# Patient Record
Sex: FEMALE | Race: WHITE | Employment: UNEMPLOYED | URBAN - METROPOLITAN AREA
[De-identification: names, ages, dates, MRNs, and addresses within clinical notes are randomized per-mention and may not be internally consistent; named-entity substitution may affect disease eponyms.]

---

## 2019-03-11 LAB
ANTIBODY SCREEN, EXTERNAL: NEGATIVE
HBSAG, EXTERNAL: NEGATIVE
HIV, EXTERNAL: NONREACTIVE
RUBELLA, EXTERNAL: NORMAL
T. PALLIDUM, EXTERNAL: NEGATIVE
TYPE, ABO & RH, EXTERNAL: NORMAL

## 2019-07-03 ENCOUNTER — OFFICE VISIT (OUTPATIENT)
Dept: FAMILY MEDICINE CLINIC | Age: 29
End: 2019-07-03

## 2019-07-03 VITALS
TEMPERATURE: 98.4 F | HEIGHT: 61 IN | DIASTOLIC BLOOD PRESSURE: 70 MMHG | SYSTOLIC BLOOD PRESSURE: 109 MMHG | BODY MASS INDEX: 42.86 KG/M2 | OXYGEN SATURATION: 99 % | RESPIRATION RATE: 16 BRPM | HEART RATE: 86 BPM | WEIGHT: 227 LBS

## 2019-07-03 DIAGNOSIS — Z34.93 PRENATAL CARE IN THIRD TRIMESTER: Primary | ICD-10-CM

## 2019-07-03 DIAGNOSIS — O09.33 LATE PRENATAL CARE COMPLICATING PREGNANCY IN THIRD TRIMESTER: ICD-10-CM

## 2019-07-03 DIAGNOSIS — E66.01 SEVERE OBESITY (HCC): ICD-10-CM

## 2019-07-03 LAB
BILIRUB UR QL STRIP: NEGATIVE
GLUCOSE UR-MCNC: NEGATIVE MG/DL
GRBS, EXTERNAL: NEGATIVE
KETONES P FAST UR STRIP-MCNC: NEGATIVE MG/DL
PH UR STRIP: 7.5 [PH] (ref 4.6–8)
PROT UR QL STRIP: NEGATIVE
SP GR UR STRIP: 1.01 (ref 1–1.03)
UA UROBILINOGEN AMB POC: NORMAL (ref 0.2–1)
URINALYSIS CLARITY POC: CLEAR
URINALYSIS COLOR POC: YELLOW
URINE BLOOD POC: NORMAL
URINE LEUKOCYTES POC: NEGATIVE
URINE NITRITES POC: NEGATIVE

## 2019-07-03 NOTE — PROGRESS NOTES
Chief Complaint   Patient presents with    Routine Prenatal Visit     [address]-  Receiving care    lmp--10/07/2018    Abnormal bleeding or discharge--no bleeding --white discharge --a lot of itching    Last pap smear--at 4 months pregnant--normal results    --    HAD 2 MISCARRIAGES     2 VAGINAL AND 2 C SECTIONS--LAST TWO CHILDREN    ALL BABIES FULL TERM    DUE DATE--,9879    VCU--3X ULTRA SOUND--LAST ONE A MONTH AGO

## 2019-07-03 NOTE — PROGRESS NOTES
34 yo  at 45 w 3 d presents for first visit to our office    LENIN    Had been receiving prenatal care at the [facility]     CD x 2 (her last two CDs)    States that she is not taking her prenatal vitamins    States that she has a headache all the time    Sami speaking, requests female provider; from [facility]    Denies being on any other medications    Last Hgb = 10.8 on   TDAP given 19    ASCUS with negative HR HPV 3/19    B +/neg  Rubella immune  TPA neg  UC 10,000-50,000  HgbB s Ag neg  HIV NR  GC/chl neg  Early glucola 3/14/19    One hour  glucola = 109      Repeat CD scheduled 2019 -- 9:30 am -- Dr. Jyoti Kimble (notified)    Pt advised to arrive at 6 am and to fast after midngiht

## 2019-07-03 NOTE — PATIENT INSTRUCTIONS
[address] 39 ãä ÇáÍãá: ÅÑÔÇÏÇÊ ÇáÑÚÇíÉ  [ Week 44 of Your Pregnancy: Care Instructions ]  ÅÑÔÇÏÇÊ ÇáÑÚÇíÉ ÇáÎÇÕÉ Èß      XHNR åÐå ÇáÃÓÇÈíÚ ÇáäåÇÆíÉ¡ ÞÏ ÊÔÚÑíä PMSZCHM áÑÄíÉ ØÝáß ÇáÌÏíÏÉ. ÚÇÏÉð íÈÏæ ÇáãæÇáíÏ ÇáÌÏÏ ãÎÊáÝíä ÚãÇ ÊÔÇåÏíäå Ýí ÇáÕæÑ Ãæ ÇáÃÝáÇã. ÈÚÏ ÇáæáÇÏÉ ãÈÇÔÑÉð¡ íãßä Ãä íßæä Ôßá ÑÄæÓåã ÛÑíÈðÇ. íãßä Ãä Êßæä ÃÚíäåã ãäÊÝÎÉ. æíãßä Ãä Êßæä YMQJAZZ SHNHLVELL ãÊæÑãÉ. ßãÇ íãßä Ãä Êßæä ÈÔÑÊåã ÔÏíÏÉ ÇáÌÝÇÝ¡ Ãæ ÚáÇãÇÊ ÍãÑÇÁ Úáì ÇáÌÝæä¡ Ãæ ÇáÃäÝ¡ Ãæ ÇáÑÞÈÉ. æãÚ Ðáß áÇ íÒÇá ÃÛáÈ ÇáÂÈÇÁ íÚÊÞÏæä Ãä RDCNMTF Ðæí Ôßá Ìãíá. ÊõÚÏ ÑÚÇíÉ ÇáãÊÇÈÚÉ ÌÒÁðÇ ÃÓÇÓíðÇ Ýí ÚáÇÌß æÓáÇãÊß. ÝÚáíß ÇáÍÑÕ Úáì ÊÑÊíÈ ÌãíÚ ãæÇÚíÏ ÒíÇÑÉ ÇáØÈíÈ ZEPMQVFKY ÈåÇ¡ FHWMFWIQ ÈØÈíÈß ÚäÏ RFJHYNNG ãä Ãí ãÔßáÇÊ. æãä ÇáÌíÏ ÃíÖðÇ Ãä ÊÚÑÝ äÊÇÆÌ GXDCWBRY æßÐáß EAHSDPXC ÈÞÇÆãÉ ÇáÃÏæíÉ ÇáÊí WKMNDSFL. ßíÝ íãßäß ÇáÇÚÊäÇÁ ÈäÝÓß Ýí ZKJGCH¿  LRMBALSGC ááÅÝØÇÑ   · ÅÐÇ ßäÊö ÊÞæãíä ÈÇáÑÖÇÚÉ ÇáØÈíÚíÉ¡ ÝÇÓÊãÑí Ýí ÊäÇæá ÃØÚãÉ ÕÍíÉ.  · ÊÌäÈí TVOICHSXR HOBTJBLD¡ æÇáÓÌÇÆÑ¡ æÇáãÎÏÑÇÊ. íÊÖãä Ðáß ÇáÃÏæíÉ ÇáãÞÑÑÉ ÈæÕÝÉ ØÈíÉ Ãæ ãÊÇÍÉ ÈÏæä æÕÝÉ ØÈíÉ.  · íãßäß ÇáãÓÇÚÏÉ Ýí ÇáæÞÇíÉ ãä ÇáÊåÇÈ ÇáÍáãÇÊ ÅÐÇ ßäÊö ÊÑÖÚíä ØÝáß Ýí ÇáæÖÚ ÇáÕÍíÍ. ÓÊÓÇÚÏß ÇáããÑÖÉ Ýí ÊÚáã ÇáÞíÇã ÈÐáß.  · íÍÊÇÌ ÇáãæáæÏ ÇáÌÏíÏ Åáì ÇáÑÖÇÚÉ ßá ÓÇÚÉ æäÕÝ ÇáÓÇÚÉ Åáì 3 ÓÇÚÇÊ ÊÞÑíÈðÇ. ÇÎÊÇÑí æÓíáÉ ÊäÙíã ÇáÍãá ÇáãäÇÓÈÉ ÈÚÏ æáÇÏÉ ØÝáß   · áÇ íÒÇá ãä Çáããßä Íãá ÇáÓíÏÇÊ ÇáÇÊí ÊÑÖÚä ÑÖÇÚÉ ØÈíÚíÉ. ÇÓÊÎÏãí æÓÇÆá ÊäÙíã GSUYS ÅÐÇ ßäÊö áÇ ÊÑÛÈíä Ýí ÇáÍãá.  · íõÚÊÈÑ FTDAKE ÇáÑÍãí (IUDs) ÝÚøÇá ááÓíÏÇÊ ÇááÇÊí ÊÑÛÈä Ýí ÇáÇäÊÙÇÑ áãÏÉ áÇ ÊÞá Úä ÓäÊíä ÞÈá ÇáÍãá ãÑÉ ÃÎÑì. ßãÇ Ãä KDMLCWCZ Âãä ÃËäÇÁ ÇáÑÖÇÚÉ ÇáØÈíÚíÉ.  · íãßä ÇÓÊÎÏÇã \"ÏíÈæ ÈÑæÝíÑÇ\" ÃËäÇÁ ÇáÑÖÇÚÉ ÇáØÈíÚíÉ. ÅäåÇ ÍÞäÉ ÊõÃÎÐ ßá 3 ÃÔåÑ.  · ßãÇ Ãä ÃÞÑÇÕ ÊäÙíã ÇáÍãá ÝÚÇáÉ. áßäß ÊÍÊÇÌíä Åáì äæÚ ãÎÊáÝ ãä ÇáÃÞÑÇÕ ÃËäÇÁ ÇáÑÖÇÚÉ ÇáØÈíÚíÉ. æÚäÏãÇ ÊÈÏÃíä Ýí ÊäÇæá åÐå ÇáÃÞÑÇÕ¡ íÊÚíä Úáíßö ÇáÊÃßÏ ãä ÇÓÊÎÏÇã äæÚðÇ ÂÎÑ ãä æÓÇÆá ÊäÙíã ÇáÍãá ÍÊì ÊÈÏÃíä ÇáãÌãæÚÉ ÇáËÇäíÉ.    · íÚÊÈÑ HKRAZB ÇáãÇäÚ DBDMI¡ æÛØÇÁ ÚäÞ ÇáÑÍã¡ æÚãáíÇÊ ÒÑÚ ÞäÇÉ ÝÇáæÈ¡ CVDGNPVMT ÐÇÊ ãÈíÏ ÇáäØÇÝ ãä CJGNIVA DKXDZUW VTB SIIWUSI. ÅÐÇ ßÇä áÏíßö ÇáÍÌÇÈ ÇáãÇäÚ UFGTK Ãæ ÛØÇÁ ÚäÞ ÇáÑÍã¡ ÝÓÊÍÊÇÌíä Åáì ÊÌÏíÏå.  · ÑÈØ ÞäÇÉ ÝÇáæÈ (ÑÈØ ÇáÞäæÇÊ áÏíßö) æÞØÚ ÇáÞäÇÉ ÇáãäæíÉ ßáÇåãÇ Íáæá ÏÇÆãÉ. ÅäåÇ ÎíÇÑÇÊ ÌíÏÉ ÅÐÇ ßäÊö æÇËÞÉ ãä Ãäß ÇßÊÝíÊ ãä ÇáÅäÌÇÈ. Ãíä íãßä ãÚÑÝÉ ÇáãÒíÏ¿  ÇäÊÞÇá Åáì http://www.[employer]/. ÏÎæá A811 íãßäß ãÚÑÝÉ ÇáãÒíÏ ãä ÎáÇá ãÑÈÚ ÇáÈÍË \"ÇáÃÓÈæÚ 44 ãä ÇáÍãá: ÅÑÔÇÏÇÊ ÇáÑÚÇíÉ - [ Week 44 of Your Pregnancy: Care Instructions ]. \"  © 4857-5583 [employer]wise, Incorporated. ÊãÊ ÊåíÆÉ ÅÑÔÇÏÇÊ ÇáÚäÇíÉ ÈãæÌÈ ÊÑÎíÕ ãä ãÎÊÕ ÇáÑÚÇíÉ ÇáÕÍíÉ áÏíß. ÅÐÇ ßÇäÊ áÏíß ÃíÉ YLTNPUTHI Úä ÍÇáÉ ØÈíÉ Ãæ Ãí ãä åÐå FQSCIAOGF¡ ÝÊæÌå ÏæãðÇ UXEXFAR Åáì ãÎÊÕ ÇáÑÚÇíÉ ÇáÕÍíÉ. ÊäÝí ãäÙãÉ [employer] ÖãÇä Ãæ Jenna Pinedo NHGTPEC åÐå GXBVSTHNY.   ÅÕÏÇÑ ÇáãÍÊæì: 11.9 ãÍÏøË HVEGTKXA ãä: 25 HO QMOTD [id number]

## 2019-07-03 NOTE — PROGRESS NOTES
Subjective:      Chiquita Cortes is a 33 y.o. female who is being seen today for her first obstetrical visit. She is a 32 yo  at .39w2d. She has had prenatal care at [address] [place] [address]/ [facility]. Has has 2 prior C- section. Last C- section was done in [facility] on 2017. They state it is not possible to get records from [facility] because they did the C- section in a clinic and they don't keep records. Pregnancy complicated with hypertension, h/o migraines headaches. Plan of delivery was for  at 39 weeks    No LOF, VB, dysuria,  + mari damian contractions. + vaginal discharge but not itching, no swelling of legs today ( but happened last month)   + Fetal movements. She is not taking prenatal vitamins ( makes her vomit). + headaches, upper abdominal area pain, sometimes SOB at night. No other complaints today. The ROS otherwise negative        All babies delivered full term. Allergies- reviewed:   No Known Allergies      Medications- reviewed:   No current outpatient medications on file. No current facility-administered medications for this visit. Past Medical History- reviewed:  History reviewed. No pertinent past medical history. Past Surgical History- reviewed:   History reviewed. No pertinent surgical history.       Social History- reviewed:  Social History     Socioeconomic History    Marital status: UNKNOWN     Spouse name: Not on file    Number of children: Not on file    Years of education: Not on file    Highest education level: Not on file   Occupational History    Not on file   Social Needs    Financial resource strain: Not on file    Food insecurity:     Worry: Not on file     Inability: Not on file    Transportation needs:     Medical: Not on file     Non-medical: Not on file   Tobacco Use    Smoking status: Never Smoker    Smokeless tobacco: Never Used   Substance and Sexual Activity    Alcohol use: Not on file    Drug use: Never    Sexual activity: Never   Lifestyle    Physical activity:     Days per week: Not on file     Minutes per session: Not on file    Stress: Not on file   Relationships    Social connections:     Talks on phone: Not on file     Gets together: Not on file     Attends Evangelical service: Not on file     Active member of club or organization: Not on file     Attends meetings of clubs or organizations: Not on file     Relationship status: Not on file    Intimate partner violence:     Fear of current or ex partner: Not on file     Emotionally abused: Not on file     Physically abused: Not on file     Forced sexual activity: Not on file   Other Topics Concern    Not on file   Social History Narrative    Not on file         Immunizations- reviewed: There is no immunization history on file for this patient. Tdap done this pregnancy per patient.       ROS  : Denies vaginal bleeding and leaking of fluid  GI: Endorses occasional nausea and vomiting      Objective:     Visit Vitals  /70   Pulse 86   Temp 98.4 °F (36.9 °C) (Oral)   Resp 16   Ht 5' 0.63\" (1.54 m)   Wt 227 lb (103 kg)   LMP 10/07/2018   SpO2 99%   BMI 43.42 kg/m²       Physical Exam:  GENERAL APPEARANCE: alert, well appearing, in no apparent distress  HEAD: normocephalic, atraumatic   LUNGS: clear to auscultation, no wheezes, rales or rhonchi, symmetric air entry  HEART: regular rate and rhythm, no murmurs  ABDOMEN: fundus soft, nontender 38 cm and FHT present 140  BACK: no CVA tenderness  Pelvic exam: refused       Labs:    Recent Results (from the past 12 hour(s))   AMB POC URINALYSIS DIP STICK AUTO W/O MICRO    Collection Time: 07/03/19  8:29 AM   Result Value Ref Range    Color (UA POC) Yellow     Clarity (UA POC) Clear     Glucose (UA POC) Negative Negative    Bilirubin (UA POC) Negative Negative    Ketones (UA POC) Negative Negative    Specific gravity (UA POC) 1.015 1.001 - 1.035    Blood (UA POC) 2+ Negative    pH (UA POC) 7.5 4.6 - 8.0    Protein (UA POC) Negative Negative    Urobilinogen (UA POC) 0.2 mg/dL 0.2 - 1    Nitrites (UA POC) Negative Negative    Leukocyte esterase (UA POC) Negative Negative         Assessment:     32 yo [id number] at 39.2 by LMP and confirmed on 2nd trimester US    PNL : B rhesus Positive, antibody screen neg. TPA, hep B sAg, HIV, GC  neg,   delined GBS  Normal 1hour glucola: 73  Electrophoresis Hb: AA  Rubella immune, Varicella IGG: low on 19  Hb: 10.9 on   Pap on 3/14/19 : ASCUS on 3/14/19    4/22 US anterior placental, EFW: 59% Oni 22.1 cm      ICD-10-CM ICD-9-CM    1. Prenatal care in third trimester Z34.93 V22.1 AMB POC URINALYSIS DIP STICK AUTO W/O MICRO   2. Late prenatal care complicating pregnancy in third trimester O09.33 V23.7    3. BMI 40.0-44.9, adult [facility]) Z68.41 V85.41          Plan:   · Patient discussed with Dr. Ludy Hurley. She will arrange for her to get a C- section. Patient would prefer care with a female provider. Dr. Ludy Hurley will take over care.  will be scheduled on   · GBS not collected. Discussed the patient's BMI with her.     Vietnamese  # [id number]   Orders Placed This Encounter    AMB POC URINALYSIS DIP STICK AUTO W/O MICRO

## 2019-07-07 LAB — B-HEM STREP SPEC QL CULT: NEGATIVE

## 2019-07-08 ENCOUNTER — ANESTHESIA EVENT (OUTPATIENT)
Dept: LABOR AND DELIVERY | Age: 29
DRG: 540 | End: 2019-07-08
Payer: MEDICAID

## 2019-07-08 ENCOUNTER — ANESTHESIA (OUTPATIENT)
Dept: LABOR AND DELIVERY | Age: 29
DRG: 540 | End: 2019-07-08
Payer: MEDICAID

## 2019-07-08 ENCOUNTER — HOSPITAL ENCOUNTER (INPATIENT)
Age: 29
LOS: 3 days | Discharge: HOME | DRG: 540 | End: 2019-07-11
Attending: OBSTETRICS & GYNECOLOGY | Admitting: OBSTETRICS & GYNECOLOGY
Payer: MEDICAID

## 2019-07-08 DIAGNOSIS — Z98.891 S/P REPEAT LOW TRANSVERSE C-SECTION: Primary | ICD-10-CM

## 2019-07-08 LAB
ALBUMIN SERPL-MCNC: 2.6 G/DL (ref 3.5–5)
ALBUMIN/GLOB SERPL: 0.6 {RATIO} (ref 1.1–2.2)
ALP SERPL-CCNC: 136 U/L (ref 45–117)
ALT SERPL-CCNC: 10 U/L (ref 12–78)
ANION GAP SERPL CALC-SCNC: 8 MMOL/L (ref 5–15)
AST SERPL-CCNC: 20 U/L (ref 15–37)
BASOPHILS # BLD: 0 K/UL (ref 0–0.1)
BASOPHILS NFR BLD: 0 % (ref 0–1)
BILIRUB SERPL-MCNC: 0.3 MG/DL (ref 0.2–1)
BUN SERPL-MCNC: 5 MG/DL (ref 6–20)
BUN/CREAT SERPL: 13 (ref 12–20)
CALCIUM SERPL-MCNC: 8.9 MG/DL (ref 8.5–10.1)
CHLORIDE SERPL-SCNC: 107 MMOL/L (ref 97–108)
CO2 SERPL-SCNC: 23 MMOL/L (ref 21–32)
CREAT SERPL-MCNC: 0.4 MG/DL (ref 0.55–1.02)
DIFFERENTIAL METHOD BLD: ABNORMAL
EOSINOPHIL # BLD: 0.1 K/UL (ref 0–0.4)
EOSINOPHIL NFR BLD: 1 % (ref 0–7)
ERYTHROCYTE [DISTWIDTH] IN BLOOD BY AUTOMATED COUNT: 15.7 % (ref 11.5–14.5)
GLOBULIN SER CALC-MCNC: 4.1 G/DL (ref 2–4)
GLUCOSE SERPL-MCNC: 76 MG/DL (ref 65–100)
HCT VFR BLD AUTO: 36.3 % (ref 35–47)
HGB BLD-MCNC: 11.4 G/DL (ref 11.5–16)
IMM GRANULOCYTES # BLD AUTO: 0.1 K/UL (ref 0–0.04)
IMM GRANULOCYTES NFR BLD AUTO: 1 % (ref 0–0.5)
LYMPHOCYTES # BLD: 1.7 K/UL (ref 0.8–3.5)
LYMPHOCYTES NFR BLD: 28 % (ref 12–49)
MCH RBC QN AUTO: 24.3 PG (ref 26–34)
MCHC RBC AUTO-ENTMCNC: 31.4 G/DL (ref 30–36.5)
MCV RBC AUTO: 77.2 FL (ref 80–99)
MONOCYTES # BLD: 0.5 K/UL (ref 0–1)
MONOCYTES NFR BLD: 9 % (ref 5–13)
NEUTS SEG # BLD: 3.7 K/UL (ref 1.8–8)
NEUTS SEG NFR BLD: 61 % (ref 32–75)
NRBC # BLD: 0 K/UL (ref 0–0.01)
NRBC BLD-RTO: 0 PER 100 WBC
PLATELET # BLD AUTO: 199 K/UL (ref 150–400)
PMV BLD AUTO: 11.2 FL (ref 8.9–12.9)
POTASSIUM SERPL-SCNC: 4.4 MMOL/L (ref 3.5–5.1)
PROT SERPL-MCNC: 6.7 G/DL (ref 6.4–8.2)
RBC # BLD AUTO: 4.7 M/UL (ref 3.8–5.2)
SODIUM SERPL-SCNC: 138 MMOL/L (ref 136–145)
WBC # BLD AUTO: 6 K/UL (ref 3.6–11)

## 2019-07-08 PROCEDURE — 77030018809 HC RETRCTR ALXSO DISP AMR -B

## 2019-07-08 PROCEDURE — 3E0R3BZ INTRODUCTION OF ANESTHETIC AGENT INTO SPINAL CANAL, PERCUTANEOUS APPROACH: ICD-10-PCS | Performed by: ANESTHESIOLOGY

## 2019-07-08 PROCEDURE — 65270000029 HC RM PRIVATE

## 2019-07-08 PROCEDURE — 77010026065 HC OXYGEN MINIMUM MEDICAL AIR: Performed by: OBSTETRICS & GYNECOLOGY

## 2019-07-08 PROCEDURE — 74011000250 HC RX REV CODE- 250

## 2019-07-08 PROCEDURE — 36415 COLL VENOUS BLD VENIPUNCTURE: CPT

## 2019-07-08 PROCEDURE — 77030007866 HC KT SPN ANES BBMI -B: Performed by: ANESTHESIOLOGY

## 2019-07-08 PROCEDURE — 76010000392 HC C SECN EA ADDL 0.5 HR: Performed by: OBSTETRICS & GYNECOLOGY

## 2019-07-08 PROCEDURE — 85025 COMPLETE CBC W/AUTO DIFF WBC: CPT

## 2019-07-08 PROCEDURE — 74011250636 HC RX REV CODE- 250/636

## 2019-07-08 PROCEDURE — 77030008459 HC STPLR SKN COOP -B

## 2019-07-08 PROCEDURE — 86900 BLOOD TYPING SEROLOGIC ABO: CPT

## 2019-07-08 PROCEDURE — 74011250636 HC RX REV CODE- 250/636: Performed by: OBSTETRICS & GYNECOLOGY

## 2019-07-08 PROCEDURE — 80053 COMPREHEN METABOLIC PANEL: CPT

## 2019-07-08 PROCEDURE — 75410000003 HC RECOV DEL/VAG/CSECN EA 0.5 HR: Performed by: OBSTETRICS & GYNECOLOGY

## 2019-07-08 PROCEDURE — 74011250636 HC RX REV CODE- 250/636: Performed by: ANESTHESIOLOGY

## 2019-07-08 PROCEDURE — 74011250636 HC RX REV CODE- 250/636: Performed by: STUDENT IN AN ORGANIZED HEALTH CARE EDUCATION/TRAINING PROGRAM

## 2019-07-08 PROCEDURE — 77030018846 HC SOL IRR STRL H20 ICUM -A

## 2019-07-08 PROCEDURE — 74011250637 HC RX REV CODE- 250/637: Performed by: STUDENT IN AN ORGANIZED HEALTH CARE EDUCATION/TRAINING PROGRAM

## 2019-07-08 PROCEDURE — 77030018836 HC SOL IRR NACL ICUM -A

## 2019-07-08 PROCEDURE — 76010000391 HC C SECN FIRST 1 HR: Performed by: OBSTETRICS & GYNECOLOGY

## 2019-07-08 PROCEDURE — 76060000078 HC EPIDURAL ANESTHESIA: Performed by: OBSTETRICS & GYNECOLOGY

## 2019-07-08 PROCEDURE — 77030034850

## 2019-07-08 PROCEDURE — 77030032490 HC SLV COMPR SCD KNE COVD -B

## 2019-07-08 PROCEDURE — 77030033542 HC RETRCTR RETNTS PANICLS GQSM -B

## 2019-07-08 RX ORDER — HYDROMORPHONE HYDROCHLORIDE 1 MG/ML
0.5 INJECTION, SOLUTION INTRAMUSCULAR; INTRAVENOUS; SUBCUTANEOUS
Status: DISPENSED | OUTPATIENT
Start: 2019-07-08 | End: 2019-07-09

## 2019-07-08 RX ORDER — SODIUM CHLORIDE 0.9 % (FLUSH) 0.9 %
5-40 SYRINGE (ML) INJECTION EVERY 8 HOURS
Status: DISCONTINUED | OUTPATIENT
Start: 2019-07-08 | End: 2019-07-11

## 2019-07-08 RX ORDER — IBUPROFEN 800 MG/1
800 TABLET ORAL EVERY 8 HOURS
Status: DISCONTINUED | OUTPATIENT
Start: 2019-07-08 | End: 2019-07-11 | Stop reason: HOSPADM

## 2019-07-08 RX ORDER — MORPHINE SULFATE 0.5 MG/ML
INJECTION, SOLUTION EPIDURAL; INTRATHECAL; INTRAVENOUS AS NEEDED
Status: DISCONTINUED | OUTPATIENT
Start: 2019-07-08 | End: 2019-07-08 | Stop reason: HOSPADM

## 2019-07-08 RX ORDER — EPHEDRINE SULFATE 50 MG/ML
INJECTION, SOLUTION INTRAVENOUS AS NEEDED
Status: DISCONTINUED | OUTPATIENT
Start: 2019-07-08 | End: 2019-07-08 | Stop reason: HOSPADM

## 2019-07-08 RX ORDER — ACETAMINOPHEN 325 MG/1
650 TABLET ORAL
Status: DISCONTINUED | OUTPATIENT
Start: 2019-07-08 | End: 2019-07-11 | Stop reason: HOSPADM

## 2019-07-08 RX ORDER — SODIUM CHLORIDE, SODIUM LACTATE, POTASSIUM CHLORIDE, CALCIUM CHLORIDE 600; 310; 30; 20 MG/100ML; MG/100ML; MG/100ML; MG/100ML
1000 INJECTION, SOLUTION INTRAVENOUS CONTINUOUS
Status: DISCONTINUED | OUTPATIENT
Start: 2019-07-08 | End: 2019-07-08 | Stop reason: HOSPADM

## 2019-07-08 RX ORDER — CEFAZOLIN SODIUM/WATER 2 G/20 ML
2 SYRINGE (ML) INTRAVENOUS ONCE
Status: COMPLETED | OUTPATIENT
Start: 2019-07-08 | End: 2019-07-08

## 2019-07-08 RX ORDER — SODIUM CHLORIDE 0.9 % (FLUSH) 0.9 %
5-40 SYRINGE (ML) INJECTION EVERY 8 HOURS
Status: DISCONTINUED | OUTPATIENT
Start: 2019-07-08 | End: 2019-07-08 | Stop reason: HOSPADM

## 2019-07-08 RX ORDER — HYDROMORPHONE HYDROCHLORIDE 2 MG/ML
1 INJECTION, SOLUTION INTRAMUSCULAR; INTRAVENOUS; SUBCUTANEOUS
Status: DISPENSED | OUTPATIENT
Start: 2019-07-08 | End: 2019-07-09

## 2019-07-08 RX ORDER — DIPHENHYDRAMINE HYDROCHLORIDE 50 MG/ML
12.5 INJECTION, SOLUTION INTRAMUSCULAR; INTRAVENOUS
Status: DISCONTINUED | OUTPATIENT
Start: 2019-07-08 | End: 2019-07-09

## 2019-07-08 RX ORDER — KETOROLAC TROMETHAMINE 30 MG/ML
30 INJECTION, SOLUTION INTRAMUSCULAR; INTRAVENOUS
Status: DISPENSED | OUTPATIENT
Start: 2019-07-08 | End: 2019-07-09

## 2019-07-08 RX ORDER — OXYTOCIN 10 [USP'U]/ML
INJECTION, SOLUTION INTRAMUSCULAR; INTRAVENOUS AS NEEDED
Status: DISCONTINUED | OUTPATIENT
Start: 2019-07-08 | End: 2019-07-08 | Stop reason: HOSPADM

## 2019-07-08 RX ORDER — SODIUM CHLORIDE 0.9 % (FLUSH) 0.9 %
5-40 SYRINGE (ML) INJECTION AS NEEDED
Status: DISCONTINUED | OUTPATIENT
Start: 2019-07-08 | End: 2019-07-08 | Stop reason: HOSPADM

## 2019-07-08 RX ORDER — SODIUM CHLORIDE 0.9 % (FLUSH) 0.9 %
5-40 SYRINGE (ML) INJECTION AS NEEDED
Status: DISCONTINUED | OUTPATIENT
Start: 2019-07-08 | End: 2019-07-11 | Stop reason: HOSPADM

## 2019-07-08 RX ORDER — FENTANYL CITRATE 50 UG/ML
INJECTION, SOLUTION INTRAMUSCULAR; INTRAVENOUS AS NEEDED
Status: DISCONTINUED | OUTPATIENT
Start: 2019-07-08 | End: 2019-07-08 | Stop reason: HOSPADM

## 2019-07-08 RX ORDER — ONDANSETRON 2 MG/ML
4 INJECTION INTRAMUSCULAR; INTRAVENOUS
Status: DISCONTINUED | OUTPATIENT
Start: 2019-07-08 | End: 2019-07-11 | Stop reason: HOSPADM

## 2019-07-08 RX ORDER — OXYTOCIN/RINGER'S LACTATE 20/1000 ML
125-500 PLASTIC BAG, INJECTION (ML) INTRAVENOUS ONCE
Status: ACTIVE | OUTPATIENT
Start: 2019-07-08 | End: 2019-07-08

## 2019-07-08 RX ORDER — SIMETHICONE 80 MG
80 TABLET,CHEWABLE ORAL AS NEEDED
Status: DISCONTINUED | OUTPATIENT
Start: 2019-07-08 | End: 2019-07-11 | Stop reason: HOSPADM

## 2019-07-08 RX ORDER — ZOLPIDEM TARTRATE 5 MG/1
5 TABLET ORAL
Status: DISCONTINUED | OUTPATIENT
Start: 2019-07-08 | End: 2019-07-11 | Stop reason: HOSPADM

## 2019-07-08 RX ORDER — ONDANSETRON 2 MG/ML
INJECTION INTRAMUSCULAR; INTRAVENOUS AS NEEDED
Status: DISCONTINUED | OUTPATIENT
Start: 2019-07-08 | End: 2019-07-08 | Stop reason: HOSPADM

## 2019-07-08 RX ORDER — NALOXONE HYDROCHLORIDE 0.4 MG/ML
0.1 INJECTION, SOLUTION INTRAMUSCULAR; INTRAVENOUS; SUBCUTANEOUS
Status: DISCONTINUED | OUTPATIENT
Start: 2019-07-08 | End: 2019-07-11 | Stop reason: HOSPADM

## 2019-07-08 RX ORDER — SODIUM CHLORIDE, SODIUM LACTATE, POTASSIUM CHLORIDE, CALCIUM CHLORIDE 600; 310; 30; 20 MG/100ML; MG/100ML; MG/100ML; MG/100ML
125 INJECTION, SOLUTION INTRAVENOUS CONTINUOUS
Status: DISCONTINUED | OUTPATIENT
Start: 2019-07-08 | End: 2019-07-11 | Stop reason: HOSPADM

## 2019-07-08 RX ORDER — OXYCODONE HYDROCHLORIDE 5 MG/1
10 TABLET ORAL
Status: ACTIVE | OUTPATIENT
Start: 2019-07-08 | End: 2019-07-09

## 2019-07-08 RX ORDER — OXYCODONE HYDROCHLORIDE 5 MG/1
5 TABLET ORAL
Status: ACTIVE | OUTPATIENT
Start: 2019-07-08 | End: 2019-07-09

## 2019-07-08 RX ORDER — FAMOTIDINE 10 MG/ML
INJECTION INTRAVENOUS AS NEEDED
Status: DISCONTINUED | OUTPATIENT
Start: 2019-07-08 | End: 2019-07-08 | Stop reason: HOSPADM

## 2019-07-08 RX ORDER — NALOXONE HYDROCHLORIDE 0.4 MG/ML
0.4 INJECTION, SOLUTION INTRAMUSCULAR; INTRAVENOUS; SUBCUTANEOUS AS NEEDED
Status: DISCONTINUED | OUTPATIENT
Start: 2019-07-08 | End: 2019-07-11 | Stop reason: HOSPADM

## 2019-07-08 RX ORDER — BUPIVACAINE HYDROCHLORIDE 7.5 MG/ML
INJECTION, SOLUTION EPIDURAL; RETROBULBAR AS NEEDED
Status: DISCONTINUED | OUTPATIENT
Start: 2019-07-08 | End: 2019-07-08 | Stop reason: HOSPADM

## 2019-07-08 RX ORDER — HYDROCODONE BITARTRATE AND ACETAMINOPHEN 5; 325 MG/1; MG/1
1 TABLET ORAL
Status: DISCONTINUED | OUTPATIENT
Start: 2019-07-08 | End: 2019-07-11 | Stop reason: HOSPADM

## 2019-07-08 RX ADMIN — SODIUM CHLORIDE, SODIUM LACTATE, POTASSIUM CHLORIDE, AND CALCIUM CHLORIDE: 600; 310; 30; 20 INJECTION, SOLUTION INTRAVENOUS at 10:26

## 2019-07-08 RX ADMIN — MORPHINE SULFATE 100 MCG: 0.5 INJECTION, SOLUTION EPIDURAL; INTRATHECAL; INTRAVENOUS at 09:42

## 2019-07-08 RX ADMIN — FENTANYL CITRATE 90 MCG: 50 INJECTION, SOLUTION INTRAMUSCULAR; INTRAVENOUS at 10:34

## 2019-07-08 RX ADMIN — HYDROCODONE BITARTRATE AND ACETAMINOPHEN 1 TABLET: 5; 325 TABLET ORAL at 21:40

## 2019-07-08 RX ADMIN — FAMOTIDINE 20 MG: 10 INJECTION INTRAVENOUS at 09:38

## 2019-07-08 RX ADMIN — HYDROMORPHONE HYDROCHLORIDE 1 MG: 2 INJECTION INTRAMUSCULAR; INTRAVENOUS; SUBCUTANEOUS at 11:59

## 2019-07-08 RX ADMIN — BUPIVACAINE HYDROCHLORIDE 1.6 ML: 7.5 INJECTION, SOLUTION EPIDURAL; RETROBULBAR at 09:42

## 2019-07-08 RX ADMIN — EPHEDRINE SULFATE 10 MG: 50 INJECTION, SOLUTION INTRAVENOUS at 09:42

## 2019-07-08 RX ADMIN — Medication 2 G: at 09:37

## 2019-07-08 RX ADMIN — FENTANYL CITRATE 10 MCG: 50 INJECTION, SOLUTION INTRAMUSCULAR; INTRAVENOUS at 09:42

## 2019-07-08 RX ADMIN — OXYTOCIN 40 UNITS: 10 INJECTION, SOLUTION INTRAMUSCULAR; INTRAVENOUS at 10:25

## 2019-07-08 RX ADMIN — OXYTOCIN 20 UNITS: 10 INJECTION, SOLUTION INTRAMUSCULAR; INTRAVENOUS at 10:59

## 2019-07-08 RX ADMIN — KETOROLAC TROMETHAMINE 30 MG: 30 INJECTION, SOLUTION INTRAMUSCULAR at 11:38

## 2019-07-08 RX ADMIN — KETOROLAC TROMETHAMINE 30 MG: 30 INJECTION, SOLUTION INTRAMUSCULAR at 16:58

## 2019-07-08 RX ADMIN — SODIUM CHLORIDE, SODIUM LACTATE, POTASSIUM CHLORIDE, AND CALCIUM CHLORIDE 1000 ML: 600; 310; 30; 20 INJECTION, SOLUTION INTRAVENOUS at 07:56

## 2019-07-08 RX ADMIN — SODIUM CHLORIDE, SODIUM LACTATE, POTASSIUM CHLORIDE, AND CALCIUM CHLORIDE: 600; 310; 30; 20 INJECTION, SOLUTION INTRAVENOUS at 09:47

## 2019-07-08 RX ADMIN — MORPHINE SULFATE 4900 MCG: 0.5 INJECTION, SOLUTION EPIDURAL; INTRATHECAL; INTRAVENOUS at 10:34

## 2019-07-08 RX ADMIN — SODIUM CHLORIDE, SODIUM LACTATE, POTASSIUM CHLORIDE, AND CALCIUM CHLORIDE: 600; 310; 30; 20 INJECTION, SOLUTION INTRAVENOUS at 10:55

## 2019-07-08 RX ADMIN — SODIUM CHLORIDE, SODIUM LACTATE, POTASSIUM CHLORIDE, AND CALCIUM CHLORIDE 125 ML/HR: 600; 310; 30; 20 INJECTION, SOLUTION INTRAVENOUS at 15:32

## 2019-07-08 RX ADMIN — DIPHENHYDRAMINE HYDROCHLORIDE 12.5 MG: 50 INJECTION, SOLUTION INTRAMUSCULAR; INTRAVENOUS at 15:31

## 2019-07-08 RX ADMIN — ONDANSETRON 4 MG: 2 INJECTION INTRAMUSCULAR; INTRAVENOUS at 09:34

## 2019-07-08 NOTE — LACTATION NOTE
This note was copied from a baby's chart.  has left for the day. No blue  phone in room, mom being moved to 3 rd floor. Dad understands some english. Mom giving formula now, may try to breast feed once her milk is in.   Given information about breast feeding in Bulgarian. Will visit mom once she is moved and has  phone.

## 2019-07-08 NOTE — PROGRESS NOTES
0800 Patient in room 203 for repeat csection.  at bedside, Deya Valenzuela at bedside fro H&P and assessment. 3364 Dr. Ranjit Tomas at bedside to discuss plan of care. 0232 Patient in [facility] with this RN and . 200 Dr. Prince Dumont called to OR1  per Dr. Ranjit Tomas request.    4376 Dr. Ranjit Tomas leaving OR at this time. MD aware of 655mL QBL. 1116 Patient back in room 203. Dr. Jesus House leaving bedside at this time. 1202 Patient refusing fundal check at this time. Patient educated on importance of fundal checks. 1307 Patient sleeping on stretcher. Family at bedside. [address] Report: Mother    Bedside and Verbal report given to Johny Paul RN (full name & credentials) on this patient, who is now being transferred to MIU (unit) for routine progression of care. Report consisted of patient's Situation, Background, Assessment and Recommendations (SBAR). [place] ID bands were compared with the identification form, and verified with the patient and receiving nurse. Information from the SBAR, Kardex, Intake/Output, MAR and Recent Results and the Ani Report was reviewed with the receiving nurse; opportunity for questions and clarification provided.

## 2019-07-08 NOTE — ANESTHESIA POSTPROCEDURE EVALUATION
Procedure(s):   SECTION. spinal    Anesthesia Post Evaluation      Multimodal analgesia: multimodal analgesia used between 6 hours prior to anesthesia start to PACU discharge  Patient location during evaluation: PACU  Patient participation: complete - patient participated  Level of consciousness: awake and alert  Airway patency: patent  Anesthetic complications: no  Cardiovascular status: acceptable  Respiratory status: acceptable  Hydration status: acceptable        Vitals Value Taken Time   /47 2019 12:46 PM   Temp 37.1 °C (98.7 °F) 2019 12:02 PM   Pulse 86 2019 12:46 PM   Resp 16 2019 12:02 PM   SpO2 93 % 2019 12:49 PM   Vitals shown include unvalidated device data.

## 2019-07-08 NOTE — PROGRESS NOTES
SBAR IN Report: Mother    Verbal report received from Aleah Shook RN (full name & credentials) on this patient, who is now being transferred from  and D (unit) for routine progression of care. The patient is not wearing a green \"Anesthesia-Duramorph\" band. Report consisted of patient's Situation, Background, Assessment and Recommendations (SBAR). [place] ID bands were compared with the identification form, and verified with the patient and transferring nurse. Information from the SBAR, Kardex, Intake/Output and MAR and the Ani Report was reviewed with the transferring nurse; opportunity for questions and clarification provided.

## 2019-07-08 NOTE — L&D DELIVERY NOTE
Delivery Summary    Patient: Chiquita Cortes MRN: [id number]  SSN: [id number]    YOB: 1990  Age: 33 y.o. Sex: female        Information for the patient's :  Elise Maravilla [[id number]]       Labor Events:    Labor: No    Steroids:     Cervical Ripening Date/Time:       Cervical Ripening Type: None   Antibiotics During Labor: No   Rupture Identifier: Sac 1    Rupture Date/Time: 2019 10:24 AM   Rupture Type:     Amniotic Fluid Volume:  Moderate    Amniotic Fluid Description: Clear    Amniotic Fluid Odor: None    Induction: None       Induction Date/Time:        Indications for Induction:      Augmentation: None   Augmentation Date/Time:      Indications for Augmentation:     Labor complications: None       Additional complications:        Delivery Events:  Indications For Episiotomy:     Episiotomy:     Perineal Laceration(s):     Repaired:     Periurethral Laceration Location:      Repaired:     Labial Laceration Location:     Repaired:     Sulcal Laceration Location:     Repaired:     Vaginal Laceration Location:     Repaired:     Cervical Laceration Location:     Repaired:     Repair Suture:     Number of Repair Packets:     Estimated Blood Loss (ml):  ml     Delivery Date: 2019    Delivery Time: 10:24 AM   Delivery Type: , Low Transverse     Details    Trial of Labor: No   Primary/Repeat: Repeat   Priority: Routine   Indications:  Prior Uterine Surgery       Sex:  Male     Gestational Age: 37w0d  Delivery Clinician:  Nova Pearson  Living Status: Living   Delivery Location: L&D OR1          APGARS  One minute Five minutes Ten minutes   Skin color: 0   1        Heart rate: 2   2        Grimace: 2   2        Muscle tone: 2   2        Breathin   2        Totals: 8   9          Presentation: Vertex    Position:   Occiput    Resuscitation Method:  Tactile Stimulation;Suctioning-bulb     Meconium Stained: None      Cord Information: 3 Vessels  Complications: None  Cord around:    Delayed cord clamping? No  Cord clamped date/time:2019 10:25 AM  Disposition of Cord Blood: Discard    Blood Gases Sent?: No    Placenta:  Date/Time: 2019 10:24 AM  Removal: Manual Removal      Appearance: Normal;Intact      Measurements:  Birth Weight: 3.465 kg      Birth Length: 51.4 cm      Head Circumference: 35 cm      Chest Circumference: 31.5 cm     Abdominal Girth: 31 cm    Other Providers:   JARED HIGHTOWER;KARLA REY;ISAURO DICKENS;VALERIO AYALA;NIKO PAREDES;BRANDY KNIGHT;SINCERE WILSON;DEDE MIRANDA, Obstetrician;Primary Nurse;Primary  Nurse; Anesthesiologist;Nursery Nurse;Surgeon Assistant;Scrub Tech;Obstetrician         PNL from [facility]: Bpos, Ab neg, GBS neg, Rubella immune, Varicella IGG: low on 19, RPR neg, HBsAg neg, HIV neg, G/C neg    Group B Strep: No results found for: GRBSEXT, GRBSEXT  Information for the patient's :  Rivka Simmonds, Male Aleksandr Gum [[id number]]   No results found for: ABORH, PCTABR, PCTDIG, BILI, ABORHEXT, ABORH    No results for input(s): PCO2CB, PO2CB, HCO3I, SO2I, IBD, PTEMPI, SPECTI, PHICB, ISITE, IDEV, IALLEN in the last 72 hours.            Celia Patton, DO

## 2019-07-08 NOTE — ROUTINE PROCESS
1700 - using Tocagen  this RN explained pain management and the need to ambulate to the bathroom. Patient declined ambulation . This RN educated patient on risks of staying in the bed. Patient verbalized she would try. Patient motioned while sitting on the edge of the bed she was dizzy, and remained dizzy after a few minutes and did not ambulate to bathroom. Charu care completed, patient resting in bed with family at bedside.

## 2019-07-08 NOTE — H&P
History & Physical    Name: Francia Lugo MRN: [id number]  SSN: [id number]    YOB: 1990  Age: 33 y.o. Sex: female        Subjective:     Estimated Date of Delivery: 19  OB History    Para Term  AB Living   7 4 4 0 2 4   SAB TAB Ectopic Molar Multiple Live Births   2                # Outcome Date GA Lbr Christ/2nd Weight Sex Delivery Anes PTL Lv   7 Current            6 SAB            5 SAB            4 Term            3 Term            2 Term            1 Term                Ms. Curt Carvajal is a 32 yo [id number] at 40w0d that was admitted for scheduled repeat LTCS with no complaints. She has h/o 2  and 2 LTCS performed in [facility]. Previous pregnancy was 2017 LTCS in [facility] complicated by hypertension, left untreated due to patient noncompliance. All prenatal care and labs performed at [place] [address]. Prenatal course was complicated by obesity. Please see prenatal records for details. Patient denies vaginal bleeding. Patient reports good fetal movement and small amount of clear mucus like discharge. Patient denies contractions. Past Medical History:   Diagnosis Date    Complication of anesthesia     general anesthesia for both previous csections    Essential hypertension     last two pregnancies     Past Surgical History:   Procedure Laterality Date    HX  SECTION      x2    HX DILATION AND CURETTAGE       Social History     Occupational History    Not on file   Tobacco Use    Smoking status: Never Smoker    Smokeless tobacco: Never Used   Substance and Sexual Activity    Alcohol use: Not Currently    Drug use: Never    Sexual activity: Not Currently     History reviewed. No pertinent family history. No Known Allergies  Prior to Admission medications    Not on File        Review of Systems: A comprehensive review of systems was negative except for that written in the HPI.     Objective:     Vitals:  Vitals:    19 0759 19 0801 19 0823   BP: 128/68     Pulse: 81     Resp: 16     Temp: 99 °F (37.2 °C)     SpO2:  100%    Weight:   227 lb (103 kg)   Height:   5' 3\" (1.6 m)        Physical Exam:  No distress  CTAB no w/r/r/c  +S1, S2, normal 2+ systolic murmur, best heard left sternal border  Membranes:  Intact  Fetal Heart Rate: Reactive, 140 bpm, no decels  Abdomen non-tender  No swelling in lower extremities    Prenatal Labs:   B+ Ab neg, RPR neg, HbsAg neg, HIV neg, Treponema neg, Rubella immune, G/C neg, GBS neg, VZV low on 4/11/2019    Assessment/Plan:     Ms. Fredy Barrow is a 34 yo [id number] at 40w0d that was admitted for scheduled repeat LTCS with no complaints. Plan:     1. Repeat LTCS to be performed by Dr. Rachel Zhang  2. Group B Strep was neg.     Patient examined and seen with Dr. Alison Guadalupe MD  Family Medicine Resident

## 2019-07-08 NOTE — ANESTHESIA PROCEDURE NOTES
Spinal Block    Start time: 7/8/2019 9:40 AM  End time: 7/8/2019 9:41 AM  Performed by: Kapil Ambrocio MD  Authorized by: Kapil Ambrocio MD     Pre-procedure:   Indications: at surgeon's request and primary anesthetic  Preanesthetic Checklist: patient identified, risks and benefits discussed, anesthesia consent and timeout performed      Spinal Block:   Patient Position:  Seated  Prep Region:  Lumbar  Prep: chlorhexidine      Location:  L3-4  Technique:  Single shot        Needle:   Needle Type:  Pencan  Needle Gauge:  25 G  Attempts:  1      Events: CSF confirmed, no blood with aspiration and no paresthesia        Assessment:  Insertion:  Uncomplicated  Patient tolerance:  Patient tolerated the procedure well with no immediate complications

## 2019-07-08 NOTE — OP NOTES
Operative Note    Name: Ann-Marie Mcmahon   Medical Record Number: [id number]   YOB: 1990  Today's Date: 2019      Pre-operative Diagnosis: Repeat x 3    Post-operative Diagnosis: TLBI    Operation: low transverse  section Procedure(s):   SECTION    Surgeon(s):-  MD Gavin Marie MD    Anesthesia: Spinal    Prophylactic Antibiotics: Ancef  DVT Prophylaxis: Sequential Compression Devices         Fetal Description: pelayo     Birth Information:   Information for the patient's :  Marbin Ashby, Male Beryle Evens [[id number]]   Delivery of a 7 lb 10.2 oz (3.465 kg) male infant on 2019 at 10:24 AM. Apgars were 8  and 9 . Umbilical Cord: 3 Vessels     Umbilical Cord Events: None     Placenta: Manual Removal removal with Normal;Intact appearance. Amniotic Fluid Volume: Moderate     Amniotic Fluid Description:  Clear        Umbilical Cord: 3 vessels present    Placenta:  manual removal    Specimens: none   EBL: 750cc  Implants: none        Complications:  none    Circ-1: Laura Coats RN  Circ-2: Héctor Gautam RN  Scrub Tech-1: Tracey Rick   Surg Asst-1: Norma Perkins    Procedure Detail:      After proper patient identification and consent, the patient was taken to the operating room, where spinal anesthesia was administered and found to be adequate. Srinivasan catheter had been placed using sterile technique. The patient was prepped and draped in the normal sterile fashion. The abdomen was entered using the Pfannenstiel technique. The peritoneum was entered bluntely well superior to the bladder without any apparent injury. An Andrews retractor was placed. Palpation revealed no bowel below the retractor. There were very large vessels noted all over the right side of the MILLIE and under the bladder. Suspicion for accreta - no ultrasound report available. Dr. Syed Flanagan entered the case to help with evaluation.  He was able to take down the adherent bladder flap. I then made a  low transverse uterine incision  made with the scalpel and extended with blunt finger dissection. Amniotomy was performed and the fluid was medium amount clear. The babys head was then delivered atraumatically. The nose and mouth were suctioned. The cord was clamped and cut and the baby was handed off to Nursing staff in attendance. Placenta was manually extracted. Dr. Prince Dumont exited the case after I delivered the placenta. The uterus was wiped clean with a moist lap pad and cleared of all clots and debris. The uterine incision was closed in 2 layers, first with a running locked suture of 0-Vicryl, then with an imbricated layer. Several additional monocryl sutures were placed for added hemostasis. Adequate hemostasis was noted. Both tubes and ovaries appeared normal. The pericolic gutters were then lavaged clean with normal saline. Good hemostasis was again reassured The Andrews retractor was removed. The fascia was closed with 1-PDS in a running fashion. Good hemostasis was assured. The incision was lavaged clean and small bleeders were coagulated with the bovie. The subcutaneous tissue was reapproximated with interrupted chromic suture. The skin was closed with absorbable staples. The patient tolerated the procedure well. Sponge, lap, and needle counts were correct times three and the patient and baby were taken to recovery/postpartum room in stable condition.     Patrice Nicholas MD  July 8, 2019  5:10 PM

## 2019-07-08 NOTE — ANESTHESIA PREPROCEDURE EVALUATION
Relevant Problems   No relevant active problems       Anesthetic History   No history of anesthetic complications            Review of Systems / Medical History  Patient summary reviewed, nursing notes reviewed and pertinent labs reviewed    Pulmonary  Within defined limits                 Neuro/Psych   Within defined limits           Cardiovascular  Within defined limits  Hypertension                Comments: Not on Beta Blocker   GI/Hepatic/Renal  Within defined limits              Endo/Other  Within defined limits           Other Findings              Physical Exam    Airway  Mallampati: II  TM Distance: 4 - 6 cm  Neck ROM: normal range of motion   Mouth opening: Normal     Cardiovascular    Rhythm: regular  Rate: normal         Dental  No notable dental hx       Pulmonary  Breath sounds clear to auscultation               Abdominal  GI exam deferred       Other Findings            Anesthetic Plan    ASA: 2  Anesthesia type: spinal      Post-op pain plan if not by surgeon: intrathecal opiates      Anesthetic plan and risks discussed with: Patient

## 2019-07-08 NOTE — PROGRESS NOTES
Bedside shift change report given to Don Melendez RN (oncoming nurse) by Luis Manuel Richards RN (offgoing nurse). Report included the following information SBAR, Kardex, Intake/Output and MAR.

## 2019-07-09 LAB
ABO + RH BLD: NORMAL
BASOPHILS # BLD: 0 K/UL (ref 0–0.1)
BASOPHILS NFR BLD: 0 % (ref 0–1)
BLOOD GROUP ANTIBODIES SERPL: NORMAL
DIFFERENTIAL METHOD BLD: ABNORMAL
EOSINOPHIL # BLD: 0.1 K/UL (ref 0–0.4)
EOSINOPHIL NFR BLD: 1 % (ref 0–7)
ERYTHROCYTE [DISTWIDTH] IN BLOOD BY AUTOMATED COUNT: 15.7 % (ref 11.5–14.5)
HCT VFR BLD AUTO: 31.2 % (ref 35–47)
HGB BLD-MCNC: 9.7 G/DL (ref 11.5–16)
IMM GRANULOCYTES # BLD AUTO: 0 K/UL (ref 0–0.04)
IMM GRANULOCYTES NFR BLD AUTO: 0 % (ref 0–0.5)
LYMPHOCYTES # BLD: 1.4 K/UL (ref 0.8–3.5)
LYMPHOCYTES NFR BLD: 20 % (ref 12–49)
MCH RBC QN AUTO: 24.3 PG (ref 26–34)
MCHC RBC AUTO-ENTMCNC: 31.1 G/DL (ref 30–36.5)
MCV RBC AUTO: 78.2 FL (ref 80–99)
MONOCYTES # BLD: 0.6 K/UL (ref 0–1)
MONOCYTES NFR BLD: 9 % (ref 5–13)
NEUTS SEG # BLD: 4.8 K/UL (ref 1.8–8)
NEUTS SEG NFR BLD: 70 % (ref 32–75)
NRBC # BLD: 0 K/UL (ref 0–0.01)
NRBC BLD-RTO: 0 PER 100 WBC
PLATELET # BLD AUTO: 186 K/UL (ref 150–400)
PMV BLD AUTO: 11 FL (ref 8.9–12.9)
RBC # BLD AUTO: 3.99 M/UL (ref 3.8–5.2)
SPECIMEN EXP DATE BLD: NORMAL
WBC # BLD AUTO: 7 K/UL (ref 3.6–11)

## 2019-07-09 PROCEDURE — 74011250637 HC RX REV CODE- 250/637: Performed by: STUDENT IN AN ORGANIZED HEALTH CARE EDUCATION/TRAINING PROGRAM

## 2019-07-09 PROCEDURE — 65270000029 HC RM PRIVATE

## 2019-07-09 PROCEDURE — 77030027138 HC INCENT SPIROMETER -A

## 2019-07-09 PROCEDURE — 85025 COMPLETE CBC W/AUTO DIFF WBC: CPT

## 2019-07-09 RX ORDER — DOCUSATE SODIUM 100 MG/1
100 CAPSULE, LIQUID FILLED ORAL 2 TIMES DAILY
Status: DISCONTINUED | OUTPATIENT
Start: 2019-07-09 | End: 2019-07-11 | Stop reason: HOSPADM

## 2019-07-09 RX ORDER — HYDROCODONE BITARTRATE AND ACETAMINOPHEN 10; 325 MG/1; MG/1
1 TABLET ORAL
Status: DISCONTINUED | OUTPATIENT
Start: 2019-07-09 | End: 2019-07-11 | Stop reason: HOSPADM

## 2019-07-09 RX ORDER — DIPHENHYDRAMINE HCL 25 MG
25 CAPSULE ORAL
Status: DISCONTINUED | OUTPATIENT
Start: 2019-07-09 | End: 2019-07-11 | Stop reason: HOSPADM

## 2019-07-09 RX ADMIN — HYDROCODONE BITARTRATE AND ACETAMINOPHEN 1 TABLET: 5; 325 TABLET ORAL at 01:24

## 2019-07-09 RX ADMIN — DIPHENHYDRAMINE HYDROCHLORIDE 25 MG: 25 CAPSULE ORAL at 07:55

## 2019-07-09 RX ADMIN — IBUPROFEN 800 MG: 800 TABLET ORAL at 10:03

## 2019-07-09 RX ADMIN — ACETAMINOPHEN 650 MG: 325 TABLET ORAL at 19:58

## 2019-07-09 RX ADMIN — HYDROCODONE BITARTRATE AND ACETAMINOPHEN 1 TABLET: 5; 325 TABLET ORAL at 12:27

## 2019-07-09 RX ADMIN — SIMETHICONE CHEW TAB 80 MG 80 MG: 80 TABLET ORAL at 19:58

## 2019-07-09 RX ADMIN — DOCUSATE SODIUM 100 MG: 100 CAPSULE, LIQUID FILLED ORAL at 22:57

## 2019-07-09 RX ADMIN — HYDROCODONE BITARTRATE AND ACETAMINOPHEN 1 TABLET: 5; 325 TABLET ORAL at 18:28

## 2019-07-09 RX ADMIN — HYDROCODONE BITARTRATE AND ACETAMINOPHEN 1 TABLET: 5; 325 TABLET ORAL at 22:57

## 2019-07-09 RX ADMIN — IBUPROFEN 800 MG: 800 TABLET ORAL at 01:24

## 2019-07-09 RX ADMIN — IBUPROFEN 800 MG: 800 TABLET ORAL at 18:28

## 2019-07-09 RX ADMIN — HYDROCODONE BITARTRATE AND ACETAMINOPHEN 1 TABLET: 5; 325 TABLET ORAL at 08:43

## 2019-07-09 NOTE — PROGRESS NOTES
Bedside shift change report given to [address] [place] [address] (oncoming nurse) by Aidee Gregory (offgoing nurse). Report included the following information SBAR and MAR.

## 2019-07-09 NOTE — PROGRESS NOTES
Cross Cultural Services      Provided assistance with Video Remote Interpreting Cart to JAVI Kramer during circumcision care teaching.                         Lisa Car, [facility] Certified    can be requested at: [email or web address]

## 2019-07-09 NOTE — LACTATION NOTE
This note was copied from a baby's chart. Went in for visit with mother, nurse using video language line to speak to mother about circ care as baby just got circ done. Discussed with mother  behaviors after circ. Assisted mother in latching baby to the breast, baby latched well, mother states pain with nursing, not nipple pain but cramping, RN aware of pain level and states she will get mother pain meds as soon as they are due. Discussed with mother her plan for feeding. Reviewed the benefits of exclusive breast milk feeding during the hospital stay. Informed her of the risks of using formula to supplement in the first few days of life as well as the benefits of successful breast milk feeding; referred her to the Breastfeeding booklet about this information. She acknowledges understanding of information reviewed and states that it is her plan to both breast and formula feed her infant. Will support her choice and offer additional information as needed. Pt chooses to do both breast and bottle. Discussed effects of early supplementation on breastfeeding success; may decrease breastmilk production and supply, increase risk for pathological engorgement, baby may develop preference for faster flow from bottles vs breast, and baby's stomach can be stretched if larger volumes of formula are given. Reviewed breastfeeding basics:  How milk is made and normal  breastfeeding behaviors discussed. Supply and demand,  stomach size, early feeding cues, skin to skin bonding with comfortable positioning and baby led latch-on reviewed. How to identify signs of successful breastfeeding sessions reviewed; education on assymetrical latch, signs of effective latching vs shallow, in-effective latching, normal  feeding frequency and duration and expected infant output discussed.   Normal course of breastfeeding discussed including the AAP's recommendation that children receive exclusive breast milk feedings for the first six months of life with breast milk feedings to continue through the first year of life and/or beyond as complimentary table foods are added. Breastfeeding Booklet and Warm line information provided with discussion. Discussed typical  weight loss and the importance of pediatrician appointment within 24-48 hours of discharge, at 2 weeks of life and normalcy of requesting pediatric weight checks as needed in between visits. Pt will successfully establish breastfeeding by feeding in response to early feeding cues   or wake every 3h, will obtain deep latch, and will keep log of feedings/output. Taught to BF at hunger cues and or q 2-3 hrs and to offer 10-20 drops of hand expressed colostrum at any non-feeds.       Breast Assessment  Left Breast: Large  Left Nipple: Everted, Intact  Right Breast: Large  Right Nipple: Everted, Intact  Breast- Feeding Assessment  Attends Breast-Feeding Classes: No  Breast-Feeding Experience: Yes( other 4 children up to 2 years)  Breast Trauma/Surgery: No  Type/Quality: Good  Lactation Consultant Visits  Breast-Feedings: Good   Mother/Infant Observation  Mother Observation: Alignment, Breast comfortable, Close hold  Infant Observation: Latches nipple and aereolae, Lips flanged, lower, Lips flanged, upper, Opens mouth  LATCH Documentation  Latch: Grasps breast, tongue down, lips flanged, rhythmic sucking  Audible Swallowing: A few with stimulation  Type of Nipple: Everted (after stimulation)  Comfort (Breast/Nipple): Soft/non-tender  Hold (Positioning): Full assist, teach one side, mother does other, staff holds  [facility] Score: 8

## 2019-07-09 NOTE — ROUTINE PROCESS
Bedside and Verbal shift change report given to Baljeet Perdue RN (oncoming nurse) by Andrea Hutchins RN (offgoing nurse). Report included the following information SBAR.

## 2019-07-09 NOTE — PROGRESS NOTES
[address] [facility] [address] [place] [address] [place] [address]   Office [phone], Fax [phone]                               Post-Operative Day Number 1 Progress Note    Pt is Amharic speaking only and has a  from [facility] present at beside to give the history. Patient doing well post-op day 1 from  delivery without significant complaints. Pain temporarily relieved with Hydrocodone and motrin. Lochia the same as menses and passing some clots. Tolerating regular diet without nausea or vomiting. Ambulating to and from the bathroom and to  baby for feeding. Srinivasan removed by nurse last evening. Urinating without difficulty. no flatus. no BM. Pt c/o of itching overnight and this morning. She notes some heaviness in her chest but denies SOB, cough, chest pain, dizziness or calf pain. Vitals:    Patient Vitals for the past 8 hrs:   BP Temp Pulse Resp   19 0257 105/54 98.4 °F (36.9 °C) 83 18     Temp (24hrs), Av.4 °F (36.9 °C), Min:97.8 °F (36.6 °C), Max:99 °F (37.2 °C)      Vital signs stable, afebrile. Intake and Output:         Exam:   Patient without distress               CTAB, no w/r/r/c    RRR, + S1 and S2, no m/r/g   Chest heaviness and pressure is reproducible with palpation along the sternal border. Abdomen soft, fundus firm and below the umbilicus, non tender                Incision covered with pressure dressing that appears cleans and dry, appropriately tender over the incision.                 Lower extremities negative for swelling, no cords or tenderness, SCDs removed (per nursing these were bothering patient)     Lab/Data Review:  Recent Results (from the past 12 hour(s))   CBC WITH AUTOMATED DIFF    Collection Time: 19  3:01 AM   Result Value Ref Range    WBC 7.0 3.6 - 11.0 K/uL    RBC 3.99 3.80 - 5.20 M/uL    HGB 9.7 (L) 11.5 - 16.0 g/dL    HCT 31.2 (L) 35.0 - 47.0 %    MCV 78.2 (L) 80.0 - 99.0 FL    MCH 24.3 (L) 26.0 - 34.0 PG    MCHC 31.1 30.0 - 36.5 g/dL    RDW 15.7 (H) 11.5 - 14.5 %    PLATELET 344 939 - 129 K/uL    MPV 11.0 8.9 - 12.9 FL    NRBC 0.0 0  WBC    ABSOLUTE NRBC 0.00 0.00 - 0.01 K/uL    NEUTROPHILS 70 32 - 75 %    LYMPHOCYTES 20 12 - 49 %    MONOCYTES 9 5 - 13 %    EOSINOPHILS 1 0 - 7 %    BASOPHILS 0 0 - 1 %    IMMATURE GRANULOCYTES 0 0.0 - 0.5 %    ABS. NEUTROPHILS 4.8 1.8 - 8.0 K/UL    ABS. LYMPHOCYTES 1.4 0.8 - 3.5 K/UL    ABS. MONOCYTES 0.6 0.0 - 1.0 K/UL    ABS. EOSINOPHILS 0.1 0.0 - 0.4 K/UL    ABS. BASOPHILS 0.0 0.0 - 0.1 K/UL    ABS. IMM. GRANS. 0.0 0.00 - 0.04 K/UL    DF AUTOMATED           Assessment and Plan:    Kina Link is a 33 y.o. [id number] s/p uncomplicated repeat LTCS at 40 weeks 0 days for h/o LTCS x2 in [facility]. Pregnancy was complicated by late to prenatal care, questionable HTN (pt states she was told to take medication but declined) and chronic migraine HA. Pt started seeing the [facility] for prenatal care in during the second trimester. Patient appears to be having uncomplicated post- course. 1. Continue routine post-op care and maternal education. 2. Srinivasan d/c'd last night. Pt urinating without problems. 3. Incision bandage may be removed 24 hours post-op. 4. Pt c/o itching- Ordered PO benadryl as her IV infiltrated last night. 5. Chest pressure and heaviness-suspect costochondritis as I can reproduce the pressure with palpation all along the sternal border. Pt denies SOB, sharp chest pain, cough, dizziness or calf pain. I have informed nursing about this complaint and asked that they inform me if her sx's change. I will check on patient later this morning or early afternoon. 6. Encouraged Pt to continue to get up and move around as much as she can tolerate today. 7. Discuss benefits of breastfeeding and encouraged continued trials every 2-3 hours  8. Baby boy- mom requests circumcision. Consent signed with interpretor present at bedside.           Patient seen with Dr. Rola Ogden.     Zackery Roth, DO  Family Medicine Resident

## 2019-07-10 PROCEDURE — 74011250637 HC RX REV CODE- 250/637: Performed by: STUDENT IN AN ORGANIZED HEALTH CARE EDUCATION/TRAINING PROGRAM

## 2019-07-10 PROCEDURE — 65270000029 HC RM PRIVATE

## 2019-07-10 RX ADMIN — HYDROCODONE BITARTRATE AND ACETAMINOPHEN 1 TABLET: 5; 325 TABLET ORAL at 03:10

## 2019-07-10 RX ADMIN — HYDROCODONE BITARTRATE AND ACETAMINOPHEN 1 TABLET: 10; 325 TABLET ORAL at 23:09

## 2019-07-10 RX ADMIN — DOCUSATE SODIUM 100 MG: 100 CAPSULE, LIQUID FILLED ORAL at 18:44

## 2019-07-10 RX ADMIN — IBUPROFEN 800 MG: 800 TABLET ORAL at 03:10

## 2019-07-10 RX ADMIN — HYDROCODONE BITARTRATE AND ACETAMINOPHEN 1 TABLET: 10; 325 TABLET ORAL at 17:30

## 2019-07-10 RX ADMIN — DOCUSATE SODIUM 100 MG: 100 CAPSULE, LIQUID FILLED ORAL at 07:44

## 2019-07-10 RX ADMIN — HYDROCODONE BITARTRATE AND ACETAMINOPHEN 1 TABLET: 5; 325 TABLET ORAL at 07:44

## 2019-07-10 RX ADMIN — HYDROCODONE BITARTRATE AND ACETAMINOPHEN 1 TABLET: 10; 325 TABLET ORAL at 11:28

## 2019-07-10 RX ADMIN — IBUPROFEN 800 MG: 800 TABLET ORAL at 18:43

## 2019-07-10 RX ADMIN — IBUPROFEN 800 MG: 800 TABLET ORAL at 11:27

## 2019-07-10 RX ADMIN — SIMETHICONE CHEW TAB 80 MG 80 MG: 80 TABLET ORAL at 07:44

## 2019-07-10 NOTE — PROGRESS NOTES
2000- Pt tearful and c/o incisional pain. Site assessed no red or tender areas noted. Small amount of serosanguinous drainage noted to steri strip. Pt given Tylenol 650mg and Simethicone. 2130- pt requested VS check Temp noted to be 100.0  MD contacted Nurse for BP check and VS T 100, P 100. R 18, /65     2200- MD on unit to assess patient. Pt noted with increase in room temp and wearing two gowns. Pt helped with changing into one gown and adjusting room temperature. Orders from MD To continue to monitor temp and encourage use of incentive spirometer.  used to go over instructions for incentive spirometer   Temp after using incentive spirometer 99.4     2300- Temp 98.9 Pt given Norco for pain control. Pt also given abdominal binder to assist with comfort. 0030- Pt states she does feel better.

## 2019-07-10 NOTE — PROGRESS NOTES
[address] Medicine Residency  Resident Note in Brief  ----------------------------------------    S: 32 y/o  [id number] s/p uncomplicated repeated LTCS at 40w0d evaluated for abdominal pain. She states it began earlier this morning. It is located at her C section site. She rates it a 10/10. The pain is constant. Moving around makes the pain worse. She admits to some sweating and chest tightness. She denies any dysuria, cough, or shortness of breath    O:  Visit Vitals  /65 (BP 1 Location: Right arm, BP Patient Position: At rest)   Pulse 100   Temp 100 °F (37.8 °C)   Resp 16   Ht 5' 3\" (1.6 m)   Wt 227 lb (103 kg)   SpO2 95%   Breastfeeding? Unknown   BMI 40.21 kg/m²     Physical Exam   HENT:   Head: Normocephalic. Cardiovascular: Normal rate, regular rhythm and normal heart sounds. Pulmonary/Chest: Effort normal and breath sounds normal. No respiratory distress. Abdominal: Soft. There is tenderness. There is guarding. Caesarian site is clean and dry. No redness or leakage of fluid. Musculoskeletal: She exhibits no edema. Skin: Skin is warm. She is diaphoretic. No pallor. A/P  32 y/o  [id number] s/p uncomplicated repeated LTCS at 40w0d evaluated for abdominal pain   -repeat vitals in room: 99.4, HR 84  -abdominal pain most likely 2/2 to incision  -spoke with Dr. Santo Tellez, advises us to continue to monitor vitals and order CBC if needed   - Increased norco to 10mg for pain management  - Contiue to monitor vitals      Please see full daily progress note for complete plan.   Angel Dorantes, DO  9:56 PM

## 2019-07-10 NOTE — PROGRESS NOTES
[address] [facility] [place] [address]   Office [phone], Fax [phone]                                 Post-Operative Day Number 2       Pt is Italian speaking only and has a  from [address] [place] [address] present at beside to give the history. Patient doing well post-op day 2 from  delivery without significant complaints. Pain is temporarily controlled with Hydrocodone and Motrin. Lochia less than menses. Tolerating regular diet. Ambulating. Voiding without difficulty. no flatus. no BM. Pt states she felt feverish last night with a temp of 100 and [facility] physician was paged. When nurses and physician arrived to Pt room the room temp was very high and Pt was wearing two gowns and laying under blankets. Nurse turned the air down and changed Pt into one gown and her temp normalized shortly after. Pt denies current fever, chills, HA, chest pain, SOB, N/V, urinary problems. Vitals:    Patient Vitals for the past 8 hrs:   BP Temp Pulse Resp   19 2341 100/55 98.6 °F (37 °C) 85 14   19 2316  99.1 °F (37.3 °C)       Temp (24hrs), Av °F (37.2 °C), Min:98.4 °F (36.9 °C), Max:100 °F (37.8 °C)      Vital signs stable, afebrile. Exam:   Patient without distress               CTAB, no w/r/r/c    RRR, + S1 and S2, no m/r/g    Abdomen soft, fundus firm and below the umbilicus, non tender                 Incision c/d/i, appropriately tender. Pressure dressing removed last night and steri strips in place. Pt with Abdominal binder in place. Lower extremities negative for swelling, no cords or tenderness    Lab/Data Review:  No results found for this or any previous visit (from the past 12 hour(s)). Assessment and Plan:    Jolene Go is a 33 y.o. [id number] POD2 sp uncomplicated repeat LTCS at 40 weeks 0 days for h/o LTCS x2 in [facility].  Pregnancy was complicated by late to prenatal care, questionable HTN (pt states she was told to take medication but declined) and chronic migraine HA. [facility] during the second trimester. Patient appears to be having uncomplicated post- course. 1. Continue routine post-op care and maternal education. 2. Pt removed incision bandage last night. Steri Strips in place and Pt informed that she can remove these in about 1 week. 3. Encouraged Pt to continue to get up and move around as much as she can tolerate today as this will speed up her recovery and help her pain. 4. Discuss benefits of breastfeeding and encouraged continued trials every 2-3 hours. Mother still prefers to do both breast and bottle. 5. Baby boy- circumcision done on 19. 6. Plan discharge tomorrow if no problems occur; Pt with 2 wk f/u with [facility] at 1:30pm.     Patient heidi with Dr. Dalia Yang.     Zackery Roth DO  Family Medicine Resident

## 2019-07-10 NOTE — PROGRESS NOTES
Called resident Dr. Damaso barber/[facility]. Advised patient called me to room at 1835, was tearful and reported 10/10 pain at incision. No improvement since pain medicine. Requested to have her temperature taken. Temp 99.5. Assessed wound, dry with old drainage, some slight redness around wound, essentially unchanged from previous assessments. Gently cleansed wound with washcloth. No new drainage. Patient emotional. Reassured her, administered Motrin. MD advised no new orders, would come to unit to assess patient this evening. 1915- bedside shift change report with Johnie Aguirre RN. Used [employer] service. Explained to patient MD was notified of pain, temp and wound assessment. Provided patient with ice pack and instructed her to apply to wound for short 20 minute period for pain relief. Provided patient with emotional support and encouraged deep breathing for relaxation and pain relief. PCT to complete full set of vital signs.

## 2019-07-10 NOTE — LACTATION NOTE
This note was copied from a baby's chart. 1150 - Per primary RN. Pt is now formula feeding. Declined assistance with pump and declines nursing secondary to uterine cramps.

## 2019-07-11 VITALS
RESPIRATION RATE: 15 BRPM | SYSTOLIC BLOOD PRESSURE: 114 MMHG | TEMPERATURE: 99.1 F | HEIGHT: 63 IN | HEART RATE: 100 BPM | WEIGHT: 227 LBS | DIASTOLIC BLOOD PRESSURE: 68 MMHG | OXYGEN SATURATION: 95 % | BODY MASS INDEX: 40.22 KG/M2

## 2019-07-11 PROCEDURE — 74011250637 HC RX REV CODE- 250/637: Performed by: STUDENT IN AN ORGANIZED HEALTH CARE EDUCATION/TRAINING PROGRAM

## 2019-07-11 RX ORDER — HYDROCODONE BITARTRATE AND ACETAMINOPHEN 5; 325 MG/1; MG/1
1 TABLET ORAL
Qty: 10 TAB | Refills: 0 | Status: SHIPPED | OUTPATIENT
Start: 2019-07-11 | End: 2019-07-14

## 2019-07-11 RX ORDER — ACETAMINOPHEN 325 MG/1
650 TABLET ORAL
Qty: 30 TAB | Refills: 5 | Status: SHIPPED | OUTPATIENT
Start: 2019-07-11 | End: 2019-08-10

## 2019-07-11 RX ORDER — DOCUSATE SODIUM 100 MG/1
100 CAPSULE, LIQUID FILLED ORAL 2 TIMES DAILY
Qty: 60 CAP | Refills: 2 | Status: SHIPPED | OUTPATIENT
Start: 2019-07-11 | End: 2019-08-20

## 2019-07-11 RX ORDER — IBUPROFEN 800 MG/1
800 TABLET ORAL EVERY 8 HOURS
Qty: 90 TAB | Refills: 0 | Status: SHIPPED | OUTPATIENT
Start: 2019-07-11 | End: 2019-07-30 | Stop reason: SDUPTHER

## 2019-07-11 RX ADMIN — DOCUSATE SODIUM 100 MG: 100 CAPSULE, LIQUID FILLED ORAL at 09:26

## 2019-07-11 RX ADMIN — SIMETHICONE CHEW TAB 80 MG 80 MG: 80 TABLET ORAL at 04:24

## 2019-07-11 RX ADMIN — HYDROCODONE BITARTRATE AND ACETAMINOPHEN 1 TABLET: 10; 325 TABLET ORAL at 04:24

## 2019-07-11 RX ADMIN — IBUPROFEN 800 MG: 800 TABLET ORAL at 04:24

## 2019-07-11 NOTE — PROGRESS NOTES
19 11:00 AM  LISA returned to unit with carseat. Take home bag provided. 19 8:30 AM  CM met with RAFAELA and her /FOB Orly with the assistance of an Nepali intrepreter. Demographics were reviewed and confirmed. RAFAELA and LISA live together in an apartment in [place] and have four older children, ages 15, 6, 11, and 2. Family supports include LISA's cousin, Dee  and Coy's family. [facility] [address] [place] [address] will provide medical follow up for the baby; an appointment is scheduled for  at 2:15PM.  RAFAELA has Medicaid services and noted that she already knows how to have the baby added to her coverage. RAFAELA does not have [employer]  services; CM provided contact information for RAFAELA, with the assistance of the , reach out to [facility] to schedule an enrollment appointment. RAFAELA noted that she does have food stamps and CM made her aware that she can utilize her food stamps to purchase formula until she is enrolled in [employer] . LISA noted that they do not have a carseat. CM noted that LISA could purchase a carseat at [facility] and return to the hospital with the carseat so the baby could be discharged home in it. CM showed parents pictures of an appropriate infant carseat. LISA verbalized understanding and stated that he would return to the hospital with a carseat. He then asked if it would be okay for him to borrow a carseat from a friend. CM responded that it would be fine, he needed to verify that the carseat was not  and had not been involved in a car crash. RAFAELA noted that she planned to have the baby sleep in the bed with her and FOB. CM advised against this, providing education on SIDS and accidental death as a result of co-sleeping. CM discussed option for a Baby Box and parents agreeable. CM and intrepreter assisted parents with completing Baby Box Safe Sleep course online. CM provided Baby Box, forms signed. Parents denied any additional needs.       RAFAELA has 2 week postpartum follow up scheduled for 7/30 at 3PM with [facility].   Care Management Interventions  PCP Verified by CM: Yes([facility])  Mode of Transport at Discharge: Self  Transition of Care Consult (CM Consult): Discharge Planning  Current Support Network: Lives with Spouse, Family Lives Nearby  Confirm Follow Up Transport: Family  Plan discussed with Pt/Family/Caregiver: Yes  Freedom of Choice Offered: Yes  Discharge Location  Discharge Placement: Home with outpatient services  MATY Sands

## 2019-07-11 NOTE — LACTATION NOTE
This note was copied from a baby's chart. Went in for lactation visit with mother, mother states she has been breastfeeding some and baby latches well. Mother is both breast and formula feeding and mostly formula fed through the night. Discussed education for breastfeeding going home, mother does not have pump at home, gave mother manual pump and explained use. Lanolin and breast pads for home use given as well. Mother states no further questions on breastfeeding at this time. Chart shows numerous feedings (mostly formula), void, stool WNL. Discussed importance of monitoring outputs and feedings on first week of life. Discussed ways to tell if baby is  getting enough breast milk, ie  voids and stools, change in color of stool, and return to birth wt within 2 weeks. Follow up with pediatrician visit for weight check in 1-2 days (per AAP guidelines.)  Encouraged to call Warm Line  [phone]  for any questions/problems that arise. Mother also given breastfeeding support group dates and times for any future needs.

## 2019-07-11 NOTE — PROGRESS NOTES
Pt discharged to home via wheelchair with infant and her belongings. 4 RX's given to pt including Norco and Tylenol. Pt verbalized understanding not to take these two medications at the same time due to tylenol being in [address].  Pt states she has had loose stools so she does not want to take Colace as prescribed. Instructed to take this  medication if her stools started to become firm to ease bowel movements. All discharge instructions given with bedside intrepreter. Time allowed for questions and answers.

## 2019-07-11 NOTE — PROGRESS NOTES
8:06 PM  Spoke with OB Resident who states that someone will be up to see the patient in the next few hours. 11:36 PM  Spoke with OB resident who states that she will come to evaluate the patient now. 11:45 PM  OB resident at pt bedside. No orders for  at this time. Continue with pain management, add Colace twice a day. Pt states that she does not have a car seat. Will inform day shift team.    7:10 AM  Bedside and Verbal shift change report given to Evi Stephens RN (oncoming nurse) by Beverly Bethea RN (offgoing nurse). Report given with SBAR, Kardex, Intake/Output and MAR.

## 2019-07-11 NOTE — DISCHARGE SUMMARY
[address] [facility] [address]   Office [phone], Fax [phone]      Obstetrical Discharge Summary     Name: Mere Mayers MRN: [id number]  SSN: [id number]    YOB: 1990  Age: 33 y.o. Sex: female      Admit Date: 2019    Discharge Date: 2019     Admitting Physician: Roxi Salmon MD     Attending Physician:  Etta Frankel, MD     Admission Diagnoses: S/P repeat low transverse  [Z98.891]    Discharge Diagnoses:   Information for the patient's :  Terry Sims, Male Karita [place] [[id number]]   Delivery of a 7 lb 10.2 oz (3.465 kg) male infant via , Low Transverse on 2019 at 10:24 AM  by Roxi Salmon. Apgars were 8  and 9 . Additional Diagnoses:   Hospital Problems  Date Reviewed: 7/3/2019          Codes Class Noted POA    S/P repeat low transverse  ICD-10-CM: Z98.891  ICD-9-CM: V45.89  2019 Unknown             Lab Results   Component Value Date/Time    Rubella, External Immune 2019    GrBStrep, External negative 2019       Immunization(s): There is no immunization history for the selected administration types on file for this patient.      Post-Partum Depression Screening:  [place]  Depression Scale  I have been able to laugh and see the funny side of things: As much as I always could  I have looked forward with enjoyment to things: As much as I ever did  I have blamed myself unnecessarily when things went wrong: Not very often  I have been anxious or worried for no good reason: No, not at all  I have felt scared or panicky for no very good reason: No, not at all  Things have been getting on top of me: No, I have been coping as well as ever  I have been so unhappy that I have had difficulty sleeping: No, not at all  I have felt sad or miserable: No, not at all  I have been so unhappy that I have been crying: No, never  The thought of harming myself has occurred to me: Never  Total Score: 1    Hospital Course: Normal hospital course following the delivery. Condition at Discharge:  stable    Disposition:  Home with follow up at [facility] in 3 weeks for incision check. Pt unable to come in at the 2 week post partum date due to transportation issues.  said he can bring patient on 19. Pt's [facility]  will be attending the visit. Prior to discharge, Social work will consult Pt regarding car seat and WIC benefits. Patient Instructions:   Current Discharge Medication List      START taking these medications    Details   acetaminophen (TYLENOL) 325 mg tablet Take 2 Tabs by mouth every four (4) hours as needed for Pain or Fever for up to 30 days. Qty: 30 Tab, Refills: 5      docusate sodium (COLACE) 100 mg capsule Take 1 Cap by mouth two (2) times a day for 90 days. Qty: 60 Cap, Refills: 2      HYDROcodone-acetaminophen (NORCO) 5-325 mg per tablet Take 1 Tab by mouth every four (4) hours as needed for Pain for up to 3 days. Max Daily Amount: 6 Tabs. Qty: 10 Tab, Refills: 0    Associated Diagnoses: S/P repeat low transverse       ibuprofen (MOTRIN) 800 mg tablet Take 1 Tab by mouth every eight (8) hours for 30 days. Qty: 90 Tab, Refills: 0             Reference my discharge instructions.     Follow-up Information     Follow up With Specialties Details Why Contact Info    None    None (395) Patient stated that they have no PCP            Signed By:  Kaur Mcarthur DO    Family Medicine Resident

## 2019-07-11 NOTE — PROGRESS NOTES
Pt refused tdap at this time, per interprator pt states she believes she received vaccine during this pregnancy.

## 2019-07-11 NOTE — DISCHARGE INSTRUCTIONS
Patient Education         Section: What to Expect at Home  Your Recovery    A  section, or , is surgery to deliver your baby through a cut, called an incision, that the doctor makes in your lower belly and uterus. You may have some pain in your lower belly and need pain medicine for 1 to 2 weeks. You can expect some vaginal bleeding for several weeks. You will probably need about 6 weeks to fully recover. It is important to take it easy while the incision is healing. Avoid heavy lifting, strenuous activities, or exercises that strain the belly muscles while you are recovering. Ask a family member or friend for help with housework, cooking, and shopping. This care sheet gives you a general idea about how long it will take for you to recover. But each person recovers at a different pace. Follow the steps below to get better as quickly as possible. How can you care for yourself at home? Activity    · Rest when you feel tired. Getting enough sleep will help you recover.     · Try to walk each day. Start by walking a little more than you did the day before. Bit by bit, increase the amount you walk. Walking boosts blood flow and helps prevent pneumonia, constipation, and blood clots.     · Avoid strenuous activities, such as bicycle riding, jogging, weightlifting, and aerobic exercise, for 6 weeks or until your doctor says it is okay.     · Until your doctor says it is okay, do not lift anything heavier than your baby.     · Do not do sit-ups or other exercises that strain the belly muscles for 6 weeks or until your doctor says it is okay.     · Hold a pillow over your incision when you cough or take deep breaths. This will support your belly and decrease your pain.     · You may shower as usual. Pat the incision dry when you are done.     · You will have some vaginal bleeding. Wear sanitary pads.  Do not douche or use tampons until your doctor says it is okay.     · Ask your doctor when you can drive again.     · You will probably need to take at least 6 weeks off work. It depends on the type of work you do and how you feel.     · Ask your doctor when it is okay for you to have sex. Diet    · You can eat your normal diet. If your stomach is upset, try bland, low-fat foods like plain rice, broiled chicken, toast, and yogurt.     · Drink plenty of fluids (unless your doctor tells you not to).     · You may notice that your bowel movements are not regular right after your surgery. This is common. Try to avoid constipation and straining with bowel movements. You may want to take a fiber supplement every day. If you have not had a bowel movement after a couple of days, ask your doctor about taking a mild laxative.     · If you are breastfeeding, limit alcohol. Alcohol can cause a lack of energy and other health problems for the baby when a breastfeeding woman drinks heavily. It can also get in the way of a mom's ability to feed her baby or to care for the child in other ways. There isn't a lot of research about exactly how much alcohol can harm a baby. Having no alcohol is the safest choice for your baby. If you choose to have a drink now and then, have only one drink, and limit the number of occasions that you have a drink. Wait to breastfeed at least 2 hours after you have a drink to reduce the amount of alcohol the baby may get in the milk. Medicines- Norco- take 1 tablet by mouth every 4 hours as needed for pain for up to 3 days. DO NOT DRIVE WHILE YOU ARE TAKING THIS MEDICATION. Motrin 800mg- take 1 tablet by mouth every 8 hours as needed for 30 days, take with food. Tylenol 325mg- take 2 tablets by mouth every 4 hours as needed for pain or fever for up to 30 days. DO NOT TAKE TYLENOL WHILE YOU ARE TAKING THE [facility]- [address] has tylenol in it. Colace 100mg- take 1 cap by mouth 2 times a day for 90 days.      · Your doctor will tell you if and when you can restart your medicines.  He or she will also give you instructions about taking any new medicines.     · If you take blood thinners, such as warfarin (Coumadin), clopidogrel (Plavix), or aspirin, be sure to talk to your doctor. He or she will tell you if and when to start taking those medicines again. Make sure that you understand exactly what your doctor wants you to do.     · Take pain medicines exactly as directed. ? If the doctor gave you a prescription medicine for pain, take it as prescribed. ? If you are not taking a prescription pain medicine, ask your doctor if you can take an over-the-counter medicine.     · If you think your pain medicine is making you sick to your stomach:  ? Take your medicine after meals (unless your doctor has told you not to). ? Ask your doctor for a different pain medicine.     · If your doctor prescribed antibiotics, take them as directed. Do not stop taking them just because you feel better. You need to take the full course of antibiotics. Incision care    · If you have strips of tape on the incision, leave the tape on for a week or until it falls off.     · Wash the area daily with warm, soapy water, and pat it dry. Don't use hydrogen peroxide or alcohol, which can slow healing. You may cover the area with a gauze bandage if it weeps or rubs against clothing. Change the bandage every day.     · Keep the area clean and dry. Other instructions    · If you breastfeed your baby, you may be more comfortable while you are healing if you place the baby so that he or she is not resting on your belly. Try tucking your baby under your arm, with his or her body along the side you will be feeding on. Support your baby's upper body with your arm. With that hand you can control your baby's head to bring his or her mouth to your breast. This is sometimes called the football hold. Follow-up care is a key part of your treatment and safety.  Be sure to make and go to all appointments, and call your doctor if you are having problems. It's also a good idea to know your test results and keep a list of the medicines you take. When should you call for help? Call 911 anytime you think you may need emergency care. For example, call if:    · You passed out (lost consciousness).     · You have chest pain, are short of breath, or cough up blood.    Call your doctor now or seek immediate medical care if:    · You have pain that does not get better after you take pain medicine.     · You have severe vaginal bleeding.     · You are dizzy or lightheaded, or you feel like you may faint.     · You have new or worse pain in your belly or pelvis.     · You have loose stitches, or your incision comes open.     · You have symptoms of infection, such as:  ? Increased pain, swelling, warmth, or redness. ? Red streaks leading from the incision. ? Pus draining from the incision. ? A fever.     · You have symptoms of a blood clot in your leg (called a deep vein thrombosis), such as:  ? Pain in your calf, back of the knee, thigh, or groin. ? Redness and swelling in your leg or groin.    Watch closely for changes in your health, and be sure to contact your doctor if:    · You do not get better as expected. Where can you learn more? Go to http://jessie-michela.info/. Enter M806 in the search box to learn more about \" Section: What to Expect at Home. \"  Current as of: 2018  Content Version: 11.9  © 7044-5956 [employer]. Care instructions adapted under license by [employer] (which disclaims liability or warranty for this information). If you have questions about a medical condition or this instruction, always ask your healthcare professional. [address] any warranty or liability for your use of this information. [employer] Activation    Thank you for requesting access to [employer].  Please follow the instructions below to securely access and download your online medical record. [employer] allows you to send messages to your doctor, view your test results, renew your prescriptions, schedule appointments, and more. How Do I Sign Up? 1. In your internet browser, go to [email or web address]  2. Click on the First Time User? Click Here link in the Sign In box. You will be redirect to the New Member Sign Up page. 3. Enter your [employer] Access Code exactly as it appears below. You will not need to use this code after youve completed the sign-up process. If you do not sign up before the expiration date, you must request a new code. [employer] Access Code: [email or web address]  Expires: 2019 10:52 AM (This is the date your [employer] access code will )    4. Enter the last four digits of your Social Security Number (xxxx) and Date of Birth (mm/dd/yyyy) as indicated and click Submit. You will be taken to the next sign-up page. 5. Create a [employer] ID. This will be your [employer] login ID and cannot be changed, so think of one that is secure and easy to remember. 6. Create a [employer] password. You can change your password at any time. 7. Enter your Password Reset Question and Answer. This can be used at a later time if you forget your password. 8. Enter your e-mail address. You will receive e-mail notification when new information is available in [address] 9. Click Sign Up. You can now view and download portions of your medical record. 10. Click the Download Summary menu link to download a portable copy of your medical information. Additional Information    If you have questions, please visit the Frequently Asked Questions section of the [employer] website at [email or web address] Remember, [employer] is NOT to be used for urgent needs. For medical emergencies, dial 911.

## 2019-07-29 NOTE — PROGRESS NOTES
[address] [facility]   Office [phone], Fax [phone]    Subjective:     No chief complaint on file. History provided by patient and patients  with help from [address] [place] [address]       2 week  incision check and Post Partum Note    33 y.o. female [id number], presenting for 3 week incision checkt (Pt was unable to make it to the 2 week pp check dur to transportation issues)  s/p repeat  at 40w0d. Pregnancy was complicated by late to prenatal care, questionable HTN (pt states she was told to take medication but declined), h/o  x2 in [facility], and chronic migraine HA. [facility] the second trimester. Patient doing well post-partum without significant complaint. She notes that after her past pregnancies her periods were irregular and she never knew when her period would comes. Denies painful periods. She has tried nexplanon in the past but had side effects of back pain and weight gain. Pt would like to discuss other methods of menses regulation and family planning. Lochia: normal (Pt reports one day of bleeding 2 weeks ago but none since)   Pain: pain over her  scar is improving. Baby: doing well, has seen PCP  Sexual activity: none  Plan for contraception: Mirena IUD. Symptoms of depression: none  Breast/bottle: bottle (states she is not making any milk)  Support from FOB/family: yes     SocHx. - Denies smoking, alcohol use, illcit drug use  - Occupation: housewife     Review of Systems   Constitutional: Negative for chills and fever. Eyes: Negative for double vision. Respiratory: Negative for cough and shortness of breath. Cardiovascular: Negative for chest pain. Gastrointestinal: Positive for abdominal pain (over  scar). Negative for blood in stool, constipation, nausea and vomiting.    Genitourinary: Negative for dysuria and hematuria. Musculoskeletal: Negative for myalgias. Skin: Negative for rash. Neurological: Negative for dizziness. Endo/Heme/Allergies: Does not bruise/bleed easily. Psychiatric/Behavioral: Negative for depression. The patient is not nervous/anxious. Objective:     Visit Vitals  /74 (BP 1 Location: Left arm, BP Patient Position: Sitting)   Pulse 85   Temp 98.5 °F (36.9 °C) (Oral)   Resp 16   Ht 5' 3\" (1.6 m)   Wt 222 lb (100.7 kg)   SpO2 98%   BMI 39.33 kg/m²           Exam:  Patient without distress. Heart: regular rate and rhythm, no murmurs rubs or gallops    Lungs: clear to auscultation bilaterally. Abdomen soft, fundus difficult to palpate due to body habitus. Skin: low transverse  incision is clean dry and intact. No sign of infection. One disposable staple at the left apex of her incision. No dehiscence. Lower extremities:  No edema. No palpable cords or tenderness. Pertinent Labs/Studies:       Assessment and orders:     Assessment and Plan:    Kassidy Rodriguez is a 33 y.o. [id number] repeat low transverse  at 40 weeks 0 days. Patient having uncomplicated post-partum course. 1. Postpartum care following  delivery    1. Continue routine care   2. Continue keeping wound clean with unscented soap and warm water, and cover with dry cotton cloth. 3. Call clinic or make appointment for symptoms of sadness  4. Refill ibuprofen (MOTRIN) 800 mg tablet; Take 1 Tab by mouth every eight (8) hours for 30 days. Dispense: 90 Tab; Refill: 0   5. Several methods of family planning were discussed with patient including, Nexplanon, Depo injection, OCP and IUD. Risks and benefits of all were discussed with patient. Pt requesting a Mirena IUD for irregular vaginal bleeding post partum and family planning- consent signed and order placed. 6. Pt is sp Tdap 19- given at the health department.        Follow up scheduled for  2018 for 6 week post partum vist.  Will place IUD at this appointment. Pt was discussed with Dr Brenda Perez (attending physician). I have reviewed patient medical and social history and medications. I have reviewed pertinent labs results and other data. I have discussed the diagnosis with the patient and the intended plan as seen in the above orders. The patient has received an after-visit summary and questions were answered concerning future plans. I have discussed medication side effects and warnings with the patient as well.     Ananya Cota, DO  Resident TELECARE [facility]

## 2019-07-30 ENCOUNTER — ROUTINE PRENATAL (OUTPATIENT)
Dept: FAMILY MEDICINE CLINIC | Age: 29
End: 2019-07-30

## 2019-07-30 VITALS
OXYGEN SATURATION: 98 % | RESPIRATION RATE: 16 BRPM | WEIGHT: 222 LBS | TEMPERATURE: 98.5 F | HEART RATE: 85 BPM | HEIGHT: 63 IN | SYSTOLIC BLOOD PRESSURE: 118 MMHG | DIASTOLIC BLOOD PRESSURE: 74 MMHG | BODY MASS INDEX: 39.34 KG/M2

## 2019-07-30 DIAGNOSIS — Z98.891 S/P C-SECTION: Primary | ICD-10-CM

## 2019-07-30 RX ORDER — IBUPROFEN 800 MG/1
800 TABLET ORAL EVERY 8 HOURS
Qty: 90 TAB | Refills: 0 | Status: SHIPPED | OUTPATIENT
Start: 2019-07-30 | End: 2019-08-29

## 2019-07-30 NOTE — PROGRESS NOTES
34 yo  for 3 week postpartum visit    Here for incision check     Repeat CD x 3     Desires Mirena for family planning    One disposable staple at left lateral edge -- mild erythema, no wound disruption - continue keeping wound clean and covered with cotton cloth    I saw and evaluated the patient, performing the key elements of the service. I discussed the findings, assessment and plan with the resident and agree with the resident's findings and plan as documented in the resident's note.

## 2019-07-30 NOTE — PROGRESS NOTES
Chief Complaint   Patient presents with   [address]     1. Have you been to the ER, urgent care clinic since your last visit? Hospitalized since your last visit? Yes [facility] for  19.    2. Have you seen or consulted any other health care providers outside of the [address] [place] [address] since your last visit? Include any pap smears or colon screening.   No

## 2019-08-20 ENCOUNTER — ROUTINE PRENATAL (OUTPATIENT)
Dept: FAMILY MEDICINE CLINIC | Age: 29
End: 2019-08-20

## 2019-08-20 VITALS
SYSTOLIC BLOOD PRESSURE: 121 MMHG | BODY MASS INDEX: 39.16 KG/M2 | DIASTOLIC BLOOD PRESSURE: 69 MMHG | TEMPERATURE: 98.6 F | RESPIRATION RATE: 17 BRPM | HEIGHT: 63 IN | OXYGEN SATURATION: 99 % | HEART RATE: 98 BPM | WEIGHT: 221 LBS

## 2019-08-20 DIAGNOSIS — G44.209 ACUTE NON INTRACTABLE TENSION-TYPE HEADACHE: ICD-10-CM

## 2019-08-20 NOTE — PROGRESS NOTES
[address] [facility] [address] [place] [address] [facility]   Office [phone], Fax [phone]    Subjective:     No chief complaint on file. History provided by patient with help of [facility]  Lilliam Burnham       6 Week Post Partum Note    33 y.o. female [id number], presenting for 6 week postpartum visit s/p repeat LTCS at 40w0d. Pregnancy was complicated by late to prenatal care, questionable HTN (pt states she was told to take medication but declined), h/o  x2 in [facility], and chronic migraine HA. Pt [facility] for [facility] care in during the second trimester. Pt reports frontal, non-radiating, HA x3 days that is constant and sharp pain and states \"I feel like there is a fire inside my head. \" Reports some associated eye watering and floaters and mild dizziness. She denies any floaters or flashes of light. States sometimes she feels like she wants to lay down and close her eyes to make her feel better. Rates the HA at a 5/10. Has only tried Motrin 800mg for this problem and denies significant relief. Lochia: normal. 2 weeks ago Pt had what she thinks was a period, with bleeding every other day for 2 weeks and then subsided. She has not had any bleeding for 2 weeks. Pain: controlled with ibuprofen 800mg prn     Baby: doing well, has seen PCP  Sexual activity: none  Plan for contraception: Pt requested Mirena IUD- will place this next week in clinic   Symptoms of depression: none EDPS score 0  Breast/bottle:bottle   Support from FOB/family: yes     SocHx. - Denies smoking, alcohol use, illcit drug use  - Sexually active: no  - Occupation: housewife    Review of Systems   Constitutional: Negative for chills and fever. HENT: Negative for congestion and sinus pain. Eyes: Negative for blurred vision and double vision. Respiratory: Negative for cough and shortness of breath. Cardiovascular: Negative for chest pain and palpitations. Gastrointestinal: Negative for abdominal pain, nausea and vomiting. Genitourinary: Negative for dysuria and hematuria. Musculoskeletal: Negative for myalgias. Skin: Negative for rash. Neurological: Positive for headaches. Negative for dizziness. Objective: There were no vitals taken for this visit. Exam:  Patient without distress. Heart: RRR no m/r/g present    Lungs: clear to auscultation bilaterally. Abdomen soft, fundus firm at level of umbilicus, nontender. One disposable staple at the left apex of her incision- easily removed with suture removal kit. Skin: low transverse  incision is clean dry and intact. No sign of infection. Lower extremities:  No edema. No palpable cords or tenderness. Pertinent Labs/Studies:       Assessment and orders:     Assessment and Plan:    Jolene Go is a 33 y.o. [id number] s/p repeat LTCS (X3) at 40 weeks 0 days. Patient having uncomplicated post-partum course. 1. Postpartum care following  delivery  -[place]  Depression Scale score: 0  -Continue routine care  -Call clinic or make appointment for symptoms of sadness  -Advised no sexual intercourse until Mirena is placed. 2. Acute non intractable tension-type headache: this is likely a tension HA. Pt denies h/o migraines. Reports intermittent HA's prior to pregnancy but denies them being \"this bad. \" rates it as a 5/10.   -recommended Pt try over the counter Excedrin Migraine or Tylenol Migraine.    -If no relief will consider Fioricet. Follow Up:  2019 for Mirena IUD placement (Dr. Rk Pringle will precept)    Pt was discussed with Dr. Rk Pringle (attending physician). I have reviewed patient medical and social history and medications. I have reviewed pertinent labs results and other data. I have discussed the diagnosis with the patient and the intended plan as seen in the above orders.  The patient has received an after-visit summary and questions were answered concerning future plans. I have discussed medication side effects and warnings with the patient as well.     Bhavik Garcia, DO  Resident [facility]

## 2019-08-20 NOTE — PROGRESS NOTES
34 yo [id number] here for postpartum care at 6 weeks    Had repeat CD     Denies any bleeding for the last two weeks    Denies any signs of depression    + bottle feeding    C/O headache -- 5/10 -- frontal - constant, sharp pain; has tried motrin without relief    Incision -- clean/dry/intact    Desires Mirena for cycle control    I saw and evaluated the patient, performing the key elements of the service. I discussed the findings, assessment and plan with the resident and agree with the resident's findings and plan as documented in the resident's note.

## 2019-08-20 NOTE — PROGRESS NOTES
Chief Complaint   Patient presents with   Mariam Coop     1. Have you been to the ER, urgent care clinic since your last visit? Hospitalized since your last visit? No    2. Have you seen or consulted any other health care providers outside of the [address] [place] [address] since your last visit? Include any pap smears or colon screening.  No       Having some bad headaches--last 3 days    Taking motrin for the pain

## 2019-09-09 PROBLEM — E66.01 SEVERE OBESITY (HCC): Status: ACTIVE | Noted: 2019-09-09

## 2019-09-10 ENCOUNTER — OFFICE VISIT (OUTPATIENT)
Dept: FAMILY MEDICINE CLINIC | Age: 29
End: 2019-09-10

## 2019-09-10 DIAGNOSIS — Z97.5 IUD CONTRACEPTION: Primary | ICD-10-CM

## 2019-09-10 LAB
HCG URINE, QL. (POC): NEGATIVE
VALID INTERNAL CONTROL?: YES

## 2019-09-10 NOTE — PROGRESS NOTES
Hawa Faria is a 33 y.o. female here for IUD placement. Reason for IUD placement: dysmenorrhea      sp c-sections x3    She has never had an IUD in the past.     Last pap smear: 3/1/2019 Per health department records. IUD Procedure Note:    [facility]  OFFICE PROCEDURE PROGRESS NOTE      Chart reviewed for the following:  Guillaume VILA DO, have reviewed the History, Physical and updated the Allergic reactions for [address] performed immediately prior to start of procedure:  Guillaume VILA DO, have performed the following reviews on Hawa Faria prior to the start of the procedure:            * Patient was identified by name and date of birth   * Agreement on procedure being performed was verified  * Risks and Benefits explained to the patient which include but are not limited to bleeding, infection, uterine perforation, and damage to surrounding structures  * Procedure site verified and marked as necessary  * Patient was positioned for comfort  * Consent was signed and verified     Time: 3:32 PM    Date of procedure: 9/10/2019    Procedure performed by:  Guillaume Gama DO    Provider assisted by: Dr. Lisa Monteiro (attending)      Patient assisted by: [employer] interpretation #[id number]    How tolerated by patient: did not tolerate the procedure well and thus it had to be discontinued. Procedure in detail:    Pt understands the method and alternatives and agrees to IUD placement. Urine pregnancy test was negative. Pt placed in dorsal lithotomy position. A sterile speculum was placed in the patients vagina. The cervix was noted to be retroverted. It  was cleansed with betadine solution. The upper lip of the cervix was grasped with a single toothed tenaculum and gentle traction was applied to align the cervical canal with the uterine cavity. This was unsuccessful at aligning the cervix into a clear view.   The tenaculum was removed and the speculum was removed. A larger sterile speculum was placed inside the vagina. The cervix was again noted to be retroverted but was able to be visualized better with the larger speculum. The cervix was cleansed with betadine solution x3. The upper lip of the cervix was grasped with a single toothed tenaculum and gentle traction was applied to align the cervical canal with the uterine cavity. I attempted to sound the uterus but was met with great resistance and cervical stenosis and was unable to insert the sound. Dr. Lisa Monteiro attempted to sound the uterus and she too was met with resistance and significant cervical stenosis. Pt was very uncomfortable at this point and was actively crying on the exam table. Noting her discomfort, her h/o multiple c-sections and recent 8 week post partum  #3, Dr. Lisa Monteiro and I agreed to discontinue the procedure to avoid greater discomfort and possible injury to the patient. Dr. Lisa Monteiro (attending) present for entire procedure.     Guillaume Gama DO  Family Medicine Resident

## 2019-09-16 NOTE — PROGRESS NOTES
Cervical stenosis -- unable to pass uterine sound  Pt uncomfortable during the procedure    I was present for and supervised the entire procedure. See resident note for details.

## 2021-02-24 ENCOUNTER — APPOINTMENT (OUTPATIENT)
Dept: GENERAL RADIOLOGY | Age: 31
End: 2021-02-24
Attending: EMERGENCY MEDICINE
Payer: OTHER GOVERNMENT

## 2021-02-24 ENCOUNTER — HOSPITAL ENCOUNTER (EMERGENCY)
Age: 31
Discharge: HOME | End: 2021-02-24
Attending: EMERGENCY MEDICINE
Payer: OTHER GOVERNMENT

## 2021-02-24 VITALS
SYSTOLIC BLOOD PRESSURE: 131 MMHG | DIASTOLIC BLOOD PRESSURE: 77 MMHG | OXYGEN SATURATION: 97 % | TEMPERATURE: 99.3 F | RESPIRATION RATE: 18 BRPM | HEART RATE: 106 BPM

## 2021-02-24 DIAGNOSIS — J12.82 PNEUMONIA DUE TO COVID-19 VIRUS: Primary | ICD-10-CM

## 2021-02-24 DIAGNOSIS — U07.1 PNEUMONIA DUE TO COVID-19 VIRUS: Primary | ICD-10-CM

## 2021-02-24 LAB
ALBUMIN SERPL-MCNC: 3.2 G/DL (ref 3.5–5)
ALBUMIN/GLOB SERPL: 0.6 {RATIO} (ref 1.1–2.2)
ALP SERPL-CCNC: 72 U/L (ref 45–117)
ALT SERPL-CCNC: 22 U/L (ref 12–78)
ANION GAP SERPL CALC-SCNC: 6 MMOL/L (ref 5–15)
AST SERPL-CCNC: 12 U/L (ref 15–37)
BASOPHILS # BLD: 0 K/UL (ref 0–0.1)
BASOPHILS NFR BLD: 0 % (ref 0–1)
BILIRUB SERPL-MCNC: 0.2 MG/DL (ref 0.2–1)
BUN SERPL-MCNC: 15 MG/DL (ref 6–20)
BUN/CREAT SERPL: 20 (ref 12–20)
CALCIUM SERPL-MCNC: 9.5 MG/DL (ref 8.5–10.1)
CHLORIDE SERPL-SCNC: 106 MMOL/L (ref 97–108)
CO2 SERPL-SCNC: 27 MMOL/L (ref 21–32)
CREAT SERPL-MCNC: 0.74 MG/DL (ref 0.55–1.02)
CRP SERPL-MCNC: 1.5 MG/DL (ref 0–0.6)
D DIMER PPP FEU-MCNC: 0.39 MG/L FEU (ref 0–0.65)
DIFFERENTIAL METHOD BLD: ABNORMAL
EOSINOPHIL # BLD: 0 K/UL (ref 0–0.4)
EOSINOPHIL NFR BLD: 0 % (ref 0–7)
ERYTHROCYTE [DISTWIDTH] IN BLOOD BY AUTOMATED COUNT: 16.8 % (ref 11.5–14.5)
GLOBULIN SER CALC-MCNC: 5 G/DL (ref 2–4)
GLUCOSE SERPL-MCNC: 130 MG/DL (ref 65–100)
HCT VFR BLD AUTO: 37.9 % (ref 35–47)
HGB BLD-MCNC: 11.1 G/DL (ref 11.5–16)
IMM GRANULOCYTES # BLD AUTO: 0 K/UL (ref 0–0.04)
IMM GRANULOCYTES NFR BLD AUTO: 0 % (ref 0–0.5)
LACTATE SERPL-SCNC: 1.6 MMOL/L (ref 0.4–2)
LYMPHOCYTES # BLD: 0.9 K/UL (ref 0.8–3.5)
LYMPHOCYTES NFR BLD: 18 % (ref 12–49)
MAGNESIUM SERPL-MCNC: 1.8 MG/DL (ref 1.6–2.4)
MCH RBC QN AUTO: 21.1 PG (ref 26–34)
MCHC RBC AUTO-ENTMCNC: 29.3 G/DL (ref 30–36.5)
MCV RBC AUTO: 72.2 FL (ref 80–99)
MONOCYTES # BLD: 0.4 K/UL (ref 0–1)
MONOCYTES NFR BLD: 8 % (ref 5–13)
NEUTS SEG # BLD: 3.8 K/UL (ref 1.8–8)
NEUTS SEG NFR BLD: 74 % (ref 32–75)
NRBC # BLD: 0 K/UL (ref 0–0.01)
NRBC BLD-RTO: 0 PER 100 WBC
PLATELET # BLD AUTO: 311 K/UL (ref 150–400)
PMV BLD AUTO: 10.6 FL (ref 8.9–12.9)
POTASSIUM SERPL-SCNC: 3.9 MMOL/L (ref 3.5–5.1)
PROCALCITONIN SERPL-MCNC: <0.05 NG/ML
PROT SERPL-MCNC: 8.2 G/DL (ref 6.4–8.2)
RBC # BLD AUTO: 5.25 M/UL (ref 3.8–5.2)
SODIUM SERPL-SCNC: 139 MMOL/L (ref 136–145)
WBC # BLD AUTO: 5.2 K/UL (ref 3.6–11)

## 2021-02-24 PROCEDURE — 83605 ASSAY OF LACTIC ACID: CPT

## 2021-02-24 PROCEDURE — 93005 ELECTROCARDIOGRAM TRACING: CPT

## 2021-02-24 PROCEDURE — 85025 COMPLETE CBC W/AUTO DIFF WBC: CPT

## 2021-02-24 PROCEDURE — 74011250636 HC RX REV CODE- 250/636: Performed by: EMERGENCY MEDICINE

## 2021-02-24 PROCEDURE — 99285 EMERGENCY DEPT VISIT HI MDM: CPT

## 2021-02-24 PROCEDURE — 80053 COMPREHEN METABOLIC PANEL: CPT

## 2021-02-24 PROCEDURE — 86140 C-REACTIVE PROTEIN: CPT

## 2021-02-24 PROCEDURE — 83735 ASSAY OF MAGNESIUM: CPT

## 2021-02-24 PROCEDURE — 71045 X-RAY EXAM CHEST 1 VIEW: CPT

## 2021-02-24 PROCEDURE — 74011250637 HC RX REV CODE- 250/637: Performed by: EMERGENCY MEDICINE

## 2021-02-24 PROCEDURE — 36415 COLL VENOUS BLD VENIPUNCTURE: CPT

## 2021-02-24 PROCEDURE — 85379 FIBRIN DEGRADATION QUANT: CPT

## 2021-02-24 PROCEDURE — 84145 PROCALCITONIN (PCT): CPT

## 2021-02-24 RX ORDER — GUAIFENESIN/DEXTROMETHORPHAN 100-10MG/5
5 SYRUP ORAL
Status: COMPLETED | OUTPATIENT
Start: 2021-02-24 | End: 2021-02-24

## 2021-02-24 RX ORDER — SODIUM CHLORIDE 0.9 % (FLUSH) 0.9 %
5-40 SYRINGE (ML) INJECTION AS NEEDED
Status: DISCONTINUED | OUTPATIENT
Start: 2021-02-24 | End: 2021-02-24 | Stop reason: HOSPADM

## 2021-02-24 RX ORDER — SODIUM CHLORIDE 0.9 % (FLUSH) 0.9 %
5-40 SYRINGE (ML) INJECTION EVERY 8 HOURS
Status: DISCONTINUED | OUTPATIENT
Start: 2021-02-24 | End: 2021-02-24 | Stop reason: HOSPADM

## 2021-02-24 RX ORDER — HYDROCODONE POLISTIREX AND CHLORPHENIRAMINE POLISTIREX 10; 8 MG/5ML; MG/5ML
5 SUSPENSION, EXTENDED RELEASE ORAL
Status: COMPLETED | OUTPATIENT
Start: 2021-02-24 | End: 2021-02-24

## 2021-02-24 RX ORDER — ACETAMINOPHEN 500 MG
1000 TABLET ORAL
Status: COMPLETED | OUTPATIENT
Start: 2021-02-24 | End: 2021-02-24

## 2021-02-24 RX ADMIN — Medication 1000 MG: at 04:50

## 2021-02-24 RX ADMIN — SODIUM CHLORIDE 1000 ML: 9 INJECTION, SOLUTION INTRAVENOUS at 04:52

## 2021-02-24 RX ADMIN — HYDROCODONE POLISTIREX AND CHLORPHENIRAMINE POLISTIREX 5 ML: 10; 8 SUSPENSION, EXTENDED RELEASE ORAL at 06:11

## 2021-02-24 RX ADMIN — GUAIFENESIN AND DEXTROMETHORPHAN 5 ML: 100; 10 SYRUP ORAL at 04:50

## 2021-02-24 NOTE — PROGRESS NOTES
2/24/2021  7:58 AM  Case management note    Set up round trip ride, nurse took information as patient is covid positive   machine in room, RN giving instructions as to get ride outside.   [address]

## 2021-02-24 NOTE — ED TRIAGE NOTES
Triage: Per EMS pt was tested positive for covid and was told to call 911 if she had shortness of breath.

## 2021-02-24 NOTE — ED PROVIDER NOTES
Healthy 69-year-old [place] female. She presents via EMS with complaints of a near constant cough. It began 3 days ago. It has been dry. She also reports crampy abdominal pain and diarrhea x3 her appetite has been decreased. She feels short of breath. Her temperature has been hovering around 100. [facility] and [facility]. She was prescribed Zithromax and albuterol. She has tried albuterol with limited relief. History was obtained through an  later. Past Medical History:   Diagnosis Date    Complication of anesthesia     general anesthesia for both previous csections    Essential hypertension     last two pregnancies       Past Surgical History:   Procedure Laterality Date    HX  SECTION      x2    HX DILATION AND CURETTAGE           No family history on file.     Social History     Socioeconomic History    Marital status:      Spouse name: Not on file    Number of children: Not on file    Years of education: Not on file    Highest education level: Not on file   Occupational History    Not on file   Social Needs    Financial resource strain: Not on file    Food insecurity     Worry: Not on file     Inability: Not on file    Transportation needs     Medical: Not on file     Non-medical: Not on file   Tobacco Use    Smoking status: Never Smoker    Smokeless tobacco: Never Used   Substance and Sexual Activity    Alcohol use: Not Currently    Drug use: Never    Sexual activity: Not Currently   Lifestyle    Physical activity     Days per week: Not on file     Minutes per session: Not on file    Stress: Not on file   Relationships    Social connections     Talks on phone: Not on file     Gets together: Not on file     Attends Pentecostalism service: Not on file     Active member of club or organization: Not on file     Attends meetings of clubs or organizations: Not on file     Relationship status: Not on file  Intimate partner violence     Fear of current or ex partner: Not on file     Emotionally abused: Not on file     Physically abused: Not on file     Forced sexual activity: Not on file   Other Topics Concern     Service Not Asked    Blood Transfusions Not Asked    Caffeine Concern Not Asked    Occupational Exposure Not Asked    Hobby Hazards Not Asked    Sleep Concern Not Asked    Stress Concern Not Asked    Weight Concern Not Asked    Special Diet Not Asked    Back Care Not Asked    Exercise Not Asked    Bike Helmet Not Asked   [address] Not Asked    Self-Exams Not Asked   Social History Narrative    Not on file         ALLERGIES: Patient has no known allergies. Review of Systems   All other systems reviewed and are negative. Vitals:    02/24/21 0417   BP: (!) 115/90   Pulse: (!) 127   Resp: 16   Temp: 99.3 °F (37.4 °C)   SpO2: 97%            Physical Exam  Vitals signs and nursing note reviewed. Constitutional:       Appearance: She is well-developed. Comments: Near constant coughing. Overweight. HENT:      Head: Normocephalic and atraumatic. Eyes:      Conjunctiva/sclera: Conjunctivae normal.   Neck:      Musculoskeletal: Neck supple. Trachea: No tracheal deviation. Cardiovascular:      Rate and Rhythm: Regular rhythm. Tachycardia present. Heart sounds: Normal heart sounds. No murmur. No friction rub. No gallop. Pulmonary:      Effort: Pulmonary effort is normal.      Breath sounds: Normal breath sounds. Comments: Near constant coughing. Abdominal:      Palpations: Abdomen is soft. Tenderness: There is no abdominal tenderness. Musculoskeletal:         General: No deformity. Skin:     General: Skin is warm and dry. Neurological:      Mental Status: She is alert. Comments: oriented          MDM       Procedures    EKG: Sinus tachycardia; rate of 130; artifact; nonspecific ST, T wave abnormalities.   Effie Montanez MD  6:15 AM    Progress Note:  Results, treatment, and follow up plan have been discussed with patient. Questions were answered. Carrie Salinas MD  6:47 AM    Assessment/plan: Recently diagnosed with Covid presents with an almost continuous cough with dyspnea. Initially tachycardic into the 130s. Heart rate has trended down to the low 100s. Reassuring appearance/exam with stable vital signs. O2 sats have been above 95% throughout her ED stay. CBC, comp met, D-dimer, lactate okay. Chest x-ray shows early pneumonia. She has Zithromax and albuterol that [facility]  Return precautions discussed. Self quarantine recommended. PCP/pulmonary follow-up.   Carrie Salnias MD  6:49 AM

## 2021-02-25 ENCOUNTER — PATIENT OUTREACH (OUTPATIENT)
Dept: CASE MANAGEMENT | Age: 31
End: 2021-02-25

## 2021-02-25 NOTE — PROGRESS NOTES
Patient contacted regarding [id number] diagnosis\". Discussed COVID-19 related testing which was available at this time. Test results were positive. Patient informed of results, if available? yes     Care Transition Nurse/ Ambulatory Care Manager contacted the patient by telephone to perform post discharge assessment. Call within 2 business days of discharge: Yes Verified name and  with patient as identifiers. Provided introduction to self, and explanation of the CTN/ACM role, and reason for call due to risk factors for infection and/or exposure to COVID-19. Symptoms reviewed with patient who verbalized the following symptoms: no worsening symptoms      Due to no new or worsening symptoms encounter was not routed to provider for escalation. Discussed follow-up appointments. If no appointment was previously scheduled, appointment scheduling offered:  [facility]   [facility] No future appointments. Non-[facility] follow up appointment(s): urged to call PCP     Advance Care Planning:   Does patient have an Advance Directive:  patient declined education. Patient has following risk factors of: no known risk factors. CTN/ACM reviewed discharge instructions, medical action plan and red flags such as increased shortness of breath, increasing fever and signs of decompensation with patient who verbalized understanding. Discussed exposure protocols and quarantine with CDC Guidelines What to do if you are sick with coronavirus disease .  Patient was given an opportunity for questions and concerns. The patient agrees to contact the [facility] exposure line [phone], [facility] [address]  ([phone] and PCP office for questions related to their healthcare. CTN/ACM provided contact information for future needs.     Reviewed and educated patient on any new and changed medications related to discharge diagnosis     Patient/family/caregiver given information for [facility] and agrees to enroll no  14 day call based on severity of symptoms and risk factors.

## 2021-02-26 ENCOUNTER — APPOINTMENT (OUTPATIENT)
Dept: CT IMAGING | Age: 31
End: 2021-02-26
Attending: EMERGENCY MEDICINE

## 2021-02-26 ENCOUNTER — APPOINTMENT (OUTPATIENT)
Dept: GENERAL RADIOLOGY | Age: 31
End: 2021-02-26
Attending: NURSE PRACTITIONER

## 2021-02-26 ENCOUNTER — HOSPITAL ENCOUNTER (EMERGENCY)
Age: 31
Discharge: HOME | End: 2021-02-26
Attending: EMERGENCY MEDICINE

## 2021-02-26 VITALS
DIASTOLIC BLOOD PRESSURE: 83 MMHG | BODY MASS INDEX: 49.32 KG/M2 | HEART RATE: 93 BPM | TEMPERATURE: 97 F | WEIGHT: 278.44 LBS | OXYGEN SATURATION: 96 % | SYSTOLIC BLOOD PRESSURE: 148 MMHG | RESPIRATION RATE: 23 BRPM

## 2021-02-26 DIAGNOSIS — U07.1 PNEUMONIA DUE TO COVID-19 VIRUS: Primary | ICD-10-CM

## 2021-02-26 DIAGNOSIS — J12.82 PNEUMONIA DUE TO COVID-19 VIRUS: Primary | ICD-10-CM

## 2021-02-26 DIAGNOSIS — R06.02 SOB (SHORTNESS OF BREATH): ICD-10-CM

## 2021-02-26 LAB
ALBUMIN SERPL-MCNC: 3.6 G/DL (ref 3.5–5)
ALBUMIN/GLOB SERPL: 0.7 {RATIO} (ref 1.1–2.2)
ALP SERPL-CCNC: 73 U/L (ref 45–117)
ALT SERPL-CCNC: 27 U/L (ref 12–78)
ANION GAP SERPL CALC-SCNC: 6 MMOL/L (ref 5–15)
APPEARANCE UR: ABNORMAL
AST SERPL-CCNC: 30 U/L (ref 15–37)
ATRIAL RATE: 130 BPM
BACTERIA URNS QL MICRO: NEGATIVE /HPF
BASOPHILS # BLD: 0 K/UL (ref 0–0.1)
BASOPHILS NFR BLD: 0 % (ref 0–1)
BILIRUB SERPL-MCNC: 0.3 MG/DL (ref 0.2–1)
BILIRUB UR QL: NEGATIVE
BUN SERPL-MCNC: 13 MG/DL (ref 6–20)
BUN/CREAT SERPL: 16 (ref 12–20)
CALCIUM SERPL-MCNC: 8.7 MG/DL (ref 8.5–10.1)
CALCULATED P AXIS, ECG09: 51 DEGREES
CALCULATED R AXIS, ECG10: 41 DEGREES
CALCULATED T AXIS, ECG11: -60 DEGREES
CHLORIDE SERPL-SCNC: 103 MMOL/L (ref 97–108)
CO2 SERPL-SCNC: 31 MMOL/L (ref 21–32)
COLOR UR: ABNORMAL
CREAT SERPL-MCNC: 0.82 MG/DL (ref 0.55–1.02)
D DIMER PPP FEU-MCNC: 0.68 MG/L FEU (ref 0–0.65)
DIAGNOSIS, 93000: NORMAL
DIFFERENTIAL METHOD BLD: ABNORMAL
EOSINOPHIL # BLD: 0 K/UL (ref 0–0.4)
EOSINOPHIL NFR BLD: 0 % (ref 0–7)
EPITH CASTS URNS QL MICRO: ABNORMAL /LPF
ERYTHROCYTE [DISTWIDTH] IN BLOOD BY AUTOMATED COUNT: 17.2 % (ref 11.5–14.5)
GLOBULIN SER CALC-MCNC: 5.4 G/DL (ref 2–4)
GLUCOSE SERPL-MCNC: 86 MG/DL (ref 65–100)
GLUCOSE UR STRIP.AUTO-MCNC: NEGATIVE MG/DL
HCG UR QL: NEGATIVE
HCT VFR BLD AUTO: 41.4 % (ref 35–47)
HGB BLD-MCNC: 11.9 G/DL (ref 11.5–16)
HGB UR QL STRIP: ABNORMAL
HYALINE CASTS URNS QL MICRO: ABNORMAL /LPF (ref 0–5)
IMM GRANULOCYTES # BLD AUTO: 0 K/UL (ref 0–0.04)
IMM GRANULOCYTES NFR BLD AUTO: 1 % (ref 0–0.5)
KETONES UR QL STRIP.AUTO: 15 MG/DL
LACTATE SERPL-SCNC: 1.2 MMOL/L (ref 0.4–2)
LEUKOCYTE ESTERASE UR QL STRIP.AUTO: NEGATIVE
LYMPHOCYTES # BLD: 1 K/UL (ref 0.8–3.5)
LYMPHOCYTES NFR BLD: 31 % (ref 12–49)
MCH RBC QN AUTO: 21.1 PG (ref 26–34)
MCHC RBC AUTO-ENTMCNC: 28.7 G/DL (ref 30–36.5)
MCV RBC AUTO: 73.3 FL (ref 80–99)
MONOCYTES # BLD: 0.3 K/UL (ref 0–1)
MONOCYTES NFR BLD: 8 % (ref 5–13)
NEUTS SEG # BLD: 2 K/UL (ref 1.8–8)
NEUTS SEG NFR BLD: 60 % (ref 32–75)
NITRITE UR QL STRIP.AUTO: NEGATIVE
NRBC # BLD: 0 K/UL (ref 0–0.01)
NRBC BLD-RTO: 0 PER 100 WBC
P-R INTERVAL, ECG05: 126 MS
PH UR STRIP: 5.5 [PH] (ref 5–8)
PLATELET # BLD AUTO: 236 K/UL (ref 150–400)
PMV BLD AUTO: 10.3 FL (ref 8.9–12.9)
POTASSIUM SERPL-SCNC: 3.5 MMOL/L (ref 3.5–5.1)
PROT SERPL-MCNC: 9 G/DL (ref 6.4–8.2)
PROT UR STRIP-MCNC: 300 MG/DL
Q-T INTERVAL, ECG07: 288 MS
QRS DURATION, ECG06: 82 MS
QTC CALCULATION (BEZET), ECG08: 423 MS
RBC # BLD AUTO: 5.65 M/UL (ref 3.8–5.2)
RBC #/AREA URNS HPF: ABNORMAL /HPF (ref 0–5)
RBC MORPH BLD: ABNORMAL
RBC MORPH BLD: ABNORMAL
SODIUM SERPL-SCNC: 140 MMOL/L (ref 136–145)
SP GR UR REFRACTOMETRY: 1.02 (ref 1–1.03)
TROPONIN I SERPL-MCNC: <0.05 NG/ML
UR CULT HOLD, URHOLD: NORMAL
UROBILINOGEN UR QL STRIP.AUTO: 0.2 EU/DL (ref 0.2–1)
VENTRICULAR RATE, ECG03: 130 BPM
WBC # BLD AUTO: 3.3 K/UL (ref 3.6–11)
WBC URNS QL MICRO: ABNORMAL /HPF (ref 0–4)

## 2021-02-26 PROCEDURE — 85025 COMPLETE CBC W/AUTO DIFF WBC: CPT

## 2021-02-26 PROCEDURE — 87040 BLOOD CULTURE FOR BACTERIA: CPT

## 2021-02-26 PROCEDURE — 74011250636 HC RX REV CODE- 250/636: Performed by: NURSE PRACTITIONER

## 2021-02-26 PROCEDURE — 71275 CT ANGIOGRAPHY CHEST: CPT

## 2021-02-26 PROCEDURE — 71045 X-RAY EXAM CHEST 1 VIEW: CPT

## 2021-02-26 PROCEDURE — 85379 FIBRIN DEGRADATION QUANT: CPT

## 2021-02-26 PROCEDURE — 36415 COLL VENOUS BLD VENIPUNCTURE: CPT

## 2021-02-26 PROCEDURE — 81001 URINALYSIS AUTO W/SCOPE: CPT

## 2021-02-26 PROCEDURE — 74011250637 HC RX REV CODE- 250/637: Performed by: EMERGENCY MEDICINE

## 2021-02-26 PROCEDURE — 96374 THER/PROPH/DIAG INJ IV PUSH: CPT

## 2021-02-26 PROCEDURE — 83605 ASSAY OF LACTIC ACID: CPT

## 2021-02-26 PROCEDURE — 84484 ASSAY OF TROPONIN QUANT: CPT

## 2021-02-26 PROCEDURE — 80053 COMPREHEN METABOLIC PANEL: CPT

## 2021-02-26 PROCEDURE — 96375 TX/PRO/DX INJ NEW DRUG ADDON: CPT

## 2021-02-26 PROCEDURE — 81025 URINE PREGNANCY TEST: CPT

## 2021-02-26 PROCEDURE — 74011000636 HC RX REV CODE- 636: Performed by: RADIOLOGY

## 2021-02-26 PROCEDURE — 74011250636 HC RX REV CODE- 250/636: Performed by: EMERGENCY MEDICINE

## 2021-02-26 PROCEDURE — 99285 EMERGENCY DEPT VISIT HI MDM: CPT

## 2021-02-26 RX ORDER — KETOROLAC TROMETHAMINE 30 MG/ML
30 INJECTION, SOLUTION INTRAMUSCULAR; INTRAVENOUS
Status: COMPLETED | OUTPATIENT
Start: 2021-02-26 | End: 2021-02-26

## 2021-02-26 RX ORDER — DEXAMETHASONE 4 MG/1
4 TABLET ORAL DAILY
Qty: 5 TAB | Refills: 0 | Status: SHIPPED | OUTPATIENT
Start: 2021-02-26 | End: 2021-03-08

## 2021-02-26 RX ORDER — ONDANSETRON 2 MG/ML
8 INJECTION INTRAMUSCULAR; INTRAVENOUS
Status: COMPLETED | OUTPATIENT
Start: 2021-02-26 | End: 2021-02-26

## 2021-02-26 RX ORDER — BENZONATATE 100 MG/1
200 CAPSULE ORAL
Status: DISCONTINUED | OUTPATIENT
Start: 2021-02-26 | End: 2021-02-27 | Stop reason: HOSPADM

## 2021-02-26 RX ADMIN — BENZONATATE 200 MG: 100 CAPSULE ORAL at 17:01

## 2021-02-26 RX ADMIN — IOPAMIDOL 80 ML: 755 INJECTION, SOLUTION INTRAVENOUS at 19:05

## 2021-02-26 RX ADMIN — SODIUM CHLORIDE 1000 ML: 9 INJECTION, SOLUTION INTRAVENOUS at 18:14

## 2021-02-26 RX ADMIN — SODIUM CHLORIDE 1000 ML: 9 INJECTION, SOLUTION INTRAVENOUS at 16:15

## 2021-02-26 RX ADMIN — ONDANSETRON 8 MG: 2 INJECTION INTRAMUSCULAR; INTRAVENOUS at 16:15

## 2021-02-26 RX ADMIN — KETOROLAC TROMETHAMINE 30 MG: 30 INJECTION, SOLUTION INTRAMUSCULAR at 17:53

## 2021-02-26 NOTE — ED TRIAGE NOTES
Fever 102.4 x 2 days ago, coughing and vomiting nonstop. Today fever was 101.4. Treated with tylenol today this morning 0830 and ibuprofen 1230    Utilizing [id number]  for Danish translation.      Was tested for COVID and had positive result on 2/24

## 2021-02-26 NOTE — ED PROVIDER NOTES
Date of Service:  2021    Patient:  Leonard Bronson    Chief Complaint:  Cough, Fever, Vomiting, Dizziness, and Shortness of Breath       HPI:  Leonard Bronson is a 31 y.o.  female who presents for evaluation of cough fever and shortness of breath. Patient was diagnosed with Covid the other day. She is had symptoms since the . She comes in today complaining of worsening shortness of breath, cough, body aches, headache with the cough. She also has some nausea and decreased appetite. Patient was seen the other night discharged home on antibiotic arrives here tachycardic diaphoretic and overall ill-appearing. She otherwise denies any other acute complaints. She denies pregnancy. No diarrhea constipation           Past Medical History:   Diagnosis Date    Complication of anesthesia     general anesthesia for both previous csections    Essential hypertension     last two pregnancies       Past Surgical History:   Procedure Laterality Date    HX  SECTION      x2    HX DILATION AND CURETTAGE           No family history on file.     Social History     Socioeconomic History    Marital status:      Spouse name: Not on file    Number of children: Not on file    Years of education: Not on file    Highest education level: Not on file   Occupational History    Not on file   Social Needs    Financial resource strain: Not on file    Food insecurity     Worry: Not on file     Inability: Not on file    Transportation needs     Medical: Not on file     Non-medical: Not on file   Tobacco Use    Smoking status: Never Smoker    Smokeless tobacco: Never Used   Substance and Sexual Activity    Alcohol use: Not Currently    Drug use: Never    Sexual activity: Not Currently   Lifestyle    Physical activity     Days per week: Not on file     Minutes per session: Not on file    Stress: Not on file   Relationships    Social connections     Talks on phone: Not on file     Gets together: Not on file Attends Hinduism service: Not on file     Active member of club or organization: Not on file     Attends meetings of clubs or organizations: Not on file     Relationship status: Not on file    Intimate partner violence     Fear of current or ex partner: Not on file     Emotionally abused: Not on file     Physically abused: Not on file     Forced sexual activity: Not on file   Other Topics Concern     Service Not Asked    Blood Transfusions Not Asked    Caffeine Concern Not Asked    Occupational Exposure Not Asked   Percy Donning Hazards Not Asked    Sleep Concern Not Asked    Stress Concern Not Asked    Weight Concern Not Asked    Special Diet Not Asked    Back Care Not Asked    Exercise Not Asked    Bike Helmet Not Asked   [address] Not Asked    Self-Exams Not Asked   Social History Narrative    Not on file         ALLERGIES: Patient has no known allergies. Review of Systems   Constitutional: Positive for appetite change and fever. HENT: Negative for hearing loss. Eyes: Negative for visual disturbance. Respiratory: Positive for cough and shortness of breath. Cardiovascular: Positive for chest pain. Gastrointestinal: Positive for nausea and vomiting. Negative for abdominal pain. Genitourinary: Negative for flank pain. Musculoskeletal: Positive for myalgias. Negative for back pain. Skin: Negative for rash. Neurological: Negative for dizziness and light-headedness. Psychiatric/Behavioral: Negative for confusion. Vitals:    02/26/21 1504   BP: (!) 144/85   Pulse: (!) 118   Resp: 22   Temp: 97 °F (36.1 °C)   SpO2: 99%   Weight: 126.3 kg (278 lb 7.1 oz)            Physical Exam  Vitals signs and nursing note reviewed. Constitutional:       General: She is in acute distress. Appearance: She is well-developed. She is ill-appearing and diaphoretic. She is not toxic-appearing. HENT:      Head: Normocephalic and atraumatic. Eyes:      General: No scleral icterus. Conjunctiva/sclera: Conjunctivae normal.      Pupils: Pupils are equal, round, and reactive to light. Neck:      Musculoskeletal: Neck supple. Trachea: No tracheal deviation. Cardiovascular:      Rate and Rhythm: Regular rhythm. Tachycardia present. Heart sounds: No murmur. Pulmonary:      Effort: Pulmonary effort is normal. Tachypnea present. No respiratory distress. Breath sounds: Normal breath sounds. Comments: Excessive coughing  Chest:      Chest wall: No mass or tenderness. Abdominal:      General: Bowel sounds are normal. There is no distension. Palpations: Abdomen is soft. Tenderness: There is no abdominal tenderness. Musculoskeletal: Normal range of motion. General: No tenderness or deformity. Right lower leg: No edema. Left lower leg: No edema. Skin:     General: Skin is warm. Capillary Refill: Capillary refill takes less than 2 seconds. Findings: No rash. Neurological:      Mental Status: She is alert and oriented to person, place, and time. Psychiatric:         Behavior: Behavior normal.         Thought Content:  Thought content normal.         Judgment: Judgment normal.          MDM  Number of Diagnoses or Management Options    ED Course as of Feb 26 2120 Fri Feb 26, 2021   1837 EKG 1833    Normal axis and intervals  No acute ischemic changes    [GG]      ED Course User Index  [GG] Ronald Pean, DO     VITAL SIGNS:  Patient Vitals for the past 4 hrs:   Pulse Resp BP SpO2   02/26/21 2100 93  (!) 148/83 96 %   02/26/21 1908   138/84    02/26/21 1757 (!) 111 23 (!) 169/97 94 %         LABS:  Recent Results (from the past 6 hour(s))   METABOLIC PANEL, COMPREHENSIVE    Collection Time: 02/26/21  3:38 PM   Result Value Ref Range    Sodium 140 136 - 145 mmol/L    Potassium 3.5 3.5 - 5.1 mmol/L    Chloride 103 97 - 108 mmol/L    CO2 31 21 - 32 mmol/L    Anion gap 6 5 - 15 mmol/L    Glucose 86 65 - 100 mg/dL    BUN 13 6 - 20 MG/DL    Creatinine 0.82 0.55 - 1.02 MG/DL    BUN/Creatinine ratio 16 12 - 20      GFR est AA >60 >60 ml/min/1.73m2    GFR est non-AA >60 >60 ml/min/1.73m2    Calcium 8.7 8.5 - 10.1 MG/DL    Bilirubin, total 0.3 0.2 - 1.0 MG/DL    ALT (SGPT) 27 12 - 78 U/L    AST (SGOT) 30 15 - 37 U/L    Alk. phosphatase 73 45 - 117 U/L    Protein, total 9.0 (H) 6.4 - 8.2 g/dL    Albumin 3.6 3.5 - 5.0 g/dL    Globulin 5.4 (H) 2.0 - 4.0 g/dL    A-G Ratio 0.7 (L) 1.1 - 2.2     CBC WITH AUTOMATED DIFF    Collection Time: 02/26/21  3:38 PM   Result Value Ref Range    WBC 3.3 (L) 3.6 - 11.0 K/uL    RBC 5.65 (H) 3.80 - 5.20 M/uL    HGB 11.9 11.5 - 16.0 g/dL    HCT 41.4 35.0 - 47.0 %    MCV 73.3 (L) 80.0 - 99.0 FL    MCH 21.1 (L) 26.0 - 34.0 PG    MCHC 28.7 (L) 30.0 - 36.5 g/dL    RDW 17.2 (H) 11.5 - 14.5 %    PLATELET 581 398 - 066 K/uL    MPV 10.3 8.9 - 12.9 FL    NRBC 0.0 0  WBC    ABSOLUTE NRBC 0.00 0.00 - 0.01 K/uL    NEUTROPHILS 60 32 - 75 %    LYMPHOCYTES 31 12 - 49 %    MONOCYTES 8 5 - 13 %    EOSINOPHILS 0 0 - 7 %    BASOPHILS 0 0 - 1 %    IMMATURE GRANULOCYTES 1 (H) 0.0 - 0.5 %    ABS. NEUTROPHILS 2.0 1.8 - 8.0 K/UL    ABS. LYMPHOCYTES 1.0 0.8 - 3.5 K/UL    ABS. MONOCYTES 0.3 0.0 - 1.0 K/UL    ABS. EOSINOPHILS 0.0 0.0 - 0.4 K/UL    ABS. BASOPHILS 0.0 0.0 - 0.1 K/UL    ABS. IMM.  GRANS. 0.0 0.00 - 0.04 K/UL    DF AUTOMATED      RBC COMMENTS ANISOCYTOSIS  1+        RBC COMMENTS HYPOCHROMIA  1+       LACTIC ACID    Collection Time: 02/26/21  3:38 PM   Result Value Ref Range    Lactic acid 1.2 0.4 - 2.0 MMOL/L   TROPONIN I    Collection Time: 02/26/21  3:38 PM   Result Value Ref Range    Troponin-I, Qt. <0.05 <0.05 ng/mL   URINALYSIS W/ RFLX MICROSCOPIC    Collection Time: 02/26/21  5:03 PM   Result Value Ref Range    Color YELLOW/STRAW      Appearance TURBID (A) CLEAR      Specific gravity 1.025 1.003 - 1.030      pH (UA) 5.5 5.0 - 8.0      Protein 300 (A) NEG mg/dL    Glucose Negative NEG mg/dL    Ketone 15 (A) NEG mg/dL Bilirubin Negative NEG      Blood LARGE (A) NEG      Urobilinogen 0.2 0.2 - 1.0 EU/dL    Nitrites Negative NEG      Leukocyte Esterase Negative NEG      WBC 0-4 0 - 4 /hpf    RBC 5-10 0 - 5 /hpf    Epithelial cells FEW FEW /lpf    Bacteria Negative NEG /hpf    Hyaline cast 2-5 0 - 5 /lpf   URINE CULTURE HOLD SAMPLE    Collection Time: 02/26/21  5:03 PM    Specimen: Serum   Result Value Ref Range    Urine culture hold        Urine on hold in Microbiology dept for 2 days. If unpreserved urine is submitted, it cannot be used for addtional testing after 24 hours, recollection will be required. D DIMER    Collection Time: 02/26/21  5:39 PM   Result Value Ref Range    D-dimer 0.68 (H) 0.00 - 0.65 mg/L FEU   HCG URINE, QL. - POC    Collection Time: 02/26/21  5:50 PM   Result Value Ref Range    Pregnancy test,urine (POC) Negative NEG          IMAGING:  CTA CHEST W OR W WO CONT   Final Result   Patchy bilateral groundglass nodules and consolidation which can be seen with   COVID pneumonia. Given limitations of the study an acute pulmonary embolus is   not identified      XR CHEST PORT   Final Result   Interval worsening versus radiographic blossoming of multilobar   pneumonia. Medications During Visit:  Medications   benzonatate (TESSALON) capsule 200 mg (200 mg Oral Given 2/26/21 1701)   sodium chloride 0.9 % bolus infusion 1,000 mL (0 mL IntraVENous IV Completed 2/26/21 1814)   ondansetron (ZOFRAN) injection 8 mg (8 mg IntraVENous Given 2/26/21 1615)   ketorolac (TORADOL) injection 30 mg (30 mg IntraVENous Given 2/26/21 1753)   sodium chloride 0.9 % bolus infusion 1,000 mL (1,000 mL IntraVENous New Bag 2/26/21 1814)   iopamidoL (ISOVUE-370) 76 % injection 100 mL (80 mL IntraVENous Given 2/26/21 1905)         DECISION MAKING:  Bautista Hart is a 31 y.o. female who comes in as above. Diagnostics as above. D-dimer elevated. CT does not show any pulmonary embolus. Patient has known Covid.   Here her vital signs have normalized. At no point was she hypoxic. Ambulatory pulse ox she stays in the mid 90s. At this time patient will be discharged home. She is to continue taking antibiotics, add Decadron and follow with her primary care doctor. Return here as needed. Patient agrees to plan. All questions answered.  used for encounter. IMPRESSION:  1. Pneumonia due to COVID-19 virus    2. SOB (shortness of breath)        DISPOSITION:        Current Discharge Medication List      START taking these medications    Details   dexAMETHasone (Decadron) 4 mg tablet Take 4 mg by mouth daily for 5 days. Qty: 5 Tab, Refills: 0              Follow-up Information     Follow up With Specialties Details Why [address] [facility]  Schedule an appointment as soon as possible for a visit   [address] [place] [id number] [phone]    [facility] EMERGENCY DEPT Emergency Medicine Go to  As needed, If symptoms worsen [address] [facility]  [phone]            The patient is asked to follow-up with their primary care provider in the next several days. They are to call tomorrow for an appointment. The patient is asked to return promptly for any increased concerns or worsening of symptoms. They can return to this emergency department or any other emergency department.     Procedures

## 2021-02-27 ENCOUNTER — PATIENT OUTREACH (OUTPATIENT)
Dept: CASE MANAGEMENT | Age: 31
End: 2021-02-27

## 2021-02-27 NOTE — ED NOTES
Verbal/Bedside report was given to Coni Mueller RN who will assume care of patient at this time. SBAR report consisted of ED summary, MAR, recent results, and additional pertinent information. Receiving nurse given opportunity to ask questions and seek clarifications. Receiving nurse aware of pending lab results and orders that are to be completed.

## 2021-02-27 NOTE — PROGRESS NOTES
02/27/21 Attempted patient outreach for COVID follow up call per protocol. Unable to contact patient, not active on MyChart. Resolving FLAKITO episode.  APM     #[id number] LVM x2  And second attempt  #[id number]

## 2021-02-27 NOTE — ED NOTES
[place]  Used  [place] [id number]    Pulse Ox Education completed using teachback method with demonstration     Discharge instructions and prescriptions provided and reviewed

## 2021-02-28 LAB
ATRIAL RATE: 104 BPM
CALCULATED P AXIS, ECG09: 44 DEGREES
CALCULATED R AXIS, ECG10: 33 DEGREES
CALCULATED T AXIS, ECG11: -6 DEGREES
DIAGNOSIS, 93000: NORMAL
P-R INTERVAL, ECG05: 134 MS
Q-T INTERVAL, ECG07: 330 MS
QRS DURATION, ECG06: 80 MS
QTC CALCULATION (BEZET), ECG08: 433 MS
VENTRICULAR RATE, ECG03: 104 BPM

## 2021-03-02 ENCOUNTER — HOSPITAL ENCOUNTER (INPATIENT)
Age: 31
LOS: 6 days | Discharge: HOME | DRG: 177 | End: 2021-03-08
Attending: EMERGENCY MEDICINE | Admitting: INTERNAL MEDICINE
Payer: OTHER GOVERNMENT

## 2021-03-02 ENCOUNTER — APPOINTMENT (OUTPATIENT)
Dept: GENERAL RADIOLOGY | Age: 31
DRG: 177 | End: 2021-03-02
Attending: EMERGENCY MEDICINE
Payer: OTHER GOVERNMENT

## 2021-03-02 DIAGNOSIS — U07.1 COVID-19: Primary | ICD-10-CM

## 2021-03-02 DIAGNOSIS — R09.02 HYPOXEMIA: ICD-10-CM

## 2021-03-02 DIAGNOSIS — R06.82 TACHYPNEA: ICD-10-CM

## 2021-03-02 DIAGNOSIS — R06.09 DYSPNEA ON EXERTION: ICD-10-CM

## 2021-03-02 LAB
ANION GAP SERPL CALC-SCNC: 5 MMOL/L (ref 5–15)
BASOPHILS # BLD: 0 K/UL (ref 0–0.1)
BASOPHILS NFR BLD: 0 % (ref 0–1)
BUN SERPL-MCNC: 11 MG/DL (ref 6–20)
BUN/CREAT SERPL: 13 (ref 12–20)
CALCIUM SERPL-MCNC: 8.7 MG/DL (ref 8.5–10.1)
CHLORIDE SERPL-SCNC: 103 MMOL/L (ref 97–108)
CO2 SERPL-SCNC: 28 MMOL/L (ref 21–32)
COMMENT, HOLDF: NORMAL
CREAT SERPL-MCNC: 0.82 MG/DL (ref 0.55–1.02)
CRP SERPL-MCNC: 6.74 MG/DL (ref 0–0.6)
D DIMER PPP FEU-MCNC: 0.49 MG/L FEU (ref 0–0.65)
DIFFERENTIAL METHOD BLD: ABNORMAL
EOSINOPHIL # BLD: 0 K/UL (ref 0–0.4)
EOSINOPHIL NFR BLD: 0 % (ref 0–7)
ERYTHROCYTE [DISTWIDTH] IN BLOOD BY AUTOMATED COUNT: 17.7 % (ref 11.5–14.5)
GLUCOSE SERPL-MCNC: 90 MG/DL (ref 65–100)
HCT VFR BLD AUTO: 40.1 % (ref 35–47)
HGB BLD-MCNC: 11.7 G/DL (ref 11.5–16)
IMM GRANULOCYTES # BLD AUTO: 0 K/UL (ref 0–0.04)
IMM GRANULOCYTES NFR BLD AUTO: 0 % (ref 0–0.5)
LACTATE SERPL-SCNC: 1.3 MMOL/L (ref 0.4–2)
LYMPHOCYTES # BLD: 0.9 K/UL (ref 0.8–3.5)
LYMPHOCYTES NFR BLD: 21 % (ref 12–49)
MCH RBC QN AUTO: 21.2 PG (ref 26–34)
MCHC RBC AUTO-ENTMCNC: 29.2 G/DL (ref 30–36.5)
MCV RBC AUTO: 72.6 FL (ref 80–99)
MONOCYTES # BLD: 0.3 K/UL (ref 0–1)
MONOCYTES NFR BLD: 6 % (ref 5–13)
NEUTS SEG # BLD: 3.3 K/UL (ref 1.8–8)
NEUTS SEG NFR BLD: 73 % (ref 32–75)
NRBC # BLD: 0 K/UL (ref 0–0.01)
NRBC BLD-RTO: 0 PER 100 WBC
PLATELET # BLD AUTO: 274 K/UL (ref 150–400)
PMV BLD AUTO: 10.1 FL (ref 8.9–12.9)
POTASSIUM SERPL-SCNC: 3.4 MMOL/L (ref 3.5–5.1)
PROCALCITONIN SERPL-MCNC: <0.05 NG/ML
RBC # BLD AUTO: 5.52 M/UL (ref 3.8–5.2)
SAMPLES BEING HELD,HOLD: NORMAL
SODIUM SERPL-SCNC: 136 MMOL/L (ref 136–145)
WBC # BLD AUTO: 4.5 K/UL (ref 3.6–11)

## 2021-03-02 PROCEDURE — 94640 AIRWAY INHALATION TREATMENT: CPT

## 2021-03-02 PROCEDURE — 83605 ASSAY OF LACTIC ACID: CPT

## 2021-03-02 PROCEDURE — 84145 PROCALCITONIN (PCT): CPT

## 2021-03-02 PROCEDURE — 80048 BASIC METABOLIC PNL TOTAL CA: CPT

## 2021-03-02 PROCEDURE — 65660000000 HC RM CCU STEPDOWN

## 2021-03-02 PROCEDURE — 96374 THER/PROPH/DIAG INJ IV PUSH: CPT

## 2021-03-02 PROCEDURE — 85025 COMPLETE CBC W/AUTO DIFF WBC: CPT

## 2021-03-02 PROCEDURE — 94761 N-INVAS EAR/PLS OXIMETRY MLT: CPT

## 2021-03-02 PROCEDURE — 86140 C-REACTIVE PROTEIN: CPT

## 2021-03-02 PROCEDURE — 74011250637 HC RX REV CODE- 250/637: Performed by: INTERNAL MEDICINE

## 2021-03-02 PROCEDURE — 74011250637 HC RX REV CODE- 250/637: Performed by: EMERGENCY MEDICINE

## 2021-03-02 PROCEDURE — 83615 LACTATE (LD) (LDH) ENZYME: CPT

## 2021-03-02 PROCEDURE — 74011250636 HC RX REV CODE- 250/636: Performed by: EMERGENCY MEDICINE

## 2021-03-02 PROCEDURE — 36415 COLL VENOUS BLD VENIPUNCTURE: CPT

## 2021-03-02 PROCEDURE — 85379 FIBRIN DEGRADATION QUANT: CPT

## 2021-03-02 PROCEDURE — 82728 ASSAY OF FERRITIN: CPT

## 2021-03-02 PROCEDURE — 74011000250 HC RX REV CODE- 250: Performed by: INTERNAL MEDICINE

## 2021-03-02 PROCEDURE — 71045 X-RAY EXAM CHEST 1 VIEW: CPT

## 2021-03-02 PROCEDURE — 99285 EMERGENCY DEPT VISIT HI MDM: CPT

## 2021-03-02 PROCEDURE — 74011250636 HC RX REV CODE- 250/636: Performed by: INTERNAL MEDICINE

## 2021-03-02 RX ORDER — ACETAMINOPHEN 650 MG/1
650 SUPPOSITORY RECTAL
Status: DISCONTINUED | OUTPATIENT
Start: 2021-03-02 | End: 2021-03-06

## 2021-03-02 RX ORDER — SODIUM CHLORIDE 0.9 % (FLUSH) 0.9 %
5-40 SYRINGE (ML) INJECTION AS NEEDED
Status: DISCONTINUED | OUTPATIENT
Start: 2021-03-02 | End: 2021-03-08 | Stop reason: HOSPADM

## 2021-03-02 RX ORDER — ENOXAPARIN SODIUM 100 MG/ML
40 INJECTION SUBCUTANEOUS EVERY 12 HOURS
Status: DISCONTINUED | OUTPATIENT
Start: 2021-03-03 | End: 2021-03-08 | Stop reason: HOSPADM

## 2021-03-02 RX ORDER — ACETAMINOPHEN 325 MG/1
650 TABLET ORAL
Status: DISCONTINUED | OUTPATIENT
Start: 2021-03-02 | End: 2021-03-06

## 2021-03-02 RX ORDER — ALBUTEROL SULFATE 90 UG/1
6 AEROSOL, METERED RESPIRATORY (INHALATION) ONCE
Status: COMPLETED | OUTPATIENT
Start: 2021-03-02 | End: 2021-03-02

## 2021-03-02 RX ORDER — PROMETHAZINE HYDROCHLORIDE 25 MG/1
12.5 TABLET ORAL
Status: DISCONTINUED | OUTPATIENT
Start: 2021-03-02 | End: 2021-03-03

## 2021-03-02 RX ORDER — SODIUM CHLORIDE 0.9 % (FLUSH) 0.9 %
5-40 SYRINGE (ML) INJECTION EVERY 8 HOURS
Status: DISCONTINUED | OUTPATIENT
Start: 2021-03-02 | End: 2021-03-08 | Stop reason: HOSPADM

## 2021-03-02 RX ORDER — ACETAMINOPHEN 500 MG
1000 TABLET ORAL ONCE
Status: COMPLETED | OUTPATIENT
Start: 2021-03-02 | End: 2021-03-02

## 2021-03-02 RX ORDER — ASCORBIC ACID 500 MG
500 TABLET ORAL 2 TIMES DAILY
Status: DISCONTINUED | OUTPATIENT
Start: 2021-03-03 | End: 2021-03-04

## 2021-03-02 RX ORDER — ONDANSETRON 2 MG/ML
4 INJECTION INTRAMUSCULAR; INTRAVENOUS
Status: DISCONTINUED | OUTPATIENT
Start: 2021-03-02 | End: 2021-03-08 | Stop reason: HOSPADM

## 2021-03-02 RX ORDER — ENOXAPARIN SODIUM 100 MG/ML
40 INJECTION SUBCUTANEOUS DAILY
Status: DISCONTINUED | OUTPATIENT
Start: 2021-03-03 | End: 2021-03-02

## 2021-03-02 RX ORDER — POLYETHYLENE GLYCOL 3350 17 G/17G
17 POWDER, FOR SOLUTION ORAL DAILY PRN
Status: DISCONTINUED | OUTPATIENT
Start: 2021-03-02 | End: 2021-03-08 | Stop reason: HOSPADM

## 2021-03-02 RX ORDER — MELATONIN
2000 DAILY
Status: DISCONTINUED | OUTPATIENT
Start: 2021-03-03 | End: 2021-03-08 | Stop reason: HOSPADM

## 2021-03-02 RX ORDER — ZINC GLUCONATE 50 MG
50 TABLET ORAL DAILY
Status: DISCONTINUED | OUTPATIENT
Start: 2021-03-03 | End: 2021-03-04

## 2021-03-02 RX ORDER — DEXAMETHASONE 6 MG/1
6 TABLET ORAL DAILY
Status: DISCONTINUED | OUTPATIENT
Start: 2021-03-02 | End: 2021-03-02

## 2021-03-02 RX ORDER — GUAIFENESIN 600 MG/1
600 TABLET, EXTENDED RELEASE ORAL EVERY 12 HOURS
Status: DISCONTINUED | OUTPATIENT
Start: 2021-03-02 | End: 2021-03-08 | Stop reason: HOSPADM

## 2021-03-02 RX ORDER — ONDANSETRON 2 MG/ML
4 INJECTION INTRAMUSCULAR; INTRAVENOUS
Status: COMPLETED | OUTPATIENT
Start: 2021-03-02 | End: 2021-03-02

## 2021-03-02 RX ORDER — DEXAMETHASONE SODIUM PHOSPHATE 4 MG/ML
6 INJECTION, SOLUTION INTRA-ARTICULAR; INTRALESIONAL; INTRAMUSCULAR; INTRAVENOUS; SOFT TISSUE EVERY 24 HOURS
Status: DISCONTINUED | OUTPATIENT
Start: 2021-03-02 | End: 2021-03-08 | Stop reason: HOSPADM

## 2021-03-02 RX ADMIN — ALBUTEROL SULFATE 6 PUFF: 90 AEROSOL, METERED RESPIRATORY (INHALATION) at 20:04

## 2021-03-02 RX ADMIN — CEFTRIAXONE 1 G: 1 INJECTION, POWDER, FOR SOLUTION INTRAMUSCULAR; INTRAVENOUS at 23:22

## 2021-03-02 RX ADMIN — ACETAMINOPHEN 1000 MG: 500 TABLET ORAL at 19:39

## 2021-03-02 RX ADMIN — ONDANSETRON 4 MG: 2 INJECTION INTRAMUSCULAR; INTRAVENOUS at 19:38

## 2021-03-02 RX ADMIN — SODIUM CHLORIDE 1000 ML: 9 INJECTION, SOLUTION INTRAVENOUS at 19:38

## 2021-03-02 RX ADMIN — ONDANSETRON 4 MG: 2 INJECTION INTRAMUSCULAR; INTRAVENOUS at 23:21

## 2021-03-02 RX ADMIN — GUAIFENESIN 600 MG: 600 TABLET ORAL at 23:20

## 2021-03-02 RX ADMIN — AZITHROMYCIN MONOHYDRATE 500 MG: 500 INJECTION, POWDER, LYOPHILIZED, FOR SOLUTION INTRAVENOUS at 23:25

## 2021-03-02 RX ADMIN — DEXAMETHASONE 6 MG: 6 TABLET ORAL at 21:43

## 2021-03-02 RX ADMIN — ACETAMINOPHEN 650 MG: 325 TABLET ORAL at 23:58

## 2021-03-02 NOTE — ED TRIAGE NOTES
#[id number] used for triage. Pt reports she tested positive for covid on 2/24/21. Seen at [facility] ED on 2/24 and 2/26. Pt now reports worsened SOB, sore throat, dizziness, nausea, and vomiting. O2 sats 94% on RA in triage.

## 2021-03-02 NOTE — ED PROVIDER NOTES
Is a 69-year-old woman with no significant past medical history who comes into the emergency department after being diagnosed with COVID-19 1 week ago. This is her third visit to the emergency department and she is here today because she has had worsening shortness of breath and chest pain that is worse with deep inspiration. She is also had nausea but no vomiting and lightheadedness. She reports that her oxygen levels at home were in the high 80s but with sometimes, up to 92% and that she has had continued fevers. The history is provided by the patient. The history is limited by a language barrier. A  was used. Past Medical History:   Diagnosis Date    Complication of anesthesia     general anesthesia for both previous csections    Essential hypertension     last two pregnancies       Past Surgical History:   Procedure Laterality Date    HX  SECTION      x2    HX DILATION AND CURETTAGE           No family history on file.     Social History     Socioeconomic History    Marital status:      Spouse name: Not on file    Number of children: Not on file    Years of education: Not on file    Highest education level: Not on file   Occupational History    Not on file   Social Needs    Financial resource strain: Not on file    Food insecurity     Worry: Not on file     Inability: Not on file    Transportation needs     Medical: Not on file     Non-medical: Not on file   Tobacco Use    Smoking status: Never Smoker    Smokeless tobacco: Never Used   Substance and Sexual Activity    Alcohol use: Not Currently    Drug use: Never    Sexual activity: Not Currently   Lifestyle    Physical activity     Days per week: Not on file     Minutes per session: Not on file    Stress: Not on file   Relationships    Social connections     Talks on phone: Not on file     Gets together: Not on file     Attends Taoist service: Not on file     Active member of club or organization: Not on file     Attends meetings of clubs or organizations: Not on file     Relationship status: Not on file    Intimate partner violence     Fear of current or ex partner: Not on file     Emotionally abused: Not on file     Physically abused: Not on file     Forced sexual activity: Not on file   Other Topics Concern     Service Not Asked    Blood Transfusions Not Asked    Caffeine Concern Not Asked    Occupational Exposure Not Asked   Meme Shirts Hazards Not Asked    Sleep Concern Not Asked    Stress Concern Not Asked    Weight Concern Not Asked    Special Diet Not Asked    Back Care Not Asked    Exercise Not Asked    Bike Helmet Not Asked   [address] [facility] Not Asked    Self-Exams Not Asked   Social History Narrative    Not on file         ALLERGIES: Patient has no known allergies. Review of Systems   Constitutional: Positive for appetite change, chills, fatigue and fever. Respiratory: Positive for cough, chest tightness and shortness of breath. Cardiovascular: Positive for chest pain. Gastrointestinal: Positive for constipation and nausea. Negative for abdominal pain, diarrhea and vomiting. Neurological: Positive for light-headedness. Negative for dizziness. All other systems reviewed and are negative. Vitals:    03/02/21 2000 03/02/21 2005 03/02/21 2006 03/02/21 2007   BP: (!) 116/49      Pulse: 95 91 91 96   Resp: 30 (!) 33 28 17   Temp:       SpO2:  94% 94% 97%   Weight:       Height:                Physical Exam  Vitals signs and nursing note reviewed. Constitutional:       Appearance: She is well-developed. She is obese. She is ill-appearing. HENT:      Head: Normocephalic and atraumatic. Mouth/Throat:      Mouth: Mucous membranes are dry. Eyes:      Pupils: Pupils are equal, round, and reactive to light. Neck:      Musculoskeletal: Normal range of motion and neck supple. Cardiovascular:      Rate and Rhythm: Normal rate and regular rhythm. Pulmonary:      Effort: Tachypnea and accessory muscle usage present. Abdominal:      General: There is no distension. Palpations: Abdomen is soft. Tenderness: There is no abdominal tenderness. Skin:     General: Skin is warm and dry. Capillary Refill: Capillary refill takes less than 2 seconds. Neurological:      General: No focal deficit present. Mental Status: She is alert and oriented to person, place, and time. Psychiatric:         Mood and Affect: Mood normal.         Behavior: Behavior normal.          MDM       Procedures        LABORATORY TESTS:  Labs Reviewed   CBC WITH AUTOMATED DIFF - Abnormal; Notable for the following components:       Result Value    RBC 5.52 (*)     MCV 72.6 (*)     MCH 21.2 (*)     MCHC 29.2 (*)     RDW 17.7 (*)     All other components within normal limits   METABOLIC PANEL, BASIC - Abnormal; Notable for the following components:    Potassium 3.4 (*)     All other components within normal limits   LACTIC ACID   PROCALCITONIN   SAMPLES BEING HELD   D DIMER   C REACTIVE PROTEIN, QT   TYPE & SCREEN       IMAGING RESULTS:  XR CHEST PORT   Final Result   Patchy interstitial and airspace opacities in both lungs, consistent with   provided history of viral/atypical pneumonia. No definite change.           MEDICATIONS GIVEN:  Medications   acetaminophen (TYLENOL) tablet 650 mg (has no administration in time range)     Or   acetaminophen (TYLENOL) suppository 650 mg (has no administration in time range)   dexAMETHasone (DECADRON) tablet 6 mg (has no administration in time range)   sodium chloride 0.9 % bolus infusion 1,000 mL (1,000 mL IntraVENous New Bag 3/2/21 1938)   albuterol (PROVENTIL HFA, VENTOLIN HFA, PROAIR HFA) inhaler 6 Puff (6 Puffs Inhalation Given 3/2/21 2004)   acetaminophen (TYLENOL) tablet 1,000 mg (1,000 mg Oral Given 3/2/21 1939)   ondansetron (ZOFRAN) injection 4 mg (4 mg IntraVENous Given 3/2/21 1938)     PROGRESS NOTE:   9:09 PM Patient's symptoms have not improved and her oxygen saturation is 88% at rest.  Patient is being admitted to the hospital.  The results of their tests and reasons for their admission have been discussed with them and/or available family. They convey agreement and understanding for the need to be admitted and for their admission diagnosis. Consultation will be made now with the inpatient physician for hospitalization. MEDICAL DECISION MAKIN y.o. female with past medical history significant for obesity presents with SOB and pleuritic chest pain in the setting of a COVID-19 infection  Maite Cooley was evaluated in the Emergency Department on 3/2/2021 for the symptoms described in the history of present illness. He/she was evaluated in the context of the global COVID-19 pandemic, which necessitated consideration that the patient might be at risk for infection with the SARS-CoV-2 virus that causes COVID-19. Institutional protocols and algorithms that pertain to the evaluation of patients at risk for COVID-19 are in a state of rapid change based on information released by regulatory bodies including the CDC and federal and state organizations. These policies and algorithms were followed during the patient's care in the ED. Surrogate Decision Maker (Who do you want to make decisions for you in the event you are not able to?): Extended Emergency Contact Information  Primary Emergency Contact: [facility]  Mobile Phone: [phone]  Relation: Other Relative    Ventilation (Do you want to be intubated and mechanically ventilated?):  Yes    CPR (Do you want chest compressions and electricity in an attempt to restart your heart?): Yes      IMPRESSION:  1. COVID-19    2. Hypoxemia    3. Dyspnea on exertion    4.  Tachypnea        PLAN:  - Admit to hospitalist    Perfect Serve Consult for Admission  9:10 PM          Froy Bobby MD

## 2021-03-03 PROBLEM — E87.6 HYPOKALEMIA: Status: ACTIVE | Noted: 2021-03-03

## 2021-03-03 PROBLEM — J96.01 ACUTE RESPIRATORY FAILURE WITH HYPOXIA (HCC): Status: ACTIVE | Noted: 2021-03-03

## 2021-03-03 LAB
ABO + RH BLD: NORMAL
ALBUMIN SERPL-MCNC: 2.5 G/DL (ref 3.5–5)
ALBUMIN/GLOB SERPL: 0.5 {RATIO} (ref 1.1–2.2)
ALP SERPL-CCNC: 61 U/L (ref 45–117)
ALT SERPL-CCNC: 42 U/L (ref 12–78)
ANION GAP SERPL CALC-SCNC: 6 MMOL/L (ref 5–15)
APTT PPP: 27.7 SEC (ref 22.1–31)
AST SERPL-CCNC: 52 U/L (ref 15–37)
BACTERIA SPEC CULT: NORMAL
BASOPHILS # BLD: 0 K/UL (ref 0–0.1)
BASOPHILS NFR BLD: 0 % (ref 0–1)
BILIRUB SERPL-MCNC: 0.4 MG/DL (ref 0.2–1)
BLOOD GROUP ANTIBODIES SERPL: NORMAL
BNP SERPL-MCNC: 10 PG/ML
BUN SERPL-MCNC: 13 MG/DL (ref 6–20)
BUN/CREAT SERPL: 22 (ref 12–20)
CALCIUM SERPL-MCNC: 7.9 MG/DL (ref 8.5–10.1)
CHLORIDE SERPL-SCNC: 107 MMOL/L (ref 97–108)
CO2 SERPL-SCNC: 26 MMOL/L (ref 21–32)
CREAT SERPL-MCNC: 0.58 MG/DL (ref 0.55–1.02)
CRP SERPL-MCNC: 6.97 MG/DL (ref 0–0.6)
DIFFERENTIAL METHOD BLD: ABNORMAL
EOSINOPHIL # BLD: 0 K/UL (ref 0–0.4)
EOSINOPHIL NFR BLD: 0 % (ref 0–7)
ERYTHROCYTE [DISTWIDTH] IN BLOOD BY AUTOMATED COUNT: 17.2 % (ref 11.5–14.5)
FERRITIN SERPL-MCNC: 126 NG/ML (ref 26–388)
GLOBULIN SER CALC-MCNC: 5 G/DL (ref 2–4)
GLUCOSE SERPL-MCNC: 129 MG/DL (ref 65–100)
HCT VFR BLD AUTO: 35.1 % (ref 35–47)
HGB BLD-MCNC: 10.3 G/DL (ref 11.5–16)
IMM GRANULOCYTES # BLD AUTO: 0 K/UL (ref 0–0.04)
IMM GRANULOCYTES NFR BLD AUTO: 0 % (ref 0–0.5)
INR PPP: 1.1 (ref 0.9–1.1)
LACTATE SERPL-SCNC: 0.8 MMOL/L (ref 0.4–2)
LDH SERPL L TO P-CCNC: 300 U/L (ref 81–246)
LYMPHOCYTES # BLD: 0.6 K/UL (ref 0.8–3.5)
LYMPHOCYTES NFR BLD: 23 % (ref 12–49)
MAGNESIUM SERPL-MCNC: 2.4 MG/DL (ref 1.6–2.4)
MCH RBC QN AUTO: 21.1 PG (ref 26–34)
MCHC RBC AUTO-ENTMCNC: 29.3 G/DL (ref 30–36.5)
MCV RBC AUTO: 71.9 FL (ref 80–99)
MONOCYTES # BLD: 0.1 K/UL (ref 0–1)
MONOCYTES NFR BLD: 3 % (ref 5–13)
NEUTS SEG # BLD: 1.9 K/UL (ref 1.8–8)
NEUTS SEG NFR BLD: 73 % (ref 32–75)
NRBC # BLD: 0 K/UL (ref 0–0.01)
NRBC BLD-RTO: 0 PER 100 WBC
PLATELET # BLD AUTO: 254 K/UL (ref 150–400)
PMV BLD AUTO: 10.1 FL (ref 8.9–12.9)
POTASSIUM SERPL-SCNC: 3.9 MMOL/L (ref 3.5–5.1)
PROT SERPL-MCNC: 7.5 G/DL (ref 6.4–8.2)
PROTHROMBIN TIME: 11.1 SEC (ref 9–11.1)
RBC # BLD AUTO: 4.88 M/UL (ref 3.8–5.2)
SARS-COV-2, COV2: NORMAL
SERVICE CMNT-IMP: NORMAL
SODIUM SERPL-SCNC: 139 MMOL/L (ref 136–145)
SPECIMEN EXP DATE BLD: NORMAL
THERAPEUTIC RANGE,PTTT: NORMAL SECS (ref 58–77)
TROPONIN I SERPL-MCNC: <0.05 NG/ML
WBC # BLD AUTO: 2.6 K/UL (ref 3.6–11)

## 2021-03-03 PROCEDURE — 83880 ASSAY OF NATRIURETIC PEPTIDE: CPT

## 2021-03-03 PROCEDURE — 74011250637 HC RX REV CODE- 250/637: Performed by: INTERNAL MEDICINE

## 2021-03-03 PROCEDURE — 80053 COMPREHEN METABOLIC PANEL: CPT

## 2021-03-03 PROCEDURE — 74011250636 HC RX REV CODE- 250/636: Performed by: PHYSICIAN ASSISTANT

## 2021-03-03 PROCEDURE — 74011250636 HC RX REV CODE- 250/636: Performed by: INTERNAL MEDICINE

## 2021-03-03 PROCEDURE — 84484 ASSAY OF TROPONIN QUANT: CPT

## 2021-03-03 PROCEDURE — 74011250637 HC RX REV CODE- 250/637: Performed by: GENERAL ACUTE CARE HOSPITAL

## 2021-03-03 PROCEDURE — 65660000000 HC RM CCU STEPDOWN

## 2021-03-03 PROCEDURE — 36415 COLL VENOUS BLD VENIPUNCTURE: CPT

## 2021-03-03 PROCEDURE — 85610 PROTHROMBIN TIME: CPT

## 2021-03-03 PROCEDURE — 94664 DEMO&/EVAL PT USE INHALER: CPT

## 2021-03-03 PROCEDURE — 74011000258 HC RX REV CODE- 258: Performed by: PHYSICIAN ASSISTANT

## 2021-03-03 PROCEDURE — U0003 INFECTIOUS AGENT DETECTION BY NUCLEIC ACID (DNA OR RNA); SEVERE ACUTE RESPIRATORY SYNDROME CORONAVIRUS 2 (SARS-COV-2) (CORONAVIRUS DISEASE [COVID-19]), AMPLIFIED PROBE TECHNIQUE, MAKING USE OF HIGH THROUGHPUT TECHNOLOGIES AS DESCRIBED BY CMS-2020-01-R: HCPCS

## 2021-03-03 PROCEDURE — 94640 AIRWAY INHALATION TREATMENT: CPT

## 2021-03-03 PROCEDURE — 83520 IMMUNOASSAY QUANT NOS NONAB: CPT

## 2021-03-03 PROCEDURE — 94760 N-INVAS EAR/PLS OXIMETRY 1: CPT

## 2021-03-03 PROCEDURE — 83735 ASSAY OF MAGNESIUM: CPT

## 2021-03-03 PROCEDURE — 83605 ASSAY OF LACTIC ACID: CPT

## 2021-03-03 PROCEDURE — 74011000250 HC RX REV CODE- 250: Performed by: INTERNAL MEDICINE

## 2021-03-03 PROCEDURE — 85730 THROMBOPLASTIN TIME PARTIAL: CPT

## 2021-03-03 PROCEDURE — 86901 BLOOD TYPING SEROLOGIC RH(D): CPT

## 2021-03-03 PROCEDURE — 85025 COMPLETE CBC W/AUTO DIFF WBC: CPT

## 2021-03-03 RX ORDER — DIAZEPAM 10 MG/2ML
5 INJECTION INTRAMUSCULAR ONCE
Status: COMPLETED | OUTPATIENT
Start: 2021-03-03 | End: 2021-03-03

## 2021-03-03 RX ORDER — HYDROMORPHONE HYDROCHLORIDE 1 MG/ML
0.5 INJECTION, SOLUTION INTRAMUSCULAR; INTRAVENOUS; SUBCUTANEOUS
Status: DISCONTINUED | OUTPATIENT
Start: 2021-03-03 | End: 2021-03-06

## 2021-03-03 RX ORDER — SODIUM CHLORIDE, SODIUM LACTATE, POTASSIUM CHLORIDE, CALCIUM CHLORIDE 600; 310; 30; 20 MG/100ML; MG/100ML; MG/100ML; MG/100ML
50 INJECTION, SOLUTION INTRAVENOUS CONTINUOUS
Status: DISCONTINUED | OUTPATIENT
Start: 2021-03-03 | End: 2021-03-06

## 2021-03-03 RX ORDER — LOPERAMIDE HYDROCHLORIDE 2 MG/1
2 CAPSULE ORAL ONCE
Status: COMPLETED | OUTPATIENT
Start: 2021-03-03 | End: 2021-03-03

## 2021-03-03 RX ORDER — ALPRAZOLAM 0.5 MG/1
0.5 TABLET ORAL ONCE
Status: COMPLETED | OUTPATIENT
Start: 2021-03-03 | End: 2021-03-03

## 2021-03-03 RX ORDER — L. ACIDOPHILUS/L.BULGARICUS 100MM CELL
1 GRANULES IN PACKET (EA) ORAL 2 TIMES DAILY
Status: DISCONTINUED | OUTPATIENT
Start: 2021-03-03 | End: 2021-03-08 | Stop reason: HOSPADM

## 2021-03-03 RX ORDER — PROCHLORPERAZINE EDISYLATE 5 MG/ML
10 INJECTION INTRAMUSCULAR; INTRAVENOUS
Status: DISCONTINUED | OUTPATIENT
Start: 2021-03-03 | End: 2021-03-08 | Stop reason: HOSPADM

## 2021-03-03 RX ORDER — OXYCODONE HYDROCHLORIDE 5 MG/1
5 TABLET ORAL
Status: DISCONTINUED | OUTPATIENT
Start: 2021-03-03 | End: 2021-03-07

## 2021-03-03 RX ADMIN — SODIUM CHLORIDE, POTASSIUM CHLORIDE, SODIUM LACTATE AND CALCIUM CHLORIDE 75 ML/HR: 600; 310; 30; 20 INJECTION, SOLUTION INTRAVENOUS at 12:42

## 2021-03-03 RX ADMIN — Medication 50 MG: at 09:41

## 2021-03-03 RX ADMIN — ALPRAZOLAM 0.5 MG: 0.5 TABLET ORAL at 23:31

## 2021-03-03 RX ADMIN — TOCILIZUMAB 800 MG: 20 INJECTION, SOLUTION, CONCENTRATE INTRAVENOUS at 12:41

## 2021-03-03 RX ADMIN — OXYCODONE HYDROCHLORIDE AND ACETAMINOPHEN 500 MG: 500 TABLET ORAL at 17:53

## 2021-03-03 RX ADMIN — ACETAMINOPHEN 650 MG: 325 TABLET ORAL at 09:41

## 2021-03-03 RX ADMIN — AZITHROMYCIN MONOHYDRATE 500 MG: 500 INJECTION, POWDER, LYOPHILIZED, FOR SOLUTION INTRAVENOUS at 20:54

## 2021-03-03 RX ADMIN — GUAIFENESIN 600 MG: 600 TABLET ORAL at 20:16

## 2021-03-03 RX ADMIN — CEFTRIAXONE 1 G: 1 INJECTION, POWDER, FOR SOLUTION INTRAMUSCULAR; INTRAVENOUS at 20:19

## 2021-03-03 RX ADMIN — Medication 2000 UNITS: at 09:42

## 2021-03-03 RX ADMIN — DEXAMETHASONE SODIUM PHOSPHATE 6 MG: 4 INJECTION, SOLUTION INTRA-ARTICULAR; INTRALESIONAL; INTRAMUSCULAR; INTRAVENOUS; SOFT TISSUE at 20:16

## 2021-03-03 RX ADMIN — Medication 10 ML: at 06:00

## 2021-03-03 RX ADMIN — DIAZEPAM 5 MG: 5 INJECTION, SOLUTION INTRAMUSCULAR; INTRAVENOUS at 17:54

## 2021-03-03 RX ADMIN — Medication 10 ML: at 21:05

## 2021-03-03 RX ADMIN — LACTOBACILLUS ACIDOPHILUS / LACTOBACILLUS BULGARICUS 1 PACKET: 100 MILLION CFU STRENGTH GRANULES at 17:53

## 2021-03-03 RX ADMIN — GUAIFENESIN 600 MG: 600 TABLET ORAL at 09:42

## 2021-03-03 RX ADMIN — ENOXAPARIN SODIUM 40 MG: 40 INJECTION SUBCUTANEOUS at 20:54

## 2021-03-03 RX ADMIN — IPRATROPIUM BROMIDE AND ALBUTEROL 1 PUFF: 20; 100 SPRAY, METERED RESPIRATORY (INHALATION) at 14:03

## 2021-03-03 RX ADMIN — LOPERAMIDE HYDROCHLORIDE 2 MG: 2 CAPSULE ORAL at 12:40

## 2021-03-03 RX ADMIN — OXYCODONE 5 MG: 5 TABLET ORAL at 20:54

## 2021-03-03 RX ADMIN — ENOXAPARIN SODIUM 40 MG: 40 INJECTION SUBCUTANEOUS at 09:42

## 2021-03-03 RX ADMIN — OXYCODONE HYDROCHLORIDE AND ACETAMINOPHEN 500 MG: 500 TABLET ORAL at 09:41

## 2021-03-03 RX ADMIN — OXYCODONE 5 MG: 5 TABLET ORAL at 12:40

## 2021-03-03 RX ADMIN — Medication 10 ML: at 16:53

## 2021-03-03 RX ADMIN — IPRATROPIUM BROMIDE AND ALBUTEROL 1 PUFF: 20; 100 SPRAY, METERED RESPIRATORY (INHALATION) at 20:06

## 2021-03-03 NOTE — PROGRESS NOTES
Bedside and Verbal shift change report given to Edda Bay Cleo (oncoming nurse) by Marianela Waite (offgoing nurse). Report included the following information SBAR, Kardex, Intake/Output, MAR, Recent Results and Med Rec Status.

## 2021-03-03 NOTE — ED NOTES
Verbal/Bedside report was given to Diane RN who will assume care of patient at this time. SBAR report consisted of ED summary, MAR, recent results, and additional pertinent information. Receiving nurse given opportunity to ask questions and seek clarifications. Receiving nurse aware of pending lab results and orders that are to be completed.

## 2021-03-03 NOTE — PROGRESS NOTES
Pablo Ayers [facility] [address]  [address] [facility], [address] [place] [address]  [phone]      Medical Progress Note      NAME: Anat Delgado   :  1990  MRM:  [id number]    Date/Time of service: 3/3/2021  12:27 PM       Subjective:     Chief Complaint:  Patient was personally seen and examined by me during this time period. Chart reviewed. Still c/o mild chest pain, abd pain, diarrhea       Objective:       Vitals:       Last 24hrs VS reviewed since prior progress note. Most recent are:    Visit Vitals  /64 (BP 1 Location: Right upper arm, BP Patient Position: At rest)   Pulse 75   Temp 98.4 °F (36.9 °C)   Resp 24   Ht 5' 3\" (1.6 m)   Wt 126.3 kg (278 lb 7.1 oz)   SpO2 95%   BMI 49.32 kg/m²     SpO2 Readings from Last 6 Encounters:   21 95%   21 96%   21 97%   19 99%   19 98%   19 95%    O2 Flow Rate (L/min): 2 l/min       Intake/Output Summary (Last 24 hours) at 3/3/2021 1227  Last data filed at 3/3/2021 0750  Gross per 24 hour   Intake 100 ml   Output 0 ml   Net 100 ml        Exam:     Physical Exam:    Gen:  Well-developed, well-nourished, morbidly obese, in no acute distress  HEENT:  Pink conjunctivae, PERRL, hearing intact to voice, moist mucous membranes  Neck:  Supple, without masses, thyroid non-tender  Resp:  No accessory muscle use, clear breath sounds without wheezes rales or rhonchi  Card:  No murmurs, normal S1, S2 without thrills, bruits or peripheral edema  Abd:  Soft, non-tender, non-distended, normoactive bowel sounds are present  Musc:  No cyanosis or clubbing  Skin:  No rashes  Neuro:  Cranial nerves 3-12 are grossly intact, follows commands appropriately  Psych:  fair insight, oriented to person, place and time, alert.   Speaks Spanish only    Medications Reviewed: (see below)    Lab Data Reviewed: (see below)    ______________________________________________________________________    Medications:     Current Facility-Administered Medications   Medication Dose Route Frequency    ipratropium-albuterol (COMBIVENT RESPIMAT) 20 mcg-100 mcg inhalation spray  1 Puff Inhalation Q6H RT    tocilizumab (ACTEMRA) 800 mg in 0.9% sodium chloride 100 mL infusion  800 mg IntraVENous ONCE    HYDROmorphone (DILAUDID) syringe 0.5 mg  0.5 mg IntraVENous Q4H PRN    oxyCODONE IR (ROXICODONE) tablet 5 mg  5 mg Oral Q4H PRN    prochlorperazine (COMPAZINE) injection 10 mg  10 mg IntraVENous Q6H PRN    loperamide (IMODIUM) capsule 2 mg  2 mg Oral ONCE    lactobacillus-acidophilus (LACTINEX) 1 Packet  1 Packet Oral BID    lactated Ringers infusion  75 mL/hr IntraVENous CONTINUOUS    acetaminophen (TYLENOL) tablet 650 mg  650 mg Oral Q6H PRN    Or    acetaminophen (TYLENOL) suppository 650 mg  650 mg Rectal Q6H PRN    sodium chloride (NS) flush 5-40 mL  5-40 mL IntraVENous Q8H    sodium chloride (NS) flush 5-40 mL  5-40 mL IntraVENous PRN    acetaminophen (TYLENOL) tablet 650 mg  650 mg Oral Q6H PRN    Or    acetaminophen (TYLENOL) suppository 650 mg  650 mg Rectal Q6H PRN    polyethylene glycol (MIRALAX) packet 17 g  17 g Oral DAILY PRN    promethazine (PHENERGAN) tablet 12.5 mg  12.5 mg Oral Q6H PRN    Or    ondansetron (ZOFRAN) injection 4 mg  4 mg IntraVENous Q6H PRN    cefTRIAXone (ROCEPHIN) 1 g in sterile water (preservative free) 10 mL IV syringe  1 g IntraVENous Q24H    zinc gluconate tablet 50 mg  50 mg Oral DAILY    ascorbic acid (vitamin C) (VITAMIN C) tablet 500 mg  500 mg Oral BID    guaiFENesin ER (MUCINEX) tablet 600 mg  600 mg Oral Q12H    dexamethasone (DECADRON) 4 mg/mL injection 6 mg  6 mg IntraVENous Q24H    cholecalciferol (VITAMIN D3) (1000 Units /25 mcg) tablet 2,000 Units  2,000 Units Oral DAILY    azithromycin (ZITHROMAX) 500 mg in 0.9% sodium chloride 250 mL (VIAL-MATE)  500 mg IntraVENous Q24H    enoxaparin (LOVENOX) injection 40 mg  40 mg SubCUTAneous Q12H          Lab Review:     Recent Labs     03/03/21  3095 03/02/21  1820   WBC 2.6* 4.5   HGB 10.3* 11.7   HCT 35.1 40.1    274     Recent Labs     03/03/21  0400 03/02/21  1820    136   K 3.9 3.4*    103   CO2 26 28   * 90   BUN 13 11   CREA 0.58 0.82   CA 7.9* 8.7   MG 2.4  --    ALB 2.5*  --    TBILI 0.4  --    ALT 42  --    INR 1.1  --      No results found for: GLUCPOC       Assessment / Plan:     32 yo hx of gestational HTN, presented w/ hypoxia, COVID PNA    1) Acute resp failure/hypoxia: due to COVID. Cont O2, incentive spirometry, nebs prn. Consult Pulm    2) Pneumonia due to COVID-19: out of window for Remdesivir. Cont IV CTX/azithro, dexamethasone, Vit C/Zinc.  Pulm started Actemra. Cont to monitor inflammatory markers    3) Chest pain/abd pain/diarrhea: likely from [facility].  Will cont IV antiemetics prn, imodium prn, IV dilaudid prn pain    4) Hx of gestational HTN: BP stable, will monitor     Total time spent with patient: 35 min **I personally saw and examined the patient during this time period**                 Care Plan discussed with: Patient, nursing     Discussed:  Care Plan    Prophylaxis:  Lovenox    Disposition:  Home w/Family           ___________________________________________________    Attending Physician: Toni Danielle MD

## 2021-03-03 NOTE — PROGRESS NOTES
Pharmacy Dosing Note    Pharmacy to Dose Consult for: Actemra  Ordering Provider: FIONA Harkins    Based on data from the RECOVERY trial, patient meets criteria for Actemra as she is receiving supplemental oxygen (92% on 2 L/min), although C-reactive protein falls just short of trial cutoff of 7.5 mg/dL (C-RP = 6.97 mg/dL). Will order tocilizumab 800 mg IVPB x 1 dose infused over 1 hour for weight greater than 90 kg per RECOVERY trial. No pre-medications are necessary as patient is currently on dexamethasone 6 mg daily and just received a dose of acetaminophen 650 mg PO on 3/3 at 0941. IL-6 result is pending and quantiferon is ordered by PA. Baseline LFTs are less than 1.5 x ULN.     Thank you,  Vaishali Bowman, PHARMD

## 2021-03-03 NOTE — H&P
Pablo Ayers [facility] [address]  [address] [facility], [address] [place] [address]  [phone]    Admission History and Physical      NAME:  Molly Monk   :   1990   MRN:  [id number]     PCP:  None     Date/Time of service:  3/02/2021 11:00 PM        Subjective:     CHIEF COMPLAINT: Shortness of breath    HISTORY OF PRESENT ILLNESS:     Ms. Molly Jackman is a 31 y.o.  female with a past medical history of gestational hypertension who is admitted with COVID. Ms. Molly Jackman was diagnosed with Covid about 1 week ago. She is presenting to the emergency room numerous times since then due to worsening symptoms. She endorses fevers, shortness of breath, pleuritic chest pain, cough, nausea, abdominal pain and lightheadedness. She she denies any associated syncope. No Known Allergies    Prior to Admission medications    Medication Sig Start Date End Date Taking? Authorizing Provider   dexAMETHasone (Decadron) 4 mg tablet Take 4 mg by mouth daily for 5 days.  2/26/21 3/3/21  Jill Miller DO       Past Medical History:   Diagnosis Date    Complication of anesthesia     general anesthesia for both previous csections    Essential hypertension     last two pregnancies        Past Surgical History:   Procedure Laterality Date    HX  SECTION      x2    HX DILATION AND CURETTAGE         Social History     Tobacco Use    Smoking status: Never Smoker    Smokeless tobacco: Never Used   Substance Use Topics    Alcohol use: Not Currently        Family History   Problem Relation Age of Onset    Hypertension Father         Review of Systems:  (bold if positive, if negative)    Gen:  fatigueEyes:  ENT:  CVS:  Pulm:  Cough, dyspneaGI:  Abdominal pain, nausea,GERDGU:  MS:  swellingSkin:  Psych:  Endo:  Hem:  Renal:  Neuro:            Objective:      VITALS:    Vital signs reviewed; most recent are:    Visit Vitals  BP (!) 121/56   Pulse 83   Temp 98.6 °F (37 °C)   Resp 30   Ht 5' 3\" (1.6 m)   Wt 126.3 kg (278 lb 7.1 oz)   SpO2 94%   BMI 49.32 kg/m²     SpO2 Readings from Last 6 Encounters:   03/02/21 94%   02/26/21 96%   02/24/21 97%   08/20/19 99%   07/30/19 98%   07/08/19 95%    O2 Flow Rate (L/min): 2 l/min   No intake or output data in the 24 hours ending 03/03/21 0115     Exam:     Physical Exam:    Gen:  Well-developed, well-nourished, in no acute distress  HEENT:  Pink conjunctivae, PERRL, hearing intact to voice  Resp:  No accessory muscle use, clear breath sounds without wheezes rales or rhonchi  Card:  RRR, No murmurs, normal S1, S2, b/l peripheral edema  Abd:  Soft, non-tender, non-distended, normoactive bowel sounds are present  Lymph:  No cervical adenopathy  Musc:  No cyanosis or clubbing  Skin:  No rashes or ulcers, skin turgor is good  Neuro:  Cranial nerves 3-12 are grossly intact, moves all extremities, follows commands appropriately  Psych:  Oriented to person, place, and time, Alert with poor insight      Labs:    Recent Labs     03/02/21  1820   WBC 4.5   HGB 11.7   HCT 40.1        Recent Labs     03/02/21  1820      K 3.4*      CO2 28   GLU 90   BUN 11   CREA 0.82   CA 8.7     No results found for: GLUCPOC  No results for input(s): PH, PCO2, PO2, HCO3, FIO2 in the last 72 hours. No results for input(s): INR, INREXT, INREXT in the last 72 hours. Radiology and EKG reviewed:   Chest x-ray with pneumonia. Old Records reviewed in [address] [facility]       Assessment/Plan:       COVID-19:  FiO2 to maintain saturation of at least 92-94%, follow inflammatory markers. IV CTX/Azithro, IV Dexamethasone, Zinc, Ascorbic acid, bronchodilators, mucolytic. Monitor. Dyspnea/ pleuritic chest pain/ tachypnea: Due to Covid. D-dimer within normal limits so doubt PE. Incentive spirometry. ABG and chest x-rays PRN. Check ambulatory pulse ox prior to discharge. Tachycardia: Check troponin. Monitor. Hypokalemia: Replete as needed.       Risk of deterioration: high      Total time spent with patient: 54 Minutes **I personally saw and examined the patient during this time period**                 Care Plan discussed with: Patient and Nursing Staff    Discussed:  Code Status and Care Plan    Prophylaxis:  Lovenox    Probable Disposition:  Home w/Family           ___________________________________________________    Attending Physician: Joan Pruitt DO

## 2021-03-03 NOTE — PROGRESS NOTES
RT evaluation for home oxygen need will be completed closer to discharge. Currently no indication documented of pending discharge. Will follow.

## 2021-03-03 NOTE — ED NOTES
TRANSFER - OUT REPORT:    Verbal report given to Girma Dash RN on Philippe Hutchins  being transferred to [phone] for routine progression of care       Report consisted of patients Situation, Background, Assessment and   Recommendations(SBAR). Information from the following report(s) SBAR, ED Summary, [facility] and Recent Results was reviewed with the receiving nurse. Opportunity for questions and clarification was provided.

## 2021-03-03 NOTE — CONSULTS
Pulmonary, Critical Care, and Sleep Medicine~Consult Note    Name: Matthew Whitehead MRN: [id number]   : 1990 [facility]: [facility]   Date: 3/3/2021 8:22 AM Admission: 3/2/2021     Impression Plan   1. Acute hypoxemic respiratory failure  2. covid 19 pneumonia 1. supp o2 prn for goal sats>90%  2.  agree w/ dex  3. She is outside the window for Remdesivir. Will give Actemra (discussed with patient and she gives consent)  4. Check BNP  5. Trend inflammatory markers  6. combivent  7. lovenox ppx  8. abx as per primary team    Thank you for the consult. Will continue to follow. Daily Progression:    HPI: history obtained via . 30 y/o [place] F with no PMH who presented with worsening SOB, pleuritic chest pain, cough, fevers. Symptoms began on 21 per ED physician notes (patient unable to recall for me when symptoms started). She was diagnosed with covid 19 on 21 at [facility] and prescribed azithromycin. She has presented to our ED on 2 other occasions last week for similar symptoms. On arrival to the ED o2 sats 94% on RA  tmax 100.4    Labs: wbc 4.5, ddimer 0.68, pct <0.05, LA 1.3, , crp 6.74    CXR 3/2/21 (personally reviewed): Patchy interstitial and airspace opacities in both lungs    CTA chest 21: Patchy bilateral groundglass nodules and consolidation which can be seen with  COVID pneumonia. Given limitations of the study an acute pulmonary embolus is  not identified    I have reviewed the labs and previous days notes. ROS: She is feeling poorly. She c/o fevers, dizziness, SOB, pleuritic chest pain (reproducible), cough productive of yellow and blood tinged sputum, N/V, myalgias, poor appetite. She denies diarrhea. On my eval O2 sats 84% on RA and improve to 92% on 2L.     Past Medical History:   Diagnosis Date    Complication of anesthesia     general anesthesia for both previous csections    Essential hypertension last two pregnancies      Past Surgical History:   Procedure Laterality Date    HX  SECTION      x2    HX DILATION AND CURETTAGE        Prior to Admission medications    Medication Sig Start Date End Date Taking? Authorizing Provider   dexAMETHasone (Decadron) 4 mg tablet Take 4 mg by mouth daily for 5 days. 2/26/21 3/3/21  Brenna Hankins, DO     No Known Allergies   Social History     Tobacco Use    Smoking status: Never Smoker    Smokeless tobacco: Never Used   Substance Use Topics    Alcohol use: Not Currently      Family History   Problem Relation Age of Onset    Hypertension Father      OBJECTIVE:     Vital Signs:       Visit Vitals  BP (!) 111/58 (BP 1 Location: Right upper arm, BP Patient Position: At rest)   Pulse 75   Temp 98.2 °F (36.8 °C)   Resp 29   Ht 5' 3\" (1.6 m)   Wt 126.3 kg (278 lb 7.1 oz)   LMP 2021 (Approximate)   SpO2 92%   BMI 49.32 kg/m²      Temp (24hrs), Av.5 °F (36.9 °C), Min:97.1 °F (36.2 °C), Max:100.4 °F (38 °C)     Intake/Output:     Last shift: No intake/output data recorded.     Last 3 shifts:  1901 -  0700  In: 100 [P.O.:100]  Out: 0           Intake/Output Summary (Last 24 hours) at 3/3/2021 0263  Last data filed at 3/3/2021 0750  Gross per 24 hour   Intake 100 ml   Output 0 ml   Net 100 ml       Physical Exam:                                        Exam Findings Other   General: No resp distress noted, appears stated age    [de-identified]:  Moist mucosal membranes    Chest: No pectus deformity, normal chest rise b/l    HEART:  RRR, no murmurs/rubs/gallops    Lungs:  CTA b/l    ABD: Soft/NT, obese    EXT: No cyanosis/clubbing/edema    Skin: No rashes or ulcers, no mottling    Neuro: A/O x 3        Medications:  Current Facility-Administered Medications   Medication Dose Route Frequency    ipratropium-albuterol (COMBIVENT RESPIMAT) 20 mcg-100 mcg inhalation spray  1 Puff Inhalation Q6H RT    acetaminophen (TYLENOL) tablet 650 mg  650 mg Oral Q6H PRN Or    acetaminophen (TYLENOL) suppository 650 mg  650 mg Rectal Q6H PRN    sodium chloride (NS) flush 5-40 mL  5-40 mL IntraVENous Q8H    sodium chloride (NS) flush 5-40 mL  5-40 mL IntraVENous PRN    acetaminophen (TYLENOL) tablet 650 mg  650 mg Oral Q6H PRN    Or    acetaminophen (TYLENOL) suppository 650 mg  650 mg Rectal Q6H PRN    polyethylene glycol (MIRALAX) packet 17 g  17 g Oral DAILY PRN    promethazine (PHENERGAN) tablet 12.5 mg  12.5 mg Oral Q6H PRN    Or    ondansetron (ZOFRAN) injection 4 mg  4 mg IntraVENous Q6H PRN    cefTRIAXone (ROCEPHIN) 1 g in sterile water (preservative free) 10 mL IV syringe  1 g IntraVENous Q24H    zinc gluconate tablet 50 mg  50 mg Oral DAILY    ascorbic acid (vitamin C) (VITAMIN C) tablet 500 mg  500 mg Oral BID    guaiFENesin ER (MUCINEX) tablet 600 mg  600 mg Oral Q12H    dexamethasone (DECADRON) 4 mg/mL injection 6 mg  6 mg IntraVENous Q24H    cholecalciferol (VITAMIN D3) (1000 Units /25 mcg) tablet 2,000 Units  2,000 Units Oral DAILY    azithromycin (ZITHROMAX) 500 mg in 0.9% sodium chloride 250 mL (VIAL-MATE)  500 mg IntraVENous Q24H    enoxaparin (LOVENOX) injection 40 mg  40 mg SubCUTAneous Q12H       Labs:  ABG No results for input(s): PHI, PCO2I, PO2I, HCO3I, SO2I, FIO2I in the last 72 hours. CBC Recent Labs     03/03/21  0400 03/02/21  1820   WBC 2.6* 4.5   HGB 10.3* 11.7   HCT 35.1 40.1    274   MCV 71.9* 72.6*   MCH 21.1* 25.8*        Metabolic  Panel Recent Labs     03/03/21  0400 03/02/21  1820    136   K 3.9 3.4*    103   CO2 26 28   * 90   BUN 13 11   CREA 0.58 0.82   CA 7.9* 8.7   MG 2.4  --    ALB 2.5*  --    ALT 42  --    INR 1.1  --         Pertinent Labs                Kavita Shermanma  3/3/2021

## 2021-03-04 ENCOUNTER — APPOINTMENT (OUTPATIENT)
Dept: CT IMAGING | Age: 31
DRG: 177 | End: 2021-03-04
Attending: INTERNAL MEDICINE
Payer: OTHER GOVERNMENT

## 2021-03-04 LAB
25(OH)D3 SERPL-MCNC: 9.5 NG/ML (ref 30–100)
ALBUMIN SERPL-MCNC: 2.5 G/DL (ref 3.5–5)
ALBUMIN/GLOB SERPL: 0.5 {RATIO} (ref 1.1–2.2)
ALP SERPL-CCNC: 61 U/L (ref 45–117)
ALT SERPL-CCNC: 44 U/L (ref 12–78)
ANION GAP SERPL CALC-SCNC: 3 MMOL/L (ref 5–15)
AST SERPL-CCNC: 38 U/L (ref 15–37)
BASOPHILS # BLD: 0 K/UL (ref 0–0.1)
BASOPHILS NFR BLD: 0 % (ref 0–1)
BILIRUB SERPL-MCNC: 0.2 MG/DL (ref 0.2–1)
BUN SERPL-MCNC: 14 MG/DL (ref 6–20)
BUN/CREAT SERPL: 23 (ref 12–20)
CALCIUM SERPL-MCNC: 8.9 MG/DL (ref 8.5–10.1)
CHLORIDE SERPL-SCNC: 107 MMOL/L (ref 97–108)
CO2 SERPL-SCNC: 28 MMOL/L (ref 21–32)
CREAT SERPL-MCNC: 0.6 MG/DL (ref 0.55–1.02)
CRP SERPL-MCNC: 4.29 MG/DL (ref 0–0.6)
D DIMER PPP FEU-MCNC: 0.33 MG/L FEU (ref 0–0.65)
DIFFERENTIAL METHOD BLD: ABNORMAL
EOSINOPHIL # BLD: 0 K/UL (ref 0–0.4)
EOSINOPHIL NFR BLD: 0 % (ref 0–7)
ERYTHROCYTE [DISTWIDTH] IN BLOOD BY AUTOMATED COUNT: 17.1 % (ref 11.5–14.5)
FERRITIN SERPL-MCNC: 153 NG/ML (ref 8–252)
FIBRINOGEN PPP-MCNC: 604 MG/DL (ref 200–475)
GLOBULIN SER CALC-MCNC: 4.8 G/DL (ref 2–4)
GLUCOSE SERPL-MCNC: 107 MG/DL (ref 65–100)
HCT VFR BLD AUTO: 35.5 % (ref 35–47)
HGB BLD-MCNC: 10.3 G/DL (ref 11.5–16)
IMM GRANULOCYTES # BLD AUTO: 0 K/UL
IMM GRANULOCYTES NFR BLD AUTO: 0 %
LDH SERPL L TO P-CCNC: 198 U/L (ref 81–246)
LYMPHOCYTES # BLD: 1 K/UL (ref 0.8–3.5)
LYMPHOCYTES NFR BLD: 52 % (ref 12–49)
MAGNESIUM SERPL-MCNC: 2.5 MG/DL (ref 1.6–2.4)
MCH RBC QN AUTO: 21.2 PG (ref 26–34)
MCHC RBC AUTO-ENTMCNC: 29 G/DL (ref 30–36.5)
MCV RBC AUTO: 73 FL (ref 80–99)
MONOCYTES # BLD: 0.2 K/UL (ref 0–1)
MONOCYTES NFR BLD: 12 % (ref 5–13)
NEUTS BAND NFR BLD MANUAL: 2 % (ref 0–6)
NEUTS SEG # BLD: 0.7 K/UL (ref 1.8–8)
NEUTS SEG NFR BLD: 34 % (ref 32–75)
NRBC # BLD: 0 K/UL (ref 0–0.01)
NRBC BLD-RTO: 0 PER 100 WBC
PHOSPHATE SERPL-MCNC: 3.6 MG/DL (ref 2.6–4.7)
PLATELET # BLD AUTO: 305 K/UL (ref 150–400)
PMV BLD AUTO: 10.1 FL (ref 8.9–12.9)
POTASSIUM SERPL-SCNC: 4.1 MMOL/L (ref 3.5–5.1)
PROT SERPL-MCNC: 7.3 G/DL (ref 6.4–8.2)
RBC # BLD AUTO: 4.86 M/UL (ref 3.8–5.2)
RBC MORPH BLD: ABNORMAL
SARS-COV-2, XPLCVT: DETECTED
SODIUM SERPL-SCNC: 138 MMOL/L (ref 136–145)
SOURCE, COVRS: ABNORMAL
WBC # BLD AUTO: 1.9 K/UL (ref 3.6–11)

## 2021-03-04 PROCEDURE — 82728 ASSAY OF FERRITIN: CPT

## 2021-03-04 PROCEDURE — 77010033678 HC OXYGEN DAILY

## 2021-03-04 PROCEDURE — 71275 CT ANGIOGRAPHY CHEST: CPT

## 2021-03-04 PROCEDURE — 84100 ASSAY OF PHOSPHORUS: CPT

## 2021-03-04 PROCEDURE — 85025 COMPLETE CBC W/AUTO DIFF WBC: CPT

## 2021-03-04 PROCEDURE — 80053 COMPREHEN METABOLIC PANEL: CPT

## 2021-03-04 PROCEDURE — 85384 FIBRINOGEN ACTIVITY: CPT

## 2021-03-04 PROCEDURE — 74011000636 HC RX REV CODE- 636: Performed by: RADIOLOGY

## 2021-03-04 PROCEDURE — 74011250636 HC RX REV CODE- 250/636: Performed by: INTERNAL MEDICINE

## 2021-03-04 PROCEDURE — 74011250637 HC RX REV CODE- 250/637: Performed by: INTERNAL MEDICINE

## 2021-03-04 PROCEDURE — 94640 AIRWAY INHALATION TREATMENT: CPT

## 2021-03-04 PROCEDURE — 82306 VITAMIN D 25 HYDROXY: CPT

## 2021-03-04 PROCEDURE — 36415 COLL VENOUS BLD VENIPUNCTURE: CPT

## 2021-03-04 PROCEDURE — 86140 C-REACTIVE PROTEIN: CPT

## 2021-03-04 PROCEDURE — 86480 TB TEST CELL IMMUN MEASURE: CPT

## 2021-03-04 PROCEDURE — 65660000000 HC RM CCU STEPDOWN

## 2021-03-04 PROCEDURE — 74011000250 HC RX REV CODE- 250: Performed by: INTERNAL MEDICINE

## 2021-03-04 PROCEDURE — 94760 N-INVAS EAR/PLS OXIMETRY 1: CPT

## 2021-03-04 PROCEDURE — 85379 FIBRIN DEGRADATION QUANT: CPT

## 2021-03-04 PROCEDURE — 83615 LACTATE (LD) (LDH) ENZYME: CPT

## 2021-03-04 PROCEDURE — 83735 ASSAY OF MAGNESIUM: CPT

## 2021-03-04 RX ORDER — MECLIZINE HYDROCHLORIDE 25 MG/1
25 TABLET ORAL
Status: DISCONTINUED | OUTPATIENT
Start: 2021-03-04 | End: 2021-03-08 | Stop reason: HOSPADM

## 2021-03-04 RX ADMIN — OXYCODONE HYDROCHLORIDE AND ACETAMINOPHEN 500 MG: 500 TABLET ORAL at 08:57

## 2021-03-04 RX ADMIN — LACTOBACILLUS ACIDOPHILUS / LACTOBACILLUS BULGARICUS 1 PACKET: 100 MILLION CFU STRENGTH GRANULES at 17:44

## 2021-03-04 RX ADMIN — LACTOBACILLUS ACIDOPHILUS / LACTOBACILLUS BULGARICUS 1 PACKET: 100 MILLION CFU STRENGTH GRANULES at 08:57

## 2021-03-04 RX ADMIN — Medication 2000 UNITS: at 08:57

## 2021-03-04 RX ADMIN — IPRATROPIUM BROMIDE AND ALBUTEROL 1 PUFF: 20; 100 SPRAY, METERED RESPIRATORY (INHALATION) at 04:41

## 2021-03-04 RX ADMIN — IPRATROPIUM BROMIDE AND ALBUTEROL 1 PUFF: 20; 100 SPRAY, METERED RESPIRATORY (INHALATION) at 20:54

## 2021-03-04 RX ADMIN — DEXAMETHASONE SODIUM PHOSPHATE 6 MG: 4 INJECTION, SOLUTION INTRA-ARTICULAR; INTRALESIONAL; INTRAMUSCULAR; INTRAVENOUS; SOFT TISSUE at 20:52

## 2021-03-04 RX ADMIN — GUAIFENESIN 600 MG: 600 TABLET ORAL at 20:47

## 2021-03-04 RX ADMIN — GUAIFENESIN 600 MG: 600 TABLET ORAL at 08:57

## 2021-03-04 RX ADMIN — Medication 10 ML: at 13:58

## 2021-03-04 RX ADMIN — IPRATROPIUM BROMIDE AND ALBUTEROL 1 PUFF: 20; 100 SPRAY, METERED RESPIRATORY (INHALATION) at 14:36

## 2021-03-04 RX ADMIN — CEFTRIAXONE 1 G: 1 INJECTION, POWDER, FOR SOLUTION INTRAMUSCULAR; INTRAVENOUS at 20:50

## 2021-03-04 RX ADMIN — AZITHROMYCIN MONOHYDRATE 500 MG: 500 INJECTION, POWDER, LYOPHILIZED, FOR SOLUTION INTRAVENOUS at 20:54

## 2021-03-04 RX ADMIN — ENOXAPARIN SODIUM 40 MG: 40 INJECTION SUBCUTANEOUS at 08:57

## 2021-03-04 RX ADMIN — Medication 50 MG: at 08:57

## 2021-03-04 RX ADMIN — OXYCODONE 5 MG: 5 TABLET ORAL at 17:44

## 2021-03-04 RX ADMIN — Medication 10 ML: at 05:56

## 2021-03-04 RX ADMIN — IPRATROPIUM BROMIDE AND ALBUTEROL 1 PUFF: 20; 100 SPRAY, METERED RESPIRATORY (INHALATION) at 08:00

## 2021-03-04 RX ADMIN — IOPAMIDOL 79 ML: 755 INJECTION, SOLUTION INTRAVENOUS at 16:17

## 2021-03-04 RX ADMIN — ENOXAPARIN SODIUM 40 MG: 40 INJECTION SUBCUTANEOUS at 20:47

## 2021-03-04 RX ADMIN — Medication 10 ML: at 21:00

## 2021-03-04 NOTE — PROGRESS NOTES
Pulmonary, Critical Care, and Sleep Medicine~Consult Note    Name: Zackary Augustin MRN: [id number]   : 1990 Hospital: [address] [place], [place] [address] Rd   Date: 3/4/2021 \ Admission: 3/2/2021     Impression Plan   1. Acute hypoxemic respiratory failure  2. covid 19 pneumonia 1. supp o2 prn for goal sats>90%; currently on 2L  2. continue w/ dex  3. She is outside the window for Remdesivir. 4. S/p Actemra (discussed with patient and she gives consent) on 3/3  5. Trend inflammatory markers  6. combivent  7. lovenox ppx  8. Continue empiric abx to complete 5 day course  9. Pain control per primary team       Daily Progression:    HPI: history obtained via . 32 y/o [place] F with no PMH who presented with worsening SOB, pleuritic chest pain, cough, fevers. Symptoms began on 21 per ED physician notes (patient unable to recall for me when symptoms started). She was diagnosed with covid 19 on 21 at [facility] and prescribed azithromycin. She has presented to our ED on 2 other occasions last week for similar symptoms. On arrival to the ED o2 sats 94% on RA  tmax 100.4    Labs: wbc 4.5, ddimer 0.68, pct <0.05, LA 1.3, , crp 6.74    CXR 3/2/21 (personally reviewed): Patchy interstitial and airspace opacities in both lungs    CTA chest 21: Patchy bilateral groundglass nodules and consolidation which can be seen with  COVID pneumonia. Given limitations of the study an acute pulmonary embolus is  not identified    I have reviewed the labs and previous days notes. ROS: She is feeling poorly. She c/o fevers, dizziness, SOB, pleuritic chest pain (reproducible), cough productive of yellow and blood tinged sputum, N/V, myalgias, poor appetite. She denies diarrhea. On my eval O2 sats 84% on RA and improve to 92% on 2L.     Interval history  Afebrile  BP stable  Sats 93-95% on 2L  WBC 1.9 - worse  ANC 0.7  CRP 4.29 - better  D-dimer 0.33  Ferritin 153  TB gold quant in process  proBNP 10    ROS: She endorses CP, abd pain, continued SOB, and coughing. Past Medical History:   Diagnosis Date    Complication of anesthesia     general anesthesia for both previous csections    Essential hypertension     last two pregnancies      Past Surgical History:   Procedure Laterality Date    HX  SECTION      x2    HX DILATION AND CURETTAGE        Prior to Admission medications    Medication Sig Start Date End Date Taking? Authorizing Provider   dexAMETHasone (Decadron) 4 mg tablet Take 4 mg by mouth daily for 5 days. 2/26/21 3/3/21  Lissy Ambrosio, DO     No Known Allergies   Social History     Tobacco Use    Smoking status: Never Smoker    Smokeless tobacco: Never Used   Substance Use Topics    Alcohol use: Not Currently      Family History   Problem Relation Age of Onset    Hypertension Father      OBJECTIVE:     Vital Signs:       Visit Vitals  /77 (BP 1 Location: Left lower arm, BP Patient Position: At rest)   Pulse 70   Temp 97.6 °F (36.4 °C)   Resp 18   Ht 5' 3\" (1.6 m)   Wt 126.3 kg (278 lb 7.1 oz)   LMP 2021 (Approximate)   SpO2 93%   BMI 49.32 kg/m²      Temp (24hrs), Av.3 °F (36.8 °C), Min:97.6 °F (36.4 °C), Max:98.7 °F (37.1 °C)     Intake/Output:     Last shift:  07 -  1900  In: 1247.5 [P.O.:125;  I.V.:1122.5]  Out: 0     Last 3 shifts:  190 -  0700  In: 540 [P.O.:540]  Out: 0           Intake/Output Summary (Last 24 hours) at 3/4/2021 1025  Last data filed at 3/4/2021 0935  Gross per 24 hour   Intake 1687.5 ml   Output 0 ml   Net 1687.5 ml       Physical Exam:                                        Exam Findings Other   General: No acute resp distress noted, appears uncomfortable, appears stated age    [de-identified]:  Moist mucosal membranes    Chest: No pectus deformity, normal chest rise b/l, TTP    HEART:  RRR, no murmurs/rubs/gallops    Lungs:  CTA b/l    ABD: Soft, obese, TTP    EXT: No cyanosis/clubbing/edema    Skin: No rashes or ulcers, no mottling    Neuro: A/O x 3        Medications:  Current Facility-Administered Medications   Medication Dose Route Frequency    ipratropium-albuterol (COMBIVENT RESPIMAT) 20 mcg-100 mcg inhalation spray  1 Puff Inhalation Q6H RT    HYDROmorphone (DILAUDID) syringe 0.5 mg  0.5 mg IntraVENous Q4H PRN    oxyCODONE IR (ROXICODONE) tablet 5 mg  5 mg Oral Q4H PRN    prochlorperazine (COMPAZINE) injection 10 mg  10 mg IntraVENous Q6H PRN    lactobacillus-acidophilus (LACTINEX) 1 Packet  1 Packet Oral BID    lactated Ringers infusion  75 mL/hr IntraVENous CONTINUOUS    acetaminophen (TYLENOL) tablet 650 mg  650 mg Oral Q6H PRN    Or    acetaminophen (TYLENOL) suppository 650 mg  650 mg Rectal Q6H PRN    sodium chloride (NS) flush 5-40 mL  5-40 mL IntraVENous Q8H    sodium chloride (NS) flush 5-40 mL  5-40 mL IntraVENous PRN    acetaminophen (TYLENOL) tablet 650 mg  650 mg Oral Q6H PRN    Or    acetaminophen (TYLENOL) suppository 650 mg  650 mg Rectal Q6H PRN    polyethylene glycol (MIRALAX) packet 17 g  17 g Oral DAILY PRN    ondansetron (ZOFRAN) injection 4 mg  4 mg IntraVENous Q6H PRN    cefTRIAXone (ROCEPHIN) 1 g in sterile water (preservative free) 10 mL IV syringe  1 g IntraVENous Q24H    zinc gluconate tablet 50 mg  50 mg Oral DAILY    ascorbic acid (vitamin C) (VITAMIN C) tablet 500 mg  500 mg Oral BID    guaiFENesin ER (MUCINEX) tablet 600 mg  600 mg Oral Q12H    dexamethasone (DECADRON) 4 mg/mL injection 6 mg  6 mg IntraVENous Q24H    cholecalciferol (VITAMIN D3) (1000 Units /25 mcg) tablet 2,000 Units  2,000 Units Oral DAILY    azithromycin (ZITHROMAX) 500 mg in 0.9% sodium chloride 250 mL (VIAL-MATE)  500 mg IntraVENous Q24H    enoxaparin (LOVENOX) injection 40 mg  40 mg SubCUTAneous Q12H       Labs:  ABG No results for input(s): PHI, PCO2I, PO2I, HCO3I, SO2I, FIO2I in the last 72 hours.      CBC Recent Labs     03/04/21  0413 03/03/21  0400 03/02/21  1820   WBC 1.9* 2.6* 4. 5   HGB 10.3* 10.3* 11.7   HCT 35.5 35.1 40.1    254 274   MCV 73.0* 71.9* 72.6*   MCH 21.2* 21.1* 91.9*        Metabolic  Panel Recent Labs     03/04/21  0413 03/03/21  0400 03/02/21  1820    139 136   K 4.1 3.9 3.4*    107 103   CO2 28 26 28   * 129* 90   BUN 14 13 11   CREA 0.60 0.58 0.82   CA 8.9 7.9* 8.7   MG 2.5* 2.4  --    PHOS 3.6  --   --    ALB 2.5* 2.5*  --    ALT 44 42  --    INR  --  1.1  --                     [facility], [address]  3/4/2021

## 2021-03-04 NOTE — PROGRESS NOTES
[facility]ablo A[facility] [address]  [address]  [phone]      Medical Progress Note      NAME: Vasiliy Corea   :  1990  MRM:  [id number]    Date/Time of service: 3/4/2021  12:17 PM       Subjective:     Chief Complaint:  Patient was personally seen and examined by me during this time period. Chart reviewed. C/o cough, chest pain, dizziness. Symptoms about the same as yesterday        Objective:       Vitals:       Last 24hrs VS reviewed since prior progress note. Most recent are:    Visit Vitals  BP (!) 141/84 (BP 1 Location: Right upper arm, BP Patient Position: At rest)   Pulse 68   Temp 97.8 °F (36.6 °C)   Resp 18   Ht 5' 3\" (1.6 m)   Wt 126.3 kg (278 lb 7.1 oz)   SpO2 96%   BMI 49.32 kg/m²     SpO2 Readings from Last 6 Encounters:   21 96%   21 96%   21 97%   19 99%   19 98%   19 95%    O2 Flow Rate (L/min): 2 l/min       Intake/Output Summary (Last 24 hours) at 3/4/2021 1217  Last data filed at 3/4/2021 0935  Gross per 24 hour   Intake 1687.5 ml   Output 0 ml   Net 1687.5 ml        Exam:     Physical Exam:    Gen:  Well-developed, well-nourished, morbidly obese, mild distress  HEENT:  Pink conjunctivae, PERRL, hearing intact to voice, moist mucous membranes  Neck:  Supple, without masses, thyroid non-tender  Resp:  No accessory muscle use, clear breath sounds without wheezes rales or rhonchi  Card:  No murmurs, normal S1, S2 without thrills, bruits or peripheral edema  Abd:  Soft, non-tender, non-distended, normoactive bowel sounds are present  Musc:  No cyanosis or clubbing  Skin:  No rashes  Neuro:  Cranial nerves 3-12 are grossly intact, follows commands appropriately  Psych:  fair insight, oriented to person, place and time, alert.   Speaks Latvian only    Medications Reviewed: (see below)    Lab Data Reviewed: (see below)    ______________________________________________________________________    Medications:     Current Facility-Administered Medications   Medication Dose Route Frequency    meclizine (ANTIVERT) tablet 25 mg  25 mg Oral Q6H PRN    ipratropium-albuterol (COMBIVENT RESPIMAT) 20 mcg-100 mcg inhalation spray  1 Puff Inhalation Q6H RT    HYDROmorphone (DILAUDID) syringe 0.5 mg  0.5 mg IntraVENous Q4H PRN    oxyCODONE IR (ROXICODONE) tablet 5 mg  5 mg Oral Q4H PRN    prochlorperazine (COMPAZINE) injection 10 mg  10 mg IntraVENous Q6H PRN    lactobacillus-acidophilus (LACTINEX) 1 Packet  1 Packet Oral BID    lactated Ringers infusion  50 mL/hr IntraVENous CONTINUOUS    acetaminophen (TYLENOL) tablet 650 mg  650 mg Oral Q6H PRN    Or    acetaminophen (TYLENOL) suppository 650 mg  650 mg Rectal Q6H PRN    sodium chloride (NS) flush 5-40 mL  5-40 mL IntraVENous Q8H    sodium chloride (NS) flush 5-40 mL  5-40 mL IntraVENous PRN    acetaminophen (TYLENOL) tablet 650 mg  650 mg Oral Q6H PRN    Or    acetaminophen (TYLENOL) suppository 650 mg  650 mg Rectal Q6H PRN    polyethylene glycol (MIRALAX) packet 17 g  17 g Oral DAILY PRN    ondansetron (ZOFRAN) injection 4 mg  4 mg IntraVENous Q6H PRN    cefTRIAXone (ROCEPHIN) 1 g in sterile water (preservative free) 10 mL IV syringe  1 g IntraVENous Q24H    guaiFENesin ER (MUCINEX) tablet 600 mg  600 mg Oral Q12H    dexamethasone (DECADRON) 4 mg/mL injection 6 mg  6 mg IntraVENous Q24H    cholecalciferol (VITAMIN D3) (1000 Units /25 mcg) tablet 2,000 Units  2,000 Units Oral DAILY    azithromycin (ZITHROMAX) 500 mg in 0.9% sodium chloride 250 mL (VIAL-MATE)  500 mg IntraVENous Q24H    enoxaparin (LOVENOX) injection 40 mg  40 mg SubCUTAneous Q12H          Lab Review:     Recent Labs     03/04/21  0413 03/03/21  0400 03/02/21  1820   WBC 1.9* 2.6* 4.5   HGB 10.3* 10.3* 11.7   HCT 35.5 35.1 40.1    254 274     Recent Labs     03/04/21  0413 03/03/21  0400 03/02/21  1820    139 136   K 4.1 3.9 3.4*    107 103   CO2 28 26 28   * 129* 90   BUN 14 13 11   CREA 0.60 0.58 0.82   CA 8.9 7.9* 8.7   MG 2.5* 2.4  --    PHOS 3.6  --   --    ALB 2.5* 2.5*  --    TBILI 0.2 0.4  --    ALT 44 42  --    INR  --  1.1  --      No results found for: GLUCPOC       Assessment / Plan:     32 yo hx of gestational HTN, presented w/ hypoxia, COVID PNA    1) Acute resp failure/hypoxia: due to COVID. Still requiring 2-3L O2. Desat to 88% on RA. Cont O2, incentive spirometry, nebs prn    2) Pneumonia due to COVID-19: out of window for Remdesivir. S/p Actemra. Cont IV CTX/azithro, dexamethasone, Vit C/Zinc.  Cont to monitor inflammatory markers. Pulm following    3) Chest pain/abd pain/dizziness/diarrhea: likely from [facility]. Will obtain chest CT to r/o PE.   Will cont IV antiemetics prn, imodium prn, IV dilaudid prn pain, meclizine prn    4) Hx of gestational HTN: BP stable, will monitor     Total time spent with patient: 35 min **I personally saw and examined the patient during this time period**                 Care Plan discussed with: Patient, nursing,  service     Discussed:  Care Plan    Prophylaxis:  Lovenox    Disposition:  Home w/Family           ___________________________________________________    Attending Physician: Eric Rodriguez MD

## 2021-03-04 NOTE — PROGRESS NOTES
Bedside and Verbal shift change report given to [address] [place] [address] (oncoming nurse) by Shelia Wheat (offgoing nurse). Report included the following information SBAR, Kardex, Intake/Output, MAR, Recent Results and Med Rec Status.

## 2021-03-04 NOTE — PROGRESS NOTES
Bedside shift change report given to Katrin Morales RN (oncoming nurse) by Bev Kelly RN (offgoing nurse). Report included the following information SBAR, Kardex and Recent Results.

## 2021-03-05 LAB
ANION GAP SERPL CALC-SCNC: 6 MMOL/L (ref 5–15)
BUN SERPL-MCNC: 20 MG/DL (ref 6–20)
BUN/CREAT SERPL: 28 (ref 12–20)
CALCIUM SERPL-MCNC: 8.9 MG/DL (ref 8.5–10.1)
CHLORIDE SERPL-SCNC: 106 MMOL/L (ref 97–108)
CO2 SERPL-SCNC: 27 MMOL/L (ref 21–32)
CREAT SERPL-MCNC: 0.72 MG/DL (ref 0.55–1.02)
CRP SERPL-MCNC: 1.56 MG/DL (ref 0–0.6)
D DIMER PPP FEU-MCNC: 0.33 MG/L FEU (ref 0–0.65)
ERYTHROCYTE [DISTWIDTH] IN BLOOD BY AUTOMATED COUNT: 17.1 % (ref 11.5–14.5)
GLUCOSE SERPL-MCNC: 109 MG/DL (ref 65–100)
HCT VFR BLD AUTO: 35.4 % (ref 35–47)
HGB BLD-MCNC: 10 G/DL (ref 11.5–16)
IL6 SERPL-MCNC: <2.5 PG/ML (ref 0–13)
MAGNESIUM SERPL-MCNC: 2.3 MG/DL (ref 1.6–2.4)
MCH RBC QN AUTO: 20.6 PG (ref 26–34)
MCHC RBC AUTO-ENTMCNC: 28.2 G/DL (ref 30–36.5)
MCV RBC AUTO: 73 FL (ref 80–99)
NRBC # BLD: 0 K/UL (ref 0–0.01)
NRBC BLD-RTO: 0 PER 100 WBC
PHOSPHATE SERPL-MCNC: 4 MG/DL (ref 2.6–4.7)
PLATELET # BLD AUTO: 362 K/UL (ref 150–400)
PMV BLD AUTO: 10.2 FL (ref 8.9–12.9)
POTASSIUM SERPL-SCNC: 4 MMOL/L (ref 3.5–5.1)
RBC # BLD AUTO: 4.85 M/UL (ref 3.8–5.2)
SODIUM SERPL-SCNC: 139 MMOL/L (ref 136–145)
WBC # BLD AUTO: 2.7 K/UL (ref 3.6–11)

## 2021-03-05 PROCEDURE — 74011000250 HC RX REV CODE- 250: Performed by: INTERNAL MEDICINE

## 2021-03-05 PROCEDURE — 80048 BASIC METABOLIC PNL TOTAL CA: CPT

## 2021-03-05 PROCEDURE — 86140 C-REACTIVE PROTEIN: CPT

## 2021-03-05 PROCEDURE — 94640 AIRWAY INHALATION TREATMENT: CPT

## 2021-03-05 PROCEDURE — 74011250637 HC RX REV CODE- 250/637: Performed by: INTERNAL MEDICINE

## 2021-03-05 PROCEDURE — 85027 COMPLETE CBC AUTOMATED: CPT

## 2021-03-05 PROCEDURE — 74011250636 HC RX REV CODE- 250/636: Performed by: INTERNAL MEDICINE

## 2021-03-05 PROCEDURE — 84100 ASSAY OF PHOSPHORUS: CPT

## 2021-03-05 PROCEDURE — 65660000000 HC RM CCU STEPDOWN

## 2021-03-05 PROCEDURE — 36415 COLL VENOUS BLD VENIPUNCTURE: CPT

## 2021-03-05 PROCEDURE — 83735 ASSAY OF MAGNESIUM: CPT

## 2021-03-05 PROCEDURE — 85379 FIBRIN DEGRADATION QUANT: CPT

## 2021-03-05 RX ADMIN — HYDROMORPHONE HYDROCHLORIDE 0.5 MG: 1 INJECTION, SOLUTION INTRAMUSCULAR; INTRAVENOUS; SUBCUTANEOUS at 12:14

## 2021-03-05 RX ADMIN — LACTOBACILLUS ACIDOPHILUS / LACTOBACILLUS BULGARICUS 1 PACKET: 100 MILLION CFU STRENGTH GRANULES at 09:16

## 2021-03-05 RX ADMIN — LACTOBACILLUS ACIDOPHILUS / LACTOBACILLUS BULGARICUS 1 PACKET: 100 MILLION CFU STRENGTH GRANULES at 18:12

## 2021-03-05 RX ADMIN — DEXAMETHASONE SODIUM PHOSPHATE 6 MG: 4 INJECTION, SOLUTION INTRA-ARTICULAR; INTRALESIONAL; INTRAMUSCULAR; INTRAVENOUS; SOFT TISSUE at 22:19

## 2021-03-05 RX ADMIN — ENOXAPARIN SODIUM 40 MG: 40 INJECTION SUBCUTANEOUS at 09:16

## 2021-03-05 RX ADMIN — IPRATROPIUM BROMIDE AND ALBUTEROL 1 PUFF: 20; 100 SPRAY, METERED RESPIRATORY (INHALATION) at 09:18

## 2021-03-05 RX ADMIN — IPRATROPIUM BROMIDE AND ALBUTEROL 1 PUFF: 20; 100 SPRAY, METERED RESPIRATORY (INHALATION) at 02:30

## 2021-03-05 RX ADMIN — SODIUM CHLORIDE, POTASSIUM CHLORIDE, SODIUM LACTATE AND CALCIUM CHLORIDE 50 ML/HR: 600; 310; 30; 20 INJECTION, SOLUTION INTRAVENOUS at 18:12

## 2021-03-05 RX ADMIN — Medication 2000 UNITS: at 09:16

## 2021-03-05 RX ADMIN — Medication 10 ML: at 05:50

## 2021-03-05 RX ADMIN — AZITHROMYCIN MONOHYDRATE 500 MG: 500 INJECTION, POWDER, LYOPHILIZED, FOR SOLUTION INTRAVENOUS at 22:15

## 2021-03-05 RX ADMIN — ENOXAPARIN SODIUM 40 MG: 40 INJECTION SUBCUTANEOUS at 22:19

## 2021-03-05 RX ADMIN — GUAIFENESIN 600 MG: 600 TABLET ORAL at 09:16

## 2021-03-05 RX ADMIN — IPRATROPIUM BROMIDE AND ALBUTEROL 1 PUFF: 20; 100 SPRAY, METERED RESPIRATORY (INHALATION) at 20:16

## 2021-03-05 RX ADMIN — GUAIFENESIN 600 MG: 600 TABLET ORAL at 22:20

## 2021-03-05 RX ADMIN — Medication 10 ML: at 18:12

## 2021-03-05 RX ADMIN — CEFTRIAXONE 2 G: 2 INJECTION, POWDER, FOR SOLUTION INTRAMUSCULAR; INTRAVENOUS at 22:18

## 2021-03-05 RX ADMIN — HYDROMORPHONE HYDROCHLORIDE 0.5 MG: 1 INJECTION, SOLUTION INTRAMUSCULAR; INTRAVENOUS; SUBCUTANEOUS at 22:21

## 2021-03-05 RX ADMIN — Medication 10 ML: at 22:20

## 2021-03-05 RX ADMIN — IPRATROPIUM BROMIDE AND ALBUTEROL 1 PUFF: 20; 100 SPRAY, METERED RESPIRATORY (INHALATION) at 13:32

## 2021-03-05 NOTE — PROGRESS NOTES
Verbal shift change report given to Deshawn Herrmann (oncoming nurse) by Rae Urban RN (offgoing nurse). Report included the following information SBAR, Kardex, ED Summary and MAR.

## 2021-03-05 NOTE — PROGRESS NOTES
Pulmonary, Critical Care, and Sleep Medicine~Consult Note    Name: Vasiliy Corea MRN: [id number]   : 1990 Hospital: [address] [place], [place] [address]on    Date: 3/5/2021 \ Admission: 3/2/2021     Impression Plan   1. Acute hypoxemic respiratory failure  2. covid 19 pneumonia 1. supp o2 prn for goal sats>90%; currently on 2L  2. continue w/ dex  3. She is outside the window for Remdesivir. 4. S/p Actemra (discussed with patient and she gives consent) on 3/3  5. Trend inflammatory markers  6. combivent  7. lovenox ppx  8. Continue empiric abx to complete 5 day course  9. Pain control per primary team       Daily Progression:    HPI: history obtained via . 30 y/o [place] F with no PMH who presented with worsening SOB, pleuritic chest pain, cough, fevers. Symptoms began on 21 per ED physician notes (patient unable to recall for me when symptoms started). She was diagnosed with covid 19 on 21 at [facility] and prescribed azithromycin. She has presented to our ED on 2 other occasions last week for similar symptoms. On arrival to the ED o2 sats 94% on RA  tmax 100.4    Labs: wbc 4.5, ddimer 0.68, pct <0.05, LA 1.3, , crp 6.74    CXR 3/2/21 (personally reviewed): Patchy interstitial and airspace opacities in both lungs    CTA chest 21: Patchy bilateral groundglass nodules and consolidation which can be seen with  COVID pneumonia. Given limitations of the study an acute pulmonary embolus is  not identified    I have reviewed the labs and previous days notes. ROS: She is feeling poorly. She c/o fevers, dizziness, SOB, pleuritic chest pain (reproducible), cough productive of yellow and blood tinged sputum, N/V, myalgias, poor appetite. She denies diarrhea. On my eval O2 sats 84% on RA and improve to 92% on 2L.     Interval history  Afebrile  BP stable  Sats 95% on 3L  WBC 2.7 - better  CRP 1.56 - better  D-dimer 0.33  Ferritin 153  TB gold quant in process  proBNP 10    3/4 chest CTA personally visualized and report reviewed. Interval worsening versus radiographic blossoming of multilobar pneumonia. No evidence for pulmonary embolism. ROS: She endorses continued CP, abd pain, LE pain, and SOB. Past Medical History:   Diagnosis Date    Complication of anesthesia     general anesthesia for both previous csections    Essential hypertension     last two pregnancies      Past Surgical History:   Procedure Laterality Date    HX  SECTION      x2    HX DILATION AND CURETTAGE        Prior to Admission medications    Not on File     No Known Allergies   Social History     Tobacco Use    Smoking status: Never Smoker    Smokeless tobacco: Never Used   Substance Use Topics    Alcohol use: Not Currently      Family History   Problem Relation Age of Onset    Hypertension Father      OBJECTIVE:     Vital Signs:       Visit Vitals  /70 (BP 1 Location: Right lower arm, BP Patient Position: At rest)   Pulse 60   Temp 98.7 °F (37.1 °C)   Resp 19   Ht 5' 3\" (1.6 m)   Wt 126.3 kg (278 lb 7.1 oz)   LMP 2021 (Approximate)   SpO2 95%   BMI 49.32 kg/m²      Temp (24hrs), Av.1 °F (36.7 °C), Min:97.6 °F (36.4 °C), Max:98.7 °F (37.1 °C)     Intake/Output:     Last shift: No intake/output data recorded.     Last 3 shifts:  1901 -  0700  In: 2291.3 [P.O.:1065; I.V.:1226.3]  Out: 0           Intake/Output Summary (Last 24 hours) at 3/5/2021 1105  Last data filed at 3/5/2021 0313  Gross per 24 hour   Intake 500 ml   Output    Net 500 ml       Physical Exam:                                        Exam Findings Other   General: No acute resp distress noted, appears uncomfortable, appears stated age    [de-identified]:  Moist mucosal membranes    Chest: No pectus deformity, normal chest rise b/l, TTP    HEART:  RRR, no murmurs/rubs/gallops    Lungs:  CTA b/l - anterior exam only due to patient discomfort; breath sounds diminished    ABD: Soft, obese, TTP EXT: No cyanosis/clubbing/edema - generalized TTP    Skin: No rashes or ulcers, no mottling    Neuro: A/O x 3        Medications:  Current Facility-Administered Medications   Medication Dose Route Frequency    meclizine (ANTIVERT) tablet 25 mg  25 mg Oral Q6H PRN    cefTRIAXone (ROCEPHIN) 2 g in sterile water (preservative free) 20 mL IV syringe  2 g IntraVENous Q24H    ipratropium-albuterol (COMBIVENT RESPIMAT) 20 mcg-100 mcg inhalation spray  1 Puff Inhalation Q6H RT    HYDROmorphone (DILAUDID) syringe 0.5 mg  0.5 mg IntraVENous Q4H PRN    oxyCODONE IR (ROXICODONE) tablet 5 mg  5 mg Oral Q4H PRN    prochlorperazine (COMPAZINE) injection 10 mg  10 mg IntraVENous Q6H PRN    lactobacillus-acidophilus (LACTINEX) 1 Packet  1 Packet Oral BID    lactated Ringers infusion  50 mL/hr IntraVENous CONTINUOUS    acetaminophen (TYLENOL) tablet 650 mg  650 mg Oral Q6H PRN    Or    acetaminophen (TYLENOL) suppository 650 mg  650 mg Rectal Q6H PRN    sodium chloride (NS) flush 5-40 mL  5-40 mL IntraVENous Q8H    sodium chloride (NS) flush 5-40 mL  5-40 mL IntraVENous PRN    acetaminophen (TYLENOL) tablet 650 mg  650 mg Oral Q6H PRN    Or    acetaminophen (TYLENOL) suppository 650 mg  650 mg Rectal Q6H PRN    polyethylene glycol (MIRALAX) packet 17 g  17 g Oral DAILY PRN    ondansetron (ZOFRAN) injection 4 mg  4 mg IntraVENous Q6H PRN    guaiFENesin ER (MUCINEX) tablet 600 mg  600 mg Oral Q12H    dexamethasone (DECADRON) 4 mg/mL injection 6 mg  6 mg IntraVENous Q24H    cholecalciferol (VITAMIN D3) (1000 Units /25 mcg) tablet 2,000 Units  2,000 Units Oral DAILY    azithromycin (ZITHROMAX) 500 mg in 0.9% sodium chloride 250 mL (VIAL-MATE)  500 mg IntraVENous Q24H    enoxaparin (LOVENOX) injection 40 mg  40 mg SubCUTAneous Q12H       Labs:  ABG No results for input(s): PHI, PCO2I, PO2I, HCO3I, SO2I, FIO2I in the last 72 hours.      CBC Recent Labs     03/05/21  0438 03/04/21  0413 03/03/21  0400   WBC 2.7* 1.9* 2.6*   HGB 10.0* 10.3* 10.3*   HCT 35.4 35.5 35.1    305 254   MCV 73.0* 73.0* 71.9*   MCH 20.6* 21.2* 01.9*        Metabolic  Panel Recent Labs     03/05/21  0438 03/04/21  0413 03/03/21  0400    138 139   K 4.0 4.1 3.9    107 107   CO2 27 28 26   * 107* 129*   BUN 20 14 13   CREA 0.72 0.60 0.58   CA 8.9 8.9 7.9*   MG 2.3 2.5* 2.4   PHOS 4.0 3.6  --    ALB  --  2.5* 2.5*   ALT  --  44 42   INR  --   --  1.1                    [address]  3/5/2021

## 2021-03-05 NOTE — PROGRESS NOTES
RACQUEL MORRISON [facility]  [address] [place] [address] [phone]      Medical Progress Note      NAME: Lexis Avelar   :  1990  MRM:  [id number]    Date/Time of service: 3/5/2021  12:24 PM       Subjective:     Chief Complaint:  Patient was personally seen and examined by me during this time period.  Chart reviewed.  Still with chest tightness, fatigue       Objective:       Vitals:       Last 24hrs VS reviewed since prior progress note. Most recent are:    Visit Vitals  /70 (BP 1 Location: Right lower arm, BP Patient Position: At rest)   Pulse 60   Temp 98.7 °F (37.1 °C)   Resp 19   Ht 5' 3\" (1.6 m)   Wt 126.3 kg (278 lb 7.1 oz)   SpO2 95%   BMI 49.32 kg/m²     SpO2 Readings from Last 6 Encounters:   21 95%   21 96%   21 97%   19 99%   19 98%   19 95%    O2 Flow Rate (L/min): 3 l/min       Intake/Output Summary (Last 24 hours) at 3/5/2021 1224  Last data filed at 3/5/2021 0313  Gross per 24 hour   Intake 500 ml   Output —   Net 500 ml        Exam:     Physical Exam:    Gen:  Well-developed, well-nourished, morbidly obese, mild distress  HEENT:  Pink conjunctivae, PERRL, hearing intact to voice, moist mucous membranes  Neck:  Supple, without masses, thyroid non-tender  Resp:  No accessory muscle use, coarse BS  Card:  No murmurs, normal S1, S2 without thrills, bruits or peripheral edema  Abd:  Soft, non-tender, non-distended, normoactive bowel sounds are present  Musc:  No cyanosis or clubbing  Skin:  No rashes  Neuro:  Cranial nerves 3-12 are grossly intact, follows commands appropriately  Psych:  fair insight, oriented to person, place and time, alert.  Speaks Icelandic only    Medications Reviewed: (see below)    Lab Data Reviewed: (see below)    ______________________________________________________________________    Medications:     Current Facility-Administered Medications   Medication Dose Route Frequency   • meclizine (ANTIVERT)  tablet 25 mg  25 mg Oral Q6H PRN    cefTRIAXone (ROCEPHIN) 2 g in sterile water (preservative free) 20 mL IV syringe  2 g IntraVENous Q24H    ipratropium-albuterol (COMBIVENT RESPIMAT) 20 mcg-100 mcg inhalation spray  1 Puff Inhalation Q6H RT    HYDROmorphone (DILAUDID) syringe 0.5 mg  0.5 mg IntraVENous Q4H PRN    oxyCODONE IR (ROXICODONE) tablet 5 mg  5 mg Oral Q4H PRN    prochlorperazine (COMPAZINE) injection 10 mg  10 mg IntraVENous Q6H PRN    lactobacillus-acidophilus (LACTINEX) 1 Packet  1 Packet Oral BID    lactated Ringers infusion  50 mL/hr IntraVENous CONTINUOUS    acetaminophen (TYLENOL) tablet 650 mg  650 mg Oral Q6H PRN    Or    acetaminophen (TYLENOL) suppository 650 mg  650 mg Rectal Q6H PRN    sodium chloride (NS) flush 5-40 mL  5-40 mL IntraVENous Q8H    sodium chloride (NS) flush 5-40 mL  5-40 mL IntraVENous PRN    acetaminophen (TYLENOL) tablet 650 mg  650 mg Oral Q6H PRN    Or    acetaminophen (TYLENOL) suppository 650 mg  650 mg Rectal Q6H PRN    polyethylene glycol (MIRALAX) packet 17 g  17 g Oral DAILY PRN    ondansetron (ZOFRAN) injection 4 mg  4 mg IntraVENous Q6H PRN    guaiFENesin ER (MUCINEX) tablet 600 mg  600 mg Oral Q12H    dexamethasone (DECADRON) 4 mg/mL injection 6 mg  6 mg IntraVENous Q24H    cholecalciferol (VITAMIN D3) (1000 Units /25 mcg) tablet 2,000 Units  2,000 Units Oral DAILY    azithromycin (ZITHROMAX) 500 mg in 0.9% sodium chloride 250 mL (VIAL-MATE)  500 mg IntraVENous Q24H    enoxaparin (LOVENOX) injection 40 mg  40 mg SubCUTAneous Q12H          Lab Review:     Recent Labs     03/05/21  0438 03/04/21  0413 03/03/21  0400   WBC 2.7* 1.9* 2.6*   HGB 10.0* 10.3* 10.3*   HCT 35.4 35.5 35.1    305 254     Recent Labs     03/05/21  0438 03/04/21  0413 03/03/21  0400    138 139   K 4.0 4.1 3.9    107 107   CO2 27 28 26   * 107* 129*   BUN 20 14 13   CREA 0.72 0.60 0.58   CA 8.9 8.9 7.9*   MG 2.3 2.5* 2.4   PHOS 4.0 3.6  --    ALB --  2.5* 2.5*   TBILI  --  0.2 0.4   ALT  --  44 42   INR  --   --  1.1     No results found for: GLUCPOC       Assessment / Plan:     30 yo hx of gestational HTN, presented w/ hypoxia, COVID PNA    1) Acute resp failure/hypoxia: due to COVID. Still requiring 2-3L O2. Desat to 88% on RA. Chest CT on 03/04 neg for PE. Cont O2, incentive spirometry, nebs prn    2) Pneumonia due to COVID-19: looks worse on chest CT from 03/04. Out of window for Remdesivir. S/p Actemra. Cont IV CTX/azithro, dexamethasone, Vit C/Zinc.  Cont to monitor inflammatory markers. Pulm following    3) Chest pain/abd pain/dizziness/diarrhea: likely from [facility]".   Will cont IV antiemetics prn, imodium prn, IV dilaudid prn pain, meclizine prn    4) Hx of gestational HTN: BP stable, will monitor     Total time spent with patient: 35 min **I personally saw and examined the patient during this time period**                 Care Plan discussed with: Patient, nursing,  service     Discussed:  Care Plan    Prophylaxis:  Lovenox    Disposition:  Home w/Family           ___________________________________________________    Attending Physician: Eric Rodriguez MD

## 2021-03-05 NOTE — PROGRESS NOTES
3/5/2021   CARE MANAGEMENT NOTE:    Reason for Admission:   Acute respiratory failure with hypoxia, and COVID                   RUR Score:   15%                  Plan for utilizing home health:   No       PCP: First and Last name:  None listed   Name of Practice:    Are you a current patient: Yes/No:  Hx is limited 2/2 language barrier and Covid positive so no entry into pt's room   Approximate date of last visit:    Can you participate in a virtual visit with your PCP:                     Current Advanced Directive/Advance Care Plan:  Full Code; Adv Care Plan not on file    Healthcare Decision Maker:   Click here to complete HealthCare Decision Makers including selection of the Healthcare Decision Maker Relationship (ie \"Primary\")                         Transition of Care Plan:   1.  Pulmonary following for medical management  2.  Pt reportedly, speaks Malay.  She is without insurance and no PCP listed  3.  Pt will return home with family  4.  Outpt f/u  5.  Family will transport pt home.    CM will follow pt until discharge.  Kellen

## 2021-03-05 NOTE — PROGRESS NOTES
Comprehensive Nutrition Assessment    Type and Reason for Visit: Initial, RD nutrition re-screen/LOS    Nutrition Recommendations/Plan:   1. Continue cardiac diet. Family to bring in food/meals that pt likes. Nutrition Assessment:      3/5: 30 y/o female admitted with COVID-19. Attempted to call pt's room phone with  service, but no answer. Unable to get full nutrition hx at this time. Per RN, pt does not like hospital food, but eating well on food that family is bringing in. Many snacks from home in room. Recorded intake of 50% breakfast yesterday and 75% breakfast 3/3. No recent wt hx in EMR, but weight does appear to be up from 2019. Will continue to monitor weight trends. Labs- reviewed. Meds- azithromycin, vit. D3, decadron, Lactinex. Intakes:  Patient Vitals for the past 168 hrs:   % Diet Eaten   03/05/21 0313 0 %   03/04/21 0935 50 %   03/04/21 0701 0 %   03/04/21 0444 0 %   03/03/21 2047 0 %   03/03/21 1931 0 %   03/03/21 1051 75 %   03/03/21 0010 0 %     Weight hx: Wt Readings from Last 10 Encounters:   03/02/21 126.3 kg (278 lb 7.1 oz)   02/26/21 126.3 kg (278 lb 7.1 oz)   08/20/19 100.2 kg (221 lb)   07/30/19 100.7 kg (222 lb)   07/08/19 103 kg (227 lb)   07/03/19 103 kg (227 lb)     Malnutrition Assessment:  Malnutrition Status: insufficient data    Estimated Daily Nutrient Needs:  Energy (kcal): 4672(9610 x 1.3 AF (-500)); Weight Used for Energy Requirements: Current  Protein (g): 63(1-1.2 g/kg IBW);  Weight Used for Protein Requirements: Ideal  Fluid (ml/day): 2031; Method Used for Fluid Requirements: 1 ml/kcal      Nutrition Related Findings:  last BM 3/4      Wounds:    None       Current Nutrition Therapies:  DIET CARDIAC Regular    Anthropometric Measures:  · Height:  5' 3\" (160 cm)  · Current Body Wt:  126.3 kg (278 lb 7.1 oz)   · Admission Body Wt:       · Usual Body Wt:        · Ideal Body Wt:  115 lbs:  242.1 %   · Adjusted Body Weight:   ; Weight Adjustment for: No adjustment · Adjusted BMI:       · BMI Category:  Obese class 3 (BMI 40.0 or greater)       Nutrition Diagnosis:   · No nutrition diagnosis at this time related to   as evidenced by        Nutrition Interventions:   Food and/or Nutrient Delivery: Continue current diet  Nutrition Education and Counseling: No recommendations at this time  Coordination of Nutrition Care: Continue to monitor while inpatient    Goals:  PO intake >50% meals next 3-5 days       Nutrition Monitoring and Evaluation:   Behavioral-Environmental Outcomes: None identified  Food/Nutrient Intake Outcomes: Food and nutrient intake  Physical Signs/Symptoms Outcomes: Weight, Biochemical data    Discharge Planning:    Continue current diet     Electronically signed by Brandon Gonzales RDN on 3/5/2021 at 2:35 PM    Contact: [phone]

## 2021-03-05 NOTE — PROGRESS NOTES
Bedside and Verbal shift change report given to [address] [place] [address] (oncoming nurse) by Amelia Lund (offgoing nurse). Report included the following information SBAR, Kardex, Intake/Output, MAR and Recent Results.

## 2021-03-06 LAB
ANION GAP SERPL CALC-SCNC: 7 MMOL/L (ref 5–15)
BNP SERPL-MCNC: 140 PG/ML
BUN SERPL-MCNC: 15 MG/DL (ref 6–20)
BUN/CREAT SERPL: 26 (ref 12–20)
CALCIUM SERPL-MCNC: 8.3 MG/DL (ref 8.5–10.1)
CHLORIDE SERPL-SCNC: 106 MMOL/L (ref 97–108)
CO2 SERPL-SCNC: 25 MMOL/L (ref 21–32)
CREAT SERPL-MCNC: 0.58 MG/DL (ref 0.55–1.02)
CRP SERPL-MCNC: 0.72 MG/DL (ref 0–0.6)
D DIMER PPP FEU-MCNC: 0.28 MG/L FEU (ref 0–0.65)
ERYTHROCYTE [DISTWIDTH] IN BLOOD BY AUTOMATED COUNT: 17 % (ref 11.5–14.5)
GLUCOSE SERPL-MCNC: 120 MG/DL (ref 65–100)
HCT VFR BLD AUTO: 35.7 % (ref 35–47)
HGB BLD-MCNC: 10.4 G/DL (ref 11.5–16)
MAGNESIUM SERPL-MCNC: 2.2 MG/DL (ref 1.6–2.4)
MCH RBC QN AUTO: 21.1 PG (ref 26–34)
MCHC RBC AUTO-ENTMCNC: 29.1 G/DL (ref 30–36.5)
MCV RBC AUTO: 72.3 FL (ref 80–99)
NRBC # BLD: 0 K/UL (ref 0–0.01)
NRBC BLD-RTO: 0 PER 100 WBC
PHOSPHATE SERPL-MCNC: 3 MG/DL (ref 2.6–4.7)
PLATELET # BLD AUTO: 349 K/UL (ref 150–400)
PMV BLD AUTO: 9.8 FL (ref 8.9–12.9)
POTASSIUM SERPL-SCNC: 4 MMOL/L (ref 3.5–5.1)
RBC # BLD AUTO: 4.94 M/UL (ref 3.8–5.2)
SODIUM SERPL-SCNC: 138 MMOL/L (ref 136–145)
WBC # BLD AUTO: 2.9 K/UL (ref 3.6–11)

## 2021-03-06 PROCEDURE — 74011250636 HC RX REV CODE- 250/636: Performed by: INTERNAL MEDICINE

## 2021-03-06 PROCEDURE — 36415 COLL VENOUS BLD VENIPUNCTURE: CPT

## 2021-03-06 PROCEDURE — 77030027138 HC INCENT SPIROMETER -A

## 2021-03-06 PROCEDURE — 85027 COMPLETE CBC AUTOMATED: CPT

## 2021-03-06 PROCEDURE — 85379 FIBRIN DEGRADATION QUANT: CPT

## 2021-03-06 PROCEDURE — 84100 ASSAY OF PHOSPHORUS: CPT

## 2021-03-06 PROCEDURE — 94640 AIRWAY INHALATION TREATMENT: CPT

## 2021-03-06 PROCEDURE — 94760 N-INVAS EAR/PLS OXIMETRY 1: CPT

## 2021-03-06 PROCEDURE — 83735 ASSAY OF MAGNESIUM: CPT

## 2021-03-06 PROCEDURE — 74011250637 HC RX REV CODE- 250/637: Performed by: INTERNAL MEDICINE

## 2021-03-06 PROCEDURE — 80048 BASIC METABOLIC PNL TOTAL CA: CPT

## 2021-03-06 PROCEDURE — 86140 C-REACTIVE PROTEIN: CPT

## 2021-03-06 PROCEDURE — 77010033678 HC OXYGEN DAILY

## 2021-03-06 PROCEDURE — 83880 ASSAY OF NATRIURETIC PEPTIDE: CPT

## 2021-03-06 PROCEDURE — 65660000000 HC RM CCU STEPDOWN

## 2021-03-06 PROCEDURE — 74011000250 HC RX REV CODE- 250: Performed by: INTERNAL MEDICINE

## 2021-03-06 RX ORDER — FAMOTIDINE 20 MG/1
20 TABLET, FILM COATED ORAL DAILY
Status: DISCONTINUED | OUTPATIENT
Start: 2021-03-06 | End: 2021-03-08 | Stop reason: HOSPADM

## 2021-03-06 RX ORDER — OXYCODONE HYDROCHLORIDE 5 MG/1
10 TABLET ORAL
Status: DISCONTINUED | OUTPATIENT
Start: 2021-03-06 | End: 2021-03-07

## 2021-03-06 RX ORDER — KETOROLAC TROMETHAMINE 30 MG/ML
30 INJECTION, SOLUTION INTRAMUSCULAR; INTRAVENOUS EVERY 6 HOURS
Status: COMPLETED | OUTPATIENT
Start: 2021-03-06 | End: 2021-03-07

## 2021-03-06 RX ORDER — ACETAMINOPHEN 325 MG/1
650 TABLET ORAL EVERY 6 HOURS
Status: DISCONTINUED | OUTPATIENT
Start: 2021-03-06 | End: 2021-03-07

## 2021-03-06 RX ADMIN — AZITHROMYCIN MONOHYDRATE 500 MG: 500 INJECTION, POWDER, LYOPHILIZED, FOR SOLUTION INTRAVENOUS at 21:33

## 2021-03-06 RX ADMIN — ENOXAPARIN SODIUM 40 MG: 40 INJECTION SUBCUTANEOUS at 21:36

## 2021-03-06 RX ADMIN — KETOROLAC TROMETHAMINE 30 MG: 30 INJECTION, SOLUTION INTRAMUSCULAR at 17:43

## 2021-03-06 RX ADMIN — Medication 10 ML: at 14:55

## 2021-03-06 RX ADMIN — OXYCODONE 10 MG: 5 TABLET ORAL at 21:38

## 2021-03-06 RX ADMIN — OXYCODONE 10 MG: 5 TABLET ORAL at 14:54

## 2021-03-06 RX ADMIN — GUAIFENESIN 600 MG: 600 TABLET ORAL at 21:39

## 2021-03-06 RX ADMIN — GUAIFENESIN 600 MG: 600 TABLET ORAL at 08:39

## 2021-03-06 RX ADMIN — ACETAMINOPHEN 650 MG: 325 TABLET ORAL at 17:43

## 2021-03-06 RX ADMIN — Medication 10 ML: at 21:38

## 2021-03-06 RX ADMIN — Medication 2000 UNITS: at 08:39

## 2021-03-06 RX ADMIN — CEFTRIAXONE 2 G: 2 INJECTION, POWDER, FOR SOLUTION INTRAMUSCULAR; INTRAVENOUS at 21:36

## 2021-03-06 RX ADMIN — OXYCODONE 5 MG: 5 TABLET ORAL at 08:53

## 2021-03-06 RX ADMIN — IPRATROPIUM BROMIDE AND ALBUTEROL 1 PUFF: 20; 100 SPRAY, METERED RESPIRATORY (INHALATION) at 07:50

## 2021-03-06 RX ADMIN — LACTOBACILLUS ACIDOPHILUS / LACTOBACILLUS BULGARICUS 1 PACKET: 100 MILLION CFU STRENGTH GRANULES at 17:43

## 2021-03-06 RX ADMIN — LACTOBACILLUS ACIDOPHILUS / LACTOBACILLUS BULGARICUS 1 PACKET: 100 MILLION CFU STRENGTH GRANULES at 08:39

## 2021-03-06 RX ADMIN — IPRATROPIUM BROMIDE AND ALBUTEROL 1 PUFF: 20; 100 SPRAY, METERED RESPIRATORY (INHALATION) at 14:55

## 2021-03-06 RX ADMIN — ENOXAPARIN SODIUM 40 MG: 40 INJECTION SUBCUTANEOUS at 08:39

## 2021-03-06 RX ADMIN — IPRATROPIUM BROMIDE AND ALBUTEROL 1 PUFF: 20; 100 SPRAY, METERED RESPIRATORY (INHALATION) at 21:40

## 2021-03-06 RX ADMIN — ACETAMINOPHEN 650 MG: 325 TABLET ORAL at 12:02

## 2021-03-06 RX ADMIN — KETOROLAC TROMETHAMINE 30 MG: 30 INJECTION, SOLUTION INTRAMUSCULAR at 12:02

## 2021-03-06 RX ADMIN — DEXAMETHASONE SODIUM PHOSPHATE 6 MG: 4 INJECTION, SOLUTION INTRA-ARTICULAR; INTRALESIONAL; INTRAMUSCULAR; INTRAVENOUS; SOFT TISSUE at 21:39

## 2021-03-06 RX ADMIN — FAMOTIDINE 20 MG: 20 TABLET ORAL at 21:38

## 2021-03-06 NOTE — PROGRESS NOTES
Bedside, Verbal and Written shift change report given to [address] (oncoming nurse) by Rowan Recio RN (offgoing nurse). Report included the following information SBAR, Kardex, ED Summary, Procedure Summary, Intake/Output, MAR, Recent Results and Med Rec Status.

## 2021-03-06 NOTE — PROGRESS NOTES
Was on the phone with Jose Ramon Fagan #[id number] the interpretor explaining medications patient stated her upper abdomen was hurting gave dilaudid afterward patient stated she can't see per interpretor she is seeing black advised patient will let the doctor know she then stated she can see again which lasted for about 3 minutes. Advised will be holding dilaudid if in pain again. Patient then ask what she will get if she is in pain again advise she has oxy advised patient she can unplug iv pole and take into the bathroom no need to call.

## 2021-03-06 NOTE — PROGRESS NOTES
Bedside shift change report given to Rowan Recio RN (oncoming nurse) by Juany Garcia RN (offgoing nurse). Report included the following information SBAR, Kardex, Intake/Output and MAR.

## 2021-03-07 LAB
ANION GAP SERPL CALC-SCNC: 6 MMOL/L (ref 5–15)
BASOPHILS # BLD: 0 K/UL (ref 0–0.1)
BASOPHILS NFR BLD: 0 % (ref 0–1)
BUN SERPL-MCNC: 26 MG/DL (ref 6–20)
BUN/CREAT SERPL: 36 (ref 12–20)
CALCIUM SERPL-MCNC: 8.9 MG/DL (ref 8.5–10.1)
CHLORIDE SERPL-SCNC: 106 MMOL/L (ref 97–108)
CO2 SERPL-SCNC: 23 MMOL/L (ref 21–32)
CREAT SERPL-MCNC: 0.73 MG/DL (ref 0.55–1.02)
DIFFERENTIAL METHOD BLD: ABNORMAL
EOSINOPHIL # BLD: 0 K/UL (ref 0–0.4)
EOSINOPHIL NFR BLD: 0 % (ref 0–7)
ERYTHROCYTE [DISTWIDTH] IN BLOOD BY AUTOMATED COUNT: 16.9 % (ref 11.5–14.5)
GLUCOSE SERPL-MCNC: 121 MG/DL (ref 65–100)
HCT VFR BLD AUTO: 35.5 % (ref 35–47)
HGB BLD-MCNC: 10.3 G/DL (ref 11.5–16)
IMM GRANULOCYTES # BLD AUTO: 0.1 K/UL (ref 0–0.04)
IMM GRANULOCYTES NFR BLD AUTO: 3 % (ref 0–0.5)
LYMPHOCYTES # BLD: 1.4 K/UL (ref 0.8–3.5)
LYMPHOCYTES NFR BLD: 32 % (ref 12–49)
M TB IFN-G BLD-IMP: NEGATIVE
MCH RBC QN AUTO: 21.1 PG (ref 26–34)
MCHC RBC AUTO-ENTMCNC: 29 G/DL (ref 30–36.5)
MCV RBC AUTO: 72.6 FL (ref 80–99)
MONOCYTES # BLD: 0.3 K/UL (ref 0–1)
MONOCYTES NFR BLD: 6 % (ref 5–13)
NEUTS SEG # BLD: 2.7 K/UL (ref 1.8–8)
NEUTS SEG NFR BLD: 59 % (ref 32–75)
NRBC # BLD: 0 K/UL (ref 0–0.01)
NRBC BLD-RTO: 0 PER 100 WBC
PLATELET # BLD AUTO: 364 K/UL (ref 150–400)
PMV BLD AUTO: 9.4 FL (ref 8.9–12.9)
POTASSIUM SERPL-SCNC: 4.1 MMOL/L (ref 3.5–5.1)
QUANTIFERON CRITERIA, QFI1T: NORMAL
QUANTIFERON MITOGEN VALUE: 4.48 IU/ML
QUANTIFERON NIL VALUE: 0.09 IU/ML
QUANTIFERON TB1 AG: 0.09 IU/ML
QUANTIFERON TB2 AG: 0.09 IU/ML
RBC # BLD AUTO: 4.89 M/UL (ref 3.8–5.2)
RBC MORPH BLD: ABNORMAL
SODIUM SERPL-SCNC: 135 MMOL/L (ref 136–145)
WBC # BLD AUTO: 4.5 K/UL (ref 3.6–11)

## 2021-03-07 PROCEDURE — 74011250636 HC RX REV CODE- 250/636: Performed by: INTERNAL MEDICINE

## 2021-03-07 PROCEDURE — 36415 COLL VENOUS BLD VENIPUNCTURE: CPT

## 2021-03-07 PROCEDURE — 74011250637 HC RX REV CODE- 250/637: Performed by: INTERNAL MEDICINE

## 2021-03-07 PROCEDURE — 65660000000 HC RM CCU STEPDOWN

## 2021-03-07 PROCEDURE — 85025 COMPLETE CBC W/AUTO DIFF WBC: CPT

## 2021-03-07 PROCEDURE — 94640 AIRWAY INHALATION TREATMENT: CPT

## 2021-03-07 PROCEDURE — 80048 BASIC METABOLIC PNL TOTAL CA: CPT

## 2021-03-07 PROCEDURE — 74011000250 HC RX REV CODE- 250: Performed by: INTERNAL MEDICINE

## 2021-03-07 RX ORDER — PSEUDOEPHED/ACETAMINOPHEN/CPM 30-500-2MG
2 TABLET ORAL ONCE
Status: COMPLETED | OUTPATIENT
Start: 2021-03-07 | End: 2021-03-07

## 2021-03-07 RX ORDER — DIPHENOXYLATE HCL/ATROPINE 2.5-.025/5
5 LIQUID (ML) ORAL ONCE
Status: DISCONTINUED | OUTPATIENT
Start: 2021-03-07 | End: 2021-03-07

## 2021-03-07 RX ORDER — LOPERAMIDE HCL 2 MG
2 TABLET ORAL
Qty: 10 TAB | Refills: 0 | Status: SHIPPED | OUTPATIENT
Start: 2021-03-07 | End: 2021-03-17

## 2021-03-07 RX ORDER — ONDANSETRON 8 MG/1
8 TABLET, ORALLY DISINTEGRATING ORAL
Qty: 10 TAB | Refills: 0 | Status: SHIPPED | OUTPATIENT
Start: 2021-03-07 | End: 2022-06-20

## 2021-03-07 RX ORDER — SODIUM CHLORIDE 9 MG/ML
75 INJECTION, SOLUTION INTRAVENOUS CONTINUOUS
Status: DISCONTINUED | OUTPATIENT
Start: 2021-03-07 | End: 2021-03-07

## 2021-03-07 RX ORDER — KETOROLAC TROMETHAMINE 30 MG/ML
30 INJECTION, SOLUTION INTRAMUSCULAR; INTRAVENOUS EVERY 6 HOURS
Status: DISCONTINUED | OUTPATIENT
Start: 2021-03-07 | End: 2021-03-08 | Stop reason: HOSPADM

## 2021-03-07 RX ORDER — AZITHROMYCIN 500 MG/1
500 TABLET, FILM COATED ORAL DAILY
Qty: 3 TAB | Refills: 0 | Status: SHIPPED | OUTPATIENT
Start: 2021-03-07 | End: 2021-03-10

## 2021-03-07 RX ORDER — HYDROCODONE BITARTRATE AND ACETAMINOPHEN 7.5; 325 MG/1; MG/1
1 TABLET ORAL ONCE
Status: COMPLETED | OUTPATIENT
Start: 2021-03-07 | End: 2021-03-07

## 2021-03-07 RX ORDER — ALBUTEROL SULFATE 90 UG/1
2 AEROSOL, METERED RESPIRATORY (INHALATION)
Qty: 1 INHALER | Refills: 0 | Status: SHIPPED | OUTPATIENT
Start: 2021-03-07 | End: 2022-06-20

## 2021-03-07 RX ORDER — SODIUM CHLORIDE 9 MG/ML
75 INJECTION, SOLUTION INTRAVENOUS CONTINUOUS
Status: DISCONTINUED | OUTPATIENT
Start: 2021-03-07 | End: 2021-03-08 | Stop reason: HOSPADM

## 2021-03-07 RX ORDER — DEXAMETHASONE 6 MG/1
6 TABLET ORAL
Qty: 5 TAB | Refills: 0 | Status: SHIPPED | OUTPATIENT
Start: 2021-03-07 | End: 2021-03-12

## 2021-03-07 RX ORDER — HYDROCODONE BITARTRATE AND ACETAMINOPHEN 7.5; 325 MG/1; MG/1
1 TABLET ORAL 2 TIMES DAILY
Qty: 20 TAB | Refills: 0 | Status: SHIPPED | OUTPATIENT
Start: 2021-03-07 | End: 2021-03-15

## 2021-03-07 RX ORDER — CEFDINIR 300 MG/1
300 CAPSULE ORAL 2 TIMES DAILY
Qty: 6 CAP | Refills: 0 | Status: SHIPPED | OUTPATIENT
Start: 2021-03-07 | End: 2021-03-10

## 2021-03-07 RX ADMIN — LACTOBACILLUS ACIDOPHILUS / LACTOBACILLUS BULGARICUS 1 PACKET: 100 MILLION CFU STRENGTH GRANULES at 18:10

## 2021-03-07 RX ADMIN — IPRATROPIUM BROMIDE AND ALBUTEROL 1 PUFF: 20; 100 SPRAY, METERED RESPIRATORY (INHALATION) at 01:00

## 2021-03-07 RX ADMIN — IPRATROPIUM BROMIDE AND ALBUTEROL 1 PUFF: 20; 100 SPRAY, METERED RESPIRATORY (INHALATION) at 14:32

## 2021-03-07 RX ADMIN — MECLIZINE HYDROCHLORIDE 25 MG: 25 TABLET ORAL at 21:34

## 2021-03-07 RX ADMIN — Medication 2000 UNITS: at 09:16

## 2021-03-07 RX ADMIN — GUAIFENESIN 600 MG: 600 TABLET ORAL at 21:34

## 2021-03-07 RX ADMIN — KETOROLAC TROMETHAMINE 30 MG: 30 INJECTION, SOLUTION INTRAMUSCULAR at 00:58

## 2021-03-07 RX ADMIN — CEFTRIAXONE 2 G: 2 INJECTION, POWDER, FOR SOLUTION INTRAMUSCULAR; INTRAVENOUS at 21:34

## 2021-03-07 RX ADMIN — DEXAMETHASONE SODIUM PHOSPHATE 6 MG: 4 INJECTION, SOLUTION INTRA-ARTICULAR; INTRALESIONAL; INTRAMUSCULAR; INTRAVENOUS; SOFT TISSUE at 21:32

## 2021-03-07 RX ADMIN — SODIUM CHLORIDE 75 ML/HR: 9 INJECTION, SOLUTION INTRAVENOUS at 17:00

## 2021-03-07 RX ADMIN — GUAIFENESIN 600 MG: 600 TABLET ORAL at 09:16

## 2021-03-07 RX ADMIN — LACTOBACILLUS ACIDOPHILUS / LACTOBACILLUS BULGARICUS 1 PACKET: 100 MILLION CFU STRENGTH GRANULES at 09:16

## 2021-03-07 RX ADMIN — ACETAMINOPHEN 650 MG: 325 TABLET ORAL at 05:44

## 2021-03-07 RX ADMIN — IPRATROPIUM BROMIDE AND ALBUTEROL 1 PUFF: 20; 100 SPRAY, METERED RESPIRATORY (INHALATION) at 08:29

## 2021-03-07 RX ADMIN — KETOROLAC TROMETHAMINE 30 MG: 30 INJECTION, SOLUTION INTRAMUSCULAR at 12:26

## 2021-03-07 RX ADMIN — KETOROLAC TROMETHAMINE 30 MG: 30 INJECTION, SOLUTION INTRAMUSCULAR at 05:44

## 2021-03-07 RX ADMIN — ACETAMINOPHEN 650 MG: 325 TABLET ORAL at 00:58

## 2021-03-07 RX ADMIN — ENOXAPARIN SODIUM 40 MG: 40 INJECTION SUBCUTANEOUS at 21:34

## 2021-03-07 RX ADMIN — KETOROLAC TROMETHAMINE 30 MG: 30 INJECTION, SOLUTION INTRAMUSCULAR at 18:10

## 2021-03-07 RX ADMIN — SODIUM CHLORIDE 75 ML/HR: 9 INJECTION, SOLUTION INTRAVENOUS at 12:28

## 2021-03-07 RX ADMIN — Medication 10 ML: at 21:35

## 2021-03-07 RX ADMIN — MECLIZINE HYDROCHLORIDE 25 MG: 25 TABLET ORAL at 14:02

## 2021-03-07 RX ADMIN — Medication 10 ML: at 01:00

## 2021-03-07 RX ADMIN — LOPERAMIDE HYDROCHLORIDE 2 MG: 2 SOLUTION ORAL at 12:26

## 2021-03-07 RX ADMIN — HYDROCODONE BITARTRATE AND ACETAMINOPHEN 1 TABLET: 7.5; 325 TABLET ORAL at 12:26

## 2021-03-07 RX ADMIN — ENOXAPARIN SODIUM 40 MG: 40 INJECTION SUBCUTANEOUS at 09:16

## 2021-03-07 RX ADMIN — OXYCODONE 10 MG: 5 TABLET ORAL at 09:16

## 2021-03-07 RX ADMIN — IPRATROPIUM BROMIDE AND ALBUTEROL 1 PUFF: 20; 100 SPRAY, METERED RESPIRATORY (INHALATION) at 20:57

## 2021-03-07 RX ADMIN — AZITHROMYCIN MONOHYDRATE 500 MG: 500 INJECTION, POWDER, LYOPHILIZED, FOR SOLUTION INTRAVENOUS at 21:34

## 2021-03-07 NOTE — PROGRESS NOTES
Bedside, Verbal and Written shift change report given to [address] (oncoming nurse) by Kory Sadler RN (offgoing nurse). Report included the following information SBAR, Kardex, ED Summary, Procedure Summary, Intake/Output, MAR, Recent Results and Med Rec Status.

## 2021-03-07 NOTE — PROGRESS NOTES
Pablo Ayers [address]  [address] [facility], [address] [place] [address]  [phone]      Medical Progress Note      NAME: Matt Dean   :  1990  MRM:  [id number]    Date/Time of service: 3/7/2021  12:24 PM       Subjective:     Chief Complaint:  Patient was personally seen and examined by me during this time period. Chart reviewed    Pt continues to have pain in her chest wall. Also reports dizziness with movement of her head. Reluctant to d/c as she has children at home she will have to care for and she doesn't think she will get rest        Objective:       Vitals:       Last 24hrs VS reviewed since prior progress note. Most recent are:    Visit Vitals  /75 (BP Patient Position: Supine)   Pulse 67   Temp 97.5 °F (36.4 °C)   Resp 17   Ht 5' 3\" (1.6 m)   Wt 126.3 kg (278 lb 7.1 oz)   SpO2 95%   BMI 49.32 kg/m²     SpO2 Readings from Last 6 Encounters:   21 95%   21 96%   21 97%   19 99%   19 98%   19 95%    O2 Flow Rate (L/min): 2 l/min       Intake/Output Summary (Last 24 hours) at 3/7/2021 1648  Last data filed at 3/6/2021 2136  Gross per 24 hour   Intake 770 ml   Output    Net 770 ml        Exam:     Physical Exam:    Gen:  Well-developed, well-nourished, morbidly obese, mild distress  HEENT:  Pink conjunctivae, PERRL, hearing intact to voice, moist mucous membranes  Neck:  Supple, without masses, thyroid non-tender  Resp:  No accessory muscle use, coarse BS  Card:  No murmurs, normal S1, S2 without thrills, bruits or peripheral edema  Abd:  Soft, non-tender, non-distended, normoactive bowel sounds are present  Musc:  No cyanosis or clubbing  Skin:  No rashes  Neuro:  Cranial nerves 3-12 are grossly intact, follows commands appropriately  Psych: Chinese speaking.  Flat affect  MSK tenderness over the intercostal muscles    Medications Reviewed: (see below)    Lab Data Reviewed: (see below)    ______________________________________________________________________    Medications:     Current Facility-Administered Medications   Medication Dose Route Frequency    0.9% sodium chloride infusion  75 mL/hr IntraVENous CONTINUOUS    famotidine (PEPCID) tablet 20 mg  20 mg Oral DAILY    meclizine (ANTIVERT) tablet 25 mg  25 mg Oral Q6H PRN    cefTRIAXone (ROCEPHIN) 2 g in sterile water (preservative free) 20 mL IV syringe  2 g IntraVENous Q24H    ipratropium-albuterol (COMBIVENT RESPIMAT) 20 mcg-100 mcg inhalation spray  1 Puff Inhalation Q6H RT    prochlorperazine (COMPAZINE) injection 10 mg  10 mg IntraVENous Q6H PRN    lactobacillus-acidophilus (LACTINEX) 1 Packet  1 Packet Oral BID    sodium chloride (NS) flush 5-40 mL  5-40 mL IntraVENous Q8H    sodium chloride (NS) flush 5-40 mL  5-40 mL IntraVENous PRN    polyethylene glycol (MIRALAX) packet 17 g  17 g Oral DAILY PRN    ondansetron (ZOFRAN) injection 4 mg  4 mg IntraVENous Q6H PRN    guaiFENesin ER (MUCINEX) tablet 600 mg  600 mg Oral Q12H    dexamethasone (DECADRON) 4 mg/mL injection 6 mg  6 mg IntraVENous Q24H    cholecalciferol (VITAMIN D3) (1000 Units /25 mcg) tablet 2,000 Units  2,000 Units Oral DAILY    azithromycin (ZITHROMAX) 500 mg in 0.9% sodium chloride 250 mL (VIAL-MATE)  500 mg IntraVENous Q24H    enoxaparin (LOVENOX) injection 40 mg  40 mg SubCUTAneous Q12H          Lab Review:     Recent Labs     03/07/21  0046 03/06/21  0640 03/05/21  0438   WBC 4.5 2.9* 2.7*   HGB 10.3* 10.4* 10.0*   HCT 35.5 35.7 35.4    349 362     Recent Labs     03/07/21  0046 03/06/21  0640 03/05/21  0438   * 138 139   K 4.1 4.0 4.0    106 106   CO2 23 25 27   * 120* 109*   BUN 26* 15 20   CREA 0.73 0.58 0.72   CA 8.9 8.3* 8.9   MG  --  2.2 2.3   PHOS  --  3.0 4.0     No results found for: GLUCPOC       Assessment / Plan:   30 yo hx of gestational HTN, presented w/ hypoxia, COVID PNA    1) Acute resp failure/hypoxia: due to COVID. Chest CT negative for PE   -OOB  -incentive spirometry   -see management of COVID PNA below  -continue toradol for MSK pain     2) Pneumonia due to COVID-19: looks worse on chest CT from 03/04. Out of window for Remdesivir. S/p Actemra  -on IV CTX/azithro, dexamethasone, Vit C/Zinc    3) Chest pain/abd pain/dizziness/diarrhea: likely from [address]   -incentive spirometry  -encouraged to get OOB pt's obesity, lack of movement is a set up for decompensation. She was counseled on this     4) Hx of gestational HTN: BP stable, will monitor     5) Dispo: passed 6 minute walk.  Will d/c in the AM if AM labs WNL     Total time spent with patient: 35 min     **I personally saw and examined the patient during this time period**                 Care Plan discussed with: Patient, nursing,  service     Discussed:  Care Plan    Prophylaxis:  Lovenox    Disposition:  Home w/Family           ___________________________________________________    Attending Physician: Luis Hart MD

## 2021-03-07 NOTE — PROGRESS NOTES
3/7/2021  Case Management Note    3:31 PM  Noted consult for PCP appointment, I have sent an email to [facility] (specialist) explaining that patient is self-pay and may benefit from one of the free clinics if she can get an appointment in a week. I also suggested Family Practice as [facility] [employer] CM informed me that they will take self-pay/citlalli. Noted that patient does not need home O2.      Sayda Meek BS

## 2021-03-07 NOTE — PROGRESS NOTES
Problem: Airway Clearance - Ineffective  Goal: *Patent airway  Outcome: Progressing Towards Goal  Goal: *Absence of airway secretions  Outcome: Progressing Towards Goal  Goal: *Able to cough effectively  Outcome: Progressing Towards Goal  Goal: *PALLIATIVE CARE:  Alleviation of secretions, cough and/or nasal congestion  Outcome: Progressing Towards Goal     Problem: Patient Education: Go to Patient Education Activity  Goal: Patient/Family Education  Outcome: Progressing Towards Goal     Problem: Pressure Injury - Risk of  Goal: *Prevention of pressure injury  Description: Document Saw Scale and appropriate interventions in the flowsheet. Outcome: Progressing Towards Goal  Note: Pressure Injury Interventions:             Activity Interventions: Increase time out of bed    Mobility Interventions: HOB 30 degrees or less    Nutrition Interventions: Document food/fluid/supplement intake    Friction and Shear Interventions: HOB 30 degrees or less

## 2021-03-07 NOTE — PROGRESS NOTES
Home O2 eval - patient stated she felt dizzy, unable to complete home evaluation Patient's SPO2 100%/HR 54 at rest. Spoke with patient's nurse to advise and rn will advise me once patient is feeling better to try home eval again

## 2021-03-07 NOTE — PROGRESS NOTES
Pt educated on the importance of getting out of bed and sitting up in the chair. Pt ambulated in room and sat up in the chair for lunch and most of the afternoon. Pt educated on the importance of using the incentive spirometer and demonstrated how to properly use it. With the assistance of an  the pt was educated to use the IS 10 times every waking hour. Pt verbalized understanding. Pt was also taken off of O2. Pt was educated that her O2 saturations are good and the O2 is not necessary. Pt educated to call nurse if she feels SOB. Pt verbalized understanding.

## 2021-03-07 NOTE — PROGRESS NOTES
03/07/21 1445 03/07/21 1447 03/07/21 1449   RT Walking Oximetry   Stage Resting (Room Air) During Walk (Room Air) During Walk (Room Air)   SpO2 98 % 95 % 96 %   HR 56 bpm 75 bpm 77 bpm   Rate of Dyspnea 0 0 0   Symptoms  --  Dizziness Dizziness   O2 Device None (Room air) None (Room air) None (Room air)   FIO2 (%) 21 % 21 % 21 %   Walk/Assistive Device  --  Ambulation Ambulation      03/07/21 1452   RT Walking Oximetry   Stage After Walk   SpO2 97 %   HR 69 bpm   Rate of Dyspnea 0   Symptoms Dizziness   O2 Device None (Room air)   FIO2 (%) 21 %   Walk/Assistive Device  --

## 2021-03-07 NOTE — PROGRESS NOTES
Pablo Ayers [facility] [address]  [address] [place] [address]  [phone]      Medical Progress Note      NAME: Esperanza Sofia   :  1990  MRM:  [id number]    Date/Time of service: 3/6/2021  12:24 PM       Subjective:     Chief Complaint:  Patient was personally seen and examined by me during this time period. Chart reviewed    Pt with MSK pain of the chest wall as well as pleuritic pain with inspiration        Objective:       Vitals:       Last 24hrs VS reviewed since prior progress note. Most recent are:    Visit Vitals  BP (!) 140/81 (BP 1 Location: Right upper arm, BP Patient Position: At rest)   Pulse 64   Temp 97.8 °F (36.6 °C)   Resp 18   Ht 5' 3\" (1.6 m)   Wt 126.3 kg (278 lb 7.1 oz)   SpO2 97%   BMI 49.32 kg/m²     SpO2 Readings from Last 6 Encounters:   21 97%   21 96%   21 97%   19 99%   19 98%   19 95%    O2 Flow Rate (L/min): 2 l/min     No intake or output data in the 24 hours ending 21     Exam:     Physical Exam:    Gen:  Well-developed, well-nourished, morbidly obese, mild distress  HEENT:  Pink conjunctivae, PERRL, hearing intact to voice, moist mucous membranes  Neck:  Supple, without masses, thyroid non-tender  Resp:  No accessory muscle use, coarse BS  Card:  No murmurs, normal S1, S2 without thrills, bruits or peripheral edema  Abd:  Soft, non-tender, non-distended, normoactive bowel sounds are present  Musc:  No cyanosis or clubbing  Skin:  No rashes  Neuro:  Cranial nerves 3-12 are grossly intact, follows commands appropriately  Psych: Indonesian speaking.  Flat affect  MSK tenderness over the intercostal muscles    Medications Reviewed: (see below)    Lab Data Reviewed: (see below)    ______________________________________________________________________    Medications:     Current Facility-Administered Medications   Medication Dose Route Frequency    acetaminophen (TYLENOL) tablet 650 mg  650 mg Oral Q6H    ketorolac (TORADOL) injection 30 mg  30 mg IntraVENous Q6H    oxyCODONE IR (ROXICODONE) tablet 10 mg  10 mg Oral Q4H PRN    meclizine (ANTIVERT) tablet 25 mg  25 mg Oral Q6H PRN    cefTRIAXone (ROCEPHIN) 2 g in sterile water (preservative free) 20 mL IV syringe  2 g IntraVENous Q24H    ipratropium-albuterol (COMBIVENT RESPIMAT) 20 mcg-100 mcg inhalation spray  1 Puff Inhalation Q6H RT    oxyCODONE IR (ROXICODONE) tablet 5 mg  5 mg Oral Q4H PRN    prochlorperazine (COMPAZINE) injection 10 mg  10 mg IntraVENous Q6H PRN    lactobacillus-acidophilus (LACTINEX) 1 Packet  1 Packet Oral BID    sodium chloride (NS) flush 5-40 mL  5-40 mL IntraVENous Q8H    sodium chloride (NS) flush 5-40 mL  5-40 mL IntraVENous PRN    polyethylene glycol (MIRALAX) packet 17 g  17 g Oral DAILY PRN    ondansetron (ZOFRAN) injection 4 mg  4 mg IntraVENous Q6H PRN    guaiFENesin ER (MUCINEX) tablet 600 mg  600 mg Oral Q12H    dexamethasone (DECADRON) 4 mg/mL injection 6 mg  6 mg IntraVENous Q24H    cholecalciferol (VITAMIN D3) (1000 Units /25 mcg) tablet 2,000 Units  2,000 Units Oral DAILY    azithromycin (ZITHROMAX) 500 mg in 0.9% sodium chloride 250 mL (VIAL-MATE)  500 mg IntraVENous Q24H    enoxaparin (LOVENOX) injection 40 mg  40 mg SubCUTAneous Q12H          Lab Review:     Recent Labs     03/06/21  0640 03/05/21 0438 03/04/21 0413   WBC 2.9* 2.7* 1.9*   HGB 10.4* 10.0* 10.3*   HCT 35.7 35.4 35.5    362 305     Recent Labs     03/06/21  0640 03/05/21 0438 03/04/21 0413    139 138   K 4.0 4.0 4.1    106 107   CO2 25 27 28   * 109* 107*   BUN 15 20 14   CREA 0.58 0.72 0.60   CA 8.3* 8.9 8.9   MG 2.2 2.3 2.5*   PHOS 3.0 4.0 3.6   ALB  --   --  2.5*   TBILI  --   --  0.2   ALT  --   --  44     No results found for: GLUCPOC       Assessment / Plan:   30 yo hx of gestational HTN, presented w/ hypoxia, COVID PNA    1) Acute resp failure/hypoxia: due to COVID.   Chest CT negative for PE -OOB  -incentive spirometry   -see management of COVID PNA below  -add toradol as splinting     2) Pneumonia due to COVID-19: looks worse on chest CT from 03/04. Out of window for Remdesivir. S/p Actemra  -on IV CTX/azithro, dexamethasone, Vit C/Zinc    3) Chest pain/abd pain/dizziness/diarrhea: likely from COVID  -IV toradol   -schedule tylenol   -incentive spirometry  -encouraged to get OOB pt's obesity, lack of movement is a set up for decompensation.  She was counseled on this     4) Hx of gestational HTN: BP stable, will monitor     Total time spent with patient: 35 min     **I personally saw and examined the patient during this time period**                 Care Plan discussed with: Patient, nursing,  service     Discussed:  Care Plan    Prophylaxis:  Lovenox    Disposition:  Home w/Family           ___________________________________________________    Attending Physician: Leslee Gilbert MD

## 2021-03-08 VITALS
DIASTOLIC BLOOD PRESSURE: 83 MMHG | WEIGHT: 278.44 LBS | HEART RATE: 82 BPM | RESPIRATION RATE: 20 BRPM | SYSTOLIC BLOOD PRESSURE: 144 MMHG | TEMPERATURE: 97.7 F | OXYGEN SATURATION: 98 % | BODY MASS INDEX: 49.34 KG/M2 | HEIGHT: 63 IN

## 2021-03-08 LAB
ANION GAP SERPL CALC-SCNC: 9 MMOL/L (ref 5–15)
BASOPHILS # BLD: 0 K/UL (ref 0–0.1)
BASOPHILS NFR BLD: 0 % (ref 0–1)
BUN SERPL-MCNC: 25 MG/DL (ref 6–20)
BUN/CREAT SERPL: 37 (ref 12–20)
CALCIUM SERPL-MCNC: 8.4 MG/DL (ref 8.5–10.1)
CHLORIDE SERPL-SCNC: 109 MMOL/L (ref 97–108)
CO2 SERPL-SCNC: 16 MMOL/L (ref 21–32)
CREAT SERPL-MCNC: 0.68 MG/DL (ref 0.55–1.02)
DIFFERENTIAL METHOD BLD: ABNORMAL
EOSINOPHIL # BLD: 0.1 K/UL (ref 0–0.4)
EOSINOPHIL NFR BLD: 2 % (ref 0–7)
ERYTHROCYTE [DISTWIDTH] IN BLOOD BY AUTOMATED COUNT: 17.3 % (ref 11.5–14.5)
GLUCOSE SERPL-MCNC: 96 MG/DL (ref 65–100)
HCT VFR BLD AUTO: 34.7 % (ref 35–47)
HGB BLD-MCNC: 10 G/DL (ref 11.5–16)
IMM GRANULOCYTES # BLD AUTO: 0 K/UL
IMM GRANULOCYTES NFR BLD AUTO: 0 %
LYMPHOCYTES # BLD: 1.7 K/UL (ref 0.8–3.5)
LYMPHOCYTES NFR BLD: 31 % (ref 12–49)
MCH RBC QN AUTO: 21.1 PG (ref 26–34)
MCHC RBC AUTO-ENTMCNC: 28.8 G/DL (ref 30–36.5)
MCV RBC AUTO: 73.2 FL (ref 80–99)
METAMYELOCYTES NFR BLD MANUAL: 1 %
MONOCYTES # BLD: 0.5 K/UL (ref 0–1)
MONOCYTES NFR BLD: 9 % (ref 5–13)
NEUTS SEG # BLD: 3.1 K/UL (ref 1.8–8)
NEUTS SEG NFR BLD: 57 % (ref 32–75)
NRBC # BLD: 0 K/UL (ref 0–0.01)
NRBC BLD-RTO: 0 PER 100 WBC
PLATELET # BLD AUTO: 350 K/UL (ref 150–400)
PMV BLD AUTO: 9.9 FL (ref 8.9–12.9)
POTASSIUM SERPL-SCNC: 4.3 MMOL/L (ref 3.5–5.1)
RBC # BLD AUTO: 4.74 M/UL (ref 3.8–5.2)
RBC MORPH BLD: ABNORMAL
RBC MORPH BLD: ABNORMAL
SODIUM SERPL-SCNC: 134 MMOL/L (ref 136–145)
WBC # BLD AUTO: 5.4 K/UL (ref 3.6–11)

## 2021-03-08 PROCEDURE — 74011250636 HC RX REV CODE- 250/636: Performed by: INTERNAL MEDICINE

## 2021-03-08 PROCEDURE — 74011250637 HC RX REV CODE- 250/637: Performed by: INTERNAL MEDICINE

## 2021-03-08 PROCEDURE — 36415 COLL VENOUS BLD VENIPUNCTURE: CPT

## 2021-03-08 PROCEDURE — 85025 COMPLETE CBC W/AUTO DIFF WBC: CPT

## 2021-03-08 PROCEDURE — 80048 BASIC METABOLIC PNL TOTAL CA: CPT

## 2021-03-08 RX ORDER — MECLIZINE HYDROCHLORIDE 25 MG/1
25 TABLET ORAL
Qty: 10 TAB | Refills: 0 | Status: SHIPPED | OUTPATIENT
Start: 2021-03-08 | End: 2021-03-18

## 2021-03-08 RX ADMIN — LACTOBACILLUS ACIDOPHILUS / LACTOBACILLUS BULGARICUS 1 PACKET: 100 MILLION CFU STRENGTH GRANULES at 09:46

## 2021-03-08 RX ADMIN — KETOROLAC TROMETHAMINE 30 MG: 30 INJECTION, SOLUTION INTRAMUSCULAR at 00:33

## 2021-03-08 RX ADMIN — Medication 10 ML: at 00:33

## 2021-03-08 RX ADMIN — KETOROLAC TROMETHAMINE 30 MG: 30 INJECTION, SOLUTION INTRAMUSCULAR at 05:35

## 2021-03-08 RX ADMIN — Medication 2000 UNITS: at 09:46

## 2021-03-08 RX ADMIN — ENOXAPARIN SODIUM 40 MG: 40 INJECTION SUBCUTANEOUS at 09:47

## 2021-03-08 RX ADMIN — IPRATROPIUM BROMIDE AND ALBUTEROL 1 PUFF: 20; 100 SPRAY, METERED RESPIRATORY (INHALATION) at 05:35

## 2021-03-08 RX ADMIN — FAMOTIDINE 20 MG: 20 TABLET ORAL at 09:46

## 2021-03-08 RX ADMIN — SODIUM CHLORIDE 75 ML/HR: 9 INJECTION, SOLUTION INTRAVENOUS at 00:37

## 2021-03-08 RX ADMIN — GUAIFENESIN 600 MG: 600 TABLET ORAL at 09:46

## 2021-03-08 NOTE — PROGRESS NOTES
Bedside, Verbal and Written shift change report given to Nurse RN (oncoming nurse) by Henny Belle (offgoing nurse). Report included the following information SBAR, Kardex, ED Summary, Procedure Summary, Intake/Output, MAR, Recent Results and Med Rec Status.

## 2021-03-08 NOTE — PROGRESS NOTES
Hospital follow-up new PCP transitional care appointment has been scheduled with Neo Wiley  for Friday, 3/19/21 at 8:30 a.m  Pending patient discharge.   Thaddeus Gotti, Care Management Specialist.

## 2021-03-08 NOTE — PROGRESS NOTES
Pulmonary, Critical Care, and Sleep Medicine~Consult Note    Name: Carlos Howard MRN: [id number]   : 1990 Hospital: [facility]   Date: 3/8/2021 \ Admission: 3/2/2021     Impression Plan   1. Acute hypoxemic respiratory failure; resolved  2. covid 19 pneumonia 1. supp o2 prn for goal sats>90%; now on RA  2.  continue w/ dex  3. She is outside the window for Remdesivir. 4. S/p Actemra (discussed with patient and she gives consent) on 3/3  5. Trend inflammatory markers  6. combivent  7. lovenox ppx  8. Continue empiric abx to complete 5 day course  9. Pain control per primary team    Okay from pulmonary perspective for discharge. We will arrange for outpatient follow-up in 1 week via telemedicine. Please call with questions. Daily Progression:    HPI: history obtained via . 32 y/o [place] F with no PMH who presented with worsening SOB, pleuritic chest pain, cough, fevers. Symptoms began on 21 per ED physician notes (patient unable to recall for me when symptoms started). She was diagnosed with covid 19 on 21 at [facility] and prescribed azithromycin. She has presented to our ED on 2 other occasions last week for similar symptoms. On arrival to the ED o2 sats 94% on RA  tmax 100.4    Labs: wbc 4.5, ddimer 0.68, pct <0.05, LA 1.3, , crp 6.74    CXR 3/2/21 (personally reviewed): Patchy interstitial and airspace opacities in both lungs    CTA chest 21: Patchy bilateral groundglass nodules and consolidation which can be seen with  COVID pneumonia. Given limitations of the study an acute pulmonary embolus is  not identified    I have reviewed the labs and previous days notes. ROS: She is feeling poorly. She c/o fevers, dizziness, SOB, pleuritic chest pain (reproducible), cough productive of yellow and blood tinged sputum, N/V, myalgias, poor appetite. She denies diarrhea.     On my eval O2 sats 84% on RA and improve to 92% on 2L.    Interval history  Afebrile  BP stable  Sats 100% on RA  WBC 5.4  Hgb 10  CRP . 72  D-dimer 0.28  Ferritin 153  TB gold quant negative  proBNP 10    3/4 chest CTA personally visualized and report reviewed. Interval worsening versus radiographic blossoming of multilobar pneumonia. No evidence for pulmonary embolism. ROS: Reports CP with deep breaths. Also nauseated and dizzy. Past Medical History:   Diagnosis Date    Complication of anesthesia     general anesthesia for both previous csections    Essential hypertension     last two pregnancies      Past Surgical History:   Procedure Laterality Date    HX  SECTION      x2    HX DILATION AND CURETTAGE        Prior to Admission medications    Medication Sig Start Date End Date Taking? Authorizing Provider   meclizine (ANTIVERT) 25 mg tablet Take 1 Tab by mouth every six (6) hours as needed for Dizziness for up to 10 days. 3/8/21 3/18/21 Yes Guru Adame MD   dexAMETHasone (Decadron) 6 mg tablet Take 1 Tab by mouth Daily (before breakfast) for 5 days. 3/7/21 3/12/21 Yes Guru Adame MD   albuterol (PROVENTIL HFA, VENTOLIN HFA, PROAIR HFA) 90 mcg/actuation inhaler Take 2 Puffs by inhalation every six (6) hours as needed for Wheezing. 3/7/21  Yes Guru Adame MD   azithromycin (ZITHROMAX) 500 mg tab Take 1 Tab by mouth daily for 3 days. 3/7/21 3/10/21 Yes Guru Adame MD   cefdinir (OMNICEF) 300 mg capsule Take 1 Cap by mouth two (2) times a day for 3 days. 3/7/21 3/10/21 Yes Guru Adame MD   loperamide (IMMODIUM) 2 mg tablet Take 1 Tab by mouth four (4) times daily as needed for Diarrhea for up to 10 days. 3/7/21 3/17/21 Yes Guru Adame MD   ondansetron (Zofran ODT) 8 mg disintegrating tablet Take 1 Tab by mouth every eight (8) hours as needed for Nausea or Vomiting.  3/7/21  Yes Guru Adame MD   HYDROcodone-acetaminophen (Norco) 7.5-325 mg per tablet Take 1 Tab by mouth two (2) times a day for 8 days. Max Daily Amount: 2 Tabs. 3/7/21 3/15/21 Yes Guru Adame MD     No Known Allergies   Social History     Tobacco Use    Smoking status: Never Smoker    Smokeless tobacco: Never Used   Substance Use Topics    Alcohol use: Not Currently      Family History   Problem Relation Age of Onset    Hypertension Father      OBJECTIVE:     Vital Signs:       Visit Vitals  BP (!) 145/92 (BP 1 Location: Right lower arm, BP Patient Position: At rest)   Pulse 61   Temp 97.4 °F (36.3 °C)   Resp 20   Ht 5' 3\" (1.6 m)   Wt 126.3 kg (278 lb 7.1 oz)   LMP 2021 (Approximate)   SpO2 100%   BMI 49.32 kg/m²      Temp (24hrs), Av.8 °F (36.6 °C), Min:97.4 °F (36.3 °C), Max:97.9 °F (36.6 °C)     Intake/Output:     Last shift: No intake/output data recorded. Last 3 shifts:  1901 -  0700  In: 2411.3 [P.O.:800;  I.V.:1611.3]  Out: -           Intake/Output Summary (Last 24 hours) at 3/8/2021 0920  Last data filed at 3/8/2021 0037  Gross per 24 hour   Intake 1641.25 ml   Output    Net 1641.25 ml       Physical Exam:                                        Exam Findings Other   General: No acute resp distress noted,  appears stated age    [de-identified]:  Moist mucosal membranes    Chest: No pectus deformity, normal chest rise b/l, TTP    HEART:  RRR, no murmurs/rubs/gallops,    Lungs:  CTA b/l - respirations non labored    ABD: Soft, obese, TTP    EXT: No cyanosis/clubbing/edema - generalized TTP    Skin: No rashes or ulcers, no mottling    Neuro: A/O x 3        Medications:  Current Facility-Administered Medications   Medication Dose Route Frequency    0.9% sodium chloride infusion  75 mL/hr IntraVENous CONTINUOUS    ketorolac (TORADOL) injection 30 mg  30 mg IntraVENous Q6H    famotidine (PEPCID) tablet 20 mg  20 mg Oral DAILY    meclizine (ANTIVERT) tablet 25 mg  25 mg Oral Q6H PRN    cefTRIAXone (ROCEPHIN) 2 g in sterile water (preservative free) 20 mL IV syringe  2 g IntraVENous Q24H    ipratropium-albuterol (COMBIVENT RESPIMAT) 20 mcg-100 mcg inhalation spray  1 Puff Inhalation Q6H RT    prochlorperazine (COMPAZINE) injection 10 mg  10 mg IntraVENous Q6H PRN    lactobacillus-acidophilus (LACTINEX) 1 Packet  1 Packet Oral BID    sodium chloride (NS) flush 5-40 mL  5-40 mL IntraVENous Q8H    sodium chloride (NS) flush 5-40 mL  5-40 mL IntraVENous PRN    polyethylene glycol (MIRALAX) packet 17 g  17 g Oral DAILY PRN    ondansetron (ZOFRAN) injection 4 mg  4 mg IntraVENous Q6H PRN    guaiFENesin ER (MUCINEX) tablet 600 mg  600 mg Oral Q12H    dexamethasone (DECADRON) 4 mg/mL injection 6 mg  6 mg IntraVENous Q24H    cholecalciferol (VITAMIN D3) (1000 Units /25 mcg) tablet 2,000 Units  2,000 Units Oral DAILY    azithromycin (ZITHROMAX) 500 mg in 0.9% sodium chloride 250 mL (VIAL-MATE)  500 mg IntraVENous Q24H    enoxaparin (LOVENOX) injection 40 mg  40 mg SubCUTAneous Q12H       Labs:  ABG No results for input(s): PHI, PCO2I, PO2I, HCO3I, SO2I, FIO2I in the last 72 hours.      CBC Recent Labs     03/08/21  0034 03/07/21  0046 03/06/21  0640   WBC 5.4 4.5 2.9*   HGB 10.0* 10.3* 10.4*   HCT 34.7* 35.5 35.7    364 349   MCV 73.2* 72.6* 72.3*   MCH 21.1* 21.1* 56.4*        Metabolic  Panel Recent Labs     03/08/21  0034 03/07/21  0046 03/06/21  0640   * 135* 138   K 4.3 4.1 4.0   * 106 106   CO2 16* 23 25   GLU 96 121* 120*   BUN 25* 26* 15   CREA 0.68 0.73 0.58   CA 8.4* 8.9 8.3*   MG  --   --  2.2   PHOS  --   --  3.0                    Dinora Riddles JEANCARLOS Cruz  3/8/2021

## 2021-03-08 NOTE — PROGRESS NOTES
Problem: Airway Clearance - Ineffective  Goal: *Patent airway  Outcome: Resolved/Not Met  Goal: *Absence of airway secretions  Outcome: Resolved/Not Met  Goal: *Able to cough effectively  Outcome: Resolved/Not Met  Goal: *PALLIATIVE CARE:  Alleviation of secretions, cough and/or nasal congestion  Outcome: Resolved/Not Met     Problem: Patient Education: Go to Patient Education Activity  Goal: Patient/Family Education  Outcome: Resolved/Not Met     Problem: Pressure Injury - Risk of  Goal: *Prevention of pressure injury  Description: Document Saw Scale and appropriate interventions in the flowsheet.   Outcome: Resolved/Not Met     Problem: Patient Education: Go to Patient Education Activity  Goal: Patient/Family Education  Outcome: Resolved/Not Met

## 2021-03-08 NOTE — PROGRESS NOTES
Problem: Patient Education: Go to Patient Education Activity  Goal: Patient/Family Education  Outcome: Progressing Towards Goal     Problem: Pressure Injury - Risk of  Goal: *Prevention of pressure injury  Description: Document Saw Scale and appropriate interventions in the flowsheet. Outcome: Progressing Towards Goal  Note: Pressure Injury Interventions:             Activity Interventions: Increase time out of bed    Mobility Interventions: HOB 30 degrees or less    Nutrition Interventions: Document food/fluid/supplement intake    Friction and Shear Interventions: HOB 30 degrees or less

## 2021-03-08 NOTE — DISCHARGE INSTRUCTIONS
HOSPITALIST DISCHARGE INSTRUCTIONS  NAME: Anat Delgado   :  1990   MRN:  [id number]     Date/Time:  3/7/2021 7:56 AM    ADMIT DATE: 3/2/2021     DISCHARGE DATE: 3/7/2021     ADMITTING DIAGNOSIS:  COVID pneumonia    DISCHARGE DIAGNOSIS:  As above     MEDICATIONS:     · It is important that you take the medication exactly as they are prescribed. · Keep your medication in the bottles provided by the pharmacist and keep a list of the medication names, dosages, and times to be taken in your wallet. · Do not take other medications without consulting your doctor. Pain Management: per above medications    What to do at Home    Recommended diet:  Regular Diet    Recommended activity: Activity as tolerated    If you experience any of the following symptoms then please call your primary care physician or return to the emergency room if you cannot get hold of your doctor:  Fever, chills, nausea, vomiting, diarrhea, change in mentation, falling, bleeding, shortness of breath, chest pain     Follow Up:  Please follow-up with your PCP in one week   Information obtained by :  I understand that if any problems occur once I am at home I am to contact my physician. I understand and acknowledge receipt of the instructions indicated above. Physician's or R.N.'s Signature                                                                  Date/Time                                                                                                                                              Patient or Representative Signature                                                          Date/Time        Ti       RE: Anat Delgado      To Whom It May Concern,    This is to certify that Maureen Bullard  will need to stay at home to help care for his children while his wife recovers from her medical condition     Please feel free to contact my office if you have any questions or concerns. Thank you for your assistance in this matter.       Sincerely,  Jeet Chowdhury MD  [phone]

## 2021-03-09 ENCOUNTER — TELEPHONE (OUTPATIENT)
Dept: CASE MANAGEMENT | Age: 31
End: 2021-03-09

## 2021-03-09 NOTE — TELEPHONE ENCOUNTER
3/9/21 11:16 AM--contacted via [facility]  # [phone]. Patient contacted regarding [id number] diagnosis\". Discussed COVID-19 related testing which was available at this time. Test results were positive. Patient informed of results, if available? yes     Care Transition Nurse contacted the patient by telephone to perform post discharge assessment. Call within 2 business days of discharge: Yes Verified name and  with patient as identifiers. Provided introduction to self, and explanation of the CTN/ACM role, and reason for call due to risk factors for infection and/or exposure to COVID-19. Symptoms reviewed with patient who verbalized the following symptoms: fever, fatigue, pain or aching joints, cough, shortness of breath, confusion of unusual change in mental status, loss or taste or smell, chills or shaking, sweating, nausea, diarrhea, chest pain, fast heart rate, fast breathing, dizziness/lightheadedness, less urine output, cold, clammy and pale skin, flank pain, no new symptoms and reports worsening abd pain and dizziness. Pt reports that she is trying to sip water but only able to do so every half hour because it tastes bitter. Friends brought juices to her but she tried this once and it tasted so bad she was unable to drink more. She states she is only passing \"drops\" of urine but feels that she needs to void. She was prescribed Zofran at D/C but states she does not have her discharge instructions any longer because the pharmacy kept the list after filling the prescriptions. She is unable to tell me for sure if she has the Zofran bottle near her but knows there is something she is taking every 6 hours and something else every 12 hours. Her , who she states was COVID positive 15 days ago, has gone to the store to buy food. I offered to call back later this afternoon to speak with him and to see how she is doing and help them develop a plan.   She is agreeable to this.      Due to no new or worsening symptoms encounter was not routed to provider because she doesn't have one on file. She is sched to have a new pt appt at [address] [place] [address] on 3/19. If no appointment was previously scheduled, appointment scheduling offered:  N/A   [facility] follow up appointment(s): No future appointments. Non-[facility] follow up appointment(s): none     Advance Care Planning:   Does patient have an Advance Directive:  not on file. Patient has following risk factors of: acute respiratory failure. CTN reviewed discharge instructions, medical action plan and red flags such as increased shortness of breath, increasing fever and signs of decompensation with patient who verbalized understanding. Discussed exposure protocols and quarantine with CDC Guidelines What to do if you are sick with coronavirus disease 2019.  Patient was given an opportunity for questions and concerns. The patient agrees to contact the [employer] exposure line [phone], [facility] department [facility]  ([phone] and PCP office for questions related to their healthcare. CTN provided contact information for future needs. Reviewed and educated patient on any new and changed medications related to discharge diagnosis     Was patient discharged with a pulse oximeter? yes Discussed and confirmed pulse oximeter discharge instructions and when to notify provider or seek emergency care. Pt states her pulse ox today is 97%. Patient/family/caregiver given information for [employer] and agrees to enroll no, Azeri-speaking  Patient's preferred e-mail: N/A   Patient's preferred phone number: N/A  Based on Loop alert triggers, patient will be contacted by nurse care manager for worsening symptoms. 3:30 PM--contacted pt's  via [address]  [place]  # [phone].   Introduced myself and stated that I am calling back to see how pt is doing and to see if he has time to discuss her medications with me. He states that she is crying and reports she has terrible chest and abdominal pain. I advised that it sounds like she needs to be re-evaluated in the ED for these symptoms and also my concern from earlier that she is not voiding very much and not drinking an adequate amount of fluids. He states he will take her if she agrees to go, but otherwise cannot. I told him I understand and we reviewed the pain and nausea medicines together in case she won't go to the hospital.  I encouraged him to give her another pain pill if she won't go right now to the ED--he states she had her last dose at 6 AM today. He states he has recently given her the nausea med so I reminded him she cannot have this but every 8 hours. Again, I urged him to take her to the emergency department for evaluation of the pain and also possible dehydration. He states he will do his best to convince her to go. I thanked him for his time and we disconnected. Advised if I am able, I will contact them in a couple of days. Plan for follow-up call in 2-5 days based on severity of symptoms and risk factors, if she isn't reassigned to someone else.

## 2021-03-10 ENCOUNTER — PATIENT OUTREACH (OUTPATIENT)
Dept: CASE MANAGEMENT | Age: 31
End: 2021-03-10

## 2021-03-11 ENCOUNTER — TELEPHONE (OUTPATIENT)
Dept: CASE MANAGEMENT | Age: 31
End: 2021-03-11

## 2021-03-11 NOTE — TELEPHONE ENCOUNTER
3/11/21 2:37 PM--contacted pt via [facility]  # [phone]. She states she has fever and still has abd pain. I tried to find out if she is using the medication given but it appears her  helps administer this so I asked if I may speak to him. She agrees. Pt's , Mina Mcneill, stated that pt is about the same with the abd pain but now has fever, for which he has been giving her Tyenol. I asked about her current pulse ox reading and he reports it is %. He states that he tried to get her to return to the ED yesterday but she declined. He states she is eating a little but not drinking very much and not urinating a normal amount. We reviewed her new pt appt at [facility] and Mr. Annalisa Mayer confirms name of clinic, address, phone #, date and time of appt, and items needed for visit. I advised I will call again on Tuesday afternoon to see how pt is doing. I thanked him for his time and we disconnected.

## 2021-03-14 NOTE — DISCHARGE SUMMARY
Physician Discharge Summary     Patient ID:  Wily Ford  [id number]  24 y.o.  1990    Admit date: 3/2/2021    Discharge date and time: 3/8/2021     Admission Diagnoses: COVID-19 [U07.1]    Discharge Diagnoses/Hospital Course  Ms. Patricia Boo is a 32 yo morbidly obese female admitted with dyspnea and hypoxia 2/2 COVID PNA. She was given IV antibiotics, steroids, zinc, vitamin C and Actemra. She was out of the window for benefit from remdesivir. CTA did NOT show a PE. Pt was started on IV NSAIDS due to pleuritic pain that was reproducible in the intercostal muscles on exam. She also required PRN pain meds. She passed her 6 minute walk prior to d/c. She was discharged to home. PCP: None     Consults: Pulmonary/Intensive care    Discharge Exam:  Visit Vitals  BP (!) 144/83 (BP 1 Location: Right lower arm, BP Patient Position: Sitting)   Pulse 82   Temp 97.7 °F (36.5 °C)   Resp 20   Ht 5' 3\" (1.6 m)   Wt 126.3 kg (278 lb 7.1 oz)   SpO2 98%   BMI 49.32 kg/m²     Gen:  Well-developed, well-nourished, morbidly obese  HEENT:  Pink conjunctivae, PERRL, hearing intact to voice, moist mucous membranes  Neck:  Supple, without masses, thyroid non-tender  Resp:  No accessory muscle use, coarse BS  Card:  No murmurs, normal S1, S2 without thrills, bruits or peripheral edema  Abd:  Soft, non-tender, non-distended, normoactive bowel sounds are present  Musc:  No cyanosis or clubbing  Skin:  No rashes  Neuro:  Cranial nerves 3-12 are grossly intact, follows commands appropriately  Psych: Albanian speaking. Flat affect  MSK tenderness over the intercostal muscles  Very histrionic     Disposition: home    Patient Instructions:   Discharge Medication List as of 3/8/2021 12:28 PM      START taking these medications    Details   meclizine (ANTIVERT) 25 mg tablet Take 1 Tab by mouth every six (6) hours as needed for Dizziness for up to 10 days. , Print, Disp-10 Tab, R-0      albuterol (PROVENTIL HFA, VENTOLIN HFA, PROAIR HFA) 90 mcg/actuation inhaler Take 2 Puffs by inhalation every six (6) hours as needed for Wheezing., Print, Disp-1 Inhaler, R-0      azithromycin (ZITHROMAX) 500 mg tab Take 1 Tab by mouth daily for 3 days. , Print, Disp-3 Tab, R-0      cefdinir (OMNICEF) 300 mg capsule Take 1 Cap by mouth two (2) times a day for 3 days. , Print, Disp-6 Cap, R-0      loperamide (IMMODIUM) 2 mg tablet Take 1 Tab by mouth four (4) times daily as needed for Diarrhea for up to 10 days. , Print, Disp-10 Tab, R-0      ondansetron (Zofran ODT) 8 mg disintegrating tablet Take 1 Tab by mouth every eight (8) hours as needed for Nausea or Vomiting., Normal, Disp-10 Tab, R-0      HYDROcodone-acetaminophen (Norco) 7.5-325 mg per tablet Take 1 Tab by mouth two (2) times a day for 8 days. Max Daily Amount: 2 Tabs., Print, Disp-20 Tab, R-0         CONTINUE these medications which have CHANGED    Details   dexAMETHasone (Decadron) 6 mg tablet Take 1 Tab by mouth Daily (before breakfast) for 5 days. , Print, Disp-5 Tab, R-0           Activity: Activity as tolerated  Diet: Regular Diet  Wound Care: None needed    Follow-up Information     Follow up With Specialties Details Why [facility]  On 3/19/2021 Hospital follow up new PCP appointment Friday, 3/19/21 at 8:30 a.m. Please provide completed application, photo ID, two months proof of income, listing of all medications and discharge papers.  [facility]  [phone]          Approximate time spent in patient care on day of discharge: 35 minutes     Signed:  Eulalia Perry MD  3/14/2021  7:04 PM

## 2021-03-17 ENCOUNTER — TELEPHONE (OUTPATIENT)
Dept: CASE MANAGEMENT | Age: 31
End: 2021-03-17

## 2021-03-17 NOTE — TELEPHONE ENCOUNTER
3/17/21 1:36 PM--contacted pt and her  via [address]  [place]  # [phone]. Pt states she is feeling somewhat better but still has HA and dizziness. She states she is eating better now but still drinking only sips because she doesn't like it and reports her urine output remains lower than normal.    Asked her if I may speak with her  for a minute and she agrees. I confirmed with pt's  that she still has the appt with [facility] for this Friday and asked about the pulse ox reading. He tells me that it is  is 99% today but but also eports it drops to 89% sometimes at night and that this can last up to 2 hours. I advised him to watch this tonight and if it continues to be below 90% for longer than 5-10 minutes, he should take her back to the ED as is recommended with this pulse ox program.  He is agreeable to this plan. I thanked him for his update and advised that I will call one more time next Tuesday before we disconnected.

## 2021-03-20 ENCOUNTER — APPOINTMENT (OUTPATIENT)
Dept: ULTRASOUND IMAGING | Age: 31
End: 2021-03-20
Attending: STUDENT IN AN ORGANIZED HEALTH CARE EDUCATION/TRAINING PROGRAM

## 2021-03-20 ENCOUNTER — HOSPITAL ENCOUNTER (EMERGENCY)
Age: 31
Discharge: HOME | End: 2021-03-20
Attending: STUDENT IN AN ORGANIZED HEALTH CARE EDUCATION/TRAINING PROGRAM | Admitting: STUDENT IN AN ORGANIZED HEALTH CARE EDUCATION/TRAINING PROGRAM

## 2021-03-20 ENCOUNTER — APPOINTMENT (OUTPATIENT)
Dept: GENERAL RADIOLOGY | Age: 31
End: 2021-03-20
Attending: STUDENT IN AN ORGANIZED HEALTH CARE EDUCATION/TRAINING PROGRAM

## 2021-03-20 VITALS
BODY MASS INDEX: 51.29 KG/M2 | DIASTOLIC BLOOD PRESSURE: 87 MMHG | SYSTOLIC BLOOD PRESSURE: 145 MMHG | OXYGEN SATURATION: 100 % | HEIGHT: 63 IN | WEIGHT: 289.46 LBS | HEART RATE: 98 BPM | TEMPERATURE: 97.9 F | RESPIRATION RATE: 24 BRPM

## 2021-03-20 DIAGNOSIS — R10.816 EPIGASTRIC ABDOMINAL TENDERNESS, REBOUND TENDERNESS PRESENCE NOT SPECIFIED: Primary | ICD-10-CM

## 2021-03-20 LAB
ALBUMIN SERPL-MCNC: 3.8 G/DL (ref 3.5–5)
ALBUMIN/GLOB SERPL: 1 {RATIO} (ref 1.1–2.2)
ALP SERPL-CCNC: 52 U/L (ref 45–117)
ALT SERPL-CCNC: 33 U/L (ref 12–78)
ANION GAP SERPL CALC-SCNC: 6 MMOL/L (ref 5–15)
APPEARANCE UR: CLEAR
AST SERPL-CCNC: 16 U/L (ref 15–37)
BACTERIA URNS QL MICRO: NEGATIVE /HPF
BASOPHILS # BLD: 0.1 K/UL (ref 0–0.1)
BASOPHILS NFR BLD: 1 % (ref 0–1)
BILIRUB SERPL-MCNC: 0.3 MG/DL (ref 0.2–1)
BILIRUB UR QL: NEGATIVE
BUN SERPL-MCNC: 23 MG/DL (ref 6–20)
BUN/CREAT SERPL: 27 (ref 12–20)
CALCIUM SERPL-MCNC: 8.9 MG/DL (ref 8.5–10.1)
CHLORIDE SERPL-SCNC: 105 MMOL/L (ref 97–108)
CO2 SERPL-SCNC: 26 MMOL/L (ref 21–32)
COLOR UR: ABNORMAL
COMMENT, HOLDF: NORMAL
CREAT SERPL-MCNC: 0.84 MG/DL (ref 0.55–1.02)
DIFFERENTIAL METHOD BLD: ABNORMAL
EOSINOPHIL # BLD: 0.2 K/UL (ref 0–0.4)
EOSINOPHIL NFR BLD: 3 % (ref 0–7)
EPITH CASTS URNS QL MICRO: ABNORMAL /LPF
ERYTHROCYTE [DISTWIDTH] IN BLOOD BY AUTOMATED COUNT: 20.4 % (ref 11.5–14.5)
GLOBULIN SER CALC-MCNC: 3.8 G/DL (ref 2–4)
GLUCOSE SERPL-MCNC: 78 MG/DL (ref 65–100)
GLUCOSE UR STRIP.AUTO-MCNC: NEGATIVE MG/DL
HCT VFR BLD AUTO: 40.5 % (ref 35–47)
HGB BLD-MCNC: 11.8 G/DL (ref 11.5–16)
HGB UR QL STRIP: ABNORMAL
IMM GRANULOCYTES # BLD AUTO: 0 K/UL (ref 0–0.04)
IMM GRANULOCYTES NFR BLD AUTO: 0 % (ref 0–0.5)
KETONES UR QL STRIP.AUTO: NEGATIVE MG/DL
LEUKOCYTE ESTERASE UR QL STRIP.AUTO: NEGATIVE
LIPASE SERPL-CCNC: 272 U/L (ref 73–393)
LYMPHOCYTES # BLD: 2.6 K/UL (ref 0.8–3.5)
LYMPHOCYTES NFR BLD: 50 % (ref 12–49)
MCH RBC QN AUTO: 21.8 PG (ref 26–34)
MCHC RBC AUTO-ENTMCNC: 29.1 G/DL (ref 30–36.5)
MCV RBC AUTO: 74.9 FL (ref 80–99)
MONOCYTES # BLD: 0.6 K/UL (ref 0–1)
MONOCYTES NFR BLD: 10 % (ref 5–13)
NEUTS SEG # BLD: 2 K/UL (ref 1.8–8)
NEUTS SEG NFR BLD: 36 % (ref 32–75)
NITRITE UR QL STRIP.AUTO: NEGATIVE
NRBC # BLD: 0 K/UL (ref 0–0.01)
NRBC BLD-RTO: 0 PER 100 WBC
PH UR STRIP: 5.5 [PH] (ref 5–8)
PLATELET # BLD AUTO: 233 K/UL (ref 150–400)
PMV BLD AUTO: 9.5 FL (ref 8.9–12.9)
POTASSIUM SERPL-SCNC: 3.6 MMOL/L (ref 3.5–5.1)
PROT SERPL-MCNC: 7.6 G/DL (ref 6.4–8.2)
PROT UR STRIP-MCNC: 30 MG/DL
RBC # BLD AUTO: 5.41 M/UL (ref 3.8–5.2)
RBC #/AREA URNS HPF: ABNORMAL /HPF (ref 0–5)
RBC MORPH BLD: ABNORMAL
SAMPLES BEING HELD,HOLD: NORMAL
SODIUM SERPL-SCNC: 137 MMOL/L (ref 136–145)
SP GR UR REFRACTOMETRY: 1 (ref 1–1.03)
TROPONIN I SERPL-MCNC: <0.05 NG/ML
UA: UC IF INDICATED,UAUC: ABNORMAL
UROBILINOGEN UR QL STRIP.AUTO: 0.2 EU/DL (ref 0.2–1)
WBC # BLD AUTO: 5.5 K/UL (ref 3.6–11)
WBC URNS QL MICRO: ABNORMAL /HPF (ref 0–4)

## 2021-03-20 PROCEDURE — 81001 URINALYSIS AUTO W/SCOPE: CPT

## 2021-03-20 PROCEDURE — 36415 COLL VENOUS BLD VENIPUNCTURE: CPT

## 2021-03-20 PROCEDURE — 74011250636 HC RX REV CODE- 250/636: Performed by: STUDENT IN AN ORGANIZED HEALTH CARE EDUCATION/TRAINING PROGRAM

## 2021-03-20 PROCEDURE — 71045 X-RAY EXAM CHEST 1 VIEW: CPT

## 2021-03-20 PROCEDURE — 85025 COMPLETE CBC W/AUTO DIFF WBC: CPT

## 2021-03-20 PROCEDURE — 84484 ASSAY OF TROPONIN QUANT: CPT

## 2021-03-20 PROCEDURE — 96374 THER/PROPH/DIAG INJ IV PUSH: CPT

## 2021-03-20 PROCEDURE — 83690 ASSAY OF LIPASE: CPT

## 2021-03-20 PROCEDURE — 76705 ECHO EXAM OF ABDOMEN: CPT

## 2021-03-20 PROCEDURE — 99283 EMERGENCY DEPT VISIT LOW MDM: CPT

## 2021-03-20 PROCEDURE — 93005 ELECTROCARDIOGRAM TRACING: CPT

## 2021-03-20 PROCEDURE — 80053 COMPREHEN METABOLIC PANEL: CPT

## 2021-03-20 PROCEDURE — 96375 TX/PRO/DX INJ NEW DRUG ADDON: CPT

## 2021-03-20 RX ORDER — FAMOTIDINE 20 MG/1
20 TABLET, FILM COATED ORAL 2 TIMES DAILY
Qty: 20 TAB | Refills: 0 | Status: SHIPPED | OUTPATIENT
Start: 2021-03-20 | End: 2021-03-30

## 2021-03-20 RX ORDER — ALUMINA, MAGNESIA, AND SIMETHICONE 2400; 2400; 240 MG/30ML; MG/30ML; MG/30ML
10 SUSPENSION ORAL
Qty: 335 ML | Refills: 0 | Status: SHIPPED | OUTPATIENT
Start: 2021-03-20 | End: 2022-06-20

## 2021-03-20 RX ORDER — MORPHINE SULFATE 4 MG/ML
4 INJECTION INTRAVENOUS ONCE
Status: COMPLETED | OUTPATIENT
Start: 2021-03-20 | End: 2021-03-20

## 2021-03-20 RX ORDER — PROMETHAZINE HYDROCHLORIDE 25 MG/1
25 TABLET ORAL
Qty: 12 TAB | Refills: 0 | Status: SHIPPED | OUTPATIENT
Start: 2021-03-20 | End: 2022-06-20

## 2021-03-20 RX ORDER — PROCHLORPERAZINE EDISYLATE 5 MG/ML
5 INJECTION INTRAMUSCULAR; INTRAVENOUS
Status: COMPLETED | OUTPATIENT
Start: 2021-03-20 | End: 2021-03-20

## 2021-03-20 RX ADMIN — PROCHLORPERAZINE EDISYLATE 5 MG: 5 INJECTION INTRAMUSCULAR; INTRAVENOUS at 21:52

## 2021-03-20 RX ADMIN — SODIUM CHLORIDE 1000 ML: 9 INJECTION, SOLUTION INTRAVENOUS at 21:49

## 2021-03-20 RX ADMIN — MORPHINE SULFATE 4 MG: 4 INJECTION, SOLUTION INTRAMUSCULAR; INTRAVENOUS at 22:43

## 2021-03-21 NOTE — ED PROVIDER NOTES
The history is provided by the patient. Abdominal Pain   This is a new problem. The current episode started yesterday. The problem occurs daily. The problem has not changed since onset. The pain is associated with vomiting (L shoulder pain). The pain is located in the epigastric region. The quality of the pain is burning. The pain is moderate. Associated symptoms include nausea, vomiting, headaches and back pain (L shoulder). Pertinent negatives include no fever, no diarrhea, no dysuria and no trauma. The pain is worsened by eating. The pain is relieved by nothing. Her past medical history does not include gallstones or pancreatitis. Past medical history comments: recent COVID. The patient's surgical history non-contributory.        Past Medical History:   Diagnosis Date    Complication of anesthesia     general anesthesia for both previous csections    Essential hypertension     last two pregnancies       Past Surgical History:   Procedure Laterality Date    HX  SECTION      x2    HX DILATION AND CURETTAGE           Family History:   Problem Relation Age of Onset    Hypertension Father        Social History     Socioeconomic History    Marital status:      Spouse name: Not on file    Number of children: Not on file    Years of education: Not on file    Highest education level: Not on file   Occupational History    Not on file   Social Needs    Financial resource strain: Not on file    Food insecurity     Worry: Not on file     Inability: Not on file   [employer] needs     Medical: Not on file     Non-medical: Not on file   Tobacco Use    Smoking status: Never Smoker    Smokeless tobacco: Never Used   Substance and Sexual Activity    Alcohol use: Not Currently    Drug use: Never    Sexual activity: Not Currently   Lifestyle    Physical activity     Days per week: Not on file     Minutes per session: Not on file    Stress: Not on file   Relationships    Social connections Talks on phone: Not on file     Gets together: Not on file     Attends Zoroastrian service: Not on file     Active member of club or organization: Not on file     Attends meetings of clubs or organizations: Not on file     Relationship status: Not on file    Intimate partner violence     Fear of current or ex partner: Not on file     Emotionally abused: Not on file     Physically abused: Not on file     Forced sexual activity: Not on file   Other Topics Concern     Service Not Asked    Blood Transfusions Not Asked    Caffeine Concern Not Asked    Occupational Exposure Not Asked   [employer] Hazards Not Asked    Sleep Concern Not Asked    Stress Concern Not Asked    Weight Concern Not Asked    Special Diet Not Asked    Back Care Not Asked    Exercise Not Asked    Bike Helmet Not Asked   [address] Not Asked    Self-Exams Not Asked   Social History Narrative    Not on file         ALLERGIES: Patient has no known allergies. Review of Systems   Constitutional: Negative for fever. Gastrointestinal: Positive for abdominal pain, nausea and vomiting. Negative for diarrhea. Genitourinary: Negative for dysuria. Musculoskeletal: Positive for back pain (L shoulder). Neurological: Positive for headaches. All other systems reviewed and are negative. Vitals:    03/20/21 2025   BP: (!) 145/87   Pulse: 98   Resp: 24   Temp: 97.9 °F (36.6 °C)   SpO2: 100%   Weight: 131.3 kg (289 lb 7.4 oz)   Height: 5' 3\" (1.6 m)            Physical Exam  Vitals signs and nursing note reviewed. Constitutional:       General: She is not in acute distress. Appearance: She is well-developed. She is obese. HENT:      Head: Normocephalic and atraumatic. Eyes:      Conjunctiva/sclera: Conjunctivae normal.   Neck:      Musculoskeletal: Normal range of motion and neck supple. Cardiovascular:      Rate and Rhythm: Normal rate and regular rhythm.    Pulmonary:      Effort: Pulmonary effort is normal. No respiratory distress. Abdominal:      Palpations: Abdomen is soft. Tenderness: There is abdominal tenderness (epigastric). There is no guarding or rebound. Musculoskeletal:      Right lower leg: No edema. Left lower leg: No edema. Skin:     General: Skin is warm and dry. Neurological:      Mental Status: She is alert and oriented to person, place, and time. Motor: No abnormal muscle tone. MDM       Procedures      10:54 PM  The patient is resting comfortably and feels better, is alert and in no distress. The repeat examination is unremarkable and benign; in particular, there is no discomfort at McBurney's point. The history, exam, diagnostic testing, and current condition do not suggest acute appendicitis, bowel obstruction, tubovarian abscess, ectopic pregnancy, ovarian torsion, acute cholecystitis, bowel perforation, major gastrointestinal bleeding, severe diverticulitis, sepsis, or other significant pathology to warrant further testing, continued ED treatment, admission, or surgical evaluation at this point. The vital signs have been stable. The patient does not have uncontrollable pain, intractable vomiting, or other significant symptoms. The patient's condition is stable and appropriate for discharge. The patient will pursue further outpatient evaluation with the primary care physician or other designated or consulting physician as indicated in the discharge instructions.

## 2021-03-21 NOTE — ED TRIAGE NOTES
Pt ambulatory to triage with mask, c/o vomiting starting today. Also reports L shoulder pain starting approx 1 week ago. Reports vomiting x2 today, reports feeling nausea. Reports epigastric pain starting yesterday. Reports taking tylenol for pain without relief.

## 2021-03-21 NOTE — ED NOTES
Patient given discharge instructions with interpretor, patient verbalized understanding and ambulatory from ED to home.

## 2021-03-22 LAB
ATRIAL RATE: 94 BPM
CALCULATED P AXIS, ECG09: 48 DEGREES
CALCULATED R AXIS, ECG10: 64 DEGREES
CALCULATED T AXIS, ECG11: 6 DEGREES
DIAGNOSIS, 93000: NORMAL
P-R INTERVAL, ECG05: 164 MS
Q-T INTERVAL, ECG07: 384 MS
QRS DURATION, ECG06: 90 MS
QTC CALCULATION (BEZET), ECG08: 480 MS
VENTRICULAR RATE, ECG03: 94 BPM

## 2021-08-06 ENCOUNTER — HOSPITAL ENCOUNTER (EMERGENCY)
Age: 31
Discharge: HOME | End: 2021-08-06
Attending: EMERGENCY MEDICINE

## 2021-08-06 ENCOUNTER — APPOINTMENT (OUTPATIENT)
Dept: GENERAL RADIOLOGY | Age: 31
End: 2021-08-06
Attending: EMERGENCY MEDICINE

## 2021-08-06 VITALS
OXYGEN SATURATION: 95 % | SYSTOLIC BLOOD PRESSURE: 149 MMHG | RESPIRATION RATE: 24 BRPM | HEIGHT: 63 IN | BODY MASS INDEX: 51.29 KG/M2 | DIASTOLIC BLOOD PRESSURE: 92 MMHG | WEIGHT: 289.46 LBS | TEMPERATURE: 96.8 F | HEART RATE: 125 BPM

## 2021-08-06 DIAGNOSIS — Z32.01 PREGNANCY TEST PERFORMED, PREGNANCY CONFIRMED: ICD-10-CM

## 2021-08-06 DIAGNOSIS — B34.9 VIRAL ILLNESS: Primary | ICD-10-CM

## 2021-08-06 DIAGNOSIS — E86.0 DEHYDRATION: ICD-10-CM

## 2021-08-06 LAB
ANION GAP SERPL CALC-SCNC: 7 MMOL/L (ref 5–15)
BASOPHILS # BLD: 0 K/UL (ref 0–0.1)
BASOPHILS NFR BLD: 0 % (ref 0–1)
BUN SERPL-MCNC: 10 MG/DL (ref 6–20)
BUN/CREAT SERPL: 17 (ref 12–20)
CALCIUM SERPL-MCNC: 8.9 MG/DL (ref 8.5–10.1)
CHLORIDE SERPL-SCNC: 106 MMOL/L (ref 97–108)
CO2 SERPL-SCNC: 25 MMOL/L (ref 21–32)
COVID-19 RAPID TEST, COVR: NOT DETECTED
CREAT SERPL-MCNC: 0.59 MG/DL (ref 0.55–1.02)
D DIMER PPP FEU-MCNC: 0.42 MG/L FEU (ref 0–0.65)
DIFFERENTIAL METHOD BLD: ABNORMAL
EOSINOPHIL # BLD: 0.1 K/UL (ref 0–0.4)
EOSINOPHIL NFR BLD: 2 % (ref 0–7)
ERYTHROCYTE [DISTWIDTH] IN BLOOD BY AUTOMATED COUNT: 17.6 % (ref 11.5–14.5)
GLUCOSE SERPL-MCNC: 139 MG/DL (ref 65–100)
HCG UR QL: POSITIVE
HCT VFR BLD AUTO: 39.7 % (ref 35–47)
HGB BLD-MCNC: 11.4 G/DL (ref 11.5–16)
IMM GRANULOCYTES # BLD AUTO: 0.1 K/UL (ref 0–0.04)
IMM GRANULOCYTES NFR BLD AUTO: 1 % (ref 0–0.5)
LYMPHOCYTES # BLD: 2.1 K/UL (ref 0.8–3.5)
LYMPHOCYTES NFR BLD: 32 % (ref 12–49)
MAGNESIUM SERPL-MCNC: 1.8 MG/DL (ref 1.6–2.4)
MCH RBC QN AUTO: 20.7 PG (ref 26–34)
MCHC RBC AUTO-ENTMCNC: 28.7 G/DL (ref 30–36.5)
MCV RBC AUTO: 72.2 FL (ref 80–99)
MONOCYTES # BLD: 0.5 K/UL (ref 0–1)
MONOCYTES NFR BLD: 7 % (ref 5–13)
NEUTS SEG # BLD: 3.7 K/UL (ref 1.8–8)
NEUTS SEG NFR BLD: 58 % (ref 32–75)
NRBC # BLD: 0 K/UL (ref 0–0.01)
NRBC BLD-RTO: 0 PER 100 WBC
PLATELET # BLD AUTO: 324 K/UL (ref 150–400)
PMV BLD AUTO: 10.5 FL (ref 8.9–12.9)
POTASSIUM SERPL-SCNC: 3.5 MMOL/L (ref 3.5–5.1)
RBC # BLD AUTO: 5.5 M/UL (ref 3.8–5.2)
RBC MORPH BLD: ABNORMAL
SODIUM SERPL-SCNC: 138 MMOL/L (ref 136–145)
SOURCE, COVRS: NORMAL
WBC # BLD AUTO: 6.5 K/UL (ref 3.6–11)

## 2021-08-06 PROCEDURE — 36415 COLL VENOUS BLD VENIPUNCTURE: CPT

## 2021-08-06 PROCEDURE — 85025 COMPLETE CBC W/AUTO DIFF WBC: CPT

## 2021-08-06 PROCEDURE — 74011250637 HC RX REV CODE- 250/637: Performed by: EMERGENCY MEDICINE

## 2021-08-06 PROCEDURE — 83735 ASSAY OF MAGNESIUM: CPT

## 2021-08-06 PROCEDURE — 71045 X-RAY EXAM CHEST 1 VIEW: CPT

## 2021-08-06 PROCEDURE — 81025 URINE PREGNANCY TEST: CPT

## 2021-08-06 PROCEDURE — 87635 SARS-COV-2 COVID-19 AMP PRB: CPT

## 2021-08-06 PROCEDURE — 93005 ELECTROCARDIOGRAM TRACING: CPT

## 2021-08-06 PROCEDURE — 74011250636 HC RX REV CODE- 250/636: Performed by: EMERGENCY MEDICINE

## 2021-08-06 PROCEDURE — 99285 EMERGENCY DEPT VISIT HI MDM: CPT

## 2021-08-06 PROCEDURE — 80048 BASIC METABOLIC PNL TOTAL CA: CPT

## 2021-08-06 PROCEDURE — 85379 FIBRIN DEGRADATION QUANT: CPT

## 2021-08-06 PROCEDURE — 96360 HYDRATION IV INFUSION INIT: CPT

## 2021-08-06 RX ORDER — ACETAMINOPHEN 500 MG
1000 TABLET ORAL ONCE
Status: COMPLETED | OUTPATIENT
Start: 2021-08-06 | End: 2021-08-06

## 2021-08-06 RX ORDER — METOCLOPRAMIDE 10 MG/1
10 TABLET ORAL
Status: COMPLETED | OUTPATIENT
Start: 2021-08-06 | End: 2021-08-06

## 2021-08-06 RX ORDER — METOCLOPRAMIDE HYDROCHLORIDE 5 MG/ML
INJECTION INTRAMUSCULAR; INTRAVENOUS
Status: DISCONTINUED
Start: 2021-08-06 | End: 2021-08-06 | Stop reason: WASHOUT

## 2021-08-06 RX ADMIN — METOCLOPRAMIDE 10 MG: 10 TABLET ORAL at 22:18

## 2021-08-06 RX ADMIN — SODIUM CHLORIDE 1000 ML: 9 INJECTION, SOLUTION INTRAVENOUS at 18:06

## 2021-08-06 RX ADMIN — SODIUM CHLORIDE 1000 ML: 9 INJECTION, SOLUTION INTRAVENOUS at 20:01

## 2021-08-06 RX ADMIN — ACETAMINOPHEN 1000 MG: 500 TABLET ORAL at 18:10

## 2021-08-06 NOTE — ED TRIAGE NOTES
Pt reports chest pain onset 5 days ago accompanied by diaphoresis, SOB, headache, nausea, vomiting, and diarrhea. Tachycardic in triage. Denies any cardiac hx.

## 2021-08-06 NOTE — ED PROVIDER NOTES
The history is provided by the patient. The history is limited by a language barrier. A  was used. Chest Pain (Angina)   This is a new problem. Episode onset: 5 days ago. The problem has not changed since onset. The pain is associated with normal activity. Associated symptoms include abdominal pain, cough, diaphoresis, a fever, nausea and shortness of breath. Pertinent negatives include no dizziness and no vomiting. Risk factors include obesity.         Past Medical History:   Diagnosis Date    Complication of anesthesia     general anesthesia for both previous csections    Essential hypertension     last two pregnancies       Past Surgical History:   Procedure Laterality Date    HX  SECTION      x2    HX DILATION AND CURETTAGE           Family History:   Problem Relation Age of Onset    Hypertension Father        Social History     Socioeconomic History    Marital status:      Spouse name: Not on file    Number of children: Not on file    Years of education: Not on file    Highest education level: Not on file   Occupational History    Not on file   Tobacco Use    Smoking status: Never Smoker    Smokeless tobacco: Never Used   Substance and Sexual Activity    Alcohol use: Not Currently    Drug use: Never    Sexual activity: Not Currently   Other Topics Concern     Service Not Asked    Blood Transfusions Not Asked    Caffeine Concern Not Asked    Occupational Exposure Not Asked    Hobby Hazards Not Asked    Sleep Concern Not Asked    Stress Concern Not Asked    Weight Concern Not Asked    Special Diet Not Asked    Back Care Not Asked    Exercise Not Asked    Bike Helmet Not Asked    [address] Not Asked    Self-Exams Not Asked   Social History Narrative    Not on file     Social Determinants of Health     Financial Resource Strain:     Difficulty of Paying Living Expenses:    Food Insecurity:     Worried About Running Out of Food in the Last Year:     Ran Out of Food in the Last Year:    Transportation Needs:     Lack of Transportation (Medical):  Lack of Transportation (Non-Medical):    Physical Activity:     Days of Exercise per Week:     Minutes of Exercise per Session:    Stress:     Feeling of Stress :    Social Connections:     Frequency of Communication with Friends and Family:     Frequency of Social Gatherings with Friends and Family:     Attends [employer] Services:     Active Member of Clubs or Organizations:     Attends Club or Organization Meetings:     Marital Status:    Intimate Partner Violence:     Fear of Current or Ex-Partner:     Emotionally Abused:     Physically Abused:     Sexually Abused: ALLERGIES: Patient has no known allergies. Review of Systems   Constitutional: Positive for chills, diaphoresis and fever. Respiratory: Positive for cough and shortness of breath. Cardiovascular: Positive for chest pain. Gastrointestinal: Positive for abdominal pain and nausea. Negative for constipation, diarrhea and vomiting. Neurological: Positive for light-headedness. Negative for dizziness. All other systems reviewed and are negative. Vitals:    08/06/21 1524   BP: (!) 159/82   Pulse: (!) 125   Resp: 24   Temp: 96.8 °F (36 °C)   SpO2: 100%   Weight: 131.3 kg (289 lb 7.4 oz)   Height: 5' 3\" (1.6 m)            Physical Exam  Vitals and nursing note reviewed. Constitutional:       General: She is not in acute distress. Appearance: She is well-developed. She is obese. She is ill-appearing. HENT:      Head: Normocephalic and atraumatic. Eyes:      Pupils: Pupils are equal, round, and reactive to light. Cardiovascular:      Rate and Rhythm: Regular rhythm. Tachycardia present. Pulmonary:      Effort: Pulmonary effort is normal.      Breath sounds: Normal breath sounds. Abdominal:      General: There is no distension. Palpations: Abdomen is soft. Tenderness:  There is no abdominal tenderness. Musculoskeletal:      Cervical back: Normal range of motion and neck supple. Skin:     General: Skin is warm and dry. Capillary Refill: Capillary refill takes less than 2 seconds. Neurological:      General: No focal deficit present. Mental Status: She is alert and oriented to person, place, and time. Psychiatric:         Mood and Affect: Mood normal.         Behavior: Behavior normal.          [facility]  ED Course as of Aug 06 1604   Fri Aug 06, 2021   1521 ED EKG interpretation:  Rhythm: sinus tachycardia; and regular . Rate (approx.): 129; Axis: normal; ST/T wave: non-specific changes; No STEMI, read limited by motion artifact. [AL]      ED Course User Index  [AL] Elaina Carmona MD       Procedures      The patient is resting comfortably and feels better, is alert and in no distress. On re-examination the patient does not appear toxic and has no meningeal signs, and there is no intractable vomiting, no respiratory distress and no apparent pain. Based on the history, exam, diagnostic testing (if any) and reassessment, the patient has no signs of meningitis, significant pneumonia, pyelonephritis, sepsis or other acute serious bacterial infections, or other significant pathology to warrant further testing, continued ED treatment, admission or specialist evaluation. Covid PCR is pending. D. Dimer is negative. Patient has no vaginal bleeding or abdominal pain. The patient's vital signs have been stable. The patient's condition is stable and is appropriate for discharge. The patient or caregiver will pursue further outpatient evaluation with the primary care physician or other designated or consulting physician as indicated in the discharge instructions.     LABORATORY TESTS:  Recent Results (from the past 12 hour(s))   CBC WITH AUTOMATED DIFF    Collection Time: 08/06/21  4:22 PM   Result Value Ref Range    WBC 6.5 3.6 - 11.0 K/uL    RBC 5.50 (H) 3.80 - 5.20 M/uL    HGB 11.4 (L) 11.5 - 16.0 g/dL    HCT 39.7 35.0 - 47.0 %    MCV 72.2 (L) 80.0 - 99.0 FL    MCH 20.7 (L) 26.0 - 34.0 PG    MCHC 28.7 (L) 30.0 - 36.5 g/dL    RDW 17.6 (H) 11.5 - 14.5 %    PLATELET 352 579 - 703 K/uL    MPV 10.5 8.9 - 12.9 FL    NRBC 0.0 0  WBC    ABSOLUTE NRBC 0.00 0.00 - 0.01 K/uL    NEUTROPHILS 58 32 - 75 %    LYMPHOCYTES 32 12 - 49 %    MONOCYTES 7 5 - 13 %    EOSINOPHILS 2 0 - 7 %    BASOPHILS 0 0 - 1 %    IMMATURE GRANULOCYTES 1 (H) 0.0 - 0.5 %    ABS. NEUTROPHILS 3.7 1.8 - 8.0 K/UL    ABS. LYMPHOCYTES 2.1 0.8 - 3.5 K/UL    ABS. MONOCYTES 0.5 0.0 - 1.0 K/UL    ABS. EOSINOPHILS 0.1 0.0 - 0.4 K/UL    ABS. BASOPHILS 0.0 0.0 - 0.1 K/UL    ABS. IMM.  GRANS. 0.1 (H) 0.00 - 0.04 K/UL    DF AUTOMATED      RBC COMMENTS HYPOCHROMIA  2+        RBC COMMENTS MICROCYTOSIS  1+        RBC COMMENTS ANISOCYTOSIS  1+       METABOLIC PANEL, BASIC    Collection Time: 08/06/21  4:22 PM   Result Value Ref Range    Sodium 138 136 - 145 mmol/L    Potassium 3.5 3.5 - 5.1 mmol/L    Chloride 106 97 - 108 mmol/L    CO2 25 21 - 32 mmol/L    Anion gap 7 5 - 15 mmol/L    Glucose 139 (H) 65 - 100 mg/dL    BUN 10 6 - 20 MG/DL    Creatinine 0.59 0.55 - 1.02 MG/DL    BUN/Creatinine ratio 17 12 - 20      GFR est AA >60 >60 ml/min/1.73m2    GFR est non-AA >60 >60 ml/min/1.73m2    Calcium 8.9 8.5 - 10.1 MG/DL   MAGNESIUM    Collection Time: 08/06/21  4:22 PM   Result Value Ref Range    Magnesium 1.8 1.6 - 2.4 mg/dL   HCG URINE, QL. - POC    Collection Time: 08/06/21  4:46 PM   Result Value Ref Range    Pregnancy test,urine (POC) Positive (A) NEG     COVID-19 RAPID TEST    Collection Time: 08/06/21  6:12 PM   Result Value Ref Range    Specimen source Nasopharyngeal      COVID-19 rapid test Not detected NOTD     D DIMER    Collection Time: 08/06/21  8:00 PM   Result Value Ref Range    D-dimer 0.42 0.00 - 0.65 mg/L FEU       MEDICATIONS GIVEN:  Medications   sodium chloride 0.9 % bolus infusion 1,000 mL (0 mL IntraVENous IV Completed 8/6/21 1915)   acetaminophen (TYLENOL) tablet 1,000 mg (1,000 mg Oral Given 8/6/21 1810)   sodium chloride 0.9 % bolus infusion 1,000 mL (1,000 mL IntraVENous New Bag 8/6/21 2001)       IMPRESSION:  1. Viral illness    2. Dehydration    3. Pregnancy test performed, pregnancy confirmed        PLAN:  1. Hydration  2.  Covid PCR    Return to ED if worse

## 2021-08-06 NOTE — ED NOTES
Verbal/Bedside report was given to Lee Ashraf RN who will assume care of patient at this time. SBAR report consisted of ED summary, MAR, recent results, and additional pertinent information. Receiving nurse given opportunity to ask questions and seek clarifications. Receiving nurse aware of pending lab results and orders that are to be completed.

## 2021-08-07 NOTE — ED NOTES
Danish  used to assist in discharge teaching. Patient no acute distress. Respirations symmetrically unlabored and speaking clearly. After discharged instruction. Patient ambulated with steady gait to ED waiting room where her husbands awaits to safely transport home.

## 2021-08-09 LAB
ATRIAL RATE: 129 BPM
CALCULATED P AXIS, ECG09: 56 DEGREES
CALCULATED R AXIS, ECG10: 48 DEGREES
CALCULATED T AXIS, ECG11: -47 DEGREES
DIAGNOSIS, 93000: NORMAL
P-R INTERVAL, ECG05: 142 MS
Q-T INTERVAL, ECG07: 306 MS
QRS DURATION, ECG06: 86 MS
QTC CALCULATION (BEZET), ECG08: 448 MS
VENTRICULAR RATE, ECG03: 129 BPM

## 2021-08-11 ENCOUNTER — APPOINTMENT (OUTPATIENT)
Dept: ULTRASOUND IMAGING | Age: 31
End: 2021-08-11
Attending: EMERGENCY MEDICINE

## 2021-08-11 ENCOUNTER — HOSPITAL ENCOUNTER (EMERGENCY)
Age: 31
Discharge: HOME | End: 2021-08-11
Attending: EMERGENCY MEDICINE

## 2021-08-11 VITALS
BODY MASS INDEX: 51.28 KG/M2 | TEMPERATURE: 98.2 F | RESPIRATION RATE: 18 BRPM | OXYGEN SATURATION: 98 % | HEART RATE: 78 BPM | DIASTOLIC BLOOD PRESSURE: 78 MMHG | WEIGHT: 289.46 LBS | SYSTOLIC BLOOD PRESSURE: 112 MMHG

## 2021-08-11 DIAGNOSIS — O20.0 THREATENED MISCARRIAGE: Primary | ICD-10-CM

## 2021-08-11 LAB
ABO + RH BLD: NORMAL
BASOPHILS # BLD: 0.1 K/UL (ref 0–0.1)
BASOPHILS NFR BLD: 1 % (ref 0–1)
BLASTS NFR BLD MANUAL: 0 %
COMMENT, HOLDF: NORMAL
DIFFERENTIAL METHOD BLD: ABNORMAL
EOSINOPHIL # BLD: 0.3 K/UL (ref 0–0.4)
EOSINOPHIL NFR BLD: 4 % (ref 0–7)
ERYTHROCYTE [DISTWIDTH] IN BLOOD BY AUTOMATED COUNT: 18.2 % (ref 11.5–14.5)
HCG SERPL-ACNC: 7737 MIU/ML (ref 0–6)
HCT VFR BLD AUTO: 35.1 % (ref 35–47)
HGB BLD-MCNC: 10.3 G/DL (ref 11.5–16)
IMM GRANULOCYTES # BLD AUTO: 0 K/UL
IMM GRANULOCYTES NFR BLD AUTO: 0 %
LYMPHOCYTES # BLD: 2.2 K/UL (ref 0.8–3.5)
LYMPHOCYTES NFR BLD: 32 % (ref 12–49)
MCH RBC QN AUTO: 20.9 PG (ref 26–34)
MCHC RBC AUTO-ENTMCNC: 29.3 G/DL (ref 30–36.5)
MCV RBC AUTO: 71.3 FL (ref 80–99)
METAMYELOCYTES NFR BLD MANUAL: 0 %
MONOCYTES # BLD: 0.1 K/UL (ref 0–1)
MONOCYTES NFR BLD: 2 % (ref 5–13)
MYELOCYTES NFR BLD MANUAL: 0 %
NEUTS BAND NFR BLD MANUAL: 0 % (ref 0–6)
NEUTS SEG # BLD: 4.3 K/UL (ref 1.8–8)
NEUTS SEG NFR BLD: 61 % (ref 32–75)
NRBC # BLD: 0 K/UL (ref 0–0.01)
NRBC BLD-RTO: 0 PER 100 WBC
OTHER CELLS NFR BLD MANUAL: 0 %
PLATELET # BLD AUTO: 252 K/UL (ref 150–400)
PMV BLD AUTO: 10.6 FL (ref 8.9–12.9)
PROMYELOCYTES NFR BLD MANUAL: 0 %
RBC # BLD AUTO: 4.92 M/UL (ref 3.8–5.2)
RBC MORPH BLD: ABNORMAL
SAMPLES BEING HELD,HOLD: NORMAL
WBC # BLD AUTO: 7 K/UL (ref 3.6–11)

## 2021-08-11 PROCEDURE — 96360 HYDRATION IV INFUSION INIT: CPT

## 2021-08-11 PROCEDURE — 36415 COLL VENOUS BLD VENIPUNCTURE: CPT

## 2021-08-11 PROCEDURE — 76817 TRANSVAGINAL US OBSTETRIC: CPT

## 2021-08-11 PROCEDURE — 85007 BL SMEAR W/DIFF WBC COUNT: CPT

## 2021-08-11 PROCEDURE — 99283 EMERGENCY DEPT VISIT LOW MDM: CPT

## 2021-08-11 PROCEDURE — 84702 CHORIONIC GONADOTROPIN TEST: CPT

## 2021-08-11 PROCEDURE — 76801 OB US < 14 WKS SINGLE FETUS: CPT

## 2021-08-11 PROCEDURE — 74011250636 HC RX REV CODE- 250/636: Performed by: EMERGENCY MEDICINE

## 2021-08-11 PROCEDURE — 86901 BLOOD TYPING SEROLOGIC RH(D): CPT

## 2021-08-11 RX ADMIN — SODIUM CHLORIDE 1000 ML: 9 INJECTION, SOLUTION INTRAVENOUS at 04:58

## 2021-08-11 NOTE — ED TRIAGE NOTES
Pt.  was seen here last week and found out that she was pregnant, states started vaginal bleeding around 0200, states is like a heavy period. Pt. Also complaining of pain in her abd. Pt.   A2.

## 2021-08-11 NOTE — ED NOTES
The documentation for this period is being entered following the guidelines as defined in the [facility] policy by Edyta Rose RN.

## 2021-08-11 NOTE — ED PROVIDER NOTES
69-year-old female,  Ab2, approximately 6 to 8 weeks pregnant who presents to the ED with a complaint of pelvic cramping and vaginal bleeding that began approximately 5 to 6 hours ago. The patient state that she woke up with blood in her undergarment and when she went to the bathroom and wipe, she had blood in the tissue and toilet paper. The patient was also spotting with blood in her pad. She has not had any prenatal care yet. She denies any fever chills, headache, sore throat, cough or congestion, neck or back pain, chest pain or shortness of breath, diarrhea, constipation, dysuria, dizziness, extremity weakness or numbness, sick contact, skin rash, prior history of same. The patient speaks mostly Kazakh and the history was obtained from her daughter who consists translate from [place] to Kazakh and vice versa.            Past Medical History:   Diagnosis Date    Complication of anesthesia     general anesthesia for both previous csections    Essential hypertension     last two pregnancies       Past Surgical History:   Procedure Laterality Date    HX  SECTION      x2    HX DILATION AND CURETTAGE           Family History:   Problem Relation Age of Onset    Hypertension Father        Social History     Socioeconomic History    Marital status:      Spouse name: Not on file    Number of children: Not on file    Years of education: Not on file    Highest education level: Not on file   Occupational History    Not on file   Tobacco Use    Smoking status: Never Smoker    Smokeless tobacco: Never Used   Substance and Sexual Activity    Alcohol use: Not Currently    Drug use: Never    Sexual activity: Not Currently   Other Topics Concern     Service Not Asked    Blood Transfusions Not Asked    Caffeine Concern Not Asked    Occupational Exposure Not Asked    Hobby Hazards Not Asked    Sleep Concern Not Asked    Stress Concern Not Asked    Weight Concern Not Asked    Special Diet Not Asked    Back Care Not Asked    Exercise Not Asked    Bike Helmet Not Asked   [address] Not Asked    Self-Exams Not Asked   Social History Narrative    Not on file     Social Determinants of Health     Financial Resource Strain:     Difficulty of Paying Living Expenses:    Food Insecurity:     Worried About Running Out of Food in the Last Year:     Ran Out of Food in the Last Year:    Transportation Needs:     Lack of Transportation (Medical):  Lack of Transportation (Non-Medical):    Physical Activity:     Days of Exercise per Week:     Minutes of Exercise per Session:    Stress:     Feeling of Stress :    Social Connections:     Frequency of Communication with Friends and Family:     Frequency of Social Gatherings with Friends and Family:     Attends Holiness Services:     Active Member of Clubs or Organizations:     Attends Club or Organization Meetings:     Marital Status:    Intimate Partner Violence:     Fear of Current or Ex-Partner:     Emotionally Abused:     Physically Abused:     Sexually Abused: ALLERGIES: Other medication    Review of Systems   All other systems reviewed and are negative. Vitals:    08/11/21 0332   BP: (!) 160/97   Pulse: (!) 106   Resp: 20   Temp: 98.1 °F (36.7 °C)   SpO2: 100%   Weight: 131.3 kg (289 lb 7.4 oz)            Physical Exam  Vitals and nursing note reviewed. Exam conducted with a chaperone present. CONSTITUTIONAL: Well-appearing; well-nourished; in no apparent distress  HEAD: Normocephalic; atraumatic  EYES: PERRL; EOM intact; conjunctiva and sclera are clear bilaterally. ENT: No rhinorrhea; normal pharynx with no tonsillar hypertrophy; mucous membranes pink/moist, no erythema, no exudate. NECK: Supple; non-tender; no cervical lymphadenopathy  CARD: Normal S1, S2; no murmurs, rubs, or gallops. Regular rate and rhythm.   RESP: Normal respiratory effort; breath sounds clear and equal bilaterally; no wheezes, rhonchi, or rales. ABD: Normal bowel sounds; non-distended; non-tender; no palpable organomegaly, no masses, no bruits. Back Exam: Normal inspection; no vertebral point tenderness, no CVA tenderness. Normal range of motion. EXT: Normal ROM in all four extremities; non-tender to palpation; no swelling or deformity; distal pulses are normal, no edema. SKIN: Warm; dry; no rash. NEURO:Alert and oriented x 3, coherent, NAVNEET-XII grossly intact, sensory and motor are non-focal.   EXAM:  External genitalia normal.  Pelvic exam: cervix normal, ovaries and uterus normal size and non-tender to palpation, no cervical motion tenderness or adnexal masses. No discharge. MDM  Number of Diagnoses or Management Options  Diagnosis management comments: Assessment: 79-year-old female with first trimester bleeding and pelvic pain rule out ectopic pregnancy, miscarriage, threatened miscarriage and subchorionic hemorrhage. Plan: lab/IV fluid/ultrasound pelvis/education, reassurance, symptomatic treatment/ Monitor and Reevaluate. Amount and/or Complexity of Data Reviewed  Clinical lab tests: ordered and reviewed  Tests in the radiology section of CPT®: ordered and reviewed  Tests in the medicine section of CPT®: reviewed and ordered  Discussion of test results with the performing providers: yes  Decide to obtain previous medical records or to obtain history from someone other than the patient: yes  Obtain history from someone other than the patient: yes  Review and summarize past medical records: yes  Discuss the patient with other providers: yes  Independent visualization of images, tracings, or specimens: yes    Risk of Complications, Morbidity, and/or Mortality  Presenting problems: moderate  Diagnostic procedures: moderate  Management options: moderate           Procedures    Progress Note:   Pt has been reexamined by Doreen Gupta MD. Pt is feeling much better. Symptoms have improved.  All available results have been reviewed with pt and any available family. Ultrasound did not reveal any cardiac activity at this time. The patient is B+. 6Pt understands sx, dx, and tx in ED. Care plan has been outlined and questions have been answered. Pt is ready to go home. Will send home on threatened miscarriage instruction. Outpatient referral with PCP/obstetrician for reevaluation and further treatment as needed. Written by Jennie Mccormick MD,5:05 AM    .   .

## 2021-08-11 NOTE — ED NOTES
The documentation for this period is being entered following the guidelines as defined in the [facility] policy by Ivette Steele yu.

## 2021-08-11 NOTE — Clinical Note
[address]  [facility] EMERGENCY DEPT  [id number]  [phone]    Work/School Note    Date: 8/11/2021    To Whom It May concern:    Connie Sullivan was seen and treated today in the emergency room by the following provider(s):  Attending Provider: Claudell Cruz, MD.      Connie Sullivan is excused from work/school on 8/11/2021 through 8/14/2021. She is medically clear to return to work/school on 8/15/2021.         Sincerely,          Israel Huertas MD

## 2021-08-11 NOTE — ED NOTES
Dr. Sinai Niño reviewed discharge instructions with the patient. The patient verbalized understanding. The patient was given opportunity for questions. Patient discharged in stable condition to the waiting room via ambulatory with daughter.

## 2021-08-26 ENCOUNTER — HOSPITAL ENCOUNTER (EMERGENCY)
Age: 31
Discharge: HOME | End: 2021-08-27
Attending: EMERGENCY MEDICINE

## 2021-08-26 ENCOUNTER — APPOINTMENT (OUTPATIENT)
Dept: ULTRASOUND IMAGING | Age: 31
End: 2021-08-26
Attending: NURSE PRACTITIONER

## 2021-08-26 VITALS
OXYGEN SATURATION: 100 % | SYSTOLIC BLOOD PRESSURE: 157 MMHG | RESPIRATION RATE: 16 BRPM | HEART RATE: 98 BPM | TEMPERATURE: 97.9 F | DIASTOLIC BLOOD PRESSURE: 87 MMHG

## 2021-08-26 DIAGNOSIS — R11.0 NAUSEA WITHOUT VOMITING: Primary | ICD-10-CM

## 2021-08-26 LAB
ANION GAP SERPL CALC-SCNC: 9 MMOL/L (ref 5–15)
APPEARANCE UR: CLEAR
BACTERIA URNS QL MICRO: NEGATIVE /HPF
BASOPHILS # BLD: 0 K/UL (ref 0–0.1)
BASOPHILS NFR BLD: 0 % (ref 0–1)
BILIRUB UR QL: NEGATIVE
BUN SERPL-MCNC: 7 MG/DL (ref 6–20)
BUN/CREAT SERPL: 14 (ref 12–20)
CALCIUM SERPL-MCNC: 9.2 MG/DL (ref 8.5–10.1)
CHLORIDE SERPL-SCNC: 105 MMOL/L (ref 97–108)
CO2 SERPL-SCNC: 24 MMOL/L (ref 21–32)
COLOR UR: ABNORMAL
CREAT SERPL-MCNC: 0.5 MG/DL (ref 0.55–1.02)
DIFFERENTIAL METHOD BLD: ABNORMAL
EOSINOPHIL # BLD: 0.1 K/UL (ref 0–0.4)
EOSINOPHIL NFR BLD: 2 % (ref 0–7)
EPITH CASTS URNS QL MICRO: ABNORMAL /LPF
ERYTHROCYTE [DISTWIDTH] IN BLOOD BY AUTOMATED COUNT: 18 % (ref 11.5–14.5)
GLUCOSE SERPL-MCNC: 114 MG/DL (ref 65–100)
GLUCOSE UR STRIP.AUTO-MCNC: NEGATIVE MG/DL
HCT VFR BLD AUTO: 37.2 % (ref 35–47)
HGB BLD-MCNC: 11.1 G/DL (ref 11.5–16)
HGB UR QL STRIP: ABNORMAL
HYALINE CASTS URNS QL MICRO: ABNORMAL /LPF (ref 0–5)
IMM GRANULOCYTES # BLD AUTO: 0 K/UL (ref 0–0.04)
IMM GRANULOCYTES NFR BLD AUTO: 0 % (ref 0–0.5)
KETONES UR QL STRIP.AUTO: NEGATIVE MG/DL
LEUKOCYTE ESTERASE UR QL STRIP.AUTO: NEGATIVE
LYMPHOCYTES # BLD: 2.3 K/UL (ref 0.8–3.5)
LYMPHOCYTES NFR BLD: 39 % (ref 12–49)
MCH RBC QN AUTO: 21.2 PG (ref 26–34)
MCHC RBC AUTO-ENTMCNC: 29.8 G/DL (ref 30–36.5)
MCV RBC AUTO: 71.1 FL (ref 80–99)
MONOCYTES # BLD: 0.6 K/UL (ref 0–1)
MONOCYTES NFR BLD: 9 % (ref 5–13)
NEUTS SEG # BLD: 3 K/UL (ref 1.8–8)
NEUTS SEG NFR BLD: 50 % (ref 32–75)
NITRITE UR QL STRIP.AUTO: NEGATIVE
NRBC # BLD: 0 K/UL (ref 0–0.01)
NRBC BLD-RTO: 0 PER 100 WBC
PH UR STRIP: 5.5 [PH] (ref 5–8)
PLATELET # BLD AUTO: 272 K/UL (ref 150–400)
PMV BLD AUTO: 11.2 FL (ref 8.9–12.9)
POTASSIUM SERPL-SCNC: 3.6 MMOL/L (ref 3.5–5.1)
PROT UR STRIP-MCNC: 100 MG/DL
RBC # BLD AUTO: 5.23 M/UL (ref 3.8–5.2)
RBC #/AREA URNS HPF: ABNORMAL /HPF (ref 0–5)
SODIUM SERPL-SCNC: 138 MMOL/L (ref 136–145)
SP GR UR REFRACTOMETRY: 1.02 (ref 1–1.03)
UROBILINOGEN UR QL STRIP.AUTO: 0.2 EU/DL (ref 0.2–1)
WBC # BLD AUTO: 6 K/UL (ref 3.6–11)
WBC URNS QL MICRO: ABNORMAL /HPF (ref 0–4)

## 2021-08-26 PROCEDURE — 99282 EMERGENCY DEPT VISIT SF MDM: CPT

## 2021-08-26 PROCEDURE — 85025 COMPLETE CBC W/AUTO DIFF WBC: CPT

## 2021-08-26 PROCEDURE — 36415 COLL VENOUS BLD VENIPUNCTURE: CPT

## 2021-08-26 PROCEDURE — 80048 BASIC METABOLIC PNL TOTAL CA: CPT

## 2021-08-26 PROCEDURE — 81001 URINALYSIS AUTO W/SCOPE: CPT

## 2021-08-26 PROCEDURE — 76817 TRANSVAGINAL US OBSTETRIC: CPT

## 2021-08-26 NOTE — Clinical Note
[address]  [facility] EMERGENCY DEPT  [phone]  [phone]    Work/School Note    Date: 8/26/2021    To Whom It May concern:    Ho Wang was seen and treated today in the emergency room by the following provider(s):  Attending Provider: Brooklyn Caballero., MD  Nurse Practitioner: Sea Griffith NP. 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 She is medically clear to return to work/school on 8/29/2021.        Sincerely,          Mike Barraza NP

## 2021-08-27 NOTE — ED TRIAGE NOTES
Pt ambulatory to triage for headache, lower abd pain, back pain, and dizziness on standing x10 days. Reports frequent urination. Pt reports nausea w/o vomiting. Pt is approx 8 weeks pregnant. Was seen here 8/11 for vaginal bleeding but denies presently.

## 2021-08-27 NOTE — ED PROVIDER NOTES
This is a 39-year-old female who presents ambulatory to the emergency room with complaints of a headache, lower abdominal pain, back pain and dizziness on standing for approximately 10 days. Patient adds that she has frequent urination. Positive nausea, no vomiting. Denies any chest pain, shortness of breath, dizziness, fevers or chills. Was seen at this facility on  with vaginal bleeding which has resolved. Patient states she is 8 weeks pregnant. Has not taken any medication prior to arrival for her symptoms. There are no further complaints at this time. On chart review patient was noted to have twin gestation.     None  Past Medical History:  No date: Complication of anesthesia      Comment:  general anesthesia for both previous csections  No date: Essential hypertension      Comment:  last two pregnancies  Past Surgical History:  No date: HX  SECTION      Comment:  x2  No date: HX DILATION AND CURETTAGE             Past Medical History:   Diagnosis Date    Complication of anesthesia     general anesthesia for both previous csections    Essential hypertension     last two pregnancies       Past Surgical History:   Procedure Laterality Date    HX  SECTION      x2    HX DILATION AND CURETTAGE           Family History:   Problem Relation Age of Onset    Hypertension Father        Social History     Socioeconomic History    Marital status:      Spouse name: Not on file    Number of children: Not on file    Years of education: Not on file    Highest education level: Not on file   Occupational History    Not on file   Tobacco Use    Smoking status: Never Smoker    Smokeless tobacco: Never Used   Substance and Sexual Activity    Alcohol use: Not Currently    Drug use: Never    Sexual activity: Not Currently   Other Topics Concern     Service Not Asked    Blood Transfusions Not Asked    Caffeine Concern Not Asked    Occupational Exposure Not Asked    Hobby Hazards Not Asked    Sleep Concern Not Asked    Stress Concern Not Asked    Weight Concern Not Asked    Special Diet Not Asked    Back Care Not Asked    Exercise Not Asked    Bike Helmet Not Asked   [address] Not Asked    Self-Exams Not Asked   Social History Narrative    Not on file     Social Determinants of Health     Financial Resource Strain:     Difficulty of Paying Living Expenses:    Food Insecurity:     Worried About Running Out of Food in the Last Year:     [address] in the Last Year:    Transportation Needs:     Lack of Transportation (Medical):  Lack of Transportation (Non-Medical):    Physical Activity:     Days of Exercise per Week:     Minutes of Exercise per Session:    Stress:     Feeling of Stress :    Social Connections:     Frequency of Communication with Friends and Family:     Frequency of Social Gatherings with Friends and Family:     Attends Sabianism Services:     Active Member of Clubs or Organizations:     Attends Club or Organization Meetings:     Marital Status:    Intimate Partner Violence:     Fear of Current or Ex-Partner:     Emotionally Abused:     Physically Abused:     Sexually Abused: ALLERGIES: Other medication    Review of Systems   Constitutional: Negative for appetite change, chills, diaphoresis, fatigue and fever. HENT: Negative for congestion, ear discharge, ear pain, sinus pressure, sinus pain, sore throat and trouble swallowing. Eyes: Negative for photophobia, pain, redness and visual disturbance. Respiratory: Negative for chest tightness, shortness of breath and wheezing. Cardiovascular: Negative for chest pain and palpitations. Gastrointestinal: Positive for abdominal pain and nausea. Negative for abdominal distention and vomiting. Endocrine: Negative. Genitourinary: Positive for frequency. Negative for difficulty urinating, flank pain and urgency. Musculoskeletal: Positive for back pain.  Negative for neck pain and neck stiffness. Skin: Negative for color change, pallor, rash and wound. Allergic/Immunologic: Negative. Neurological: Positive for dizziness and headaches. Negative for speech difficulty and weakness. Hematological: Does not bruise/bleed easily. Psychiatric/Behavioral: Negative for behavioral problems. The patient is not nervous/anxious. Vitals:    08/26/21 2003   BP: (!) 157/87   Pulse: 98   Resp: 16   Temp: 97.9 °F (36.6 °C)   SpO2: 100%            Physical Exam  Vitals and nursing note reviewed. Constitutional:       General: She is not in acute distress. Appearance: Normal appearance. She is well-developed. She is not ill-appearing. HENT:      Head: Normocephalic and atraumatic. Right Ear: External ear normal.      Left Ear: External ear normal.      Nose: Nose normal.      Mouth/Throat:      Mouth: Mucous membranes are moist.   Eyes:      General:         Right eye: No discharge. Left eye: No discharge. Conjunctiva/sclera: Conjunctivae normal.      Pupils: Pupils are equal, round, and reactive to light. Neck:      Vascular: No JVD. Trachea: No tracheal deviation. Cardiovascular:      Rate and Rhythm: Normal rate and regular rhythm. Pulses: Normal pulses. Heart sounds: Normal heart sounds. No murmur heard. No gallop. Pulmonary:      Effort: Pulmonary effort is normal. No respiratory distress. Breath sounds: Normal breath sounds. No wheezing or rales. Chest:      Chest wall: No tenderness. Abdominal:      General: Bowel sounds are normal. There is no distension. Palpations: Abdomen is soft. Tenderness: There is no abdominal tenderness. There is no guarding or rebound. Genitourinary:     Comments: Negative    Musculoskeletal:         General: No tenderness. Normal range of motion. Cervical back: Normal range of motion and neck supple. Skin:     General: Skin is warm and dry.       Capillary Refill: Capillary refill takes less than 2 seconds. Coloration: Skin is not pale. Findings: No erythema or rash. Neurological:      General: No focal deficit present. Mental Status: She is alert and oriented to person, place, and time. Motor: No weakness. Coordination: Coordination normal.   Psychiatric:         Mood and Affect: Mood normal.         Behavior: Behavior normal.         Thought Content: Thought content normal.         Judgment: Judgment normal.          MDM  Number of Diagnoses or Management Options  Nausea without vomiting: new and requires workup  Diagnosis management comments: Differential diagnosis includes ectopic pregnancy, threatened , dehydration and others. After physical assessment, review of imaging and laboratory data, patient was ordered IV fluids. Patient decided to leave without completing intervention. States that she will increase p.o. intake. Left for home and will return with worsening symptoms. We will follow-up with PCP and OB/GYN as an outpatient. Amount and/or Complexity of Data Reviewed  Clinical lab tests: ordered and reviewed  Tests in the radiology section of CPT®: ordered and reviewed         Labs Reviewed   URINALYSIS W/MICROSCOPIC - Abnormal; Notable for the following components:       Result Value    Protein 100 (*)     Blood LARGE (*)     All other components within normal limits   CBC WITH AUTOMATED DIFF - Abnormal; Notable for the following components:    RBC 5.23 (*)     HGB 11.1 (*)     MCV 71.1 (*)     MCH 21.2 (*)     MCHC 29.8 (*)     RDW 18.0 (*)     All other components within normal limits   METABOLIC PANEL, BASIC - Abnormal; Notable for the following components:    Glucose 114 (*)     Creatinine 0.50 (*)     All other components within normal limits      UTS TRANSVAGINAL OB    Addendum Date: 2021    Addendum: Dual intrauterine gestations are identified.  Fetus a 7 week 4 day gestation with a heart rate of 156 bpm. Crown-rump length of 1.39 cm consistent with a 7 week 5 day gestation. Fetus B1 167 bpm crown-rump length of 1.46 cm consistent with a 7 week 6 day gestation. IMPRESSION: Dual intrauterine gestations. Result Date: 8/26/2021  Single IUP. 12:04 AM  Pt has been reexamined. Pt has no new complaints, changes or physical findings. Care plan outlined and precautions discussed. All available results were reviewed with pt. All medications were reviewed with pt. All of pt's questions and concerns were addressed. Pt agrees to F/U as instructed and agrees to return to ED upon further deterioration. Pt is ready to go home after she refused IVF. Will increase po intake. Nadege Ramey, NP    Please note that this dictation was completed with [employer], the computer voice recognition software. Quite often unanticipated grammatical, syntax, homophones, and other interpretive errors are inadvertently transcribed by the computer software. Please disregard these errors. Please excuse any errors that have escaped final proofreading. Thank you.     Procedures

## 2021-09-10 ENCOUNTER — ROUTINE PRENATAL (OUTPATIENT)
Dept: OBGYN CLINIC | Age: 31
End: 2021-09-10

## 2021-09-10 VITALS
SYSTOLIC BLOOD PRESSURE: 139 MMHG | WEIGHT: 285 LBS | DIASTOLIC BLOOD PRESSURE: 77 MMHG | BODY MASS INDEX: 50.5 KG/M2 | HEIGHT: 63 IN

## 2021-09-10 DIAGNOSIS — O30.049 DICHORIONIC DIAMNIOTIC TWIN PREGNANCY, ANTEPARTUM: ICD-10-CM

## 2021-09-10 DIAGNOSIS — O16.9 HYPERTENSION AFFECTING PREGNANCY, ANTEPARTUM: ICD-10-CM

## 2021-09-10 DIAGNOSIS — O09.90 SUPERVISION OF HIGH RISK PREGNANCY, ANTEPARTUM: Primary | ICD-10-CM

## 2021-09-10 DIAGNOSIS — Z3A.10 10 WEEKS GESTATION OF PREGNANCY: ICD-10-CM

## 2021-09-10 LAB
ABO + RH BLD: NORMAL
ANTIBODY SCREEN, EXTERNAL: NEGATIVE
BILIRUB UR QL STRIP: NEGATIVE
BLOOD BANK CMNT PATIENT-IMP: NORMAL
BLOOD GROUP ANTIBODIES SERPL: NORMAL
CHLAMYDIA, EXTERNAL: NEGATIVE
GLUCOSE UR-MCNC: NORMAL MG/DL
HBSAG, EXTERNAL: NEGATIVE
HCT, EXTERNAL: 39.2
HEPATITIS C AB,   EXT: NON REACTIVE
HGB, EXTERNAL: 11.6
HIV, EXTERNAL: NON REACTIVE
KETONES P FAST UR STRIP-MCNC: NORMAL MG/DL
N. GONORRHEA, EXTERNAL: NEGATIVE
PH UR STRIP: 6 [PH] (ref 4.6–8)
PLATELET CNT,   EXTERNAL: 291
PROT UR QL STRIP: NORMAL
RUBELLA, EXTERNAL: NORMAL
SP GR UR STRIP: 1.03 (ref 1–1.03)
SPECIMEN EXP DATE BLD: NORMAL
T. PALLIDUM, EXTERNAL: NON REACTIVE
UA UROBILINOGEN AMB POC: NORMAL (ref 0.2–1)
URINALYSIS CLARITY POC: CLEAR
URINALYSIS COLOR POC: NORMAL
URINE BLOOD POC: NORMAL
URINE LEUKOCYTES POC: NORMAL
URINE NITRITES POC: NEGATIVE

## 2021-09-10 PROCEDURE — 0500F INITIAL PRENATAL CARE VISIT: CPT | Performed by: OBSTETRICS & GYNECOLOGY

## 2021-09-10 RX ORDER — FERROUS SULFATE 325(65) MG
325 TABLET, DELAYED RELEASE (ENTERIC COATED) ORAL DAILY
Qty: 90 TABLET | Refills: 4 | Status: SHIPPED | OUTPATIENT
Start: 2021-09-10 | End: 2022-06-20

## 2021-09-10 NOTE — PROGRESS NOTES
Current pregnancy history:    Yudelka Morales is a 31 y.o. female who presents for the evaluation of pregnancy. Patient's last menstrual period was 2021 (exact date). LMP history:  The date of her LMP is  certain. Her menstrual cycles are regular and occur approximately every 28 days and range from 3 to 5 days. The last menses did  last the usual number of days. A urine pregnancy test was positive at the ER about 2 weeks ago. She WAS on the pill at conception, she was taking Femogesal.    Based on her LMP her EGA is 10 weeks and 2 days giving an EDC of 2022. Ultrasound data:  She had an ultrasound done by the ultrasound tech [facility] which revealed a viable pelayo pregnancy with a gestational age of 9 weeks and 0 days giving an [facility] of 2022. Ultrasound details:    Fetus: A  TA ULTRASOUND PERFORMED  DECREASED SCAN CLARITY DUE TO BMI >50. A KAREN TWIN VIABLE 10W2D IUP BY LMP IS SEEN BOTH WITH INCREASED CARDIAC RHYTHM. BABY A MEASURES 10W0D. BABY B MEASURES 10W0D. GESTATIONAL AGE BASED ON [facility] ULTRASOUND. A NORMAL YOLK SAC IS SEEN FOR A.  NO YOLK SAC IS SEEN FOR B.  RIGHT ADNEXA APPEARS WITHIN NORMAL LIMITS. LEFT ADNEXA APPEARS WITHIN NORMAL LIMITS. NO FREE FLUID SEEN IN THE CDS. Pregnancy symptoms:    Since her LMP she has experienced  urinary frequency, breast tenderness, fatigue and nausea. She has not been vomiting over the last few weeks. Associated signs and symptoms which she denies: discharge    She states she has gained weight:  Approximately 5 pounds over the last few weeks. Relevant past pregnancy history:  She has the following pregnancy history: Hx  x3, gestational hypertension  Seen in ER for vaginal bleeding  C/o dysuria        Relevant past medical history:(relevant to this pregnancy): noncontributory. Pap/Occupational history:  Last pap smear:   Results: normal, per patient     Her occupation is: Homemaker.      Substance history:  Negative for alcohol, tobacco and street drugs. Positive for nothing. Exposure history: There are no indoor cat/s in the home. She denies close contact with children on a regular basis. She has not had chicken pox or the vaccine in the past.   Patient denies issues with domestic violence. Genetic Screening/Teratology Counseling: (Includes patient, baby's father, or anyone in either family with:)  3.  Patient's age >/= 28 at [facility]?-- no  .   2. Thalassemia ([facility], [facility], [address], or  background): MCV<80?--no.     3.  Neural tube defect (meningomyelocele, spina bifida, anencephaly)?--no.   4.  Congenital heart defect?--no.  5.  Down syndrome?--no.   6.  Bunny-Sachs (Spiritism, [facility])?--no.   7.  Canavan's Disease?--no.   8.  Familial Dysautonomia?--no.   9.  Sickle cell disease or trait ()? --no   The patient has not been tested for sickle trait  10. Hemophilia or other blood disorders?--no. 11.  Muscular dystrophy?--no. 12.  Cystic fibrosis?--no. 13.  Nat's Chorea?--no. 14.  Mental retardation/autism (if yes was person tested for Fragile X)?--no. 15.  Other inherited genetic or chromosomal disorder?--no. 12.  Maternal metabolic disorder (DM, PKU, etc)?--no. 17.  Patient or FOB with a child with a birth defect not listed above?--no.  17a. Patient or FOB with a birth defect themselves?--no. 18.  Recurrent pregnancy loss, or stillbirth?--no. 19.  Any medications since LMP other than prenatal vitamins (include vitamins,  supplements, OTC meds, drugs, alcohol)?--no. 20.  Any other genetic/environmental exposure to discuss?--no. Infection History:  1. Lives with someone with TB or TB exposed?--no.   2.  Patient or partner has history of genital herpes?--no.  3.  Rash or viral illness since LMP?--no.    4.  History of STD (GC, CT, HPV, syphilis, HIV)? --no   5.  Other: OTHER?      OB History    Para Term  AB Living   8 5 5 0 2 5   SAB TAB Ectopic Molar Multiple Live Births   2 0 0 0 0 5      # Outcome Date GA Lbr Christ/2nd Weight Sex Delivery Anes PTL Lv   8 Current            7 Term 19 40w0d  7 lb 10.2 oz (3.465 kg) M CS-LTranv Spinal N MORE      Name: Caitlin Peterson: Nikita  Apgar5: 9   6 Term 17    F CS-Unspec   MORE   5 Term 14    F CS-Unspec   MORE   4 Term 11    M Vag-Spont   MORE   3 Term 07    F Vag-Spont   MORE   2 SAB            1 SAB                  Past Medical History:   Diagnosis Date    Complication of anesthesia     general anesthesia for both previous csections    Essential hypertension     last two pregnancies     Past Surgical History:   Procedure Laterality Date    HX  SECTION      x2    HX DILATION AND CURETTAGE       Social History     Occupational History    Not on file   Tobacco Use    Smoking status: Never Smoker    Smokeless tobacco: Never Used   Substance and Sexual Activity    Alcohol use: Not Currently    Drug use: Never    Sexual activity: Not Currently     Family History   Problem Relation Age of Onset    Hypertension Father        Allergies   Allergen Reactions    Other Medication Unknown (comments)     Pt. States does not remember the name of the shot but had a reaction. Prior to Admission medications    Medication Sig Start Date End Date Taking? Authorizing Provider   promethazine (PHENERGAN) 25 mg tablet Take 1 Tab by mouth every six (6) hours as needed for Nausea. Patient not taking: Reported on 9/10/2021 3/20/21   Shari Ramey MD   aluminum & magnesium hydroxide-simethicone (Mylanta Maximum Strength) 400-400-40 mg/5 mL suspension Take 10 mL by mouth every six (6) hours as needed for Indigestion. Indications: indigestion  Patient not taking: Reported on 9/10/2021 3/20/21   Shari Ramey MD   albuterol (PROVENTIL HFA, VENTOLIN HFA, PROAIR HFA) 90 mcg/actuation inhaler Take 2 Puffs by inhalation every six (6) hours as needed for Wheezing.   Patient not taking: Reported on 9/10/2021 3/7/21   Zane Huerta MD   ondansetron (Zofran ODT) 8 mg disintegrating tablet Take 1 Tab by mouth every eight (8) hours as needed for Nausea or Vomiting.   Patient not taking: Reported on 9/10/2021 3/7/21   Zane Huerta MD        Review of Systems: History obtained from the patient  Constitutional: negative for weight loss, fever, night sweats  HEENT: negative for hearing loss, earache, congestion, snoring, sorethroat  CV: negative for chest pain, palpitations, edema  Resp: negative for cough, shortness of breath, wheezing  Breast: negative for breast lumps, nipple discharge, galactorrhea  GI: negative for change in bowel habits, abdominal pain, black or bloody stools  : negative for frequency, dysuria, hematuria, vaginal discharge  MSK: negative for back pain, joint pain, muscle pain  Skin: negative for itching, rash, hives  Neuro: negative for dizziness, headache, confusion, weakness  Psych: negative for anxiety, depression, change in mood  Heme/lymph: negative for bleeding, bruising, pallor    Objective:  Visit Vitals  /77   Ht 5' 3\" (1.6 m)   Wt 285 lb (129.3 kg)   LMP 06/30/2021 (Exact Date)   Breastfeeding No   BMI 50.49 kg/m²       Physical Exam:   PHYSICAL EXAMINATION    Constitutional  · Appearance: well-nourished, well developed, alert, in no acute distress    HENT  · Head  · Face: appears normal  · Eyes: appear normal  · Ears: normal  · Mouth: normal  · Lips: no lesions    Neck  · Inspection/Palpation: normal appearance, no masses or tenderness  · Lymph Nodes: no lymphadenopathy present  · Thyroid: gland size normal, nontender, no nodules or masses present on palpation    Chest  · Respiratory Effort: breathing unlabored  · Auscultation: normal breath sounds    Cardiovascular  · Heart:  · Auscultation: regular rate and rhythm without murmur    Breasts  · Inspection of Breasts: breasts symmetrical, no skin changes, no discharge present, nipple appearance normal, no skin retraction present  · Palpation of Breasts and Axillae: no masses present on palpation, no breast tenderness  · Axillary Lymph Nodes: no lymphadenopathy present    Gastrointestinal  · Abdominal Examination: abdomen non-tender to palpation, normal bowel sounds, no masses present  · Liver and spleen: no hepatomegaly present, spleen not palpable  · Hernias: no hernias identified    Genitourinary  · External Genitalia: normal appearance for age, no discharge present, no tenderness present, no inflammatory lesions present, no masses present, no atrophy present  · Vagina: normal vaginal vault without central or paravaginal defects, no discharge present, no inflammatory lesions present, no masses present  · Bladder: non-tender to palpation  · Urethra: appears normal  · Cervix: normal   · Uterus: enlarged, normal shape, soft  · Adnexa: no adnexal tenderness present, no adnexal masses present  · Perineum: perineum within normal limits, no evidence of trauma, no rashes or skin lesions present  · Anus: anus within normal limits, no hemorrhoids present  · Inguinal Lymph Nodes: no lymphadenopathy present    Skin  · General Inspection: no rash, no lesions identified    Neurologic/Psychiatric  · Mental Status:  · Orientation: grossly oriented to person, place and time  · Mood and Affect: mood normal, affect appropriate    Assessment:   Intrauterine pregnancy with the following problems identified:   [facility] 4/8/2022 by US (conceived on OCP  Twins di/di  MFM 12 weeks  [place] multiparity  Hx of CS x3 - CS 38 weeks  Hx of HTN - no meds, baby ASA at 12 weeks  NIPTS? [employer]?  covid vaccine  Brothers with blood clotting issue - thin - need dx      Plan:     Offered CF testing, CVS, Nuchal Translucency, MSAFP, amnio, and discussed NIPT  Course of pregnancy discussed including visit schedule, routine U/S, glucola testing, etc.  Avoid alcoholic beverages and illicit/recreational drugs use  Take prenatal vitamins or folic acid daily.   [facility] and practice style discussed with coverage system. Discussed nutrition, toxoplasmosis precautions, sexual activity, exercise, need for influenza vaccine, environmental and work hazards, travel advice, screen for domestic violence, need for seat belts. Discussed seafood, unpasteurized dairy products, deli meat, artificial sweeteners, and caffeine. Information on prenatal classes/breastfeeding given. Information on circumcision given  Patient encouraged not to smoke. Discussed current prescription drug use. Given medication list.  Discussed the use of over the counter medications and chemicals. Route of delivery discussed, including risks, benefits, and alternatives of  versus repeat LTCS. Pt understands risk of hemorrhage during pregnancy and post delivery and would accept blood products if necessary in life-threatening emergencies  Varicella IgG        Handouts given to pt.

## 2021-09-11 LAB
CREAT UR-MCNC: 154 MG/DL
ERYTHROCYTE [DISTWIDTH] IN BLOOD BY AUTOMATED COUNT: 19 % (ref 11.5–14.5)
HBV SURFACE AG SER QL: <0.1 INDEX
HBV SURFACE AG SER QL: NEGATIVE
HCT VFR BLD AUTO: 39.2 % (ref 35–47)
HCV AB SERPL QL IA: NONREACTIVE
HGB BLD-MCNC: 11.6 G/DL (ref 11.5–16)
HIV 1+2 AB+HIV1 P24 AG SERPL QL IA: NONREACTIVE
HIV12 RESULT COMMENT, HHIVC: NORMAL
MCH RBC QN AUTO: 21.4 PG (ref 26–34)
MCHC RBC AUTO-ENTMCNC: 29.6 G/DL (ref 30–36.5)
MCV RBC AUTO: 72.3 FL (ref 80–99)
NRBC # BLD: 0 K/UL (ref 0–0.01)
NRBC BLD-RTO: 0 PER 100 WBC
PLATELET # BLD AUTO: 291 K/UL (ref 150–400)
PMV BLD AUTO: 10.6 FL (ref 8.9–12.9)
PROT UR-MCNC: 288 MG/DL (ref 0–11.9)
PROT/CREAT UR-RTO: 1.9
RBC # BLD AUTO: 5.42 M/UL (ref 3.8–5.2)
RUBV IGG SER-IMP: REACTIVE
RUBV IGG SERPL IA-ACNC: 93.8 IU/ML
WBC # BLD AUTO: 6 K/UL (ref 3.6–11)

## 2021-09-12 LAB
BACTERIA SPEC CULT: NORMAL
SERVICE CMNT-IMP: NORMAL

## 2021-09-13 LAB
T PALLIDUM AB SER QL IA: NON REACTIVE
VZV IGG SER IA-ACNC: <135 INDEX

## 2021-09-15 LAB
C TRACH RRNA CVX QL NAA+PROBE: NEGATIVE
CYTOLOGIST CVX/VAG CYTO: NORMAL
CYTOLOGY CVX/VAG DOC CYTO: NORMAL
CYTOLOGY CVX/VAG DOC THIN PREP: NORMAL
DX ICD CODE: NORMAL
HPV I/H RISK 4 DNA CVX QL PROBE+SIG AMP: NEGATIVE
Lab: NORMAL
Lab: NORMAL
N GONORRHOEA RRNA CVX QL NAA+PROBE: NEGATIVE
OTHER STN SPEC: NORMAL
STAT OF ADQ CVX/VAG CYTO-IMP: NORMAL
T VAGINALIS RRNA SPEC QL NAA+PROBE: NEGATIVE

## 2021-09-21 DIAGNOSIS — O09.90 SUPERVISION OF HIGH RISK PREGNANCY, ANTEPARTUM: ICD-10-CM

## 2021-09-29 ENCOUNTER — ROUTINE PRENATAL (OUTPATIENT)
Dept: OBGYN CLINIC | Age: 31
End: 2021-09-29

## 2021-09-29 VITALS
HEIGHT: 63 IN | DIASTOLIC BLOOD PRESSURE: 72 MMHG | SYSTOLIC BLOOD PRESSURE: 131 MMHG | WEIGHT: 283 LBS | BODY MASS INDEX: 50.14 KG/M2

## 2021-09-29 DIAGNOSIS — O09.90 SUPERVISION OF HIGH RISK PREGNANCY, ANTEPARTUM: Primary | ICD-10-CM

## 2021-09-29 DIAGNOSIS — O30.049 DICHORIONIC DIAMNIOTIC TWIN PREGNANCY, ANTEPARTUM: ICD-10-CM

## 2021-09-29 DIAGNOSIS — Z3A.12 12 WEEKS GESTATION OF PREGNANCY: ICD-10-CM

## 2021-09-29 LAB
GLUCOSE 1H P 100 G GLC PO SERPL-MCNC: 121 MG/DL (ref 65–140)
GTT, 1 HR, GLUCOLA, EXTERNAL: 121

## 2021-09-29 PROCEDURE — 0502F SUBSEQUENT PRENATAL CARE: CPT | Performed by: OBSTETRICS & GYNECOLOGY

## 2021-09-29 RX ORDER — ONDANSETRON 8 MG/1
8 TABLET, ORALLY DISINTEGRATING ORAL
Qty: 30 TABLET | Refills: 4 | Status: SHIPPED | OUTPATIENT
Start: 2021-09-29 | End: 2022-06-20

## 2021-09-29 NOTE — LETTER
9/29/2021 10:44 AM    Ms. Kayleen Santos [phone]        To Whom it may concern:      Gino Tse is under my care for pregnancy. Patient is currently pregnant with twins. EDC is 4/8/2022. She is currently 12 weeks, 5 days pregnant. Patient will be seen monthly until 28 weeks gestation, bi weekly until 36 weeks gestation, weekly until delivery.  If you have any questions please contact our office        Sincerely,      Katalina Bass MD

## 2021-09-29 NOTE — PROGRESS NOTES
Problem List  Date Reviewed: 9/10/2021        Codes Class Noted    Supervision of high risk pregnancy, antepartum ICD-10-CM: O09.90  ICD-9-CM: V23.9  2021    Overview Addendum 2021  1:38 PM by Martin Hammond LPN     [facility] 4/3/8705 by [address] (conceived on OCP)  Twins di/di  MFM 12 weeks-  (Pt notified at appt-needs -Slovak)  [address] multiparity  Hx of CS x3 - CS 38 weeks  Hx of HTN - no meds, baby ASA at 12 weeks  NIPTS? Medicaid pending  [facility]?  Medicaid pending  covid vaccine  Brothers with blood clotting issue - thin - need dx  Varicella nonimmune  *Needs rx for yeast @ next appt*             Ac[facility] respiratory failure with hypoxia ([facility] ICD-10-CM: J96.01  ICD-9-CM: 518.81  3/3/2021        Hypokalemia ICD-10-CM: E87.6  ICD-9-CM: 276.8  3/3/2021        COVID-19 ICD-10-CM: U07.1  ICD-9-CM: 079.89  3/2/2021        Severe obesity ([facility] ICD-10-CM: E66.01  ICD-9-CM: 278.01  2019        S/P repeat low transverse  ICD-10-CM: R24.904  ICD-9-CM: V45.89  2019

## 2021-09-29 NOTE — PROGRESS NOTES
Both babies appear to be moving on handheld US but visualization very difficult - has MFM today  Has pain in mid abdomen above uterus, eye hurts, dizzy  Abdomen NT - normal BM and tolerative PO but nausea  Advised needs to take Zofran so can be able to drink fluids  See MFM today  Declines NIPTS today pending medicaid - given proof of pregnancy

## 2021-10-08 ENCOUNTER — HOSPITAL ENCOUNTER (OUTPATIENT)
Dept: PERINATAL CARE | Age: 31
Discharge: HOME | End: 2021-10-08
Attending: OBSTETRICS & GYNECOLOGY

## 2021-10-08 ENCOUNTER — HOSPITAL ENCOUNTER (EMERGENCY)
Age: 31
Discharge: HOME | End: 2021-10-08
Attending: STUDENT IN AN ORGANIZED HEALTH CARE EDUCATION/TRAINING PROGRAM

## 2021-10-08 ENCOUNTER — LAB ONLY (OUTPATIENT)
Dept: OBGYN CLINIC | Age: 31
End: 2021-10-08

## 2021-10-08 VITALS
RESPIRATION RATE: 22 BRPM | OXYGEN SATURATION: 100 % | DIASTOLIC BLOOD PRESSURE: 96 MMHG | SYSTOLIC BLOOD PRESSURE: 160 MMHG | TEMPERATURE: 97.8 F | HEART RATE: 103 BPM

## 2021-10-08 DIAGNOSIS — R11.10 HYPEREMESIS: Primary | ICD-10-CM

## 2021-10-08 LAB
ALBUMIN SERPL-MCNC: 2.8 G/DL (ref 3.5–5)
ALBUMIN/GLOB SERPL: 0.7 {RATIO} (ref 1.1–2.2)
ALP SERPL-CCNC: 45 U/L (ref 45–117)
ALT SERPL-CCNC: 12 U/L (ref 12–78)
ANION GAP SERPL CALC-SCNC: 7 MMOL/L (ref 5–15)
APPEARANCE UR: ABNORMAL
AST SERPL-CCNC: 13 U/L (ref 15–37)
BACTERIA URNS QL MICRO: NEGATIVE /HPF
BASOPHILS # BLD: 0 K/UL (ref 0–0.1)
BASOPHILS NFR BLD: 0 % (ref 0–1)
BILIRUB SERPL-MCNC: 0.2 MG/DL (ref 0.2–1)
BILIRUB UR QL: NEGATIVE
BUN SERPL-MCNC: 6 MG/DL (ref 6–20)
BUN/CREAT SERPL: 12 (ref 12–20)
CALCIUM SERPL-MCNC: 8.7 MG/DL (ref 8.5–10.1)
CHLORIDE SERPL-SCNC: 104 MMOL/L (ref 97–108)
CO2 SERPL-SCNC: 27 MMOL/L (ref 21–32)
COLOR UR: ABNORMAL
CREAT SERPL-MCNC: 0.5 MG/DL (ref 0.55–1.02)
DIFFERENTIAL METHOD BLD: ABNORMAL
EOSINOPHIL # BLD: 0 K/UL (ref 0–0.4)
EOSINOPHIL NFR BLD: 1 % (ref 0–7)
EPITH CASTS URNS QL MICRO: ABNORMAL /LPF
ERYTHROCYTE [DISTWIDTH] IN BLOOD BY AUTOMATED COUNT: 17.8 % (ref 11.5–14.5)
GLOBULIN SER CALC-MCNC: 4 G/DL (ref 2–4)
GLUCOSE SERPL-MCNC: 79 MG/DL (ref 65–100)
GLUCOSE UR STRIP.AUTO-MCNC: NEGATIVE MG/DL
HCT VFR BLD AUTO: 36 % (ref 35–47)
HGB BLD-MCNC: 11 G/DL (ref 11.5–16)
HGB UR QL STRIP: ABNORMAL
IMM GRANULOCYTES # BLD AUTO: 0 K/UL (ref 0–0.04)
IMM GRANULOCYTES NFR BLD AUTO: 0 % (ref 0–0.5)
KETONES UR QL STRIP.AUTO: 80 MG/DL
LEUKOCYTE ESTERASE UR QL STRIP.AUTO: ABNORMAL
LYMPHOCYTES # BLD: 1.7 K/UL (ref 0.8–3.5)
LYMPHOCYTES NFR BLD: 30 % (ref 12–49)
MCH RBC QN AUTO: 21.9 PG (ref 26–34)
MCHC RBC AUTO-ENTMCNC: 30.6 G/DL (ref 30–36.5)
MCV RBC AUTO: 71.6 FL (ref 80–99)
MONOCYTES # BLD: 0.4 K/UL (ref 0–1)
MONOCYTES NFR BLD: 6 % (ref 5–13)
NEUTS SEG # BLD: 3.6 K/UL (ref 1.8–8)
NEUTS SEG NFR BLD: 63 % (ref 32–75)
NITRITE UR QL STRIP.AUTO: NEGATIVE
NRBC # BLD: 0 K/UL (ref 0–0.01)
NRBC BLD-RTO: 0 PER 100 WBC
PH UR STRIP: 6 [PH] (ref 5–8)
PLATELET # BLD AUTO: 232 K/UL (ref 150–400)
PMV BLD AUTO: 10.4 FL (ref 8.9–12.9)
POTASSIUM SERPL-SCNC: 3.8 MMOL/L (ref 3.5–5.1)
PROT SERPL-MCNC: 6.8 G/DL (ref 6.4–8.2)
PROT UR STRIP-MCNC: 300 MG/DL
RBC # BLD AUTO: 5.03 M/UL (ref 3.8–5.2)
RBC #/AREA URNS HPF: ABNORMAL /HPF (ref 0–5)
SODIUM SERPL-SCNC: 138 MMOL/L (ref 136–145)
SP GR UR REFRACTOMETRY: 1.02 (ref 1–1.03)
UROBILINOGEN UR QL STRIP.AUTO: 0.2 EU/DL (ref 0.2–1)
WBC # BLD AUTO: 5.8 K/UL (ref 3.6–11)
WBC URNS QL MICRO: ABNORMAL /HPF (ref 0–4)

## 2021-10-08 PROCEDURE — 96374 THER/PROPH/DIAG INJ IV PUSH: CPT

## 2021-10-08 PROCEDURE — 80053 COMPREHEN METABOLIC PANEL: CPT

## 2021-10-08 PROCEDURE — 96361 HYDRATE IV INFUSION ADD-ON: CPT

## 2021-10-08 PROCEDURE — 85025 COMPLETE CBC W/AUTO DIFF WBC: CPT

## 2021-10-08 PROCEDURE — 76815 OB US LIMITED FETUS(S): CPT | Performed by: OBSTETRICS & GYNECOLOGY

## 2021-10-08 PROCEDURE — 36415 COLL VENOUS BLD VENIPUNCTURE: CPT

## 2021-10-08 PROCEDURE — 74011250636 HC RX REV CODE- 250/636: Performed by: NURSE PRACTITIONER

## 2021-10-08 PROCEDURE — 81001 URINALYSIS AUTO W/SCOPE: CPT

## 2021-10-08 PROCEDURE — 99282 EMERGENCY DEPT VISIT SF MDM: CPT

## 2021-10-08 RX ORDER — ONDANSETRON 2 MG/ML
4 INJECTION INTRAMUSCULAR; INTRAVENOUS
Status: COMPLETED | OUTPATIENT
Start: 2021-10-08 | End: 2021-10-08

## 2021-10-08 RX ADMIN — ONDANSETRON 4 MG: 2 INJECTION INTRAMUSCULAR; INTRAVENOUS at 17:37

## 2021-10-08 RX ADMIN — SODIUM CHLORIDE 1000 ML: 9 INJECTION, SOLUTION INTRAVENOUS at 17:37

## 2021-10-08 NOTE — ED TRIAGE NOTES
Pt arrives to ED pregnant with twins 14 weeks. Pt sent by OB for fluids due to nausea and vomiting, headache. Pt reports this is her 8th pregnancy.  Pt reports this pregnancy has been normal.

## 2021-10-08 NOTE — Clinical Note
[facility]  [facility] EMERGENCY DEPT  [phone]  [phone]    Work/School Note    Date: 10/8/2021    To Whom It May concern:    Molly Monk was seen and treated today in the emergency room by the following provider(s):  Attending Provider: Gurpreet Armendariz MD  Nurse Practitioner: Ely Flower NP. Molly Monk is excused from work/school on 10/08/21 and 10/09/21. She is medically clear to return to work/school on 10/10/2021.        Sincerely,          Sumeet Short NP

## 2021-10-08 NOTE — ED NOTES
I have reviewed discharge instructions with the patient. The patient verbalized understanding. The patient was able to ambulate to the exit with a steady gait an did not appear to be in any distress.

## 2021-10-08 NOTE — ED PROVIDER NOTES
This is a 62-year-old female who presents ambulatory to the emergency room with complaints of a headache, dizziness, nausea and vomiting. She was sent by her OB GYN for IVF due to the symptoms. Denies any chest pain, shortness of breath,  fevers or chills. Positive dizziness. Patient states she is with a twin pregnancy, currently 14 weeks pregnant. No vaginal bleeding, no vaginal discharge. Has not taken any medication prior to arrival for her symptoms. Actively dry heaving in triage. Adds she feels weak. There are no further complaints at this time.     None  Past Medical History:  No date: Complication of anesthesia      Comment:  general anesthesia for both previous csections  No date: Essential hypertension      Comment:  last two pregnancies  Past Surgical History:  No date: HX  SECTION      Comment:  x2  No date: HX DILATION AND CURETTAGE                Past Medical History:   Diagnosis Date    Complication of anesthesia     general anesthesia for both previous csections    Essential hypertension     last two pregnancies       Past Surgical History:   Procedure Laterality Date    HX  SECTION      x2    HX DILATION AND CURETTAGE           Family History:   Problem Relation Age of Onset    Hypertension Father        Social History     Socioeconomic History    Marital status:      Spouse name: Not on file    Number of children: Not on file    Years of education: Not on file    Highest education level: Not on file   Occupational History    Not on file   Tobacco Use    Smoking status: Never Smoker    Smokeless tobacco: Never Used   Substance and Sexual Activity    Alcohol use: Not Currently    Drug use: Never    Sexual activity: Not Currently   Other Topics Concern     Service Not Asked    Blood Transfusions Not Asked    Caffeine Concern Not Asked    Occupational Exposure Not Asked    Hobby Hazards Not Asked    Sleep Concern Not Asked    Stress Concern Not Asked  Weight Concern Not Asked    Special Diet Not Asked    Back Care Not Asked    Exercise Not Asked    Bike Helmet Not Asked   [place] Not Asked    Self-Exams Not Asked   Social History Narrative    Not on file     Social Determinants of Health     Financial Resource Strain:     Difficulty of Paying Living Expenses:    Food Insecurity:     Worried About Running Out of Food in the Last Year:     Ran Out of Food in the Last Year:    Transportation Needs:     Lack of Transportation (Medical):  Lack of Transportation (Non-Medical):    Physical Activity:     Days of Exercise per Week:     Minutes of Exercise per Session:    Stress:     Feeling of Stress :    Social Connections:     Frequency of Communication with Friends and Family:     Frequency of Social Gatherings with Friends and Family:     Attends [employer] Services:     Active Member of Clubs or Organizations:     Attends Club or Organization Meetings:     Marital Status:    Intimate Partner Violence:     Fear of Current or Ex-Partner:     Emotionally Abused:     Physically Abused:     Sexually Abused: ALLERGIES: Other medication    Review of Systems   Constitutional: Positive for appetite change. Negative for activity change, fatigue and fever. HENT: Negative for congestion and trouble swallowing. Eyes: Negative for redness. Respiratory: Negative for chest tightness and shortness of breath. Cardiovascular: Negative for chest pain and palpitations. Gastrointestinal: Positive for nausea and vomiting. Genitourinary: Negative for difficulty urinating, frequency and urgency. Musculoskeletal: Negative for arthralgias. Skin: Negative for color change, pallor, rash and wound. Neurological: Positive for weakness (generalized) and headaches. Psychiatric/Behavioral: The patient is not nervous/anxious. There were no vitals filed for this visit. Physical Exam  Vitals and nursing note reviewed. Constitutional:       General: She is not in acute distress. Appearance: Normal appearance. She is well-developed. She is not ill-appearing. HENT:      Head: Normocephalic and atraumatic. Right Ear: External ear normal.      Left Ear: External ear normal.      Nose: Nose normal. No congestion. Mouth/Throat:      Mouth: Mucous membranes are moist.   Eyes:      General:         Right eye: No discharge. Left eye: No discharge. Conjunctiva/sclera: Conjunctivae normal.      Pupils: Pupils are equal, round, and reactive to light. Neck:      Vascular: No JVD. Trachea: No tracheal deviation. Cardiovascular:      Rate and Rhythm: Regular rhythm. Tachycardia present. Pulses: Normal pulses. Heart sounds: Normal heart sounds. Pulmonary:      Effort: Pulmonary effort is normal. No respiratory distress. Abdominal:      General: Bowel sounds are normal. There is no distension. Palpations: Abdomen is soft. Tenderness: There is no abdominal tenderness. There is no guarding or rebound. Genitourinary:     Comments: Negative. Gravid abdomen. Positive nausea, positive vomiting in triage. Musculoskeletal:         General: No tenderness. Normal range of motion. Cervical back: Normal range of motion and neck supple. Skin:     General: Skin is warm and dry. Capillary Refill: Capillary refill takes less than 2 seconds. Coloration: Skin is not pale. Findings: No erythema or rash. Neurological:      General: No focal deficit present. Mental Status: She is alert and oriented to person, place, and time. Coordination: Coordination normal.   Psychiatric:         Behavior: Behavior normal.         Thought Content:  Thought content normal.         Judgment: Judgment normal.          MDM  Number of Diagnoses or Management Options  Hyperemesis: new and requires workup  Diagnosis management comments: Differential diagnosis includes hyperemesis , dehydration, electrolyte imbalance and others. After physical assessment, review of laboratory data and IV fluids, patient's symptoms improved. Discharged home and will follow up with PCP. Follow-up with OB/GYN. Return to the emergency room with worsening symptoms. Patient in agreement with plan of care. Amount and/or Complexity of Data Reviewed  Clinical lab tests: ordered and reviewed         Labs Reviewed   CBC WITH AUTOMATED DIFF - Abnormal; Notable for the following components:       Result Value    HGB 11.0 (*)     MCV 71.6 (*)     MCH 21.9 (*)     RDW 17.8 (*)     All other components within normal limits   METABOLIC PANEL, COMPREHENSIVE - Abnormal; Notable for the following components:    Creatinine 0.50 (*)     AST (SGOT) 13 (*)     Albumin 2.8 (*)     A-G Ratio 0.7 (*)     All other components within normal limits   URINALYSIS W/MICROSCOPIC     No results found. 6:32 PM  Pt has been reexamined. Pt has no new complaints, changes or physical findings. Care plan outlined and precautions discussed. All available results were reviewed with pt. All medications were reviewed with pt. All of pt's questions and concerns were addressed. Pt agrees to F/U as instructed and agrees to return to ED upon further deterioration. Pt is ready to go home. Hema Zarate NP    Please note that this dictation was completed with [email or web address], the computer voice recognition software. Quite often unanticipated grammatical, syntax, homophones, and other interpretive errors are inadvertently transcribed by the computer software. Please disregard these errors. Please excuse any errors that have escaped final proofreading. Thank you.     Procedures

## 2021-10-20 ENCOUNTER — HOSPITAL ENCOUNTER (EMERGENCY)
Age: 31
Discharge: HOME | End: 2021-10-21
Attending: EMERGENCY MEDICINE

## 2021-10-20 DIAGNOSIS — Z3A.16 16 WEEKS GESTATION OF PREGNANCY: ICD-10-CM

## 2021-10-20 DIAGNOSIS — R10.9 ABDOMINAL CRAMPING: Primary | ICD-10-CM

## 2021-10-20 LAB
COMMENT, HOLDF: NORMAL
SAMPLES BEING HELD,HOLD: NORMAL
UR CULT HOLD, URHOLD: NORMAL

## 2021-10-20 PROCEDURE — 81001 URINALYSIS AUTO W/SCOPE: CPT

## 2021-10-20 PROCEDURE — 74011250636 HC RX REV CODE- 250/636: Performed by: EMERGENCY MEDICINE

## 2021-10-20 PROCEDURE — 96360 HYDRATION IV INFUSION INIT: CPT

## 2021-10-20 PROCEDURE — 96361 HYDRATE IV INFUSION ADD-ON: CPT

## 2021-10-20 PROCEDURE — 99285 EMERGENCY DEPT VISIT HI MDM: CPT

## 2021-10-20 PROCEDURE — 85025 COMPLETE CBC W/AUTO DIFF WBC: CPT

## 2021-10-20 PROCEDURE — 36415 COLL VENOUS BLD VENIPUNCTURE: CPT

## 2021-10-20 PROCEDURE — 80053 COMPREHEN METABOLIC PANEL: CPT

## 2021-10-20 RX ADMIN — SODIUM CHLORIDE 1000 ML: 9 INJECTION, SOLUTION INTRAVENOUS at 23:43

## 2021-10-21 ENCOUNTER — APPOINTMENT (OUTPATIENT)
Dept: ULTRASOUND IMAGING | Age: 31
End: 2021-10-21
Attending: EMERGENCY MEDICINE

## 2021-10-21 VITALS
HEIGHT: 63 IN | WEIGHT: 283 LBS | SYSTOLIC BLOOD PRESSURE: 145 MMHG | TEMPERATURE: 98 F | BODY MASS INDEX: 50.14 KG/M2 | HEART RATE: 97 BPM | DIASTOLIC BLOOD PRESSURE: 75 MMHG | OXYGEN SATURATION: 98 % | RESPIRATION RATE: 18 BRPM

## 2021-10-21 LAB
ALBUMIN SERPL-MCNC: 2.6 G/DL (ref 3.5–5)
ALBUMIN/GLOB SERPL: 0.6 {RATIO} (ref 1.1–2.2)
ALP SERPL-CCNC: 42 U/L (ref 45–117)
ALT SERPL-CCNC: 11 U/L (ref 12–78)
ANION GAP SERPL CALC-SCNC: 9 MMOL/L (ref 5–15)
APPEARANCE UR: ABNORMAL
AST SERPL-CCNC: 15 U/L (ref 15–37)
BACTERIA URNS QL MICRO: NEGATIVE /HPF
BASOPHILS # BLD: 0 K/UL (ref 0–0.1)
BASOPHILS NFR BLD: 1 % (ref 0–1)
BILIRUB SERPL-MCNC: 0.3 MG/DL (ref 0.2–1)
BILIRUB UR QL: NEGATIVE
BUN SERPL-MCNC: 7 MG/DL (ref 6–20)
BUN/CREAT SERPL: 12 (ref 12–20)
CALCIUM SERPL-MCNC: 8.7 MG/DL (ref 8.5–10.1)
CHLORIDE SERPL-SCNC: 107 MMOL/L (ref 97–108)
CO2 SERPL-SCNC: 23 MMOL/L (ref 21–32)
COLOR UR: ABNORMAL
CREAT SERPL-MCNC: 0.58 MG/DL (ref 0.55–1.02)
DIFFERENTIAL METHOD BLD: ABNORMAL
EOSINOPHIL # BLD: 0.1 K/UL (ref 0–0.4)
EOSINOPHIL NFR BLD: 2 % (ref 0–7)
EPITH CASTS URNS QL MICRO: ABNORMAL /LPF
ERYTHROCYTE [DISTWIDTH] IN BLOOD BY AUTOMATED COUNT: 18.3 % (ref 11.5–14.5)
GLOBULIN SER CALC-MCNC: 4.4 G/DL (ref 2–4)
GLUCOSE SERPL-MCNC: 89 MG/DL (ref 65–100)
GLUCOSE UR STRIP.AUTO-MCNC: NEGATIVE MG/DL
HCT VFR BLD AUTO: 34.5 % (ref 35–47)
HGB BLD-MCNC: 10.5 G/DL (ref 11.5–16)
HGB UR QL STRIP: ABNORMAL
HYALINE CASTS URNS QL MICRO: ABNORMAL /LPF (ref 0–5)
IMM GRANULOCYTES # BLD AUTO: 0 K/UL (ref 0–0.04)
IMM GRANULOCYTES NFR BLD AUTO: 1 % (ref 0–0.5)
KETONES UR QL STRIP.AUTO: NEGATIVE MG/DL
LEUKOCYTE ESTERASE UR QL STRIP.AUTO: ABNORMAL
LYMPHOCYTES # BLD: 2.1 K/UL (ref 0.8–3.5)
LYMPHOCYTES NFR BLD: 33 % (ref 12–49)
MCH RBC QN AUTO: 21.9 PG (ref 26–34)
MCHC RBC AUTO-ENTMCNC: 30.4 G/DL (ref 30–36.5)
MCV RBC AUTO: 71.9 FL (ref 80–99)
MONOCYTES # BLD: 0.6 K/UL (ref 0–1)
MONOCYTES NFR BLD: 9 % (ref 5–13)
NEUTS SEG # BLD: 3.6 K/UL (ref 1.8–8)
NEUTS SEG NFR BLD: 56 % (ref 32–75)
NITRITE UR QL STRIP.AUTO: NEGATIVE
NRBC # BLD: 0 K/UL (ref 0–0.01)
NRBC BLD-RTO: 0 PER 100 WBC
PH UR STRIP: 6.5 [PH] (ref 5–8)
PLATELET # BLD AUTO: 223 K/UL (ref 150–400)
POTASSIUM SERPL-SCNC: 3.4 MMOL/L (ref 3.5–5.1)
PROT SERPL-MCNC: 7 G/DL (ref 6.4–8.2)
PROT UR STRIP-MCNC: NEGATIVE MG/DL
RBC # BLD AUTO: 4.8 M/UL (ref 3.8–5.2)
RBC #/AREA URNS HPF: ABNORMAL /HPF (ref 0–5)
SODIUM SERPL-SCNC: 139 MMOL/L (ref 136–145)
SP GR UR REFRACTOMETRY: <1.005 (ref 1–1.03)
UROBILINOGEN UR QL STRIP.AUTO: 0.2 EU/DL (ref 0.2–1)
WBC # BLD AUTO: 6.4 K/UL (ref 3.6–11)
WBC URNS QL MICRO: ABNORMAL /HPF (ref 0–4)

## 2021-10-21 PROCEDURE — 76770 US EXAM ABDO BACK WALL COMP: CPT

## 2021-10-21 PROCEDURE — 76815 OB US LIMITED FETUS(S): CPT

## 2021-10-21 PROCEDURE — 74011250637 HC RX REV CODE- 250/637: Performed by: EMERGENCY MEDICINE

## 2021-10-21 PROCEDURE — 76810 OB US >/= 14 WKS ADDL FETUS: CPT

## 2021-10-21 RX ORDER — ACETAMINOPHEN 500 MG
1000 TABLET ORAL
Status: COMPLETED | OUTPATIENT
Start: 2021-10-21 | End: 2021-10-21

## 2021-10-21 RX ADMIN — ACETAMINOPHEN 1000 MG: 500 TABLET ORAL at 01:00

## 2021-10-21 NOTE — ED NOTES
I have reviewed discharge instructions with the patient. The patient verbalized understanding. No further questions at this time. Patient is being discharged from the ED via wheelchair and in stable condition.

## 2021-10-21 NOTE — ED PROVIDER NOTES
39-year-old female presenting ER with complaint of abdominal pain/cramping currently approximately 15 weeks pregnant according to the patient. Patient is followed by Dr. Elsie Blanton. Patient reports that she started having abdominal pain today. Describes it as intermittent lower abdominal cramping. Denies any vaginal bleeding or discharge. Reports that she has had previous vaginal bleeding during this pregnancy. No fevers or chills. No nausea vomiting or diarrhea. No pain with urination. After exam and ultrasounds were performed patient later reports that this cramping that she is having is something that she has been having ongoing has been seen by her OB/GYN for and has had ultrasounds previously for and pelvic exams. Patient was asking the ultrasound tech with the gender of the baby's work and then asked me multiple times if I knew what the gender of her twins were. Patient's pain seems to be improved after receiving Tylenol and IV fluids. No concern for urinary tract infection. Normal lab work.            Past Medical History:   Diagnosis Date    Complication of anesthesia     general anesthesia for both previous csections    Essential hypertension     last two pregnancies       Past Surgical History:   Procedure Laterality Date    HX  SECTION      x2    HX DILATION AND CURETTAGE           Family History:   Problem Relation Age of Onset    Hypertension Father        Social History     Socioeconomic History    Marital status:      Spouse name: Not on file    Number of children: Not on file    Years of education: Not on file    Highest education level: Not on file   Occupational History    Not on file   Tobacco Use    Smoking status: Never Smoker    Smokeless tobacco: Never Used   Substance and Sexual Activity    Alcohol use: Not Currently    Drug use: Never    Sexual activity: Not Currently   Other Topics Concern     Service Not Asked    Blood Transfusions Not Asked  Caffeine Concern Not Asked    Occupational Exposure Not Asked    Hobby Hazards Not Asked    Sleep Concern Not Asked    Stress Concern Not Asked    Weight Concern Not Asked    Special Diet Not Asked    Back Care Not Asked    Exercise Not Asked    Bike Helmet Not Asked   [address] Not Asked    Self-Exams Not Asked   Social History Narrative    Not on file     Social Determinants of Health     Financial Resource Strain:     Difficulty of Paying Living Expenses:    Food Insecurity:     Worried About Running Out of Food in the Last Year:     [address] in the Last Year:    Transportation Needs:     Lack of Transportation (Medical):  Lack of Transportation (Non-Medical):    Physical Activity:     Days of Exercise per Week:     Minutes of Exercise per Session:    Stress:     Feeling of Stress :    Social Connections:     Frequency of Communication with Friends and Family:     Frequency of Social Gatherings with Friends and Family:     Attends [employer] Services:     Active Member of Clubs or Organizations:     Attends Club or Organization Meetings:     Marital Status:    Intimate Partner Violence:     Fear of Current or Ex-Partner:     Emotionally Abused:     Physically Abused:     Sexually Abused: ALLERGIES: Other medication    Review of Systems   Constitutional: Negative for chills and fever. HENT: Negative for congestion and sore throat. Eyes: Negative for pain. Respiratory: Negative for shortness of breath. Cardiovascular: Negative for chest pain. Gastrointestinal: Positive for abdominal pain (cramping). Negative for diarrhea, nausea and vomiting. Genitourinary: Positive for pelvic pain (cramping). Negative for dysuria, flank pain, vaginal bleeding, vaginal discharge and vaginal pain. Musculoskeletal: Negative for back pain and neck pain. Skin: Negative for rash. Neurological: Negative for dizziness and headaches.    All other systems reviewed and are negative. Vitals:    10/20/21 2345 10/21/21 0000 10/21/21 0055 10/21/21 0100   BP: 137/71 127/68 (!) 149/61 (!) 136/58   Pulse:       Resp:       Temp:       SpO2:  100% 99% 100%   Weight:       Height:                Physical Exam  Constitutional:       Appearance: She is well-developed. HENT:      Head: Normocephalic. Eyes:      Conjunctiva/sclera: Conjunctivae normal.   Cardiovascular:      Rate and Rhythm: Normal rate and regular rhythm. Pulmonary:      Effort: Pulmonary effort is normal. No respiratory distress. Breath sounds: Normal breath sounds. Abdominal:      General: Bowel sounds are normal.      Palpations: Abdomen is soft. Tenderness: There is abdominal tenderness in the periumbilical area and suprapubic area. There is no right CVA tenderness, left CVA tenderness, guarding or rebound. Negative signs include Payne's sign and McBurney's sign. Comments: Gravid uterus   Musculoskeletal:         General: Normal range of motion. Cervical back: Normal range of motion and neck supple. Skin:     General: Skin is warm. Capillary Refill: Capillary refill takes less than 2 seconds. Findings: No rash. Neurological:      Mental Status: She is alert and oriented to person, place, and time. Comments: No gross motor or sensory deficits          MDM  Number of Diagnoses or Management Options  16 weeks gestation of pregnancy  Abdominal cramping  Diagnosis management comments: See HPI.        Amount and/or Complexity of Data Reviewed  Clinical lab tests: reviewed  Tests in the radiology section of CPT®: reviewed           Procedures      Recent Results (from the past 24 hour(s))   URINALYSIS W/MICROSCOPIC    Collection Time: 10/20/21 11:42 PM   Result Value Ref Range    Color YELLOW/STRAW      Appearance CLOUDY (A) CLEAR      Specific gravity <1.005 1.003 - 1.030    pH (UA) 6.5 5.0 - 8.0      Protein Negative NEG mg/dL    Glucose Negative NEG mg/dL    Ketone Negative NEG mg/dL    Bilirubin Negative NEG      Blood SMALL (A) NEG      Urobilinogen 0.2 0.2 - 1.0 EU/dL    Nitrites Negative NEG      Leukocyte Esterase LARGE (A) NEG      WBC 5-10 0 - 4 /hpf    RBC 0-5 0 - 5 /hpf    Epithelial cells MODERATE (A) FEW /lpf    Bacteria Negative NEG /hpf    Hyaline cast 0-2 0 - 5 /lpf   URINE CULTURE HOLD SAMPLE    Collection Time: 10/20/21 11:42 PM    Specimen: Serum; Urine   Result Value Ref Range    Urine culture hold        Urine on hold in Microbiology dept for 2 days. If unpreserved urine is submitted, it cannot be used for addtional testing after 24 hours, recollection will be required. CBC WITH AUTOMATED DIFF    Collection Time: 10/20/21 11:42 PM   Result Value Ref Range    WBC 6.4 3.6 - 11.0 K/uL    RBC 4.80 3.80 - 5.20 M/uL    HGB 10.5 (L) 11.5 - 16.0 g/dL    HCT 34.5 (L) 35.0 - 47.0 %    MCV 71.9 (L) 80.0 - 99.0 FL    MCH 21.9 (L) 26.0 - 34.0 PG    MCHC 30.4 30.0 - 36.5 g/dL    RDW 18.3 (H) 11.5 - 14.5 %    PLATELET 377 110 - 599 K/uL    NRBC 0.0 0  WBC    ABSOLUTE NRBC 0.00 0.00 - 0.01 K/uL    NEUTROPHILS 56 32 - 75 %    LYMPHOCYTES 33 12 - 49 %    MONOCYTES 9 5 - 13 %    EOSINOPHILS 2 0 - 7 %    BASOPHILS 1 0 - 1 %    IMMATURE GRANULOCYTES 1 (H) 0.0 - 0.5 %    ABS. NEUTROPHILS 3.6 1.8 - 8.0 K/UL    ABS. LYMPHOCYTES 2.1 0.8 - 3.5 K/UL    ABS. MONOCYTES 0.6 0.0 - 1.0 K/UL    ABS. EOSINOPHILS 0.1 0.0 - 0.4 K/UL    ABS. BASOPHILS 0.0 0.0 - 0.1 K/UL    ABS. IMM.  GRANS. 0.0 0.00 - 0.04 K/UL    DF AUTOMATED     METABOLIC PANEL, COMPREHENSIVE    Collection Time: 10/20/21 11:42 PM   Result Value Ref Range    Sodium 139 136 - 145 mmol/L    Potassium 3.4 (L) 3.5 - 5.1 mmol/L    Chloride 107 97 - 108 mmol/L    CO2 23 21 - 32 mmol/L    Anion gap 9 5 - 15 mmol/L    Glucose 89 65 - 100 mg/dL    BUN 7 6 - 20 MG/DL    Creatinine 0.58 0.55 - 1.02 MG/DL    BUN/Creatinine ratio 12 12 - 20      GFR est AA >60 >60 ml/min/1.73m2    GFR est non-AA >60 >60 ml/min/1.73m2    Calcium 8.7 8.5 - 10.1 MG/DL    Bilirubin, total 0.3 0.2 - 1.0 MG/DL    ALT (SGPT) 11 (L) 12 - 78 U/L    AST (SGOT) 15 15 - 37 U/L    Alk. phosphatase 42 (L) 45 - 117 U/L    Protein, total 7.0 6.4 - 8.2 g/dL    Albumin 2.6 (L) 3.5 - 5.0 g/dL    Globulin 4.4 (H) 2.0 - 4.0 g/dL    A-G Ratio 0.6 (L) 1.1 - 2.2     SAMPLES BEING HELD    Collection Time: 10/20/21 11:42 PM   Result Value Ref Range    SAMPLES BEING HELD 1RED,1SST     COMMENT        Add-on orders for these samples will be processed based on acceptable specimen integrity and analyte stability, which may vary by analyte. US RETROPERITONEUM COMP    Result Date: 10/21/2021  EXAM: US RENAL-RETROPERITONEAL INDICATION: Abdominal pain. Pregnant. COMPARISON: None FINDINGS: The patient is studied with coronal and axial real-time ultrasound. The right kidney measures 12.3 cm, and the left kidney measures 12.9 cm. Renal contour and echogenicity are normal.  There is no mass or shadowing calculus. There is mild right pelviectasis. There is no left hydronephrosis. The ureters are not dilated. The aorta is normal in caliber. The origins of the iliac arteries are normal in caliber. The IVC is patent. The urinary bladder is unremarkable. Bilateral ureteral jets are demonstrated. Mild right kidney pelviectasis may be physiologic in the setting of pregnancy. Normal appearance of the renal parenchyma. US PREG UTS LTD    Result Date: 10/21/2021  EXAM: US OB EVAL SINGLE GESTATION LESS THAN 14 WEEKS INDICATION: Pelvic pain. Pregnant. COMPARISON: Ultrasound 9/10/2021 TECHNIQUE: Transabdominal ultrasound of the pelvis. hCG: Not available FINDINGS:  The gravid uterus contains a twin gestation. Fetus A: Breech position with fetal measurements corresponding to an estimated gestational age of 12 weeks 1 day. Fetal heart rate of 163 bpm. Amniotic fluid within normal limits. Placenta anterior.  Fetus B: Vertex position with fetal measurements corresponding to an estimated gestational age of 16 weeks 1 day. Fetal heart rate of 157 bpm. Amniotic fluid within normal limits. Placenta posterior. The cervix is unremarkable measuring 4.7 cm in length. The ovaries are not seen due to bowel gas and a gravid uterus. Viable intrauterine twin gestation with an estimated gestational age of 12 weeks 1 day.

## 2021-10-21 NOTE — ED TRIAGE NOTES
Pt arrives with complaint of abdominal cramping w/ nausea starting 1 hr ago. Pt denies vaginal bleeding at this time, acknowledges white discharge. Pain with urination. 8th pregnancy per pt.

## 2021-10-27 ENCOUNTER — ROUTINE PRENATAL (OUTPATIENT)
Dept: OBGYN CLINIC | Age: 31
End: 2021-10-27

## 2021-10-27 VITALS — DIASTOLIC BLOOD PRESSURE: 70 MMHG | SYSTOLIC BLOOD PRESSURE: 134 MMHG | WEIGHT: 286 LBS | BODY MASS INDEX: 50.66 KG/M2

## 2021-10-27 DIAGNOSIS — O30.049 DICHORIONIC DIAMNIOTIC TWIN PREGNANCY, ANTEPARTUM: ICD-10-CM

## 2021-10-27 DIAGNOSIS — O09.90 SUPERVISION OF HIGH RISK PREGNANCY, ANTEPARTUM: Primary | ICD-10-CM

## 2021-10-27 DIAGNOSIS — Z3A.16 16 WEEKS GESTATION OF PREGNANCY: ICD-10-CM

## 2021-10-27 PROCEDURE — 90471 IMMUNIZATION ADMIN: CPT | Performed by: OBSTETRICS & GYNECOLOGY

## 2021-10-27 PROCEDURE — 0502F SUBSEQUENT PRENATAL CARE: CPT | Performed by: OBSTETRICS & GYNECOLOGY

## 2021-10-27 PROCEDURE — 90686 IIV4 VACC NO PRSV 0.5 ML IM: CPT | Performed by: OBSTETRICS & GYNECOLOGY

## 2021-10-27 RX ORDER — ASPIRIN 81 MG/1
81 TABLET ORAL DAILY
Qty: 90 TABLET | Refills: 3 | Status: SHIPPED | OUTPATIENT
Start: 2021-10-27 | End: 2022-06-20

## 2021-10-27 NOTE — PROGRESS NOTES
AFP today   weight up 3#  No FM yet  Wont take vit or iron, refuses nausea meds - states she can't eat or drink but weight up  Has medicaid now - will get AFP and [facility]  Advised needs to start baby ASA  Flu vaccine today

## 2021-10-27 NOTE — PROGRESS NOTES
Problem List  Date Reviewed: 2021        Codes Class Noted    Supervision of high risk pregnancy, antepartum ICD-10-CM: O09.90  ICD-9-CM: V23.9  2021    Overview Addendum 10/1/2021  7:23 AM by Elyse Batista     [facility] 2022 by [address] (conceived on OCP)  Twins di/di  MFM 12 weeks-  (Pt notified at appt-needs -[place])  [address] multiparity  Hx of CS x3 - CS 38 weeks  Hx of HTN - no meds, baby ASA at 12 weeks  NIPTS? Medicaid pending  [employer]?  Medicaid pending  covid vaccine  Brothers with blood clotting issue - thin - need dx  Varicella nonimmune  *Needs rx for yeast @ next appt*  Early Glucola- 124             Acute respiratory failure with hypoxia ([facility] [address] ICD-10-CM: J96.01  ICD-9-CM: 518.81  3/3/2021        Hypokalemia ICD-10-CM: E87.6  ICD-9-CM: 276.8  3/3/2021        COVID-19 ICD-10-CM: U07.1  ICD-9-CM: 079.89  3/2/2021        Severe obesity ([facility] [address] ICD-10-CM: E66.01  ICD-9-CM: 278.01  2019        S/P repeat low transverse  ICD-10-CM: J97.671  ICD-9-CM: V45.89  2019

## 2021-10-29 LAB
AFP ADJ MOM SERPL: 1.15
AFP INTERP SERPL-IMP: NORMAL
AFP INTERP SERPL-IMP: NORMAL
AFP SERPL-MCNC: 26.8 NG/ML
AGE AT DELIVERY: 32.5 YR
COMMENT, 018013: NORMAL
GA METHOD: NORMAL
GA: 16 WEEKS
IDDM PATIENT QL: NO
MULTIPLE PREGNANCY: NORMAL
NEURAL TUBE DEFECT RISK FETUS: NORMAL %
RESULTS, 017004: NORMAL

## 2021-11-01 ENCOUNTER — APPOINTMENT (OUTPATIENT)
Dept: VASCULAR SURGERY | Age: 31
End: 2021-11-01
Attending: EMERGENCY MEDICINE

## 2021-11-01 ENCOUNTER — HOSPITAL ENCOUNTER (EMERGENCY)
Age: 31
Discharge: HOME | End: 2021-11-01
Attending: EMERGENCY MEDICINE

## 2021-11-01 VITALS
OXYGEN SATURATION: 100 % | SYSTOLIC BLOOD PRESSURE: 140 MMHG | RESPIRATION RATE: 20 BRPM | WEIGHT: 286 LBS | HEART RATE: 100 BPM | DIASTOLIC BLOOD PRESSURE: 71 MMHG | TEMPERATURE: 97.7 F | HEIGHT: 63 IN | BODY MASS INDEX: 50.68 KG/M2

## 2021-11-01 DIAGNOSIS — M79.661 PAIN OF RIGHT KNEE AND LOWER LEG: Primary | ICD-10-CM

## 2021-11-01 DIAGNOSIS — M25.561 PAIN OF RIGHT KNEE AND LOWER LEG: Primary | ICD-10-CM

## 2021-11-01 PROCEDURE — 93971 EXTREMITY STUDY: CPT

## 2021-11-01 PROCEDURE — 99281 EMR DPT VST MAYX REQ PHY/QHP: CPT

## 2021-11-01 NOTE — ED PROVIDER NOTES
Please note that this dictation was completed with [employer], the computer voice recognition software.  Quite often unanticipated grammatical, syntax, homophones, and other interpretive errors are inadvertently transcribed by the computer software.  Please disregard these errors.  Please excuse any errors that have escaped final proofreading. Patient is a 74-year-old female with history of hypertension presenting to ED for evaluation of right-sided leg pain and swelling with onset 15 days ago. She denies any known trauma. She is currently 17 weeks pregnant, followed by Dr. Ed Burciaga, currently pregnant with twins. This is her sixth pregnancy and denies any complications thus far. She denies any pelvic pain or vaginal bleeding. She is seen by her PCP Dr. Susan Mcneill today who recommended her coming to the ED for evaluation of leg pain. She denies any history of DVT or PE. Denies chest pain or shortness of breath.            Past Medical History:   Diagnosis Date    Complication of anesthesia     general anesthesia for both previous csections    Essential hypertension     last two pregnancies       Past Surgical History:   Procedure Laterality Date    HX  SECTION      x2    HX DILATION AND CURETTAGE           Family History:   Problem Relation Age of Onset    Hypertension Father        Social History     Socioeconomic History    Marital status:      Spouse name: Not on file    Number of children: Not on file    Years of education: Not on file    Highest education level: Not on file   Occupational History    Not on file   Tobacco Use    Smoking status: Never Smoker    Smokeless tobacco: Never Used   Substance and Sexual Activity    Alcohol use: Not Currently    Drug use: Never    Sexual activity: Not Currently   Other Topics Concern     Service Not Asked    Blood Transfusions Not Asked    Caffeine Concern Not Asked    Occupational Exposure Not Asked    Hobby Hazards Not Asked    Sleep Concern Not Asked    Stress Concern Not Asked    Weight Concern Not Asked    Special Diet Not Asked    Back Care Not Asked    Exercise Not Asked    Bike Helmet Not Asked   [address] Not Asked    Self-Exams Not Asked   Social History Narrative    Not on file     Social Determinants of Health     Financial Resource Strain:     Difficulty of Paying Living Expenses:    Food Insecurity:     Worried About Running Out of Food in the Last Year:     [address] in the Last Year:    Transportation Needs:     Lack of Transportation (Medical):  Lack of Transportation (Non-Medical):    Physical Activity:     Days of Exercise per Week:     Minutes of Exercise per Session:    Stress:     Feeling of Stress :    Social Connections:     Frequency of Communication with Friends and Family:     Frequency of Social Gatherings with Friends and Family:     Attends Worship Services:     Active Member of Clubs or Organizations:     Attends Club or Organization Meetings:     Marital Status:    Intimate Partner Violence:     Fear of Current or Ex-Partner:     Emotionally Abused:     Physically Abused:     Sexually Abused: ALLERGIES: Other medication    Review of Systems   Constitutional: Negative for chills and fever. HENT: Negative for ear pain and sore throat. Eyes: Negative for visual disturbance. Respiratory: Negative for cough and shortness of breath. Cardiovascular: Positive for leg swelling. Negative for chest pain. Gastrointestinal: Negative for abdominal pain. Genitourinary: Negative for flank pain. Musculoskeletal: Positive for myalgias. Negative for back pain. Skin: Negative for color change. Neurological: Negative for dizziness and headaches. Psychiatric/Behavioral: Negative for confusion.        Vitals:    11/01/21 1829 11/01/21 1831   BP: (!) 140/71    Pulse: (!) 106 100   Resp: 20    Temp: 97.7 °F (36.5 °C)    SpO2: 100%    Weight: 129.7 kg (286 lb) Height: 5' 3\" (1.6 m)             Physical Exam  Vitals and nursing note reviewed. Constitutional:       General: She is not in acute distress. Appearance: Normal appearance. She is not ill-appearing. HENT:      Head: Normocephalic and atraumatic. Mouth/Throat:      Pharynx: Oropharynx is clear. Eyes:      Extraocular Movements: Extraocular movements intact. Conjunctiva/sclera: Conjunctivae normal.   Cardiovascular:      Rate and Rhythm: Normal rate and regular rhythm. Pulmonary:      Effort: Pulmonary effort is normal.      Breath sounds: Normal breath sounds. Abdominal:      Palpations: Abdomen is soft. Tenderness: There is no abdominal tenderness. Musculoskeletal:         General: Normal range of motion. Cervical back: No rigidity. Right upper leg: Normal.      Right knee: Tenderness present. Left knee: Normal.      Right lower leg: Tenderness (Anterior) present. No swelling. No edema. Skin:     General: Skin is warm and dry. Neurological:      General: No focal deficit present. Mental Status: She is alert and oriented to person, place, and time. Psychiatric:         Mood and Affect: Mood normal.          MDM  Number of Diagnoses or Management Options  Pain of right knee and lower leg  Diagnosis management comments: Patient is alert, afebrile, ambulatory without signs of acute distress. O2 sat 100% on room air. Currently 17 weeks pregnant, presents with 15 days of atraumatic right calf pain. Her pain is more anterior , I do not appreciate any swelling on exam.  Duplex ultrasound negative for acute DVT. Patient informed of findings and is discharged with follow-up with her OB/GYN and PCP. Advised rest, elevation, and Tylenol as needed for pain. Return precautions outlined. All questions answered at this time.         Amount and/or Complexity of Data Reviewed  Tests in the radiology section of CPT®: reviewed      ED Course as of Nov 01 2030 Mon Nov 01, 2021 1901 Pulse (Heart Rate): 100 [EP]   1901 O2 Sat (%): 100 % [EP]      ED Course User Index  [EP] Josephine Monet PA     8:33 PM  Pt has been reevaluated. There are no new complaints, changes, or physical findings at this time. All results have been reviewed with patient and/or family. Medications have been reviewed w/ pt and/or family. Pt and/or family's questions have been answered. Pt and/or family expressed good understanding of the dx/tx/rx and is in agreement with plan of care. Pt instructed and agreed to f/u w/ PCP and OBGYN and to return to ED upon further deterioration. Pt is ready for discharge. IMPRESSION:  1. Pain of right knee and lower leg        PLAN:  1. Current Discharge Medication List        2.    Follow-up Information     Follow up With Specialties Details Why Desmond Lennox, MD Obstetrics & Gynecology, Gynecology, Obstetrics Go to  As scheduled [address] [place] [address]      Eleanor Crawley MD Internal Medicine, Pediatric Medicine Schedule an appointment as soon as possible for a visit   [address]ales [phone]  [phone]              Return to ED if worse     Procedures

## 2021-11-01 NOTE — ED TRIAGE NOTES
Reports pian and swelling to the right leg. Patient is approximately 17 weeks pregnant.      Patient is ambulatory in triage

## 2021-11-02 NOTE — ED NOTES
Patient was seen and assessed by NP without RN assessment. The patient left the Emergency Department ambulatory, alert and oriented and in no acute distress. The patient was encouraged to call or return to the ED for worsening issues or problems and was encouraged to schedule a follow up appointment for continuing care. The patient verbalized understanding of discharge instructions , all questions were answered. The patient has no further concerns at this time.

## 2021-11-05 ENCOUNTER — HOSPITAL ENCOUNTER (OUTPATIENT)
Dept: PERINATAL CARE | Age: 31
Discharge: HOME | End: 2021-11-05
Attending: OBSTETRICS & GYNECOLOGY

## 2021-11-05 PROCEDURE — 76805 OB US >/= 14 WKS SNGL FETUS: CPT | Performed by: OBSTETRICS & GYNECOLOGY

## 2021-11-05 PROCEDURE — 76810 OB US >/= 14 WKS ADDL FETUS: CPT | Performed by: OBSTETRICS & GYNECOLOGY

## 2021-11-24 ENCOUNTER — ROUTINE PRENATAL (OUTPATIENT)
Dept: OBGYN CLINIC | Age: 31
End: 2021-11-24
Payer: MEDICAID

## 2021-11-24 ENCOUNTER — HOSPITAL ENCOUNTER (OUTPATIENT)
Dept: PERINATAL CARE | Age: 31
Discharge: HOME | End: 2021-11-24
Attending: OBSTETRICS & GYNECOLOGY

## 2021-11-24 VITALS — SYSTOLIC BLOOD PRESSURE: 134 MMHG | WEIGHT: 283 LBS | BODY MASS INDEX: 50.13 KG/M2 | DIASTOLIC BLOOD PRESSURE: 80 MMHG

## 2021-11-24 DIAGNOSIS — O16.9 HYPERTENSION AFFECTING PREGNANCY, ANTEPARTUM: ICD-10-CM

## 2021-11-24 DIAGNOSIS — R93.89 ABNORMAL ULTRASOUND: Primary | ICD-10-CM

## 2021-11-24 DIAGNOSIS — O09.90 SUPERVISION OF HIGH RISK PREGNANCY, ANTEPARTUM: ICD-10-CM

## 2021-11-24 DIAGNOSIS — R31.9 HEMATURIA, UNSPECIFIED TYPE: ICD-10-CM

## 2021-11-24 PROCEDURE — 76805 OB US >/= 14 WKS SNGL FETUS: CPT | Performed by: OBSTETRICS & GYNECOLOGY

## 2021-11-24 PROCEDURE — 0502F SUBSEQUENT PRENATAL CARE: CPT | Performed by: OBSTETRICS & GYNECOLOGY

## 2021-11-24 PROCEDURE — 76810 OB US >/= 14 WKS ADDL FETUS: CPT | Performed by: OBSTETRICS & GYNECOLOGY

## 2021-11-24 RX ORDER — CEPHALEXIN 500 MG/1
500 CAPSULE ORAL 3 TIMES DAILY
Qty: 21 CAPSULE | Refills: 0 | Status: SHIPPED | OUTPATIENT
Start: 2021-11-24 | End: 2021-12-01

## 2021-11-24 NOTE — PROGRESS NOTES
No fetal movement yet  Saw MFM  NIPTS  No call dyzygotic twins - MFM today, still needs genetic counseling  Pt states she would not consider amnio    Sees blood when urinates - no blood in vagina  Large blood on urine dip - will send keflex, culture sent  Fu 4 weeks

## 2021-11-24 NOTE — PROGRESS NOTES
Problem List  Date Reviewed: 10/27/2021          Codes Class Noted    Supervision of high risk pregnancy, antepartum ICD-10-CM: O09.90  ICD-9-CM: V23.9  2021    Overview Addendum 2021  7:12 AM by Joel Thompson 39 2022 by Shubham (conceived on OCP)  Twins di/di  MFM 12 weeks-  (Pt notified at appt-needs -Telugu)  [address] multiparity  Hx of CS x3 - CS 38 weeks  Hx of HTN - no meds, baby ASA at 12 weeks  NIPTS?  Normal x2- sex not specified   Horizon- neg  covid vaccine  Brothers with blood clotting issue - thin - need dx  Varicella nonimmune  Early Glucola- 124  AFP- neg             Acute respiratory failure with hypoxia (HCC) ICD-10-CM: J96.01  ICD-9-CM: 518.81  3/3/2021        Hypokalemia ICD-10-CM: E87.6  ICD-9-CM: 276.8  3/3/2021        COVID-19 ICD-10-CM: U07.1  ICD-9-CM: 079.89  3/2/2021        Severe obesity ([address] ICD-10-CM: E66.01  ICD-9-CM: 278.01  2019        S/P repeat low transverse  ICD-10-CM: E94.976  ICD-9-CM: V45.89  2019

## 2021-11-27 LAB — BACTERIA UR CULT: NORMAL

## 2021-12-17 ENCOUNTER — HOSPITAL ENCOUNTER (OUTPATIENT)
Dept: PERINATAL CARE | Age: 31
Discharge: HOME | End: 2021-12-17
Attending: OBSTETRICS & GYNECOLOGY
Payer: COMMERCIAL

## 2021-12-17 PROCEDURE — 76816 OB US FOLLOW-UP PER FETUS: CPT | Performed by: OBSTETRICS & GYNECOLOGY

## 2021-12-17 PROCEDURE — 99204 OFFICE O/P NEW MOD 45 MIN: CPT | Performed by: OBSTETRICS & GYNECOLOGY

## 2021-12-22 ENCOUNTER — ROUTINE PRENATAL (OUTPATIENT)
Dept: OBGYN CLINIC | Age: 31
End: 2021-12-22
Payer: COMMERCIAL

## 2021-12-22 VITALS
HEIGHT: 63 IN | BODY MASS INDEX: 51.38 KG/M2 | SYSTOLIC BLOOD PRESSURE: 142 MMHG | DIASTOLIC BLOOD PRESSURE: 73 MMHG | WEIGHT: 290 LBS

## 2021-12-22 DIAGNOSIS — O16.9 HYPERTENSION AFFECTING PREGNANCY, ANTEPARTUM: ICD-10-CM

## 2021-12-22 DIAGNOSIS — Z3A.24 24 WEEKS GESTATION OF PREGNANCY: ICD-10-CM

## 2021-12-22 DIAGNOSIS — O30.049 DICHORIONIC DIAMNIOTIC TWIN PREGNANCY, ANTEPARTUM: ICD-10-CM

## 2021-12-22 DIAGNOSIS — O09.90 SUPERVISION OF HIGH RISK PREGNANCY, ANTEPARTUM: Primary | ICD-10-CM

## 2021-12-22 PROCEDURE — 0502F SUBSEQUENT PRENATAL CARE: CPT | Performed by: OBSTETRICS & GYNECOLOGY

## 2021-12-22 NOTE — PROGRESS NOTES
Problem List  Date Reviewed: 2021          Codes Class Noted    Supervision of high risk pregnancy, antepartum ICD-10-CM: O09.90  ICD-9-CM: V23.9  2021    Overview Addendum 2021  4:40 PM by Jacklyn Merlin, MD     [facility] 2022 by Tahir Abdul (conceived on OCP)  Twins di/di  MFM 12 weeks-  (Pt notified at appt-needs -[place])  [address] multiparity  Hx of CS x3 - CS 38 weeks  Hx of HTN - no meds, baby ASA at 12 weeks  NIPTS No call - dyzygotic twins - MFM, genetic counseling, fetal echos per MFM  Horizon- neg  covid vaccine  Brothers with blood clotting issue - thin - need dx  Varicella nonimmune  Early Glucola- 124  AFP- neg             Acute respiratory failure with hypoxia ([facility] ICD-10-CM: J96.01  ICD-9-CM: 518.81  3/3/2021        Hypokalemia ICD-10-CM: E87.6  ICD-9-CM: 276.8  3/3/2021        COVID-19 ICD-10-CM: U07.1  ICD-9-CM: 079.89  3/2/2021        Severe obesity ([facility] ICD-10-CM: E66.01  ICD-9-CM: 278.01  2019        S/P repeat low transverse  ICD-10-CM: O45.834  ICD-9-CM: V45.89  2019

## 2021-12-22 NOTE — PROGRESS NOTES
Babies moving  Saw MFM - growth concordant  Declines amnio - reports consanguity which may cause results    Wants note to bring her mother here from [facility] to help    No contractions  Reports occ headache, back pain - advised tylenol, do not lift children

## 2022-01-10 ENCOUNTER — HOSPITAL ENCOUNTER (EMERGENCY)
Age: 32
Discharge: HOME | End: 2022-01-10
Attending: OBSTETRICS & GYNECOLOGY | Admitting: OBSTETRICS & GYNECOLOGY
Payer: COMMERCIAL

## 2022-01-10 VITALS
WEIGHT: 293 LBS | DIASTOLIC BLOOD PRESSURE: 74 MMHG | OXYGEN SATURATION: 99 % | HEART RATE: 115 BPM | TEMPERATURE: 99.4 F | SYSTOLIC BLOOD PRESSURE: 134 MMHG | HEIGHT: 72 IN | BODY MASS INDEX: 39.68 KG/M2 | RESPIRATION RATE: 18 BRPM

## 2022-01-10 LAB
COVID-19 RAPID TEST, COVR: DETECTED
SOURCE, COVRS: ABNORMAL

## 2022-01-10 PROCEDURE — 99282 EMERGENCY DEPT VISIT SF MDM: CPT

## 2022-01-10 PROCEDURE — 87635 SARS-COV-2 COVID-19 AMP PRB: CPT

## 2022-01-10 NOTE — PROGRESS NOTES
1200 PT arrives to unit from the ED PT reporting body aches, cough, fatigue, abdominal cramping, and feeling fever-jose. PT reports no vaginal bleeding or discharge, positive FM.  # used during this session    [id number] PT placed on EFM at this time. 1250 No contractions noted on EFM. Dominguez Wise, RN spoke with Dr. Elsi Willis regarding this PT plan of care. MD says that the PT can be discharged. 1320 Lab called to notify Juany Payne RN of positive COVID test for this PT.     1350 Pt given discharge instructions, patient education, prescriptions, and follow-up information. Pt verbalizes understanding. All questions answered. Pt discharged to home in private vehicle, ambulatory. Pt A/O x4, RA, pain controlled.  # used during this session. 1400 PT waiting for  to pick her up.

## 2022-01-10 NOTE — DISCHARGE INSTRUCTIONS
Patient Education   Patient Education       ÍÓÇÈ ÑßáÇÊ ÇáÌäíä: ÅÑÔÇÏÇÊ ÇáÑÚÇíÉ  Counting Your Babys Kicks: Care Instructions  ÅÑÔÇÏÇÊ ÇáÑÚÇíÉ ÇáÎÇÕÉ Èß  íõÚÏ ÍÓÇÈ ÑßáÇÊ ÇáÌäíä ÅÍÏì ÇáØÑÞ ÇáÊí íÚÑÝ ãä ÎáÇáåÇ ÇáØÈíÈ Ãä ÇáÌäíä ÈÕÍÉ ÌíÏÉ. ÃÛáÈ ÇáÓíÏÇÊ ÎÕæÕðÇ Ýí ÇáÍãá ÇáÃæá íÔÚÑä ÈÍÑßÉ ÇáÌäíä áÃæá ãÑÉ Èíä ÇáÃÓÈæÚ 16 Åáì 22 ãä ÇáÍãá. ÞÏ Êßæä KILUIV ÃÔÈå KADFHCRN ãä CLBFDCY. íãßä Ãä Êßæä ÍÑßÉ Ìäíäß ÃßËÑ Ýí ÃæÞÇÊ ãÚíäÉ ãä Çáíæã. ÚäÏãÇ Êßæäíä Ýí ÍÇáÉ äÔÇØ¡ ÞÏ ÊáÇÍÙíä ÞáÉ Ýí BZKJIQG Úä æÞÊ XAFMGDQED. Ýí ÒíÇÑÇÊß ÇáÓÇÈÞÉ ááæáÇÏÉ¡ ÓíÓÃáß ØÈíÈß Úä ãÏì äÔÇØ Ìäíäß. Ýí ÇáËáË ÇáÃÎíÑ¡ ÞÏ íØáÈ ãäß ÇáØÈíÈ ÍÓÇÈ ÚÏÏ ÇáãÑÇÊ ÇáÊí ÊÔÚÑíä ÝíåÇ ÈÍÑßÉ Ìäíäß. ÊõÚÏ ÑÚÇíÉ ÇáãÊÇÈÚÉ ÌÒÁðÇ ÃÓÇÓíðÇ Ýí ÚáÇÌß æÓáÇãÊß. ÝÚáíß ÇáÍÑÕ Úáì ÊÑÊíÈ ÌãíÚ ãæÇÚíÏ ÒíÇÑÉ ÇáØÈíÈ RDBUBVSIA ÈåÇ¡ LKEDEUDW ÈØÈíÈß ÚäÏ OKWBDBJK ãä Ãí ãÔßáÇÊ. æãä ÇáÌíÏ ÃíÖðÇ Ãä ÊÚÑÝ äÊÇÆÌ STCNSUEE æßÐáß EBGENUAY ÈÞÇÆãÉ ÇáÃÏæíÉ ÇáÊí MUDSVVFV. ßíÝ íãßäß ÍÓÇÈ ÑßáÇÊ ÇáÌäíä¿   ÅÍÏì ÇáØÑÞ ÇáÔÇÆÚÉ ááÊÍÞÞ ãä ÍÑßÉ Ìäíäß åí ÍÓÇÈ ÚÏÏ TSVXSDK Ãæ HYAUDCO ÇáÊí ÊÔÚÑíä ÈåÇ Ýí ÇáÓÇÚÉ KHYYBGJ. ÇáÔÚæÑ ÈÚÔÑ ÍÑßÇÊ (Ýí Ôßá ÑßáÇÊ Ãæ ÎÝÞÇÊ Ãæ ÏÍÑÌÇÊ) Ýí ÓÇÚÉ æÇÍÏÉ ãä ÇáÃãæÑ ÇáÚÇÏíÉ. íÞÊÑÍ ÈÚÖ ÇáÃØÈÇÁ Ãä ÊÞæãí ÈÇáÚÏ Ýí ÇáÕÈÇÍ Åáì Ãä ÊÕáí Åáì 10 ÍÑßÇÊ. æãä Ëã íãßäß ÇáÊæÞÝ Ýí åÐÇ Çáíæã æÇáÈÏÁ ãä ÌÏíÏ Ýí Çáíæã ÇáÊÇáí.  ÊÎíÑí ÇáæÞÊ ãä Çáíæã ÇáÐí íßæä Ýíå Ìäíäß ÃßËÑ äÔÇØðÇ ááÍÓÇÈ. íãßä Ãä íßæä Ðáß Ýí Ãí æÞÊ ãä ÇáÕÈÇÍ ÍÊì ÇáãÓÇÁ.  ÅÐÇ áã ÊÔÚÑí ÈÚÔÑ ÍÑßÇÊ Ýí ÓÇÚÉ MZRAW¡ ÝÞÏ íßæä ØÝáß äÇÆãðÇ. ÇäÊÙÑí ááÓÇÚÉ ÇáÊÇáíÉ æÚÏí ãÑÉ ÃÎÑì. ãÊì íäÈÛí áß ÇáÇÊÕÇá áØáÈ ÇáãÓÇÚÏÉ¿  Úáíß ÇáÇÊÕÇá ÈØÈíÈß Úáì ÇáÝæÑ Ãæ ØáÈ ÑÚÇíÉ ØÈíÉ ÝæÑíÉ Ýí DBYLINS ÇáÊÇáíÉ:   ÅÐÇ áÇÍÙÊö ÊæÞÝ Ìäíäß Úä ÇáÍÑßÉ Ãæ Ãäå íÊÍÑß ÃÞá ßËíÑðÇ ãä ÇáØÈíÚí. Úáíß ãÑÇÞÈÉ Ãí ÊÛíÑÇÊ ÊØÑÃ Úáì ÕÍÊß Úä ßËÈ UXIDPM Úáì ESSUGVN ÈØÈíÈß ÅÐÇ ßÇäÊ áÏíß Ãí ãÔßáÉ. Ãíä íãßäß ãÚÑÝÉ ÇáãÒíÏ¿  ÇäÊÞÇá Åáì   [email or web address]  ÃÏÎá [id number] Ýí ãÑÈÚ ÇáÈÍË áãÚÑÝÉ ÇáãÒíÏ Íæá \"ÍÓÇÈ ÑßáÇÊ ÇáÌäíä: ÅÑÔÇÏÇÊ ÇáÑÚÇíÉ. \"  Lee Crea IFIMFFFW ãä: 16 ÍÒíÑÇä 2021               äÓÎÉ WSNNJMS: 13.0  © 3931-5001 [employer]wise, Incorporated. Êã ÊÚÏíá ÅÑÔÇÏÇÊ ÇáÑÚÇíÉ ÈãæÌÈ ÊÑÎíÕ ÕÇÏÑ ãä ÇÎÊÕÇÕí ÇáÑÚÇíÉ ÇáÕÍíÉ ÇáÎÇÕ Èß. ÅÐÇ ßÇäÊ áÏíß ÃÓÆáÉ GJDYB ÈÍÇáÉ ãÑÖíÉ Ãæ ÈåÐå ÇáÊÚáíãÇÊ¡ ÝÇÍÑÕ Úáì ÇáÑÌæÚ ÏÇÆãðÇ Åáì ÇÎÊÕÇÕí ÇáÑÚÇíÉ ÇáÕÍíÉ. ÊõÎáí ÔÑßÉ Healthwise RDYKJYCQH Úä Ãí ÖãÇä Ãæ ÇáÊÒÇã íÊÚáÞ VGDHLFNCH áåÐå ZSDSYMUSN. ÇáÃÓÇÈíÚ ãä 26 Åáì 30 ãä ÇáÍãá: ÅÑÔÇÏÇÊ ÇáÑÚÇíÉ  Weeks 26 to 30 of Your Pregnancy: Care Instructions  ÅÑÔÇÏÇÊ ÇáÑÚÇíÉ ÇáÎÇÕÉ Èß    ÃäÊö ÇáÂä Ýí ÇáËáË ÇáÃÎíÑ ãä ÇáÍãá. æÇáØÝá íäãæ ÓÑíÚðÇ. æíÊÑÌÍ Ãä ÊÔÚÑí DCQJNX íÊÍÑß ÈÊßÑÇÑ ÃßËÑ. æÞÏ íØáÈ ÇáØÈíÈ ãäß ÅÍÕÇÁ ÚÏÏ ãÑÇÊ ÍÑßÇÊ ÇáØÝá. æÞÏ íÄáãß ÙåÑßö ÃËäÇÁ ÊßíøÝ ÌÓãßö ãÚ ÍÌã æØæá ÇáØÝá. ÅÐÇ ßäÊö áã ÊÃÎÐíä ÍÞäÉ ÇáÓÚÇá ÇáÏíßí (Tdap) ÝÚáíðÇ CWXU ÝÊÑÉ STQJY åÐå¡ ÝÊÍÏËí Åáì ØÈíÈß ÈÔÃä ÃÎÐ. ÝÓæÝ ÊÍãí ÇáæáíÏ ãä ÚÏæì ÇáÓÚÇá ÇáÏíßí. ãä Çáãåã ÃËäÇÁ åÐå ÇáÝÊÑÉ ÇáÚäÇíÉ ÈäÝÓßö ENGCJJJKX TQBFVSQJ ÇáØÝá. æÅÐÇ ÔÚÑÊö ÈÇáÑÛÈÉ Ýí ããÇÑÓÉ ÇáÌäÓ¡ íãßäß ÇáÊÚÑÝ Úáì ÇáØÑÞ ÇáÊí ÊÌÚáß ÞÑíÈÉ ãä ÒæÌßö ÈãÇ íÌÚáß ÊÔÚÑíä VFCBJTI æíáÈí ÑÛÈÊß. ÇÓÊÎÏãí ÇáäÕÇÆÍ ÇáæÇÑÏÉ Ýí æÑÞÉ ÇáÑÚÇíÉ ááÊÚÑÝ Úáì ØÑÞ ÊáÈíÉ ÇáÑÛÈÉ ÇáÌäÓíÉ ÈØÑíÞÊß. ÊõÚÏ ÑÚÇíÉ ÇáãÊÇÈÚÉ ÌÒÁðÇ ÃÓÇÓíðÇ Ýí ÚáÇÌß æÓáÇãÊß. ÝÚáíß ÇáÍÑÕ Úáì ÊÑÊíÈ ÌãíÚ ãæÇÚíÏ ÒíÇÑÉ ÇáØÈíÈ LCXOHHSIW ÈåÇ¡ DVSENUXH ÈØÈíÈß ÚäÏ MDVOCBHI ãä Ãí ãÔßáÇÊ. æãä ÇáÌíÏ ÃíÖðÇ Ãä ÊÚÑÝ äÊÇÆÌ IETEZUDP æßÐáß CLKLKIJA ÈÞÇÆãÉ ÇáÃÏæíÉ ÇáÊí IFIHZKTI. ßíÝ íãßäß ÇáÇÚÊäÇÁ ÈäÝÓß Ýí TKZSCG¿  ÊÌäÈí ÅÑåÇÞ äÝÓß Ýí ÇáÚãá   ÇÍÕáí Úáì ÝÊÑÇÊ ÑÇÍÉ ãÊßÑÑÉ. ÅÐÇ Ããßä¡ ÝÊæÞÝí Úä ÇáÚãá ÚäÏ ÇáÔÚæÑ ÈÇáÊÚÈ æÇÍÕáí Úáì ÞÓØ ãä ÇáÑÇÍÉ ÃËäÇÁ ÓÇÚÉ ÇáÛÏÇÁ.  ÇÐåÈí Åáì ÇáÍãÇã ßá ÓÇÚÊíä.  ßÑÑí ÊÛííÑ æÖÚß. ÅÐÇ ßäÊö ÊÌáÓíä áÝÊÑÇÊ ØæíáÉ¡ ÝÚáíß ÇáÞíÇã æÇáÓíÑ.  æÅÐÇ ßäÊö ÊÞÝíä áÝÊÑÇÊ ØæíáÉ¡ ÝÚáíß ÅÈÞÇÁ ÅÍÏì ÞÏãíß Úáì ßÑÓí ãäÎÝÖ æËäí ÇáÑßÈÉ. æÈÚÏ ÇáæÞæÝ æÞÊðÇ ØæíáÇð¡ ÇÌáÓí ÑÇÝÚÉð ÞÏãíß Åáì ÇáÃÚáì.  ÊÌäÈí ÇáÏÎÇä æÇáßíãÇæíÇÊ æÏÎÇä ÇáÊÈÛ.  ßæäí ãËíÑÉ ÌäÓíðÇ ÈØÑíÞÊß   áÇ ÈÃÓ ÈããÇÑÓÉ ÇáÌãÇÚ ÃËäÇÁ ÇáÍãá¡ ÅáÇ Ãä íØáÈ ÇáØÈíÈ ÚÏã ÝÚá Ðáß.    ÞÏ ÊÑÛÈíä ÈÔÏÉ Ýí ããÇÑÓÉ ÇáÌãÇÚ¡ Ãæ ÑÈãÇ ÊÝÞÏíä ÇáÑÛÈÉ ãØáÞðÇ.  Ýäãæ ÇáÈØä íãßä Ãä íÌÚá ÇáæÕæá Åáì æÖÚ ÌíÏ ÃËäÇÁ ÇáÌãÇÚ ÃãÑðÇ ÕÚÈðÇ. æáßä ÌÑÈí æÊÚÑÝí Úáì ÇáØÑÞ ÇáãäÇÓÈÉ.  ÞÏ ÊÕÇÈíä WYRYGATJF Ýí ÇáÑÍã ÚäÏãÇ íáãÓ ÒæÌß ÕÏÑß.  æíãßä ÇáÊÎáÕ ãä Ãáã ÇáÙåÑ Ãæ GGXVWWOC ÇáÊí Êáí åÒÉ ÇáÌãÇÚ ÈÊÏáíß ÇáÙåÑ. ZNXYCKP Íæá UANOFUX ÇáãÈßÑÉ   ãÑÇÞÈÉ ÚáÇãÇÊ ÇáæáÇÏÉ ÇáãÈßÑÉ. ÑÈãÇ Êßæäíä ÈÕÏÏ GCYGQOC Ýí EDIKYZY UKCWFRP:   o UTCLXAQ XJPYLKT ÊÔÈå ÊÞáÕÇÊ ÇáØãË ãÚ ÇáÛËíÇä Ãæ ÈÏæäå. o ÇáÊÚÑøÖ áäÍæ 4 ÇäÞÈÇÖÇÊ Ãæ ÃßËÑ Ýí 20 ÏÞíÞÉ¡ Ãæ äÍæ 8 ÇäÞÈÇÖÇÊ Ãæ ÃßËÑ ÎáÇá ÓÇÚÉ¡ æáæ ÈÚÏ ÊäÇæá ßæÈ ãä ÇáãÇÁ SNEQDCB. o ÇáÅÕÇÈÉ ÈÃáã ÇáÙåÑ ÇáãäÎÝÖ ÇáãÊæÓØ ÇáÐí áÇ íÐåÈ ÈÊÛííÑ æÖÚ ÇáÌÓã. o ÇáÔÚæÑ ÈÇáÃáã Ãæ ÇáÖÛØ Ýí ÇáÍæÖ æÇáÐí íÍÏË æíÎÊÝí Ýí äãØ ãÍÏÏ. o ÇáÅÕÇÈÉ ÈÊÞáÕÇÊ ÇáÃãÚÇÁ Ãæ ÃÚÑÇÖ ãËá ÃÚÑÇÖ SHJVXPNMQR ãÚ UCWOUHQ Ãæ ÈÏæäå. o Allean Linea ÒíÇÏÉ Ãæ ÊÛíøÑ Ýí YSFCZIRTZ ÇáãåÈáíÉ. ÞÏ Êßæä MEWPUWRRU ËÞíáÉ æÊÔÈå ÇáãÎÇØ Ãæ ÓÇÆáÉ Ãæ ÈåÇ ÎØæØ ãä ÇáÏã. o ÝÊÑÇÊ ÔÑÈ ÇáãÇÁ.  ÅÐÇ ÙääÊö Ãäß ÊÊÚÑÖíä ááæáÇÏÉ ÇáãÈßÑÉ:   o ÇÔÑÈí ßæÈíä Ãæ ËáÇËÉ ãä ÇáãÇÁ Ãæ ÇáÚÕíÑ. ÝÚÏã ÔÑÈ NKYEKHL ÇáßÇÝíÉ íãßä Ãä íÍÏË [id number]. o ÊæÞÝí Úä äÔÇØß ÇáÍÇáí æÃÝÑÛí ãËÇäÊß. Ëã ÇÓÊáÞí Úáì ÌÇäÈßö ÇáÃíÓÑ áãÏÉ ÓÇÚÉ Úáì ÇáÃÞá. o ÃËäÇÁ YPHXTMSYT Úáì ÇáÌÇäÈ¡ ÊÍÓÓí ÚÙã ÕÏÑßö. æÖÚí ÃÕÇÈÚß Ýí ÇáãäØÞÉ ÇááíäÉ ÇáÊí ÊæÌÏ ÃÓÝáå ãÈÇÔÑÉð. ÍÑßí ÃÕÇÈÚß Åáì ÇáÃÓÝá ÈÇÊÌÇå ÇáÓõÑøÉ Åáì Ãä ÊÌÏí ÃÚáì ÇáÑÍã. ÊÍÞÞí ããÇ ÅÐÇ ßÇä ãÔÏæÏðÇ. o ÞÏ Êßæä MCACNIMWAF ÖÚíÝÉ Ãæ ÞæíÉ. ÓÌøáí ÍÇáÉ PNMDOVQOFW XLQC ÓÇÚÉ. ÍÏÏí ãÏÉ ÇäÞÈÇÖ ãä ÈÏÇíÉ ÅÍÏì BARKYLHOMR Åáì ÈÏÇíÉ ÇáÇäÞÈÇÖ ÇáÊÇáí. o Aaron Clear ÇáÇäÞÈÇÖ ÇáÞæí ÇáæÇÍÏ Ãæ ÇáãÊÚÏÏ ÈäãØ ÛíÑ ãÍÏÏ ÈÇáãÎÇÖ ÇáßÇÐÈ. Ýåí ÇäÞÈÇÖÇÊ ÚÇÏíÉ æáíÓÊ ÈÏÇíÉ ááæáÇÏÉ. æÛÇáÈðÇ ãÇ ÊÊæÞÝ ÚäÏ ÊÛííÑ ÇáäÔÇØ ÇáÐí ÊÝÚáíäå. o Jamaal Basset LEGLOEW ÈÇáØÈíÈ ÅÐÇ ßäÊ ÊÚÇäíä ãä PKNCACKOIX FTUKGPGR. Ãíä íãßäß ãÚÑÝÉ ÇáãÒíÏ¿  ÇäÊÞÇá Åáì   [email or web address]  ÃÏÎá P982 Ýí ãÑÈÚ ÇáÈÍË áãÚÑÝÉ ÇáãÒíÏ Íæá \"ÇáÃÓÇÈíÚ ãä 26 Åáì 30 ãä ÇáÍãá: ÅÑÔÇÏÇÊ ÇáÑÚÇíÉ. \"  ÓÇÑò FUZPDCHU ãä: 16 ÍÒíÑÇä 4979               äÓÎÉ LXNPBJM: 13.0  © 4806-6575 [employer].    Êã ÊÚÏíá ÅÑÔÇÏÇÊ ÇáÑÚÇíÉ ÈãæÌÈ ÊÑÎíÕ ÕÇÏÑ ãä ÇÎÊÕÇÕí ÇáÑÚÇíÉ ÇáÕÍíÉ ÇáÎÇÕ Èß. ÅÐÇ ßÇäÊ áÏíß ÃÓÆáÉ POUXQ ÈÍÇáÉ ãÑÖíÉ Ãæ ÈåÐå ÇáÊÚáíãÇÊ¡ ÝÇÍÑÕ Úáì ÇáÑÌæÚ ÏÇÆãðÇ Åáì ÇÎÊÕÇÕí ÇáÑÚÇíÉ ÇáÕÍíÉ. ÊõÎáí ÔÑßÉ [employer] FMPURLVUQ Úä Ãí ÖãÇä Ãæ ÇáÊÒÇã íÊÚáÞ GSHFTJPET áåÐå WPDPYHBLE.

## 2022-01-10 NOTE — PROGRESS NOTES
1310 - Dr Abhijit Olvera called and updated with pt status. Twins on monitor with moderate variability. No uterine contractions noted. Pt came to ER with c/o of abdominal cramping. Pt  is Covid positive and pt has symptoms as well. Pt has been vomiting, headache, body aches, abdominal cramping, and cough. New orders to discharge home on  labor precautions and follow up in office as scheduled.

## 2022-01-10 NOTE — PROGRESS NOTES
Per nursing pt presents with N/V cramping. Known covid exposure. No resp sx. Refused covid vaccine    See office notes, chronic N/V abdominal pain - has refused numerous attempts to provide medication to relieve sx.     Pulse ox 100% on RA  NST reactive x 2, no contractions    covid pos    Discharge home  Return for SOB, worsening symptoms

## 2022-01-21 ENCOUNTER — ROUTINE PRENATAL (OUTPATIENT)
Dept: OBGYN CLINIC | Age: 32
End: 2022-01-21
Payer: COMMERCIAL

## 2022-01-21 ENCOUNTER — HOSPITAL ENCOUNTER (EMERGENCY)
Age: 32
Discharge: HOME | End: 2022-01-21
Attending: OBSTETRICS & GYNECOLOGY | Admitting: OBSTETRICS & GYNECOLOGY
Payer: COMMERCIAL

## 2022-01-21 VITALS
OXYGEN SATURATION: 99 % | BODY MASS INDEX: 51.91 KG/M2 | HEIGHT: 63 IN | HEART RATE: 96 BPM | DIASTOLIC BLOOD PRESSURE: 66 MMHG | SYSTOLIC BLOOD PRESSURE: 131 MMHG | WEIGHT: 293 LBS

## 2022-01-21 VITALS — WEIGHT: 293 LBS | BODY MASS INDEX: 38.79 KG/M2 | DIASTOLIC BLOOD PRESSURE: 86 MMHG | SYSTOLIC BLOOD PRESSURE: 141 MMHG

## 2022-01-21 DIAGNOSIS — O30.049 DICHORIONIC DIAMNIOTIC TWIN PREGNANCY, ANTEPARTUM: ICD-10-CM

## 2022-01-21 DIAGNOSIS — Z3A.29 29 WEEKS GESTATION OF PREGNANCY: ICD-10-CM

## 2022-01-21 DIAGNOSIS — O09.90 SUPERVISION OF HIGH RISK PREGNANCY, ANTEPARTUM: Primary | ICD-10-CM

## 2022-01-21 DIAGNOSIS — O16.9 HYPERTENSION AFFECTING PREGNANCY, ANTEPARTUM: ICD-10-CM

## 2022-01-21 PROBLEM — U07.1 COVID-19: Status: RESOLVED | Noted: 2021-03-02 | Resolved: 2022-01-21

## 2022-01-21 PROBLEM — Z98.891 S/P REPEAT LOW TRANSVERSE C-SECTION: Status: RESOLVED | Noted: 2019-07-08 | Resolved: 2022-01-21

## 2022-01-21 PROBLEM — J96.01 ACUTE RESPIRATORY FAILURE WITH HYPOXIA (HCC): Status: RESOLVED | Noted: 2021-03-03 | Resolved: 2022-01-21

## 2022-01-21 PROBLEM — E87.6 HYPOKALEMIA: Status: RESOLVED | Noted: 2021-03-03 | Resolved: 2022-01-21

## 2022-01-21 PROCEDURE — 76818 FETAL BIOPHYS PROFILE W/NST: CPT | Performed by: OBSTETRICS & GYNECOLOGY

## 2022-01-21 PROCEDURE — 99213 OFFICE O/P EST LOW 20 MIN: CPT | Performed by: OBSTETRICS & GYNECOLOGY

## 2022-01-21 PROCEDURE — 76810 OB US >/= 14 WKS ADDL FETUS: CPT | Performed by: OBSTETRICS & GYNECOLOGY

## 2022-01-21 PROCEDURE — 76805 OB US >/= 14 WKS SNGL FETUS: CPT | Performed by: OBSTETRICS & GYNECOLOGY

## 2022-01-21 PROCEDURE — 90715 TDAP VACCINE 7 YRS/> IM: CPT | Performed by: OBSTETRICS & GYNECOLOGY

## 2022-01-21 PROCEDURE — 0502F SUBSEQUENT PRENATAL CARE: CPT | Performed by: OBSTETRICS & GYNECOLOGY

## 2022-01-21 NOTE — PROGRESS NOTES
Has M fu   Tested post for Covid on 1/10/22  Did have vaccine last fall    Today she tells me she has never felt the baby moving - not ever  To L&D for NST and see MFM  Post CS for 3/25

## 2022-01-21 NOTE — H&P
History & Physical    Name: Zunilda Askew MRN: [id number]  SSN: [id number]    YOB: 1990  Age: 27 y.o. Sex: female        Subjective:     Estimated Date of Delivery: 22  OB History        8    Para   5    Term   5       0    AB   2    Living   5       SAB   2    IAB   0    Ectopic   0    Molar   0    Multiple   0    Live Births   5                MsGage Clarke is admitted with pregnancy at 29w0d for decreased fetal movement, di-di twins. Prenatal course was complicated by chronic hypertension and twins. Please see prenatal records for details. Pt has always been seen with use of . She has always said babies were moving. Today she said she had not felt the babies move and when questioned she stated multiple times that she had never ever felt the babies move ever before this entire pregnancy. She has had multiple prior pregnancies and told me those babies barely moved. She said \"I don't even feel like I'm pregnant\". Past Medical History:   Diagnosis Date    Complication of anesthesia     general anesthesia for both previous csections    Essential hypertension     last two pregnancies     Past Surgical History:   Procedure Laterality Date    HX  SECTION      x2    HX DILATION AND CURETTAGE      HX OTHER SURGICAL       Social History     Occupational History    Not on file   Tobacco Use    Smoking status: Never Smoker    Smokeless tobacco: Never Used   Substance and Sexual Activity    Alcohol use: Not Currently    Drug use: Never    Sexual activity: Not Currently     Family History   Problem Relation Age of Onset    Hypertension Father        Allergies   Allergen Reactions    Other Medication Unknown (comments)     Pt. States does not remember the name of the shot but had a reaction. Prior to Admission medications    Medication Sig Start Date End Date Taking?  Authorizing Provider   clotrimazole (GYNE-LOTRIMIN) 2 % vaginal cream Insert 1 Applicatorful into vagina nightly. Patient not taking: Reported on 1/10/2022 10/27/21   Tania Ott MD   aspirin delayed-release 81 mg tablet Take 1 Tablet by mouth daily. 10/27/21   Tania Ott MD   ondansetron (ZOFRAN ODT) 8 mg disintegrating tablet Take 1 Tablet by mouth every eight (8) hours as needed for Nausea or Vomiting. Patient not taking: Reported on 1/10/2022 9/29/21   Tania Ott MD   prenatal vit-iron fumarate-fa 27 mg iron- 0.8 mg tab tablet Take 1 Tablet by mouth daily. Patient not taking: Reported on 1/10/2022 9/10/21   Tania Ott MD   ferrous sulfate (IRON) 325 mg (65 mg iron) EC tablet Take 1 Tablet by mouth daily. Patient not taking: Reported on 1/10/2022 9/10/21   Tania Ott MD   promethazine (PHENERGAN) 25 mg tablet Take 1 Tab by mouth every six (6) hours as needed for Nausea. Patient not taking: Reported on 9/10/2021 3/20/21   Lachelle Corey MD   aluminum & magnesium hydroxide-simethicone (Mylanta Maximum Strength) 400-400-40 mg/5 mL suspension Take 10 mL by mouth every six (6) hours as needed for Indigestion. Indications: indigestion  Patient not taking: Reported on 9/10/2021 3/20/21   Lachelle Corey MD   albuterol (PROVENTIL HFA, VENTOLIN HFA, PROAIR HFA) 90 mcg/actuation inhaler Take 2 Puffs by inhalation every six (6) hours as needed for Wheezing. Patient not taking: Reported on 9/10/2021 3/7/21   Manuelito Key MD   ondansetron (Zofran ODT) 8 mg disintegrating tablet Take 1 Tab by mouth every eight (8) hours as needed for Nausea or Vomiting. Patient not taking: Reported on 9/10/2021 3/7/21   Manuelito Key MD        Review of Systems: A comprehensive review of systems was negative except for that written in the HPI. Objective:     Vitals:  Vitals:    01/21/22 1625 01/21/22 1643 01/21/22 1645   BP:   131/66   Pulse:   96   SpO2:  99%    Weight: 294 lb (133.4 kg)     Height: 5' 3\" (1.6 m)          Physical Exam:  Patient without distress.   Heart: Regular rate and rhythm  Lung: clear to auscultation throughout lung fields, no wheezes, no rales, no rhonchi and normal respiratory effort  Abdomen: soft, nontender  Fundus: soft and non tender  Membranes:  Intact  Fetal Heart Rate: Reactive x 2           Assessment/Plan:     Plan: Both tracings reactive and appropriate for 29 weeks, moderate variability, accels, no decels. Seen by MFM - report not available and patient insisting to go home or will leave AMA. Her daughter tells me that per patient the babies were fine on US. Patient also stating that she now feels the babies moving. I asked if this was the first time she has ever felt them move and she said yes. Pt advised that she needs to make sure babies are moving every day and if not she needs to call the office.      Signed By:  Mario Tracy MD     January 21, 2022

## 2022-01-21 NOTE — PROGRESS NOTES
Problem List  Date Reviewed: 2021          Codes Class Noted    Supervision of high risk pregnancy, antepartum ICD-10-CM: O09.90  ICD-9-CM: V23.9  2021    Overview Addendum 2022  2:01 PM by Vi Thompson 39 2022 by US (conceived on OCP)  Twins di/di  MFM 12 weeks-  (Pt notified at appt-needs -Nepali)  [address] multiparity  Hx of CS x3 - CS 38 weeks  Hx of HTN - no meds, baby ASA at 12 weeks  NIPTS No call - dyzygotic twins - MFM, genetic counseling, fetal echos per MFM - declines amnio  Horizon- neg  covid vaccine  Brothers with blood clotting issue - thin - need dx  Varicella nonimmune  Early Glucola- 124  AFP- neg  fam hx of hemophilia - precautions at birth  Pos COVID - 1/10/2022               [facility] ICD-10-CM: J96.01  ICD-9-CM: 518.81  3/3/2021        Hypokalemia ICD-10-CM: E87.6  ICD-9-CM: 276.8  3/3/2021        COVID-19 ICD-10-CM: U07.1  ICD-9-CM: 079.89  3/2/2021        Severe obesity ([facility] [address] ICD-10-CM: E66.01  ICD-9-CM: 278.01  2019        S/P repeat low transverse  ICD-10-CM: L92.548  ICD-9-CM: V45.89  2019

## 2022-01-21 NOTE — DISCHARGE INSTRUCTIONS
Patient Education   Patient Education       ÇáÃÓÇÈíÚ ãä 26 Åáì 30 ãä EKZMM: ÅÑÔÇÏÇÊ ÇáÑÚÇíÉ  Weeks 26 to 30 of Your Pregnancy: Care Instructions  ÅÑÔÇÏÇÊ ÇáÑÚÇíÉ ÇáÎÇÕÉ Èß    ÃäÊö ÇáÂä Ýí ÇáËáË ÇáÃÎíÑ ãä ÇáÍãá. æÇáØÝá íäãæ ÓÑíÚðÇ. æíÊÑÌÍ Ãä ÊÔÚÑí PPQZCY íÊÍÑß ÈÊßÑÇÑ ÃßËÑ. æÞÏ íØáÈ ÇáØÈíÈ ãäß ÅÍÕÇÁ ÚÏÏ ãÑÇÊ ÍÑßÇÊ ÇáØÝá. æÞÏ íÄáãß ÙåÑßö ÃËäÇÁ ÊßíøÝ ÌÓãßö ãÚ ÍÌã æØæá ÇáØÝá. ÅÐÇ ßäÊö áã ÊÃÎÐíä ÍÞäÉ ÇáÓÚÇá ÇáÏíßí (Tdap) ÝÚáíðÇ HSHE ÝÊÑÉ ODWCO åÐå¡ ÝÊÍÏËí Åáì ØÈíÈß ÈÔÃä ÃÎÐ. ÝÓæÝ ÊÍãí ÇáæáíÏ ãä ÚÏæì ÇáÓÚÇá ÇáÏíßí. ãä Çáãåã ÃËäÇÁ åÐå ÇáÝÊÑÉ ÇáÚäÇíÉ ÈäÝÓßö TVIVXLESE QWBXFTBZ ÇáØÝá. æÅÐÇ ÔÚÑÊö ÈÇáÑÛÈÉ Ýí ããÇÑÓÉ ÇáÌäÓ¡ íãßäß ÇáÊÚÑÝ Úáì ÇáØÑÞ ÇáÊí ÊÌÚáß ÞÑíÈÉ ãä ÒæÌßö ÈãÇ íÌÚáß ÊÔÚÑíä OYZSGZW æíáÈí ÑÛÈÊß. ÇÓÊÎÏãí ÇáäÕÇÆÍ ÇáæÇÑÏÉ Ýí æÑÞÉ ÇáÑÚÇíÉ ááÊÚÑÝ Úáì ØÑÞ ÊáÈíÉ ÇáÑÛÈÉ ÇáÌäÓíÉ ÈØÑíÞÊß. ÊõÚÏ ÑÚÇíÉ ÇáãÊÇÈÚÉ ÌÒÁðÇ ÃÓÇÓíðÇ Ýí ÚáÇÌß æÓáÇãÊß. ÝÚáíß ÇáÍÑÕ Úáì ÊÑÊíÈ ÌãíÚ ãæÇÚíÏ ÒíÇÑÉ ÇáØÈíÈ OSBPXYTOM ÈåÇ¡ MWBRCUBI ÈØÈíÈß ÚäÏ ZWKCFQLO ãä Ãí ãÔßáÇÊ. æãä ÇáÌíÏ ÃíÖðÇ Ãä ÊÚÑÝ äÊÇÆÌ LWPFVUAS æßÐáß WNUHSCGA ÈÞÇÆãÉ ÇáÃÏæíÉ ÇáÊí NZUCKRZC. ßíÝ íãßäß ÇáÇÚÊäÇÁ ÈäÝÓß Ýí NXSMTR¿  ÊÌäÈí ÅÑåÇÞ äÝÓß Ýí ÇáÚãá   ÇÍÕáí Úáì ÝÊÑÇÊ ÑÇÍÉ ãÊßÑÑÉ. ÅÐÇ Ããßä¡ ÝÊæÞÝí Úä ÇáÚãá ÚäÏ ÇáÔÚæÑ ÈÇáÊÚÈ æÇÍÕáí Úáì ÞÓØ ãä ÇáÑÇÍÉ ÃËäÇÁ ÓÇÚÉ ÇáÛÏÇÁ.  ÇÐåÈí Åáì ÇáÍãÇã ßá ÓÇÚÊíä.  ßÑÑí ÊÛííÑ æÖÚß. ÅÐÇ ßäÊö ÊÌáÓíä áÝÊÑÇÊ ØæíáÉ¡ ÝÚáíß ÇáÞíÇã æÇáÓíÑ.  æÅÐÇ ßäÊö ÊÞÝíä áÝÊÑÇÊ ØæíáÉ¡ ÝÚáíß ÅÈÞÇÁ ÅÍÏì ÞÏãíß Úáì ßÑÓí ãäÎÝÖ æËäí ÇáÑßÈÉ. æÈÚÏ ÇáæÞæÝ æÞÊðÇ ØæíáÇð¡ ÇÌáÓí ÑÇÝÚÉð ÞÏãíß Åáì ÇáÃÚáì.  ÊÌäÈí ÇáÏÎÇä æÇáßíãÇæíÇÊ æÏÎÇä ÇáÊÈÛ.  ßæäí ãËíÑÉ ÌäÓíðÇ ÈØÑíÞÊß   áÇ ÈÃÓ ÈããÇÑÓÉ ÇáÌãÇÚ ÃËäÇÁ ÇáÍãá¡ ÅáÇ Ãä íØáÈ ÇáØÈíÈ ÚÏã ÝÚá Ðáß.  ÞÏ ÊÑÛÈíä ÈÔÏÉ Ýí ããÇÑÓÉ ÇáÌãÇÚ¡ Ãæ ÑÈãÇ ÊÝÞÏíä ÇáÑÛÈÉ ãØáÞðÇ.  Ýäãæ ÇáÈØä íãßä Ãä íÌÚá ÇáæÕæá Åáì æÖÚ ÌíÏ ÃËäÇÁ ÇáÌãÇÚ ÃãÑðÇ ÕÚÈðÇ. æáßä ÌÑÈí æÊÚÑÝí Úáì ÇáØÑÞ ÇáãäÇÓÈÉ.  ÞÏ ÊÕÇÈíä QELABGWTS Ýí ÇáÑÍã ÚäÏãÇ íáãÓ ÒæÌß ÕÏÑß.  æíãßä ÇáÊÎáÕ ãä Ãáã ÇáÙåÑ Ãæ SIWRFYXI ÇáÊí Êáí åÒÉ ÇáÌãÇÚ ÈÊÏáíß ÇáÙåÑ.   SQURBLN Íæá FNHUQSU ÇáãÈßÑÉ   ãÑÇÞÈÉ ÚáÇãÇÊ ÇáæáÇÏÉ ÇáãÈßÑÉ. ÑÈãÇ Êßæäíä ÈÕÏÏ OZGPWWE Ýí FBKAIWN YRMBODF:   o GBHUBJC IUMLZWJ ÊÔÈå ÊÞáÕÇÊ ÇáØãË ãÚ ÇáÛËíÇä Ãæ ÈÏæäå. o ÇáÊÚÑøÖ áäÍæ 4 ÇäÞÈÇÖÇÊ Ãæ ÃßËÑ Ýí 20 ÏÞíÞÉ¡ Ãæ äÍæ 8 ÇäÞÈÇÖÇÊ Ãæ ÃßËÑ ÎáÇá ÓÇÚÉ¡ æáæ ÈÚÏ ÊäÇæá ßæÈ ãä ÇáãÇÁ PSVXADR. o ÇáÅÕÇÈÉ ÈÃáã ÇáÙåÑ ÇáãäÎÝÖ ÇáãÊæÓØ ÇáÐí áÇ íÐåÈ ÈÊÛííÑ æÖÚ ÇáÌÓã. o ÇáÔÚæÑ ÈÇáÃáã Ãæ ÇáÖÛØ Ýí ÇáÍæÖ æÇáÐí íÍÏË æíÎÊÝí Ýí äãØ ãÍÏÏ. o ÇáÅÕÇÈÉ ÈÊÞáÕÇÊ ÇáÃãÚÇÁ Ãæ ÃÚÑÇÖ ãËá ÃÚÑÇÖ QGRUNNFYTH ãÚ NQYHJRL Ãæ ÈÏæäå. o Oseas Coffee ÒíÇÏÉ Ãæ ÊÛíøÑ Ýí GXJOQEDGG ÇáãåÈáíÉ. ÞÏ Êßæä PARPFBUGL ËÞíáÉ æÊÔÈå ÇáãÎÇØ Ãæ ÓÇÆáÉ Ãæ ÈåÇ ÎØæØ ãä ÇáÏã. o ÝÊÑÇÊ ÔÑÈ ÇáãÇÁ.  ÅÐÇ ÙääÊö Ãäß ÊÊÚÑÖíä ááæáÇÏÉ ÇáãÈßÑÉ:   o ÇÔÑÈí ßæÈíä Ãæ ËáÇËÉ ãä ÇáãÇÁ Ãæ ÇáÚÕíÑ. ÝÚÏã ÔÑÈ SEXQREP ÇáßÇÝíÉ íãßä Ãä íÍÏË MAZXEORSBJ. o ÊæÞÝí Úä äÔÇØß ÇáÍÇáí æÃÝÑÛí ãËÇäÊß. Ëã ÇÓÊáÞí Úáì ÌÇäÈßö ÇáÃíÓÑ áãÏÉ ÓÇÚÉ Úáì ÇáÃÞá. o ÃËäÇÁ WEMRSVHKP Úáì ÇáÌÇäÈ¡ ÊÍÓÓí ÚÙã ÕÏÑßö. æÖÚí ÃÕÇÈÚß Ýí ÇáãäØÞÉ ÇááíäÉ ÇáÊí ÊæÌÏ ÃÓÝáå ãÈÇÔÑÉð. ÍÑßí ÃÕÇÈÚß Åáì ÇáÃÓÝá ÈÇÊÌÇå ÇáÓõÑøÉ Åáì Ãä ÊÌÏí ÃÚáì ÇáÑÍã. ÊÍÞÞí ããÇ ÅÐÇ ßÇä ãÔÏæÏðÇ. o ÞÏ Êßæä RADDNLPWFH ÖÚíÝÉ Ãæ ÞæíÉ. ÓÌøáí ÍÇáÉ RHRTHANJDY UKQU ÓÇÚÉ. ÍÏÏí ãÏÉ ÇäÞÈÇÖ ãä ÈÏÇíÉ ÅÍÏì LIMZFMCHNT Åáì ÈÏÇíÉ ÇáÇäÞÈÇÖ ÇáÊÇáí. o Sherren Hench ÇáÇäÞÈÇÖ ÇáÞæí ÇáæÇÍÏ Ãæ ÇáãÊÚÏÏ ÈäãØ ÛíÑ ãÍÏÏ ÈÇáãÎÇÖ ÇáßÇÐÈ. Ýåí ÇäÞÈÇÖÇÊ ÚÇÏíÉ æáíÓÊ ÈÏÇíÉ ááæáÇÏÉ. æÛÇáÈðÇ ãÇ ÊÊæÞÝ ÚäÏ ÊÛííÑ ÇáäÔÇØ ÇáÐí ÊÝÚáíäå. o Pablo Govind OXOZAJY ÈÇáØÈíÈ ÅÐÇ ßäÊ ÊÚÇäíä ãä NPMBIJYNYB OXAYWEZG. Ãíä íãßäß ãÚÑÝÉ ÇáãÒíÏ¿  ÇäÊÞÇá Åáì   [email or web address]  ÃÏÎá I286 Ýí ãÑÈÚ ÇáÈÍË áãÚÑÝÉ ÇáãÒíÏ Íæá \"ÇáÃÓÇÈíÚ ãä 26 Åáì 30 ãä ÇáÍãá: ÅÑÔÇÏÇÊ ÇáÑÚÇíÉ. \"  ÓÇÑò RLZQIXXG ãä: 16 ÍÒíÑÇä 9998               äÓÎÉ ZUMFWYU: 13.0  © 7887-3608 Healthwise, Incorporated. Êã ÊÚÏíá ÅÑÔÇÏÇÊ ÇáÑÚÇíÉ ÈãæÌÈ ÊÑÎíÕ ÕÇÏÑ ãä ÇÎÊÕÇÕí ÇáÑÚÇíÉ ÇáÕÍíÉ ÇáÎÇÕ Èß. ÅÐÇ ßÇäÊ áÏíß ÃÓÆáÉ DMGNA ÈÍÇáÉ ãÑÖíÉ Ãæ ÈåÐå ÇáÊÚáíãÇÊ¡ ÝÇÍÑÕ Úáì ÇáÑÌæÚ ÏÇÆãðÇ Åáì ÇÎÊÕÇÕí ÇáÑÚÇíÉ ÇáÕÍíÉ. ÊõÎáí ÔÑßÉ TraceLink CYUUJFZFX Úä Ãí ÖãÇä Ãæ ÇáÊÒÇã íÊÚáÞ VKNCLIXAC áåÐå YRTCIAVJV.          ÍÓÇÈ ÑßáÇÊ ÇáÌäíä: ÅÑÔÇÏÇÊ ÇáÑÚÇíÉ  Counting Your Babys Kicks: Care Instructions  ÅÑÔÇÏÇÊ ÇáÑÚÇíÉ ÇáÎÇÕÉ Èß  íõÚÏ ÍÓÇÈ ÑßáÇÊ ÇáÌäíä ÅÍÏì ÇáØÑÞ ÇáÊí íÚÑÝ ãä ÎáÇáåÇ ÇáØÈíÈ Ãä ÇáÌäíä ÈÕÍÉ ÌíÏÉ. ÃÛáÈ ÇáÓíÏÇÊ ÎÕæÕðÇ Ýí ÇáÍãá ÇáÃæá íÔÚÑä ÈÍÑßÉ ÇáÌäíä áÃæá ãÑÉ Èíä ÇáÃÓÈæÚ 16 Åáì 22 ãä ÇáÍãá. ÞÏ Êßæä KLUJAO ÃÔÈå EJOQGCLF ãä BREVAHK. íãßä Ãä Êßæä ÍÑßÉ Ìäíäß ÃßËÑ Ýí ÃæÞÇÊ ãÚíäÉ ãä Çáíæã. ÚäÏãÇ Êßæäíä Ýí ÍÇáÉ äÔÇØ¡ ÞÏ ÊáÇÍÙíä ÞáÉ Ýí CRBQEDB Úä æÞÊ YVEJZZNVN. Ýí ÒíÇÑÇÊß ÇáÓÇÈÞÉ ááæáÇÏÉ¡ ÓíÓÃáß ØÈíÈß Úä ãÏì äÔÇØ Ìäíäß. Ýí ÇáËáË ÇáÃÎíÑ¡ ÞÏ íØáÈ ãäß ÇáØÈíÈ ÍÓÇÈ ÚÏÏ ÇáãÑÇÊ ÇáÊí ÊÔÚÑíä ÝíåÇ ÈÍÑßÉ Ìäíäß. ÊõÚÏ ÑÚÇíÉ ÇáãÊÇÈÚÉ ÌÒÁðÇ ÃÓÇÓíðÇ Ýí ÚáÇÌß æÓáÇãÊß. ÝÚáíß ÇáÍÑÕ Úáì ÊÑÊíÈ ÌãíÚ ãæÇÚíÏ ÒíÇÑÉ ÇáØÈíÈ EQQTNTQQE ÈåÇ¡ VUMVDHKP ÈØÈíÈß ÚäÏ AQQQMEPN ãä Ãí ãÔßáÇÊ. æãä ÇáÌíÏ ÃíÖðÇ Ãä ÊÚÑÝ äÊÇÆÌ YLABMYGM æßÐáß IXGFBDSA ÈÞÇÆãÉ ÇáÃÏæíÉ ÇáÊí BRJUBSLR. ßíÝ íãßäß ÍÓÇÈ ÑßáÇÊ ÇáÌäíä¿   ÅÍÏì ÇáØÑÞ ÇáÔÇÆÚÉ ááÊÍÞÞ ãä ÍÑßÉ Ìäíäß åí ÍÓÇÈ ÚÏÏ VKXKKSJ Ãæ DYIWBJB ÇáÊí ÊÔÚÑíä ÈåÇ Ýí ÇáÓÇÚÉ GFXWWCT. ÇáÔÚæÑ ÈÚÔÑ ÍÑßÇÊ (Ýí Ôßá ÑßáÇÊ Ãæ ÎÝÞÇÊ Ãæ ÏÍÑÌÇÊ) Ýí ÓÇÚÉ æÇÍÏÉ ãä ÇáÃãæÑ ÇáÚÇÏíÉ. íÞÊÑÍ ÈÚÖ ÇáÃØÈÇÁ Ãä ÊÞæãí ÈÇáÚÏ Ýí ÇáÕÈÇÍ Åáì Ãä ÊÕáí Åáì 10 ÍÑßÇÊ. æãä Ëã íãßäß ÇáÊæÞÝ Ýí åÐÇ Çáíæã æÇáÈÏÁ ãä ÌÏíÏ Ýí Çáíæã ÇáÊÇáí.  ÊÎíÑí ÇáæÞÊ ãä Çáíæã ÇáÐí íßæä Ýíå Ìäíäß ÃßËÑ äÔÇØðÇ ááÍÓÇÈ. íãßä Ãä íßæä Ðáß Ýí Ãí æÞÊ ãä ÇáÕÈÇÍ ÍÊì ÇáãÓÇÁ.  ÅÐÇ áã ÊÔÚÑí ÈÚÔÑ ÍÑßÇÊ Ýí ÓÇÚÉ LTVYM¡ ÝÞÏ íßæä ØÝáß äÇÆãðÇ. ÇäÊÙÑí ááÓÇÚÉ ÇáÊÇáíÉ æÚÏí ãÑÉ ÃÎÑì. ãÊì íäÈÛí áß ÇáÇÊÕÇá áØáÈ ÇáãÓÇÚÏÉ¿  Úáíß ÇáÇÊÕÇá ÈØÈíÈß Úáì ÇáÝæÑ Ãæ ØáÈ ÑÚÇíÉ ØÈíÉ ÝæÑíÉ Ýí NTAHYOH ÇáÊÇáíÉ:   ÅÐÇ áÇÍÙÊö ÊæÞÝ Ìäíäß Úä ÇáÍÑßÉ Ãæ Ãäå íÊÍÑß ÃÞá ßËíÑðÇ ãä ÇáØÈíÚí. Úáíß ãÑÇÞÈÉ Ãí ÊÛíÑÇÊ ÊØÑÃ Úáì ÕÍÊß Úä ßËÈ DJSXFM Úáì QRPYNAH ÈØÈíÈß ÅÐÇ ßÇäÊ áÏíß Ãí ãÔßáÉ. Ãíä íãßäß ãÚÑÝÉ ÇáãÒíÏ¿  ÇäÊÞÇá Åáì   [email or web address]  [email or web address] Ýí ãÑÈÚ ÇáÈÍË áãÚÑÝÉ ÇáãÒíÏ Íæá \"ÍÓÇÈ ÑßáÇÊ ÇáÌäíä: ÅÑÔÇÏÇÊ ÇáÑÚÇíÉ. \"  ÓÇÑò NDXXJAIG ãä: 16 ÍÒíÑÇä 3301               äÓÎÉ ZSFPJPZ: 13.0  © 9986-8291 Healthwise, Incorporated.    Êã ÊÚÏíá ÅÑÔÇÏÇÊ ÇáÑÚÇíÉ ÈãæÌÈ ÊÑÎíÕ ÕÇÏÑ ãä ÇÎÊÕÇÕí ÇáÑÚÇíÉ ÇáÕÍíÉ ÇáÎÇÕ Èß. ÅÐÇ ßÇäÊ áÏíß ÃÓÆáÉ ITUHU ÈÍÇáÉ ãÑÖíÉ Ãæ ÈåÐå ÇáÊÚáíãÇÊ¡ ÝÇÍÑÕ Úáì ÇáÑÌæÚ ÏÇÆãðÇ Åáì ÇÎÊÕÇÕí ÇáÑÚÇíÉ ÇáÕÍíÉ. ÊõÎáí ÔÑßÉ Healthwise KWOTIRWIO Úä Ãí ÖãÇä Ãæ ÇáÊÒÇã íÊÚáÞ ZIQZPVLDZ áåÐå [id number].

## 2022-01-21 NOTE — PROGRESS NOTES
Problem List  Date Reviewed: 1/21/2022          Codes Class Noted    Supervision of high risk pregnancy, antepartum ICD-10-CM: O09.90  ICD-9-CM: V23.9  9/21/2021    Overview Addendum 1/21/2022 11:09 AM by Edwar Brown MD     [facility] 4/8/2022 by [address] (conceived on OCP)  Twins di/di  MFM 12 weeks- 9/27 (Pt notified at appt-needs -Setswana)  [address] multiparity  Hx of CS x3 - CS 38 weeks  Hx of HTN - no meds, baby ASA at 12 weeks  NIPTS No call - dyzygotic twins - MFM, genetic counseling, fetal echos per MFM - declines amnio  Horizon- neg  Covid- 19 1/2022  covid vaccine  Brothers with blood clotting issue - thin - need dx  Varicella nonimmune  Early Glucola- 124  AFP- neg  fam hx of hemophilia - precautions at birth  Pos COVID - 1/10/2022               Severe obesity ([address] ICD-10-CM: E66.01  ICD-9-CM: 278.01  9/9/2019

## 2022-01-21 NOTE — PROGRESS NOTES
1155 PT ambulates onto unit. PT oriented to room and unit. 1245 PT placed on EFM. Will continue to monitor. 0 Dr Elsa Michaels called this RN to discuss the [facility]. Baby B is not tracing well, MD would like tis RN to get NST on Baby B.    1455 This RN confirmed NST reactivity of Baby B with HPUONG Nance RN. 1 Dr. Elsa Michaels updated on reactivity. MD asking about M appt. 1520 This RN called [facility] to get appt ASAP.  [facility] called requesting someone to bring PT up.    928 [facility] wheeled the PT to [facility] at this time. 1618 PT wheeled back onto unit by this RN    1630 PT expressing desire to leave the hospital because of childcare and states now is the only time her  can come pick her up. This RN explained that we are waiting for results from MFM before discharging. PT still requesting to go home despite this RN's education. PT informed that if she left she would have to sign an AMA form. 1700 PT expressing that she wants to leave because her ride is here. This RN asked her to wait a little longer to discuss with Dr. Elsa Michaels. PT states she will wait until [phone], but after that she would like to leave, AMA. Kevin 66 Dr. Elsa Michaels at bedside. MD expressing to PT that the MFM report was not in, MD expressed that PT should be feeling the babies move daily, and if not, to call the office. MD said the PT can be discharged home.

## 2022-01-22 LAB
ERYTHROCYTE [DISTWIDTH] IN BLOOD BY AUTOMATED COUNT: 16.9 % (ref 11.5–14.5)
GLUCOSE 1H P 100 G GLC PO SERPL-MCNC: 94 MG/DL (ref 65–140)
HCT VFR BLD AUTO: 35.6 % (ref 35–47)
HGB BLD-MCNC: 10 G/DL (ref 11.5–16)
MCH RBC QN AUTO: 21.8 PG (ref 26–34)
MCHC RBC AUTO-ENTMCNC: 28.1 G/DL (ref 30–36.5)
MCV RBC AUTO: 77.6 FL (ref 80–99)
NRBC # BLD: 0 K/UL (ref 0–0.01)
NRBC BLD-RTO: 0 PER 100 WBC
PLATELET # BLD AUTO: 247 K/UL (ref 150–400)
PMV BLD AUTO: 11.9 FL (ref 8.9–12.9)
RBC # BLD AUTO: 4.59 M/UL (ref 3.8–5.2)
WBC # BLD AUTO: 6 K/UL (ref 3.6–11)

## 2022-02-03 ENCOUNTER — ROUTINE PRENATAL (OUTPATIENT)
Dept: OBGYN CLINIC | Age: 32
End: 2022-02-03
Payer: COMMERCIAL

## 2022-02-03 ENCOUNTER — HOSPITAL ENCOUNTER (OUTPATIENT)
Dept: PERINATAL CARE | Age: 32
Discharge: HOME | End: 2022-02-03
Attending: OBSTETRICS & GYNECOLOGY
Payer: COMMERCIAL

## 2022-02-03 VITALS — SYSTOLIC BLOOD PRESSURE: 139 MMHG | DIASTOLIC BLOOD PRESSURE: 79 MMHG | WEIGHT: 293 LBS | BODY MASS INDEX: 53.6 KG/M2

## 2022-02-03 DIAGNOSIS — O16.9 HYPERTENSION AFFECTING PREGNANCY, ANTEPARTUM: ICD-10-CM

## 2022-02-03 DIAGNOSIS — O30.049 DICHORIONIC DIAMNIOTIC TWIN PREGNANCY, ANTEPARTUM: ICD-10-CM

## 2022-02-03 DIAGNOSIS — O09.90 SUPERVISION OF HIGH RISK PREGNANCY, ANTEPARTUM: Primary | ICD-10-CM

## 2022-02-03 DIAGNOSIS — Z3A.30 30 WEEKS GESTATION OF PREGNANCY: ICD-10-CM

## 2022-02-03 PROCEDURE — 76819 FETAL BIOPHYS PROFIL W/O NST: CPT | Performed by: OBSTETRICS & GYNECOLOGY

## 2022-02-03 PROCEDURE — 0502F SUBSEQUENT PRENATAL CARE: CPT | Performed by: OBSTETRICS & GYNECOLOGY

## 2022-02-03 NOTE — PROGRESS NOTES
Problem List  Date Reviewed: 1/21/2022          Codes Class Noted    Supervision of high risk pregnancy, antepartum ICD-10-CM: O09.90  ICD-9-CM: V23.9  9/21/2021    Overview Addendum 1/28/2022  9:51 AM by Ranell Galeazzi     [facility] 4/8/2022 by [address] (conceived on OCP)  Twins di/di  MFM 12 weeks- 9/27 (Pt notified at appt-needs -Khmer)  [address] multiparity  Hx of CS x3 - CS 38 weeks  Hx of HTN - no meds, baby ASA at 12 weeks  NIPTS No call - dyzygotic twins - MFM, genetic counseling, fetal echos per MFM - declines amnio  Horizon- neg  Covid- 19 1/2022  covid vaccine  Brothers with blood clotting issue - thin - need dx  Varicella nonimmune  Early Glucola- 124  AFP- neg  fam hx of hemophilia - precautions at birth  Pos COVID - 1/10/2022  1hr GTT- normal  REPEAT C/S 3/25/22               Severe obesity ([facility] [address] ICD-10-CM: E66.01  ICD-9-CM: 278.01  9/9/2019

## 2022-02-03 NOTE — PROGRESS NOTES
Patient states she has not had any fetal movements since 2 days after she left the hospital last visit. This is despite being told that she needs to call the office if she does not feel the baby is moving. She was advised that she needs to take her prenatal vitamins and iron which she has always refused to take and that she is an increased risk for blood transfusion particularly due to the fact that she is a grand multiparity and is carrying twins and will need a .   We will send the patient up to [facility] today for biophysical profile

## 2022-02-18 ENCOUNTER — HOSPITAL ENCOUNTER (OUTPATIENT)
Age: 32
Setting detail: OBSERVATION
Discharge: HOME | End: 2022-02-21
Attending: OBSTETRICS & GYNECOLOGY | Admitting: OBSTETRICS & GYNECOLOGY
Payer: COMMERCIAL

## 2022-02-18 ENCOUNTER — HOSPITAL ENCOUNTER (OUTPATIENT)
Dept: PERINATAL CARE | Age: 32
Discharge: HOME | End: 2022-02-18
Attending: OBSTETRICS & GYNECOLOGY
Payer: COMMERCIAL

## 2022-02-18 ENCOUNTER — ROUTINE PRENATAL (OUTPATIENT)
Dept: OBGYN CLINIC | Age: 32
End: 2022-02-18
Payer: COMMERCIAL

## 2022-02-18 VITALS — SYSTOLIC BLOOD PRESSURE: 150 MMHG | BODY MASS INDEX: 54.74 KG/M2 | DIASTOLIC BLOOD PRESSURE: 90 MMHG | WEIGHT: 293 LBS

## 2022-02-18 DIAGNOSIS — O36.5990 PREGNANCY AFFECTED BY FETAL GROWTH RESTRICTION: ICD-10-CM

## 2022-02-18 DIAGNOSIS — O30.049 DICHORIONIC DIAMNIOTIC TWIN PREGNANCY, ANTEPARTUM: ICD-10-CM

## 2022-02-18 DIAGNOSIS — O16.9 HYPERTENSION AFFECTING PREGNANCY, ANTEPARTUM: ICD-10-CM

## 2022-02-18 DIAGNOSIS — O09.90 SUPERVISION OF HIGH RISK PREGNANCY, ANTEPARTUM: Primary | ICD-10-CM

## 2022-02-18 DIAGNOSIS — R31.9 HEMATURIA, UNSPECIFIED TYPE: ICD-10-CM

## 2022-02-18 DIAGNOSIS — O99.213 OBESITY AFFECTING PREGNANCY IN THIRD TRIMESTER: ICD-10-CM

## 2022-02-18 DIAGNOSIS — O30.043 DICHORIONIC DIAMNIOTIC TWIN PREGNANCY IN THIRD TRIMESTER: ICD-10-CM

## 2022-02-18 DIAGNOSIS — O09.90 SUPERVISION OF HIGH RISK PREGNANCY, ANTEPARTUM: ICD-10-CM

## 2022-02-18 DIAGNOSIS — Z3A.33 33 WEEKS GESTATION OF PREGNANCY: ICD-10-CM

## 2022-02-18 DIAGNOSIS — O10.919 CHRONIC HYPERTENSION AFFECTING PREGNANCY: ICD-10-CM

## 2022-02-18 PROBLEM — Z34.90 PREGNANCY: Status: ACTIVE | Noted: 2022-02-18

## 2022-02-18 LAB
ALBUMIN SERPL-MCNC: 2.2 G/DL (ref 3.5–5)
ALBUMIN/GLOB SERPL: 0.5 {RATIO} (ref 1.1–2.2)
ALP SERPL-CCNC: 119 U/L (ref 45–117)
ALT SERPL-CCNC: 16 U/L (ref 12–78)
ANION GAP SERPL CALC-SCNC: 7 MMOL/L (ref 5–15)
AST SERPL-CCNC: 69 U/L (ref 15–37)
BASOPHILS # BLD: 0 K/UL (ref 0–0.1)
BASOPHILS NFR BLD: 0 % (ref 0–1)
BILIRUB SERPL-MCNC: 0.2 MG/DL (ref 0.2–1)
BUN SERPL-MCNC: 4 MG/DL (ref 6–20)
BUN/CREAT SERPL: 8 (ref 12–20)
CALCIUM SERPL-MCNC: 8.9 MG/DL (ref 8.5–10.1)
CHLORIDE SERPL-SCNC: 106 MMOL/L (ref 97–108)
CO2 SERPL-SCNC: 25 MMOL/L (ref 21–32)
CREAT SERPL-MCNC: 0.52 MG/DL (ref 0.55–1.02)
DIFFERENTIAL METHOD BLD: ABNORMAL
EOSINOPHIL # BLD: 0 K/UL (ref 0–0.4)
EOSINOPHIL NFR BLD: 0 % (ref 0–7)
ERYTHROCYTE [DISTWIDTH] IN BLOOD BY AUTOMATED COUNT: 17 % (ref 11.5–14.5)
GLOBULIN SER CALC-MCNC: 4.5 G/DL (ref 2–4)
GLUCOSE SERPL-MCNC: 63 MG/DL (ref 65–100)
HCT VFR BLD AUTO: 34.6 % (ref 35–47)
HGB BLD-MCNC: 10.3 G/DL (ref 11.5–16)
IMM GRANULOCYTES # BLD AUTO: 0 K/UL (ref 0–0.04)
IMM GRANULOCYTES NFR BLD AUTO: 0 % (ref 0–0.5)
LYMPHOCYTES # BLD: 1.5 K/UL (ref 0.8–3.5)
LYMPHOCYTES NFR BLD: 22 % (ref 12–49)
MCH RBC QN AUTO: 21.3 PG (ref 26–34)
MCHC RBC AUTO-ENTMCNC: 29.8 G/DL (ref 30–36.5)
MCV RBC AUTO: 71.5 FL (ref 80–99)
MONOCYTES # BLD: 0.7 K/UL (ref 0–1)
MONOCYTES NFR BLD: 10 % (ref 5–13)
NEUTS SEG # BLD: 4.7 K/UL (ref 1.8–8)
NEUTS SEG NFR BLD: 68 % (ref 32–75)
NRBC # BLD: 0 K/UL (ref 0–0.01)
NRBC BLD-RTO: 0 PER 100 WBC
PLATELET # BLD AUTO: 267 K/UL (ref 150–400)
PMV BLD AUTO: 11.4 FL (ref 8.9–12.9)
POTASSIUM SERPL-SCNC: 5.8 MMOL/L (ref 3.5–5.1)
PROT SERPL-MCNC: 6.7 G/DL (ref 6.4–8.2)
RBC # BLD AUTO: 4.84 M/UL (ref 3.8–5.2)
SODIUM SERPL-SCNC: 138 MMOL/L (ref 136–145)
WBC # BLD AUTO: 7 K/UL (ref 3.6–11)

## 2022-02-18 PROCEDURE — 76816 OB US FOLLOW-UP PER FETUS: CPT | Performed by: OBSTETRICS & GYNECOLOGY

## 2022-02-18 PROCEDURE — 74011250637 HC RX REV CODE- 250/637: Performed by: OBSTETRICS & GYNECOLOGY

## 2022-02-18 PROCEDURE — 96372 THER/PROPH/DIAG INJ SC/IM: CPT

## 2022-02-18 PROCEDURE — 76819 FETAL BIOPHYS PROFIL W/O NST: CPT | Performed by: OBSTETRICS & GYNECOLOGY

## 2022-02-18 PROCEDURE — 99218 PR INITIAL OBSERVATION CARE/DAY 30 MINUTES: CPT | Performed by: OBSTETRICS & GYNECOLOGY

## 2022-02-18 PROCEDURE — 96374 THER/PROPH/DIAG INJ IV PUSH: CPT

## 2022-02-18 PROCEDURE — 74011250636 HC RX REV CODE- 250/636

## 2022-02-18 PROCEDURE — 74011250636 HC RX REV CODE- 250/636: Performed by: OBSTETRICS & GYNECOLOGY

## 2022-02-18 PROCEDURE — 76820 UMBILICAL ARTERY ECHO: CPT | Performed by: OBSTETRICS & GYNECOLOGY

## 2022-02-18 PROCEDURE — 96375 TX/PRO/DX INJ NEW DRUG ADDON: CPT

## 2022-02-18 PROCEDURE — 36415 COLL VENOUS BLD VENIPUNCTURE: CPT

## 2022-02-18 PROCEDURE — 0502F SUBSEQUENT PRENATAL CARE: CPT | Performed by: OBSTETRICS & GYNECOLOGY

## 2022-02-18 PROCEDURE — 74011000250 HC RX REV CODE- 250: Performed by: OBSTETRICS & GYNECOLOGY

## 2022-02-18 PROCEDURE — 80053 COMPREHEN METABOLIC PANEL: CPT

## 2022-02-18 PROCEDURE — 85025 COMPLETE CBC W/AUTO DIFF WBC: CPT

## 2022-02-18 PROCEDURE — G0378 HOSPITAL OBSERVATION PER HR: HCPCS

## 2022-02-18 PROCEDURE — 96376 TX/PRO/DX INJ SAME DRUG ADON: CPT

## 2022-02-18 RX ORDER — CEPHALEXIN 250 MG/1
500 CAPSULE ORAL EVERY 6 HOURS
Status: DISCONTINUED | OUTPATIENT
Start: 2022-02-18 | End: 2022-02-20

## 2022-02-18 RX ORDER — MAGNESIUM SULFATE HEPTAHYDRATE 40 MG/ML
INJECTION, SOLUTION INTRAVENOUS
Status: COMPLETED
Start: 2022-02-18 | End: 2022-02-19

## 2022-02-18 RX ORDER — WATER FOR INJECTION,STERILE
VIAL (ML) INJECTION
Status: DISPENSED
Start: 2022-02-18 | End: 2022-02-19

## 2022-02-18 RX ORDER — ONDANSETRON 2 MG/ML
4 INJECTION INTRAMUSCULAR; INTRAVENOUS
Status: DISCONTINUED | OUTPATIENT
Start: 2022-02-18 | End: 2022-02-21 | Stop reason: HOSPADM

## 2022-02-18 RX ORDER — OXYTOCIN/RINGER'S LACTATE 30/500 ML
87.3 PLASTIC BAG, INJECTION (ML) INTRAVENOUS AS NEEDED
Status: CANCELLED | OUTPATIENT
Start: 2022-02-18

## 2022-02-18 RX ORDER — CEFAZOLIN SODIUM 1 G/3ML
INJECTION, POWDER, FOR SOLUTION INTRAMUSCULAR; INTRAVENOUS
Status: DISPENSED
Start: 2022-02-18 | End: 2022-02-19

## 2022-02-18 RX ORDER — OXYTOCIN/RINGER'S LACTATE 30/500 ML
10 PLASTIC BAG, INJECTION (ML) INTRAVENOUS AS NEEDED
Status: CANCELLED | OUTPATIENT
Start: 2022-02-18

## 2022-02-18 RX ORDER — SODIUM CHLORIDE 0.9 % (FLUSH) 0.9 %
5-40 SYRINGE (ML) INJECTION EVERY 8 HOURS
Status: CANCELLED | OUTPATIENT
Start: 2022-02-18

## 2022-02-18 RX ORDER — NALBUPHINE HYDROCHLORIDE 10 MG/ML
10 INJECTION, SOLUTION INTRAMUSCULAR; INTRAVENOUS; SUBCUTANEOUS
Status: DISCONTINUED | OUTPATIENT
Start: 2022-02-18 | End: 2022-02-21 | Stop reason: HOSPADM

## 2022-02-18 RX ORDER — BETAMETHASONE SODIUM PHOSPHATE AND BETAMETHASONE ACETATE 3; 3 MG/ML; MG/ML
12 INJECTION, SUSPENSION INTRA-ARTICULAR; INTRALESIONAL; INTRAMUSCULAR; SOFT TISSUE EVERY 24 HOURS
Status: COMPLETED | OUTPATIENT
Start: 2022-02-18 | End: 2022-02-19

## 2022-02-18 RX ORDER — SODIUM CHLORIDE, SODIUM LACTATE, POTASSIUM CHLORIDE, CALCIUM CHLORIDE 600; 310; 30; 20 MG/100ML; MG/100ML; MG/100ML; MG/100ML
100 INJECTION, SOLUTION INTRAVENOUS CONTINUOUS
Status: DISCONTINUED | OUTPATIENT
Start: 2022-02-18 | End: 2022-02-21 | Stop reason: HOSPADM

## 2022-02-18 RX ORDER — ACETAMINOPHEN 325 MG/1
650 TABLET ORAL
Status: DISCONTINUED | OUTPATIENT
Start: 2022-02-18 | End: 2022-02-19

## 2022-02-18 RX ORDER — PROCHLORPERAZINE EDISYLATE 5 MG/ML
10 INJECTION INTRAMUSCULAR; INTRAVENOUS ONCE
Status: COMPLETED | OUTPATIENT
Start: 2022-02-18 | End: 2022-02-18

## 2022-02-18 RX ORDER — MAGNESIUM SULFATE HEPTAHYDRATE 40 MG/ML
1 INJECTION, SOLUTION INTRAVENOUS CONTINUOUS
Status: DISCONTINUED | OUTPATIENT
Start: 2022-02-18 | End: 2022-02-21

## 2022-02-18 RX ORDER — MAGNESIUM SULFATE HEPTAHYDRATE 40 MG/ML
4 INJECTION, SOLUTION INTRAVENOUS ONCE
Status: COMPLETED | OUTPATIENT
Start: 2022-02-18 | End: 2022-02-19

## 2022-02-18 RX ORDER — CEPHALEXIN 500 MG/1
500 CAPSULE ORAL 3 TIMES DAILY
Qty: 21 CAPSULE | Refills: 0 | Status: SHIPPED | OUTPATIENT
Start: 2022-02-18 | End: 2022-02-25

## 2022-02-18 RX ORDER — SODIUM CHLORIDE 0.9 % (FLUSH) 0.9 %
5-40 SYRINGE (ML) INJECTION AS NEEDED
Status: CANCELLED | OUTPATIENT
Start: 2022-02-18

## 2022-02-18 RX ADMIN — PROCHLORPERAZINE EDISYLATE 10 MG: 5 INJECTION INTRAMUSCULAR; INTRAVENOUS at 22:13

## 2022-02-18 RX ADMIN — CEFAZOLIN 2 G: 1 INJECTION, POWDER, FOR SOLUTION INTRAMUSCULAR; INTRAVENOUS at 17:31

## 2022-02-18 RX ADMIN — MAGNESIUM SULFATE IN WATER 1 G/HR: 40 INJECTION, SOLUTION INTRAVENOUS at 22:18

## 2022-02-18 RX ADMIN — MAGNESIUM SULFATE HEPTAHYDRATE 4 G: 40 INJECTION, SOLUTION INTRAVENOUS at 21:54

## 2022-02-18 RX ADMIN — SODIUM CHLORIDE, POTASSIUM CHLORIDE, SODIUM LACTATE AND CALCIUM CHLORIDE 75 ML/HR: 600; 310; 30; 20 INJECTION, SOLUTION INTRAVENOUS at 21:02

## 2022-02-18 RX ADMIN — ACETAMINOPHEN 650 MG: 325 TABLET ORAL at 20:36

## 2022-02-18 RX ADMIN — BETAMETHASONE SODIUM PHOSPHATE AND BETAMETHASONE ACETATE 12 MG: 3; 3 INJECTION, SUSPENSION INTRA-ARTICULAR; INTRALESIONAL; INTRAMUSCULAR at 17:34

## 2022-02-18 RX ADMIN — NALBUPHINE HYDROCHLORIDE 10 MG: 10 INJECTION, SOLUTION INTRAMUSCULAR; INTRAVENOUS; SUBCUTANEOUS at 21:48

## 2022-02-18 RX ADMIN — CEPHALEXIN 500 MG: 250 CAPSULE ORAL at 19:23

## 2022-02-18 NOTE — PROGRESS NOTES
Problem List  Date Reviewed: 2/3/2022          Codes Class Noted    Supervision of high risk pregnancy, antepartum ICD-10-CM: O09.90  ICD-9-CM: V23.9  9/21/2021    Overview Addendum 1/28/2022  9:51 AM by Mary Sharpe     [facility] 4/8/2022 by US (conceived on OCP)  Twins di/di  MFM 12 weeks- 9/27 (Pt notified at appt-needs -Frisian)  [address] multiparity  Hx of CS x3 - CS 38 weeks  Hx of HTN - no meds, baby ASA at 12 weeks  NIPTS No call - dyzygotic twins - MFM, genetic counseling, fetal echos per MFM - declines amnio  Horizon- neg  Covid- 19 1/2022  covid vaccine  Brothers with blood clotting issue - thin - need dx  Varicella nonimmune  Early Glucola- 124  AFP- neg  fam hx of hemophilia - precautions at birth  Pos COVID - 1/10/2022  1hr GTT- normal  REPEAT C/S 3/25/22               Severe obesity ([facility] [address] ICD-10-CM: E66.01  ICD-9-CM: 278.01  9/9/2019

## 2022-02-18 NOTE — H&P
History & Physical    Name: Rama Noguera MRN: [id number]  SSN: [id number]    YOB: 1990  Age: 27 y.o. Sex: female        Subjective:     Estimated Date of Delivery: 22  OB History        8    Para   5    Term   5       0    AB   2    Living   5       SAB   2    IAB   0    Ectopic   0    Molar   0    Multiple   0    Live Births   5                MsGage Palomino Courser is admitted with pregnancy at 33w0d for 2/8 BPP on twin B, poor interval growth, elevated dopplers. Prenatal course was complicated by chronic hypertension and twins. Please see prenatal records for details.       Problem List  Date Reviewed: 2022          Codes Class Noted    Supervision of high risk pregnancy, antepartum ICD-10-CM: O09.90  ICD-9-CM: V23.9  2021    Overview Addendum 2022  9:51 AM by Alise Thompson 39 2022 by US (conceived on OCP)  Twins di/di  MFM 12 weeks-  (Pt notified at appt-needs -Khmer)  [address] multiparity  Hx of CS x3 - CS 38 weeks  Hx of HTN - no meds, baby ASA at 12 weeks  NIPTS No call - dyzygotic twins - MFM, genetic counseling, fetal echos per MFM - declines amnio  Horizon- neg  Covid- 19 2022  covid vaccine  Brothers with blood clotting issue - thin - need dx  Varicella nonimmune  Early Glucola- 124  AFP- neg  fam hx of hemophilia - precautions at birth  Pos COVID - 1/10/2022  1hr GTT- normal  REPEAT C/S 3/25/22               Severe obesity ([facility] [address] ICD-10-CM: E66.01  ICD-9-CM: 278.01  2019              Past Medical History:   Diagnosis Date    Complication of anesthesia     general anesthesia for both previous csections    Essential hypertension     last two pregnancies     Past Surgical History:   Procedure Laterality Date    HX  SECTION      x2    HX DILATION AND CURETTAGE      HX OTHER SURGICAL       Social History     Occupational History    Not on file   Tobacco Use    Smoking status: Never Smoker    Smokeless tobacco: Never Used   Substance and Sexual Activity    Alcohol use: Not Currently    Drug use: Never    Sexual activity: Not Currently     Family History   Problem Relation Age of Onset    Hypertension Father        Allergies   Allergen Reactions    Other Medication Unknown (comments)     Pt. States does not remember the name of the shot but had a reaction. Prior to Admission medications    Medication Sig Start Date End Date Taking? Authorizing Provider   cephALEXin (KEFLEX) 500 mg capsule Take 1 Capsule by mouth three (3) times daily for 7 days. 2/18/22 2/25/22  Yasmin Benoit MD   clotrimazole (GYNE-LOTRIMIN) 2 % vaginal cream Insert 1 Applicatorful into vagina nightly. Patient not taking: Reported on 1/10/2022 10/27/21   Yasmin Benoit MD   aspirin delayed-release 81 mg tablet Take 1 Tablet by mouth daily. 10/27/21   Yasmin Benoit MD   ondansetron (ZOFRAN ODT) 8 mg disintegrating tablet Take 1 Tablet by mouth every eight (8) hours as needed for Nausea or Vomiting. Patient not taking: Reported on 1/10/2022 9/29/21   Yasmin Benoit MD   prenatal vit-iron fumarate-fa 27 mg iron- 0.8 mg tab tablet Take 1 Tablet by mouth daily. Patient not taking: Reported on 1/10/2022 9/10/21   Yasmin Benoit MD   ferrous sulfate (IRON) 325 mg (65 mg iron) EC tablet Take 1 Tablet by mouth daily. Patient not taking: Reported on 1/10/2022 9/10/21   Yasmin Benoit MD   promethazine (PHENERGAN) 25 mg tablet Take 1 Tab by mouth every six (6) hours as needed for Nausea. Patient not taking: Reported on 9/10/2021 3/20/21   Neyda London MD   aluminum & magnesium hydroxide-simethicone (Mylanta Maximum Strength) 400-400-40 mg/5 mL suspension Take 10 mL by mouth every six (6) hours as needed for Indigestion. Indications: indigestion  Patient not taking: Reported on 9/10/2021 3/20/21   Neyda London MD   albuterol (PROVENTIL HFA, VENTOLIN HFA, PROAIR HFA) 90 mcg/actuation inhaler Take 2 Puffs by inhalation every six (6) hours as needed for Wheezing.   Patient not taking: Reported on 9/10/2021 3/7/21   Sera Herring MD   ondansetron (Zofran ODT) 8 mg disintegrating tablet Take 1 Tab by mouth every eight (8) hours as needed for Nausea or Vomiting. Patient not taking: Reported on 9/10/2021 3/7/21   Sera Herring MD        Review of Systems: A comprehensive review of systems was negative except for that written in the HPI. Objective:     Vitals: There were no vitals filed for this visit. Physical Exam:  Patient without distress. Heart: Regular rate and rhythm  Lung: clear to auscultation throughout lung fields, no wheezes, no rales, no rhonchi and normal respiratory effort  Abdomen: soft, nontender  Fundus: soft and non tender  Perineum: blood absent, amniotic fluid absent  Cervical Exam: Closed/Thick/High  Membranes:  Intact      Prenatal Labs:   B pos         Assessment/Plan:     Plan: Admit for observation. Continuous EFM. Give steroids now. Dr. Syed De Souza to repeat BPP tomorrow.    CS if abnormal tracing B or unable to adequately trace twins    Signed By:  Emili Brock MD     February 18, 2022

## 2022-02-18 NOTE — PROGRESS NOTES
Has SP pain worse with urination  Large blood in urine  Will send keflex   Cervix closed  Recheck BP in one week - sees MFM today

## 2022-02-18 NOTE — PROGRESS NOTES
Pt arrived to L&D from office for monitoring. [address] Dr. Myles Wolf at bedside to see pt. Pt aware will see MFM again tomorrow and will stay inpatient until at least Monday. 1909 Bedside shift change report given to JENIFER Voss (oncoming nurse) by PHUONG Villalba RN (offgoing nurse). Report included the following information SBAR, Kardex, Intake/Output, MAR, Recent Results and Med Rec Status.

## 2022-02-18 NOTE — PROGRESS NOTES
MATERNAL FETAL MEDICINE NOTE    Ultrasound findings today:    Follow Up Obstetric Ultrasound    This is a dichorionic/diamniotic pelayo gestation. Biometry is consistent with prior dating. Fetal anatomy appears normal as noted above. Twin A: Maternal left, 2163 gms, 46th percentile. There has been adequate interval growth of 150 gms/week. Umbilical artery dopplers are normal. BPP 8/8. Twin B: Superior transverse, head to maternal left, 1692 gms, 15th percentile. Growth for twin B is lagging at 100 gms/week. Umbilical artery dopplers are elevated, there is continuous forward flow. BPP 2/8 with no fetal tone, movement, fetal breathing. Intertwin discordance is 21%    Impression:    28 yo  at 33w0d with di/di twin pregnancy, chronic hypertension, morbid obesity, prior  delivery X 3, abnormal genetic screening, possible family history of hemophilia. There is significant compromise in the growth for Twin B along with elevated umbilical dopplers and non-reassuring fetal status. I recommend inpatient hospital admission for  corticosteroids. I discussed the plan with Dr. Indigo Stone.     Recommendation:  - Admit to inpatient  -  corticosteroids for fetal lung maturity  - Continuous fetal monitoring  - If there is any concern for Twin B, proceed with delivery  - I will perform a BPP in the morning    Ward Kim MD  Maternal Fetal Medicine

## 2022-02-19 PROBLEM — O30.043 DICHORIONIC DIAMNIOTIC TWIN PREGNANCY IN THIRD TRIMESTER: Status: ACTIVE | Noted: 2022-02-19

## 2022-02-19 PROBLEM — O99.213 OBESITY AFFECTING PREGNANCY IN THIRD TRIMESTER: Status: ACTIVE | Noted: 2022-02-19

## 2022-02-19 PROBLEM — O10.919 CHRONIC HYPERTENSION AFFECTING PREGNANCY: Status: ACTIVE | Noted: 2022-02-19

## 2022-02-19 PROBLEM — O36.5990 PREGNANCY AFFECTED BY FETAL GROWTH RESTRICTION: Status: ACTIVE | Noted: 2022-02-19

## 2022-02-19 LAB
ABO + RH BLD: NORMAL
ALBUMIN SERPL-MCNC: 2.2 G/DL (ref 3.5–5)
ALBUMIN/GLOB SERPL: 0.6 {RATIO} (ref 1.1–2.2)
ALP SERPL-CCNC: 114 U/L (ref 45–117)
ALT SERPL-CCNC: 12 U/L (ref 12–78)
ANION GAP SERPL CALC-SCNC: 11 MMOL/L (ref 5–15)
AST SERPL-CCNC: 11 U/L (ref 15–37)
BASOPHILS # BLD: 0 K/UL (ref 0–0.1)
BASOPHILS NFR BLD: 0 % (ref 0–1)
BILIRUB SERPL-MCNC: 0.3 MG/DL (ref 0.2–1)
BLOOD GROUP ANTIBODIES SERPL: NORMAL
BUN SERPL-MCNC: 8 MG/DL (ref 6–20)
BUN/CREAT SERPL: 13 (ref 12–20)
CALCIUM SERPL-MCNC: 8.3 MG/DL (ref 8.5–10.1)
CHLORIDE SERPL-SCNC: 105 MMOL/L (ref 97–108)
CO2 SERPL-SCNC: 22 MMOL/L (ref 21–32)
CREAT SERPL-MCNC: 0.6 MG/DL (ref 0.55–1.02)
DIFFERENTIAL METHOD BLD: ABNORMAL
EOSINOPHIL # BLD: 0 K/UL (ref 0–0.4)
EOSINOPHIL NFR BLD: 0 % (ref 0–7)
ERYTHROCYTE [DISTWIDTH] IN BLOOD BY AUTOMATED COUNT: 17 % (ref 11.5–14.5)
GLOBULIN SER CALC-MCNC: 3.8 G/DL (ref 2–4)
GLUCOSE SERPL-MCNC: 136 MG/DL (ref 65–100)
HCT VFR BLD AUTO: 32.4 % (ref 35–47)
HGB BLD-MCNC: 9.7 G/DL (ref 11.5–16)
IMM GRANULOCYTES # BLD AUTO: 0.1 K/UL (ref 0–0.04)
IMM GRANULOCYTES NFR BLD AUTO: 1 % (ref 0–0.5)
LYMPHOCYTES # BLD: 1.3 K/UL (ref 0.8–3.5)
LYMPHOCYTES NFR BLD: 17 % (ref 12–49)
MCH RBC QN AUTO: 21.4 PG (ref 26–34)
MCHC RBC AUTO-ENTMCNC: 29.9 G/DL (ref 30–36.5)
MCV RBC AUTO: 71.5 FL (ref 80–99)
MONOCYTES # BLD: 0.7 K/UL (ref 0–1)
MONOCYTES NFR BLD: 8 % (ref 5–13)
NEUTS SEG # BLD: 6 K/UL (ref 1.8–8)
NEUTS SEG NFR BLD: 74 % (ref 32–75)
NRBC # BLD: 0 K/UL (ref 0–0.01)
NRBC BLD-RTO: 0 PER 100 WBC
PLATELET # BLD AUTO: 303 K/UL (ref 150–400)
PMV BLD AUTO: 10.7 FL (ref 8.9–12.9)
POTASSIUM SERPL-SCNC: 4.1 MMOL/L (ref 3.5–5.1)
PROT SERPL-MCNC: 6 G/DL (ref 6.4–8.2)
RBC # BLD AUTO: 4.53 M/UL (ref 3.8–5.2)
SODIUM SERPL-SCNC: 138 MMOL/L (ref 136–145)
SPECIMEN EXP DATE BLD: NORMAL
URATE SERPL-MCNC: 6.5 MG/DL (ref 2.6–6)
WBC # BLD AUTO: 8 K/UL (ref 3.6–11)

## 2022-02-19 PROCEDURE — 99225 PR SBSQ OBSERVATION CARE/DAY 25 MINUTES: CPT | Performed by: OBSTETRICS & GYNECOLOGY

## 2022-02-19 PROCEDURE — 85025 COMPLETE CBC W/AUTO DIFF WBC: CPT

## 2022-02-19 PROCEDURE — 74011250636 HC RX REV CODE- 250/636: Performed by: OBSTETRICS & GYNECOLOGY

## 2022-02-19 PROCEDURE — 74011250637 HC RX REV CODE- 250/637: Performed by: OBSTETRICS & GYNECOLOGY

## 2022-02-19 PROCEDURE — 96372 THER/PROPH/DIAG INJ SC/IM: CPT

## 2022-02-19 PROCEDURE — G0378 HOSPITAL OBSERVATION PER HR: HCPCS

## 2022-02-19 PROCEDURE — 80053 COMPREHEN METABOLIC PANEL: CPT

## 2022-02-19 PROCEDURE — 86900 BLOOD TYPING SEROLOGIC ABO: CPT

## 2022-02-19 PROCEDURE — 84550 ASSAY OF BLOOD/URIC ACID: CPT

## 2022-02-19 PROCEDURE — 36415 COLL VENOUS BLD VENIPUNCTURE: CPT

## 2022-02-19 PROCEDURE — 99231 SBSQ HOSP IP/OBS SF/LOW 25: CPT | Performed by: OBSTETRICS & GYNECOLOGY

## 2022-02-19 RX ORDER — NIFEDIPINE 10 MG/1
20 CAPSULE ORAL EVERY 8 HOURS
Status: DISCONTINUED | OUTPATIENT
Start: 2022-02-19 | End: 2022-02-21

## 2022-02-19 RX ORDER — ACETAMINOPHEN 325 MG/1
975 TABLET ORAL
Status: DISCONTINUED | OUTPATIENT
Start: 2022-02-19 | End: 2022-02-21 | Stop reason: HOSPADM

## 2022-02-19 RX ADMIN — CEPHALEXIN 500 MG: 250 CAPSULE ORAL at 05:54

## 2022-02-19 RX ADMIN — SODIUM CHLORIDE, POTASSIUM CHLORIDE, SODIUM LACTATE AND CALCIUM CHLORIDE 100 ML/HR: 600; 310; 30; 20 INJECTION, SOLUTION INTRAVENOUS at 08:25

## 2022-02-19 RX ADMIN — CEPHALEXIN 500 MG: 250 CAPSULE ORAL at 12:29

## 2022-02-19 RX ADMIN — BETAMETHASONE SODIUM PHOSPHATE AND BETAMETHASONE ACETATE 12 MG: 3; 3 INJECTION, SUSPENSION INTRA-ARTICULAR; INTRALESIONAL; INTRAMUSCULAR at 17:34

## 2022-02-19 RX ADMIN — ACETAMINOPHEN 975 MG: 325 TABLET ORAL at 20:06

## 2022-02-19 RX ADMIN — CEPHALEXIN 500 MG: 250 CAPSULE ORAL at 00:02

## 2022-02-19 RX ADMIN — NIFEDIPINE 20 MG: 10 CAPSULE ORAL at 11:09

## 2022-02-19 RX ADMIN — CEPHALEXIN 500 MG: 250 CAPSULE ORAL at 18:24

## 2022-02-19 RX ADMIN — NIFEDIPINE 20 MG: 10 CAPSULE ORAL at 19:58

## 2022-02-19 RX ADMIN — ACETAMINOPHEN 975 MG: 325 TABLET ORAL at 12:29

## 2022-02-19 NOTE — PROGRESS NOTES
Problem: Antepartum: Day 1 through Discharge  Goal: Off Pathway (Use only if patient is Off Pathway)  Outcome: Progressing Towards Goal     Problem: Antepartum: Day 1 through Discharge  Goal: Activity/Safety  Outcome: Progressing Towards Goal

## 2022-02-19 NOTE — PROGRESS NOTES
[address] [employer] language line Lucien Rings [id number]) used to interpret plan of care. Pt states ctx pain and frequency are \"less than yesterday\". Pt verbalize she feels a painful ctx every 2hrs. Pt denies any other needs at this time. Pt aware if she has any questions or concerns to press call bell and [employer]  will be contacted. 2883 Dr Judge Bahena at bedside to evaluate pt.    0006 Magnesium Sulfate stopped. Oeders received per Dr Astrid Dobbs. [id number]  Dr James([facility]) at bedside to evaluate pt and perform bedside ultrasound. Baby A BPP 8/8 and baby BPP 6/8. FM present with both babies per pt. Orders for pt to advance to regular diet and NST TID. [employer]   used for translation. [address] reviewed with Dr Niranjan Gray. Orders received for pt to start Nifedipine 20mg q 8 hrs for contractions. 56 Pt educated on use of Nifedipine. Pt aware a headache is common side effect. through [facility]  [id number].    1109 Nifedipine 20mg given orally. Dr Niranjan Gray at bedside. Aware pt is feeling her ctx and rates them 10/10.     1205 Pt c/o headache and would like to take Tylenol. Pt verbalized ctx are less and pain is now 4/10. [employer]  [id number] used to translate. [address] Niranjan Gray updated on ctx less frequent and painful per pt. Aware pt has a headache. Orders received for pt to take tylenol for headache. Tracing reviewed per Dr Niranjan Gray. Pt to come off monitor at this time. 1306 Pt denies any headache at this time. 1454 This RN at bedside to draw cbc, cmp and uric acid. Pt verbalized HA has returned. Pt states it is a 10/10. Pt stated \"it feels like boiling water inside\". Pt given ice pack. [employer]  [id number] used to translate. 1506 Pt placed back on monitor for NST.    1525 Dr Niranjan Gray at bedside with ultrasound to help find baby B hr. Dr Niranjan Gray aware pt is c/o headache returning and temp of 99.7. No orders received at this time.     1710 Recent lab results reviewed with Dr Tracie Baca. No new orders at this time. 960 [employer]  [id number] used to translate. Pt verbalized headache is better following ice pack. Pt informed she will be receiving second dose of Betamethasone. Pt instructed to notify  RN for if headache worsens or if she begins to feel painful contractions. Pt verbalized understanding. FM present with Baby A and Baby B per pt.     1750 Pt sitting at bedside eating dinner. Pt would like to shower this evening. 1824 Pt given Keflex 500mg po. Pt talking on phone. No complaints offered. 1850 Pt to continue taking Nifedipine 20mg q 8hrs  per Dr Tracie Baca. Pt can take Fiorecet for HA if needed.     1905 Shift report given to Live Bean RN

## 2022-02-19 NOTE — PROGRESS NOTES
Problem: Patient Education: Go to Patient Education Activity  Goal: Patient/Family Education  2/19/2022 0326 by Eva Perez Outcome: Progressing Towards Goal  2/18/2022 1953 by Eva Perez Outcome: Progressing Towards Goal     Problem: Antepartum: Day 1 through Discharge  Goal: Activity/Safety  2/19/2022 0326 by Eva Perez. Outcome: Progressing Towards Goal  2/18/2022 1953 by Eva Perez Outcome: Progressing Towards Goal     Problem: Antepartum: Day 1 through Discharge  Goal: Medications  2/19/2022 0326 by Eva Perez. Outcome: Progressing Towards Goal  2/18/2022 1953 by Eva Perez   Outcome: Progressing Towards Goal

## 2022-02-19 NOTE — PROGRESS NOTES
S/c/o contractions last night and started on Mag. No with occasional contractions    O/ 140/64; 126/56; 138/60    Abd: obese, gravid, mild diffuse tenderness to palpation    A/ IUP 33 1/7 weeks with di/di twins; twin B 2/8 yesterday and in L&D for monitoring; both twins with reactive tracings overnight; some contractions overnight now subsided; Seen by MFM this am and twin B 6/8; 2 off for breathing; MFM recc TID NST and repeat BPP in 2 days.  Stop Mag and treat with Nifedipine prn for contractions    P/ per MFM TID NST and repeat BPP in 2 days; stop mag and use prn Nifedpine in contractions increase

## 2022-02-19 NOTE — PROGRESS NOTES
MATERNAL FETAL MEDICINE   PROGRESS NOTE    Roma Ren is a 27 y.o. female  at 33w1d admitted for di/di twin pregnancy, morbid obesity, chronic hypertension and non-reassuring status for twin B. Present on Admission:  Gerri Dean Dichorionic diamniotic twin pregnancy in third trimester   Chronic hypertension affecting pregnancy   Obesity affecting pregnancy in third trimester   Pregnancy affected by fetal growth restriction      Since admission she has felt occasional movement from Twin B. She says Twin A is very active. She is feeling occasional contractions overnight. No contractions this morning. Visit Vitals  BP (!) 140/64   Pulse (!) 110   Temp 99 °F (37.2 °C)   Resp 20   Wt (!) 177.8 kg (392 lb)   SpO2 93%   Breastfeeding No   BMI 69.44 kg/m²     Gen: Comfortable, NAD  Resp: Comfortable respirations  CV: Well perfused  Abd: Gravid, NT, ND  Ext: Moving all extremities well  Neuro: Alert, interactive, appropriate    Recent Results (from the past 24 hour(s))   CBC WITH AUTOMATED DIFF    Collection Time: 22  4:57 PM   Result Value Ref Range    WBC 7.0 3.6 - 11.0 K/uL    RBC 4.84 3.80 - 5.20 M/uL    HGB 10.3 (L) 11.5 - 16.0 g/dL    HCT 34.6 (L) 35.0 - 47.0 %    MCV 71.5 (L) 80.0 - 99.0 FL    MCH 21.3 (L) 26.0 - 34.0 PG    MCHC 29.8 (L) 30.0 - 36.5 g/dL    RDW 17.0 (H) 11.5 - 14.5 %    PLATELET 356 883 - 908 K/uL    MPV 11.4 8.9 - 12.9 FL    NRBC 0.0 0  WBC    ABSOLUTE NRBC 0.00 0.00 - 0.01 K/uL    NEUTROPHILS 68 32 - 75 %    LYMPHOCYTES 22 12 - 49 %    MONOCYTES 10 5 - 13 %    EOSINOPHILS 0 0 - 7 %    BASOPHILS 0 0 - 1 %    IMMATURE GRANULOCYTES 0 0.0 - 0.5 %    ABS. NEUTROPHILS 4.7 1.8 - 8.0 K/UL    ABS. LYMPHOCYTES 1.5 0.8 - 3.5 K/UL    ABS. MONOCYTES 0.7 0.0 - 1.0 K/UL    ABS. EOSINOPHILS 0.0 0.0 - 0.4 K/UL    ABS. BASOPHILS 0.0 0.0 - 0.1 K/UL    ABS. IMM.  GRANS. 0.0 0.00 - 0.04 K/UL    DF AUTOMATED     METABOLIC PANEL, COMPREHENSIVE    Collection Time: 22  4:57 PM   Result Value Ref Range    Sodium 138 136 - 145 mmol/L    Potassium 5.8 (H) 3.5 - 5.1 mmol/L    Chloride 106 97 - 108 mmol/L    CO2 25 21 - 32 mmol/L    Anion gap 7 5 - 15 mmol/L    Glucose 63 (L) 65 - 100 mg/dL    BUN 4 (L) 6 - 20 MG/DL    Creatinine 0.52 (L) 0.55 - 1.02 MG/DL    BUN/Creatinine ratio 8 (L) 12 - 20      GFR est AA >60 >60 ml/min/1.73m2    GFR est non-AA >60 >60 ml/min/1.73m2    Calcium 8.9 8.5 - 10.1 MG/DL    Bilirubin, total 0.2 0.2 - 1.0 MG/DL    ALT (SGPT) 16 12 - 78 U/L    AST (SGOT) 69 (H) 15 - 37 U/L    Alk. phosphatase 119 (H) 45 - 117 U/L    Protein, total 6.7 6.4 - 8.2 g/dL    Albumin 2.2 (L) 3.5 - 5.0 g/dL    Globulin 4.5 (H) 2.0 - 4.0 g/dL    A-G Ratio 0.5 (L) 1.1 - 2.2     TYPE & SCREEN    Collection Time: 22 12:10 AM   Result Value Ref Range    Crossmatch Expiration 2022,2359     ABO/Rh(D) B POSITIVE     Antibody screen NEG        ULTRASOUND  Bedside BPP:  Twin A: cephalic, maternal left, MVP 6.1 cm, BPP 8/8  Twin B: transverse head to maternal right, MVP 5.3, BPP 6/8 (-2 for breathing)    ASSESSMENT:    26 yo  at 33w1d with di/di twin pregnancy, chronic hypertension, morbid obesity with fetal growth restriction for twin B. Fetal tracings have been Category 1 for both twins. BPP for twin B is improved today with only -2 for fetal breathing. Josh Cabello had received her first dose of  corticosteroids. She was having contractions overnight and started on magnesium. I recommend discontinuing magnesium and using nifedipine for tocolysis.      RECOMMENDATIONS:    - Complete  corticosteroid course  - Can discontinue continuous fetal monitoring  - NSTs 3 times per day  - Nifedipine prn tocolysis  - If there is a change in fetal status I recommend proceeding to delivery  - Currently fetal status is reassuring  - Will repeat BPP on Monday    Montse Martin MD  Maternal Fetal Medicine

## 2022-02-19 NOTE — PROGRESS NOTES
This is a 26 y/o F [id number] at 33 weeks 1 day has twins with TWIN B 6/8 on todays BPP. Pt is having contractions Q7-8 minutes that are painful. FHR is CAT I. Will tocolyse with Procardia 20mg Q 4- 8 hrs for now. VS are stable. Will monitor closely.

## 2022-02-19 NOTE — ROUTINE PROCESS
1905  Verbal shift change report given to DAMEON Kay RN (oncoming nurse) by Nathen Quiles RN (offgoing nurse). Report included the following information SBAR, Kardex, MAR, Accordion, Recent Results and Med Rec Status. 1925  Patient assessment completed. [address] adjusted. 2021  Patient ambulates to bathroom. Patient states she has contraction like pains every ten minutes apart that are a 10/10. Dr Siddiqui Persons notified. Per MD, administer Tylenol. 2042  TOCO repositioned. 2118  Dr Ofelia Maloney at bedside assessing patient prgres and the strip. 2127  SVE 0.5 by Dr Ofelia Maloney. Per MD Magnesium will be started, and additional pain medications prescribed. 1448  Dr Ofelia Maloney updated on strip.  0700  Verbal shift change report given to ELOISA Gupta RN (oncoming nurse) by Jenny Hines RN (offgoing nurse). Report included the following information SBAR, Kardex, Intake/Output, MAR, Accordion, Recent Results and Med Rec Status.

## 2022-02-19 NOTE — PROGRESS NOTES
High Risk Obstetrics Progress Note    Name: Mckenzie Cruz MRN: [id number]  SSN: [id number]    YOB: 1990  Age: 27 y.o. Sex: female      Subjective:      LOS: 0 days    Estimated Date of Delivery: 22   Gestational Age Today: 33w0d     Patient admitted for twins. States she does have severe abdominal pain and severe contractions. Objective:     Vitals:  Blood pressure 125/70, pulse 88, temperature 98.8 °F (37.1 °C), resp. rate 18, weight (!) 177.8 kg (392 lb), last menstrual period 2021, SpO2 98 %, not currently breastfeeding. Temp (24hrs), Av.6 °F (37 °C), Min:98.4 °F (36.9 °C), Max:98.8 °F (64.3 °C)    Systolic (03RQE), WWI:628 , Min:122 , IFO:943      Diastolic (17RZW), NZV:06, Min:60, Max:99       Intake and Output:         Physical Exam:  Patient with distress.   Abdomen: soft, nontender, tenderness in the right lower quadrant - moderate and in the left lower quadrant - moderate, without guarding, without rebound  Fundus: soft and non tender  Perineum: blood absent, amniotic fluid absent  Cervical Exam: 1 cm dilated    50% effaced    -3 station    Lower Extremities: n/a       Membranes:  Intact    Uterine Activity:  Contractions noted per RN with EFm(hard to keep mom on due to obesity)    Fetal Heart Rate:  Baseline: 1320 per minute x2  Variability: moderate  Accelerations: yes  Decelerations: none  Uterine contractions: irregular, every 5-10 minutes        Labs:   Recent Results (from the past 36 hour(s))   CBC WITH AUTOMATED DIFF    Collection Time: 22  4:57 PM   Result Value Ref Range    WBC 7.0 3.6 - 11.0 K/uL    RBC 4.84 3.80 - 5.20 M/uL    HGB 10.3 (L) 11.5 - 16.0 g/dL    HCT 34.6 (L) 35.0 - 47.0 %    MCV 71.5 (L) 80.0 - 99.0 FL    MCH 21.3 (L) 26.0 - 34.0 PG    MCHC 29.8 (L) 30.0 - 36.5 g/dL    RDW 17.0 (H) 11.5 - 14.5 %    PLATELET 798 000 - 814 K/uL    MPV 11.4 8.9 - 12.9 FL    NRBC 0.0 0  WBC    ABSOLUTE NRBC 0.00 0.00 - 0.01 K/uL    NEUTROPHILS 68 32 - 75 % LYMPHOCYTES 22 12 - 49 %    MONOCYTES 10 5 - 13 %    EOSINOPHILS 0 0 - 7 %    BASOPHILS 0 0 - 1 %    IMMATURE GRANULOCYTES 0 0.0 - 0.5 %    ABS. NEUTROPHILS 4.7 1.8 - 8.0 K/UL    ABS. LYMPHOCYTES 1.5 0.8 - 3.5 K/UL    ABS. MONOCYTES 0.7 0.0 - 1.0 K/UL    ABS. EOSINOPHILS 0.0 0.0 - 0.4 K/UL    ABS. BASOPHILS 0.0 0.0 - 0.1 K/UL    ABS. IMM. GRANS. 0.0 0.00 - 0.04 K/UL    DF AUTOMATED     METABOLIC PANEL, COMPREHENSIVE    Collection Time: 22  4:57 PM   Result Value Ref Range    Sodium 138 136 - 145 mmol/L    Potassium 5.8 (H) 3.5 - 5.1 mmol/L    Chloride 106 97 - 108 mmol/L    CO2 25 21 - 32 mmol/L    Anion gap 7 5 - 15 mmol/L    Glucose 63 (L) 65 - 100 mg/dL    BUN 4 (L) 6 - 20 MG/DL    Creatinine 0.52 (L) 0.55 - 1.02 MG/DL    BUN/Creatinine ratio 8 (L) 12 - 20      GFR est AA >60 >60 ml/min/1.73m2    GFR est non-AA >60 >60 ml/min/1.73m2    Calcium 8.9 8.5 - 10.1 MG/DL    Bilirubin, total 0.2 0.2 - 1.0 MG/DL    ALT (SGPT) 16 12 - 78 U/L    AST (SGOT) 69 (H) 15 - 37 U/L    Alk. phosphatase 119 (H) 45 - 117 U/L    Protein, total 6.7 6.4 - 8.2 g/dL    Albumin 2.2 (L) 3.5 - 5.0 g/dL    Globulin 4.5 (H) 2.0 - 4.0 g/dL    A-G Ratio 0.5 (L) 1.1 - 2.2         Assessment and Plan: Active Problems:    Pregnancy (2022)       Previous  Delivery: For planned repeat  delivery. Twin Pregnancy:  abn BPP twin B per [facility]   Fetal Abnormality:  Fetus with anatomic abnormality noted previously on ultrasound. Admitted with UTI and Antibiotic is being given- is this bladder spasm? Morbidly Obese with Twins- is this Contributing? Prior  (multiple)- unlikely Scar issue- but will closely observe for uterine rupture(doubt at this time)- non acute abdomen at this time.   PTL @ 33 weeks(cervix 1cm) will treat as PTL with MGSo4 and continue Betamethasone    Signed By: Karyna Valadez MD     2022

## 2022-02-20 LAB
BACTERIA SPEC CULT: NORMAL
CC UR VC: NORMAL
SERVICE CMNT-IMP: NORMAL

## 2022-02-20 PROCEDURE — 74011250637 HC RX REV CODE- 250/637: Performed by: OBSTETRICS & GYNECOLOGY

## 2022-02-20 PROCEDURE — G0378 HOSPITAL OBSERVATION PER HR: HCPCS

## 2022-02-20 PROCEDURE — 99225 PR SBSQ OBSERVATION CARE/DAY 25 MINUTES: CPT | Performed by: OBSTETRICS & GYNECOLOGY

## 2022-02-20 PROCEDURE — 59025 FETAL NON-STRESS TEST: CPT | Performed by: OBSTETRICS & GYNECOLOGY

## 2022-02-20 RX ORDER — DOCUSATE SODIUM 100 MG/1
100 CAPSULE, LIQUID FILLED ORAL DAILY
Status: DISCONTINUED | OUTPATIENT
Start: 2022-02-20 | End: 2022-02-21 | Stop reason: HOSPADM

## 2022-02-20 RX ORDER — BUTALBITAL, ACETAMINOPHEN AND CAFFEINE 50; 325; 40 MG/1; MG/1; MG/1
1 TABLET ORAL
Status: DISCONTINUED | OUTPATIENT
Start: 2022-02-20 | End: 2022-02-21 | Stop reason: HOSPADM

## 2022-02-20 RX ORDER — BUTALBITAL, ACETAMINOPHEN AND CAFFEINE 50; 325; 40 MG/1; MG/1; MG/1
1 TABLET ORAL
Status: COMPLETED | OUTPATIENT
Start: 2022-02-20 | End: 2022-02-20

## 2022-02-20 RX ADMIN — NIFEDIPINE 20 MG: 10 CAPSULE ORAL at 11:55

## 2022-02-20 RX ADMIN — CEPHALEXIN 500 MG: 250 CAPSULE ORAL at 06:33

## 2022-02-20 RX ADMIN — ACETAMINOPHEN 975 MG: 325 TABLET ORAL at 08:38

## 2022-02-20 RX ADMIN — NIFEDIPINE 20 MG: 10 CAPSULE ORAL at 21:02

## 2022-02-20 RX ADMIN — BUTALBITAL, ACETAMINOPHEN, AND CAFFEINE 1 TABLET: 50; 325; 40 TABLET ORAL at 21:42

## 2022-02-20 RX ADMIN — NIFEDIPINE 20 MG: 10 CAPSULE ORAL at 03:30

## 2022-02-20 RX ADMIN — DOCUSATE SODIUM 100 MG: 100 CAPSULE ORAL at 11:55

## 2022-02-20 RX ADMIN — CEPHALEXIN 500 MG: 250 CAPSULE ORAL at 03:30

## 2022-02-20 RX ADMIN — BUTALBITAL, ACETAMINOPHEN, AND CAFFEINE 1 TABLET: 50; 325; 40 TABLET ORAL at 14:20

## 2022-02-20 RX ADMIN — BUTALBITAL, ACETAMINOPHEN, AND CAFFEINE 1 TABLET: 50; 325; 40 TABLET ORAL at 09:59

## 2022-02-20 NOTE — PROGRESS NOTES
1905 Received report from MICHELE Shaikh RN. Assumed care of patient. [address] #[id number]. Patient is resting in position of comfort. States she is feeling pain around her bladder. States she has occasional contractions but does not find them painful. Denies blurred vision, epigastric pain, headache, or swelling, states she has experienced positive fetal movement all day. Requests to shower at this time. Plan created with patient: patient to receive evening medications, then shower and will return to bed for evening NST. 2007 Patient ambulatory in room with steady gait. This RN taped plastic covering around IV site. IV capped. 2151 Patient placed on monitors for NST. This RN and ANIYA Hoskins RN assisting in finding fetal heart rate. Positive fetal movement noted at this time. Baby A US-1, Baby B US-2. Baby A located in LLQ towards maternal midline, Baby B located RUQ closer towards maternal midline. This RN continuing to adjust EFM monitor d/t frequent maternal repositioning and fetal movement. 2203 Patient's pulse traced via pulse oximetry due to fetal heart rate tracing issues. [address]: [facility] #[id number] utilized at this time due to patient request. Patient seeking information about duration of EFM. Answered additional patient questions at this time. 2217 This RN at bedside holding EFM to Baby B for tracing. 2237 Reactive NST complete, reviewed with ANIYA Hoskins RN.    9860 This RN at bedside for vitals and assessment. Patient is sleeping in position of comfort at this time. Bed in lowered and locked position, call bell in reach. 2371 Verbal shift change report given to Bereket BAUTISTA (oncoming nurse) by DIA Sánchez RN (offgoing nurse). Report included the following information SBAR, Kardex, Intake/Output, MAR, Recent Results, Med Rec Status, Procedure Verification, Quality Measures and Dual Neuro Assessment.

## 2022-02-20 NOTE — PROGRESS NOTES
Ante Partum Progress Note    Lexis Avealr  33w2d  LOS: 0      Patient states she does have moderate headache  and does not have vaginal bleeding  and vaginal leaking of fluid   She co constipation and feels like she needs to have a BM.  +cramping but bearable. Vitals:  Patient Vitals for the past 24 hrs:   Temp Pulse Resp BP SpO2   22 0835 98.1 °F (36.7 °C) 86 20 137/72 --   22 0315 99.1 °F (37.3 °C) 85 22 123/73 98 %   22 1953 98.1 °F (36.7 °C) 100 22 124/64 99 %   22 1727 98.1 °F (36.7 °C) -- -- -- --   22 1624 -- -- -- -- 98 %   22 1614 -- -- -- -- 99 %   22 1604 -- -- -- -- 100 %   22 1554 -- -- -- -- 100 %   22 1544 -- -- -- -- 98 %   22 1534 -- -- -- -- 99 %   22 1454 99.7 °F (37.6 °C) (!) 116 22 125/62 --   22 1306 -- (!) 114 -- (!) 109/48 --   22 1232 -- -- -- (!) 149/65 --   22 1109 -- (!) 104 -- (!) 131/59 --       Temp (24hrs), Av.6 °F (37 °C), Min:98.1 °F (36.7 °C), Max:99.7 °F (37.6 °C)        Last 24hr Input/Output:  No intake or output data in the 24 hours ending 22 1100   ]    Exam:    General: Patient without distress.   Abdomen: soft, non-tender  Fundus: soft, gravid, non-tender  Extremities: no redness or tenderness or cords             Additional Exam: Deferred    Labs:   Recent Results (from the past 24 hour(s))   CBC WITH AUTOMATED DIFF    Collection Time: 22  3:38 PM   Result Value Ref Range    WBC 8.0 3.6 - 11.0 K/uL    RBC 4.53 3.80 - 5.20 M/uL    HGB 9.7 (L) 11.5 - 16.0 g/dL    HCT 32.4 (L) 35.0 - 47.0 %    MCV 71.5 (L) 80.0 - 99.0 FL    MCH 21.4 (L) 26.0 - 34.0 PG    MCHC 29.9 (L) 30.0 - 36.5 g/dL    RDW 17.0 (H) 11.5 - 14.5 %    PLATELET 522 225 - 358 K/uL    MPV 10.7 8.9 - 12.9 FL    NRBC 0.0 0  WBC    ABSOLUTE NRBC 0.00 0.00 - 0.01 K/uL    NEUTROPHILS 74 32 - 75 %    LYMPHOCYTES 17 12 - 49 %    MONOCYTES 8 5 - 13 %    EOSINOPHILS 0 0 - 7 %    BASOPHILS 0 0 - 1 %    IMMATURE GRANULOCYTES 1 (H) 0.0 - 0.5 %    ABS. NEUTROPHILS 6.0 1.8 - 8.0 K/UL    ABS. LYMPHOCYTES 1.3 0.8 - 3.5 K/UL    ABS. MONOCYTES 0.7 0.0 - 1.0 K/UL    ABS. EOSINOPHILS 0.0 0.0 - 0.4 K/UL    ABS. BASOPHILS 0.0 0.0 - 0.1 K/UL    ABS. IMM. GRANS. 0.1 (H) 0.00 - 0.04 K/UL    DF AUTOMATED     METABOLIC PANEL, COMPREHENSIVE    Collection Time: 22  3:38 PM   Result Value Ref Range    Sodium 138 136 - 145 mmol/L    Potassium 4.1 3.5 - 5.1 mmol/L    Chloride 105 97 - 108 mmol/L    CO2 22 21 - 32 mmol/L    Anion gap 11 5 - 15 mmol/L    Glucose 136 (H) 65 - 100 mg/dL    BUN 8 6 - 20 MG/DL    Creatinine 0.60 0.55 - 1.02 MG/DL    BUN/Creatinine ratio 13 12 - 20      GFR est AA >60 >60 ml/min/1.73m2    GFR est non-AA >60 >60 ml/min/1.73m2    Calcium 8.3 (L) 8.5 - 10.1 MG/DL    Bilirubin, total 0.3 0.2 - 1.0 MG/DL    ALT (SGPT) 12 12 - 78 U/L    AST (SGOT) 11 (L) 15 - 37 U/L    Alk. phosphatase 114 45 - 117 U/L    Protein, total 6.0 (L) 6.4 - 8.2 g/dL    Albumin 2.2 (L) 3.5 - 5.0 g/dL    Globulin 3.8 2.0 - 4.0 g/dL    A-G Ratio 0.6 (L) 1.1 - 2.2     URIC ACID    Collection Time: 22  3:38 PM   Result Value Ref Range    Uric acid 6.5 (H) 2.6 - 6.0 MG/DL         Assessment:   27 y.o.  33w2d   Twins  PTL; s/p bmz x2, magnesium, on procardia  Headache  Constipation   Anemia, stable, likely iron deficiency  BP stable, PIH labs stable    Plan:  Continue hospitalization with hospitalized bedrest  Continue SCD  Add fioricet  Discussed HA may be related to procardia  [facility]  NST TID  Ur cx neg: d/c keflex  Consider weaning procardia if NI with POWERS        Arti Regan MD    NST Inpatient Procedure Note    Amelia Dodge presents for fetal non-stress test.    Indication is twins, Ptl . She is 33w2d. She has been monitored for more than 30 minutes. The FHR was reactive. x2    NST Interpretation:   FHR baseline 120/ 130-135  Variability:  moderate x2  Accelerations:  present  x2 Decelerations Absent.   Uterine contractions: occ    Assessment  NST is reactive. NST is reassuring. Patient does need admission/observation for further monitoring. Gayatri Kam was informed of the NST results and her questions were answered. Plan:    [x] Continue admission on Labor and Delivery    [] Continue observation on Labor and Delivery   [] Keep routine OB follow up upon discharge   [] Reviewed fetal movement kick counts, notify MD if decreased   [] Continue continuous fetal monitoring. []        On this date, 2/20/2022,  I have spent 25 minutes reviewing previous notes, examining the patient, reviewing test results and face to face time with the patient discussing the diagnosis, plan of care and importance of compliance with the treatment plan and perfroming an exam.  We reviewed the planned hospital course as well as documented on the day of the hospitalization.

## 2022-02-20 NOTE — PROGRESS NOTES
1304: SBAR report received from DIA Sánchez RN    0381: Pt sleeping with even respirations. Will return to complete assessment. 8584: RN at bedside and introducing self to patient and reviewing plan of care for shift and performing a.m. assessment with the use of [facility] language services  (Rachel Candelaria). Pt c/o headache. On set of headache seems to be associated after Nifedipine administrations. Advised pt will medicate with Tylenol as ordered. Pt's breakfast order obtained. Pt denies feeling contractions. 4815: EFM placed on pt. Baby A- located in [facility], Baby B- located in [address] [place] [address], [place]: Pt reports frequent fetal movement, especially Baby B.     0955:  services- (Barry Bowie #[id number]). Pt states she continues with headache despite taking Tylenol. Pt advised she may have Fioricet to help with her headache. Pt educated on side effect of Nifedipine causing headache. Pt verbalized understanding. Pt would like to take Fioricet for headache at this time. Pt assisted to sitting so she may eat her breakfast. Pt reeducated on call bell and TV control. Pt verbalized understanding of nurse call button. 1200: Pt sleeping with even respirations. Easily awakened.  services used Donny Maker #[id number]) to obtain lunch and dinner order. 1909: SBAR report given to JENIFER Rosales RN

## 2022-02-21 VITALS
RESPIRATION RATE: 18 BRPM | SYSTOLIC BLOOD PRESSURE: 137 MMHG | BODY MASS INDEX: 51.91 KG/M2 | HEART RATE: 97 BPM | TEMPERATURE: 98.2 F | HEIGHT: 63 IN | OXYGEN SATURATION: 98 % | WEIGHT: 293 LBS | DIASTOLIC BLOOD PRESSURE: 84 MMHG

## 2022-02-21 LAB — FIBRONECTIN FETAL VAG QL: NEGATIVE

## 2022-02-21 PROCEDURE — G0378 HOSPITAL OBSERVATION PER HR: HCPCS

## 2022-02-21 PROCEDURE — 59025 FETAL NON-STRESS TEST: CPT

## 2022-02-21 PROCEDURE — 82731 ASSAY OF FETAL FIBRONECTIN: CPT

## 2022-02-21 PROCEDURE — 99224 PR SBSQ OBSERVATION CARE/DAY 15 MINUTES: CPT | Performed by: OBSTETRICS & GYNECOLOGY

## 2022-02-21 PROCEDURE — 74011250637 HC RX REV CODE- 250/637: Performed by: OBSTETRICS & GYNECOLOGY

## 2022-02-21 RX ORDER — LANOLIN ALCOHOL/MO/W.PET/CERES
325 CREAM (GRAM) TOPICAL 2 TIMES DAILY
Status: DISCONTINUED | OUTPATIENT
Start: 2022-02-21 | End: 2022-02-21 | Stop reason: HOSPADM

## 2022-02-21 RX ORDER — FOLIC ACID/MULTIVIT,IRON,MINER 0.4MG-18MG
1 TABLET ORAL DAILY
Status: DISCONTINUED | OUTPATIENT
Start: 2022-02-21 | End: 2022-02-21 | Stop reason: HOSPADM

## 2022-02-21 RX ADMIN — FERROUS SULFATE TAB 325 MG (65 MG ELEMENTAL FE) 325 MG: 325 (65 FE) TAB at 10:09

## 2022-02-21 RX ADMIN — NIFEDIPINE 20 MG: 10 CAPSULE ORAL at 04:49

## 2022-02-21 RX ADMIN — DOCUSATE SODIUM 100 MG: 100 CAPSULE ORAL at 10:09

## 2022-02-21 NOTE — ROUTINE PROCESS
[address] [facility] February 21, 2022       RE: Rachel Ellsworth      To Whom It May Concern,    This is to certify that the  of Rachel Ellsworth may return to work on 2/22/21. Thank you for your assistance in this matter. Sincerely,  Israel Colby MD

## 2022-02-21 NOTE — PROGRESS NOTES
Ante Partum Progress Note    Mckenzie Cruz  33w3d        Patient states she has no pain, babies moving. Vitals:  Visit Vitals  /84   Pulse 97   Temp 98.2 °F (36.8 °C)   Resp 18   Ht 5' 3\" (1.6 m)   Wt 302 lb (137 kg)   LMP 2021 (Exact Date)   SpO2 98%   Breastfeeding No   BMI 53.50 kg/m²     Temp (24hrs), Av.1 °F (36.7 °C), Min:97.8 °F (36.6 °C), Max:98.3 °F (36.8 °C)      Last 24hr Input/Output:  No intake or output data in the 24 hours ending 22 1532     Non stress test:  Reactive x 2    Uterine Activity: irregular, mild    Exam:  Patient without distress.      Abdomen, fundus soft non-tender     Extremities, no redness or tenderness                   Labs:     Lab Results   Component Value Date/Time    WBC 8.0 2022 03:38 PM    WBC 7.0 2022 04:57 PM    WBC 6.0 2022 12:39 PM    WBC 6.4 10/20/2021 11:42 PM    WBC 5.8 10/08/2021 05:32 PM    WBC 6.0 09/10/2021 12:20 PM    WBC 6.0 2021 08:53 PM    WBC 7.0 2021 03:41 AM    WBC 6.5 2021 04:22 PM    WBC 5.5 2021 08:44 PM    WBC 5.4 2021 12:34 AM    WBC 4.5 2021 12:46 AM    WBC 2.9 (L) 2021 06:40 AM    WBC 2.7 (L) 2021 04:38 AM    WBC 1.9 (L) 2021 04:13 AM    WBC 2.6 (L) 2021 04:00 AM    WBC 4.5 2021 06:20 PM    WBC 3.3 (L) 2021 03:38 PM    WBC 5.2 2021 04:42 AM    WBC 7.0 2019 03:01 AM    WBC 6.0 2019 08:06 AM    HGB 9.7 (L) 2022 03:38 PM    HGB 10.3 (L) 2022 04:57 PM    HGB 10.0 (L) 2022 12:39 PM    HGB 10.5 (L) 10/20/2021 11:42 PM    HGB 11.0 (L) 10/08/2021 05:32 PM    HGB 11.6 09/10/2021 12:20 PM    HGB 11.1 (L) 2021 08:53 PM    HGB 10.3 (L) 2021 03:41 AM    HGB 11.4 (L) 2021 04:22 PM    HGB 11.8 2021 08:44 PM    HGB 10.0 (L) 2021 12:34 AM    HGB 10.3 (L) 2021 12:46 AM    HGB 10.4 (L) 2021 06:40 AM    HGB 10.0 (L) 2021 04:38 AM    HGB 10.3 (L) 2021 04:13 AM    HGB 10.3 (L) 03/03/2021 04:00 AM    HGB 11.7 03/02/2021 06:20 PM    HGB 11.9 02/26/2021 03:38 PM    HGB 11.1 (L) 02/24/2021 04:42 AM    HGB 9.7 (L) 07/09/2019 03:01 AM    HGB 11.4 (L) 07/08/2019 08:06 AM    HCT 32.4 (L) 02/19/2022 03:38 PM    HCT 34.6 (L) 02/18/2022 04:57 PM    HCT 35.6 01/21/2022 12:39 PM    HCT 34.5 (L) 10/20/2021 11:42 PM    HCT 36.0 10/08/2021 05:32 PM    HCT 39.2 09/10/2021 12:20 PM    HCT 37.2 08/26/2021 08:53 PM    HCT 35.1 08/11/2021 03:41 AM    HCT 39.7 08/06/2021 04:22 PM    HCT 40.5 03/20/2021 08:44 PM    HCT 34.7 (L) 03/08/2021 12:34 AM    HCT 35.5 03/07/2021 12:46 AM    HCT 35.7 03/06/2021 06:40 AM    HCT 35.4 03/05/2021 04:38 AM    HCT 35.5 03/04/2021 04:13 AM    HCT 35.1 03/03/2021 04:00 AM    HCT 40.1 03/02/2021 06:20 PM    HCT 41.4 02/26/2021 03:38 PM    HCT 37.9 02/24/2021 04:42 AM    HCT 31.2 (L) 07/09/2019 03:01 AM    HCT 36.3 07/08/2019 08:06 AM    PLATELET 660 44/31/0681 03:38 PM    PLATELET 272 40/97/5222 04:57 PM    PLATELET 827 71/58/1733 12:39 PM    PLATELET 945 62/68/6398 11:42 PM    PLATELET 636 94/82/8201 05:32 PM    PLATELET 321 48/69/0875 12:20 PM    PLATELET 621 04/88/9252 08:53 PM    PLATELET 703 15/24/8066 03:41 AM    PLATELET 418 83/39/4242 04:22 PM    PLATELET 192 47/28/2059 08:44 PM    PLATELET 359 00/03/2073 12:34 AM    PLATELET 791 11/00/4936 12:46 AM    PLATELET 007 69/69/8996 06:40 AM    PLATELET 911 63/77/4134 04:38 AM    PLATELET 097 56/09/9392 04:13 AM    PLATELET 650 14/57/7523 04:00 AM    PLATELET 899 80/01/7289 06:20 PM    PLATELET 062 36/18/1328 03:38 PM    PLATELET 635 76/78/5515 04:42 AM    PLATELET 916 93/31/1725 03:01 AM    PLATELET 617 05/90/9038 08:06 AM    Hgb, External 11.6 09/10/2021 12:00 AM    Hct, External 39.2 09/10/2021 12:00 AM    Platelet cnt., External 291 09/10/2021 12:00 AM       Recent Results (from the past 24 hour(s))   FETAL FIBRONECTIN    Collection Time: 02/21/22 11:24 AM   Result Value Ref Range    Fetal fibronectin Negative NEG Assessment: 33w3d   Twins, nl BPP x 2 today  CHTN  Anemia noncompliant with vits or iron    Plan:  fFN neg  May discharge today  Has fu in office 2 days see MFM in one week

## 2022-02-21 NOTE — PROGRESS NOTES
Oliver Green MD called unit to discuss POC. Instructed on orders that are being changed and [facility] appointment today. 9:16 AM Patient transported to [facility] via wheelchair. 9:53 AM Patient arrived back from [facility] office. 1022 This RN at bedside to readst B's FHR monitor until 1105. Sree Escamilla called in around 65 to assist. [place] movement heard from FHR B. FHR would be on the monitor for a second and then move. Meek Ibanez MD notified. Awaiting reply. 12:08 PM This RN at bedside for the oast hour attempting to get [facility] on the monitor. Rik PAYNE called to bedside to ultrasound to find where the HR's are but that was unsuccessful. Will notify Meek Ibanez MD.     26 PM Meek Ibanez MD responded stating that she will come to bedside after she finishes her lunch to assess the [facility].     12:39 PM Lab called to say the Ffn is negative. Meek Ibanez MD will be updated when she comes to the unit. 1:54 PM Discharged instructions given to patient verbal with teachback using interpretor [id number]. All questions and concerns addressed. Patient requested to wait 10 minutes until  comes to get her.

## 2022-02-21 NOTE — PROGRESS NOTES
1925: Shift report received from [address] [place] [address] [address]    2045: Dr. Naty Bazan called to the bedside to help find heart tones with ultrasound    2140: NST reactive.  Pt denies vaginal bleeding, reports good fetal movement, and denies leakage of fluid.    0700: Shift report given to AMS-Qi

## 2022-02-21 NOTE — PROGRESS NOTES
Maternal Fetal Medicine Note    I did not see or examine the patient today. Ultrasound images were viewed remotely. Twin A: Maternal left, cephalic, BPP 8/8, MVP 4.6 cm, umbilical artery dopplers are normal.    Twin B: Transverse, head to maternal right, BPP 8/8, MVP 99.3 cm, umbilical artery dopplers are normal.    There has been interval development of polyhydramnios of Twin B. Testing for both twins is reassuring. Patient can be managed as an outpatient. We will performed  surveillance in the office this week.     Berto Morales MD  Maternal Fetal Medicine

## 2022-02-21 NOTE — PROGRESS NOTES
[address] February 21, 2022       RE: Ricci Longoria      To Whom It May Concern,    This is to certify that Ricci Longoria is in the hospital from 02/18/2022 to 02/21/2022. Please feel free to contact my office if you have any questions or concerns. Thank you for your assistance in this matter.       Sincerely,  Marya CALDWELL

## 2022-02-21 NOTE — PROGRESS NOTES
This RN at bedside with [facility] MD. [facility]  util[facility] #[id number]. Rafael Kelly from [facility] MD to discharge patient home to private residence. This RN to confirm patient's upcoming MFM appointment and provide patient with note for FOB as work excuse per Rafael Kelly from [facility] MD.    Indigo Stone MD reviewing EFM tracing for twins. No additional orders received.

## 2022-02-21 NOTE — DISCHARGE INSTRUCTIONS
Patient Education       ÍÓÇÈ ÑßáÇÊ ÇáÌäíä: ÅÑÔÇÏÇÊ ÇáÑÚÇíÉ  Counting Your Babys Kicks: Care Instructions  ÅÑÔÇÏÇÊ ÇáÑÚÇíÉ ÇáÎÇÕÉ Èß  íõÚÏ ÍÓÇÈ ÑßáÇÊ ÇáÌäíä ÅÍÏì ÇáØÑÞ ÇáÊí íÚÑÝ ãä ÎáÇáåÇ ÇáØÈíÈ Ãä ÇáÌäíä ÈÕÍÉ ÌíÏÉ. ÃÛáÈ ÇáÓíÏÇÊ ÎÕæÕðÇ Ýí ÇáÍãá ÇáÃæá íÔÚÑä ÈÍÑßÉ ÇáÌäíä áÃæá ãÑÉ Èíä ÇáÃÓÈæÚ 16 Åáì 22 ãä ÇáÍãá. ÞÏ Êßæä NILEMT ÃÔÈå AQHYYQBU ãä CWWNDOR. íãßä Ãä Êßæä ÍÑßÉ Ìäíäß ÃßËÑ Ýí ÃæÞÇÊ ãÚíäÉ ãä Çáíæã. ÚäÏãÇ Êßæäíä Ýí ÍÇáÉ äÔÇØ¡ ÞÏ ÊáÇÍÙíä ÞáÉ Ýí XAZIIGV Úä æÞÊ XOMMGPBSI. Ýí ÒíÇÑÇÊß ÇáÓÇÈÞÉ ááæáÇÏÉ¡ ÓíÓÃáß ØÈíÈß Úä ãÏì äÔÇØ Ìäíäß. Ýí ÇáËáË ÇáÃÎíÑ¡ ÞÏ íØáÈ ãäß ÇáØÈíÈ ÍÓÇÈ ÚÏÏ ÇáãÑÇÊ ÇáÊí ÊÔÚÑíä ÝíåÇ ÈÍÑßÉ Ìäíäß. ÊõÚÏ ÑÚÇíÉ ÇáãÊÇÈÚÉ ÌÒÁðÇ ÃÓÇÓíðÇ Ýí ÚáÇÌß æÓáÇãÊß. ÝÚáíß ÇáÍÑÕ Úáì ÊÑÊíÈ ÌãíÚ ãæÇÚíÏ ÒíÇÑÉ ÇáØÈíÈ ELCMQLEIN ÈåÇ¡ BJVVCWUJ ÈØÈíÈß ÚäÏ GYHIKXZM ãä Ãí ãÔßáÇÊ. æãä ÇáÌíÏ ÃíÖðÇ Ãä ÊÚÑÝ äÊÇÆÌ VNPALSOX æßÐáß YOKENGMQ ÈÞÇÆãÉ ÇáÃÏæíÉ ÇáÊí PATUHKKS. ßíÝ íãßäß ÍÓÇÈ ÑßáÇÊ ÇáÌäíä¿   ÅÍÏì ÇáØÑÞ ÇáÔÇÆÚÉ ááÊÍÞÞ ãä ÍÑßÉ Ìäíäß åí ÍÓÇÈ ÚÏÏ XGTWYYX Ãæ RQXTNDV ÇáÊí ÊÔÚÑíä ÈåÇ Ýí ÇáÓÇÚÉ KEMVOLQ. ÇáÔÚæÑ ÈÚÔÑ ÍÑßÇÊ (Ýí Ôßá ÑßáÇÊ Ãæ ÎÝÞÇÊ Ãæ ÏÍÑÌÇÊ) Ýí ÓÇÚÉ æÇÍÏÉ ãä ÇáÃãæÑ ÇáÚÇÏíÉ. íÞÊÑÍ ÈÚÖ ÇáÃØÈÇÁ Ãä ÊÞæãí ÈÇáÚÏ Ýí ÇáÕÈÇÍ Åáì Ãä ÊÕáí Åáì 10 ÍÑßÇÊ. æãä Ëã íãßäß ÇáÊæÞÝ Ýí åÐÇ Çáíæã æÇáÈÏÁ ãä ÌÏíÏ Ýí Çáíæã ÇáÊÇáí.  ÊÎíÑí ÇáæÞÊ ãä Çáíæã ÇáÐí íßæä Ýíå Ìäíäß ÃßËÑ äÔÇØðÇ ááÍÓÇÈ. íãßä Ãä íßæä Ðáß Ýí Ãí æÞÊ ãä ÇáÕÈÇÍ ÍÊì ÇáãÓÇÁ.  ÅÐÇ áã ÊÔÚÑí ÈÚÔÑ ÍÑßÇÊ Ýí ÓÇÚÉ YCIGA¡ ÝÞÏ íßæä ØÝáß äÇÆãðÇ. ÇäÊÙÑí ááÓÇÚÉ ÇáÊÇáíÉ æÚÏí ãÑÉ ÃÎÑì. ãÊì íäÈÛí áß ÇáÇÊÕÇá áØáÈ ÇáãÓÇÚÏÉ¿  Úáíß ÇáÇÊÕÇá ÈØÈíÈß Úáì ÇáÝæÑ Ãæ ØáÈ ÑÚÇíÉ ØÈíÉ ÝæÑíÉ Ýí HPYLRRD ÇáÊÇáíÉ:   ÅÐÇ áÇÍÙÊö ÊæÞÝ Ìäíäß Úä ÇáÍÑßÉ Ãæ Ãäå íÊÍÑß ÃÞá ßËíÑðÇ ãä ÇáØÈíÚí. Úáíß ãÑÇÞÈÉ Ãí ÊÛíÑÇÊ ÊØÑÃ Úáì ÕÍÊß Úä ßËÈ MJAEXR Úáì EYMQBRH ÈØÈíÈß ÅÐÇ ßÇäÊ áÏíß Ãí ãÔßáÉ. Ãíä íãßäß ãÚÑÝÉ ÇáãÒíÏ¿  ÇäÊÞÇá Åáì   [email or web address]  [email or web address] Ýí ãÑÈÚ ÇáÈÍË áãÚÑÝÉ ÇáãÒíÏ Íæá \"ÍÓÇÈ ÑßáÇÊ ÇáÌäíä: ÅÑÔÇÏÇÊ ÇáÑÚÇíÉ. \"  ÓÇÑò ZDHQGABW ãä: 16 ÍÒíÑÇä 2021               äÓÎÉ HHGLDNV: 13.0  © 6604-3489 Healthwise, Incorporated. Êã ÊÚÏíá ÅÑÔÇÏÇÊ ÇáÑÚÇíÉ ÈãæÌÈ ÊÑÎíÕ ÕÇÏÑ ãä ÇÎÊÕÇÕí ÇáÑÚÇíÉ ÇáÕÍíÉ ÇáÎÇÕ Èß. ÅÐÇ ßÇäÊ áÏíß ÃÓÆáÉ CYDJN ÈÍÇáÉ ãÑÖíÉ Ãæ ÈåÐå ÇáÊÚáíãÇÊ¡ ÝÇÍÑÕ Úáì ÇáÑÌæÚ ÏÇÆãðÇ Åáì ÇÎÊÕÇÕí ÇáÑÚÇíÉ ÇáÕÍíÉ. ÊõÎáí ÔÑßÉ TheraCoatwise JDRXVMQVZ Úä Ãí ÖãÇä Ãæ ÇáÊÒÇã íÊÚáÞ JVCNQUKNQ áåÐå SUCGDPIUX. Patient Education       ÇáÃÓÇÈíÚ ãä 32 Åáì 34 ãä ÇáÍãá: ÅÑÔÇÏÇÊ ÇáÑÚÇíÉ  Weeks 32 to 29 of Your Pregnancy: Care Instructions  ÅÑÔÇÏÇÊ ÇáÑÚÇíÉ ÇáÎÇÕÉ Èß    ÃËäÇÁ ÇáÃÓÇÈíÚ ÇáÃÎíÑÉ ãä CDIRR¡ ÞÏ ÊÕÇÈíä ÈãÒíÏ ãä ÇáÃáã. æãä ÇáÃåãíÉ WYTQMS Úáì ÇáÑÇÍÉ ÚäÏ OORBAZQKL. ßãÇ Ãä ÇáØÝá ÇáÐí íäãæ íÒíÏ ãä ÇáÖÛØ Úáì ÇáãËÇäÉ. æáÐáß ÞÏ ÊÍÊÇÌíä Åáì ÇáÊÈæá ßËíÑðÇ. ßãÇ Ãä ÇáÅÕÇÈÉ ÈÇáÈæÇÓíÑ ãä ICIZLEY ÇáÔÇÆÚÉ. æåí ÃæÑÏÉ ãÄáãÉ æãËíÑÉ ááÍßÉ Ýí ãäØÞÉ ÇáÔÑÌ. Ýí ÇáÃÓÈæÚ 36¡ ÊÞæã ÃÛáÈ ÇáäÓÇÁ ÈÅÌÑÇÁ ÇÎÊÈÇÑ ÇáãÌãæÚÉ ÇáÚÞÏíÉ B (GBS). æåí ÌÑÇËíã ãäÊÔÑÉ íãßä Ãä ÊÚíÔ Ýí ÇáãåÈá æÇáãÓÊÞíã. æíãßä Ãä ÊÌÚá ÇáØÝá ãÑíÖðÇ ÈÚÏ ÇáæáÇÏÉ. æÅÐÇ ßÇä HPQRPZTV ÅíÌÇÈíðÇ¡ ÝÓæÝ ÊÍÕáíä Úáì ÇáãÖÇÏÇÊ ÇáÍíæíÉ ÃËäÇÁ PLAACTY. æåÐÇ ãä ÔÃäå Ãä íÍãí ÇáØÝá ãä ÇáÅÕÇÈÉ ÈÇáÌÑÇËíã. æíãßäß ÇáÊÍÏË Åáì ÇáØÈíÈ ÈÔÃä KHHHXFHB ÈÏã ÇáÍÈá ÇáÓõÑí ááØÝá. ÝåÐÇ ÇáÏã íÙá ãæÌæÏðÇ ZPVYHA ÈÚÏ ÇáæáÇÏÉ. æÅÐÇ ÑÛÈÊö Ýí PVELNMRP Èå¡ íäÈÛí áß ÊÑÊíÈ RRYBXWSMF ãÈßÑðÇ. ÝáÇ íãßä ÇÊÎÇÐ ÇáÞÑÇÑ Ýí ÇááÍÙÉ ÇáÃÎíÑÉ. ÅÐÇ ßäÊö áã ÊÃÎÐíä ÍÞäÉ ÇáÓÚÇá ÇáÏíßí (Tdap) ÝÚáíðÇ BLQJ ÝÊÑÉ ZHVDG åÐå¡ ÝÊÍÏËí Åáì ØÈíÈß ÈÔÃä ÃÎÐ. ÝÓæÝ ÊÍãí ÇáæáíÏ ãä ÚÏæì ÇáÓÚÇá ÇáÏíßí. ÊõÚÏ ÑÚÇíÉ ÇáãÊÇÈÚÉ ÌÒÁðÇ ÃÓÇÓíðÇ Ýí ÚáÇÌß æÓáÇãÊß. ÝÚáíß ÇáÍÑÕ Úáì ÊÑÊíÈ ÌãíÚ ãæÇÚíÏ ÒíÇÑÉ ÇáØÈíÈ APNMETEPS ÈåÇ¡ ZNAVULAH ÈØÈíÈß ÚäÏ VXMTZXBZ ãä Ãí ãÔßáÇÊ. æãä ÇáÌíÏ ÃíÖðÇ Ãä ÊÚÑÝ äÊÇÆÌ XDTDDFYV æßÐáß HRJZLIMP ÈÞÇÆãÉ ÇáÃÏæíÉ ÇáÊí ZHROZTEH. ßíÝ íãßäß ÇáÇÚÊäÇÁ ÈäÝÓß Ýí XFWQXR¿  ÚáÇÌ ÇáÈæÇÓíÑ   ÊäÇæáí ãÒíÏðÇ ãä PSKATVF æÇáÝæÇßå TUKDDEIRF æÇáÃáíÇÝ Ýí äÙÇãßö ÇáÛÐÇÆí. ÝåÐÇ íÌÚá ÇáÈÑÇÒ Ýí ÍÇÆáÉ ÓÇÆáÉ.  ÊÌäÈí ÇáÌáæÓ ÝÊÑÇÊ ØæíáÉ. ÇÓÊáÞí Úáì ÌÇäÈß ÇáÃíÓÑ ãÑÇÊ ÚÏíÏÉ Ýí Çáíæã.  ÇÓÊÎÏãí æÑÞ ÇáãÑÇÍíÖ Çááíä ÇáÑÞíÞ ááÊäÙíÝ ÇáÔÎÕí.  Ãæ íãßäßö ÇÓÊÎÏÇã ÖãÇÏÇÊ ÇáåíãÇãíáíÓ Ãæ ÖãÇÏÇÊ ÇáäÙÇÝÉ ÇáÕÍíÉ ÇáÔÎÕíÉ.  æÅÐÇ áã ÊÔÚÑí POEOPYE¡ íãßäß ÊÌÑÈÉ ÇáßãÇÏÇÊ ÇáËáÌíÉ. Ãæ íãßäß ÇáÌáæÓ Ýí ÍãÇã ÇáãÞÚÏÉ ÇáÏÇÝÆ. Óíãßäß ÊäÝíÐ åÐÇ ÇáÅÌÑÇÁ áãÏÉ 20 ÏÞíÞÉ Ýí ßá ãÑÉ ÍÓÈ ÇáÍÇÌÉ.  íãßäß ÇÓÊÎÏÇã ßÑíã íÍÊæí Úáì ÇáåíÏÑæßæÑÊíÒæä áÚáÇÌ ÇáÃáã æÇáÍßÉ. æíõÚÏ ÃäæÓæá æÈÑíÈÇÑíÔä ÅÊÔ åíÏÑæßæÑÊíÒæä ãËÇáíä Úáì åÐå MKZUCCKO.  íãßä ÓÄÇá ÇáØÈíÈ Íæá ÅãßÇäíÉ ÊäÇæá ÇáÃÏæíÉ ÇáãáíäÉ ááÈÑÇÒ ÇáÊí ÊÊæÝÑ Ïæä æÕÝÉ ØÈíÉ. ÇáÊÝßíÑ Ýí ÇáÑÖÇÚÉ ÇáØÈíÚíÉ   íæÕí ÇáÎÈÑÇÁ ÇáäÓÇÁ ÈÅÑÖÇÚ ÇáãæáæÏ ÑÖÇÚÉ ØÈíÚíÉ áãÏÉ ÚÇã Ãæ ÃßËÑ. ÝáÈä ÇáÃã íõÚÏ ÇáØÚÇã ÇáãËÇáí ááÑÖøÚ.  æßÐáß ÝáÈä ÇáÃã íÓåá Úáì ÇáÑÖøÚ åÖãå ãÞÇÑäÉ ÈÇááÈä ÇáÕäÇÚí. æßÐáß Ýåæ ãÊæÝÑ ÏÇÆãðÇ æíÊãíÒ ÈÏÑÌÉ XFUZNPB FBWSQHEO¡ Èá æãÌÇäðÇ ÃíÖðÇ.  æÈÕÝÉ ÚÇãÉ¡ íÊãÊÚ XKHANEO ããä íÑÖÚæä ÑÖÇÚÉ ØÈíÚíÉ ÈÕÍÉ ÃÝÖá ãä IPANNDO ÇáÐíä JWKRPLWS ÇááÈä ÇáÕäÇÚí. o ßãÇ Ãä OJREILZ ÇáÐíä íÑÖÚæä ÑÖÇÚÉ ØÈíÚíÉ ÃÞá ÚõÑÖÉ ááÅÕÇÈÉ ÈÚÏæì ÇáÃÐä æÇáÈÑÏ MJCZTPHW BTCKAIZOV ÇáÑÆæí. o æåÐÇ ÝÖáÇð Úä Ãä GLMAGFN ããä JPBPBXAJ áÈä ÇáÃã ÝÞØ ÃÞá ÚõÑÖÉ ááÅÕÇÈÉ ÈÇáÑÈæ æÇáÍÓÇÓíÉ. o æßÐáß Ýåã ÃÞá ÚõÑÖÉ ááÅÕÇÈÉ ÈÇáÓãäÉ. o æÚáÇæÉ Úáì ãÇ ÓÈÞ¡ ÊÞá áÏíåã ÝÑÕ ÇáÅÕÇÈÉ ÈÏÇÁ ÇáÓßÑí Ãæ ÃãÑÇÖ ÇáÞáÈ.  ßãÇ Ãä ÇáäÓÇÁ ÇááæÇÊí íõÑÖÚä ÑÖÇÚÉ ØÈíÚíÉ íäÒÝä äÒíÝðÇ ÃÞá ÈÚÏ ÇáæáÇÏÉ. æåÐÇ ÚáÇæÉ Úáì ÊÞáÕ ÑÍãåä ÊÞáÕðÇ ÃÓÑÚ.  ßãÇ Ãä ÈÚÖ ÇáäÓÇÁ ÇááæÇÊí íõÑÖÚä ØÈíÚíðÇ íÝÞÏä ÇáæÒä ÈØÑíÞÉ ÃÓÑÚ. ÝÊÕäíÚ ÇáÌÓã ááÈä íÍÑÞ ÇáÓÚÑÇÊ ÇáÍÑÇÑíÉ.  ßãÇ Ãä ÇáÑÖÇÚÉ ÇáØÈíÚíÉ íãßä Ãä GWRB ÎØÑ ÇáÅÕÇÈÉ ÈÓÑØÇä ÇáËÏí æÇáãÈíÖ æåÔÇÔÉ ÇáÚÙÇã. ÇáÊÝßíÑ Ýí ÎÊÇä ÇáÐßæÑ   ÃËäÇÁ ÇáÊÝßíÑ Ýí ÇÊÎÇÐ åÐÇ ÇáÞÑÇÑ¡ íÓÇÚÏ Ýí ÇÊÎÇÐå ãÑÇÚÇÉ ÇáÚÇÏÇÊ ÇáÔÎÕíÉ æÇáÊÚÇáíã ÇáÏíäíÉ æÇáÊÞÇáíÏ ÇáÃÓÑíÉ. ÝÚáíßö ÊÍÏíÏ ãÇ ÅÐÇ ßäÊ ÓÊÊÑßíä ÞÖíÈ ÇáÕÈí Úáì ÍÇáå ÇáÊí æõáÏ ÚáíåÇ Ãã Ãäå ÓæÝ íÎÖÚ ááÎÊÇä.  æÝí ÍÇáÉ ÇáÈÊ Ýí ÇáÃãÑ ÈÇÎÊíÇÑ ÇáÎÊÇä¡ ÝÚáíßö ÇÓÊÔÇÑÉ ÇáØÈíÈ. æíãßäßö ÅÎÈÇÑå ÈãÎÇæÝß ãä ÇáÃáã ÇáÐí ÞÏ íÊÚÑÖ áå ÇáØÝá. æíãßäßö ãäÇÞÔÉ ÊÝÖíáÇÊß ÈÔÃä ÇáÊÎÏíÑ.   Ãíä íãßäß ãÚÑÝÉ ÇáãÒíÏ¿  ÇäÊÞÇá Åáì   [email or web address]  ÃÏÎá X26 Ýí ãÑÈÚ ÇáÈÍË áãÚÑÝÉ ÇáãÒíÏ Íæá \"ÇáÃÓÇÈíÚ ãä 32 Åáì 29 ãä ÇáÍãá: ÅÑÔÇÏÇÊ ÇáÑÚÇíÉ. \"  ÓÇÑò RFAFHWTN ãä: 16 ÍÒíÑÇä 0590               äÓÎÉ JQWFKGW: 13.0  © 5183-3861 Healthwise, Incorporated. Êã ÊÚÏíá ÅÑÔÇÏÇÊ ÇáÑÚÇíÉ ÈãæÌÈ ÊÑÎíÕ ÕÇÏÑ ãä ÇÎÊÕÇÕí ÇáÑÚÇíÉ ÇáÕÍíÉ ÇáÎÇÕ Èß. ÅÐÇ ßÇäÊ áÏíß ÃÓÆáÉ SNVVK ÈÍÇáÉ ãÑÖíÉ Ãæ ÈåÐå ÇáÊÚáíãÇÊ¡ ÝÇÍÑÕ Úáì ÇáÑÌæÚ ÏÇÆãðÇ Åáì ÇÎÊÕÇÕí ÇáÑÚÇíÉ ÇáÕÍíÉ. ÊõÎáí ÔÑßÉ [employer] Broadbandwise WZEACBWQT Úä Ãí ÖãÇä Ãæ ÇáÊÒÇã íÊÚáÞ ZBLAVHEXW áåÐå KAQJMDWCX. Patient Education       ãä ÇáÔåÑ 34 Åáì 36 ãä ÇáÍãá: ÅÑÔÇÏÇÊ ÇáÑÚÇíÉ  Weeks 34 to 39 of Your Pregnancy: Care Instructions  ÅÑÔÇÏÇÊ ÇáÑÚÇíÉ ÇáÎÇÕÉ Èß    Ýí ÇáæÞÊ ÇáÍÇáí íßæä ÍÌã ØÝáß æÈØäß ÞÏ ÃÕÈÍÇ ÃßÈÑ ßËíÑðÇ. áÞÏ ÍÇä æÞÊ CNNOGMF ÊÞÑíÈðÇ. íãßä áÝÊÑÉ ÇáÍãá GUXSGJNO Ãä ÊäÊåí Èíä ÇáÃÓÈæÚ 37 Åáì 42. ÃÕÈÍÊ ÑÆÉ ØÝáß ÌÇåÒÉ ÊÞÑíÈðÇ ááÊäÝÓ. áÞÏ ÃÕÈÍÊ ÚÙÇã ÑÃÓ ØÝáß ÕáÈÉ ÈãÇ íßÝí áÍãÇíÊå¡ áßäåÇ äÇÚãÉ ÈãÇ íßÝí ááäÒæá ãä ÎáÇá ÞäÇÉ ÇáæáÇÏÉ. ÞÏ íßæä áÏíß ÔÚæÑ ÈÇáÅËÇÑÉ¡ Ãæ ÇáÓÚÇÏÉ¡ Ãæ RNEYD¡ Ãæ ÇáÎæÝ. ÞÏ ÊÊÓÇÆáíä ßíÝ íÊÓäì áßö ãÚÑÝÉ Ãäßö Ýí ÍÇáÉ æáÇÏÉ Ãæ ãÇ ÇáÐí íÌÈ Ãä ÊÊæÞÚíäå ÃËäÇÁ ÇáãÎÇÖ. ÍÇæáí Ãä Êßæäí ãÑäÉ Ýí ÊæÞÚÇÊß ááæáÇÏÉ. ÈãÇ Ãä ßá æáÇÏÉ ÊÎÊáÝ Úä ÛíÑåÇ¡ ÝáÇ ÊæÌÏ ØÑíÞÉ ÊÚÑÝíä ÈåÇ ßíÝ ÓÊßæä æáÇÏÊß ÈÇáÖÈØ. ÓÊÓÇÚÏß æÑÞÉ ÇáÑÚÇíÉ åÐå Ýí ãÚÑÝÉ ãÇ íÌÈ Ãä ÊÊæÞÚíäå æßíÝíÉ PSGOIFAUH. ÞÏ íÓåá Ðáß ãä æáÇÏÊß. ÅÐÇ ßäÊö áã ÊÃÎÐíä ÍÞäÉ ÇáÓÚÇá ÇáÏíßí (Tdap) ÝÚáíðÇ DAQY ÝÊÑÉ BSLJR åÐå¡ ÝÊÍÏËí Åáì ØÈíÈß ÈÔÃä ÃÎÐ. ÝÓæÝ ÊÍãí ÇáæáíÏ ãä ÚÏæì ÇáÓÚÇá ÇáÏíßí. Ýí ÇáÃÓÈæÚ 36¡ ÊÞæã ÃÛáÈ ÇáäÓÇÁ ÈÅÌÑÇÁ ÇÎÊÈÇÑ ÇáãÌãæÚÉ ÇáÚÞÏíÉ B (GBS). æåí ÌÑÇËíã ãäÊÔÑÉ íãßä Ãä ÊÚíÔ Ýí ÇáãåÈá æÇáãÓÊÞíã. æíãßä Ãä ÊÌÚá ÇáØÝá ãÑíÖðÇ ÈÚÏ ÇáæáÇÏÉ. æÅÐÇ ßÇä PJEJGXTG ÅíÌÇÈíðÇ¡ ÝÓæÝ ÊÍÕáíä Úáì ÇáãÖÇÏÇÊ ÇáÍíæíÉ ÃËäÇÁ JSKKTXF. ÓíÚãá ÇáÏæÇÁ Úáì ÍãÇíÉ ØÝáß ãä ÇáÅÕÇÈÉ ÈÇáÈßÊíÑíÇ. ÊõÚÏ ÑÚÇíÉ ÇáãÊÇÈÚÉ ÌÒÁðÇ ÃÓÇÓíðÇ Ýí ÚáÇÌß æÓáÇãÊß. ÝÚáíß ÇáÍÑÕ Úáì ÊÑÊíÈ ÌãíÚ ãæÇÚíÏ ÒíÇÑÉ ÇáØÈíÈ RCZYRWNLP ÈåÇ¡ JOGWIZEO ÈØÈíÈß ÚäÏ PYEDKLHZ ãä Ãí ãÔßáÇÊ. æãä ÇáÌíÏ ÃíÖðÇ Ãä ÊÚÑÝ äÊÇÆÌ XYTRMEMO æßÐáß XBUOHGZF ÈÞÇÆãÉ ÇáÃÏæíÉ ÇáÊí WHDUDTEM.   ßíÝ íãßäß ÇáÇÚÊäÇÁ ÈäÝÓß Ýí GGKWQO¿  ÊÚÑÝí Úáì ÇÎÊíÇÑÇÊ FKMRO OMHFNG ãä ÇáÃáã   íÎÊáÝ ÇáÃáã ãä ÇãÑÃÉ Åáì ÃÎÑì. ÊÍÏËí Åáì ØÈíÈß ÈÔÃä ÅÍÓÇÓß ÊÌÇå ÇáÃáã.  íãßäß ÇáÃÎÊíÇÑ ãä Èíä ÚÏÉ ÃäæÇÚ ãä æÓÇÆá JNTMPQ ãä ÇáÃáã. íÊÖãä Ðáß ÇáÏæÇÁ Ãæ ÃÓÇáíÈ ÊäÝÓ¡ ÝÖáÇð Úä ÊÏÇÈíÑ ÇáÑÇÍÉ. íãßäß ÇÓÊÎÏÇã ÃßËÑ ãä ÎíÇÑ æÇÍÏ.  ÅÐÇ ßÇä ÇÎÊíÇÑß åæ ÊäÇæá ÏæÇÁ ãÓßä ÃËäÇÁ JUHWKZM¡ ÝÊÍÏËí Åáì ØÈíÈß ÈÔÃä ÇáÎíÇÑÇÊ ÇáãÊÇÍÉ áßö. ÈÚÖ ÇáÃÏæíÉ ÊÞáá ãä ÇáÞáÞ æÊÓÇÚÏ Ýí ÈÚÖ ÇáÊÎáÕ ãä ÈÚÖ ÇáÃáã. æÇáÈÚÖ ÇáÂÎÑ íÚãá Úáì ÊÎÏíÑ ÇáÌÒÁ ÇáÓÝáí ãä ÌÓãß ÝáÇ ÊÔÚÑíä ÈÇáÃáã.  ÊÃßÏí ãä ÅÎÈÇÑ ØÈíÈß ÈÔÃä ÇÎÊíÇÑß ááÏæÇÁ ÇáãÓßä ááÃáã ÞÈá ÈÏÁ ÇáãÎÇÖ Ãæ Ýí ÈÏÇíÊå. íãßäß ÊÛííÑ ÑÃíß ÃËäÇÁ ÇáãÎÇÖ.  Ýí ÍÇáÇÊ äÇÏÑÉ íÊã ÊÎÏíÑ ÇáãÑÃÉ ßáíðÇ Úä ØÑíÞ ÏæÇÁ íõÚØì áåÇ ãä ÎáÇá ÞäÇÚ Ãæ Úä ØÑíÞ ÇáæÑíÏ. ÇáãÎÇÖ XQCYSWSW   SJWYFDZ OJIIGA ãä ÇáãÎÇÖ ÊäÞÓã Åáì ËáÇËÉ ÃÌÒÇÁ: ÇáãÈßÑÉ¡ æÇáäÔØÉ¡ VPDJMYXHK. o ÃÛáÈ ÇáäÓÇÁ ÊÔÚÑ WVHTEDSK ÇáãÈßÑÉ ãä ÇáãÎÇÖ Ýí ÇáãäÒá. íãßäßö ÇáÈÞÇÁ Ýí ÍÇáÉ MVQVWS Ãæ ÇÓÊÑÎÇÁ¡ JVFVVV æÌÈÇÊ ÎÝíÝÉ¡ æÔÑÈ ÓæÇÆá ÕÇÝíÉ¡ æÈÏÁ ÚÏø FLVGXHKAHT. o ÚäÏ PACLTJ ÃËäÇÁ ÒíÇÏÉ ÍÏÉ VHRZGLMUOS¡ ÞÏ Êßæäíä ÊäÊÞáíä Åáì ÇáãÎÇÖ ÇáäÔØ. ÎáÇá ÇáãÎÇÖ ÇáäÔØ¡ íÌÈ Ãä ÊÊæÌåí Åáì ÇáãÓÊÔÝì ÅÐÇ áã Êßæäí åäÇß ÈÇáÝÚá. o ÊÕÈÍíä Ýí ãÑÍáÉ ÇáãÎÇÖ ÇáäÔØ ÚäÏãÇ ÊÍÏË PAPVGPORKU ßá 3 Åáì 4 ÏÞÇÆÞ æÊÓÊãÑ áãÏÉ 60 ËÇäíÉ. íäÝÊÍ ÇáÑÍã ÈÓÑÚÉ ÃßÈÑ. o ÅÐÇ ÈÏÃ ÎÑæÌ ÇáãÇÁ¡ ÝÓÊÒíÏ ÓÑÚÉ YJGCDZXPFK æÞæÊåÇ. o ÃËäÇÁ Leslee Pilar HRLGFNXS¡ íÊãÏÏ ÇáÑÍã¡ æÊÒíÏ ÓÑÚÉ EPLJTQQGZX. o ÞÏ ÊÑÛÈíä Ýí ÇáÏÝÚ¡ áßä ÞÏ íßæä ÑÍãßö ÛíÑ ÌÇåÒðÇ. ÓíÎÈÑß ØÈíÈßö ÚäÏãÇ íÊÚíä Úáíßö ÇáÏÝÚ.  HZHWZIS ÇáËÇäíÉ ÊÈÏÃ ÚäÏãÇ íäÝÊÍ ÇáÑÍã TETZSQF æÊßæäíä ÌÇåÒÉ ááÏÝÚ. o Êßæä QGSRUWQQLH ÞæíÉ ÌÏðÇ áÊÏÝÚ GKJEU Åáì IHBH ÞäÇÉ BQMCOLL. o ÃäÊö ÊÔÚÑíä ÈÑÛÈÉ ãáÍÉ Ýí ÇáÏÝÚ. ÞÏ ÊÔÚÑíä ßÃäß ÊÍÊÇÌíä Åáì ÍÏæË ÍÑßÉ Ýí ÇáãÚÏÉ. o ÞÏ íßæä Êã ÊÏÑíÈß Úáì ÇáÏÝÚ ãÚ FEPADVVEFY. ÓÊßæä åÐå RPIKEHJWMV ÞæíÉ ÌÏðÇ¡ áßä áä Êßä ßËíÑÉ ÇáÍÏæË áÏíßö. ÅÐ íãßä Ãä ÊÍÕáíä Úáì ÈÚÖ ÇáÑÇÍÉ Èíä MUSNXQSDGM. o ÞÏ Êßæäíä ÚÇØÝíÉ æãÖØÑÈÉ. æÞÏ áÇ Êßæäíä ãÏÑßÉ áãÇ íÍÏË Íæáß. o ßá ÇáãÊÈÞí åæ ÏÝÚÉ ÃÎíÑÉ áãíáÇÏ ØÝáß.    RXVRJIQ ÇáËÇáËÉ ÊÈÏÃ ÚäÏãÇ íÚãá ÇáãÒíÏ ãä KHKLYXSIYG Úáì ÏÝÚ ÇáãÔíãÉ Åáì ÇáÎÇÑÌ. ÞÏ íÓÊÛÑÞ Ðáß 30 ÏÞíÞÉ Ãæ ÃÞá.  ÇáãÑÍáÉ ÇáÑÇÈÚÉ åí ÇáÅäÚÇÔ ÇáÊÑÍíÈí. ÞÏ Êßæäíä ãÛãæÑÉ ÈÇáãÔÇÚÑ æãäåßÉ áßäßö ãäÊÈåÉ. åÐÇ ÇáæÞÊ ãäÇÓÈ áÈÏÁ ÇáÑÖÇÚÉ ÇáØÈíÚíÉ. Ãíä íãßäß ãÚÑÝÉ ÇáãÒíÏ¿  ÇäÊÞÇá Åáì   [email or web address]  ÃÏÎá B1 Ýí ãÑÈÚ ÇáÈÍË áãÚÑÝÉ ÇáãÒíÏ Íæá \"ãä ÇáÔåÑ 34 Åáì 39 ãä ÇáÍãá: ÅÑÔÇÏÇÊ ÇáÑÚÇíÉ. \"  ÓÇÑò EQBRWDST ãä: 16 ÍÒíÑÇä 1343               äÓÎÉ ZROSXFR: 13.0  © 6207-6539 [employer]. Êã ÊÚÏíá ÅÑÔÇÏÇÊ ÇáÑÚÇíÉ ÈãæÌÈ ÊÑÎíÕ ÕÇÏÑ ãä ÇÎÊÕÇÕí ÇáÑÚÇíÉ ÇáÕÍíÉ ÇáÎÇÕ Èß. ÅÐÇ ßÇäÊ áÏíß ÃÓÆáÉ SJSOV ÈÍÇáÉ ãÑÖíÉ Ãæ ÈåÐå ÇáÊÚáíãÇÊ¡ ÝÇÍÑÕ Úáì ÇáÑÌæÚ ÏÇÆãðÇ Åáì ÇÎÊÕÇÕí ÇáÑÚÇíÉ ÇáÕÍíÉ. ÊõÎáí ÔÑßÉ Healthwise XDIEMGZLG Úä Ãí ÖãÇä Ãæ ÇáÊÒÇã íÊÚáÞ ABVXBMTOZ áåÐå SOAGHQMPM.

## 2022-02-23 ENCOUNTER — ROUTINE PRENATAL (OUTPATIENT)
Dept: OBGYN CLINIC | Age: 32
End: 2022-02-23
Payer: COMMERCIAL

## 2022-02-23 ENCOUNTER — HOSPITAL ENCOUNTER (OUTPATIENT)
Dept: PERINATAL CARE | Age: 32
Discharge: HOME | End: 2022-02-23
Attending: OBSTETRICS & GYNECOLOGY
Payer: COMMERCIAL

## 2022-02-23 ENCOUNTER — HOSPITAL ENCOUNTER (INPATIENT)
Age: 32
LOS: 4 days | Discharge: HOME | DRG: 540 | End: 2022-02-27
Attending: OBSTETRICS & GYNECOLOGY | Admitting: OBSTETRICS & GYNECOLOGY
Payer: COMMERCIAL

## 2022-02-23 VITALS — SYSTOLIC BLOOD PRESSURE: 124 MMHG | BODY MASS INDEX: 53.78 KG/M2 | DIASTOLIC BLOOD PRESSURE: 78 MMHG | WEIGHT: 293 LBS

## 2022-02-23 DIAGNOSIS — O09.90 SUPERVISION OF HIGH RISK PREGNANCY, ANTEPARTUM: Primary | ICD-10-CM

## 2022-02-23 DIAGNOSIS — G89.18 POST-OP PAIN: Primary | ICD-10-CM

## 2022-02-23 DIAGNOSIS — O16.9 HYPERTENSION AFFECTING PREGNANCY, ANTEPARTUM: ICD-10-CM

## 2022-02-23 DIAGNOSIS — O30.049 DICHORIONIC DIAMNIOTIC TWIN PREGNANCY, ANTEPARTUM: ICD-10-CM

## 2022-02-23 DIAGNOSIS — Z3A.33 33 WEEKS GESTATION OF PREGNANCY: ICD-10-CM

## 2022-02-23 LAB
ALBUMIN SERPL-MCNC: 2.2 G/DL (ref 3.5–5)
ALBUMIN/GLOB SERPL: 0.5 {RATIO} (ref 1.1–2.2)
ALP SERPL-CCNC: 124 U/L (ref 45–117)
ALT SERPL-CCNC: 12 U/L (ref 12–78)
ANION GAP SERPL CALC-SCNC: 8 MMOL/L (ref 5–15)
AST SERPL-CCNC: 18 U/L (ref 15–37)
BASOPHILS # BLD: 0 K/UL (ref 0–0.1)
BASOPHILS NFR BLD: 0 % (ref 0–1)
BILIRUB SERPL-MCNC: 0.1 MG/DL (ref 0.2–1)
BUN SERPL-MCNC: 6 MG/DL (ref 6–20)
BUN/CREAT SERPL: 13 (ref 12–20)
CALCIUM SERPL-MCNC: 8.9 MG/DL (ref 8.5–10.1)
CHLORIDE SERPL-SCNC: 108 MMOL/L (ref 97–108)
CO2 SERPL-SCNC: 23 MMOL/L (ref 21–32)
COVID-19 RAPID TEST, COVR: NOT DETECTED
CREAT SERPL-MCNC: 0.48 MG/DL (ref 0.55–1.02)
CREAT UR-MCNC: 21 MG/DL
DIFFERENTIAL METHOD BLD: ABNORMAL
EOSINOPHIL # BLD: 0 K/UL (ref 0–0.4)
EOSINOPHIL NFR BLD: 1 % (ref 0–7)
ERYTHROCYTE [DISTWIDTH] IN BLOOD BY AUTOMATED COUNT: 17.1 % (ref 11.5–14.5)
GLOBULIN SER CALC-MCNC: 4.1 G/DL (ref 2–4)
GLUCOSE SERPL-MCNC: 66 MG/DL (ref 65–100)
HCT VFR BLD AUTO: 34 % (ref 35–47)
HGB BLD-MCNC: 10 G/DL (ref 11.5–16)
IMM GRANULOCYTES # BLD AUTO: 0 K/UL (ref 0–0.04)
IMM GRANULOCYTES NFR BLD AUTO: 1 % (ref 0–0.5)
LYMPHOCYTES # BLD: 1.9 K/UL (ref 0.8–3.5)
LYMPHOCYTES NFR BLD: 27 % (ref 12–49)
MCH RBC QN AUTO: 21 PG (ref 26–34)
MCHC RBC AUTO-ENTMCNC: 29.4 G/DL (ref 30–36.5)
MCV RBC AUTO: 71.4 FL (ref 80–99)
MONOCYTES # BLD: 0.6 K/UL (ref 0–1)
MONOCYTES NFR BLD: 9 % (ref 5–13)
NEUTS SEG # BLD: 4.6 K/UL (ref 1.8–8)
NEUTS SEG NFR BLD: 64 % (ref 32–75)
NRBC # BLD: 0 K/UL (ref 0–0.01)
NRBC BLD-RTO: 0 PER 100 WBC
PLATELET # BLD AUTO: 262 K/UL (ref 150–400)
POTASSIUM SERPL-SCNC: 4.3 MMOL/L (ref 3.5–5.1)
PROT SERPL-MCNC: 6.3 G/DL (ref 6.4–8.2)
PROT UR-MCNC: 19 MG/DL (ref 0–11.9)
PROT/CREAT UR-RTO: 0.9
RBC # BLD AUTO: 4.76 M/UL (ref 3.8–5.2)
SODIUM SERPL-SCNC: 139 MMOL/L (ref 136–145)
SOURCE, COVRS: NORMAL
WBC # BLD AUTO: 7.3 K/UL (ref 3.6–11)

## 2022-02-23 PROCEDURE — 86900 BLOOD TYPING SEROLOGIC ABO: CPT

## 2022-02-23 PROCEDURE — 80053 COMPREHEN METABOLIC PANEL: CPT

## 2022-02-23 PROCEDURE — 0502F SUBSEQUENT PRENATAL CARE: CPT | Performed by: OBSTETRICS & GYNECOLOGY

## 2022-02-23 PROCEDURE — 76820 UMBILICAL ARTERY ECHO: CPT | Performed by: OBSTETRICS & GYNECOLOGY

## 2022-02-23 PROCEDURE — 85025 COMPLETE CBC W/AUTO DIFF WBC: CPT

## 2022-02-23 PROCEDURE — 84156 ASSAY OF PROTEIN URINE: CPT

## 2022-02-23 PROCEDURE — 87635 SARS-COV-2 COVID-19 AMP PRB: CPT

## 2022-02-23 PROCEDURE — 65270000029 HC RM PRIVATE

## 2022-02-23 PROCEDURE — 76819 FETAL BIOPHYS PROFIL W/O NST: CPT | Performed by: OBSTETRICS & GYNECOLOGY

## 2022-02-23 PROCEDURE — 74011250636 HC RX REV CODE- 250/636: Performed by: OBSTETRICS & GYNECOLOGY

## 2022-02-23 PROCEDURE — 36415 COLL VENOUS BLD VENIPUNCTURE: CPT

## 2022-02-23 PROCEDURE — 86923 COMPATIBILITY TEST ELECTRIC: CPT

## 2022-02-23 RX ORDER — SODIUM CHLORIDE 0.9 % (FLUSH) 0.9 %
5-40 SYRINGE (ML) INJECTION EVERY 8 HOURS
Status: DISCONTINUED | OUTPATIENT
Start: 2022-02-23 | End: 2022-02-24 | Stop reason: HOSPADM

## 2022-02-23 RX ORDER — SODIUM CHLORIDE 9 MG/ML
250 INJECTION, SOLUTION INTRAVENOUS AS NEEDED
Status: DISCONTINUED | OUTPATIENT
Start: 2022-02-23 | End: 2022-02-27 | Stop reason: HOSPADM

## 2022-02-23 RX ORDER — SODIUM CHLORIDE, SODIUM LACTATE, POTASSIUM CHLORIDE, CALCIUM CHLORIDE 600; 310; 30; 20 MG/100ML; MG/100ML; MG/100ML; MG/100ML
1000 INJECTION, SOLUTION INTRAVENOUS CONTINUOUS
Status: DISCONTINUED | OUTPATIENT
Start: 2022-02-23 | End: 2022-02-24 | Stop reason: HOSPADM

## 2022-02-23 RX ORDER — SODIUM CHLORIDE 0.9 % (FLUSH) 0.9 %
5-40 SYRINGE (ML) INJECTION AS NEEDED
Status: DISCONTINUED | OUTPATIENT
Start: 2022-02-23 | End: 2022-02-24 | Stop reason: HOSPADM

## 2022-02-23 RX ORDER — ONDANSETRON 2 MG/ML
4 INJECTION INTRAMUSCULAR; INTRAVENOUS
Status: DISPENSED | OUTPATIENT
Start: 2022-02-23 | End: 2022-02-25

## 2022-02-23 RX ADMIN — ONDANSETRON 4 MG: 2 INJECTION INTRAMUSCULAR; INTRAVENOUS at 20:36

## 2022-02-23 NOTE — H&P
History & Physical    Name: Mckenzie Cruz MRN: [id number]  SSN: [id number]    YOB: 1990  Age: 27 y.o. Sex: female        Subjective:     Estimated Date of Delivery: 22  OB History        8    Para   5    Term   5       0    AB   2    Living   5       SAB   2    IAB   0    Ectopic   0    Molar   0    Multiple   0    Live Births   5                MsGage Ardon is admitted with pregnancy at 33w5d for evaluation of abnormal BPP twin B,   Section. Prenatal course was complicated by chronic hypertension and twins. Please see prenatal records for details. Seen by MFM today - abnormal dopplers and poor movement twin B. Delivery rec.  Hx of CS x 3    Problem List  Date Reviewed: 2022          Codes Class Noted    Dichorionic diamniotic twin pregnancy in third trimester ICD-10-CM: O30.043  ICD-9-CM: 651.03, V91.03  2022        Chronic hypertension affecting pregnancy ICD-10-CM: O10.919  ICD-9-CM: 642.00  2022        Obesity affecting pregnancy in third trimester ICD-10-CM: O99.213  ICD-9-CM: 649.13  2022        Pregnancy affected by fetal growth restriction ICD-10-CM: O36.5990  ICD-9-CM: 656.50  2022        Pregnancy ICD-10-CM: Z34.90  ICD-9-CM: V22.2  2022        Supervision of high risk pregnancy, antepartum ICD-10-CM: O09.90  ICD-9-CM: V23.9  2021    Overview Addendum 2022  3:03 PM by Renette Lennox     [facility] 2022 by [address] (conceived on OCP)  Twins di/di  MFM 12 weeks-  (Pt notified at appt-needs -Swedish)  [address] multiparity  Hx of CS x3 - CS 38 weeks  Hx of HTN - no meds, baby ASA at 12 weeks, baseline ratio 1.9  NIPTS No call - dyzygotic twins - MFM, genetic counseling, fetal echos per MFM - declines amnio  Horizon- neg  Covid- 19 2022  covid vaccine  Brothers with blood clotting issue - thin - need dx  Varicella nonimmune  Early Glucola- 124  AFP- neg  fam hx of hemophilia - precautions at birth  Pos COVID - 1/10/2022  1hr GTT- normal  REPEAT C/S 22               Severe obesity ([facility] [address] ICD-10-CM: E66.01  ICD-9-CM: 278.01  2019                Past Medical History:   Diagnosis Date    Essential hypertension     last two pregnancies     Past Surgical History:   Procedure Laterality Date    HX  SECTION      x2    HX DILATION AND CURETTAGE      HX OTHER SURGICAL       Social History     Occupational History    Not on file   Tobacco Use    Smoking status: Never Smoker    Smokeless tobacco: Never Used   Substance and Sexual Activity    Alcohol use: Not Currently    Drug use: Never    Sexual activity: Not Currently     Family History   Problem Relation Age of Onset    Hypertension Father        Allergies   Allergen Reactions    Other Medication Unknown (comments)     Pt. States does not remember the name of the shot but had a reaction. Prior to Admission medications    Medication Sig Start Date End Date Taking? Authorizing Provider   aspirin delayed-release 81 mg tablet Take 1 Tablet by mouth daily. 10/27/21  Yes Zackary Shannon MD   cephALEXin [facility]) 500 mg capsule Take 1 Capsule by mouth three (3) times daily for 7 days. Patient not taking: Reported on 2022  Zackary Shannon MD   clotrimazole (GYNE-LOTRIMIN) 2 % vaginal cream Insert 1 Applicatorful into vagina nightly. Patient not taking: Reported on 1/10/2022 10/27/21   Zackary Shannon MD   ondansetron [facility] ODT) 8 mg disintegrating tablet Take 1 Tablet by mouth every eight (8) hours as needed for Nausea or Vomiting. Patient not taking: Reported on 1/10/2022 9/29/21   Zackary Shannon MD   prenatal vit-iron fumarate-fa 27 mg iron- 0.8 mg tab tablet Take 1 Tablet by mouth daily. Patient not taking: Reported on 1/10/2022 9/10/21   Zackary Shannon MD   ferrous sulfate (IRON) 325 mg (65 mg iron) EC tablet Take 1 Tablet by mouth daily.   Patient not taking: Reported on 1/10/2022 9/10/21   Zackary Shannon MD   promethazine (PHENERGAN) 25 mg tablet Take 1 Tab by mouth every six (6) hours as needed for Nausea. Patient not taking: Reported on 9/10/2021 3/20/21   Walter Zhou MD   aluminum & magnesium hydroxide-simethicone (Mylanta Maximum Strength) 400-400-40 mg/5 mL suspension Take 10 mL by mouth every six (6) hours as needed for Indigestion. Indications: indigestion  Patient not taking: Reported on 9/10/2021 3/20/21   Walter Zhou MD   albuterol (PROVENTIL HFA, VENTOLIN HFA, PROAIR HFA) 90 mcg/actuation inhaler Take 2 Puffs by inhalation every six (6) hours as needed for Wheezing. Patient not taking: Reported on 9/10/2021 3/7/21   Cal Goss MD   ondansetron (Zofran ODT) 8 mg disintegrating tablet Take 1 Tab by mouth every eight (8) hours as needed for Nausea or Vomiting. Patient not taking: Reported on 9/10/2021 3/7/21   Cal Goss MD        Review of Systems: A comprehensive review of systems was negative except for that written in the HPI. Objective:     Vitals:  Vitals:    02/23/22 1526 02/23/22 1547 02/23/22 1606 02/23/22 1627   BP: 131/66 (!) 148/58 136/70 124/64   Pulse: 88 88 94 84   Temp:            Physical Exam:  Patient without distress.   Heart: Regular rate and rhythm  Lung: clear to auscultation throughout lung fields, no wheezes, no rales, no rhonchi and normal respiratory effort  Abdomen: soft, nontender, morbidly obese  Fundus: soft and non tender  Perineum: blood absent, amniotic fluid absent  Cervical Exam: 1 cm dilated    50% effaced    -3 station    Presenting Part: cephalic  Membranes:  Intact  Fetal Heart Rate: Reactive x 2    Prenatal Labs:   Lab Results   Component Value Date/Time    Rubella, External Immune 09/10/2021 12:00 AM    GrBStrep, External negative 07/03/2019 12:00 AM    HBsAg, External Negative 09/10/2021 12:00 AM    HIV, External Non Reactive 09/10/2021 12:00 AM    Gonorrhea, External Negative 09/10/2021 12:00 AM    Chlamydia, External Negative 09/10/2021 12:00 AM      Results for orders placed or performed during the hospital encounter of 02/23/22   COVID-19 RAPID TEST   Result Value Ref Range    Specimen source Nasopharyngeal      COVID-19 rapid test Not detected NOTD     CBC WITH AUTOMATED DIFF   Result Value Ref Range    WBC 7.3 3.6 - 11.0 K/uL    RBC 4.76 3.80 - 5.20 M/uL    HGB 10.0 (L) 11.5 - 16.0 g/dL    HCT 34.0 (L) 35.0 - 47.0 %    MCV 71.4 (L) 80.0 - 99.0 FL    MCH 21.0 (L) 26.0 - 34.0 PG    MCHC 29.4 (L) 30.0 - 36.5 g/dL    RDW 17.1 (H) 11.5 - 14.5 %    PLATELET 369 303 - 272 K/uL    NRBC 0.0 0  WBC    ABSOLUTE NRBC 0.00 0.00 - 0.01 K/uL    NEUTROPHILS 64 32 - 75 %    LYMPHOCYTES 27 12 - 49 %    MONOCYTES 9 5 - 13 %    EOSINOPHILS 1 0 - 7 %    BASOPHILS 0 0 - 1 %    IMMATURE GRANULOCYTES 1 (H) 0.0 - 0.5 %    ABS. NEUTROPHILS 4.6 1.8 - 8.0 K/UL    ABS. LYMPHOCYTES 1.9 0.8 - 3.5 K/UL    ABS. MONOCYTES 0.6 0.0 - 1.0 K/UL    ABS. EOSINOPHILS 0.0 0.0 - 0.4 K/UL    ABS. BASOPHILS 0.0 0.0 - 0.1 K/UL    ABS. IMM. GRANS. 0.0 0.00 - 0.04 K/UL    DF AUTOMATED     METABOLIC PANEL, COMPREHENSIVE   Result Value Ref Range    Sodium 139 136 - 145 mmol/L    Potassium 4.3 3.5 - 5.1 mmol/L    Chloride 108 97 - 108 mmol/L    CO2 23 21 - 32 mmol/L    Anion gap 8 5 - 15 mmol/L    Glucose 66 65 - 100 mg/dL    BUN 6 6 - 20 MG/DL    Creatinine 0.48 (L) 0.55 - 1.02 MG/DL    BUN/Creatinine ratio 13 12 - 20      GFR est AA >60 >60 ml/min/1.73m2    GFR est non-AA >60 >60 ml/min/1.73m2    Calcium 8.9 8.5 - 10.1 MG/DL    Bilirubin, total 0.1 (L) 0.2 - 1.0 MG/DL    ALT (SGPT) 12 12 - 78 U/L    AST (SGOT) 18 15 - 37 U/L    Alk. phosphatase 124 (H) 45 - 117 U/L    Protein, total 6.3 (L) 6.4 - 8.2 g/dL    Albumin 2.2 (L) 3.5 - 5.0 g/dL    Globulin 4.1 (H) 2.0 - 4.0 g/dL    A-G Ratio 0.5 (L) 1.1 - 2.2     PROTEIN/CREATININE RATIO, URINE   Result Value Ref Range    Protein, urine random 19 (H) 0.0 - 11.9 mg/dL    Creatinine, urine 21.00 mg/dL    Protein/Creat.  urine Ratio 0.9     TYPE & SCREEN   Result Value Ref Range Crossmatch Expiration 02/26/2022,2340     ABO/Rh(D) B POSITIVE     Antibody screen NEG        Assessment/Plan:     Plan: Admit for repeat CS tomorrow. Had steroids last week. Group B Strep was not tested. CHTN no meds, BP stable. Pr:Cr less than baseline. FHT cat 1 for both twins. Plan delivery tomorrow. NPO after midnight.      Signed By:  Yoly Diaz MD     February 23, 2022

## 2022-02-23 NOTE — PROGRESS NOTES
Asked to establish IV access in Ms. May Southward. She has had multiple peripheral IV sticks without success. Under ultrasound guidance 4Fr 10 cm midline catheter placed in right antecubital region. Catheter aspirates and flushes easily.

## 2022-02-23 NOTE — PROGRESS NOTES
Problem List  Date Reviewed: 2/18/2022          Codes Class Noted    Dichorionic diamniotic twin pregnancy in third trimester ICD-10-CM: O30.043  ICD-9-CM: 651.03, V91.03  2/19/2022        Chronic hypertension affecting pregnancy ICD-10-CM: O10.919  ICD-9-CM: 642.00  2/19/2022        Obesity affecting pregnancy in third trimester ICD-10-CM: O99.213  ICD-9-CM: 649.13  2/19/2022        Pregnancy affected by fetal growth restriction ICD-10-CM: O36.5990  ICD-9-CM: 656.50  2/19/2022        Pregnancy ICD-10-CM: Z34.90  ICD-9-CM: V22.2  2/18/2022        Supervision of high risk pregnancy, antepartum ICD-10-CM: O09.90  ICD-9-CM: V23.9  9/21/2021    Overview Addendum 1/28/2022  9:51 AM by Prabha Thompson 39 4/8/2022 by [place] (conceived on OCP)  Twins di/di  MFM 12 weeks- 9/27 (Pt notified at appt-needs -Kazakh)  [address] multiparity  Hx of CS x3 - CS 38 weeks  Hx of HTN - no meds, baby ASA at 12 weeks  NIPTS No call - dyzygotic twins - MFM, genetic counseling, fetal echos per MFM - declines amnio  Horizon- neg  Covid- 19 1/2022  covid vaccine  Brothers with blood clotting issue - thin - need dx  Varicella nonimmune  Early Glucola- 124  AFP- neg  fam hx of hemophilia - precautions at birth  Pos COVID - 1/10/2022  1hr GTT- normal  REPEAT C/S 3/25/22               Severe obesity ([address] ICD-10-CM: E66.01  ICD-9-CM: 278.01  9/9/2019

## 2022-02-23 NOTE — PROGRESS NOTES
MATERNAL FETAL MEDICINE NOTE    Lucila Sadler is a 28 yo  at 33w5d  with di/di twin pregnancy, fetal growth restriction Twin B, chronic hypertension, morbid obesity, prior  delivery X 3, abnormal genetic screening, possible family history of hemophilia. Biophysical profile at [facility] office today    Twin A: Maternal left, cephalic, BPP 8/8, MVP 5.0 cm, umbilical artery dopplers are normal.    Twin B: Transverse, head to maternal right, BPP 4/8 (-2 tone, movement), MVP 8.4 cm, umbilical artery dopplers are elevated with continuous forward flow. Lucila Sadler was admitted last week for non-reassuring fetal status of Twin B. She received  corticosteroids. Today on fetal surveillance, Twin B was noted to have elevated UA dopplers and absent tone and fetal movement. Due to growth restriction of Twin B, elevated dopplers, and non-reassuring fetal status, I recommend delivery. Lucila Sadler should be admitted to L&D with plans for delivery tomorrow. If there is concern about fetal status of Twin B overnight, delivery should be performed at that time. I discussed the plan with Dr. Cecilia Rivas.     Dyane Libman, MD  Maternal Fetal Medicine

## 2022-02-23 NOTE — PROGRESS NOTES
[id number]  Entered room to find pt laying in bed. Pt c/o lower abdominal pain 8 out of 10. Pt denies vaginal bleeding, n/v, epigastric pain, but pt c/o headache and white vaginal d/c. +CTX. Pt last ate something at 9am today. Consents explained and signed. Pt stated she received all three COVID vaccines and her flu shot but is unsure if she received her TDAP. Pt stated she had a rash after her COVID shot. [id number] [address]  [facility] #[id number]    [id number] [address]  [facility] [employer]! #[id number]    5501 MD Tesfaye at bedside using ultrasound to locate [facility]. [address] Betsy Bond #[id number]    [address] CRNA at bedside to place IV via ultrasound. [address] Tar[address] Garza,[address] Dr. Carissa Manuel at bedside to assist.     4531 This RN made phone call to Maryjane Marcano MD of difficulty tracing baby B. MD stated she would be down shortly to re-ultrasound the pt. MD stated pt was fine to be off the monitor for now. [address] This RN spoke to Maryjane Marcano MD. MD ordered NST q4. MD would like 2 units of PRBCs on hold for tomorrow as well as for the pt to have TXA ready during scheduled c/s.    1649 This RN explained to pt that she is able to ambulate to bathroom and is able to eat/drink until midnight. Pt verbalized understanding. 1857 This RN spoke to Niharika Gonzales MD. MD ordered continuous monitoring for this pt.    1900 Bedside and Verbal shift change report given to Fior Corral RN (oncoming nurse) by Anya Brand RN  (offgoing nurse). Report included the following information SBAR, MAR and Med Rec Status.

## 2022-02-23 NOTE — PROGRESS NOTES
[phone] Call from Dr. Mirela Berg stating to have pt NPO after midnight for a c/s delivery tomorrow.

## 2022-02-24 ENCOUNTER — ANESTHESIA EVENT (OUTPATIENT)
Dept: LABOR AND DELIVERY | Age: 32
DRG: 540 | End: 2022-02-24
Payer: COMMERCIAL

## 2022-02-24 ENCOUNTER — ANESTHESIA (OUTPATIENT)
Dept: LABOR AND DELIVERY | Age: 32
DRG: 540 | End: 2022-02-24
Payer: COMMERCIAL

## 2022-02-24 LAB — HISTORY CHECKED?,CKHIST: NORMAL

## 2022-02-24 PROCEDURE — 74011250636 HC RX REV CODE- 250/636: Performed by: OBSTETRICS & GYNECOLOGY

## 2022-02-24 PROCEDURE — 74011250636 HC RX REV CODE- 250/636: Performed by: ANESTHESIOLOGY

## 2022-02-24 PROCEDURE — 88307 TISSUE EXAM BY PATHOLOGIST: CPT

## 2022-02-24 PROCEDURE — 76010000397 HC C SECN MULTIPL FIRST 1 HR: Performed by: OBSTETRICS & GYNECOLOGY

## 2022-02-24 PROCEDURE — 75410000003 HC RECOV DEL/VAG/CSECN EA 0.5 HR: Performed by: OBSTETRICS & GYNECOLOGY

## 2022-02-24 PROCEDURE — 74011250637 HC RX REV CODE- 250/637: Performed by: OBSTETRICS & GYNECOLOGY

## 2022-02-24 PROCEDURE — 2709999900 HC NON-CHARGEABLE SUPPLY

## 2022-02-24 PROCEDURE — 77030020847 HC STATLOK BARD -A

## 2022-02-24 PROCEDURE — 74011250637 HC RX REV CODE- 250/637: Performed by: ANESTHESIOLOGY

## 2022-02-24 PROCEDURE — 65270000029 HC RM PRIVATE

## 2022-02-24 PROCEDURE — 76060000078 HC EPIDURAL ANESTHESIA: Performed by: OBSTETRICS & GYNECOLOGY

## 2022-02-24 PROCEDURE — 59510 CESAREAN DELIVERY: CPT | Performed by: OBSTETRICS & GYNECOLOGY

## 2022-02-24 PROCEDURE — 74011000258 HC RX REV CODE- 258: Performed by: OBSTETRICS & GYNECOLOGY

## 2022-02-24 PROCEDURE — 65410000002 HC RM PRIVATE OB

## 2022-02-24 PROCEDURE — 77030007866 HC KT SPN ANES BBMI -B: Performed by: ANESTHESIOLOGY

## 2022-02-24 PROCEDURE — 74011000250 HC RX REV CODE- 250: Performed by: NURSE ANESTHETIST, CERTIFIED REGISTERED

## 2022-02-24 PROCEDURE — 76010000398 HC C SECN MULTIPL EA ADDL 0.5 HR: Performed by: OBSTETRICS & GYNECOLOGY

## 2022-02-24 PROCEDURE — 74011000258 HC RX REV CODE- 258: Performed by: NURSE ANESTHETIST, CERTIFIED REGISTERED

## 2022-02-24 PROCEDURE — 10907ZC DRAINAGE OF AMNIOTIC FLUID, THERAPEUTIC FROM PRODUCTS OF CONCEPTION, VIA NATURAL OR ARTIFICIAL OPENING: ICD-10-PCS | Performed by: OBSTETRICS & GYNECOLOGY

## 2022-02-24 PROCEDURE — 74011250636 HC RX REV CODE- 250/636: Performed by: NURSE ANESTHETIST, CERTIFIED REGISTERED

## 2022-02-24 RX ORDER — OXYTOCIN/RINGER'S LACTATE 30/500 ML
10 PLASTIC BAG, INJECTION (ML) INTRAVENOUS AS NEEDED
Status: DISCONTINUED | OUTPATIENT
Start: 2022-02-24 | End: 2022-02-27 | Stop reason: HOSPADM

## 2022-02-24 RX ORDER — DOCUSATE SODIUM 100 MG/1
100 CAPSULE, LIQUID FILLED ORAL 2 TIMES DAILY
Status: DISCONTINUED | OUTPATIENT
Start: 2022-02-24 | End: 2022-02-27 | Stop reason: HOSPADM

## 2022-02-24 RX ORDER — OXYTOCIN/RINGER'S LACTATE 30/500 ML
87.3 PLASTIC BAG, INJECTION (ML) INTRAVENOUS AS NEEDED
Status: DISCONTINUED | OUTPATIENT
Start: 2022-02-24 | End: 2022-02-27 | Stop reason: HOSPADM

## 2022-02-24 RX ORDER — GLYCOPYRROLATE 0.2 MG/ML
INJECTION INTRAMUSCULAR; INTRAVENOUS AS NEEDED
Status: DISCONTINUED | OUTPATIENT
Start: 2022-02-24 | End: 2022-02-24 | Stop reason: HOSPADM

## 2022-02-24 RX ORDER — SODIUM CHLORIDE 0.9 % (FLUSH) 0.9 %
5-40 SYRINGE (ML) INJECTION AS NEEDED
Status: DISCONTINUED | OUTPATIENT
Start: 2022-02-24 | End: 2022-02-27 | Stop reason: HOSPADM

## 2022-02-24 RX ORDER — DIPHENHYDRAMINE HYDROCHLORIDE 50 MG/ML
12.5 INJECTION, SOLUTION INTRAMUSCULAR; INTRAVENOUS
Status: DISCONTINUED | OUTPATIENT
Start: 2022-02-24 | End: 2022-02-27 | Stop reason: HOSPADM

## 2022-02-24 RX ORDER — SODIUM CHLORIDE 0.9 % (FLUSH) 0.9 %
5-40 SYRINGE (ML) INJECTION EVERY 8 HOURS
Status: DISCONTINUED | OUTPATIENT
Start: 2022-02-24 | End: 2022-02-27 | Stop reason: HOSPADM

## 2022-02-24 RX ORDER — IBUPROFEN 800 MG/1
800 TABLET ORAL EVERY 8 HOURS
Status: DISCONTINUED | OUTPATIENT
Start: 2022-02-25 | End: 2022-02-27 | Stop reason: HOSPADM

## 2022-02-24 RX ORDER — EPHEDRINE SULFATE/0.9% NACL/PF 50 MG/5 ML
SYRINGE (ML) INTRAVENOUS AS NEEDED
Status: DISCONTINUED | OUTPATIENT
Start: 2022-02-24 | End: 2022-02-24 | Stop reason: HOSPADM

## 2022-02-24 RX ORDER — HYDROCODONE BITARTRATE AND ACETAMINOPHEN 5; 325 MG/1; MG/1
1 TABLET ORAL
Status: DISCONTINUED | OUTPATIENT
Start: 2022-02-25 | End: 2022-02-27 | Stop reason: HOSPADM

## 2022-02-24 RX ORDER — SODIUM CHLORIDE, SODIUM LACTATE, POTASSIUM CHLORIDE, CALCIUM CHLORIDE 600; 310; 30; 20 MG/100ML; MG/100ML; MG/100ML; MG/100ML
125 INJECTION, SOLUTION INTRAVENOUS CONTINUOUS
Status: DISCONTINUED | OUTPATIENT
Start: 2022-02-24 | End: 2022-02-27 | Stop reason: HOSPADM

## 2022-02-24 RX ORDER — OXYTOCIN 10 [USP'U]/ML
INJECTION, SOLUTION INTRAMUSCULAR; INTRAVENOUS AS NEEDED
Status: DISCONTINUED | OUTPATIENT
Start: 2022-02-24 | End: 2022-02-24 | Stop reason: HOSPADM

## 2022-02-24 RX ORDER — FENTANYL CITRATE 50 UG/ML
INJECTION, SOLUTION INTRAMUSCULAR; INTRAVENOUS AS NEEDED
Status: DISCONTINUED | OUTPATIENT
Start: 2022-02-24 | End: 2022-02-24 | Stop reason: HOSPADM

## 2022-02-24 RX ORDER — ONDANSETRON 2 MG/ML
INJECTION INTRAMUSCULAR; INTRAVENOUS AS NEEDED
Status: DISCONTINUED | OUTPATIENT
Start: 2022-02-24 | End: 2022-02-24 | Stop reason: HOSPADM

## 2022-02-24 RX ORDER — NALBUPHINE HYDROCHLORIDE 10 MG/ML
5 INJECTION, SOLUTION INTRAMUSCULAR; INTRAVENOUS; SUBCUTANEOUS
Status: ACTIVE | OUTPATIENT
Start: 2022-02-24 | End: 2022-02-25

## 2022-02-24 RX ORDER — OXYCODONE HYDROCHLORIDE 5 MG/1
5 TABLET ORAL
Status: ACTIVE | OUTPATIENT
Start: 2022-02-24 | End: 2022-02-25

## 2022-02-24 RX ORDER — NALOXONE HYDROCHLORIDE 0.4 MG/ML
0.4 INJECTION, SOLUTION INTRAMUSCULAR; INTRAVENOUS; SUBCUTANEOUS AS NEEDED
Status: DISCONTINUED | OUTPATIENT
Start: 2022-02-24 | End: 2022-02-27 | Stop reason: HOSPADM

## 2022-02-24 RX ORDER — BUPIVACAINE HYDROCHLORIDE 7.5 MG/ML
INJECTION, SOLUTION EPIDURAL; RETROBULBAR AS NEEDED
Status: DISCONTINUED | OUTPATIENT
Start: 2022-02-24 | End: 2022-02-24 | Stop reason: HOSPADM

## 2022-02-24 RX ORDER — SODIUM CHLORIDE, SODIUM LACTATE, POTASSIUM CHLORIDE, CALCIUM CHLORIDE 600; 310; 30; 20 MG/100ML; MG/100ML; MG/100ML; MG/100ML
INJECTION, SOLUTION INTRAVENOUS
Status: DISCONTINUED | OUTPATIENT
Start: 2022-02-24 | End: 2022-02-24 | Stop reason: HOSPADM

## 2022-02-24 RX ORDER — OXYCODONE HYDROCHLORIDE 5 MG/1
10 TABLET ORAL
Status: DISPENSED | OUTPATIENT
Start: 2022-02-24 | End: 2022-02-25

## 2022-02-24 RX ORDER — FAMOTIDINE 10 MG/ML
INJECTION INTRAVENOUS AS NEEDED
Status: DISCONTINUED | OUTPATIENT
Start: 2022-02-24 | End: 2022-02-24 | Stop reason: HOSPADM

## 2022-02-24 RX ORDER — ZOLPIDEM TARTRATE 5 MG/1
5 TABLET ORAL
Status: DISCONTINUED | OUTPATIENT
Start: 2022-02-24 | End: 2022-02-27 | Stop reason: HOSPADM

## 2022-02-24 RX ORDER — ROCURONIUM BROMIDE 10 MG/ML
INJECTION, SOLUTION INTRAVENOUS AS NEEDED
Status: DISCONTINUED | OUTPATIENT
Start: 2022-02-24 | End: 2022-02-24

## 2022-02-24 RX ORDER — SIMETHICONE 80 MG
80 TABLET,CHEWABLE ORAL 4 TIMES DAILY
Status: DISCONTINUED | OUTPATIENT
Start: 2022-02-24 | End: 2022-02-27 | Stop reason: HOSPADM

## 2022-02-24 RX ORDER — KETOROLAC TROMETHAMINE 30 MG/ML
30 INJECTION, SOLUTION INTRAMUSCULAR; INTRAVENOUS
Status: DISPENSED | OUTPATIENT
Start: 2022-02-24 | End: 2022-02-25

## 2022-02-24 RX ORDER — MORPHINE SULFATE 0.5 MG/ML
INJECTION, SOLUTION EPIDURAL; INTRATHECAL; INTRAVENOUS AS NEEDED
Status: DISCONTINUED | OUTPATIENT
Start: 2022-02-24 | End: 2022-02-24 | Stop reason: HOSPADM

## 2022-02-24 RX ADMIN — SODIUM CHLORIDE, POTASSIUM CHLORIDE, SODIUM LACTATE AND CALCIUM CHLORIDE 1000 ML: 600; 310; 30; 20 INJECTION, SOLUTION INTRAVENOUS at 09:22

## 2022-02-24 RX ADMIN — DIPHENHYDRAMINE HYDROCHLORIDE 12.5 MG: 50 INJECTION, SOLUTION INTRAMUSCULAR; INTRAVENOUS at 13:00

## 2022-02-24 RX ADMIN — OXYCODONE HYDROCHLORIDE 10 MG: 5 TABLET ORAL at 22:46

## 2022-02-24 RX ADMIN — OXYTOCIN 40 UNITS: 10 INJECTION, SOLUTION INTRAMUSCULAR; INTRAVENOUS at 11:32

## 2022-02-24 RX ADMIN — Medication 10 MG: at 10:55

## 2022-02-24 RX ADMIN — DOCUSATE SODIUM 100 MG: 100 CAPSULE ORAL at 19:39

## 2022-02-24 RX ADMIN — SODIUM CHLORIDE, POTASSIUM CHLORIDE, SODIUM LACTATE AND CALCIUM CHLORIDE 125 ML/HR: 600; 310; 30; 20 INJECTION, SOLUTION INTRAVENOUS at 21:18

## 2022-02-24 RX ADMIN — OXYCODONE HYDROCHLORIDE 10 MG: 5 TABLET ORAL at 15:05

## 2022-02-24 RX ADMIN — DIPHENHYDRAMINE HYDROCHLORIDE 12.5 MG: 50 INJECTION, SOLUTION INTRAMUSCULAR; INTRAVENOUS at 17:33

## 2022-02-24 RX ADMIN — FENTANYL CITRATE 10 MCG: 50 INJECTION INTRAMUSCULAR; INTRAVENOUS at 10:50

## 2022-02-24 RX ADMIN — PHENYLEPHRINE HYDROCHLORIDE 20 MCG/MIN: 10 INJECTION INTRAVENOUS at 10:52

## 2022-02-24 RX ADMIN — SODIUM CHLORIDE, POTASSIUM CHLORIDE, SODIUM LACTATE AND CALCIUM CHLORIDE 1000 ML: 600; 310; 30; 20 INJECTION, SOLUTION INTRAVENOUS at 10:35

## 2022-02-24 RX ADMIN — CEFAZOLIN SODIUM 3 G: 10 INJECTION, POWDER, FOR SOLUTION INTRAVENOUS at 11:00

## 2022-02-24 RX ADMIN — SODIUM CHLORIDE, POTASSIUM CHLORIDE, SODIUM LACTATE AND CALCIUM CHLORIDE 1000 ML: 600; 310; 30; 20 INJECTION, SOLUTION INTRAVENOUS at 07:44

## 2022-02-24 RX ADMIN — SODIUM CHLORIDE, POTASSIUM CHLORIDE, SODIUM LACTATE AND CALCIUM CHLORIDE: 600; 310; 30; 20 INJECTION, SOLUTION INTRAVENOUS at 10:48

## 2022-02-24 RX ADMIN — Medication 10 MG: at 10:59

## 2022-02-24 RX ADMIN — BUPIVACAINE HYDROCHLORIDE 1.5 ML: 7.5 INJECTION, SOLUTION EPIDURAL; RETROBULBAR at 10:50

## 2022-02-24 RX ADMIN — Medication 10 MG: at 10:51

## 2022-02-24 RX ADMIN — Medication 10 MG: at 10:53

## 2022-02-24 RX ADMIN — Medication 10 MG: at 11:03

## 2022-02-24 RX ADMIN — KETOROLAC TROMETHAMINE 30 MG: 30 INJECTION, SOLUTION INTRAMUSCULAR at 19:38

## 2022-02-24 RX ADMIN — KETOROLAC TROMETHAMINE 30 MG: 30 INJECTION, SOLUTION INTRAMUSCULAR at 12:56

## 2022-02-24 RX ADMIN — ONDANSETRON 4 MG: 2 INJECTION INTRAMUSCULAR; INTRAVENOUS at 16:53

## 2022-02-24 RX ADMIN — SIMETHICONE 80 MG: 80 TABLET, CHEWABLE ORAL at 19:39

## 2022-02-24 RX ADMIN — SODIUM CHLORIDE, POTASSIUM CHLORIDE, SODIUM LACTATE AND CALCIUM CHLORIDE 125 ML/HR: 600; 310; 30; 20 INJECTION, SOLUTION INTRAVENOUS at 13:13

## 2022-02-24 RX ADMIN — TRANEXAMIC ACID 1 G: 100 INJECTION, SOLUTION INTRAVENOUS at 11:33

## 2022-02-24 RX ADMIN — GLYCOPYRROLATE 0.2 MG: 0.2 INJECTION INTRAMUSCULAR; INTRAVENOUS at 10:52

## 2022-02-24 RX ADMIN — ONDANSETRON HYDROCHLORIDE 4 MG: 2 SOLUTION INTRAMUSCULAR; INTRAVENOUS at 11:00

## 2022-02-24 RX ADMIN — MORPHINE SULFATE 200 MCG: 0.5 INJECTION, SOLUTION EPIDURAL; INTRATHECAL; INTRAVENOUS at 10:50

## 2022-02-24 RX ADMIN — FAMOTIDINE 20 MG: 10 INJECTION INTRAVENOUS at 11:03

## 2022-02-24 NOTE — PROGRESS NOTES
2/24/2022  3:24 PM    CM completed assessment with  #[id number]     CM met with RAFAELA to complete initial assessment and begin discharge planning. MOB verified and confirmed demographics. RAFAELA lives with CALOS Enamorado ( [phone]) along with their children ages: 15,14,7,4, and 2, at the address on file. RAFAELA does not work and stays home with the children. LISA is also employed and will be taking adequate time off. RAFAELA reports she has good family support, and feels like she has the support she needs when she returns home. RAFAELA plans to bottle feed baby. [facility], ([address] location) will provide follow up care for infant. RAFAELA has car seat, bassinet/crib, clothing, bottles and all necessary supplies for baby. RAFAELA has [facility], and will be adding baby to this policy. CM discussed process to add baby to insurance, MOB verbalized understanding. RAFAELA is also enrolled in [facility] services. CM discussed twins admission to NICU. Baby: Idalmis Pickering. Both infants in isolette and on RA at this time. CM will continue to follow for needs. Care Management Interventions  PCP Verified by CM: Yes Luisana Oropeza)  Mode of Transport at Discharge:  Other (see comment)  Transition of Care Consult (CM Consult): Discharge Planning  Support Systems: Spouse/Significant Other,Other Family Member(s)  Confirm Follow Up Transport: Family  Discharge Location  Patient Expects to be Discharged to[de-identified] Home with family assistance  [place]

## 2022-02-24 NOTE — PROGRESS NOTES
1853 Bedside and Verbal shift change report given to Nisreen Mandujano (oncoming nurse) by Hyacinth Zavala (offgoing nurse). Report included the following information SBAR, Kardex, Intake/Output, MAR and Recent Results. 1910 RN at bedside, Dr Zoey Maloney in room for bedside US to determine accurate placement for monitors. 8881 RN at bedside. Pt requesting to get off monitor and take shower at this time. RN spoke with Dr Zoey Maloney and MD agrees pt can shower. 0885 RN at bedside, pt off monitor at this time to shower. 0594 Bedside and Verbal shift change report given to Grayson (oncoming nurse) by Nisreen Mandujano (offgoing nurse). Report included the following information SBAR, Kardex, Procedure Summary, Intake/Output, MAR and Recent Results.

## 2022-02-24 NOTE — ANESTHESIA PREPROCEDURE EVALUATION
Relevant Problems   CARDIOVASCULAR   (+) Chronic hypertension affecting pregnancy      ENDOCRINE   (+) Obesity affecting pregnancy in third trimester   (+) Severe obesity (HCC)       Anesthetic History   No history of anesthetic complications            Review of Systems / Medical History  Patient summary reviewed and pertinent labs reviewed    Pulmonary  Within defined limits        Undiagnosed apnea         Neuro/Psych   Within defined limits        Pertinent negatives: No seizures, TIA and CVA   Cardiovascular    Hypertension            Pertinent negatives: No past MI, angina and CHF  Exercise tolerance: >4 METS     GI/Hepatic/Renal  Within defined limits           Pertinent negatives: No renal disease   Endo/Other        Morbid obesity and anemia  Pertinent negatives: No diabetes   Other Findings   Comments: 3 prior c-sections  Twin pregnancy         Physical Exam    Airway  Mallampati: III  TM Distance: 4 - 6 cm  Neck ROM: short neck   Mouth opening: Diminished (comment)    Comments: Small mouth Cardiovascular      Rate: normal         Dental         Pulmonary  Breath sounds clear to auscultation               Abdominal  GI exam deferred       Other Findings            Anesthetic Plan    ASA: 3  Anesthesia type: spinal            Anesthetic plan and risks discussed with: Patient      Consent obtained with assistance of Estonian . Starting Hgb 10.0. High risk for hemorrhage. Consented for blood transfusion as needed.

## 2022-02-24 NOTE — PROGRESS NOTES
0710: Bedside and Verbal shift change report given to [address] [facility] RN (oncoming nurse) by Paola Daniel RN (offgoing nurse). Report included the following information SBAR, Kardex, Intake/Output, MAR, Recent Results and Med Rec Status.  # [id number] used from 51954 Dequindre: IV infiltrated, IV removed. Anesthesia called to place IV after 1 unsuccessful attempt by this RN. Anesthesia asked for PICC team to be called to facilitate IV placement. 1800: Pt sleeping, family at bedside    1900: Bedside and Verbal shift change report given to [address] (oncoming nurse) by Cherylene Maclachlan RN (offgoing nurse). Report included the following information SBAR, Kardex, OR Summary, Intake/Output, MAR, Recent Results and Med Rec Status.

## 2022-02-24 NOTE — ANESTHESIA POSTPROCEDURE EVALUATION
Procedure(s):   SECTION MULTIPLE. spinal    Anesthesia Post Evaluation      Multimodal analgesia: multimodal analgesia used between 6 hours prior to anesthesia start to PACU discharge  Patient location during evaluation: floor  Patient participation: complete - patient participated  Level of consciousness: awake and alert  Pain management: adequate  Airway patency: patent  Anesthetic complications: no  Cardiovascular status: acceptable and stable  Respiratory status: acceptable and unassisted  Hydration status: acceptable  Comments: The patient was seen and evaluated in the post-operative period. The time of my evaluation may not match the time of this note. The patient denied uncontrolled pain or nausea, and there were no significant complications evident. Jerry Godinez MD      Post anesthesia nausea and vomiting:  none  Final Post Anesthesia Temperature Assessment:  Normothermia (36.0-37.5 degrees C)      INITIAL Post-op Vital signs:   Vitals Value Taken Time   /65 22 1324   Temp 36.4 °C (97.5 °F) 22 1240   Pulse 80 22 1324   Resp 16 22 1255   SpO2 93 % 22 1328   Vitals shown include unvalidated device data.

## 2022-02-24 NOTE — PROGRESS NOTES
0710: Bedside and Verbal shift change report given to [id number] [facility],1[address] Floor RN (oncoming nurse) by William Werner RN (offgoing nurse). Report included the following information SBAR, Kardex, Intake/Output, MAR, Recent Results and Med Rec Status. 0745:  # [id number] [facility] [phone] for shift assessment PICC team procedure. 7148 PICC at bedside to start an IV. Attempting to keep baby A & B heart tone on the monitor during the procedure. 4621: IV line placed by PICC team in the left  [place] Lars Hudson MD at bedside to explain C/S procedure . 1038 Patient transferred to OR for C/S.  # [id number] used for  until disconnected at [phone].    21 : FHTs noted in OR after spinal A-160s, B-150s. Chintan Ramírez #[id number] used from [phone] (PNDS:[id number])LXQO for pain assessment for on the abdomen which she stated to be 7/10 on pain scale, and a headache of 9/10. See MAR for details.

## 2022-02-24 NOTE — ANESTHESIA PROCEDURE NOTES
Spinal Block    Performed by: Radha Youssef CRNA  Authorized by: Olu Gonzales MD     Pre-procedure:   Indications: at surgeon's request and primary anesthetic  Preanesthetic Checklist: patient identified, risks and benefits discussed, anesthesia consent and timeout performed      Spinal Block:   Patient Position:  Seated  Prep Region:  Lumbar  Prep: chlorhexidine      Location:  L3-4  Technique:  Single shot        Needle:   Needle Type:  Pencan  Needle Gauge:  25 G  Attempts:  1      Events: CSF confirmed, no blood with aspiration and no paresthesia        Assessment:  Insertion:  Uncomplicated  Patient tolerance:  Patient tolerated the procedure well with no immediate complications  Spinal easily placed x1 attempt.  +csf  Neg heme, neg paresthesia

## 2022-02-24 NOTE — OP NOTES
Operative Note    Name: Claudean England   Medical Record Number: [id number]   YOB: 1990  Today's Date: 2022      Pre-operative Diagnosis: Dizygotic twin pregnancy [O30.049] IUGR, CHTN    Post-operative Diagnosis: Dizygotic twin pregnancy, IUGR twin B    Operation: low transverse  section Procedure(s):   SECTION MULTIPLE    Surgeon: Anirudh Lundberg MD  Assistant Surgeon: Delmar Hopkins MD    Anesthesia: Spinal    EBL: 1200cc    Prophylactic Antibiotics: Ancef  DVT Prophylaxis: Sequential Compression Devices         Fetal Description: multiple gestation 2     Birth Information:   Information for the patient's :  Anderson Zavala, Male Remi Olvera [[id number]]   Delivery of a 5 lb 2.2 oz (2.33 kg) male infant on 2022 at 11:31 AM. Apgars were 9  and 9 . Umbilical Cord: 3 Vessels     Umbilical Cord Events: None     Placenta: Manual Removal removal with   appearance. Amniotic Fluid Volume:  Large     Amniotic Fluid Description:  Clear    Information for the patient's :  May Men MALE JARET [address] [place] [address] [[id number]]   Delivery of a 4 lb 7.4 oz (2.025 kg) male infant on 2022 at 11:31 AM. Apgars were 8  and 9 . Umbilical Cord: 3 Vessels     Umbilical Cord Events: None     Placenta: Manual Removal removal with Normal appearance. Amniotic Fluid Volume:  Large     Amniotic Fluid Description:  Clear        Umbilical Cord: 3 vessels present x 2    Placenta:  manual removal    Specimens: placenta           Complications:  none    Implants: none    Circ-1: Mikayla Venegas RN; Jose Rafael Aj RN  Circ-2: Jose R Gordon RN  Scrub Tech-1: Whitney Saldivar  Surg Asst-1: Conception Gables        Procedure Detail:      After proper patient identification and consent, the patient was taken to the operating room, where spinal anesthesia was administered and found to be adequate. Srinivasan catheter had been placed using sterile technique.   The patient was prepped and draped in the normal sterile fashion. The abdomen was entered using the Pfannenstiel technique. The peritoneum was entered bluntely well superior to the bladder without any apparent injury. There were omental adhesions that were taken down with the bovid. An Andrews retractor was placed. Palpation revealed no bowel below the retractor. The bladder flap was created without difficulty. A low transverse uterine incision was made with the scalpel and extended with blunt finger dissection. Amniotomy was performed and the fluid was medium amount clear. The babys head was then delivered atraumatically. The nose and mouth were suctioned. The cord was clamped and cut and the baby was handed off to  staff in attendance. The feet of Twin B were grasped, rupture revealed clear fluid. B was delivered in the usual fashion for breech extraction. The cord was clamped but and baby handed to  attendant. The placenta was manually extracted. The uterus was wiped clean with a moist lap pad and cleared of all clots and debris. The uterine incision was closed in 2 layers, first with a running locked suture of 0-Monocryl, then with an imbricated layer. Adequate hemostasis was noted. Both tubes and ovaries appeared normal. The pericolic gutters were then lavaged clean with normal saline. Good hemostasis was again reassured The Andrews retractor was removed. The fascia was closed with stratifix symmetric in a running fashion. Good hemostasis was assured. The incision was lavaged clean and small bleeders were coagulated with the bovie. The subcutaneous tissue was closed with chromic. The skin was closed with absorbable staples. The patient tolerated the procedure well. Sponge, lap, and needle counts were correct times three and the patient and baby were taken to recovery/postpartum room in stable condition.     Candis Mart MD  2022  5:17 PM

## 2022-02-25 LAB
ABO + RH BLD: NORMAL
BASOPHILS # BLD: 0 K/UL (ref 0–0.1)
BASOPHILS NFR BLD: 0 % (ref 0–1)
BLD PROD TYP BPU: NORMAL
BLD PROD TYP BPU: NORMAL
BLOOD GROUP ANTIBODIES SERPL: NORMAL
BPU ID: NORMAL
BPU ID: NORMAL
CROSSMATCH RESULT,%XM: NORMAL
CROSSMATCH RESULT,%XM: NORMAL
DIFFERENTIAL METHOD BLD: ABNORMAL
EOSINOPHIL # BLD: 0.1 K/UL (ref 0–0.4)
EOSINOPHIL NFR BLD: 2 % (ref 0–7)
ERYTHROCYTE [DISTWIDTH] IN BLOOD BY AUTOMATED COUNT: 17.1 % (ref 11.5–14.5)
HCT VFR BLD AUTO: 30.9 % (ref 35–47)
HGB BLD-MCNC: 9.1 G/DL (ref 11.5–16)
IMM GRANULOCYTES # BLD AUTO: 0 K/UL (ref 0–0.04)
IMM GRANULOCYTES NFR BLD AUTO: 0 % (ref 0–0.5)
LYMPHOCYTES # BLD: 1.4 K/UL (ref 0.8–3.5)
LYMPHOCYTES NFR BLD: 21 % (ref 12–49)
MCH RBC QN AUTO: 21.1 PG (ref 26–34)
MCHC RBC AUTO-ENTMCNC: 29.4 G/DL (ref 30–36.5)
MCV RBC AUTO: 71.7 FL (ref 80–99)
MONOCYTES # BLD: 0.5 K/UL (ref 0–1)
MONOCYTES NFR BLD: 8 % (ref 5–13)
NEUTS SEG # BLD: 4.8 K/UL (ref 1.8–8)
NEUTS SEG NFR BLD: 69 % (ref 32–75)
NRBC # BLD: 0 K/UL (ref 0–0.01)
NRBC BLD-RTO: 0 PER 100 WBC
PLATELET # BLD AUTO: 231 K/UL (ref 150–400)
PMV BLD AUTO: 10.7 FL (ref 8.9–12.9)
RBC # BLD AUTO: 4.31 M/UL (ref 3.8–5.2)
SPECIMEN EXP DATE BLD: NORMAL
STATUS OF UNIT,%ST: NORMAL
STATUS OF UNIT,%ST: NORMAL
UNIT DIVISION, %UDIV: 0
UNIT DIVISION, %UDIV: 0
WBC # BLD AUTO: 6.8 K/UL (ref 3.6–11)

## 2022-02-25 PROCEDURE — 85025 COMPLETE CBC W/AUTO DIFF WBC: CPT

## 2022-02-25 PROCEDURE — 74011250636 HC RX REV CODE- 250/636: Performed by: ANESTHESIOLOGY

## 2022-02-25 PROCEDURE — 65410000002 HC RM PRIVATE OB

## 2022-02-25 PROCEDURE — 36415 COLL VENOUS BLD VENIPUNCTURE: CPT

## 2022-02-25 PROCEDURE — 74011250637 HC RX REV CODE- 250/637: Performed by: OBSTETRICS & GYNECOLOGY

## 2022-02-25 PROCEDURE — 74011250636 HC RX REV CODE- 250/636: Performed by: OBSTETRICS & GYNECOLOGY

## 2022-02-25 PROCEDURE — 74011000250 HC RX REV CODE- 250: Performed by: OBSTETRICS & GYNECOLOGY

## 2022-02-25 PROCEDURE — 2709999900 HC NON-CHARGEABLE SUPPLY

## 2022-02-25 RX ORDER — AMMONIA 15 % (W/V)
AMPUL (EA) INHALATION
Status: DISPENSED
Start: 2022-02-25 | End: 2022-02-25

## 2022-02-25 RX ORDER — OXYCODONE HYDROCHLORIDE 5 MG/1
5 TABLET ORAL
Status: DISCONTINUED | OUTPATIENT
Start: 2022-02-25 | End: 2022-02-27

## 2022-02-25 RX ORDER — OXYCODONE HYDROCHLORIDE 5 MG/1
10 TABLET ORAL
Status: DISCONTINUED | OUTPATIENT
Start: 2022-02-25 | End: 2022-02-27

## 2022-02-25 RX ADMIN — HYDROCODONE BITARTRATE AND ACETAMINOPHEN 1 TABLET: 5; 325 TABLET ORAL at 19:52

## 2022-02-25 RX ADMIN — SODIUM CHLORIDE, PRESERVATIVE FREE 10 ML: 5 INJECTION INTRAVENOUS at 23:28

## 2022-02-25 RX ADMIN — IBUPROFEN 800 MG: 800 TABLET ORAL at 16:17

## 2022-02-25 RX ADMIN — HYDROCODONE BITARTRATE AND ACETAMINOPHEN 1 TABLET: 5; 325 TABLET ORAL at 12:04

## 2022-02-25 RX ADMIN — KETOROLAC TROMETHAMINE 30 MG: 30 INJECTION, SOLUTION INTRAMUSCULAR at 03:49

## 2022-02-25 RX ADMIN — SODIUM CHLORIDE, POTASSIUM CHLORIDE, SODIUM LACTATE AND CALCIUM CHLORIDE 125 ML/HR: 600; 310; 30; 20 INJECTION, SOLUTION INTRAVENOUS at 05:38

## 2022-02-25 RX ADMIN — DOCUSATE SODIUM 100 MG: 100 CAPSULE ORAL at 09:12

## 2022-02-25 RX ADMIN — IBUPROFEN 800 MG: 800 TABLET ORAL at 23:25

## 2022-02-25 RX ADMIN — OXYCODONE 10 MG: 5 TABLET ORAL at 23:25

## 2022-02-25 RX ADMIN — HYDROCODONE BITARTRATE AND ACETAMINOPHEN 1 TABLET: 5; 325 TABLET ORAL at 16:17

## 2022-02-25 RX ADMIN — SIMETHICONE 80 MG: 80 TABLET, CHEWABLE ORAL at 09:11

## 2022-02-25 NOTE — PROGRESS NOTES
1600: Pt taken back to room after being in NICU. Pt tolerating sitting upright and ambulating small distances in the room. 1900: SBAR report given to ANTIONETTE Ribeiro RN

## 2022-02-25 NOTE — LACTATION NOTE
This note was copied from a baby's chart. Consult provided with Uzbek  Jose Gregory #[id number]. Mom states she has not  long with other children as she \"doesn't produce much and my babies don't like my milk. \"  Mom acknowledges she might like to try pumping to give EBM to the twins. MedAdimab Symphony pump set up with instruction. Mom assisted with pump session. A few drops of colostrum seen on nipple. Reviewed with mom that for a full supply, pumping will need to be frequent - at least 8 times in 24 hours. Reviewed hand expression with mother. Syringes to bedside and mom shown how to capture colostrum to transfer to the NICU. All questions regarding pumping answered. Discussed with mother her plan for feeding. Reviewed the benefits of exclusive breast milk feeding during the hospital stay. Informed her of the risks of using formula to supplement in the first few days of life as well as the benefits of successful breast milk feeding; referred her to the Breastfeeding booklet about this information. She acknowledges understanding of information reviewed and states that it is her plan to blendfeed her infants. Will support her choice and offer additional information as needed. Pt chooses to do both breast and bottle. Discussed effects of early supplementation on breastfeeding success; may decrease breastmilk production and supply, increase risk for pathological engorgement, baby may develop preference for faster flow from bottles vs breast, and baby's stomach can be stretched if larger volumes of formula are given. Hand Expression Education:  Mom taught how to manually hand express her colostrum. Emphasized the importance of providing infant with valuable colostrum as infant rests skin to skin at breast.  Aware to avoid extended periods of non-feeding. Aware to offer 10-20+ drops of colostrum every 2-3 hours until infant is latching and nursing effectively.   Taught the rationale behind this low tech but highly effective evidence based practice. Infant admitted to NICU. Pt will successfully establish breast milk supply by pumping with a hospital grade pump every 2-3 hours for approximately 20 minutes/8-10 x day. To maximize milk production mom taught to incorporate breast massage before and hand expression after each pumping session. All expressed breast milk (EBM) will be provided for infant(s) use. The value of skin to skin bonding emphasized. The breast will be offered as baby is ready; with the goal of eventual transition to breastfeeding. Importance of maintaining milk supply through pumping emphasized as infant(s) learns to nurse. Pt will successfully establish breastfeeding by feeding in response to early feeding cues   or wake every 3h, will obtain deep latch, and will keep log of feedings/output. Taught to BF at hunger cues and or q 2-3 hrs and to offer 10-20 drops of hand expressed colostrum at any non-feeds. Breast Assessment  Left Breast: Large,Extra large  Left Nipple: Everted,Intact  Right Breast: Large,Extra large  Right Nipple: Everted,Intact  Breast- Feeding Assessment  Breast-Feeding Experience: Yes (\"a few days/weeks.  My babies don't like my milk\")  Breast Trauma/Surgery: No  Type/Quality:  (twins in NICU)     Mother/Infant Observation  Mother Observation: Breast comfortable

## 2022-02-25 NOTE — PROGRESS NOTES
0700: SBAR report received from ANTIONETTE Taylor RN with preceptor ANIYA Gordon RN. 7963:  services used (Vikas Markham #[id number]). Pt complains of occasional dizziness. States pt pain is manageable at this time. Reviewed POC with pt to include removing barajas catheter and ambulating to bathroom. Breakfast order received. Will plan to remove catheter after pt has eaten breakfast. Plan to take mother to NICU later in the day. 3938:  services used [id number]). Barajas catheter removed. Pt assisted to sitting position at side of bed. Pt assisted to wheelchair. Pt taken to NICU to see babies. 1010: Pt assisted back to room in wheelchair. Pt tolerated being in wheelchair well. Pt assisted to bathroom to void. Pt back in bed eating breakfast.     1400: Pt assisted to bathroom. Pt assisted to wheelchair and taken to NICU.     1825: Pt up to bathroom with .  used Jorge Patten #[id number]). Pt asking for more pain medication. Advise on next dose.

## 2022-02-25 NOTE — PROGRESS NOTES
Post-Operative Day Number 1 Progress Note    Patient doing well post-op day 1 from  delivery without significant complaints. Pain controlled on current medication. Voiding without difficulty, normal lochia. Vitals:  Patient Vitals for the past 8 hrs:   BP Temp Pulse Resp SpO2   22 0750 (!) 114/58 99 °F (37.2 °C) 92 18 100 %     Temp (24hrs), Av.5 °F (36.9 °C), Min:98.3 °F (36.8 °C), Max:99 °F (37.2 °C)      Vital signs stable, afebrile. Exam:  Patient without distress. Abdomen soft, fundus firm at level of umbilicus, nontender. Incision dry and clean without erythema. Lower extremities are negative for swelling, cords or tenderness. Labs:   Recent Results (from the past 24 hour(s))   CBC WITH AUTOMATED DIFF    Collection Time: 22  4:00 AM   Result Value Ref Range    WBC 6.8 3.6 - 11.0 K/uL    RBC 4.31 3.80 - 5.20 M/uL    HGB 9.1 (L) 11.5 - 16.0 g/dL    HCT 30.9 (L) 35.0 - 47.0 %    MCV 71.7 (L) 80.0 - 99.0 FL    MCH 21.1 (L) 26.0 - 34.0 PG    MCHC 29.4 (L) 30.0 - 36.5 g/dL    RDW 17.1 (H) 11.5 - 14.5 %    PLATELET 693 390 - 875 K/uL    MPV 10.7 8.9 - 12.9 FL    NRBC 0.0 0  WBC    ABSOLUTE NRBC 0.00 0.00 - 0.01 K/uL    NEUTROPHILS 69 32 - 75 %    LYMPHOCYTES 21 12 - 49 %    MONOCYTES 8 5 - 13 %    EOSINOPHILS 2 0 - 7 %    BASOPHILS 0 0 - 1 %    IMMATURE GRANULOCYTES 0 0.0 - 0.5 %    ABS. NEUTROPHILS 4.8 1.8 - 8.0 K/UL    ABS. LYMPHOCYTES 1.4 0.8 - 3.5 K/UL    ABS. MONOCYTES 0.5 0.0 - 1.0 K/UL    ABS. EOSINOPHILS 0.1 0.0 - 0.4 K/UL    ABS. BASOPHILS 0.0 0.0 - 0.1 K/UL    ABS. IMM. GRANS. 0.0 0.00 - 0.04 K/UL    DF AUTOMATED         Assessment and Plan:  Patient appears to be having uncomplicated post- course. Continue routine post-op care and maternal education.

## 2022-02-26 PROCEDURE — 65410000002 HC RM PRIVATE OB

## 2022-02-26 PROCEDURE — 74011250637 HC RX REV CODE- 250/637: Performed by: OBSTETRICS & GYNECOLOGY

## 2022-02-26 RX ORDER — HYDROCORTISONE 1 %
CREAM (GRAM) TOPICAL
Status: DISCONTINUED | OUTPATIENT
Start: 2022-02-26 | End: 2022-02-27 | Stop reason: HOSPADM

## 2022-02-26 RX ORDER — ACETAMINOPHEN 325 MG/1
650 TABLET ORAL
Status: DISCONTINUED | OUTPATIENT
Start: 2022-02-26 | End: 2022-02-27 | Stop reason: HOSPADM

## 2022-02-26 RX ADMIN — OXYCODONE 10 MG: 5 TABLET ORAL at 21:50

## 2022-02-26 RX ADMIN — IBUPROFEN 800 MG: 800 TABLET ORAL at 09:02

## 2022-02-26 RX ADMIN — OXYCODONE 10 MG: 5 TABLET ORAL at 13:35

## 2022-02-26 RX ADMIN — OXYCODONE 10 MG: 5 TABLET ORAL at 09:23

## 2022-02-26 RX ADMIN — SIMETHICONE 80 MG: 80 TABLET, CHEWABLE ORAL at 17:54

## 2022-02-26 RX ADMIN — OXYCODONE 10 MG: 5 TABLET ORAL at 17:54

## 2022-02-26 RX ADMIN — IBUPROFEN 800 MG: 800 TABLET ORAL at 17:53

## 2022-02-26 RX ADMIN — ACETAMINOPHEN 650 MG: 325 TABLET ORAL at 13:27

## 2022-02-26 RX ADMIN — SIMETHICONE 80 MG: 80 TABLET, CHEWABLE ORAL at 13:27

## 2022-02-26 RX ADMIN — OXYCODONE 10 MG: 5 TABLET ORAL at 04:44

## 2022-02-26 NOTE — LACTATION NOTE
This note was copied from a baby's chart. Lactation Consult done with interpretor for Arabic #[id number]. Mom states pumps but is not getting much milk and has started nursing the baby's. Discussed importance of breast stimulation. Shown mom how to hand express. Milk squirting out in streams. Asked if she has [facility] and told her she may be able to get a double electric pump to borrow. Does not want to rent a pump. Shown how to use hand pump. Pt will successfully establish breastfeeding by feeding in response to early feeding cues   or wake every 3h, will obtain deep latch, and will keep log of feedings/output. Taught to BF at hunger cues and or q 2-3 hrs and to offer 10-20 drops of hand expressed colostrum at any non-feeds. Breast Assessment  Left Breast: Large,Extra large  Left Nipple: Everted,Intact  Right Breast: Large,Extra large  Right Nipple: Everted,Intact  Breast- Feeding Assessment  Breast-Feeding Experience: Yes (\"a few days/weeks.  My babies don't like my milk\")  Breast Trauma/Surgery: No  Type/Quality:  (twins in NICU)     Mother/Infant Observation  Mother Observation: Breast comfortable

## 2022-02-26 NOTE — PROGRESS NOTES
Post-Operative Day Number 2 Progress Note    Patient doing well post-op day 2 from  delivery without significant complaints. Pain controlled on current medication. Voiding without difficulty, normal lochia. Has headache - BP normal. Given tylenol. Also tape burn    Vitals:  Patient Vitals for the past 8 hrs:   BP Temp Pulse Resp SpO2   22 1049 -- 98.3 °F (36.8 °C) -- -- --   22 0853 -- 98.1 °F (36.7 °C) -- 16 --   22 0449 138/69 98.5 °F (36.9 °C) 98 16 95 %     Temp (24hrs), Av.3 °F (36.8 °C), Min:98.1 °F (36.7 °C), Max:98.5 °F (36.9 °C)      Vital signs stable, afebrile. Exam:  Patient without distress. Abdomen soft, fundus firm at level of umbilicus, nontender. Incision dry and clean without erythema. Lower extremities are negative for swelling, cords or tenderness. Labs: No results found for this or any previous visit (from the past 24 hour(s)). Assessment and Plan:  Patient appears to be having uncomplicated post- course. Continue routine post-op care and maternal education.

## 2022-02-26 NOTE — PROGRESS NOTES
0700 sbar report received from kaleb herbert rn    1050 pt c/o bilateral headache and dizziness that just started. Dr Melinda Jordan in and assessed pt, aware of pt c/o headache, vital signs and c/o dizziness. Pt c/o abdominal itching, pink lines noted on abdomen and left side of abdomen has raised red area that pt also c/o itching.   Orders received     [phone] sbar report given to corie rankin rn

## 2022-02-27 VITALS
TEMPERATURE: 98.6 F | SYSTOLIC BLOOD PRESSURE: 151 MMHG | HEART RATE: 85 BPM | DIASTOLIC BLOOD PRESSURE: 65 MMHG | OXYGEN SATURATION: 96 % | RESPIRATION RATE: 18 BRPM

## 2022-02-27 PROCEDURE — 74011250637 HC RX REV CODE- 250/637: Performed by: OBSTETRICS & GYNECOLOGY

## 2022-02-27 RX ORDER — IBUPROFEN 800 MG/1
800 TABLET ORAL
Qty: 60 TABLET | Refills: 1 | Status: SHIPPED | OUTPATIENT
Start: 2022-02-27 | End: 2022-06-20

## 2022-02-27 RX ORDER — IBUPROFEN 800 MG/1
800 TABLET ORAL
Qty: 60 TABLET | Refills: 1 | Status: SHIPPED | OUTPATIENT
Start: 2022-02-27 | End: 2022-02-27

## 2022-02-27 RX ORDER — HYDROCODONE BITARTRATE AND ACETAMINOPHEN 5; 325 MG/1; MG/1
1 TABLET ORAL
Qty: 30 TABLET | Refills: 0 | Status: SHIPPED | OUTPATIENT
Start: 2022-02-27 | End: 2022-02-27

## 2022-02-27 RX ORDER — HYDROCODONE BITARTRATE AND ACETAMINOPHEN 5; 325 MG/1; MG/1
1 TABLET ORAL
Qty: 30 TABLET | Refills: 0 | Status: SHIPPED | OUTPATIENT
Start: 2022-02-27 | End: 2022-03-04

## 2022-02-27 RX ADMIN — DOCUSATE SODIUM 100 MG: 100 CAPSULE ORAL at 09:24

## 2022-02-27 RX ADMIN — OXYCODONE 10 MG: 5 TABLET ORAL at 09:24

## 2022-02-27 RX ADMIN — HYDROCODONE BITARTRATE AND ACETAMINOPHEN 1 TABLET: 5; 325 TABLET ORAL at 13:15

## 2022-02-27 RX ADMIN — IBUPROFEN 800 MG: 800 TABLET ORAL at 06:00

## 2022-02-27 RX ADMIN — SIMETHICONE 80 MG: 80 TABLET, CHEWABLE ORAL at 13:15

## 2022-02-27 RX ADMIN — SIMETHICONE 80 MG: 80 TABLET, CHEWABLE ORAL at 09:24

## 2022-02-27 RX ADMIN — IBUPROFEN 800 MG: 800 TABLET ORAL at 13:15

## 2022-02-27 NOTE — DISCHARGE SUMMARY
Obstetrical Discharge Summary     Name: Ricci Longoria MRN: [id number]  SSN: [id number]    YOB: 1990  Age: 27 y.o. Sex: female      Admit Date: 2022    Discharge Date: 2022     Admitting Physician: Vikas Phan MD     Attending Physician:  Yaquelin Blandon MD     Admission Diagnoses: Pregnancy [Z34.90]    Discharge Diagnoses:   Information for the patient's :  Saintclair Cain, Male Israel Bill [[id number]]   Delivery of a 5 lb 2.2 oz (2.33 kg) male infant via , Low Transverse on 2022 at 11:31 AM  by Vikas Phan. Apgars were 9  and 9 . Information for the patient's :  Leyda Tomas MALE B [address] [facility] [[id number]]   Delivery of a 4 lb 7.4 oz (2.025 kg) male infant via , Low Transverse on 2022 at 11:31 AM  by Vikas Phan. Apgars were 8  and 9 . Additional Diagnoses:   Hospital Problems  Date Reviewed: 2022          Codes Class Noted POA    Pregnancy ICD-10-CM: Z34.90  ICD-9-CM: V22.2  2022 Unknown             Lab Results   Component Value Date/Time    Rubella, External Immune 09/10/2021 12:00 AM    GrBStrep, External negative 2019 12:00 AM       Hospital Course: Normal hospital course following the delivery. Disposition: Home  Condition: Good    Patient Instructions:   Current Discharge Medication List      START taking these medications    Details   ibuprofen (MOTRIN) 800 mg tablet Take 1 Tablet by mouth every eight (8) hours as needed for Pain. Qty: 60 Tablet, Refills: 1      HYDROcodone-acetaminophen (NORCO) 5-325 mg per tablet Take 1 Tablet by mouth every four (4) hours as needed for Pain for up to 3 days. Max Daily Amount: 6 Tablets. Qty: 30 Tablet, Refills: 0    Associated Diagnoses: Post-op pain         CONTINUE these medications which have NOT CHANGED    Details   aspirin delayed-release 81 mg tablet Take 1 Tablet by mouth daily.   Qty: 90 Tablet, Refills: 3      clotrimazole (GYNE-LOTRIMIN) 2 % vaginal cream Insert 1 Applicatorful into vagina nightly. Qty: 21 g, Refills: 0      !! ondansetron (ZOFRAN ODT) 8 mg disintegrating tablet Take 1 Tablet by mouth every eight (8) hours as needed for Nausea or Vomiting. Qty: 30 Tablet, Refills: 4      prenatal vit-iron fumarate-fa 27 mg iron- 0.8 mg tab tablet Take 1 Tablet by mouth daily. Qty: 90 Tablet, Refills: 4      ferrous sulfate (IRON) 325 mg (65 mg iron) EC tablet Take 1 Tablet by mouth daily. Qty: 90 Tablet, Refills: 4      promethazine (PHENERGAN) 25 mg tablet Take 1 Tab by mouth every six (6) hours as needed for Nausea. Qty: 12 Tab, Refills: 0      aluminum & magnesium hydroxide-simethicone (Mylanta Maximum Strength) 400-400-40 mg/5 mL suspension Take 10 mL by mouth every six (6) hours as needed for Indigestion. Indications: indigestion  Qty: 335 mL, Refills: 0      albuterol (PROVENTIL HFA, VENTOLIN HFA, PROAIR HFA) 90 mcg/actuation inhaler Take 2 Puffs by inhalation every six (6) hours as needed for Wheezing. Qty: 1 Inhaler, Refills: 0      !! ondansetron (Zofran ODT) 8 mg disintegrating tablet Take 1 Tab by mouth every eight (8) hours as needed for Nausea or Vomiting. Qty: 10 Tab, Refills: 0       !! - Potential duplicate medications found. Please discuss with provider. STOP taking these medications       cephALEXin (KEFLEX) 500 mg capsule Comments:   Reason for Stopping:               Reference my discharge instructions. Follow-up Appointments   Procedures    FOLLOW UP VISIT Appointment in: One Week 10:30 on 3/3/2022     10:30 on 3/3/2022     Standing Status:   Standing     Number of Occurrences:   1     Order Specific Question:   Appointment in     Answer:    One Week        Signed By:  Flavia Kramer MD     February 27, 2022

## 2022-02-27 NOTE — DISCHARGE INSTRUCTIONS
Patient Education       [facility]ÑíÉ: ãÇ íäÈÛí ÊæÞÚå Ýí ÇáãäÒá   Section: What to Expect at [address] ÇáæáÇÏÉ ÇáÞíÕÑíÉ Ãæ ÇáÌÑÇÍÉ ÇáÞíÕÑíÉ ÈÃäåÇ ÚãáíÉ ÌÑÇÍíÉ áæáÇÏÉ ØÝáßö -íØáÞ ÚáíåÇ ÇáÔÞ- ÅÐ íÞæã ÇáØÈíÈ ÈÅÌÑÇÁ ÝÊÍÉ Ýí ÃÓÝá ÇáÈØä BRLHBG. æÞÏ ÊÔÚÑíä ÈÈÚÖ ÇáÃáã Ýí ÃÓÝá ÇáÈØä æÊÍÊÇÌíä Åáì ÊäÇæá ÃÏæíÉ ÊÓßíä ÇáÃáã áãÏÉ ÊÊÑÇæÍ ãä ÃÓÈæÚ Åáì ÃÓÈæÚíä. æÊæÞÚí ÍÏæË ÈÚÖ ÇáäÒíÝ ÇáãåÈáí áÚÏÉ ÃÓÇÈíÚ. ßãÇ ÞÏ ÊÍÊÇÌíä Åáì ÍæÇáí 6 ÃÓÇÈíÚ ááÊÚÇÝí ÈÇáßÇãá. ãä Çáãåã Ãä ÊÊÚÇãáí ãÚ ÇáÃãÑ ÈÈÓÇØÉ ÃËäÇÁ ÝÊÑÉ ÇáÊÚÇÝí ãä ÇáÔÞ. ÊÌäÈí ÑÝÚ ÇáÃÔíÇÁ ÇáËÞíáÉ¡ Ãæ ããÇÑÓÉ ÇáÃäÔØÉ ÇáÔÇÞÉ¡ Ãæ ÇáÊãÇÑíä ÇáÊí ÊÌåÏ ÚÖáÇÊ ÇáÈØä ÃËäÇÁ ÝÊÑÉ ÊÚÇÝíßö. ÇØáÈí ÇáãÓÇÚÏÉ ãä ÃÍÏ ÃÝÑÇÏ ÚÇÆáÊßö Ãæ ÕÏíÞÊßö Ýí BCSKVVZ ÇáãäÒáíÉ¡ æÇáØÈÎ¡ æÇáÊÓæÞ. æÊÞÏã æÑÞÉ ÇáÚäÇíÉ åÐå ÝßÑÉ ÚÇãÉ Íæá ÇáãÏÉ ÇáÊí ÊÓÊÛÑÞíäåÇ ááÊÚÇÝí. æåÐÇ ãÚ ÇáÚáã Ãä ßá ÇãÑÃÉ ÊÊÚÇÝì ÈÓÑÚÉ ÊÎÊáÝ Úä ÇáÃÎÑì. ÇÊÈÚí ÇáÎØæÇÊ ÇáÊÇáíÉ ááÔÚæÑ ÈÇáÊÍÓøä Ýí ÃÓÑÚ æÞÊ ããßä. ßíÝ íãßäßö ÇáÇÚÊäÇÁ ÈäÝÓßö Ýí XKGHUE¿  ÇáäÔÇØ   ÇÓÊÑíÍí ÚäÏãÇ ÊÔÚÑíä ÈÇáÊÚÈ. ßãÇ Ãä VZUXOQ Úáì ÞÓØ ßÇÝò ãä Çáäæã ÞÏ íÓÇÚÏßö Úáì ÇáÊÚÇÝí.  ÍÇæáí ÇáãÔí ßá íæã. ÇÈÏÆí ÈÇáãÔí áãÓÇÝÉ ÃØæá ÞáíáðÇ ãä ÇáãÓÇÝÉ ÇáÊí ÞØÚÊöåÇ Ýí Çáíæã ÇáÓÇÈÞ. Þæãí ÈÒíÇÏÉ ÇáãÓÇÝÉ ÇáÊí ÊãÔíäåÇ ÊÏÑíÌíðÇ. ÅÐ íÚÒÒ ÇáãÔí ãä ÊÏÝÞ ÇáÏã æíÓÇÚÏ Úáì ãäÚ SBCAISJP ÇáÑÆæí¡ æÇáÅãÓÇß¡ VBWNBAPR ÇáÏãæíÉ.  ÊÌäÈí ÇáÃäÔØÉ ÇáÔÇÞÉ¡ ãËá ÑßæÈ XLAPQJXE¡ GIHOLQ¡ æÑÝÚ BPFXRLF¡ æããÇÑÓÉ ÇáÑíÇÖÇÊ ÇáåæÇÆíÉ áãÏÉ 6 ÃÓÇÈíÚ Ãæ ÍÊì íÞæá ØÈíÈßö Åä ÇáæÖÚ Âãä.  æÅáì Ãä íÕÑÍ ØÈíÈßö ÈÐáß¡ áÇ ÊÍãáí Ãí ÔíÁ ÃËÞá ãä ØÝáßö.  áÇ ÊãÇÑÓí ÊãÇÑíä ÇáÈØä Ãæ ÛíÑåÇ ãä ÇáÊãÇÑíä ÇáÊí ÊÌåÏ ÚÖáÇÊ ÇáÈØä áãÏÉ 6 ÃÓÇÈíÚ Ãæ ÍÊì íÞæá ØÈíÈßö Åä ÇáæÖÚ Âãä.  ÖÚí æÓÇÏÉ Úáì ÇáÔÞ ÇáÐí áÏíßö ÚäÏ ÇáÓÚÇá Ãæ ÃÎÐ äÝÓ ÚãíÞ. ÓíÄÏí Ðáß Åáì ÊÞæíÉ ÈØäßö æÊÞáíá Ãáãßö.  íãßäßö FWCNJLDKR ZYVRKWDE. æÇÊÑßí ÇáÔÞ ÍÊì íÌÝ ÚäÏ ÇáÇäÊåÇÁ.  æÓæÝ ÊÕÇÈíä ÈÈÚÖ ÇáäÒíÝ ÇáãåÈáí. Úáíßö ÇÑÊÏÇÁ ÇáÝæØ ÇáÕÍíÉ. áÇ ÊÃÎÐí ÏÔ ãåÈáíðÇ Ãæ ÊÓÊÎÏãí OURIVGLR ÇáÞØäíÉ ÅáÇ ÅÐÇ ÕÑÍ ØÈíÈßö ÈÃä Ðáß Âãä.    ÇÓÃáí ØÈíÈßö Úä ÇáæÞÊ ÇáÐí íãßäßö Ýíå ÇáÞíÇÏÉ ãÑÉ ÃÎÑì.   ÑÈãÇ ZBOQLHWW Åáì KTSHOO Úáì ÅÌÇÒÉ ãä ÇáÚãá áãÏÉ 6 ÃÓÇÈíÚ Úáì ÇáÃÞá. æåÐÇ íÚÊãÏ Úáì äæÚ ÇáÚãá ÇáÐí ÊãÇÑÓíäå Èå æØÈíÚÉ ÔÚæÑßö.  ÇÓÃáí ØÈíÈßö Úä ÇáæÞÊ ÇáãäÇÓÈ áããÇÑÓÉ ÇáÌäÓ. ÇáäÙÇã ÇáÛÐÇÆí   íãßäßö ÊäÇæá æÌÈÇÊ ÇáäÙÇã ÇáÛÐÇÆí ÇáãÚÊÇÏ. ÅÐÇ áã Êßä ãÚÏÊßö Úáì ãÇ íÑÇã¡ ÝÌÑÈí ÇáÃØÚãÉ ÇáÎÝíÝÉ ÞáíáÉ ÇáÏÓã ãËá ÇáÃÑÒ ÇáÓÇÏÉ¡ æÇáÏÌÇÌ ÇáãÔæí¡ æÇáÎÈÒ ÇáãÍãÕ¡ æÇáÒÈÇÏí.  ÇÔÑÈí ßãíÉ æÇÝÑÉ ãä OKQUBEH (ãÇ áã íÎÈÑßö ØÈíÈßö ÎáÇÝ Ðáß).  ÞÏ ÊáÇÍÙíä Ãä ÍÑßÇÊ ÇáÃãÚÇÁ ÛíÑ ãäÊÙãÉ ÈÚÏ ÇáÌÑÇÍÉ ãÈÇÔÑÉð. æåÐÇ ÃãÑ ÔÇÆÚ. ÍÇæáí ÊÌäÈ ÍÏæË ÇáÅãÓÇß æÇáÅÌåÇÏ ÚäÏ ÇáÊÈÑÒ. æÞÏ ÊÑÛÈíä Ýí ÊäÇæá GASGDXZL ÇáÛÐÇÆíÉ ÇáÊí ÊÍÊæí Úáì ÃáíÇÝ íæãíðÇ. ÅÐÇ áã Êßæäí ÞÏ ÞãÊö ÈÇáÊÈÑÒ ÈÚÏ íæãíä¡ ÝÇÓÊÔíÑí ØÈíÈßö ÈÔÃä ÊäÇæá ãáíä ÎÝíÝ.  ÅÐÇ ßäÊö ÊÑÖÚíä ÑÖÇÚÉ ØÈíÚíÉ¡ ÝÚáíßö Priscille Jaya ãä ÊäÇæá FGIYJZHXR ÇáßÍæáíÉ. ÅÐ íãßä Ãä íÓÈÈ NNWLLA äÞÕðÇ Ýí ÇáØÇÞÉ VAWGARS ÕÍíÉ ÃÎÑì ááØÝá ÚäÏãÇ ÊÔÑÈå ÇáãÑÃÉ ÇáãÑÖÚÉ ÈßËÑÉ. æíãßä Ãä íÚíÞ ÃíÖðÇ ÞÏÑÉ ÇáÃã Úáì ÅØÚÇã ØÝáåÇ Ãæ ÑÚÇíÉ ÇáØÝá ÈØÑÞ ÃÎÑì. áÇ íæÌÏ ÇáßËíÑ ãä TNRHQNB Íæá ãÞÏÇÑ WOEWXV ÇáÐí íãßä Ãä íÄÐí ÇáØÝá. ÚáãðÇ ÈÃä ÚÏã ÊäÇæá ÇáßÍæá åæ ÇáÎíÇÑ ÇáÃßËÑ ÃãÇäðÇ áØÝáßö. ÅÐÇ ÇÎÊÑÊö ÔÑÈ ÇáÎãÑ Èíä ÇáÍíä EAZIWJ¡ ÝÚáíßö ÊäÇæá ãÔÑæÈ æÇÍÏ ÝÞØ¡ æÊÞáíá ÚÏÏ PYHEEBMMR ÇáÊí KQWABHZJ ÝíåÇ åÐÇ ÇáãÔÑæÈ. ÇäÊÙÑí ÓÇÚÊíä Úáì ÇáÃÞá ÈÚÏ ÊäÇæá ÇáÎãÑ áÊÞáíá ßãíÉ ÇáßÍæá ÇáÊí ÞÏ WFZTTPXF ÇáØÝá Ýí ÇáÍáíÈ ÚäÏ ÇáÑÖÇÚÉ ÇáØÈíÚíÉ. ÇáÃÏæíÉ   ÓæÝ íÎÈÑßö ØÈíÈßö ÅÐÇ ßÇä ãä Çáããßä ÇÓÊÆäÇÝ ÊÚÇØí ÇáÏæÇÁ æãæÚÏ Ðáß. æÓæÝ íÚØíßö ÅÑÔÇÏÇÊ ÈÔÃä ÊäÇæá Ãíø ÏæÇÁ ÌÏíÏ.  æÅÐÇ ßäÊö ÊÊäÇæáíä ãÎÝÝÇÊ ÇáÏã ãËá ÇáæÇÑÝÇÑíä (Coumadin) Ãæ ßáæÈíÏæÌÑíá (Plavix)¡ Ãæ ÇáÃÓÈíÑíä¡ ÝÊÃßÏí ãä ÇÓÊÔÇÑÉ ÇáØÈíÈ. ÝÓæÝ íÎÈÑßö ØÈíÈßö ÅÐÇ ßÇä ãä Çáããßä ÈÏÁ ÊÚÇØí Êáß ÇáÃÏæíÉ ãÑÉ ÃÎÑì æãæÚÏ Ðáß. ÊÃßÏí ãä ÇÓÊíÚÇÈßö ÈÏÞÉ ãÇ íÑíÏ ØÈíÈßö Ãä ÊÝÚáíå.  ÊäÇæáí ÃÏæíÉ ÊÓßíä ÇáÃáã ÈÏÞÉ æÝÞðÇ áÅÑÔÇÏÇÊ ÇáØÈíÈ. o ÅÐÇ ÃÚØÇßö ÇáØÈíÈ ÏæÇÁð ãä ÇáÃÏæíÉ ÇáÊí ÊõÕÑÝ ÈæÕÝÉ ØÈíÉ áÚáÇÌ ÇáÃáã¡ ÝÊäÇæáíå æÝÞðÇ áÅÑÔÇÏÇÊå.   o ÅÐÇ áã ÊÊäÇæáí ÏæÇÁð ãÖÇÏðÇ ááÃáã ãä ÇáÃÏæíÉ ÇáÊí ÊõÕÑÝ ÈæÕÝÉ ØÈíÉ¡ ÝÇÓÃáí ØÈíÈßö ÅÐÇ ßÇä ÈÅãßÇäßö ÊäÇæá ÏæÇÁ ãä ÇáÃÏæíÉ ÇáÊí ÊõÕÑÝ Ïæä æÕÝÉ ØÈíÉ.  ÅÐÇ ßäÊö ÊÚÊÞÏíä Ãä ÇáÏæÇÁ ÇáãÖÇÏ ááÃáã ÇáÎÇÕ Èßö TYARGP ÊÔÚÑíä ÈÇáÛËíÇä Ýí ãÚÏÊßö:   o ÊäÇæáí ÇáÏæÇÁ ÈÚÏ ÇáæÌÈÇÊ (ÅáÇ ÅÐÇ ÃÎÈÑßö ØÈíÈßö ÈÎáÇÝ Ðáß). o ÇÓÃáí ØÈíÈßö Úä ÏæÇÁ ÂÎÑ ãÖÇÏ ááÃáã.  ÅÐÇ æÕÝ áßö ØÈíÈßö ãÖÇÏðÇ ÍíæíðÇ¡ JENKIVQI æÝÞðÇ áÅÑÔÇÏÇÊå. æáÇ ÊÊæÞÝí Úä ÊäÇæáå áãÌÑÏ Ãäßö ÊÔÚÑíä ÈÊÍÓä. ÅÐ íáÒãßö ÊäÇæá ÇáãÖÇÏ ÇáÍíæí ÈÇáßÇãá. ÇáÚäÇíÉ ÈÇáÔÞ   ÅÐÇ ßÇä áÏíßö ÔÑíØ áÇÕÞ Úáì ÇáÔÞ¡ ÝÇÊÑßí ÇáÔÑíØ áãÏÉ ÃÓÈæÚ Ãæ ÍÊì íÓÞØ.  ÇÛÓáí ÇáãäØÞÉ íæãíðÇ ÈãÇÁ ÏÇÝÆ æÕÇÈæä¡ æÇÊÑßíåÇ ÍÊì ÊÌÝ. áÇ ÊÓÊÎÏãí ãÇÏÉ ÊÍÊæí Úáì ÈíÑæßÓíÏ ÇáåíÏÑæÌíä Ãæ TURIUL ÝÞÏ íÚãáÇä Úáì ÊÃÎÑ ÇáÔÝÇÁ. íãßäßö ÊÛØíÉ ÇáãäØÞÉ ÈÚÕÇÈÉ ãä ÇáÔÇÔ ÅÐÇ ßÇäÊ ÊäÒÝ Ãæ ÊÍÊß KLFXCWSC. Úáíßö ÊÛííÑ ÇáÚÕÇÈÉ ßá íæã.  æÍÇÝÙí Úáì äÙÇÝÉ ÇáãäØÞÉ æÌÝÇÝåÇ. ÅÑÔÇÏÇÊ ÃÎÑì   ÅÐÇ ßäÊö ÊÑÖÚíä ØÝáßö ÑÖÇÚÉ ØÈíÚíÉ¡ ÝÞÏ ÊÔÚÑíä ÈÑÇÍÉ ÃßÈÑ ÃËäÇÁ ÇáÊÚÇÝí ÅÐÇ æÖÚÊö ÇáÑÖíÚ ÍÊì áÇ íÓÊÑíÍ Úáì ÈØäßö. ÌÑøÈí æÖÚ ØÝáßö ÃÓÝá ÐÑÇÚßö æÌÓãå ÈãÍÇÐÇÉ ÇáÌÇäÈ ÇáÐí ÓÊÑÖÚíäå Úáíå. ÇÏÚãí ÇáÌÒÁ ÇáÚáæí ãä ÌÓã ÇáØÝá ÈÐÑÇÚßö. íãßäßö ÈåÐå ÇáíÏ ÇáÊÍßã Ýí ÑÃÓ ÇáØÝá áÌáÈ Ýãå Åáì ÇáËÏí. æåÐÇ ãÇ íÓãì Ýí ÈÚÖ ÇáÃÍíÇä ãÓßÉ ßÑÉ ÇáÞÏã. ÊõÚÏ ÑÚÇíÉ ÇáãÊÇÈÚÉ ÌÒÁðÇ ÃÓÇÓíðÇ Ýí ÚáÇÌß æÓáÇãÊß. ÝÚáíß ÇáÍÑÕ Úáì ÊÑÊíÈ ÌãíÚ ãæÇÚíÏ ÒíÇÑÉ ÇáØÈíÈ WHOALJIRV ÈåÇ¡ ZPQEKSPP ÈØÈíÈß ÚäÏ DWFYHZJD ãä Ãí ãÔßáÇÊ. æãä ÇáÌíÏ ÃíÖðÇ Ãä ÊÚÑÝ äÊÇÆÌ RWRIFMMM æßÐáß WPQGQCPE ÈÞÇÆãÉ ÇáÃÏæíÉ ÇáÊí IJFAHSLY. ãÊì íäÈÛí Úáíßö AHWZYUO áØáÈ ÇáãÓÇÚÏÉ¿  íõãßäßö ÇáÇÊÕÇá 911 Ýí Ãí æÞÊ ÊÚÊÞÏíä Ýíå Ãäßö ÈÍÇÌÉ Åáì ÑÚÇíÉ ØÇÑÆÉ. Úáì ÓÈíá DBZFFF¡ ÇÊÕáí Ýí KASHDLW OCPNZPL:   ÅÐÇ ÑÇæÏÊßö ÃÝßÇÑ ÈÅíÐÇÁ äÝÓßö¡ Ãæ ØÝáßö¡ Ãæ Ãí ÔÎÕ ÂÎÑ.  ÅÐÇ ÃõÕÈÊö ÈÇáÅÛãÇÁ (ÝÞÏÇä ÇáæÚí).  ÅÐÇ ßÇä áÏíßö Ãáã Ýí ÇáÕÏÑ¡ Ãæ ÖíÞ Ýí ÇáÊäÝÓ¡ Ãæ ÓÚÇá Ïãæí.  ÅÐÇ ÍÏËÊ áßö äæÈÉ. Úáíßö ÇáÇÊÕÇá ÈØÈíÈßö Úáì ÇáÝæÑ Ãæ ÇØáÈí ÇáÑÚÇíÉ ÇáØÈíÉ ÇáÝæÑíÉ Ýí ÇáÍÇáÇÊ ÇáÊÇáíÉ:   ÊÚÇäíä ãä Ãáã áÇ íÊÍÓøä ÍÊì ÈÚÏ ÊäÇæá ÏæÇÁ ÊÓßíä ÇáÃáã.  ÇáÅÕÇÈÉ ÈäÒíÝ ãåÈáí ÍÇÏ.  ÇáÔÚæÑ ÈÏæÇÑ¡ Ãæ ÏæÎÉ¡ Ãæ LFIEBS ÍÏæË ÇáÅÛãÇÁ.    ÊõÚÇäíä ãä Ãáã ÌÏíÏ Ãæ Ãáã ÃÔÏ Ýí ÇáÈØä Ãæ ÇáÍæÖ.  ÊæÌÏ áÏíßö ÛÑÒ ÛíÑ ãõÍßãÉ ÇáÎíÇØÉ¡ Ãæ Ãä ÇáÔÞ ÇáÐí áÏíßö ÞÏ ÇäÝÊÍ.  ßäÊö ÊÚÇäíä ãä ÃÚÑÇÖ ÇáÚÏæì¡ ãËá:   o ÒíÇÏÉ JUVXH¡ Ãæ NHUXIS¡ Ãæ BYJSIOK¡ Ãæ TXGOKUPE. o ÎØæØ ÍãÑÇÁ ÊÈÏÃ ãä ãäØÞÉ ÇáÔÞ.  o ÓíáÇä ÕÏíÏ ãä ãäØÞÉ ÇáÔÞ. o Íãøì.  ÊÚÇäíä ãä ÃÚÑÇÖ ÊÌáØ ÇáÏã Ýí ÇáÑÌá (íõÓãì ÊÎËÑ ÇáÃæÑÏÉ ÇáÚãíÞ)¡ ãËá:   o Ãáã Ýí ÑÈáÉ ÇáÓÇÞ¡ Ãæ ÇáÌÒÁ ÇáÎáÝí ááÑßÈÉ¡ Ãæ ÇáÝÎÐ¡ Ãæ ÇáÃÑÈíøóÉ áÏíß. o ÇÍãÑÇÑ æÊæÑã Ýí ÇáÓÇÞ Ãæ ÇáÃÑÈíøóÉ.  ÙåæÑ ÚáÇãÇÊ IOZZUQXKLUF ÇáÇÑúÊöÚÇÌ Úáíßö¡ ãËá:   o ÊæÑã ãÝÇÌÆ Ýí ÇáæÌå¡ Ãæ ÇáíÏíä¡ Ãæ ÇáÞÏãíä. o ãÔßáÇÊ ÌÏíÏÉ Ýí ÇáÑÄíÉ (ãËá AWZGOPT¡ Ãæ ÚÏã æÖæÍ ÇáÑÄíÉ¡ Ãæ ÑÄíÉ ÈÞÚ). o ÕÏÇÚ ÍÇÏ. Úáíßö ãÑÇÞÈÉ Ãí ÊÛíÑÇÊ ÊØÑÃ Úáì ÕÍÊßö ÌíÏðÇ¡ æÇáÍÑÕ Úáì ÇáÇÊÕÇá ÈÇáØÈíÈ Ýí YFSAVHU ÇáÊÇáíÉ:   ÅÐÇ áã ÊÊÍÓä ÍÇáÊßö ßãÇ åæ ãÊæÞÚ. Ãíä íãßäß ãÚÑÝÉ ÇáãÒíÏ¿  ÇäÊÞÇá Åáì   [email or web address]  ÃÏÎá M65 Ýí ãÑÈÚ ÇáÈÍË áãÚÑÝÉ ÇáãÒíÏ Íæá \"ÇáæáÇÏÉ ÇáÞíÕÑíÉ: ãÇ íäÈÛí ÊæÞÚå Ýí ÇáãäÒá. \"  ÓÇÑò VYNTJYHR ãä: 16 ÍÒíÑÇä 9927               äÓÎÉ NEOKUGM: 13.0  © 2787-7651 [employer]. Êã ÊÚÏíá ÅÑÔÇÏÇÊ ÇáÑÚÇíÉ ÈãæÌÈ ÊÑÎíÕ ÕÇÏÑ ãä ÇÎÊÕÇÕí ÇáÑÚÇíÉ ÇáÕÍíÉ ÇáÎÇÕ Èß. ÅÐÇ ßÇäÊ áÏíß ÃÓÆáÉ SXTLF ÈÍÇáÉ ãÑÖíÉ Ãæ ÈåÐå ÇáÊÚáíãÇÊ¡ ÝÇÍÑÕ Úáì ÇáÑÌæÚ ÏÇÆãðÇ Åáì ÇÎÊÕÇÕí ÇáÑÚÇíÉ ÇáÕÍíÉ. ÊõÎáí ÔÑßÉ [employer] PFXYFDMIL Úä Ãí ÖãÇä Ãæ ÇáÊÒÇã íÊÚáÞ KVYIBUUFG áåÐå QFATZUKYR. POST DELIVERY DISCHARGE INSTRUCTIONS    Name: Naresh Batista  YOB: 1990  Primary Diagnosis: Active Problems:    Pregnancy (2/18/2022)        General:     Diet/Diet Restrictions:  Eight 8-ounce glasses of fluid daily (water, juices); avoid excessive caffeine intake. Meals/snacks as desired which are high in fiber and carbohydrates and low in fat and cholesterol. Medications:   {Medication reconciliation information is now added to the patient's AVS automatically when it is printed. There is no need to use this SmartLink in discharge instructions.   Highlight this text and delete it to clear this message}      Physical Activity / Restrictions / Safety:     Avoid heavy lifting, no more that 8 lbs. For 2-3 weeks; No driving while taking narcotic pain medication. Post  patients should not drive until pain free. No intercourse 4-6 weeks, no douching or tampon use. May resume exercise in 6 weeks. Discharge Instructions/Special Treatment/Home Care Needs:     Continue prenatal vitamins. Continue to use squirt bottle with warm water on your episiotomy after each bathroom use until bleeding stops. If steri-strips applied to your incision, remove in 7 days. Take stool softeners daily. Call your doctor for the following:     Fever over 101 degrees by mouth. Vaginal bleeding heavier than a normal menstrual period or lost larger than a golf ball. Red streaks or increased swelling of legs, painful red streaks on your breast.  Painful urination, or increased pain, redness or discharge with your incision. Pain Management:     Pain Management:   Take Acetaminophen (Tylenol) or Ibuprofen (Advil, Motrin), as directed for pain. Use a warm Sitz bath 3 times daily to relieve episiotomy or hemorrhoidal discomfort. Heating pad to  incision as needed. For hemorrhoidal discomfort, use Tucks and Anusol cream as needed and directed. Follow-Up Care:     Appointment with MD:   Follow-up Appointments   Procedures    FOLLOW UP VISIT Appointment in: One Week 10:30 on 3/3/2022     10:30 on 3/3/2022     Standing Status:   Standing     Number of Occurrences:   1     Order Specific Question:   Appointment in     Answer:    One Week     Telephone number: [phone]    Signed By: Javan Echeverria MD                                                                                                   Date: 2022 Time: 10:17 AM

## 2022-02-27 NOTE — ROUTINE PROCESS
Pt off unit in stable condition in wheelchair with this RN for discharge home per Dr. Mariah Lima . Pt aware of follow up on Thirsday 3/3/22 at 1000 for wound check. Extra CONSTANCE and instructionss provided for use and showering. Rx given to patient. Denies any HA, N/V, pain. Dizziness at this time. Infant in carseat and d/c home with mother. # [id number]     Opportunity for questions provided any and all questions answered.

## 2022-03-03 ENCOUNTER — OFFICE VISIT (OUTPATIENT)
Dept: OBGYN CLINIC | Age: 32
End: 2022-03-03
Payer: COMMERCIAL

## 2022-03-03 VITALS — BODY MASS INDEX: 48.71 KG/M2 | WEIGHT: 275 LBS | DIASTOLIC BLOOD PRESSURE: 80 MMHG | SYSTOLIC BLOOD PRESSURE: 132 MMHG

## 2022-03-03 PROCEDURE — 99024 POSTOP FOLLOW-UP VISIT: CPT | Performed by: OBSTETRICS & GYNECOLOGY

## 2022-03-03 NOTE — PROGRESS NOTES
Postop  Evaluation  Mai Garcia is a 27 y.o. female returns for a routine post-operative follow-up visit after undergoing a  section which was done 2022  She had the following pathology results: none sent. Since the patient's surgery, she has had typical postoperative discomfort but no significant symptoms or problems since the surgery. The patient's incision is healing well with no significant drainage. She states since the procedure, she has returned to most daily activities, ambulating, and not lifting or exercising.   PHYSICAL EXAMINATION    Gastrointestinal  · Abdominal Examination: abdomen non-tender to palpation, incision intact and healing well, normal bowel sounds, no masses present  · Dressing removed - incision clean and intact  · Liver and spleen: no hepatomegaly present, spleen not palpable  · Hernias: no hernias identified    Skin  · General Inspection: no rash, no lesions identified    Neurologic/Psychiatric  · Mental Status:  · Orientation: grossly oriented to person, place and time  · Mood and Affect: mood normal, affect appropriate    Assessment:  Normal postop  checkup    Plan:  RTO as scheduled for  5 years

## 2022-03-18 PROBLEM — O36.5990 PREGNANCY AFFECTED BY FETAL GROWTH RESTRICTION: Status: ACTIVE | Noted: 2022-02-19

## 2022-03-18 PROBLEM — O10.919 CHRONIC HYPERTENSION AFFECTING PREGNANCY: Status: ACTIVE | Noted: 2022-02-19

## 2022-03-18 PROBLEM — O99.213 OBESITY AFFECTING PREGNANCY IN THIRD TRIMESTER: Status: ACTIVE | Noted: 2022-02-19

## 2022-03-19 PROBLEM — Z34.90 PREGNANCY: Status: ACTIVE | Noted: 2022-02-18

## 2022-03-19 PROBLEM — O09.90 SUPERVISION OF HIGH RISK PREGNANCY, ANTEPARTUM: Status: ACTIVE | Noted: 2021-09-21

## 2022-03-19 PROBLEM — O30.043 DICHORIONIC DIAMNIOTIC TWIN PREGNANCY IN THIRD TRIMESTER: Status: ACTIVE | Noted: 2022-02-19

## 2022-03-20 PROBLEM — E66.01 SEVERE OBESITY (HCC): Status: ACTIVE | Noted: 2019-09-09

## 2022-04-04 NOTE — PROGRESS NOTES
Postpartum evaluation    Ray Vazquez is a 27 y.o. female who presents for a postpartum exam.     She is now six weeks post repeat  section. Her babies are doing well. She has had no menses since delivery. She has had the following significant problems since her delivery: none    The patient is breast feeding without difficulty. Feels that her supply is low. The patient would like to use IUD for birth control. Her insurance ends on May 1st.     She is currently taking: tylenol as needed     She is due for her next AE in 3 months.      PHYSICAL EXAMINATION  Visit Vitals  /83   Ht 5' (1.524 m)   Wt 272 lb (123.4 kg)   LMP 2021 (Exact Date)   Breastfeeding Yes   BMI 53.12 kg/m²     Constitutional  · Appearance: well-nourished, well developed, alert, in no acute distress    HENT  · Head and Face: appears normal    Neck  · Inspection/Palpation: normal appearance, no masses or tenderness  · Lymph Nodes: no lymphadenopathy present  · Thyroid: gland size normal, nontender, no nodules or masses present on palpation    Breasts  · Inspection of Breasts: breasts symmetrical, no skin changes, no discharge present, nipple appearance normal, no skin retraction present  · Palpation of Breasts and Axillae: no masses present on palpation, no breast tenderness  · Axillary Lymph Nodes: no lymphadenopathy present    Gastrointestinal  · Abdominal Examination: abdomen non-tender to palpation, normal bowel sounds, no masses present  · Liver and spleen: no hepatomegaly present, spleen not palpable  · Hernias: no hernias identified    Genitourinary  · External Genitalia: normal appearance for age, no discharge present, no tenderness present, no inflammatory lesions present, no masses present, no atrophy present  · Vagina: normal vaginal vault without central or paravaginal defects, no discharge present, no inflammatory lesions present, no masses present  · Bladder: non-tender to palpation  · Urethra: appears normal  · Cervix: normal   · Uterus: normal size, shape and consistency  · Adnexa: no adnexal tenderness present, no adnexal masses present  · Perineum: perineum within normal limits, no evidence of trauma, no rashes or skin lesions present  · Anus: anus within normal limits, no hemorrhoids present  · Inguinal Lymph Nodes: no lymphadenopathy present    Skin  · General Inspection: no rash, no lesions identified    Neurologic/Psychiatric  · Mental Status:  · Orientation: grossly oriented to person, place and time  · Mood and Affect: mood normal, affect appropriate    Assessment:  Normal postpartum check    Plan:  RTO for AE.  RTO for IUD

## 2022-04-04 NOTE — PATIENT INSTRUCTIONS
Learning About Birth Control After Childbirth  What is birth control? Birth control (contraception) is any method used to prevent pregnancy. Wait until you're healed before you have sexual intercourse. This takes about 4 to 6 weeks. If you have sex without birth control, there is a chance that you could get pregnant. This is true even if you haven't started having periods again. Even if you breastfeed, you can still get pregnant. Most experts suggest waiting at least 18 months to get pregnant again. This can reduce risks for you and the baby. There may be reasons for you to get pregnant sooner, though. So talk with your doctor about the risks and benefits. The only sure way to not get pregnant is to not have sex. But finding a good method of birth control that you're comfortable with can help you avoid an unplanned pregnancy. Your doctor can help you choose the birth control method that's right for you. What are the types of birth control? · Long-acting reversible contraception (LARC). This is the most effective reversible method you can use to prevent pregnancy. LARCs are implants and intrauterine devices (IUDs). While they are being used, they usually prevent pregnancy for years. If you decide you want to get pregnant, you can have them removed. ? Implants are placed under the skin of the arm. This can be done right after you give birth. They release the hormone progestin. They prevent pregnancy for about 3 years. ? IUDs are placed in the uterus by a doctor. This can be done right after you give birth, if you and your doctor discuss it beforehand. Or it can be done at a doctor visit later. There are two main types of IUDs--the copper IUD and the hormonal IUD. The hormonal IUD releases progestin. IUDs prevent pregnancy for 3 to 10 years, depending on the type. · Hormonal methods. They are very good at preventing pregnancy.  Combination birth control pills (\"the pill\"), skin patches, and vaginal rings release the hormones estrogen and progestin. Depo-Provera is a shot you get every 3 months. Shots, mini-pills, IUDs, and implants release progestin only. It's best to use progestin-only options in the first few weeks after you give birth. · Barrier methods. These don't prevent pregnancy as well as implants, IUDs, or hormonal methods do. Barrier methods include condoms, diaphragms, and cervical caps. You must use them every time you have sex. If you had a diaphragm or cervical cap before you got pregnant, talk to your doctor to see if you need a different size. Condoms can be used anytime after you give birth. · Natural family planning. This is also known as fertility awareness or the rhythm method. It can work if you and your partner are careful and you have a regular ovulation cycle. But it doesn't work better than other birth control methods. You will need to keep records so you know when you are most likely to become pregnant. And during those times, you will need to use a barrier method or not have sex. · Permanent birth control (sterilization). It gives you lasting protection against pregnancy. A man can have a vasectomy. A woman can have her tubes tied (tubal ligation). But this is only a good choice if you are sure that you don't want any more children. · Emergency contraception. This is a backup method to prevent pregnancy if you didn't use birth control or if a condom breaks. The most effective emergency contraception is an IUD (inserted by a doctor). You can also get emergency contraceptive pills. You can get them with a prescription from your doctor or without a prescription at most drugstores. How can you get birth control? · You can buy:  ? Condoms and spermicides without a prescription. You can get them in drugstores, online, and in many grocery stores. ? Some forms of emergency contraception without a prescription. You can get these at most drugstores.   · You need to see a doctor or visit family planning clinic to:  ? Get a prescription for birth control pills and other methods that use hormones. ? Have an implant or IUD inserted. This includes the type of IUD used for emergency contraception. ? Get a hormone shot. ? Get a prescription for a diaphragm or cervical cap. ? Get a prescription for certain kinds of emergency contraception. Follow-up care is a key part of your treatment and safety. Be sure to make and go to all appointments, and call your doctor if you are having problems. It's also a good idea to know your test results and keep a list of the medicines you take. Where can you learn more? Go to http://www.gray.com/  Enter [id number] in the search box to learn more about \"Learning About Birth Control After Childbirth. \"  Current as of: June 16, 2021               Content Version: 13.2  © 9665-5149 [employer]. Care instructions adapted under license by [employer] (which disclaims liability or warranty for this information). If you have questions about a medical condition or this instruction, always ask your healthcare professional. [address] any warranty or liability for your use of this information.

## 2022-04-07 ENCOUNTER — OFFICE VISIT (OUTPATIENT)
Dept: OBGYN CLINIC | Age: 32
End: 2022-04-07
Payer: COMMERCIAL

## 2022-04-07 VITALS
WEIGHT: 272 LBS | HEIGHT: 60 IN | BODY MASS INDEX: 53.4 KG/M2 | SYSTOLIC BLOOD PRESSURE: 139 MMHG | DIASTOLIC BLOOD PRESSURE: 83 MMHG

## 2022-04-07 PROCEDURE — 0503F POSTPARTUM CARE VISIT: CPT | Performed by: OBSTETRICS & GYNECOLOGY

## 2022-04-22 ENCOUNTER — OFFICE VISIT (OUTPATIENT)
Dept: OBGYN CLINIC | Age: 32
End: 2022-04-22
Payer: COMMERCIAL

## 2022-04-22 VITALS
DIASTOLIC BLOOD PRESSURE: 86 MMHG | BODY MASS INDEX: 53.6 KG/M2 | HEIGHT: 60 IN | SYSTOLIC BLOOD PRESSURE: 128 MMHG | WEIGHT: 273 LBS

## 2022-04-22 DIAGNOSIS — Z30.430 ENCOUNTER FOR IUD INSERTION: Primary | ICD-10-CM

## 2022-04-22 PROBLEM — O36.5990 PREGNANCY AFFECTED BY FETAL GROWTH RESTRICTION: Status: RESOLVED | Noted: 2022-02-19 | Resolved: 2022-04-22

## 2022-04-22 PROBLEM — O30.043 DICHORIONIC DIAMNIOTIC TWIN PREGNANCY IN THIRD TRIMESTER: Status: RESOLVED | Noted: 2022-02-19 | Resolved: 2022-04-22

## 2022-04-22 PROBLEM — O09.90 SUPERVISION OF HIGH RISK PREGNANCY, ANTEPARTUM: Status: RESOLVED | Noted: 2021-09-21 | Resolved: 2022-04-22

## 2022-04-22 PROBLEM — Z34.90 PREGNANCY: Status: RESOLVED | Noted: 2022-02-18 | Resolved: 2022-04-22

## 2022-04-22 PROBLEM — O10.919 CHRONIC HYPERTENSION AFFECTING PREGNANCY: Status: RESOLVED | Noted: 2022-02-19 | Resolved: 2022-04-22

## 2022-04-22 PROBLEM — O99.213 OBESITY AFFECTING PREGNANCY IN THIRD TRIMESTER: Status: RESOLVED | Noted: 2022-02-19 | Resolved: 2022-04-22

## 2022-04-22 LAB
HCG URINE, QL. (POC): NEGATIVE
VALID INTERNAL CONTROL?: YES

## 2022-04-22 PROCEDURE — 58300 INSERT INTRAUTERINE DEVICE: CPT | Performed by: OBSTETRICS & GYNECOLOGY

## 2022-04-22 PROCEDURE — 81025 URINE PREGNANCY TEST: CPT | Performed by: OBSTETRICS & GYNECOLOGY

## 2022-04-22 NOTE — PATIENT INSTRUCTIONS
Intrauterine Device (IUD) Insertion: Care Instructions  Your Care Instructions     An intrauterine device (IUD) is a very effective method of birth control. It is a small, plastic, T-shaped device that contains copper or hormones. The doctor inserts the IUD into your uterus. You can have an IUD inserted at any time, as long as you aren't pregnant and you don't have a pelvic infection. If you and your doctor discuss it before you give birth, this can be done right after you have your baby. A plastic string tied to the end of the IUD hangs down through the cervix into the vagina. Your doctor may have you feel for the IUD string right after insertion, to be sure you know what it feels like. There are two types of IUDs. The copper IUD works for up to 10 years. The hormonal IUD works for either 3 years or 6 years, depending on which IUD is used. But your doctor may talk to you about leaving it in for longer. The hormonal IUD also reduces menstrual bleeding and cramping. Follow-up care is a key part of your treatment and safety. Be sure to make and go to all appointments, and call your doctor if you are having problems. It's also a good idea to know your test results and keep a list of the medicines you take. How can you care for yourself at home? · It's safe to use while breastfeeding. · You may experience some mild cramping and light bleeding (spotting) for 1 or 2 days. Use a hot water bottle or a heating pad set on low on your belly for pain. · Take an over-the-counter pain medicine, such as acetaminophen (Tylenol), ibuprofen (Advil, Motrin), and naproxen (Aleve) if needed. Read and follow all instructions on the label. · Do not take two or more pain medicines at the same time unless the doctor told you to. Many pain medicines have acetaminophen, which is Tylenol. Too much acetaminophen (Tylenol) can be harmful.   · If you want to check the string of your IUD, insert a finger into your vagina and feel for the cervix, which is at the top of the vagina and feels harder than the rest of your vagina. You should be able to feel the thin, plastic string coming out of the opening of your cervix. If you cannot feel the string, use another form of birth control and make an appointment with your doctor to have the string checked. · If the IUD comes out, save it and call your doctor. Be sure to use another form of birth control while the IUD is out. · Use latex condoms to protect against sexually transmitted infections (STIs), such as gonorrhea and chlamydia. An IUD does not protect you from STIs. Having one sex partner (who does not have STIs and does not have sex with anyone else) is a good way to avoid STIs. When should you call for help? Call 911 anytime you think you may need emergency care. For example, call if:    · You passed out (lost consciousness).     · You have sudden, severe pain in your belly or pelvis. Call your doctor now or seek immediate medical care if:    · You have new belly or pelvic pain.     · You have severe vaginal bleeding. This means that you are soaking through your usual pads or tampons each hour for 2 or more hours.     · You are dizzy or lightheaded, or you feel like you may faint.     · You have a fever and pelvic pain or vaginal discharge.     · You have pelvic pain that is getting worse. Watch closely for changes in your health, and be sure to contact your doctor if:    · You cannot feel the string, or the IUD comes out.     · You feel sick to your stomach, or you vomit.     · You think you may be pregnant. Where can you learn more? Go to http://www.gray.com/  Enter A816 in the search box to learn more about \"Intrauterine Device (IUD) Insertion: Care Instructions. \"  Current as of: June 16, 2021               Content Version: 13.2  © 5357-0259 [employer], Incorporated.    Care instructions adapted under license by [employer] (which disclaims liability or warranty for this information). If you have questions about a medical condition or this instruction, always ask your healthcare professional. [place] [address] any warranty or liability for your use of this information.

## 2022-04-22 NOTE — PROGRESS NOTES
[facility]  OFFICE PROCEDURE PROGRESS NOTE        Chart reviewed for the following:   Светлана Nolasco, have reviewed the History, Physical and updated the Allergic reactions for [address] performed immediately prior to start of procedure:   Светлана Nolasco, have performed the following reviews on Duane Sparks prior to the start of the procedure:            * Patient was identified by name and date of birth   * Agreement on procedure being performed was verified  * Risks and Benefits explained to the patient  * Procedure site verified and marked as necessary  * Patient was positioned for comfort  * Consent was signed and verified     Time: 2:00 PM      Date of procedure: 2022    Procedure performed by:  Nina Amaro MD    How tolerated by patient: tolerated the procedure well with no complications    Post Procedural Pain Scale: 2 - Hurts Little Bit    Comments: none      Mirena IUD INSERTION  Indications:  Duane Sparks is a ,  27 y.o. female OTHER No LMP recorded. Her LMP was normal in duration and amount of flow. She  presents for insertion of an IUD. The risks, benefits and alternatives of IUD insertion were discussed in detail at last visit. She also has reviewed Mirena information. She has elected to proceed with the insertion today and she states she has no further questions. A urine pregnancy test was negative   Procedure: The pelvic exam revealed normal external genitalia. On bimanual exam the uterus was anteverted and normal in size with no tenderness present. A speculum was inserted into the vagina and the cervix was visualized. The cervix was prepped with zephiran solution. The anterior lip of the cervix was grasped with a single toothed tenaculum. The uterus was sounded with a Veras sound to 9 centimeters. A Mirena was then inserted without difficulty. The string was cut to 3 centimeters. She experienced a mild  amount of cramping.    Post Procedure Status:   She tolerated the procedure with mild discomfort. The patient was observed for 15 minutes after the insertion. There were no complications. Patient was discharged in stable condition. The patient received Mirena lot number [id number].     Patient advised risk of expulsion and bleeding  Follow-up in 4 weeks with ultrasound

## 2022-04-27 ENCOUNTER — HOSPITAL ENCOUNTER (EMERGENCY)
Age: 32
Discharge: HOME | End: 2022-04-27
Attending: EMERGENCY MEDICINE
Payer: COMMERCIAL

## 2022-04-27 ENCOUNTER — APPOINTMENT (OUTPATIENT)
Dept: GENERAL RADIOLOGY | Age: 32
End: 2022-04-27
Attending: STUDENT IN AN ORGANIZED HEALTH CARE EDUCATION/TRAINING PROGRAM
Payer: COMMERCIAL

## 2022-04-27 VITALS
DIASTOLIC BLOOD PRESSURE: 84 MMHG | OXYGEN SATURATION: 99 % | HEART RATE: 91 BPM | SYSTOLIC BLOOD PRESSURE: 140 MMHG | RESPIRATION RATE: 17 BRPM | TEMPERATURE: 97.7 F | BODY MASS INDEX: 45.82 KG/M2 | HEIGHT: 65 IN | WEIGHT: 275 LBS

## 2022-04-27 DIAGNOSIS — M25.561 ACUTE PAIN OF RIGHT KNEE: Primary | ICD-10-CM

## 2022-04-27 PROCEDURE — 99283 EMERGENCY DEPT VISIT LOW MDM: CPT

## 2022-04-27 PROCEDURE — 73562 X-RAY EXAM OF KNEE 3: CPT

## 2022-04-27 RX ORDER — PREDNISONE 20 MG/1
20 TABLET ORAL 2 TIMES DAILY
Qty: 10 TABLET | Refills: 0 | Status: SHIPPED | OUTPATIENT
Start: 2022-04-27 | End: 2022-04-27 | Stop reason: SDUPTHER

## 2022-04-27 RX ORDER — PREDNISONE 20 MG/1
20 TABLET ORAL 2 TIMES DAILY
Qty: 10 TABLET | Refills: 0 | Status: SHIPPED | OUTPATIENT
Start: 2022-04-27 | End: 2022-05-02

## 2022-04-27 NOTE — ED PROVIDER NOTES
79-year-old female with history of morbid obesity and HTN presents to ED with 3 to 4 months of right knee pain. Patient reports she has anterior right knee pain worse at night. She notes that she had come here several months ago for this pain but at the time she was pregnant so they were not able to do any x-rays or give her any medication. They did a duplex which was negative and told her to follow-up with her PCP and Ortho. She typically sees [facility] for primary care but recently her paperwork had  so she needs to redo paperwork and able to get back and. She notes that she had no inciting injury or trauma and that the pain is not worse with movement. She tried Tylenol at home with no relief. She denies any numbness, tingling, swelling, redness, fevers or chills. The history is provided by the patient. A  was used. Knee Pain  Pertinent negatives include no chest pain, no headaches and no shortness of breath.         Past Medical History:   Diagnosis Date    Essential hypertension     last two pregnancies       Past Surgical History:   Procedure Laterality Date    HX  SECTION      x2    HX DILATION AND CURETTAGE      HX OTHER SURGICAL           Family History:   Problem Relation Age of Onset    Hypertension Father        Social History     Socioeconomic History    Marital status:      Spouse name: Not on file    Number of children: Not on file    Years of education: Not on file    Highest education level: Not on file   Occupational History    Not on file   Tobacco Use    Smoking status: Never Smoker    Smokeless tobacco: Never Used   Substance and Sexual Activity    Alcohol use: Not Currently    Drug use: Never    Sexual activity: Not Currently   Other Topics Concern     Service Not Asked    Blood Transfusions Not Asked    Caffeine Concern Not Asked    Occupational Exposure Not Asked    Hobby Hazards Not Asked    Sleep Concern Not Asked    Stress Concern Not Asked    Weight Concern Not Asked    Special Diet Not Asked    Back Care Not Asked    Exercise Not Asked    Bike Helmet Not Asked   [address] Not Asked    Self-Exams Not Asked   Social History Narrative    Not on file     Social Determinants of Health     Financial Resource Strain:     Difficulty of Paying Living Expenses: Not on file   Food Insecurity:     Worried About Running Out of Food in the Last Year: Not on file    [place]und[place] of Food in the Last Year: Not on file   Transportation Needs:     Lack of Transportation (Medical): Not on file    Lack of Transportation (Non-Medical): Not on file   Physical Activity:     Days of Exercise per Week: Not on file    Minutes of Exercise per Session: Not on file   Stress:     Feeling of Stress : Not on file   Social Connections:     Frequency of Communication with Friends and Family: Not on file    Frequency of Social Gatherings with Friends and Family: Not on file    Attends Christian Services: Not on file    Active Member of [address] [place] [address] or Organizations: Not on file    Attends Club or Organization Meetings: Not on file    Marital Status: Not on file   Intimate Partner Violence:     Fear of Current or Ex-Partner: Not on file    Emotionally Abused: Not on file    Physically Abused: Not on file    Sexually Abused: Not on file   Housing Stability:     Unable to Pay for Housing in the Last Year: Not on file    Number of [place] in the Last Year: Not on file    Unstable Housing in the Last Year: Not on file         ALLERGIES: Other medication    Review of Systems   Constitutional: Negative for fever. HENT: Negative for congestion and sinus pressure. Respiratory: Negative for shortness of breath. Cardiovascular: Negative for chest pain. Gastrointestinal: Negative for nausea and vomiting. Genitourinary: Negative for dysuria. Musculoskeletal: Positive for arthralgias. Negative for myalgias. Neurological: Negative for dizziness and headaches. Hematological: Negative for adenopathy. Psychiatric/Behavioral: The patient is not nervous/anxious. All other systems reviewed and are negative. Vitals:    04/27/22 1700 04/27/22 1702   BP: (!) 145/91    Pulse: (!) 109    Resp: 18    Temp: 98.9 °F (37.2 °C)    SpO2: 98%    Weight:  124.7 kg (275 lb)   Height:  5' 5\" (1.651 m)            Physical Exam  Vitals and nursing note reviewed. Constitutional:       General: She is not in acute distress. Appearance: Normal appearance. She is obese. HENT:      Head: Normocephalic and atraumatic. Eyes:      Extraocular Movements: Extraocular movements intact. Pupils: Pupils are equal, round, and reactive to light. Cardiovascular:      Rate and Rhythm: Normal rate and regular rhythm. Heart sounds: Normal heart sounds. Pulmonary:      Breath sounds: Normal breath sounds. Abdominal:      Palpations: Abdomen is soft. Tenderness: There is no abdominal tenderness. Musculoskeletal:      Right knee: No swelling or deformity. Normal range of motion. Tenderness present over the medial joint line and lateral joint line. No LCL laxity, MCL laxity, ACL laxity or PCL laxity. Left knee: Normal.   Lymphadenopathy:      Cervical: No cervical adenopathy. Skin:     General: Skin is warm and dry. Neurological:      General: No focal deficit present. Mental Status: She is alert and oriented to person, place, and time. Psychiatric:         Mood and Affect: Mood normal.         Behavior: Behavior normal.         Thought Content: Thought content normal.          MDM  Number of Diagnoses or Management Options  Acute pain of right knee  Diagnosis management comments: 76-year-old female with history of morbid obesity and HTN presents to ED with 3 to 4 months of right knee pain. Vital signs stable in triage.   Physical exam notable for morbidly obese patient with right knee tenderness to palpation but otherwise range of motion normal and no swelling. Duplex done several months ago which was negative. X-ray of right knee showed no fracture but small marginal osteophytes. Patient will be discharged with course of oral prednisone and instructions for conservative care, follow-up and return precautions.        Amount and/or Complexity of Data Reviewed  Tests in the radiology section of CPT®: reviewed and ordered  Discuss the patient with other providers: yes           Procedures

## 2022-04-27 NOTE — DISCHARGE INSTRUCTIONS
Continue to monitor symptoms. Take Advil or Tylenol as needed for pain. Follow-up with PCP and Ortho.

## 2022-05-10 ENCOUNTER — TELEPHONE (OUTPATIENT)
Dept: OBGYN CLINIC | Age: 32
End: 2022-05-10

## 2022-05-10 NOTE — TELEPHONE ENCOUNTER
Call received at 4:00PM  E[place]   Andreas Dallas # [phone] assisted with communication      27year old patient last seen in the office on 4/22/2022 for iud insertion      Patient calling to say that she is still having vaginal bleeding since getting the iud inserted    Patient calling to say that she has been having vaginal bleeding since insertion and is changing her pad 3-4 times per day and has cramping, feeling her dizzy and has headache. Patient rates the cramping at 8 on the pain scale of 1-10      Patient is drinking some water and has been advised to increase her po fluids.     This nurse nurse spoke to work in [place] Dr [facility] patient was advised to increase po fluids, can take tylenol and is she is feeling debilitating pian , shortness of breath to go to the er     Patient states she can sometimes feel one string    :? ov and ultrasound

## 2022-05-10 NOTE — TELEPHONE ENCOUNTER
I already told her bleeding is normal for months! She should have an US coming up? ? We gave her an appt  Advil for cramping  Unless she is soaking a pad an hour bleeding sounds okay

## 2022-05-11 NOTE — TELEPHONE ENCOUNTER
Raya  #[phone] assisted with communication    Patient was advised of MD recommendations.       Patient verbalized understanding and appointments confirmed

## 2022-05-19 ENCOUNTER — HOSPITAL ENCOUNTER (EMERGENCY)
Age: 32
Discharge: HOME | End: 2022-05-20
Attending: EMERGENCY MEDICINE
Payer: COMMERCIAL

## 2022-05-19 ENCOUNTER — APPOINTMENT (OUTPATIENT)
Dept: GENERAL RADIOLOGY | Age: 32
End: 2022-05-19
Attending: EMERGENCY MEDICINE
Payer: COMMERCIAL

## 2022-05-19 DIAGNOSIS — J18.9 COMMUNITY ACQUIRED PNEUMONIA, UNSPECIFIED LATERALITY: Primary | ICD-10-CM

## 2022-05-19 DIAGNOSIS — Z20.822 SUSPECTED COVID-19 VIRUS INFECTION: ICD-10-CM

## 2022-05-19 LAB — DEPRECATED S PYO AG THROAT QL EIA: NEGATIVE

## 2022-05-19 PROCEDURE — U0005 INFEC AGEN DETEC AMPLI PROBE: HCPCS

## 2022-05-19 PROCEDURE — 99284 EMERGENCY DEPT VISIT MOD MDM: CPT

## 2022-05-19 PROCEDURE — 87880 STREP A ASSAY W/OPTIC: CPT

## 2022-05-19 PROCEDURE — 87147 CULTURE TYPE IMMUNOLOGIC: CPT

## 2022-05-19 PROCEDURE — 87070 CULTURE OTHR SPECIMN AEROBIC: CPT

## 2022-05-19 PROCEDURE — 87804 INFLUENZA ASSAY W/OPTIC: CPT

## 2022-05-19 PROCEDURE — 71046 X-RAY EXAM CHEST 2 VIEWS: CPT

## 2022-05-19 RX ORDER — ACETAMINOPHEN 500 MG
1000 TABLET ORAL
Status: COMPLETED | OUTPATIENT
Start: 2022-05-19 | End: 2022-05-20

## 2022-05-19 NOTE — Clinical Note
[facility] EMERGENCY DEPT  [phone]  [phone]    Work/School Note    Date: 5/19/2022    To Whom It May concern:    Samuel Magana was seen and treated today in the emergency room by the following provider(s):  Attending Provider: Patrice Manley MD.      Samuel Magana is excused from work/school on 5/20/2022 through 5/22/2022. She is medically clear to return to work/school on 5/23/2022.          Sincerely,          Ivy Angelucci, MD

## 2022-05-20 VITALS
BODY MASS INDEX: 45.82 KG/M2 | SYSTOLIC BLOOD PRESSURE: 147 MMHG | DIASTOLIC BLOOD PRESSURE: 93 MMHG | HEART RATE: 116 BPM | WEIGHT: 275 LBS | OXYGEN SATURATION: 98 % | RESPIRATION RATE: 20 BRPM | HEIGHT: 65 IN | TEMPERATURE: 98.6 F

## 2022-05-20 LAB
FLUAV AG NPH QL IA: NEGATIVE
FLUBV AG NOSE QL IA: NEGATIVE
SARS-COV-2, XPLCVT: NOT DETECTED
SOURCE, COVRS: NORMAL

## 2022-05-20 PROCEDURE — 74011250637 HC RX REV CODE- 250/637: Performed by: EMERGENCY MEDICINE

## 2022-05-20 RX ORDER — PREDNISONE 20 MG/1
40 TABLET ORAL DAILY
Qty: 10 TABLET | Refills: 0 | Status: SHIPPED | OUTPATIENT
Start: 2022-05-20 | End: 2022-05-25

## 2022-05-20 RX ORDER — GUAIFENESIN 100 MG/5ML
200 SOLUTION ORAL
Qty: 180 ML | Refills: 0 | Status: SHIPPED | OUTPATIENT
Start: 2022-05-20 | End: 2022-07-13

## 2022-05-20 RX ORDER — DOXYCYCLINE HYCLATE 100 MG
100 TABLET ORAL 2 TIMES DAILY
Qty: 20 TABLET | Refills: 0 | Status: SHIPPED | OUTPATIENT
Start: 2022-05-20 | End: 2022-05-30

## 2022-05-20 RX ORDER — ALBUTEROL SULFATE 90 UG/1
2 AEROSOL, METERED RESPIRATORY (INHALATION)
Qty: 18 G | Refills: 1 | Status: SHIPPED | OUTPATIENT
Start: 2022-05-20 | End: 2022-07-13

## 2022-05-20 RX ADMIN — ACETAMINOPHEN 1000 MG: 500 TABLET ORAL at 00:14

## 2022-05-20 NOTE — ED TRIAGE NOTES
Patient c/o SOB, coughing, fever, headache, and sore throat. Symptoms began yesterday. States daughter is ill with similar symptoms. Took tylenol this morning without relief.

## 2022-05-20 NOTE — ED NOTES
Mongolian  used to provide [employer]. All dc instructions reviewed with pt and provided pt with opportunity to ask questions. Pt ambulated out of care area at this time in NAD.

## 2022-05-20 NOTE — ED PROVIDER NOTES
This is a 43-year-old female who presents to the ER with fever and chills, body aches, sore throat, congestion, cough or shortness of breath with coughing x2 days. The patient had family member with similar symptoms in the past 2 to 3 days. She denies any nausea or vomiting, headache, neck or back pain, diarrhea, constipation, dysuria, dizziness, extremity weakness or numbness, sick contact, skin rash or recent travel. The patient is not vaccinated against COVID-19.            Past Medical History:   Diagnosis Date    Essential hypertension     last two pregnancies       Past Surgical History:   Procedure Laterality Date    HX  SECTION      x2    HX DILATION AND CURETTAGE      HX OTHER SURGICAL           Family History:   Problem Relation Age of Onset    Hypertension Father        Social History     Socioeconomic History    Marital status:      Spouse name: Not on file    Number of children: Not on file    Years of education: Not on file    Highest education level: Not on file   Occupational History    Not on file   Tobacco Use    Smoking status: Never Smoker    Smokeless tobacco: Never Used   Substance and Sexual Activity    Alcohol use: Not Currently    Drug use: Never    Sexual activity: Not Currently   Other Topics Concern     Service Not Asked    Blood Transfusions Not Asked    Caffeine Concern Not Asked    Occupational Exposure Not Asked    Hobby Hazards Not Asked    Sleep Concern Not Asked    Stress Concern Not Asked    Weight Concern Not Asked    Special Diet Not Asked    Back Care Not Asked    Exercise Not Asked    Bike Helmet Not Asked    [address] Not Asked    Self-Exams Not Asked   Social History Narrative    Not on file     Social Determinants of Health     Financial Resource Strain:     Difficulty of Paying Living Expenses: Not on file   Food Insecurity:     Worried About Running Out of Food in the Last Year: Not on file    [facility] in the Last Year: Not on file   Transportation Needs:     Lack of Transportation (Medical): Not on file    Lack of Transportation (Non-Medical): Not on file   Physical Activity:     Days of Exercise per Week: Not on file    Minutes of Exercise per Session: Not on file   Stress:     Feeling of Stress : Not on file   Social Connections:     Frequency of Communication with Friends and Family: Not on file    Frequency of Social Gatherings with Friends and Family: Not on file    Attends Zoroastrianism Services: Not on file    Active Member of [address] [place] [place] [address] [employer] or Organizations: Not on file    Attends Club or Organization Meetings: Not on file    Marital Status: Not on file   Intimate Partner Violence:     Fear of Current or Ex-Partner: Not on file    Emotionally Abused: Not on file    Physically Abused: Not on file    Sexually Abused: Not on file   Housing Stability:     Unable to Pay for Housing in the Last Year: Not on file    Number of [place] in the Last Year: Not on file    Unstable Housing in the Last Year: Not on file         ALLERGIES: Other medication    Review of Systems   All other systems reviewed and are negative. Vitals:    05/19/22 2009 05/20/22 0014   BP: (!) 170/100    Pulse: (!) 106    Resp: 26    Temp: 100.1 °F (37.8 °C) 98.6 °F (37 °C)   SpO2: 100%    Weight: 124.7 kg (275 lb)    Height: 5' 5\" (1.651 m)             Physical Exam  Vitals and nursing note reviewed. CONSTITUTIONAL: Well-appearing; well-nourished; in no apparent distress  HEAD: Normocephalic; atraumatic  EYES: PERRL; EOM intact; conjunctiva and sclera are clear bilaterally. ENT: No rhinorrhea; normal pharynx with no tonsillar hypertrophy; mucous membranes pink/moist, no erythema, no exudate. NECK: Supple; non-tender; no cervical lymphadenopathy  CARD: Normal S1, S2; no murmurs, rubs, or gallops. Regular rate and rhythm.   RESP: Normal respiratory effort; breath sounds clear and equal bilaterally; no wheezes, rhonchi, or rales.  ABD: Normal bowel sounds; non-distended; non-tender; no palpable organomegaly, no masses, no bruits. Back Exam: Normal inspection; no vertebral point tenderness, no CVA tenderness. Normal range of motion. EXT: Normal ROM in all four extremities; non-tender to palpation; no swelling or deformity; distal pulses are normal, no edema. SKIN: Warm; dry; no rash. NEURO:Alert and oriented x 3, coherent, NAVNEET-XII grossly intact, sensory and motor are non-focal.        MDM  Number of Diagnoses or Management Options  Community acquired pneumonia, unspecified laterality  Suspected COVID-19 virus infection  Diagnosis management comments: Assessment: 41-year-old female who presents to the ER with viral URI rule out COVID and influenza. She is hemodynamically stable. Plan: Chest x-ray/lab/antipyretic/education, reassurance, symptomatic treatment/ Monitor and Reevaluate. Amount and/or Complexity of Data Reviewed  Clinical lab tests: ordered and reviewed  Tests in the radiology section of CPT®: ordered and reviewed  Tests in the medicine section of CPT®: reviewed and ordered  Discussion of test results with the performing providers: yes  Decide to obtain previous medical records or to obtain history from someone other than the patient: yes  Obtain history from someone other than the patient: yes  Review and summarize past medical records: yes  Discuss the patient with other providers: yes  Independent visualization of images, tracings, or specimens: yes    Risk of Complications, Morbidity, and/or Mortality  Presenting problems: moderate  Diagnostic procedures: moderate  Management options: moderate    Patient Progress  Patient progress: stable         Procedures    XRAY INTERPRETATION (ED MD)  Chest Xray  B/L pneumonia. Normal heart size. No bony abnormalities. Hortencia Mueller MD 12:15 AM    Progress Note:   Pt has been reexamined by Carmelo Santso MD. Pt is feeling much better. Symptoms have improved.  All available results have been reviewed with pt and any available family. Pt understands sx, dx, and tx in ED. Care plan has been outlined and questions have been answered. Pt is ready to go home. Will send home on acute febrile illness/pneumonia and COVID-19 instruction. Prescription of doxycycline, Robitussin-AC, albuterol inhaler. Antipyretic education. . Outpatient referral with PCP as needed. Written by Angel Schroeder MD,12:15 AM    .   .

## 2022-05-22 LAB
BACTERIA SPEC CULT: ABNORMAL
BACTERIA SPEC CULT: ABNORMAL
SERVICE CMNT-IMP: ABNORMAL

## 2022-06-07 NOTE — PROGRESS NOTES
This is a follow-up visit for Connie Sullivan is a G8 [id number],  27 y.o. female OTHER whose No LMP recorded. (Menstrual status: IUD). was on . She had an Mirena IUD placed eight weeks ago. Since the IUD placement, the patient has not had any unusual complaints. She has had some mild non-menstrual bleeding. She describes having a light amount of blood-tinged discharge which has occurred off and on since insertion of the Mirena. She has not significant  pain. She has a headache because her BP has been high  She has had no fever. Associated signs and symptoms: she denies dyspareunia, expulsion, heavy bleeding, increased pain, fever, and pelvic pain. Ultrasound was done today and revealed appropriate placement of the IUD in the endometrial cavity. TRANSVAGINAL ULTRASOUND PERFORMED  UTERUS IS ANTEVERTED, NORMAL IN SIZE AND ECHOGENICITY. ENDOMETRIUM MEASURES 5-6MM IN THICKNESS. NO EVIDENCE OF MASSES OR ABNORMALITIES ARE SEEN. IUD IS SEEN IN THE PROPER POSITION WITHIN THE ENDOMETRIAL CAVITY IN THE UTERINE FUNDUS. RIGHT OVARY APPEARS WITHIN NORMAL LIMITS. LEFT OVARY APPEARS WITHIN NORMAL LIMITS. NO FREE FLUID SEEN IN THE CDS. Past Medical History:   Diagnosis Date    Essential hypertension     last two pregnancies     Past Surgical History:   Procedure Laterality Date    HX  SECTION      x2    HX DILATION AND CURETTAGE      HX OTHER SURGICAL       Social History     Occupational History    Not on file   Tobacco Use    Smoking status: Never Smoker    Smokeless tobacco: Never Used   Substance and Sexual Activity    Alcohol use: Not Currently    Drug use: Never    Sexual activity: Not Currently     Family History   Problem Relation Age of Onset    Hypertension Father        Allergies   Allergen Reactions    Other Medication Unknown (comments)     Pt. States does not remember the name of the shot but had a reaction.      Prior to Admission medications    Medication Sig Start Date End Date Taking? Authorizing Provider   guaiFENesin (ROBITUSSIN) 100 mg/5 mL liquid Take 10 mL by mouth three (3) times daily as needed for Cough. 5/20/22   Flavio Hair MD   albuterol (PROVENTIL HFA, VENTOLIN HFA, PROAIR HFA) 90 mcg/actuation inhaler Take 2 Puffs by inhalation every four (4) hours as needed for Wheezing. 5/20/22   Flavio Hair MD   ibuprofen (MOTRIN) 800 mg tablet Take 1 Tablet by mouth every eight (8) hours as needed for Pain. Patient not taking: Reported on 4/7/2022 2/27/22   Wally Mari MD   clotrimazole (GYNE-LOTRIMIN) 2 % vaginal cream Insert 1 Applicatorful into vagina nightly. Patient not taking: Reported on 1/10/2022 10/27/21   Wally Mari MD   aspirin delayed-release 81 mg tablet Take 1 Tablet by mouth daily. Patient not taking: Reported on 4/7/2022 10/27/21   Wally Mari MD   ondansetron [facility] ODT) 8 mg disintegrating tablet Take 1 Tablet by mouth every eight (8) hours as needed for Nausea or Vomiting. Patient not taking: Reported on 1/10/2022 9/29/21   Wally Mari MD   prenatal vit-iron fumarate-fa 27 mg iron- 0.8 mg tab tablet Take 1 Tablet by mouth daily. Patient not taking: Reported on 1/10/2022 9/10/21   Wally Mari MD   ferrous sulfate (IRON) 325 mg (65 mg iron) EC tablet Take 1 Tablet by mouth daily. Patient not taking: Reported on 1/10/2022 9/10/21   Wally Mari MD   promethazine (PHENERGAN) 25 mg tablet Take 1 Tab by mouth every six (6) hours as needed for Nausea. Patient not taking: Reported on 9/10/2021 3/20/21   Tripp Kelly MD   aluminum & magnesium hydroxide-simethicone (Mylanta Maximum Strength) 400-400-40 mg/5 mL suspension Take 10 mL by mouth every six (6) hours as needed for Indigestion. Indications: indigestion  Patient not taking: Reported on 9/10/2021 3/20/21   Tripp Kelly MD   albuterol (PROVENTIL HFA, VENTOLIN HFA, PROAIR HFA) 90 mcg/actuation inhaler Take 2 Puffs by inhalation every six (6) hours as needed for Wheezing.   Patient not taking: Reported on 9/10/2021 3/7/21   Chelita Byrd MD   ondansetron (Zofran ODT) 8 mg disintegrating tablet Take 1 Tab by mouth every eight (8) hours as needed for Nausea or Vomiting.   Patient not taking: Reported on 9/10/2021 3/7/21   Chelita Byrd MD        Review of Systems: History obtained from the patient  Constitutional: negative for weight loss, fever, night sweats  Breast: negative for breast lumps, nipple discharge, galactorrhea  GI: negative for change in bowel habits, abdominal pain, black or bloody stools  : negative for frequency, dysuria, hematuria, vaginal discharge  MSK: negative for back pain, joint pain, muscle pain  Skin: negative for itching, rash, hives  Psych: negative for anxiety, depression, change in mood      Objective:  Visit Vitals  BP (!) 156/93 (BP 1 Location: Left upper arm, BP Patient Position: Sitting, BP Cuff Size: Large adult)   Pulse 92   Wt 282 lb 9.6 oz (128.2 kg)   BMI 47.03 kg/m²       Physical Exam:   PHYSICAL EXAMINATION    Constitutional  · Appearance: well-nourished, well developed, alert, in no acute distress    Gastrointestinal  · Abdominal Examination: abdomen non-tender to palpation, normal bowel sounds, no masses present  · Liver and spleen: no hepatomegaly present, spleen not palpable  · Hernias: no hernias identified    Genitourinary  · External Genitalia: normal appearance for age, no discharge present, no tenderness present, no inflammatory lesions present, no masses present, no atrophy present  · Vagina: normal vaginal vault without central or paravaginal defects, no discharge present, no inflammatory lesions present, no masses present  · Bladder: non-tender to palpation  · Urethra: appears normal  · Cervix: normal with IUD string visible and appropriate length   · Uterus: normal size, shape and consistency  · Adnexa: no adnexal tenderness present, no adnexal masses present  · Perineum: perineum within normal limits, no evidence of trauma, no rashes or skin lesions present    Skin  · General Inspection: no rash, no lesions identified    Neurologic/Psychiatric  · Mental Status:  · Orientation: grossly oriented to person, place and time  · Mood and Affect: mood normal, affect appropriate    Assessment:   Doing well with IUD    Plan:   I advised the patient that she needs to see a family practice physician she can [facility] where she used to go before to have her blood pressure checked in 2 weeks. We used an Yi  and the patient recited back to me that she was to go back to have her blood pressure checked in 2 weeks.   She understands the seriousness of not having her blood pressure managed    Yi  used

## 2022-06-09 ENCOUNTER — OFFICE VISIT (OUTPATIENT)
Dept: OBGYN CLINIC | Age: 32
End: 2022-06-09

## 2022-06-09 VITALS
HEART RATE: 92 BPM | BODY MASS INDEX: 47.03 KG/M2 | WEIGHT: 282.6 LBS | DIASTOLIC BLOOD PRESSURE: 93 MMHG | SYSTOLIC BLOOD PRESSURE: 156 MMHG

## 2022-06-09 DIAGNOSIS — Z30.431 IUD CHECK UP: Primary | ICD-10-CM

## 2022-06-09 DIAGNOSIS — I15.9 SECONDARY HYPERTENSION: ICD-10-CM

## 2022-06-09 PROCEDURE — 99213 OFFICE O/P EST LOW 20 MIN: CPT | Performed by: OBSTETRICS & GYNECOLOGY

## 2022-06-09 RX ORDER — NIFEDIPINE 30 MG/1
30 TABLET, EXTENDED RELEASE ORAL DAILY
Qty: 90 TABLET | Refills: 0 | Status: SHIPPED | OUTPATIENT
Start: 2022-06-09 | End: 2022-07-13

## 2022-06-15 ENCOUNTER — OFFICE VISIT (OUTPATIENT)
Dept: FAMILY MEDICINE CLINIC | Age: 32
End: 2022-06-15
Payer: COMMERCIAL

## 2022-06-15 ENCOUNTER — OFFICE VISIT (OUTPATIENT)
Dept: FAMILY MEDICINE CLINIC | Age: 32
End: 2022-06-15

## 2022-06-15 VITALS — WEIGHT: 281 LBS

## 2022-06-15 VITALS
SYSTOLIC BLOOD PRESSURE: 107 MMHG | BODY MASS INDEX: 46.82 KG/M2 | OXYGEN SATURATION: 94 % | HEIGHT: 65 IN | WEIGHT: 281 LBS | DIASTOLIC BLOOD PRESSURE: 74 MMHG | HEART RATE: 104 BPM

## 2022-06-15 DIAGNOSIS — E66.01 MORBID OBESITY (HCC): ICD-10-CM

## 2022-06-15 DIAGNOSIS — G44.229 CHRONIC TENSION-TYPE HEADACHE, NOT INTRACTABLE: ICD-10-CM

## 2022-06-15 DIAGNOSIS — R09.81 SINUS CONGESTION: Primary | ICD-10-CM

## 2022-06-15 PROCEDURE — 99214 OFFICE O/P EST MOD 30 MIN: CPT | Performed by: FAMILY MEDICINE

## 2022-06-15 RX ORDER — FLUTICASONE PROPIONATE 50 MCG
2 SPRAY, SUSPENSION (ML) NASAL DAILY
Qty: 1 EACH | Refills: 5 | Status: SHIPPED | OUTPATIENT
Start: 2022-06-15 | End: 2022-09-07 | Stop reason: SDUPTHER

## 2022-06-15 NOTE — PROGRESS NOTES
Cherry  22. Medicine Office Visit     *Video  used for patient encounter  Assessment/ Plan: Cedric Wayne is a 27 y.o. female presenting for:    Chronic Headaches  Chronic Hypertension  Sinus Congestion - Acute on chronic issue. Worsening BP postpartum now improved since starting medication but persistent daily headaches. Location seems most consistent with tension/sinus headaches but given complaints of blurry vision, pseudotumor cerebri and elevated BP do concern me. Discussed concerns with patient. Absolutely exacerbated by lack of sleep from presumed ELROY combined with , morbid obesity. BP Readings from Last 3 Encounters:   06/15/22 107/74   22 (!) 156/93   22 (!) 147/93   -- baseline labs including CBC, CMP, A1C, lipid panel, prolactin  -- MRI/MRV to evaluate for secondary causes of possible increased ICP (fundoscopic exam limited today)  -- continue procardia - consider switching to ARB/diuretic given IUD in place and CCB likely exacerbating symptoms  -- symptomatic treatment with Tylenol prn   -- f/u with ophthalmology - seen previously, does not wear glasses  -- trial of Flonase for sinus congestion   -- counseled on increased sleep if able, balanced diet, increased hydration   -- Next Visit: ensure f/u scheduled with opho      40 minutes were spent on the day of this encounter both with the patient and in related activities including chart review, care coordination and counseling. Patient instructions were discussed and/or provided in the AVS. The patient understands and agrees to the plan. RETURN TO CARE:   Future Appointments   Date Time Provider Aleksandra Reyez   2022  2:00 PM DO FRANK Starr BS AMB   2022  2:30 PM Jasmyne Bella MD [facility] BS AMB       Subjective:  Chief Complaint   Patient presents with    Hypertension       HPI: Cedric Wayne is a 27 y.o. female with PMH of HTN, morbid obesity here for headaches, elevated BP. Headache  - temples, in middle  - constant   - present even more pregnancy  - takes Tylenol but only makes a little bit better   - some vision changes   -  asked doctor friend in [facility] who said maybe could be sinuses - has been using flonase   - sometimes trouble breathing through nose, occasional SOB  - when looks at things sometimes seems blurry (more directly in front) - has noticed since had the baby   - situation gets worse with steam and cooking - headache worse   - saw eye doctor about February 2021 - was told to wear glasses for when far away   - doesn't sleep well with young kids  - no coffee  soda once a day     High Blood Pressure  - BMI 47, cHTN  - pulse 104-116 recently   - started on nifedipine a few days ago  didn't need any while pregnant   - c/s 2/24/22    I have reviewed the patients problem list, current medications, allergies, family, medical and social history. I have updated them as needed. Review of Systems  See HPI. Objective:  Visit Vitals  /74 (BP 1 Location: Right arm, BP Patient Position: Sitting)   Pulse (!) 104   Ht 5' 5\" (1.651 m)   Wt 281 lb (127.5 kg)   SpO2 94%   BMI 46.76 kg/m²     Physical Exam  Vitals and nursing note reviewed. Constitutional:       General: She is not in acute distress. Appearance: Normal appearance. HENT:      Head: Normocephalic and atraumatic. Right Ear: Tympanic membrane and external ear normal.      Left Ear: Tympanic membrane and external ear normal.      Nose: Nose normal. No congestion or rhinorrhea. Mouth/Throat:      Mouth: Mucous membranes are moist.      Pharynx: No posterior oropharyngeal erythema. Comments: Mallampati 3  Eyes:      Extraocular Movements: Extraocular movements intact. Conjunctiva/sclera: Conjunctivae normal.      Pupils: Pupils are equal, round, and reactive to light.    Neck:      Comments: Acanthosis nigricans noted  no thyromegaly  Cardiovascular:      Rate and Rhythm: Normal rate and regular rhythm. Pulses: Normal pulses. Heart sounds: No murmur heard. Pulmonary:      Effort: Pulmonary effort is normal. No respiratory distress. Breath sounds: Normal breath sounds. Musculoskeletal:      Cervical back: Neck supple. Right lower leg: No edema. Left lower leg: No edema. Lymphadenopathy:      Cervical: No cervical adenopathy. Neurological:      General: No focal deficit present. Mental Status: She is alert. Cranial Nerves: No cranial nerve deficit. Gait: Gait normal.   Psychiatric:         Mood and Affect: Mood normal.         Thought Content:  Thought content normal.       Niru Patrick [place]

## 2022-06-15 NOTE — PROGRESS NOTES
Ray Vazquez is a 27 y.o. female    Chief Complaint   Patient presents with    Hypertension       1. Have you been to the ER, urgent care clinic since your last visit? Hospitalized since your last visit? No  2. Have you seen or consulted any other health care providers outside of the [address] [place] [address] since your last visit? Include any pap smears or colon screening.  No    Visit Vitals  /74 (BP 1 Location: Right arm, BP Patient Position: Sitting)   Pulse (!) 104   Ht 5' 5\" (1.651 m)   Wt 281 lb (127.5 kg)   SpO2 94%   BMI 46.76 kg/m²     3 most recent PHQ Screens 7/3/2019   Little interest or pleasure in doing things Not at all   Feeling down, depressed, irritable, or hopeless Not at all   Total Score PHQ 2 0     Health Maintenance Due   Topic Date Due    Depression Screen  Never done    COVID-19 Vaccine (1) Never done

## 2022-06-16 LAB
ALBUMIN SERPL-MCNC: 3.6 G/DL (ref 3.5–5)
ALBUMIN/GLOB SERPL: 0.8 {RATIO} (ref 1.1–2.2)
ALP SERPL-CCNC: 70 U/L (ref 45–117)
ALT SERPL-CCNC: 32 U/L (ref 12–78)
ANION GAP SERPL CALC-SCNC: 7 MMOL/L (ref 5–15)
AST SERPL-CCNC: 20 U/L (ref 15–37)
BASOPHILS # BLD: 0.1 K/UL (ref 0–0.1)
BASOPHILS NFR BLD: 1 % (ref 0–1)
BILIRUB SERPL-MCNC: 0.3 MG/DL (ref 0.2–1)
BUN SERPL-MCNC: 11 MG/DL (ref 6–20)
BUN/CREAT SERPL: 19 (ref 12–20)
CALCIUM SERPL-MCNC: 10 MG/DL (ref 8.5–10.1)
CHLORIDE SERPL-SCNC: 104 MMOL/L (ref 97–108)
CHOLEST SERPL-MCNC: 236 MG/DL
CO2 SERPL-SCNC: 27 MMOL/L (ref 21–32)
CREAT SERPL-MCNC: 0.58 MG/DL (ref 0.55–1.02)
DIFFERENTIAL METHOD BLD: ABNORMAL
EOSINOPHIL # BLD: 0.3 K/UL (ref 0–0.4)
EOSINOPHIL NFR BLD: 5 % (ref 0–7)
ERYTHROCYTE [DISTWIDTH] IN BLOOD BY AUTOMATED COUNT: 18.1 % (ref 11.5–14.5)
EST. AVERAGE GLUCOSE BLD GHB EST-MCNC: 131 MG/DL
GLOBULIN SER CALC-MCNC: 4.4 G/DL (ref 2–4)
GLUCOSE SERPL-MCNC: 92 MG/DL (ref 65–100)
HBA1C MFR BLD: 6.2 % (ref 4–5.6)
HCT VFR BLD AUTO: 41.4 % (ref 35–47)
HDLC SERPL-MCNC: 48 MG/DL
HDLC SERPL: 4.9 {RATIO} (ref 0–5)
HGB BLD-MCNC: 11.8 G/DL (ref 11.5–16)
IMM GRANULOCYTES # BLD AUTO: 0 K/UL (ref 0–0.04)
IMM GRANULOCYTES NFR BLD AUTO: 0 % (ref 0–0.5)
LDLC SERPL CALC-MCNC: 146.2 MG/DL (ref 0–100)
LYMPHOCYTES # BLD: 2.1 K/UL (ref 0.8–3.5)
LYMPHOCYTES NFR BLD: 42 % (ref 12–49)
MCH RBC QN AUTO: 20.5 PG (ref 26–34)
MCHC RBC AUTO-ENTMCNC: 28.5 G/DL (ref 30–36.5)
MCV RBC AUTO: 71.9 FL (ref 80–99)
MONOCYTES # BLD: 0.4 K/UL (ref 0–1)
MONOCYTES NFR BLD: 7 % (ref 5–13)
NEUTS SEG # BLD: 2.1 K/UL (ref 1.8–8)
NEUTS SEG NFR BLD: 45 % (ref 32–75)
NRBC # BLD: 0 K/UL (ref 0–0.01)
NRBC BLD-RTO: 0 PER 100 WBC
PLATELET # BLD AUTO: 323 K/UL (ref 150–400)
PMV BLD AUTO: 10.9 FL (ref 8.9–12.9)
POTASSIUM SERPL-SCNC: 4 MMOL/L (ref 3.5–5.1)
PROLACTIN SERPL-MCNC: 19.2 NG/ML
PROT SERPL-MCNC: 8 G/DL (ref 6.4–8.2)
RBC # BLD AUTO: 5.76 M/UL (ref 3.8–5.2)
RBC MORPH BLD: ABNORMAL
SODIUM SERPL-SCNC: 138 MMOL/L (ref 136–145)
TRIGL SERPL-MCNC: 209 MG/DL (ref ?–150)
VLDLC SERPL CALC-MCNC: 41.8 MG/DL
WBC # BLD AUTO: 5 K/UL (ref 3.6–11)

## 2022-06-20 PROBLEM — I10 CHRONIC HYPERTENSION: Status: ACTIVE | Noted: 2022-06-20

## 2022-06-20 PROBLEM — E66.01 MORBID OBESITY (HCC): Status: ACTIVE | Noted: 2022-06-20

## 2022-06-20 PROBLEM — E78.49 OTHER HYPERLIPIDEMIA: Status: ACTIVE | Noted: 2022-06-20

## 2022-06-20 PROBLEM — G44.229 CHRONIC TENSION-TYPE HEADACHE, NOT INTRACTABLE: Status: ACTIVE | Noted: 2022-06-20

## 2022-06-20 PROBLEM — R73.03 PRE-DIABETES: Status: ACTIVE | Noted: 2022-06-20

## 2022-06-20 PROBLEM — E66.01 SEVERE OBESITY (HCC): Status: RESOLVED | Noted: 2019-09-09 | Resolved: 2022-06-20

## 2022-06-21 ENCOUNTER — APPOINTMENT (OUTPATIENT)
Dept: GENERAL RADIOLOGY | Age: 32
End: 2022-06-21
Attending: EMERGENCY MEDICINE
Payer: COMMERCIAL

## 2022-06-21 ENCOUNTER — HOSPITAL ENCOUNTER (EMERGENCY)
Age: 32
Discharge: HOME | End: 2022-06-21
Attending: EMERGENCY MEDICINE
Payer: COMMERCIAL

## 2022-06-21 ENCOUNTER — PATIENT MESSAGE (OUTPATIENT)
Dept: FAMILY MEDICINE CLINIC | Age: 32
End: 2022-06-21

## 2022-06-21 ENCOUNTER — TELEPHONE (OUTPATIENT)
Dept: FAMILY MEDICINE CLINIC | Age: 32
End: 2022-06-21

## 2022-06-21 VITALS
WEIGHT: 280 LBS | HEART RATE: 98 BPM | SYSTOLIC BLOOD PRESSURE: 131 MMHG | OXYGEN SATURATION: 97 % | TEMPERATURE: 97.3 F | HEIGHT: 65 IN | RESPIRATION RATE: 16 BRPM | BODY MASS INDEX: 46.65 KG/M2 | DIASTOLIC BLOOD PRESSURE: 87 MMHG

## 2022-06-21 DIAGNOSIS — R07.89 LEFT-SIDED CHEST WALL PAIN: Primary | ICD-10-CM

## 2022-06-21 LAB
ANION GAP SERPL CALC-SCNC: 8 MMOL/L (ref 5–15)
BASOPHILS # BLD: 0.1 K/UL (ref 0–0.1)
BASOPHILS NFR BLD: 1 % (ref 0–1)
BUN SERPL-MCNC: 11 MG/DL (ref 6–20)
BUN/CREAT SERPL: 18 (ref 12–20)
CALCIUM SERPL-MCNC: 9.6 MG/DL (ref 8.5–10.1)
CHLORIDE SERPL-SCNC: 105 MMOL/L (ref 97–108)
CO2 SERPL-SCNC: 28 MMOL/L (ref 21–32)
CREAT SERPL-MCNC: 0.62 MG/DL (ref 0.55–1.02)
DIFFERENTIAL METHOD BLD: ABNORMAL
EOSINOPHIL # BLD: 0.3 K/UL (ref 0–0.4)
EOSINOPHIL NFR BLD: 5 % (ref 0–7)
ERYTHROCYTE [DISTWIDTH] IN BLOOD BY AUTOMATED COUNT: 17.2 % (ref 11.5–14.5)
GLUCOSE SERPL-MCNC: 88 MG/DL (ref 65–100)
HCT VFR BLD AUTO: 38.8 % (ref 35–47)
HGB BLD-MCNC: 11.1 G/DL (ref 11.5–16)
IMM GRANULOCYTES # BLD AUTO: 0 K/UL (ref 0–0.04)
IMM GRANULOCYTES NFR BLD AUTO: 0 % (ref 0–0.5)
LYMPHOCYTES # BLD: 2.5 K/UL (ref 0.8–3.5)
LYMPHOCYTES NFR BLD: 41 % (ref 12–49)
MCH RBC QN AUTO: 20.4 PG (ref 26–34)
MCHC RBC AUTO-ENTMCNC: 28.6 G/DL (ref 30–36.5)
MCV RBC AUTO: 71.5 FL (ref 80–99)
MONOCYTES # BLD: 0.5 K/UL (ref 0–1)
MONOCYTES NFR BLD: 8 % (ref 5–13)
NEUTS SEG # BLD: 2.7 K/UL (ref 1.8–8)
NEUTS SEG NFR BLD: 45 % (ref 32–75)
NRBC # BLD: 0 K/UL (ref 0–0.01)
NRBC BLD-RTO: 0 PER 100 WBC
PLATELET # BLD AUTO: 344 K/UL (ref 150–400)
PLATELET COMMENTS,PCOM: ABNORMAL
PMV BLD AUTO: 10.3 FL (ref 8.9–12.9)
POTASSIUM SERPL-SCNC: 3.7 MMOL/L (ref 3.5–5.1)
RBC # BLD AUTO: 5.43 M/UL (ref 3.8–5.2)
RBC MORPH BLD: ABNORMAL
RBC MORPH BLD: ABNORMAL
SODIUM SERPL-SCNC: 141 MMOL/L (ref 136–145)
TROPONIN-HIGH SENSITIVITY: 6 NG/L (ref 0–51)
WBC # BLD AUTO: 6.1 K/UL (ref 3.6–11)

## 2022-06-21 PROCEDURE — 36415 COLL VENOUS BLD VENIPUNCTURE: CPT

## 2022-06-21 PROCEDURE — 80048 BASIC METABOLIC PNL TOTAL CA: CPT

## 2022-06-21 PROCEDURE — 84484 ASSAY OF TROPONIN QUANT: CPT

## 2022-06-21 PROCEDURE — 99284 EMERGENCY DEPT VISIT MOD MDM: CPT

## 2022-06-21 PROCEDURE — 71046 X-RAY EXAM CHEST 2 VIEWS: CPT

## 2022-06-21 PROCEDURE — 85025 COMPLETE CBC W/AUTO DIFF WBC: CPT

## 2022-06-21 RX ORDER — IBUPROFEN 800 MG/1
800 TABLET ORAL
Qty: 20 TABLET | Refills: 0 | Status: SHIPPED | OUTPATIENT
Start: 2022-06-21 | End: 2022-06-28

## 2022-06-21 RX ORDER — IBUPROFEN 800 MG/1
800 TABLET ORAL
Status: DISCONTINUED | OUTPATIENT
Start: 2022-06-21 | End: 2022-06-21 | Stop reason: HOSPADM

## 2022-06-21 NOTE — TELEPHONE ENCOUNTER
Called patient using [facility]  #[phone] to schedule her an appt for MRI/MRV  Patient is scheduled on June 30 @ 5pm.   Advised patient to bring her medications, Photo ID and insurance with her. Sent a [employer] message with the hospital address and directions.

## 2022-06-21 NOTE — ED TRIAGE NOTES
English  used: [id number]    Pt started pain to the left side of her face last night and left sided chest pain. States that she feels like she needs to push on her chest in order to take a deep breath.

## 2022-06-22 NOTE — ED PROVIDER NOTES
The history is provided by the patient. Chest Pain   This is a new problem. The current episode started yesterday. The problem has been gradually worsening. Duration of episode(s) is 1 day. The problem occurs constantly. The pain is associated with normal activity. The pain is present in the left side. The pain is moderate. The quality of the pain is described as sharp. The pain does not radiate. The symptoms are aggravated by movement, palpation and certain positions. Pertinent negatives include no lower extremity edema, no near-syncope, no palpitations, no shortness of breath and no vomiting. She has tried nothing for the symptoms. Risk factors include no risk factors. Her past medical history does not include DVT or PE.         Past Medical History:   Diagnosis Date    Essential hypertension     last two pregnancies       Past Surgical History:   Procedure Laterality Date    HX  SECTION      x2    HX DILATION AND CURETTAGE      HX OTHER SURGICAL           Family History:   Problem Relation Age of Onset    Hypertension Father        Social History     Socioeconomic History    Marital status:      Spouse name: Not on file    Number of children: Not on file    Years of education: Not on file    Highest education level: Not on file   Occupational History    Not on file   Tobacco Use    Smoking status: Never Smoker    Smokeless tobacco: Never Used   Substance and Sexual Activity    Alcohol use: Not Currently    Drug use: Never    Sexual activity: Not Currently   Other Topics Concern     Service Not Asked    Blood Transfusions Not Asked    Caffeine Concern Not Asked    Occupational Exposure Not Asked    Hobby Hazards Not Asked    Sleep Concern Not Asked    Stress Concern Not Asked    Weight Concern Not Asked    Special Diet Not Asked    Back Care Not Asked    Exercise Not Asked    Bike Helmet Not Asked    [address] Not Asked    Self-Exams Not Asked   Social History Narrative    Not on file     Social Determinants of Health     Financial Resource Strain:     Difficulty of Paying Living Expenses: Not on file   Food Insecurity:     Worried About Running Out of Food in the Last Year: Not on file    Saundra of Food in the Last Year: Not on file   Transportation Needs:     Lack of Transportation (Medical): Not on file    Lack of Transportation (Non-Medical): Not on file   Physical Activity:     Days of Exercise per Week: Not on file    Minutes of Exercise per Session: Not on file   Stress:     Feeling of Stress : Not on file   Social Connections:     Frequency of Communication with Friends and Family: Not on file    Frequency of Social Gatherings with Friends and Family: Not on file    Attends Shinto Services: Not on file    Active Member of [address] [place] [address] [employer] or Organizations: Not on file    Attends Club or Organization Meetings: Not on file    Marital Status: Not on file   Intimate Partner Violence:     Fear of Current or Ex-Partner: Not on file    Emotionally Abused: Not on file    Physically Abused: Not on file    Sexually Abused: Not on file   Housing Stability:     Unable to Pay for Housing in the Last Year: Not on file    Number of [place] in the Last Year: Not on file    Unstable Housing in the Last Year: Not on file         ALLERGIES: Other medication    Review of Systems   Respiratory: Negative for shortness of breath. Cardiovascular: Positive for chest pain. Negative for palpitations and near-syncope. Gastrointestinal: Negative for vomiting. All other systems reviewed and are negative. Vitals:    06/21/22 1710   BP: 131/87   Pulse: 98   Resp: 16   Temp: 97.3 °F (36.3 °C)   SpO2: 97%   Weight: 127 kg (280 lb)   Height: 5' 5\" (1.651 m)            Physical Exam  Vitals and nursing note reviewed. Constitutional:       General: She is not in acute distress. Appearance: She is well-developed. HENT:      Head: Normocephalic and atraumatic. Eyes:      Conjunctiva/sclera: Conjunctivae normal.   Cardiovascular:      Rate and Rhythm: Normal rate and regular rhythm. Heart sounds: Normal heart sounds. Pulmonary:      Effort: Pulmonary effort is normal. No respiratory distress. Breath sounds: Normal breath sounds. Chest:      Chest wall: Tenderness present. Abdominal:      General: There is no distension. Musculoskeletal:         General: No deformity. Normal range of motion. Cervical back: Neck supple. Comments: Pain with shoulder abduction and extension   Skin:     General: Skin is warm and dry. Neurological:      Mental Status: She is alert. Cranial Nerves: No cranial nerve deficit. Psychiatric:         Behavior: Behavior normal.          MDM     27 y.o. female presents with chest pain starting yesterday. Pain is reproducible with palpation and movement, highly atypical in nature. Low risk for ACS with normal EKG and negative troponin. Low risk without signs and symptoms of PE. XRay without pneumothorax. Appears musculoskeletal in nature. Patient was recommended to take short course of scheduled NSAIDs and engage in early mobility as definitive treatment. Plan to follow up with PCP as needed and return precautions discussed for worsening or new concerning symptoms. Procedures    EKG 1659: Rate 106, sinus tachycardia. No ST segment or T wave abnormalities. Otherwise normal EKG.

## 2022-06-28 ENCOUNTER — NURSE TRIAGE (OUTPATIENT)
Dept: OTHER | Facility: CLINIC | Age: 32
End: 2022-06-28

## 2022-06-28 NOTE — TELEPHONE ENCOUNTER
Received call from [place] at [facility] with [employer] Complaint. Call completed with Kyrgyz interpretor    Subjective: Caller states \"Everytime I go to the bathroom there is blood. \"     Current Symptoms: Rectal bleeding    Onset: Five days ago    Associated Symptoms: Patient reports \"blood clots\" with bowel movement. Small clots- small beads. Having dizziness when having a bowel movement. Pain Severity: Reports rectal pain- 8/10    Temperature: Denies    What has been tried: N/A    LMP: IUD Pregnant: No    Recommended disposition: See in Office Today    Care advice provided, patient verbalizes understanding; denies any other questions or concerns; instructed to call back for any new or worsening symptoms. Patient/Caller agrees with recommended disposition; writer provided warm transfer to David Sofia at [facility] for appointment scheduling    Attention Provider: Thank you for allowing me to participate in the care of your patient. The patient was connected to triage in response to information provided to the [facility]. Please do not respond through this encounter as the response is not directed to a shared pool.     Reason for Disposition   MODERATE rectal bleeding (small blood clots, passing blood without stool, or toilet water turns red)    Protocols used: RECTAL BLEEDING-ADULT-[place]

## 2022-06-30 ENCOUNTER — HOSPITAL ENCOUNTER (OUTPATIENT)
Dept: MRI IMAGING | Age: 32
Discharge: HOME | End: 2022-06-30
Attending: FAMILY MEDICINE
Payer: COMMERCIAL

## 2022-06-30 VITALS — BODY MASS INDEX: 46.65 KG/M2 | WEIGHT: 280 LBS | HEIGHT: 65 IN

## 2022-06-30 DIAGNOSIS — R09.81 SINUS CONGESTION: ICD-10-CM

## 2022-06-30 DIAGNOSIS — G44.229 CHRONIC TENSION-TYPE HEADACHE, NOT INTRACTABLE: ICD-10-CM

## 2022-06-30 DIAGNOSIS — E66.01 MORBID OBESITY (HCC): ICD-10-CM

## 2022-06-30 PROCEDURE — A9576 INJ PROHANCE MULTIPACK: HCPCS | Performed by: RADIOLOGY

## 2022-06-30 PROCEDURE — 70544 MR ANGIOGRAPHY HEAD W/O DYE: CPT

## 2022-06-30 PROCEDURE — 74011250636 HC RX REV CODE- 250/636: Performed by: RADIOLOGY

## 2022-06-30 PROCEDURE — 70553 MRI BRAIN STEM W/O & W/DYE: CPT

## 2022-06-30 RX ADMIN — GADOTERIDOL 20 ML: 279.3 INJECTION, SOLUTION INTRAVENOUS at 18:48

## 2022-07-07 DIAGNOSIS — G93.2 IDIOPATHIC INTRACRANIAL HYPERTENSION: Primary | ICD-10-CM

## 2022-07-11 ENCOUNTER — TELEPHONE (OUTPATIENT)
Dept: FAMILY MEDICINE CLINIC | Age: 32
End: 2022-07-11

## 2022-07-11 NOTE — TELEPHONE ENCOUNTER
Called patient using [facility]  to review results of MRI. Left VM, patient has follow-up scheduled in 2 days.      [facility], [address] [place] [address]  7/11/2022

## 2022-07-13 ENCOUNTER — OFFICE VISIT (OUTPATIENT)
Dept: FAMILY MEDICINE CLINIC | Age: 32
End: 2022-07-13

## 2022-07-13 VITALS
RESPIRATION RATE: 16 BRPM | TEMPERATURE: 98.7 F | BODY MASS INDEX: 47.65 KG/M2 | DIASTOLIC BLOOD PRESSURE: 67 MMHG | SYSTOLIC BLOOD PRESSURE: 119 MMHG | OXYGEN SATURATION: 97 % | WEIGHT: 286 LBS | HEART RATE: 91 BPM | HEIGHT: 65 IN

## 2022-07-13 DIAGNOSIS — G93.2 IDIOPATHIC INTRACRANIAL HYPERTENSION: Primary | ICD-10-CM

## 2022-07-13 DIAGNOSIS — G89.29 CHRONIC NONINTRACTABLE HEADACHE, UNSPECIFIED HEADACHE TYPE: ICD-10-CM

## 2022-07-13 DIAGNOSIS — R51.9 CHRONIC NONINTRACTABLE HEADACHE, UNSPECIFIED HEADACHE TYPE: ICD-10-CM

## 2022-07-13 DIAGNOSIS — J32.9 CHRONIC SINUSITIS, UNSPECIFIED LOCATION: ICD-10-CM

## 2022-07-13 DIAGNOSIS — E66.01 MORBID OBESITY (HCC): ICD-10-CM

## 2022-07-13 RX ORDER — ACETAZOLAMIDE 250 MG/1
250 TABLET ORAL 2 TIMES DAILY
Qty: 60 TABLET | Refills: 1 | Status: SHIPPED | OUTPATIENT
Start: 2022-07-13 | End: 2022-07-19

## 2022-07-13 NOTE — PROGRESS NOTES
Indy Go is a 27 y.o. female    Chief Complaint   Patient presents with    Blood Pressure Check     Patient is coming in for a follow up blood pressure check and headaches. No other concerns. 1. Have you been to the ER, urgent care clinic since your last visit? Hospitalized since your last visit? No  2. Have you seen or consulted any other health care providers outside of the [address] [place] [address] since your last visit? Include any pap smears or colon screening. No      Visit Vitals  /67 (BP 1 Location: Right upper arm, BP Patient Position: Sitting)   Pulse 91   Temp 98.7 °F (37.1 °C) (Oral)   Resp 16   Ht 5' 5\" (1.651 m)   Wt 286 lb (129.7 kg)   SpO2 97%   BMI 47.59 kg/m²           Health Maintenance Due   Topic Date Due    Depression Screen  Never done    COVID-19 Vaccine (1) Never done         Medication Reconciliation completed, changes noted.   Please  Update medication list.

## 2022-07-13 NOTE — PATIENT INSTRUCTIONS
Financial Assistance: [phone]  [email or web address]     Call Medicaid Office: may need to reapply but should be able to keep insurance for 12-months

## 2022-07-13 NOTE — PROGRESS NOTES
Cherry  22. Medicine Office Visit     *Daughter acted as  for this encounter. Offered telephone Tajik  but patient declined*  Assessment/ Plan: Alana Painter is a 27 y.o. female presenting for:    Headaches  Idiopathic Intracranial Hypertension - No improvement in symptoms with BP control, managing sinus ongestion. MRI/MRV with findings consistent. Had hoped to obtain LP to obtain opening pressures/confirm diagnosis but patient recently lost insurance and unable to afford at this time. BP Readings from Last 3 Encounters:   07/13/22 119/67   06/21/22 131/87   06/15/22 107/74   -- stop nifedipine  start acetazolamide  has IUD in place  -- continue Flonase, if no improvement may consider alternatives for chronic sinusitis   -- resources for financial assistance provided  encouraged to reach out to Medicaid  to reapply   -- encouraged to follow-up with eye doctor to assess for papilledema  may refer to [facility] ophtho at follow-up visit   -- f/u in 2 weeks to recheck BP, labs, continue care coordination, discuss methods for weight loss     45 minutes were spent on the day of this encounter both with the patient and in related activities including chart review, care coordination and counseling. Patient instructions were discussed and/or provided in the AVS. The patient understands and agrees to the plan. RETURN TO CARE:   Future Appointments   Date Time Provider Aleksandra Reyez   7/29/2022 10:40 AM Carlos Saldaña DO [facility] BS AMB   8/5/2022  2:10 PM Oskar Morris MD [facility] BS AMB       Subjective:  Chief Complaint   Patient presents with    Blood Pressure Check     Patient is coming in for a follow up blood pressure check and headaches. No other concerns. HPI: Alana Painter is a 27 y.o. female with PMH of cHTN, chronic headaches, morbid obesity, HLD, pre-diabetes here for follow-up headaches.      Headaches  - MRI presumed to be idiopathic intracranial hypertension  - headache still there  - vision can sometimes be blurry  last saw eye doctor a year ago - Dr. Klever Mason   - congestion still bad  flonase hasn't helped   - hears \"whooshing\" sound   - prolactin  - has been taking procardia BP      Labs  - slight microcytosis anemia   - pre-diabetes 6.2%   - LDL elevated     - call Medicaid [employer] to reapply  9 people in house     I have reviewed the patients problem list, current medications, allergies, family, medical and social history. I have updated them as needed. Review of Systems  See HPI. Objective:  Visit Vitals  /67 (BP 1 Location: Right upper arm, BP Patient Position: Sitting)   Pulse 91   Temp 98.7 °F (37.1 °C) (Oral)   Resp 16   Ht 5' 5\" (1.651 m)   Wt 286 lb (129.7 kg)   SpO2 97%   BMI 47.59 kg/m²     Physical Exam  Vitals and nursing note reviewed. Constitutional:       General: She is not in acute distress. Appearance: Normal appearance. HENT:      Head: Normocephalic and atraumatic. Eyes:      Extraocular Movements: Extraocular movements intact. Conjunctiva/sclera: Conjunctivae normal.   Cardiovascular:      Rate and Rhythm: Normal rate. Pulmonary:      Effort: No respiratory distress. Neurological:      General: No focal deficit present. Mental Status: She is alert. Mental status is at baseline. Psychiatric:         Mood and Affect: Mood normal.         Thought Content:  Thought content normal.       Leroy Baker [facility]

## 2022-07-18 ENCOUNTER — TELEPHONE (OUTPATIENT)
Dept: FAMILY MEDICINE CLINIC | Age: 32
End: 2022-07-18

## 2022-07-18 DIAGNOSIS — G93.2 IDIOPATHIC INTRACRANIAL HYPERTENSION: Primary | ICD-10-CM

## 2022-07-18 NOTE — TELEPHONE ENCOUNTER
Patient called with her son julio. He says that the medicine that was prescribed to her at her last appointment has caused her to have symptoms. He says his mom feels like its ant crawling all over her body from her head to her feet and that her face is really heavy. I asked for him to verify the medicine but he said they don't know remember the name. Patient would like for you to give her a call to see what she needs to do or if she needs to stop using this medication.

## 2022-07-19 RX ORDER — TOPIRAMATE 25 MG/1
25 TABLET ORAL
Qty: 30 TABLET | Refills: 0 | Status: SHIPPED | OUTPATIENT
Start: 2022-07-19 | End: 2022-07-29 | Stop reason: SDUPTHER

## 2022-07-19 NOTE — TELEPHONE ENCOUNTER
Called patient to return phone call using Malay . Symptoms seems consistent with allergic reaction to acetazolamide - rash and paraesthesias. Instructed patient to stop taking medication. Will trial topiramate next, starting at low dose of 25mg. Will see in clinic in 2 weeks. If no improvement, will adjust dose slightly but likely will need referral to neurology.      [facility] [address]  7/19/2022

## 2022-07-29 ENCOUNTER — OFFICE VISIT (OUTPATIENT)
Dept: FAMILY MEDICINE CLINIC | Age: 32
End: 2022-07-29

## 2022-07-29 VITALS
OXYGEN SATURATION: 96 % | RESPIRATION RATE: 16 BRPM | DIASTOLIC BLOOD PRESSURE: 68 MMHG | HEIGHT: 65 IN | SYSTOLIC BLOOD PRESSURE: 104 MMHG | BODY MASS INDEX: 47.32 KG/M2 | WEIGHT: 284 LBS | HEART RATE: 92 BPM | TEMPERATURE: 98.9 F

## 2022-07-29 DIAGNOSIS — G93.2 IDIOPATHIC INTRACRANIAL HYPERTENSION: Primary | ICD-10-CM

## 2022-07-29 DIAGNOSIS — R73.03 PREDIABETES: ICD-10-CM

## 2022-07-29 DIAGNOSIS — G44.221 CHRONIC TENSION-TYPE HEADACHE, INTRACTABLE: ICD-10-CM

## 2022-07-29 DIAGNOSIS — S16.1XXD STRAIN OF NECK MUSCLE, SUBSEQUENT ENCOUNTER: ICD-10-CM

## 2022-07-29 DIAGNOSIS — M99.01 SOMATIC DYSFUNCTION OF CERVICAL REGION: ICD-10-CM

## 2022-07-29 DIAGNOSIS — E66.01 MORBID OBESITY (HCC): ICD-10-CM

## 2022-07-29 RX ORDER — METFORMIN HYDROCHLORIDE 500 MG/1
500 TABLET ORAL DAILY
Qty: 30 TABLET | Refills: 1 | Status: SHIPPED | OUTPATIENT
Start: 2022-07-29 | End: 2022-09-07 | Stop reason: SDUPTHER

## 2022-07-29 RX ORDER — TOPIRAMATE 50 MG/1
50 TABLET, FILM COATED ORAL
Qty: 30 TABLET | Refills: 1 | Status: SHIPPED | OUTPATIENT
Start: 2022-07-29 | End: 2022-09-07

## 2022-07-29 NOTE — PROGRESS NOTES
Joie Moreira is a 27 y.o. female    Chief Complaint   Patient presents with    Medication Evaluation     Patient is following up after starting medications. She is not taking the medication that was prescribed last visit anymore because she started getting a rash. No other concerns. 1. Have you been to the ER, urgent care clinic since your last visit? Hospitalized since your last visit? No    2. Have you seen or consulted any other health care providers outside of the [address] [place] [address] since your last visit? Include any pap smears or colon screening. No      Visit Vitals  /68 (BP 1 Location: Right upper arm, BP Patient Position: Sitting)   Pulse 92   Temp 98.9 °F (37.2 °C) (Oral)   Resp 16   Ht 5' 5\" (1.651 m)   Wt 284 lb (128.8 kg)   SpO2 96%   BMI 47.26 kg/m²           Health Maintenance Due   Topic Date Due    COVID-19 Vaccine (1) Never done         Medication Reconciliation completed, changes noted.   Please  Update medication list.

## 2022-07-29 NOTE — PROGRESS NOTES
[facility] Medicine Office Visit     *Daughter translating for visit. Patient speaks some English and declines use of telephone *  Assessment/ Plan: Khushbu Chaves is a 27 y.o. female presenting for:    Headaches  Idiopathic Intracranial Hypertension, Cervical Strain/Somatic Dysfunction  Morbid Obesity  Prediabetes - No improvement in symptoms with BP control, managing sinus ongestion. MRI/MRV with findings consistent. Had hoped to obtain LP to obtain opening pressures/confirm diagnosis but patient recently lost insurance and unable to afford at this time. BP Readings from Last 3 Encounters:   07/29/22 104/68   07/13/22 119/67   06/21/22 131/87   -- unable to tolerate acetazolamide   -- topiramate with limited benefit - will increase to 50mg daily   -- referral to [facility] (Dr. Stella Stephens) - called and will be $150/exam and $50 for follow-up visits without insurance   -- counseled extensively on weight loss > 30 minutes   -- trial of metformin for morbid obesity/prediabetes, counseled on possible side effects   -- discussed possibility of weight loss surgery depending on ability to obtain medical coverage   -- continue Flonase, if no improvement may consider alternatives for chronic sinusitis   -- resources for financial assistance reviewed - needs to submit additional paperwork for [employer], told not eligible for Medicaid for 1 year? -- reviewed ROM/exercises to help with cervical strain, tight muscles   -- f/u in 1 month to check on symptoms     40 minutes were spent on the day of this encounter both with the patient and in related activities including chart review, care coordination and counseling. Patient instructions were discussed and/or provided in the AVS. The patient understands and agrees to the plan.     RETURN TO CARE: 1 month  Future Appointments   Date Time Provider Aleksandra Reyez   8/5/2022  2:10 PM Lance Barnes MD [facility] BS AMB   9/7/2022 3:40 PM Leticia Mccracken, DO [facility] BS AMB       Subjective:  Chief Complaint   Patient presents with    Medication Evaluation     Patient is following up after starting medications. She is not taking the medication that was prescribed last visit anymore because she started getting a rash. No other concerns. HPI: Esperanza Sofia is a 27 y.o. female with PMH of morbid obesity, chronic headaches with likely idiopathic intracranial hypertension here for f/up headaches and discuss weight . IIP/Headaches  - stopped nifedipine   - unable to tolerate acetazolamide   - switching to topiramate but feels like doesn't help   - constant, nothing helps   - eye doctor  ([facility] ) - [facility] really far away and too confusing     Obesity  - pre-diabetes A1C 6.2%  - elevated lipids ([id number])   - diet recall: sometimes eats pizza for breakfast  dinner or lunch mac&cheese  sometimes cheese with bread  drinks 1 can kimmy-cola per day and then water 2-3 bottles  no juice  not really fruits/vegetables - occasionally cucumber, jirjir/watercress  only eats mangoes  doesn't like vegetables - makes stomach feel nauseaous   - sometimes only 1-2 meals   - as an adult lowest weight has been: with first daughter was 65kg  had nexplanon and gained a lot of weight  gained weight with each pregnancy   - referral to weight loss clinic   - wondering about something that goes in ear to help with weight loss     Insurance   - was told cannot have Medicaid for a year after birth and only while pregnant when called office   - went to [facility] office to apply as well and needs to return some forms and will do soon     I have reviewed the patients problem list, current medications, allergies, family, medical and social history. I have updated them as needed. Review of Systems  See HPI.     Objective:  Visit Vitals  /68 (BP 1 Location: Right upper arm, BP Patient Position: Sitting)   Pulse 92   Temp 98.9 °F (37.2 °C) (Oral)   Resp 16   Ht 5' 5\" (1.651 m)   Wt 284 lb (128.8 kg)   SpO2 96%   BMI 47.26 kg/m²     Physical Exam  Vitals reviewed. Constitutional:       General: She is not in acute distress. Appearance: She is obese. Comments: Appears older than stated age   HENT:      Head: Normocephalic and atraumatic. Eyes:      Extraocular Movements: Extraocular movements intact. Conjunctiva/sclera: Conjunctivae normal.   Neck:      Comments: ROM limited by pain in all directions  TTP along scalenes and paraspinals R>L  TTP over 1st rib b/l  Cardiovascular:      Rate and Rhythm: Normal rate. Pulmonary:      Effort: No respiratory distress. Musculoskeletal:      Cervical back: Neck supple. Skin:     General: Skin is warm and dry. Findings: Rash: acanthosis nigrans. Neurological:      General: No focal deficit present. Mental Status: She is alert. Gait: Abnormal gait: waddling/antalgic. Psychiatric:         Mood and Affect: Mood normal.         Thought Content:  Thought content normal.       Niru Schilling [place]

## 2022-09-07 ENCOUNTER — OFFICE VISIT (OUTPATIENT)
Dept: FAMILY MEDICINE CLINIC | Age: 32
End: 2022-09-07

## 2022-09-07 VITALS
WEIGHT: 281 LBS | DIASTOLIC BLOOD PRESSURE: 76 MMHG | HEART RATE: 89 BPM | HEIGHT: 65 IN | RESPIRATION RATE: 16 BRPM | SYSTOLIC BLOOD PRESSURE: 122 MMHG | BODY MASS INDEX: 46.82 KG/M2 | TEMPERATURE: 98.3 F | OXYGEN SATURATION: 98 %

## 2022-09-07 DIAGNOSIS — R09.81 SINUS CONGESTION: ICD-10-CM

## 2022-09-07 DIAGNOSIS — R73.03 PREDIABETES: ICD-10-CM

## 2022-09-07 DIAGNOSIS — G93.2 IDIOPATHIC INTRACRANIAL HYPERTENSION: Primary | ICD-10-CM

## 2022-09-07 DIAGNOSIS — E66.01 MORBID OBESITY (HCC): ICD-10-CM

## 2022-09-07 PROBLEM — Z97.5 IUD (INTRAUTERINE DEVICE) IN PLACE: Status: ACTIVE | Noted: 2022-09-07

## 2022-09-07 RX ORDER — METFORMIN HYDROCHLORIDE 500 MG/1
500 TABLET ORAL 2 TIMES DAILY WITH MEALS
Qty: 60 TABLET | Refills: 3 | Status: SHIPPED | OUTPATIENT
Start: 2022-09-07

## 2022-09-07 RX ORDER — FLUTICASONE PROPIONATE 50 MCG
2 SPRAY, SUSPENSION (ML) NASAL DAILY
Qty: 1 EACH | Refills: 11 | Status: SHIPPED | OUTPATIENT
Start: 2022-09-07

## 2022-09-07 NOTE — PROGRESS NOTES
Jonathon Nava is a 27 y.o. female    Chief Complaint   Patient presents with    Headache     Patient is following up on headaches. She states that she is still getting headaches that are still the same. Patient would like a refill of her weight loss medication. Interpretor: [id number]. No other concerns. 1. Have you been to the ER, urgent care clinic since your last visit? Hospitalized since your last visit? No    2. Have you seen or consulted any other health care providers outside of the [address] [place] [address] since your last visit? Include any pap smears or colon screening. No      Visit Vitals  /76 (BP 1 Location: Right upper arm, BP Patient Position: Sitting)   Pulse 89   Temp 98.3 °F (36.8 °C) (Axillary)   Resp 16   Ht 5' 5\" (1.651 m)   Wt 281 lb (127.5 kg)   SpO2 98%   BMI 46.76 kg/m²           Health Maintenance Due   Topic Date Due    COVID-19 Vaccine (1) Never done    Flu Vaccine (1) 09/01/2022         Medication Reconciliation completed, changes noted.   Please  Update medication list.

## 2022-09-07 NOTE — PROGRESS NOTES
[facility] Medicine Office Visit     Used Kazakh video    Assessment/ Plan: Rosy Alicia is a 27 y.o. female presenting for:    Headaches  Idiopathic Intracranial Hypertension, Cervical Strain/Somatic Dysfunction  Morbid Obesity  Prediabetes - No improvement in symptoms with BP control, managing sinus ongestion. MRI/MRV with findings consistent. Had hoped to obtain LP to obtain opening pressures/confirm diagnosis but patient recently lost insurance and unable to afford at this time. Ideally would have her see neurology as well but patient declines as unable to get financial assistance. -- unable to tolerate acetazolamide  no improvement with Topamax (even with increased dose, recommended stopping)   -- saw optometry - changed eye prescription, unclear if checked for papilledema (recommended seeing ophthalmology   -- counseled extensively on weight loss - will increase metformin  recommended seeing DM education but deferred as no insurance at this time   -- continue Flonase, has helped with sinus congestion   -- resources for financial assistance reviewed - has completed paperwork for [facility] financial assistance, still waiting for Medicaid information to reapply     30 minutes were spent on the day of this encounter both with the patient and in related activities including chart review, care coordination and counseling. Patient instructions were discussed and/or provided in the AVS. The patient understands and agrees to the plan. RETURN TO CARE: 1 month   Future Appointments   Date Time Provider Aleksandra Reyez   10/14/2022  4:00 PM Isaak Goodwin DO [facility] BS AMB         Subjective:  Chief Complaint   Patient presents with    Headache     Patient is following up on headaches. She states that she is still getting headaches that are still the same. Patient would like a refill of her weight loss medication. Interpretor: [id number]. No other concerns.       HPI: Rosy Alicia is a 27 y.o. female with PMH of obesity,  chronic HTN, pre-diabetes, idiopathic intracranial HTN, HLD, IUD in place here for follow-up headaches. Headaches   - pressure in head and inside ears   - trouble with vision - went to eye doctor at [address] [facility] (Dr. Giovanna Abarca 8/19)   - increased topiramate last visit and hasn't noticed any improvement   - Tylenol XL 1g sometimes - doesn't help either     Obesity   - started metformin for assistance with weight loss  - needs refill, had a little stomach ache   - make sure eye appointment   - usually just two meals (some bread with condiments)   - decreased appetite since starting metformin     HCM  - applied through hospital 8/2 and hasn't heard anything about status of financial assistance ([phone])   - [facility] had referred for assistance  was told they would call her back but no one has called back     I have reviewed the patients problem list, current medications, allergies, family, medical and social history. I have updated them as needed. Review of Systems  See HPI. Objective:  Visit Vitals  /76 (BP 1 Location: Right upper arm, BP Patient Position: Sitting)   Pulse 89   Temp 98.3 °F (36.8 °C) (Axillary)   Resp 16   Ht 5' 5\" (1.651 m)   Wt 281 lb (127.5 kg)   SpO2 98%   BMI 46.76 kg/m²     Physical Exam  Vitals and nursing note reviewed. Constitutional:       Appearance: Normal appearance. Comments: Obese, tearful   HENT:      Head: Normocephalic and atraumatic. Eyes:      Extraocular Movements: Extraocular movements intact. Conjunctiva/sclera: Conjunctivae normal.   Cardiovascular:      Rate and Rhythm: Normal rate. Pulmonary:      Effort: No respiratory distress. Neurological:      General: No focal deficit present. Mental Status: She is alert. Psychiatric:         Mood and Affect: Mood normal.         Thought Content:  Thought content normal.     Niru Jordan [place]

## 2022-10-14 ENCOUNTER — TRANSCRIBE ORDER (OUTPATIENT)
Dept: SCHEDULING | Age: 32
End: 2022-10-14

## 2022-10-14 ENCOUNTER — OFFICE VISIT (OUTPATIENT)
Dept: FAMILY MEDICINE CLINIC | Age: 32
End: 2022-10-14

## 2022-10-14 VITALS
HEIGHT: 65 IN | WEIGHT: 279 LBS | DIASTOLIC BLOOD PRESSURE: 82 MMHG | SYSTOLIC BLOOD PRESSURE: 135 MMHG | BODY MASS INDEX: 46.48 KG/M2 | TEMPERATURE: 98.2 F | HEART RATE: 91 BPM | OXYGEN SATURATION: 99 % | RESPIRATION RATE: 16 BRPM

## 2022-10-14 DIAGNOSIS — G93.2 IDIOPATHIC INTRACRANIAL HYPERTENSION: Primary | ICD-10-CM

## 2022-10-14 PROCEDURE — 99214 OFFICE O/P EST MOD 30 MIN: CPT | Performed by: FAMILY MEDICINE

## 2022-10-14 PROCEDURE — 3074F SYST BP LT 130 MM HG: CPT | Performed by: FAMILY MEDICINE

## 2022-10-14 PROCEDURE — 3078F DIAST BP <80 MM HG: CPT | Performed by: FAMILY MEDICINE

## 2022-10-14 RX ORDER — ACETAZOLAMIDE 125 MG/1
125 TABLET ORAL 2 TIMES DAILY
Qty: 60 TABLET | Refills: 3 | Status: SHIPPED | OUTPATIENT
Start: 2022-10-14 | End: 2022-11-09 | Stop reason: DRUGHIGH

## 2022-10-14 NOTE — Clinical Note
Patient will be coming to see you in November for lifestyle medicine visit - tough story. Now has financial assistance so hopefully will get some symptomatic relief soon. French speaking, limited finances, multiple children, erratic eating pattern. Trying metformin for predm though not much change. Pain now leading to significant depression, just discussed briefly at last visit. Let me know if any questions.

## 2022-10-14 NOTE — PROGRESS NOTES
[facility]. Medicine Office Visit     Telephone Hungarian  used for encounter   Assessment/ Plan: Caio Ley is a 34 y.o. female presenting for:    Headaches  Idiopathic Intracranial Hypertension, Cervical Strain/Somatic Dysfunction  Morbid Obesity  Prediabetes - No improvement in symptoms with BP control, managing sinus congestion. In fact, symptoms seem to be worsening. Minimal weight loss, still somewhat erratic eating pattern. Open to referral to lifestyle medicine. MRI/MRV with findings consistent. Recently approved for [facility] financial assistance (letter to be scanned into chart), so will be able to schedule LP and neurology referral.   Lab Results   Component Value Date/Time    Hemoglobin A1c 6.2 (H) 06/15/2022 02:45 PM   -- open to trying acetazolamide again, will start with lower dose, reviewed allergic warnings and indications to stop  -- no improvement with Topamax   -- saw optometry - changed eye prescription, unclear if checked for papilledema (recommended seeing ophthalmology - will see if able to be seen by [facility], had referred to one group for $150 but unable to afford)   -- counseled extensively on weight loss - continue metformin   -- continue Flonase, has helped with sinus congestion   -- resources for financial assistance reviewed - continue to follow-up with [facility] as could potentially have Medicaid through April 2023  -- referral to neurology, will send message to hopefully coordinate visit before the end of the year, otherwise may place referral through [facility]   -- discussed with radiology and scheduled for LP with opening/closing pressures 11/3 at 0900 - will call to notify patient     45 minutes were spent on the day of this encounter both with the patient and in related activities including chart review, care coordination and counseling. Patient instructions were discussed and/or provided in the AVS. The patient understands and agrees to the plan.     RETURN TO CARE:   Future Appointments   Date Time Provider Aleksandra Reyez   11/16/2022 11:00 AM Gwen Mei DO [facility] BS AMB   11/18/2022 11:00 AM Adal Galarza MD [facility] BS AMB       Subjective:  Chief Complaint   Patient presents with    Headache     Patient is coming in for a follow up on headaches and weight loss. Estonian interpretor: [id number] No other concerns. HPI: Ryley Crane is a 34 y.o. female with PMH of obesity, cHTN, pre-diabetes, HLD, idiopathic intracranial HTN here for f/up of headaches. - worsening headaches with lots of pressure behind eyes, around ears     - was told could not get Medicaid because haven't lived here for 5 years  - Maria Guadalupe Franks reaching out via text to help with Medicaid application   - [facility] secours approval for financial assistance through 1/31/23     - wondering about mood - feels like needs appointment to discuss  feels like locking herself in room and not talking to anyone  wants to take long nap and have no one bother her   - denies thoughts of wanting to hurt herself  sometimes thoughts of not wanting to live because overwhelmed by finances, family demands     I have reviewed the patients problem list, current medications, allergies, family, medical and social history. I have updated them as needed. Review of Systems  See HPI. Objective:  Visit Vitals  /82 (BP 1 Location: Right upper arm, BP Patient Position: Sitting)   Pulse 91   Temp 98.2 °F (36.8 °C) (Oral)   Resp 16   Ht 5' 5\" (1.651 m)   Wt 279 lb (126.6 kg)   SpO2 99%   BMI 46.43 kg/m²     Physical Exam  Vitals and nursing note reviewed. Constitutional:       General: She is not in acute distress. Appearance: Normal appearance. HENT:      Head: Normocephalic and atraumatic. Eyes:      Extraocular Movements: Extraocular movements intact. Conjunctiva/sclera: Conjunctivae normal.   Cardiovascular:      Rate and Rhythm: Normal rate. Pulmonary:      Effort: No respiratory distress. Neurological:      General: No focal deficit present. Mental Status: She is alert. Cranial Nerves: No cranial nerve deficit. Psychiatric:         Thought Content:  Thought content normal.         Judgment: Judgment normal.      Comments: Intermittently tearful       Niru Orellana [place]

## 2022-10-14 NOTE — PROGRESS NOTES
Rey Cazares is a 34 y.o. female    Chief Complaint   Patient presents with    Headache     Patient is coming in for a follow up on headaches and weight loss. Polish interpretor: [id number] No other concerns. 1. Have you been to the ER, urgent care clinic since your last visit? Hospitalized since your last visit? No    2. Have you seen or consulted any other health care providers outside of the [address] [place] [address] since your last visit? Include any pap smears or colon screening. No      Visit Vitals  /82 (BP 1 Location: Right upper arm, BP Patient Position: Sitting)   Pulse 91   Temp 98.2 °F (36.8 °C) (Oral)   Resp 16   Ht 5' 5\" (1.651 m)   Wt 279 lb (126.6 kg)   SpO2 99%   BMI 46.43 kg/m²           Health Maintenance Due   Topic Date Due    COVID-19 Vaccine (1) Never done    Flu Vaccine (1) 08/01/2022         Medication Reconciliation completed, changes noted.   Please  Update medication list.

## 2022-10-25 ENCOUNTER — TRANSCRIBE ORDER (OUTPATIENT)
Dept: SCHEDULING | Age: 32
End: 2022-10-25

## 2022-10-25 DIAGNOSIS — G93.2 IDIOPATHIC INTRACRANIAL HYPERTENSION: Primary | ICD-10-CM

## 2022-10-31 ENCOUNTER — TELEPHONE (OUTPATIENT)
Dept: FAMILY MEDICINE CLINIC | Age: 32
End: 2022-10-31

## 2022-10-31 NOTE — TELEPHONE ENCOUNTER
Called and left patient message with information regarding appointment for LP on 11/3 at [address] at [facility]. Should be NPO for at least 4 hours. Should stop Diamox. Will try to call again.      [facility], [id number] [facility], [address] [place] [address]  10/31/2022

## 2022-11-01 ENCOUNTER — APPOINTMENT (OUTPATIENT)
Dept: GENERAL RADIOLOGY | Age: 32
End: 2022-11-01
Attending: EMERGENCY MEDICINE
Payer: MEDICAID

## 2022-11-01 ENCOUNTER — HOSPITAL ENCOUNTER (EMERGENCY)
Age: 32
Discharge: HOME | End: 2022-11-01
Attending: EMERGENCY MEDICINE
Payer: MEDICAID

## 2022-11-01 VITALS
HEART RATE: 85 BPM | OXYGEN SATURATION: 100 % | SYSTOLIC BLOOD PRESSURE: 162 MMHG | TEMPERATURE: 98.3 F | DIASTOLIC BLOOD PRESSURE: 100 MMHG | RESPIRATION RATE: 16 BRPM

## 2022-11-01 DIAGNOSIS — J11.1 INFLUENZA: Primary | ICD-10-CM

## 2022-11-01 LAB
DEPRECATED S PYO AG THROAT QL EIA: NEGATIVE
FLUAV AG NPH QL IA: NEGATIVE
FLUBV AG NOSE QL IA: NEGATIVE

## 2022-11-01 PROCEDURE — 87147 CULTURE TYPE IMMUNOLOGIC: CPT

## 2022-11-01 PROCEDURE — 87070 CULTURE OTHR SPECIMN AEROBIC: CPT

## 2022-11-01 PROCEDURE — U0005 INFEC AGEN DETEC AMPLI PROBE: HCPCS

## 2022-11-01 PROCEDURE — 99284 EMERGENCY DEPT VISIT MOD MDM: CPT

## 2022-11-01 PROCEDURE — 87880 STREP A ASSAY W/OPTIC: CPT

## 2022-11-01 PROCEDURE — 87804 INFLUENZA ASSAY W/OPTIC: CPT

## 2022-11-01 PROCEDURE — 71046 X-RAY EXAM CHEST 2 VIEWS: CPT

## 2022-11-01 RX ORDER — OSELTAMIVIR PHOSPHATE 75 MG/1
75 CAPSULE ORAL 2 TIMES DAILY
Qty: 10 CAPSULE | Refills: 0 | Status: SHIPPED | OUTPATIENT
Start: 2022-11-01 | End: 2022-11-06

## 2022-11-01 NOTE — ED PROVIDER NOTES
Ms. Eva Gates is a 31yo female who presents to the ER with complaints of flulike illness. She reports that multiple family members have been sick. She said that they were diagnosed with \"common cold. \"  However, they are placed on Tamiflu for this. The  states that sometimes an airbag the, \"influenza uses same word. She reports that she began to have a headache yesterday. She reports some sore throat and cough. She reports generalized fatigue and aches. She also reports some right knee pain. The right knee pain is over a-year-old. She had an injection in his right knee last year. The pain has been slowly worsening for the last 3 to 4 months. She denies shortness of breath. She denies any other complaints.       Patient's history was obtained using an          Past Medical History:   Diagnosis Date    Essential hypertension     last two pregnancies       Past Surgical History:   Procedure Laterality Date    HX  SECTION      x2    HX DILATION AND CURETTAGE      HX OTHER SURGICAL           Family History:   Problem Relation Age of Onset    Hypertension Father        Social History     Socioeconomic History    Marital status:      Spouse name: Not on file    Number of children: Not on file    Years of education: Not on file    Highest education level: Not on file   Occupational History    Not on file   Tobacco Use    Smoking status: Never    Smokeless tobacco: Never   Substance and Sexual Activity    Alcohol use: Not Currently    Drug use: Never    Sexual activity: Not Currently   Other Topics Concern     Service Not Asked    Blood Transfusions Not Asked    Caffeine Concern Not Asked    Occupational Exposure Not Asked    Hobby Hazards Not Asked    Sleep Concern Not Asked    Stress Concern Not Asked    Weight Concern Not Asked    Special Diet Not Asked    Back Care Not Asked    Exercise Not Asked    Bike Helmet Not Asked    Seat Belt Not Asked    Self-Exams Not Asked   Social History Narrative    ** Merged History Encounter **          Social Determinants of Health     Financial Resource Strain: Not on file   Food Insecurity: Not on file   Transportation Needs: Not on file   Physical Activity: Not on file   Stress: Not on file   Social Connections: Not on file   Intimate Partner Violence: Not on file   Housing Stability: Not on file         ALLERGIES: Acetazolamide and Other medication    Review of Systems   Constitutional:  Positive for fatigue. HENT:  Positive for congestion and sore throat. Negative for rhinorrhea. Respiratory:  Positive for cough. Negative for shortness of breath. Cardiovascular:  Negative for chest pain. Gastrointestinal:  Negative for abdominal pain, diarrhea, nausea and vomiting. Genitourinary:  Negative for dysuria and urgency. Musculoskeletal:         Knee pain   Skin:  Negative for rash. Neurological:  Negative for dizziness, weakness and light-headedness. Vitals:    11/01/22 1257   BP: (!) 162/100   Pulse: 85   Resp: 16   Temp: 98.3 °F (36.8 °C)   SpO2: 100%            Physical Exam     Vital signs reviewed. Nursing notes reviewed.     Const:  No acute distress, well developed, well nourished  Head:  Atraumatic, normocephalic  Eyes:  PERRL, conjunctiva normal, no scleral icterus  Neck:  Supple, trachea midline  Cardiovascular: Regular rate  Resp:  No resp distress, no increased work of breathing  Abd:  Soft, non-tender, non-distended  MSK:  No pedal edema, range of motion of the right knee limited by pain, patient able to walk unassisted but does have a mild limp, no palpable knee effusion, leg swelling, or erythema  Neuro:  Alert and oriented x3, no cranial nerve defect  Skin:  Warm, dry, intact  Psych: normal mood and affect, behavior is normal, judgement and thought content is normal        MDM     Amount and/or Complexity of Data Reviewed  Clinical lab tests: reviewed and ordered  Tests in the radiology section of CPT®: ordered and reviewed  Review and summarize past medical records: yes           Ms. Shahnaz Rushing is a 29yo female who presents to the ER with complaints of flu-like illness. Her daughter, who is with her, is having similar sx. Her daughter and it sounds like multiple other family members have tested positive for influenza. I believe that she has the flu. No pneumonia on xray. Negative strep. Covid test is pending. Pt. States that she started feeling bad yesterday. I will start her on tamiflu. She is to follow-up with her PCP or return to the ER with new or worsening sx.       Procedures

## 2022-11-01 NOTE — ED TRIAGE NOTES
in use. Pt presents with sore throat, headache, and mild cough for 2 days. Right knee pain for 15 days.

## 2022-11-02 LAB
SARS-COV-2, XPLCVT: NOT DETECTED
SOURCE, COVRS: NORMAL

## 2022-11-03 ENCOUNTER — HOSPITAL ENCOUNTER (OUTPATIENT)
Dept: GENERAL RADIOLOGY | Age: 32
Discharge: HOME | End: 2022-11-03
Attending: FAMILY MEDICINE
Payer: COMMERCIAL

## 2022-11-03 VITALS
HEART RATE: 82 BPM | TEMPERATURE: 98.1 F | RESPIRATION RATE: 16 BRPM | OXYGEN SATURATION: 99 % | DIASTOLIC BLOOD PRESSURE: 63 MMHG | SYSTOLIC BLOOD PRESSURE: 124 MMHG

## 2022-11-03 DIAGNOSIS — G93.2 IDIOPATHIC INTRACRANIAL HYPERTENSION: ICD-10-CM

## 2022-11-03 LAB
BACTERIA SPEC CULT: ABNORMAL
BACTERIA SPEC CULT: ABNORMAL
SERVICE CMNT-IMP: ABNORMAL

## 2022-11-03 PROCEDURE — 77030003666 HC NDL SPINAL BD -A

## 2022-11-03 PROCEDURE — 74011000250 HC RX REV CODE- 250: Performed by: FAMILY MEDICINE

## 2022-11-03 PROCEDURE — 62270 DX LMBR SPI PNXR: CPT

## 2022-11-03 PROCEDURE — 74011250636 HC RX REV CODE- 250/636: Performed by: NURSE PRACTITIONER

## 2022-11-03 PROCEDURE — 77030014132 HC TY LUMBR PUNC BD -A

## 2022-11-03 RX ORDER — LIDOCAINE HYDROCHLORIDE 10 MG/ML
4 INJECTION, SOLUTION EPIDURAL; INFILTRATION; INTRACAUDAL; PERINEURAL
Status: COMPLETED | OUTPATIENT
Start: 2022-11-03 | End: 2022-11-03

## 2022-11-03 RX ORDER — ONDANSETRON 4 MG/1
8 TABLET, ORALLY DISINTEGRATING ORAL ONCE
Status: COMPLETED | OUTPATIENT
Start: 2022-11-03 | End: 2022-11-03

## 2022-11-03 RX ADMIN — LIDOCAINE HYDROCHLORIDE 2 ML: 10 INJECTION, SOLUTION EPIDURAL; INFILTRATION; INTRACAUDAL; PERINEURAL at 11:35

## 2022-11-03 RX ADMIN — ONDANSETRON 8 MG: 4 TABLET, ORALLY DISINTEGRATING ORAL at 12:17

## 2022-11-03 NOTE — PROGRESS NOTES
11:30 AM Patient received S/P lumbar puncture from radiology in supine position on stretcher. A  was used, Vin Romero [id number] as patient speaks Lithuanian. Patient driven by spouse phone number [phone]. Patient reports having headche as before LP. Home meds reviewed, has not taken any of her medications yet today. Instructed she may take ibuprofen or tylenol for headache as at home as per verbal instruction radiology provider Carline Rivera NP and may have a dose at present if desired, patient declined a dose of medication but asked for a prescription for it. Explained to patient that Tylenol or acetaminophen or Motrin/Advil/Ibuprofen are all available over the counter for headache use as directed on package and again offered a dose now and patient refused. All of the post lumbar puncture discharge instructions were reviewed at this time with the patient using interpretor and patient voices good understanding. Offered restroom at this time and declined. VS checked and WNL. Water offered. 12:10 PM Ready to discharge patient and she began having dry heaves, given emesis bag and provider called, order received for Zofran ODT from Carline Rivera NP.    12:17 PM. Zofran ODT administered.  updated on status by Griselda Cuba RN    12:35 PM No further nausea or dry heaves, discharge instructions in hand and patient assisted into W/C. Bandaid is CDI to lower back. Discharged via w/c to passenger seat of private vehicle with spouse.

## 2022-11-04 ENCOUNTER — HOSPITAL ENCOUNTER (EMERGENCY)
Age: 32
Discharge: HOME | End: 2022-11-04
Attending: EMERGENCY MEDICINE
Payer: COMMERCIAL

## 2022-11-04 ENCOUNTER — TELEPHONE (OUTPATIENT)
Dept: FAMILY MEDICINE CLINIC | Age: 32
End: 2022-11-04

## 2022-11-04 ENCOUNTER — APPOINTMENT (OUTPATIENT)
Dept: CT IMAGING | Age: 32
End: 2022-11-04
Attending: EMERGENCY MEDICINE
Payer: COMMERCIAL

## 2022-11-04 VITALS
SYSTOLIC BLOOD PRESSURE: 136 MMHG | BODY MASS INDEX: 48.65 KG/M2 | HEART RATE: 84 BPM | HEIGHT: 65 IN | RESPIRATION RATE: 18 BRPM | WEIGHT: 292 LBS | OXYGEN SATURATION: 99 % | TEMPERATURE: 98.6 F | DIASTOLIC BLOOD PRESSURE: 93 MMHG

## 2022-11-04 DIAGNOSIS — G97.1 POST LUMBAR PUNCTURE HEADACHE: ICD-10-CM

## 2022-11-04 DIAGNOSIS — R51.9 ACUTE NONINTRACTABLE HEADACHE, UNSPECIFIED HEADACHE TYPE: Primary | ICD-10-CM

## 2022-11-04 LAB
ALBUMIN SERPL-MCNC: 3.5 G/DL (ref 3.5–5)
ALBUMIN/GLOB SERPL: 0.8 {RATIO} (ref 1.1–2.2)
ALP SERPL-CCNC: 57 U/L (ref 45–117)
ALT SERPL-CCNC: 13 U/L (ref 12–78)
ANION GAP SERPL CALC-SCNC: 6 MMOL/L (ref 5–15)
AST SERPL-CCNC: 9 U/L (ref 15–37)
BASOPHILS # BLD: 0 K/UL (ref 0–0.1)
BASOPHILS NFR BLD: 1 % (ref 0–1)
BILIRUB SERPL-MCNC: 0.6 MG/DL (ref 0.2–1)
BUN SERPL-MCNC: 10 MG/DL (ref 6–20)
BUN/CREAT SERPL: 14 (ref 12–20)
CALCIUM SERPL-MCNC: 9.8 MG/DL (ref 8.5–10.1)
CHLORIDE SERPL-SCNC: 102 MMOL/L (ref 97–108)
CO2 SERPL-SCNC: 28 MMOL/L (ref 21–32)
COMMENT, HOLDF: NORMAL
CREAT SERPL-MCNC: 0.7 MG/DL (ref 0.55–1.02)
DIFFERENTIAL METHOD BLD: ABNORMAL
EOSINOPHIL # BLD: 0.1 K/UL (ref 0–0.4)
EOSINOPHIL NFR BLD: 2 % (ref 0–7)
ERYTHROCYTE [DISTWIDTH] IN BLOOD BY AUTOMATED COUNT: 18.1 % (ref 11.5–14.5)
GLOBULIN SER CALC-MCNC: 4.6 G/DL (ref 2–4)
GLUCOSE SERPL-MCNC: 119 MG/DL (ref 65–100)
HCG UR QL: NEGATIVE
HCT VFR BLD AUTO: 42.1 % (ref 35–47)
HGB BLD-MCNC: 12.5 G/DL (ref 11.5–16)
IMM GRANULOCYTES # BLD AUTO: 0 K/UL (ref 0–0.04)
IMM GRANULOCYTES NFR BLD AUTO: 0 % (ref 0–0.5)
LYMPHOCYTES # BLD: 2.2 K/UL (ref 0.8–3.5)
LYMPHOCYTES NFR BLD: 46 % (ref 12–49)
MCH RBC QN AUTO: 21.8 PG (ref 26–34)
MCHC RBC AUTO-ENTMCNC: 29.7 G/DL (ref 30–36.5)
MCV RBC AUTO: 73.3 FL (ref 80–99)
MONOCYTES # BLD: 0.4 K/UL (ref 0–1)
MONOCYTES NFR BLD: 7 % (ref 5–13)
NEUTS SEG # BLD: 2.1 K/UL (ref 1.8–8)
NEUTS SEG NFR BLD: 44 % (ref 32–75)
NRBC # BLD: 0 K/UL (ref 0–0.01)
NRBC BLD-RTO: 0 PER 100 WBC
PLATELET # BLD AUTO: 275 K/UL (ref 150–400)
PMV BLD AUTO: 11 FL (ref 8.9–12.9)
POTASSIUM SERPL-SCNC: 3.8 MMOL/L (ref 3.5–5.1)
PROT SERPL-MCNC: 8.1 G/DL (ref 6.4–8.2)
RBC # BLD AUTO: 5.74 M/UL (ref 3.8–5.2)
SAMPLES BEING HELD,HOLD: NORMAL
SODIUM SERPL-SCNC: 136 MMOL/L (ref 136–145)
WBC # BLD AUTO: 4.8 K/UL (ref 3.6–11)

## 2022-11-04 PROCEDURE — 96375 TX/PRO/DX INJ NEW DRUG ADDON: CPT

## 2022-11-04 PROCEDURE — 70450 CT HEAD/BRAIN W/O DYE: CPT

## 2022-11-04 PROCEDURE — 80053 COMPREHEN METABOLIC PANEL: CPT

## 2022-11-04 PROCEDURE — 36415 COLL VENOUS BLD VENIPUNCTURE: CPT

## 2022-11-04 PROCEDURE — 74011250636 HC RX REV CODE- 250/636: Performed by: EMERGENCY MEDICINE

## 2022-11-04 PROCEDURE — 81025 URINE PREGNANCY TEST: CPT

## 2022-11-04 PROCEDURE — 96374 THER/PROPH/DIAG INJ IV PUSH: CPT

## 2022-11-04 PROCEDURE — 99284 EMERGENCY DEPT VISIT MOD MDM: CPT

## 2022-11-04 PROCEDURE — 85025 COMPLETE CBC W/AUTO DIFF WBC: CPT

## 2022-11-04 RX ORDER — PROCHLORPERAZINE EDISYLATE 5 MG/ML
10 INJECTION INTRAMUSCULAR; INTRAVENOUS
Status: DISCONTINUED | OUTPATIENT
Start: 2022-11-04 | End: 2022-11-04 | Stop reason: HOSPADM

## 2022-11-04 RX ORDER — DIPHENHYDRAMINE HYDROCHLORIDE 50 MG/ML
25 INJECTION, SOLUTION INTRAMUSCULAR; INTRAVENOUS
Status: COMPLETED | OUTPATIENT
Start: 2022-11-04 | End: 2022-11-04

## 2022-11-04 RX ORDER — BUTALBITAL, ACETAMINOPHEN AND CAFFEINE 300; 40; 50 MG/1; MG/1; MG/1
1 CAPSULE ORAL
Qty: 12 CAPSULE | Refills: 0 | Status: SHIPPED | OUTPATIENT
Start: 2022-11-04

## 2022-11-04 RX ORDER — KETOROLAC TROMETHAMINE 30 MG/ML
30 INJECTION, SOLUTION INTRAMUSCULAR; INTRAVENOUS
Status: COMPLETED | OUTPATIENT
Start: 2022-11-04 | End: 2022-11-04

## 2022-11-04 RX ORDER — ONDANSETRON 4 MG/1
4 TABLET, ORALLY DISINTEGRATING ORAL
Qty: 12 TABLET | Refills: 0 | Status: SHIPPED | OUTPATIENT
Start: 2022-11-04

## 2022-11-04 RX ADMIN — KETOROLAC TROMETHAMINE 30 MG: 30 INJECTION, SOLUTION INTRAMUSCULAR; INTRAVENOUS at 12:40

## 2022-11-04 RX ADMIN — SODIUM CHLORIDE 1000 ML: 9 INJECTION, SOLUTION INTRAVENOUS at 12:41

## 2022-11-04 RX ADMIN — DIPHENHYDRAMINE HYDROCHLORIDE 25 MG: 50 INJECTION, SOLUTION INTRAMUSCULAR; INTRAVENOUS at 12:39

## 2022-11-04 RX ADMIN — PROCHLORPERAZINE EDISYLATE 10 MG: 5 INJECTION INTRAMUSCULAR; INTRAVENOUS at 12:41

## 2022-11-04 NOTE — CONSULTS
Anesthesia Consult for Headache possible blood patch after Lumbar Puncture. Pt c/o worse headache after lumbar puncture yesterday. LP was to treat Intracranial hypertension headache. Headache has been chronic since July. Patient was sedated due to analgesics received in ER. History from .  used via internet. I feel like I did get an adequate history from . Her headache is worse than her chronic headache and does seem to be positional, however without closely questioning the patient I am not fully certain. In any event I do not think going straight to blood patch 24 hours after procedure is best.  There is a good chance the post puncture portion of the headache will resolve on its own in a few days. I do not want to risk making the headache worse or doing another procedure on her spine so soon after the procedure that has irritated her. My advice for treating just the post puncture headache is to call her neurologist on Monday or some day next week, and have them contact the anesthesia department and we can make a decision if appropriate to do blood patch.           Consult Date: 2022    Consults    Subjective     Past Medical History:   Diagnosis Date    Essential hypertension     last two pregnancies      Past Surgical History:   Procedure Laterality Date    HX  SECTION      x2    HX DILATION AND CURETTAGE      HX OTHER SURGICAL       Family History   Problem Relation Age of Onset    Hypertension Father       Social History     Tobacco Use    Smoking status: Never    Smokeless tobacco: Never   Substance Use Topics    Alcohol use: Not Currently       Current Facility-Administered Medications   Medication Dose Route Frequency Provider Last Rate Last Admin    prochlorperazine (COMPAZINE) injection 10 mg  10 mg IntraVENous Q6H PRN Timi Ken MD   10 mg at 22 1241     Current Outpatient Medications   Medication Sig Dispense Refill    oseltamivir (Tamiflu) 75 mg capsule Take 1 Capsule by mouth two (2) times a day for 5 days. 10 Capsule 0    acetaZOLAMIDE (DIAMOX) 125 mg tablet Take 1 Tablet by mouth two (2) times a day. Indications: pseudotumor cerebri, a condition with high fluid pressure in the brain 60 Tablet 3    metFORMIN (GLUCOPHAGE) 500 mg tablet Take 1 Tablet by mouth two (2) times daily (with meals). 60 Tablet 3    fluticasone propionate (FLONASE) 50 mcg/actuation nasal spray 2 Sprays by Both Nostrils route daily. 1 Each 11        Review of Systems    Objective     Vital signs for last 24 hours:  Visit Vitals  /80 (BP 1 Location: Right upper arm, BP Patient Position: Sitting)   Pulse 73   Temp 36.4 °C (97.6 °F)   Resp 16   Ht 5' 5\" (1.651 m)   Wt 132.5 kg (292 lb)   SpO2 100%   BMI 48.59 kg/m²       Intake/Output this shift:  Current Shift: No intake/output data recorded. Last 3 Shifts: No intake/output data recorded. Data Review:   Recent Results (from the past 24 hour(s))   SAMPLES BEING HELD    Collection Time: 11/04/22 12:32 PM   Result Value Ref Range    SAMPLES BEING HELD 1RED,1DK GRN,1BLUE,1SST     COMMENT        Add-on orders for these samples will be processed based on acceptable specimen integrity and analyte stability, which may vary by analyte. CBC WITH AUTOMATED DIFF    Collection Time: 11/04/22 12:32 PM   Result Value Ref Range    WBC 4.8 3.6 - 11.0 K/uL    RBC 5.74 (H) 3.80 - 5.20 M/uL    HGB 12.5 11.5 - 16.0 g/dL    HCT 42.1 35.0 - 47.0 %    MCV 73.3 (L) 80.0 - 99.0 FL    MCH 21.8 (L) 26.0 - 34.0 PG    MCHC 29.7 (L) 30.0 - 36.5 g/dL    RDW 18.1 (H) 11.5 - 14.5 %    PLATELET 238 086 - 081 K/uL    MPV 11.0 8.9 - 12.9 FL    NRBC 0.0 0  WBC    ABSOLUTE NRBC 0.00 0.00 - 0.01 K/uL    NEUTROPHILS 44 32 - 75 %    LYMPHOCYTES 46 12 - 49 %    MONOCYTES 7 5 - 13 %    EOSINOPHILS 2 0 - 7 %    BASOPHILS 1 0 - 1 %    IMMATURE GRANULOCYTES 0 0.0 - 0.5 %    ABS. NEUTROPHILS 2.1 1.8 - 8.0 K/UL    ABS.  LYMPHOCYTES 2.2 0.8 - 3.5 K/UL ABS. MONOCYTES 0.4 0.0 - 1.0 K/UL    ABS. EOSINOPHILS 0.1 0.0 - 0.4 K/UL    ABS. BASOPHILS 0.0 0.0 - 0.1 K/UL    ABS. IMM. GRANS. 0.0 0.00 - 0.04 K/UL    DF AUTOMATED     METABOLIC PANEL, COMPREHENSIVE    Collection Time: 11/04/22 12:32 PM   Result Value Ref Range    Sodium 136 136 - 145 mmol/L    Potassium 3.8 3.5 - 5.1 mmol/L    Chloride 102 97 - 108 mmol/L    CO2 28 21 - 32 mmol/L    Anion gap 6 5 - 15 mmol/L    Glucose 119 (H) 65 - 100 mg/dL    BUN 10 6 - 20 MG/DL    Creatinine 0.70 0.55 - 1.02 MG/DL    BUN/Creatinine ratio 14 12 - 20      eGFR >60 >60 ml/min/1.73m2    Calcium 9.8 8.5 - 10.1 MG/DL    Bilirubin, total 0.6 0.2 - 1.0 MG/DL    ALT (SGPT) 13 12 - 78 U/L    AST (SGOT) 9 (L) 15 - 37 U/L    Alk.  phosphatase 57 45 - 117 U/L    Protein, total 8.1 6.4 - 8.2 g/dL    Albumin 3.5 3.5 - 5.0 g/dL    Globulin 4.6 (H) 2.0 - 4.0 g/dL    A-G Ratio 0.8 (L) 1.1 - 2.2         Physical Exam

## 2022-11-04 NOTE — TELEPHONE ENCOUNTER
Patient daughter called in to inform you that her mom is having really bad back pain after the spinal puncture and also really bad headaches. She asked if you can give her mom a call at [phone].

## 2022-11-04 NOTE — ED PROVIDER NOTES
Ms. Aminata Honeycutt is a 29yo female who presents to the ER with complaints of headache. She presents to the ER today because she had an LP yesterday. She has had worsening headaches since then. He said that she has a severe headache. She feels dizzy and nauseous. She said that she feels worse than she did before she had the LP. She is taking Tylenol gram of Tylenol. She reports generalized fatigue. She denies similar symptoms in the past.  She complains of some low back pain as well. She said that there is no way that she can be pregnant. She denies any other complaints.        Past Medical History:   Diagnosis Date    Essential hypertension     last two pregnancies       Past Surgical History:   Procedure Laterality Date    HX  SECTION      x2    HX DILATION AND CURETTAGE      HX OTHER SURGICAL           Family History:   Problem Relation Age of Onset    Hypertension Father        Social History     Socioeconomic History    Marital status:      Spouse name: Not on file    Number of children: Not on file    Years of education: Not on file    Highest education level: Not on file   Occupational History    Not on file   Tobacco Use    Smoking status: Never    Smokeless tobacco: Never   Substance and Sexual Activity    Alcohol use: Not Currently    Drug use: Never    Sexual activity: Not Currently   Other Topics Concern     Service Not Asked    Blood Transfusions Not Asked    Caffeine Concern Not Asked    Occupational Exposure Not Asked    Hobby Hazards Not Asked    Sleep Concern Not Asked    Stress Concern Not Asked    Weight Concern Not Asked    Special Diet Not Asked    Back Care Not Asked    Exercise Not Asked    Bike Helmet Not Asked    Seat Belt Not Asked    Self-Exams Not Asked   Social History Narrative    ** Merged History Encounter **          Social Determinants of Health     Financial Resource Strain: Not on file   Food Insecurity: Not on file   Transportation Needs: Not on file Physical Activity: Not on file   Stress: Not on file   Social Connections: Not on file   Intimate Partner Violence: Not on file   Housing Stability: Not on file         ALLERGIES: Acetazolamide and Other medication    Review of Systems   Constitutional:  Positive for fatigue. Negative for chills and fever. HENT:  Negative for rhinorrhea and sore throat. Respiratory:  Negative for cough and shortness of breath. Cardiovascular:  Negative for chest pain. Gastrointestinal:  Positive for nausea. Negative for abdominal pain and diarrhea. Genitourinary:  Negative for dysuria and urgency. Musculoskeletal:  Negative for arthralgias and back pain. Skin:  Negative for rash. Neurological:  Positive for dizziness and headaches. Negative for weakness and light-headedness. Vitals:    11/04/22 1225 11/04/22 1226   BP: 135/76 139/80   Pulse: 80 73   Resp: 24 16   Temp:  97.6 °F (36.4 °C)   SpO2: 100% 100%   Weight: 126.6 kg (279 lb) 132.5 kg (292 lb)   Height: 5' 5\" (1.651 m)             Physical Exam       Vital signs reviewed. Nursing notes reviewed. Const: Appears uncomfortable   head:  Atraumatic, normocephalic  Eyes:  PERRL, conjunctiva normal, no scleral icterus  Neck:  Supple, trachea midline, no nuchal rigidity, no meningeal signs  Cardiovascular: Regular rate  Resp:  No resp distress, no increased work of breathing  Abd:  Soft, non-tender, non-distended  MSK:  No pedal edema, normal ROM, mild diffuse L-spine tenderness  Neuro:  Alert and oriented x3, no cranial nerve defect  Skin: No signs of erythema or drainage at the site on the lower back of the LP  Psych: Crying during exam        MDM     Amount and/or Complexity of Data Reviewed  Clinical lab tests: ordered and reviewed  Tests in the radiology section of CPT®: ordered and reviewed  Review and summarize past medical records: yes    Patient Progress  Patient progress: stable           Ms. Vladislav Parrish is a 31yo female who presents to the ER with complaints of headache after an LP yesterday. Pt. Recently also was diagnosed with influenza. No meningeal signs on exam. No mass or bleed on head CT. She states that she feels better after meds in the ER. She was seen by anesthesia, who recommends follow-up with her doctor on Monday if her pain persists. Pt. To return to the ER with new or worsening sx.       Procedures

## 2022-11-04 NOTE — ED TRIAGE NOTES
Pt arrives to ED with complaints of weakness, dizziness, headache, and report she is unable to stand. Pt appears weak, diaphoretic. Pt reports nausea, right hip pain since having a spinal tap here yesterday after having headaches.

## 2022-11-04 NOTE — Clinical Note
Nirali Hernandez was seen and treated in our emergency department on 11/4/2022. Vasu Gil should be excused from work until 11/8/22.     Caren Man MD

## 2022-11-04 NOTE — ED NOTES
Anaesthesiologist at bedside to assess patient. Patient drowsy from IV benadryl .  in use,  at bedside to answer questions.

## 2022-11-08 NOTE — TELEPHONE ENCOUNTER
Patient seen in ER and has neurology follow-up scheduled.      [address] [facility], [address]

## 2022-11-09 ENCOUNTER — OFFICE VISIT (OUTPATIENT)
Dept: NEUROLOGY | Age: 32
End: 2022-11-09
Payer: MEDICAID

## 2022-11-09 VITALS — SYSTOLIC BLOOD PRESSURE: 140 MMHG | HEART RATE: 95 BPM | OXYGEN SATURATION: 99 % | DIASTOLIC BLOOD PRESSURE: 80 MMHG

## 2022-11-09 DIAGNOSIS — R51.9 CHRONIC INTRACTABLE HEADACHE, UNSPECIFIED HEADACHE TYPE: Primary | ICD-10-CM

## 2022-11-09 DIAGNOSIS — G89.29 CHRONIC INTRACTABLE HEADACHE, UNSPECIFIED HEADACHE TYPE: Primary | ICD-10-CM

## 2022-11-09 PROCEDURE — 3074F SYST BP LT 130 MM HG: CPT | Performed by: PSYCHIATRY & NEUROLOGY

## 2022-11-09 PROCEDURE — 3078F DIAST BP <80 MM HG: CPT | Performed by: PSYCHIATRY & NEUROLOGY

## 2022-11-09 PROCEDURE — 99245 OFF/OP CONSLTJ NEW/EST HI 55: CPT | Performed by: PSYCHIATRY & NEUROLOGY

## 2022-11-09 RX ORDER — ACETAZOLAMIDE 500 MG/1
500 CAPSULE, EXTENDED RELEASE ORAL 2 TIMES DAILY
Qty: 60 CAPSULE | Refills: 3 | Status: SHIPPED | OUTPATIENT
Start: 2022-11-09

## 2022-11-09 NOTE — PROGRESS NOTES
NEUROLOGY NEW PATIENT OFFICE CONSULTATION      11/9/2022    RE: Roddy Snow         1990      REFERRED BY:  Zeina Bernardo DO        CHIEF COMPLAINT:  This is Roddy Snow is a 34 y.o. female right handed stay at home who had concerns including Headache. HPI:     For the past 15 yrs, patient noted headache, bitemporal going to the back, daily, described as \"boiling water\", 10/10, constant, (+) nausea (+) vomiting (+) photophobia (+) phonophobia (+) blurred vision. Was seen by a headache doctor in [facility]. Thought it was sinus allergies placed on unrecalled meds. Saw optometrist in Aug 2022- who said she just needs glasses but they are not clear if her optic nerve was examined. Placed on Diamox 125 mg BID 1 month ago with no benefit. Developed some paresthesia    (+) weight gain  (+) not sleeping well - only 5 hrs      Recent tests done:  MRI brain: Imaging findings are suggestive of idiopathic intracranial hypertension. Increased perineural CSF, mild posterior flattening  MRV brain (-) venous sinus thrombosis  CT head - no acute finding    Opening pressure approximately  17 cmH2O.    ROS  (-) fever  (-) rash  All other systems reviewed and are negative    Past Medical Hx  Past Medical History:   Diagnosis Date    Essential hypertension     last two pregnancies   R knee pain    Social Hx  Social History     Socioeconomic History    Marital status:    Tobacco Use    Smoking status: Never    Smokeless tobacco: Never   Substance and Sexual Activity    Alcohol use: Not Currently    Drug use: Never    Sexual activity: Not Currently   Social History Narrative    ** Merged History Encounter **            Family Hx  Family History   Problem Relation Age of Onset    Hypertension Father        ALLERGIES  Allergies   Allergen Reactions    Acetazolamide Unknown (comments)     Rash arm and paresthesias. Other Medication Unknown (comments)     Pt.  States does not remember the name of the shot but had a reaction. CURRENT MEDS  Current Outpatient Medications   Medication Sig Dispense Refill    acetaZOLAMIDE SR (DIAMOX) 500 mg capsule Take 1 Capsule by mouth two (2) times a day. 60 Capsule 3    butalbital-acetaminophen-caff (Fioricet) -40 mg per capsule Take 1 Capsule by mouth every four (4) hours as needed for Headache. 12 Capsule 0    ondansetron (ZOFRAN ODT) 4 mg disintegrating tablet Take 1 Tablet by mouth every eight (8) hours as needed for Nausea or Vomiting. 12 Tablet 0    fluticasone propionate (FLONASE) 50 mcg/actuation nasal spray 2 Sprays by Both Nostrils route daily. 1 Each 11    metFORMIN (GLUCOPHAGE) 500 mg tablet Take 1 Tablet by mouth two (2) times daily (with meals). (Patient not taking: Reported on 11/9/2022) 60 Tablet 3           PREVIOUS WORKUP: (reviewed)  IMAGING:    CT Results (recent):  Results from Hospital Encounter encounter on 11/04/22    CT HEAD WO CONT    Narrative  INDICATION: headache    EXAM: CT HEAD without contrast.    TECHNIQUE: Unenhanced CT Head is performed. CT dose reduction was achieved  through use of a standardized protocol tailored for this examination and  automatic exposure control for dose modulation. FINDINGS:  No acute infarct is seen. There is no apparent mass on unenhanced imaging. There  is no bleed, shift, obstructive hydrocephalus or significant extra-axial fluid  collection. Bone windows are unremarkable. Impression  No acute intracranial finding. MRI Results (recent):  Results from [place] encounter on 06/30/22    MRV BRAIN WO CONT    Narrative  CLINICAL HISTORY: Tension type headache. INDICATION: Tension type headache. COMPARISON: NONE    TECHNIQUE: MR examination of the brain includes axial and sagittal T1, coronal  T2, axial T2, axial FLAIR, axial gradient echo, axial DWI. CONTRAST: None    FINDINGS:  There is no intracranial mass, hemorrhage or evidence of acute infarction. IACs are symmetric in size.  Mild tortuosity of the optic nerves. There is  slightly increased perineural CSF. There is flattening of the posterior globes. The brain architecture is normal. There is no evidence of midline shift or  mass-effect. There are no extra-axial fluid collections. There is no Chiari or  syrinx. The pituitary and infundibulum are grossly unremarkable. There is no  skull base mass. Cerebellopontine angles are grossly unremarkable. The major  intracranial vascular flow-voids are unremarkable. The cavernous sinuses are  symmetric. Optic chiasm and infundibulum grossly unremarkable. Orbits are  grossly symmetric. Dural venous sinuses are grossly patent. The mastoid air cells are well pneumatized and clear. Impression  Imaging findings are suggestive of idiopathic intracranial hypertension. Increased perineural CSF, mild posterior flattening. Correlation with lumbar  puncture opening pressures suggested. There is no intracranial mass, hemorrhage or evidence of acute infarction. No acute intracranial process is demonstrated. Clinical history: Tension type headache  INDICATION:   Tension type headache  COMPARISON: None  TECHNIQUE: Contrast enhanced and 3-D time-of-flight MRV of the brain was  performed. Multiplanar reconstructions were obtained. FINDINGS:  Transverse sinus is patent. . Sagittal sinus is patent. . Straight sinus is  patent. . Basilar veins are patent. . Sigmoid sinuses are patent. . There is no  aneurysm. There is no evidence of cortical vein thrombosis. The right transverse  sinus is dominant. There is moderate stenosis of the left transverse sinus. .    IMPRESSION:  No evidence of dural venous sinus thrombosis. 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 IR Results (recent):  No results found for this or any previous visit. VAS/US Results (recent):  No results found for this or any previous visit.           LABS (reviewed)  Results for orders placed or performed during the hospital encounter of 11/04/22   SAMPLES BEING HELD   Result Value Ref Range    SAMPLES BEING HELD 1RED,1DK GRN,1BLUE,1SST     COMMENT        Add-on orders for these samples will be processed based on acceptable specimen integrity and analyte stability, which may vary by analyte. CBC WITH AUTOMATED DIFF   Result Value Ref Range    WBC 4.8 3.6 - 11.0 K/uL    RBC 5.74 (H) 3.80 - 5.20 M/uL    HGB 12.5 11.5 - 16.0 g/dL    HCT 42.1 35.0 - 47.0 %    MCV 73.3 (L) 80.0 - 99.0 FL    MCH 21.8 (L) 26.0 - 34.0 PG    MCHC 29.7 (L) 30.0 - 36.5 g/dL    RDW 18.1 (H) 11.5 - 14.5 %    PLATELET 423 219 - 030 K/uL    MPV 11.0 8.9 - 12.9 FL    NRBC 0.0 0  WBC    ABSOLUTE NRBC 0.00 0.00 - 0.01 K/uL    NEUTROPHILS 44 32 - 75 %    LYMPHOCYTES 46 12 - 49 %    MONOCYTES 7 5 - 13 %    EOSINOPHILS 2 0 - 7 %    BASOPHILS 1 0 - 1 %    IMMATURE GRANULOCYTES 0 0.0 - 0.5 %    ABS. NEUTROPHILS 2.1 1.8 - 8.0 K/UL    ABS. LYMPHOCYTES 2.2 0.8 - 3.5 K/UL    ABS. MONOCYTES 0.4 0.0 - 1.0 K/UL    ABS. EOSINOPHILS 0.1 0.0 - 0.4 K/UL    ABS. BASOPHILS 0.0 0.0 - 0.1 K/UL    ABS. IMM. GRANS. 0.0 0.00 - 0.04 K/UL    DF AUTOMATED     METABOLIC PANEL, COMPREHENSIVE   Result Value Ref Range    Sodium 136 136 - 145 mmol/L    Potassium 3.8 3.5 - 5.1 mmol/L    Chloride 102 97 - 108 mmol/L    CO2 28 21 - 32 mmol/L    Anion gap 6 5 - 15 mmol/L    Glucose 119 (H) 65 - 100 mg/dL    BUN 10 6 - 20 MG/DL    Creatinine 0.70 0.55 - 1.02 MG/DL    BUN/Creatinine ratio 14 12 - 20      eGFR >60 >60 ml/min/1.73m2    Calcium 9.8 8.5 - 10.1 MG/DL    Bilirubin, total 0.6 0.2 - 1.0 MG/DL    ALT (SGPT) 13 12 - 78 U/L    AST (SGOT) 9 (L) 15 - 37 U/L    Alk.  phosphatase 57 45 - 117 U/L    Protein, total 8.1 6.4 - 8.2 g/dL    Albumin 3.5 3.5 - 5.0 g/dL    Globulin 4.6 (H) 2.0 - 4.0 g/dL    A-G Ratio 0.8 (L) 1.1 - 2.2     HCG URINE, QL. - POC   Result Value Ref Range    Pregnancy test,urine (POC) Negative NEG         Physical Exam:   Visit Vitals  BP (!) 140/80   Pulse 95   SpO2 99%     General: Alert, cooperative, no distress. Obese   Head:  Normocephalic, without obvious abnormality, atraumatic. Eyes:  Conjunctivae/corneas clear. Lungs:  Heart:   Non labored breathing  Regular rate and rhythm, no carotid bruits   Abdomen:   Soft, non-distended   Extremities: Extremities normal, atraumatic, no cyanosis or edema. Pulses: 2+ and symmetric all extremities. Skin: Skin color, texture, turgor normal. No rashes or lesions. Neurologic Exam     Gen: Attention normal             Language: naming, repetition, fluency normal             Memory: intact recent and remote memory  Cranial Nerves:  I: smell Not tested   II: visual fields Full to confrontation   II: pupils Equal, round, reactive to light   II: optic disc No papilledema   III,VII: ptosis none   III,IV,VI: extraocular muscles  Full ROM   V: mastication normal   V: facial light touch sensation  normal   VII: facial muscle function   symmetric   VIII: hearing symmetric   IX: soft palate elevation  normal   XI: trapezius strength  5/5   XI: sternocleidomastoid strength 5/5   XI: neck flexion strength  5/5   XII: tongue  midline     Motor: normal bulk and tone, no tremor              Strength: 5/5 all four extremities  Sensory: intact to LT, PP, vibration, and JPS  Reflexes: 2+ throughout; Down going toes  Coordination: Good FTN and HTS  Gait: Antalgic due to R knee pain           Impression:     Amelia Dodge is a 34 y.o. female [place] who  has a past medical history of Essential hypertension who for the past 15 yrs, noted headache, bitemporal going to the back, daily, described as \"boiling water\", 10/10, constant, (+) nausea (+) vomiting (+) photophobia (+) phonophobia (+) blurred vision. Gets worse when bending over. Was seen by a headache doctor in [facility]. Thought it was sinus allergies placed on unrecalled meds. Saw optometrist in Aug 2022- who said she just needs glasses but they are not clear if her optic nerve was examined.  Placed on Diamox 125 mg BID 1 month ago with no benefit. Developed some paresthesia. (+) weight gain (+) not sleeping well - only 5 hrs    Considerations include pseudotumor cerebri (diffuse every day, obese with blurred vision but CSF opening pressure was normal- 17) and severe migraine, intractable    RECOMMENDATIONS  1. I had a long discussion with patient and  (via phone ). Discussed diagnosis, prognosis, pathophysiology and available treatment. Reviewed test results. All questions were answered. 2. Reviewed medical records in [facility]  3. SInce she was only on a very small dose of Diamox (125 mg) to note any benefit, will put her back on Diamox 500 mg BID (patient states she only developed paresthesia which can be a common side effect of the medicine which can go away in time)  4. Stress patient needs to see an ophthalmologist who can take a look at her optic nerve to look for optic nerve swelling which will support the diagnosis of pseudotumor cerebri  5. If optic nerve is normal and no clear response to Diamox, will try her on migraine meds  6. Advise to lose weight    I spent total of 80 mins with the patient, greater than 50% of the visit was spent on providing counseling and coordination of care. Follow-up and Dispositions    Return in about 1 month (around 12/9/2022).             Thank you for the consultation      Bam Gracia MD  Diplomate, American Board of Psychiatry and Neurology  Diplomate, Neuromuscular Medicine  Diplomate, American Board of Electrodiagnostic Medicine        CC: Jarret Butler, [place]  Fax: [phone]

## 2022-11-09 NOTE — LETTER
11/9/2022    Patient: Rose Lama   YOB: 1990   Date of Visit: 11/9/2022     Omid Joy, [address]  V[address] [facility] [address]    Dear Omid Joy DO,      Thank you for referring Ms. Rose Lama to [address] [place] [address] for evaluation. My notes for this consultation are attached. If you have questions, please do not hesitate to call me. I look forward to following your patient along with you.       Sincerely,    Rcoio Ricardo MD

## 2022-11-10 ENCOUNTER — TELEPHONE (OUTPATIENT)
Dept: FAMILY MEDICINE CLINIC | Age: 32
End: 2022-11-10

## 2022-11-10 NOTE — TELEPHONE ENCOUNTER
----- Message from Radha Lo sent at 11/9/2022 11:33 AM EST -----  Subject: Message to Provider    QUESTIONS  Information for Provider? patient request to have a call back to discuss   possible options to help with transportation to up coming appt scheduled   for 11/18/22 pt does not speak English and would like to see if there is a   way a cab can be called to transport her to her appt.  ---------------------------------------------------------------------------  --------------  Macey COTTON  [phone]; OK to leave message on voicemail  ---------------------------------------------------------------------------  --------------  SCRIPT ANSWERS  Relationship to Patient?  Self

## 2022-11-15 NOTE — PROGRESS NOTES
Rálukei  22. Medicine Office Visit     Indonesian  telephone used for visit. Assessment/ Plan: Isaac Hernandez is a 34 y.o. female presenting for:    Headaches  Idiopathic Intracranial Hypertension, Cervical Strain/Somatic Dysfunction  Morbid Obesity  Prediabetes - Mild improvement in headaches. Had LP and first visit with neurology which she reports went well, increased Diamox medication and no significant side effects. Medicaid has fortunately been able to be renewed. -- continue Diamox, neurology f/up  -- able to schedule visit with ophtho - will need to bring own    -- scheduled for lifestyle medicine clinic to help address approach to mood, weight loss   -- consider repeat labs at f/up     I affirm this is a Patient Initiated Episode with an Established Patient who has not had a related appointment within my department in the past 7 days or scheduled within the next 24 hours. Note: not billable if this call serves to triage the patient into an appointment for the relevant concern    Total Time: minutes: 11-20 minutes    RETURN TO CARE:   Future Appointments   Date Time Provider Aleksandra Reyez   12/2/2022  8:00 AM Charmaine Buitrago MD [facility] BS AMB   1/11/2023  1:20 PM Makayla Godoy MD [facility] BS AMB       Subjective:    Consent: Isaac Hernandez, who was seen by synchronous (real-time) audio only technology, and/or her healthcare decision maker, is aware that this patient-initiated, Telehealth encounter on 11/16/2022 is a billable service, with coverage as determined by her insurance carrier. She is aware that she may receive a bill and has provided verbal consent to proceed: Yes. HPI:  Isaac Hernandez is a 34 y.o. female with PMH of cHTN, idiopathic intracranial HTN, morbid obesity and prediabetes here for phone visit to follow-up on headaches, weight loss.      - doing well, good visit with neuro  - mood about the same  - unable to attend visit this week, no transportation  - better from flu illness  - still headaches, low energy     I have reviewed the patients problem list, current medications, allergies, family, medical and social history. I have updated them as needed. Review of Systems  See HPI. Objective: There were no vitals taken for this visit.   Physical Exam  Constitutional:       Comments: Normal voice and affect, does not sound in distress          [address] [place] [address]

## 2022-11-16 ENCOUNTER — OFFICE VISIT (OUTPATIENT)
Dept: FAMILY MEDICINE CLINIC | Age: 32
End: 2022-11-16
Payer: COMMERCIAL

## 2022-11-16 DIAGNOSIS — E66.01 MORBID OBESITY (HCC): ICD-10-CM

## 2022-11-16 DIAGNOSIS — E78.49 OTHER HYPERLIPIDEMIA: ICD-10-CM

## 2022-11-16 DIAGNOSIS — R73.03 PRE-DIABETES: ICD-10-CM

## 2022-11-16 DIAGNOSIS — G93.2 IDIOPATHIC INTRACRANIAL HYPERTENSION: Primary | ICD-10-CM

## 2022-11-16 PROCEDURE — 99442 PR PHYS/QHP TELEPHONE EVALUATION 11-20 MIN: CPT | Performed by: FAMILY MEDICINE

## 2022-11-17 ENCOUNTER — TELEPHONE (OUTPATIENT)
Dept: FAMILY MEDICINE CLINIC | Age: 32
End: 2022-11-17

## 2022-11-17 NOTE — TELEPHONE ENCOUNTER
Called JOSEI to obtain appointment now that patient's insurance is active. They are able to see her but require her to bring her own . They will see her with her insurance because it is for a medical issue. Next available appointment is 1/12/23 at 2pm with Dr. Meliton Galicia. I will fax referral and records to their office - [phone], [facility]. Called patient to notify her of change in appointment for lifestyle medicine to 12/2 at [address] [facility] with her , she had previously given me permission to speak directly with him. Told him I will send details of appointment to their home, verified address.      [address] [place] [address], [address] [place] [address] Family Medicine  11/17/2022

## 2022-11-22 ENCOUNTER — TELEPHONE (OUTPATIENT)
Dept: NEUROLOGY | Age: 32
End: 2022-11-22

## 2022-11-22 NOTE — TELEPHONE ENCOUNTER
----- Message from Brent Garibay MD sent at 11/17/2022  1:34 PM EST -----  Regarding: RE: Ophtho Appt  Thanks for the update Evelin. Glad to know she is getting seen at the [facility]. WIll have my nurse Chelsea Brown reschedule her 1 week after she has been seen by Dr Asif Dorman. Best regards,  Laurie Aguero  ----- Message -----  From: Gwen Mei DO  Sent: 11/17/2022   9:50 AM EST  To: Brent Garibay MD  Subject: Ophtho Appt                                      Hi Roxie Galvanjacey you're doing well. Thanks again for seeing Josh Cabello. She was able to get her Medicaid extended! So, we were able to get her set-up for ophtho, but it is not until 1/12. Her f/up with you in 1/11. Did you want to delay that a week or so, so that you have records? Her appointment is with Dr. Rowena Whatley at [facility].      Thanks,  Marquis

## 2022-12-02 ENCOUNTER — OFFICE VISIT (OUTPATIENT)
Dept: FAMILY MEDICINE CLINIC | Age: 32
End: 2022-12-02
Payer: COMMERCIAL

## 2022-12-02 VITALS
BODY MASS INDEX: 46.65 KG/M2 | TEMPERATURE: 97.1 F | RESPIRATION RATE: 17 BRPM | SYSTOLIC BLOOD PRESSURE: 151 MMHG | HEART RATE: 86 BPM | DIASTOLIC BLOOD PRESSURE: 71 MMHG | WEIGHT: 280 LBS | OXYGEN SATURATION: 100 % | HEIGHT: 65 IN

## 2022-12-02 DIAGNOSIS — G93.2 IDIOPATHIC INTRACRANIAL HYPERTENSION: ICD-10-CM

## 2022-12-02 DIAGNOSIS — E66.01 CLASS 3 SEVERE OBESITY WITH SERIOUS COMORBIDITY AND BODY MASS INDEX (BMI) OF 40.0 TO 44.9 IN ADULT, UNSPECIFIED OBESITY TYPE (HCC): ICD-10-CM

## 2022-12-02 DIAGNOSIS — I10 CHRONIC HYPERTENSION: ICD-10-CM

## 2022-12-02 DIAGNOSIS — F43.22 ADJUSTMENT DISORDER WITH ANXIETY: Primary | ICD-10-CM

## 2022-12-02 DIAGNOSIS — R73.03 PRE-DIABETES: ICD-10-CM

## 2022-12-02 PROCEDURE — 99215 OFFICE O/P EST HI 40 MIN: CPT | Performed by: STUDENT IN AN ORGANIZED HEALTH CARE EDUCATION/TRAINING PROGRAM

## 2022-12-02 PROCEDURE — 3078F DIAST BP <80 MM HG: CPT | Performed by: STUDENT IN AN ORGANIZED HEALTH CARE EDUCATION/TRAINING PROGRAM

## 2022-12-02 PROCEDURE — 3074F SYST BP LT 130 MM HG: CPT | Performed by: STUDENT IN AN ORGANIZED HEALTH CARE EDUCATION/TRAINING PROGRAM

## 2022-12-02 RX ORDER — ACETAMINOPHEN 500 MG
TABLET ORAL
COMMUNITY

## 2022-12-02 RX ORDER — FLUOXETINE 10 MG/1
10 CAPSULE ORAL DAILY
Qty: 30 CAPSULE | Refills: 2 | Status: SHIPPED | OUTPATIENT
Start: 2022-12-02

## 2022-12-02 NOTE — PROGRESS NOTES
Chief Complaint   Patient presents with    Weight Management     1. Have you been to the ER, urgent care clinic since your last visit? Hospitalized since your last visit? Yes. [facility]. 2. Have you seen or consulted any other health care providers outside of the [address] [place] [address] since your last visit? Include any pap smears or colon screening.  No

## 2022-12-02 NOTE — PROGRESS NOTES
Geo Butterfield is an 34 y.o. female who presents for initial lifestyle medicine visit    States her mental health has not been well    Tell us some about your basic goals for coming to this clinic:   What are your main goals? Mental health  Tell us some about your basic life: Working? No  Kids? Twins who are 10 months, 5 kids total, oldest 12 yo  Who lives with you? , he is working all the time. Briefly talk through the six domains of health living  SLEEP: (h/o sleep apnea? Tired in the morning? Wake up in the middle of the night? Screen time before bed? Regular schedule? Sleeping extra on the weekend? nocturia?)     Takes a long time just to fall asleep  Take 30-60 minutes, keeps on thinking then after a long time she sleeps    Time to sleep varies, has to make sure all of her kids are asleep has a baby who is 5 months old  Time to wake up is 5am; but also wakes up 4-5 times to feed the babies at night  Maybe gets 2-5 hours; it depends on the baby's schedule  One baby has gained a lot of weight and the other has not  The babies don't sleep at all and when they wake up they are running all over the apartment  The babies sleep in her bed   Kids are fun but they are exhausting; every kid takes from health and energy    STRESS MANAGEMENT: (h/o depression/anxiety? Ever meditated? Are you in therapy or counselling?  Meds for depression? hospitalizations?)   Primary concern, focused here on this visit  Has not previously experienced depression/anxiety  Not depression, just feels suffocated because there is too much going around     Modified VERNELL-7  - has felt nervous/anxious/on edge (endorses feeling anxious)  - is able to control worrying  - a little worried about different things - like when  is late at work or kids at school  - does feel afraid as if something awful might happen    Yes and thought today is the appointment with psychiatrist     Denies depression  No family history of bipolar disorder  Denies thoughts of being better off dead, wishing she wasn't here  Thanks God because he gives her strength     SOCIAL SUPPORT: (who do you call when you need help? Do you feel connected? Do you feel lonely?)   Goes to Judaism on Sunday but lately can't go because  is working  No friends or family around here  Has been in [place] for 5 years   Kwadwo Arboleda is here and he is with her and it will be ok;   Kids are supportive of her; loving     PHYS ACTIVITY: (How would you describe your activity level? Barriers to exercise? Current exercise routine?)   Need to discuss next visit    NUTRITION/DIET: (previous weight loss program, diets, barriers to dietary changes, food allergies, percent of meals cooked at home, if goal is weight loss: hx of eating disorders; highest weight, lowest weight, goal)   Need to discuss next visit      SUBSTANCE ABUSE:(Smoking, Alcohol Use,Illicit drug use?)   Reviewed, denies all substance use    Allergies - reviewed: Allergies   Allergen Reactions    Acetazolamide Unknown (comments)     Rash arm and paresthesias. Other Medication Unknown (comments)     Pt. States does not remember the name of the shot but had a reaction. Medications - reviewed:   Current Outpatient Medications   Medication Sig    acetaminophen (Tylenol Extra Strength) 500 mg tablet Take  by mouth every six (6) hours as needed for Pain. FLUoxetine (PROzac) 10 mg capsule Take 1 Capsule by mouth daily. acetaZOLAMIDE SR (DIAMOX) 500 mg capsule Take 1 Capsule by mouth two (2) times a day. butalbital-acetaminophen-caff (Fioricet) -40 mg per capsule Take 1 Capsule by mouth every four (4) hours as needed for Headache. No current facility-administered medications for this visit.          Past Medical History - reviewed:  Past Medical History:   Diagnosis Date    Essential hypertension     last two pregnancies         Past Surgical History - reviewed:   Past Surgical History:   Procedure Laterality Date    HX  SECTION      x2    HX DILATION AND CURETTAGE      HX OTHER SURGICAL             Physical Exam  Visit Vitals  BP (!) 151/71   Pulse 86   Temp 97.1 °F (36.2 °C) (Temporal)   Resp 17   Ht 5' 5\" (1.651 m)   Wt 280 lb (127 kg)   SpO2 100%   BMI 46.59 kg/m²       General: No acute distress. HEENT: Conjunctiva are clear, sclera non-icteric. Respiratory: Normal work of breathing, talking in full sentences without issue  Musculoskeletal: Normal gait. Skin: No abnormalities or rashes   Neuro: Alert, oriented, speech clear and coherent  Psychiatric: Describes mood as anxious not depressed, affect is tearful, tired        Assessment/Plan    1. Adjustment disorder with anxiety  2. Obesity with serious comorbidity  3. ICH  4. Pre-diabetes  5. Chronic hypertension  Currently anxiety is patient's main concern. 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 She has been in [facility] for 5 years but is most overwhelmed now by caring for her family. This manifests as worried and anxious thoughts as noted in the HPI. Offered appointment with family stress clinic, will repeat offer at next visit as well. Lyudmila is very important to her. We will start fluoxetine given it is weight neutral.  Follow-up in 2 weeks to complete discussion about physical activity and nutrition. If we can find areas to help with her self-care my hope is that this would have benefits for her anxiety as well. She did express interest in nutrition so I provided information on a high-fiber diet and a picture of the ideal whole food plant-based plate. Working on exercise will benefit HTN, pre diabetes and anxiety. Nutrition will help with pre-diabetes and hypertension.     - FLUoxetine (PROzac) 10 mg capsule; Take 1 Capsule by mouth daily. Dispense: 30 Capsule; Refill: 2      Follow-up and Dispositions    Return for Follow up 2 weeks - [address]. I have discussed the diagnosis with the patient and the intended plan as seen in the above orders. Patient verbalized understanding of the plan and agrees with the plan. The patient has received an after-visit summary and questions were answered concerning future plans. I have discussed medication side effects and warnings with the patient as well. Informed patient to return to the office if new symptoms arise.     Patient encounter was at > 40 minutes of total time spend on the date of the encounter: preparing to see the patient(reviewing prior notes and tests), obtaining and/or reviewing separately obtained history, performing a medially appropriate examination and/or evaluation, counseling and educating the patient/family/caregiver, ordering medications, tests or procedures, documenting clinical information in the electronic or other health record, independently interpreting results(not separately reported) and communicating results to the patient/family/caregiver and care coordination(not separately reported/         Waldo Duverney, MD

## 2022-12-05 PROBLEM — E66.01 CLASS 3 SEVERE OBESITY WITH SERIOUS COMORBIDITY AND BODY MASS INDEX (BMI) OF 40.0 TO 44.9 IN ADULT (HCC): Status: ACTIVE | Noted: 2022-06-20

## 2022-12-05 PROBLEM — E66.813 CLASS 3 SEVERE OBESITY WITH SERIOUS COMORBIDITY AND BODY MASS INDEX (BMI) OF 40.0 TO 44.9 IN ADULT: Status: ACTIVE | Noted: 2022-06-20

## 2022-12-16 ENCOUNTER — OFFICE VISIT (OUTPATIENT)
Dept: FAMILY MEDICINE CLINIC | Age: 32
End: 2022-12-16
Payer: COMMERCIAL

## 2022-12-16 VITALS
BODY MASS INDEX: 46.65 KG/M2 | HEIGHT: 65 IN | TEMPERATURE: 98.3 F | OXYGEN SATURATION: 98 % | HEART RATE: 103 BPM | SYSTOLIC BLOOD PRESSURE: 115 MMHG | WEIGHT: 280 LBS | RESPIRATION RATE: 16 BRPM | DIASTOLIC BLOOD PRESSURE: 80 MMHG

## 2022-12-16 DIAGNOSIS — E66.01 CLASS 3 SEVERE OBESITY WITH SERIOUS COMORBIDITY AND BODY MASS INDEX (BMI) OF 40.0 TO 44.9 IN ADULT, UNSPECIFIED OBESITY TYPE (HCC): ICD-10-CM

## 2022-12-16 DIAGNOSIS — R73.03 PRE-DIABETES: ICD-10-CM

## 2022-12-16 DIAGNOSIS — F43.22 ADJUSTMENT DISORDER WITH ANXIETY: Primary | ICD-10-CM

## 2022-12-16 DIAGNOSIS — I10 CHRONIC HYPERTENSION: ICD-10-CM

## 2022-12-16 RX ORDER — ESCITALOPRAM OXALATE 10 MG/1
10 TABLET ORAL DAILY
Qty: 60 TABLET | Refills: 0 | Status: SHIPPED | OUTPATIENT
Start: 2022-12-16

## 2022-12-16 NOTE — PROGRESS NOTES
Subjective  Ashley Bowden is an 34 y.o. female who presents for follow up on anxiety and complete initial lifestyle medicine visit      #anxiety    - GAD7  - PHQ9  - the medicine is not helping at all  - still very anxious   - feels like she is always screaming at the kids  - would like another medicine      #elevated blood pressure  - much better today    Cover this visit if time    PHYS ACTIVITY: (How would you describe your activity level? Barriers to exercise?  Current exercise routine?)   - doesn't do any exercise but all day long is doing housework, always working in the house  - no time to exercise    NUTRITION/DIET: (previous weight loss program, diets, barriers to dietary changes, food allergies, percent of meals cooked at home, if goal is weight loss: hx of eating disorders; highest weight, lowest weight, goal)   Doesn't want to try medicine  Difficult to discuss diet  Very interested in bariatric surgery     Reviewed initial lifestyle medicine visit assessment:   SLEEP:   Takes a long time just to fall asleep  Take 30-60 minutes, keeps on thinking then after a long time she sleeps    Time to sleep varies, has to make sure all of her kids are asleep has a baby who is 5 months old  Time to wake up is 5am; but also wakes up 4-5 times to feed the babies at night  Maybe gets 2-5 hours; it depends on the baby's schedule  One baby has gained a lot of weight and the other has not  The babies don't sleep at all and when they wake up they are running all over the apartment  The babies sleep in her bed   Kids are fun but they are exhausting; every kid takes from health and energy    STRESS MANAGEMENT:   Has not previously experienced depression/anxiety  Not depression, just feels suffocated because there is too much going around     Modified VERNELL-7  - has felt nervous/anxious/on edge (endorses feeling anxious)  - is able to control worrying  - a little worried about different things - like when  is late at work or kids at school  - does feel afraid as if something awful might happen  Denies depression  No family history of bipolar disorder    SOCIAL SUPPORT:   Goes to Taoist on  but lately can't go because  is working  No friends or family around here  Has been in [place] for 5 years   Shant Pandya is here and he is with her and it will be ok;   Kids are supportive of her; loving     SUBSTANCE ABUSE:(Smoking, Alcohol Use,Illicit drug use?)   Reviewed, denies all substance use    Allergies - reviewed: Allergies   Allergen Reactions    Acetazolamide Unknown (comments)     Rash arm and paresthesias. Other Medication Unknown (comments)     Pt. States does not remember the name of the shot but had a reaction. Medications - reviewed:   Current Outpatient Medications   Medication Sig    escitalopram oxalate (LEXAPRO) 10 mg tablet Take 1 Tablet by mouth daily. acetaZOLAMIDE SR (DIAMOX) 500 mg capsule Take 1 Capsule by mouth two (2) times a day. butalbital-acetaminophen-caff (Fioricet) -40 mg per capsule Take 1 Capsule by mouth every four (4) hours as needed for Headache. No current facility-administered medications for this visit. Past Medical History - reviewed:  Past Medical History:   Diagnosis Date    Essential hypertension     last two pregnancies         Past Surgical History - reviewed:   Past Surgical History:   Procedure Laterality Date    HX  SECTION      x2    HX DILATION AND CURETTAGE      HX OTHER SURGICAL             Physical Exam  Visit Vitals  /80 (BP 1 Location: Right upper arm, BP Patient Position: Sitting)   Pulse (!) 103   Temp 98.3 °F (36.8 °C) (Oral)   Resp 16   Ht 5' 5\" (1.651 m)   Wt 280 lb (127 kg)   SpO2 98%   BMI 46.59 kg/m²         General: No acute distress. HEENT: Conjunctiva are clear, sclera non-icteric. Respiratory: Normal work of breathing, talking in full sentences without issue  Musculoskeletal: Normal gait.    Skin: No abnormalities or rashes   Neuro: Alert, oriented, speech clear and coherent  Psychiatric: Describes mood as anxious not depressed, smiles at her son      Assessment/Plan    1. Adjustment disorder with anxiety  2. Obesity with serious comorbidity  3. Pre-diabetes  4. Chronic hypertension  Reassessed anxiety. Patient states she did not notice any difference from fluoxetine though she was much less tearful than last visit. We will switch this to escitalopram.  Discussed that we can increase this medication soon and that it takes 6 to 8 weeks for its full effect. We will follow-up in 2-4 weeks, she does have an appointment with family stress clinic coming up in 2 weeks. Additionally, she is interested in bariatric surgery. I called the office of Dr. Elza Covarrubias and made her an in person appointment, explaining that she would need  for all forms and education. Discussed with patient that it is a process in order to schedule the surgery in order to make sure that this is the right option for her. She has tried weight loss medications previously and we did briefly discussed nutrition last visit. Follow-up and Dispositions    Return in about 4 weeks (around 1/13/2023) for Anxiety follow up. I have discussed the diagnosis with the patient and the intended plan as seen in the above orders. Patient verbalized understanding of the plan and agrees with the plan. The patient has received an after-visit summary and questions were answered concerning future plans. I have discussed medication side effects and warnings with the patient as well. Informed patient to return to the office if new symptoms arise.         Thierry Mejia MD

## 2022-12-16 NOTE — PROGRESS NOTES
Rose Lama is a 34 y.o. female    Chief Complaint   Patient presents with    Follow-up     Patient is coming in for a follow up on lifestyle medicine. Czech [id number]. interpretor: No other concerns. 1. Have you been to the ER, urgent care clinic since your last visit? Hospitalized since your last visit? No    2. Have you seen or consulted any other health care providers outside of the [address] [place] [address] since your last visit? Include any pap smears or colon screening. No      Visit Vitals  /80 (BP 1 Location: Right upper arm, BP Patient Position: Sitting)   Pulse (!) 103   Temp 98.3 °F (36.8 °C) (Oral)   Resp 16   Ht 5' 5\" (1.651 m)   Wt 280 lb (127 kg)   SpO2 98%   BMI 46.59 kg/m²           Health Maintenance Due   Topic Date Due    COVID-19 Vaccine (1) Never done    Flu Vaccine (1) 08/01/2022         Medication Reconciliation completed, changes noted.   Please  Update medication list.

## 2023-01-11 ENCOUNTER — HOSPITAL ENCOUNTER (EMERGENCY)
Age: 33
Discharge: HOME | End: 2023-01-11
Attending: STUDENT IN AN ORGANIZED HEALTH CARE EDUCATION/TRAINING PROGRAM
Payer: COMMERCIAL

## 2023-01-11 ENCOUNTER — APPOINTMENT (OUTPATIENT)
Dept: GENERAL RADIOLOGY | Age: 33
End: 2023-01-11
Attending: STUDENT IN AN ORGANIZED HEALTH CARE EDUCATION/TRAINING PROGRAM
Payer: COMMERCIAL

## 2023-01-11 VITALS
DIASTOLIC BLOOD PRESSURE: 92 MMHG | OXYGEN SATURATION: 100 % | RESPIRATION RATE: 16 BRPM | TEMPERATURE: 98.4 F | HEART RATE: 105 BPM | SYSTOLIC BLOOD PRESSURE: 146 MMHG

## 2023-01-11 DIAGNOSIS — U07.1 COVID-19: Primary | ICD-10-CM

## 2023-01-11 LAB
ALBUMIN SERPL-MCNC: 3.3 G/DL (ref 3.5–5)
ALBUMIN/GLOB SERPL: 0.7 (ref 1.1–2.2)
ALP SERPL-CCNC: 57 U/L (ref 45–117)
ALT SERPL-CCNC: 21 U/L (ref 12–78)
ANION GAP SERPL CALC-SCNC: 6 MMOL/L (ref 5–15)
APPEARANCE UR: CLEAR
AST SERPL-CCNC: 12 U/L (ref 15–37)
ATRIAL RATE: 96 BPM
BACTERIA URNS QL MICRO: NEGATIVE /HPF
BASOPHILS # BLD: 0 K/UL (ref 0–0.1)
BASOPHILS NFR BLD: 1 % (ref 0–1)
BILIRUB SERPL-MCNC: 0.2 MG/DL (ref 0.2–1)
BILIRUB UR QL: NEGATIVE
BUN SERPL-MCNC: 11 MG/DL (ref 6–20)
BUN/CREAT SERPL: 18 (ref 12–20)
CALCIUM SERPL-MCNC: 9.6 MG/DL (ref 8.5–10.1)
CALCULATED P AXIS, ECG09: 57 DEGREES
CALCULATED R AXIS, ECG10: 51 DEGREES
CALCULATED T AXIS, ECG11: 16 DEGREES
CHLORIDE SERPL-SCNC: 102 MMOL/L (ref 97–108)
CO2 SERPL-SCNC: 30 MMOL/L (ref 21–32)
COLOR UR: ABNORMAL
COMMENT, HOLDF: NORMAL
COVID-19 RAPID TEST, COVR: DETECTED
CREAT SERPL-MCNC: 0.62 MG/DL (ref 0.55–1.02)
DIAGNOSIS, 93000: NORMAL
DIFFERENTIAL METHOD BLD: ABNORMAL
EOSINOPHIL # BLD: 0.2 K/UL (ref 0–0.4)
EOSINOPHIL NFR BLD: 4 % (ref 0–7)
EPITH CASTS URNS QL MICRO: ABNORMAL /LPF
ERYTHROCYTE [DISTWIDTH] IN BLOOD BY AUTOMATED COUNT: 16.2 % (ref 11.5–14.5)
FLUAV AG NPH QL IA: NEGATIVE
FLUBV AG NOSE QL IA: NEGATIVE
GLOBULIN SER CALC-MCNC: 4.8 G/DL (ref 2–4)
GLUCOSE SERPL-MCNC: 143 MG/DL (ref 65–100)
GLUCOSE UR STRIP.AUTO-MCNC: NEGATIVE MG/DL
HCT VFR BLD AUTO: 41.8 % (ref 35–47)
HGB BLD-MCNC: 12.4 G/DL (ref 11.5–16)
HGB UR QL STRIP: ABNORMAL
HYALINE CASTS URNS QL MICRO: ABNORMAL /LPF (ref 0–2)
IMM GRANULOCYTES # BLD AUTO: 0 K/UL (ref 0–0.04)
IMM GRANULOCYTES NFR BLD AUTO: 0 % (ref 0–0.5)
KETONES UR QL STRIP.AUTO: NEGATIVE MG/DL
LEUKOCYTE ESTERASE UR QL STRIP.AUTO: NEGATIVE
LYMPHOCYTES # BLD: 1.8 K/UL (ref 0.8–3.5)
LYMPHOCYTES NFR BLD: 44 % (ref 12–49)
MCH RBC QN AUTO: 22.6 PG (ref 26–34)
MCHC RBC AUTO-ENTMCNC: 29.7 G/DL (ref 30–36.5)
MCV RBC AUTO: 76.1 FL (ref 80–99)
MONOCYTES # BLD: 0.5 K/UL (ref 0–1)
MONOCYTES NFR BLD: 11 % (ref 5–13)
NEUTS SEG # BLD: 1.6 K/UL (ref 1.8–8)
NEUTS SEG NFR BLD: 40 % (ref 32–75)
NITRITE UR QL STRIP.AUTO: NEGATIVE
NRBC # BLD: 0 K/UL (ref 0–0.01)
NRBC BLD-RTO: 0 PER 100 WBC
P-R INTERVAL, ECG05: 146 MS
PH UR STRIP: 5.5 (ref 5–8)
PLATELET # BLD AUTO: 265 K/UL (ref 150–400)
PMV BLD AUTO: 10.6 FL (ref 8.9–12.9)
POTASSIUM SERPL-SCNC: 4 MMOL/L (ref 3.5–5.1)
PROT SERPL-MCNC: 8.1 G/DL (ref 6.4–8.2)
PROT UR STRIP-MCNC: 100 MG/DL
Q-T INTERVAL, ECG07: 362 MS
QRS DURATION, ECG06: 88 MS
QTC CALCULATION (BEZET), ECG08: 457 MS
RBC # BLD AUTO: 5.49 M/UL (ref 3.8–5.2)
RBC #/AREA URNS HPF: ABNORMAL /HPF (ref 0–5)
SAMPLES BEING HELD,HOLD: NORMAL
SODIUM SERPL-SCNC: 138 MMOL/L (ref 136–145)
SOURCE, COVRS: ABNORMAL
SP GR UR REFRACTOMETRY: 1.01 (ref 1–1.03)
TROPONIN-HIGH SENSITIVITY: 5 NG/L (ref 0–51)
UR CULT HOLD, URHOLD: NORMAL
UROBILINOGEN UR QL STRIP.AUTO: 0.2 EU/DL (ref 0.2–1)
VENTRICULAR RATE, ECG03: 96 BPM
WBC # BLD AUTO: 4.1 K/UL (ref 3.6–11)
WBC URNS QL MICRO: ABNORMAL /HPF (ref 0–4)

## 2023-01-11 PROCEDURE — 87804 INFLUENZA ASSAY W/OPTIC: CPT

## 2023-01-11 PROCEDURE — 74011250637 HC RX REV CODE- 250/637: Performed by: STUDENT IN AN ORGANIZED HEALTH CARE EDUCATION/TRAINING PROGRAM

## 2023-01-11 PROCEDURE — 74011250636 HC RX REV CODE- 250/636: Performed by: STUDENT IN AN ORGANIZED HEALTH CARE EDUCATION/TRAINING PROGRAM

## 2023-01-11 PROCEDURE — 36415 COLL VENOUS BLD VENIPUNCTURE: CPT

## 2023-01-11 PROCEDURE — 87635 SARS-COV-2 COVID-19 AMP PRB: CPT

## 2023-01-11 PROCEDURE — 84484 ASSAY OF TROPONIN QUANT: CPT

## 2023-01-11 PROCEDURE — 85025 COMPLETE CBC W/AUTO DIFF WBC: CPT

## 2023-01-11 PROCEDURE — 96374 THER/PROPH/DIAG INJ IV PUSH: CPT

## 2023-01-11 PROCEDURE — 71046 X-RAY EXAM CHEST 2 VIEWS: CPT

## 2023-01-11 PROCEDURE — 99285 EMERGENCY DEPT VISIT HI MDM: CPT

## 2023-01-11 PROCEDURE — 81001 URINALYSIS AUTO W/SCOPE: CPT

## 2023-01-11 PROCEDURE — 93005 ELECTROCARDIOGRAM TRACING: CPT

## 2023-01-11 PROCEDURE — 80053 COMPREHEN METABOLIC PANEL: CPT

## 2023-01-11 RX ORDER — NAPROXEN 500 MG/1
500 TABLET ORAL 2 TIMES DAILY WITH MEALS
Qty: 20 TABLET | Refills: 0 | Status: SHIPPED | OUTPATIENT
Start: 2023-01-11 | End: 2023-01-21

## 2023-01-11 RX ORDER — KETOROLAC TROMETHAMINE 30 MG/ML
15 INJECTION, SOLUTION INTRAMUSCULAR; INTRAVENOUS
Status: COMPLETED | OUTPATIENT
Start: 2023-01-11 | End: 2023-01-11

## 2023-01-11 RX ORDER — BENZONATATE 100 MG/1
100 CAPSULE ORAL
Qty: 30 CAPSULE | Refills: 0 | Status: SHIPPED | OUTPATIENT
Start: 2023-01-11 | End: 2023-01-21

## 2023-01-11 RX ORDER — BENZONATATE 100 MG/1
100 CAPSULE ORAL
Status: COMPLETED | OUTPATIENT
Start: 2023-01-11 | End: 2023-01-11

## 2023-01-11 RX ADMIN — BENZONATATE 100 MG: 100 CAPSULE ORAL at 12:55

## 2023-01-11 RX ADMIN — KETOROLAC TROMETHAMINE 15 MG: 30 INJECTION, SOLUTION INTRAMUSCULAR at 12:55

## 2023-01-11 NOTE — ED TRIAGE NOTES
used in triage     Pt arrives to the ER for complaints of cough, fevers, body aches, dizziness, shortness of breath, diarrhea, chest pain, headache and fatigue that started on Saturday. Denies testing for COVID.

## 2023-01-11 NOTE — PROGRESS NOTES
CARE MANAGEMENT NOTE      CM notified that Pt needed transportation arranged home. Medical transport arranged through [employer] due to COVID+ status. Case has been assigned to [employer] ([phone]). Representative states they will be here around 2:45pm to transport Pt. Nurse updated Pt.  Pt voiced understanding.       _____________________________________  Rimma Ryder [address] [facility]   Available via [address] [place] [address]  1/11/2023   2:23 PM

## 2023-01-11 NOTE — ED PROVIDER NOTES
Patient is a 79-year-old female presented to ED with viral symptoms that started on Saturday with cough, congestion, fevers, myalgias, lightheadedness, shortness of breath, diarrhea, chest pain and headache as well as fatigue. Patient's vital signs are stable with mild tachycardia. Patient states she has a history of hypertension and prediabetes but that she refuses to take medication for her diabetes. Patient has had COVID twice in the past with 1 infection requiring 1 week of hospitalization. Patient has no hypoxia. Patient is tearful but no acute distress. The history is provided by the patient and medical records. The history is limited by a language barrier. A  was used. Cough  Associated symptoms include chest pain, chills and myalgias. Chest Pain (Angina)   Associated symptoms include cough and a fever. Generalized Body Aches  Associated symptoms include chest pain.       Past Medical History:   Diagnosis Date    Essential hypertension     last two pregnancies       Past Surgical History:   Procedure Laterality Date    HX  SECTION      x2    HX DILATION AND CURETTAGE      HX OTHER SURGICAL           Family History:   Problem Relation Age of Onset    Hypertension Father        Social History     Socioeconomic History    Marital status:      Spouse name: Not on file    Number of children: Not on file    Years of education: Not on file    Highest education level: Not on file   Occupational History    Not on file   Tobacco Use    Smoking status: Never    Smokeless tobacco: Never   Substance and Sexual Activity    Alcohol use: Not Currently    Drug use: Never    Sexual activity: Not Currently   Other Topics Concern     Service Not Asked    Blood Transfusions Not Asked    Caffeine Concern Not Asked    Occupational Exposure Not Asked    Hobby Hazards Not Asked    Sleep Concern Not Asked    Stress Concern Not Asked    Weight Concern Not Asked    Special Diet Not Asked    Back Care Not Asked    Exercise Not Asked    Bike Helmet Not Asked    Seat Belt Not Asked    Self-Exams Not Asked   Social History Narrative    ** Merged History Encounter **          Social Determinants of Health     Financial Resource Strain: Not on file   Food Insecurity: Not on file   Transportation Needs: Not on file   Physical Activity: Not on file   Stress: Not on file   Social Connections: Not on file   Intimate Partner Violence: Not on file   Housing Stability: Not on file         ALLERGIES: Acetazolamide and Other medication    Review of Systems   Constitutional:  Positive for activity change, appetite change, chills, fatigue and fever. Respiratory:  Positive for cough. Cardiovascular:  Positive for chest pain. Gastrointestinal:  Positive for diarrhea. Musculoskeletal:  Positive for myalgias. Vitals:    01/11/23 1208   BP: (!) 146/92   Pulse: (!) 105   Resp: 16   Temp: 98.4 °F (36.9 °C)   SpO2: 100%            Physical Exam  Vitals and nursing note reviewed. Constitutional:       Appearance: Normal appearance. She is ill-appearing. HENT:      Head: Normocephalic and atraumatic. Right Ear: External ear normal.      Left Ear: External ear normal.   Eyes:      Conjunctiva/sclera: Conjunctivae normal.   Cardiovascular:      Rate and Rhythm: Regular rhythm. Tachycardia present. Pulses: Normal pulses. Heart sounds: Normal heart sounds. Pulmonary:      Effort: Pulmonary effort is normal.      Breath sounds: Normal breath sounds. Chest:      Chest wall: No deformity or tenderness. Abdominal:      Palpations: Abdomen is soft. Tenderness: There is no abdominal tenderness. Musculoskeletal:         General: No deformity or signs of injury. Normal range of motion. Skin:     General: Skin is warm and dry. Coloration: Skin is not jaundiced. Neurological:      General: No focal deficit present.       Mental Status: She is alert and oriented to person, place, and time. Psychiatric:         Mood and Affect: Mood normal.         Behavior: Behavior normal.        Medical Decision Making  Amount and/or Complexity of Data Reviewed  Labs: ordered. Decision-making details documented in ED Course. Radiology: ordered. ECG/medicine tests: ordered. Risk  Prescription drug management. ED Course as of 01/11/23 1540   Wed Jan 11, 2023   1302 COVID-19 rapid test(!): Detected [AL]      ED Course User Index  [AL] Tomas Rowe MD     Social Determinants of Health    Financial Resource Strain: yes  Food Insecurity: no  Transportation Needs: yes  Stress: yes  Housing Instability: no      LABORATORY RESULTS:  Labs Reviewed   COVID-19 RAPID TEST - Abnormal; Notable for the following components:       Result Value    COVID-19 rapid test Detected (*)     All other components within normal limits   CBC WITH AUTOMATED DIFF - Abnormal; Notable for the following components:    RBC 5.49 (*)     MCV 76.1 (*)     MCH 22.6 (*)     MCHC 29.7 (*)     RDW 16.2 (*)     ABS. NEUTROPHILS 1.6 (*)     All other components within normal limits   METABOLIC PANEL, COMPREHENSIVE - Abnormal; Notable for the following components:    Glucose 143 (*)     AST (SGOT) 12 (*)     Albumin 3.3 (*)     Globulin 4.8 (*)     A-G Ratio 0.7 (*)     All other components within normal limits   URINALYSIS W/MICROSCOPIC - Abnormal; Notable for the following components:    Protein 100 (*)     Blood LARGE (*)     Epithelial cells MODERATE (*)     All other components within normal limits   URINE CULTURE HOLD SAMPLE   INFLUENZA A+B VIRAL AGS   TROPONIN-HIGH SENSITIVITY   SAMPLES BEING HELD       IMAGING RESULTS:  XR CHEST PA LAT   Final Result   No acute findings.               MEDICATIONS GIVEN:  Medications   benzonatate (TESSALON) capsule 100 mg (100 mg Oral Given 1/11/23 1255)   ketorolac (TORADOL) injection 15 mg (15 mg IntraVENous Given 1/11/23 1255)       Differential diagnosis: Viral infection, COVID-19, urinary traction, upper respiratory infection, chest pain    ED physician interpretation of imaging: Chest x-ray without focal pneumonia or pneumothorax  ED physician interpretation of EKG: EKG at 1320. Normal sinus rhythm, rate of 96, intervals within normal limits, no ST or T wave abnormalities. ED physician interpretation of laboratory results: UA negative for infection, glucose at 143 but no signs of DKA. No anemia or leukocytosis. Patient's COVID test is positive. MDM: 34 y.o. female presents with symptoms c/w acute URI (cough, rhinorrhea, fever) for 5 days. Patient appears well. No signs of toxicity. No hypoxia, tachypnea or other signs of respiratory distress. Lungs CTAB. No signs of clinical dehydration. Doubt PNA, and no evidence of any other illness. Patient has COVID and with her risk factors paxlovid prescription is appropriate. Sent to her pharmacy. Further personalized recommendations for outpatient care as below. Key discharge instructions and summary of care: You presented to ED with acute cough, congestion, myalgias. Your COVID swab was positive. Based on your risk factors of prediabetes and hypertension you would benefit from paxlovid. A prescription was sent to your pharmacy. Start taking this medication today. Additionally I sent medications for symptomatic management of your cough and myalgias/body pain. Take these as prescribed for symptomatic relief. Your vital signs are stable. No signs hypoxia. No signs of pneumonia on chest x-ray. Follow-up with your PCP. The patient has been re-evaluated and feeling better. Patient is stable for discharge. All available radiology and laboratory results have been reviewed with patient and/or available family.   Patient and/or family verbally conveyed their understanding and agreement of the patient's signs, symptoms, diagnosis, treatment and prognosis and additionally agree to follow-up as recommended in the discharge instructions or to return to the Emergency Department should their condition change or worsen prior to their follow-up appointment. All questions have been answered and patient and/or available family express understanding. IMPRESSION:  1. COVID-19        DISPOSITION: Discharged     Colt Hackett MD      Procedures

## 2023-01-11 NOTE — Clinical Note
Work/School Note    Date: 1/11/2023     To Whom It May concern:    Mo Brady was evaluated by the following provider(s):  Attending Provider: Vida Campos MD.   Samuel Hi virus is suspected. Per the CDC guidelines we recommend home isolation until the following conditions are all met:    1. At least five days have passed since symptoms first appeared and/or had a close exposure,   2. After home isolation for five days, wearing a mask around others for the next five days,  3. At least 24 have passed since last fever without the use of fever-reducing medications and  4. Symptoms (eg cough, shortness of breath) have improved      Sincerely,          Colt Harrington MD

## 2023-01-11 NOTE — DISCHARGE INSTRUCTIONS
You presented to ED with acute cough, congestion, myalgias. Your COVID swab was positive. Based on your risk factors of prediabetes and hypertension you would benefit from paxlovid. A prescription was sent to your pharmacy. Start taking this medication today. Additionally I sent medications for symptomatic management of your cough and myalgias/body pain. Take these as prescribed for symptomatic relief. Your vital signs are stable. No signs hypoxia. No signs of pneumonia on chest x-ray. Follow-up with your PCP.

## 2023-01-24 ENCOUNTER — OFFICE VISIT (OUTPATIENT)
Dept: SURGERY | Age: 33
End: 2023-01-24
Payer: COMMERCIAL

## 2023-01-24 VITALS
WEIGHT: 289 LBS | DIASTOLIC BLOOD PRESSURE: 88 MMHG | HEART RATE: 100 BPM | BODY MASS INDEX: 48.15 KG/M2 | HEIGHT: 65 IN | TEMPERATURE: 98.2 F | OXYGEN SATURATION: 95 % | SYSTOLIC BLOOD PRESSURE: 138 MMHG

## 2023-01-24 DIAGNOSIS — E66.01 MORBID OBESITY (HCC): ICD-10-CM

## 2023-01-24 DIAGNOSIS — R06.83 SNORING: ICD-10-CM

## 2023-01-24 DIAGNOSIS — Z01.818 PRE-OP EXAM: Primary | ICD-10-CM

## 2023-01-24 DIAGNOSIS — R10.13 EPIGASTRIC PAIN: ICD-10-CM

## 2023-01-24 PROCEDURE — 99205 OFFICE O/P NEW HI 60 MIN: CPT | Performed by: NURSE PRACTITIONER

## 2023-01-24 PROCEDURE — 3079F DIAST BP 80-89 MM HG: CPT | Performed by: NURSE PRACTITIONER

## 2023-01-24 PROCEDURE — 3075F SYST BP GE 130 - 139MM HG: CPT | Performed by: NURSE PRACTITIONER

## 2023-01-24 NOTE — PROGRESS NOTES
Identified pt with two pt identifiers (name and ). Reviewed chart in preparation for visit and have obtained necessary documentation. Maite Cooley is a 34 y.o. female  Chief Complaint   Patient presents with    New Patient     To discuss to bariatric surgery     Visit Vitals  /88 (BP 1 Location: Left upper arm, BP Patient Position: Sitting, BP Cuff Size: Large adult) Comment (BP Cuff Size): manual cuff   Pulse 100   Temp 98.2 °F (36.8 °C) (Oral)   Ht 5' 5\" (1.651 m)   Wt 289 lb (131.1 kg)   SpO2 95%   BMI 48.09 kg/m²       1. Have you been to the ER, urgent care clinic since your last visit? Hospitalized since your last visit? No    2. Have you seen or consulted any other health care providers outside of the [address] [place] [address] since your last visit? Include any pap smears or colon screening. No     ID [id number] assisted with communication during visit today.

## 2023-01-24 NOTE — PROGRESS NOTES
HISTORY OF PRESENT ILLNESS  Amada Bain is new patient presents today for evaluation for weight loss surgery. Patient has been overweight for the past  8 years. Her weight has ranged 220-289 lbs. Her heaviest weight is 289 lbs. Dietary Habits:  Eats a loaf bread a day  Breakfast- one piece of bread and a piece of cheese. Lunch- veg, fruit, steak and potato  Dinner- skips. Snack- apple  Liquids- water, soda     Prior weight loss attempts: skipping meals. STOPBANG questionnaire     Do you Snore loudly? yes  Do you often feel Tired, fatigued, or sleepy during the daytime? yes  Has anyone Observed you stop breathing during your sleep? yes  Are you being treated for high blood Pressure? no  BMI more than 35 kg/m2? yes  Age over 48years old? no  Neck Circumference >16 inches? no  Gender male? no  ______________________________________     SCORE: 3     If YES to 0 - 2, low risk of sleep apnea  If YES to 3 - 4 intermediate risk of having sleep apnea  If YES to 5 - 8 high risk of having sleep apnea (or 2 + BMI 35 or Neck > 17\" or Male)           Ms. Avelar has a reminder for a \"due or due soon\" health maintenance. I have asked that she contact her primary care provider for follow-up on this health maintenance.       Patient Active Problem List   Diagnosis Code    Chronic tension-type headache, not intractable G44.229    Class 3 severe obesity with serious comorbidity and body mass index (BMI) of 40.0 to 44.9 in adult ([facility] [address] E66.01, Z68.41    Chronic hypertension I10    Pre-diabetes R73.03    Other hyperlipidemia E78.49    Idiopathic intracranial hypertension G93.2    IUD (intrauterine device) in place Z97.5       Past Medical History:   Diagnosis Date    Essential hypertension     last two pregnancies         Past Surgical History:   Procedure Laterality Date    HX  SECTION      x2    HX DILATION AND CURETTAGE      HX OTHER SURGICAL             Lilian Bowles, DO      Allergies   Allergen Reactions Acetazolamide Unknown (comments)     Rash arm and paresthesias. Other Medication Unknown (comments)     Pt. States does not remember the name of the shot but had a reaction. Family History   Problem Relation Age of Onset    Hypertension Father        Social History     Socioeconomic History    Marital status:      Spouse name: Not on file    Number of children: Not on file    Years of education: Not on file    Highest education level: Not on file   Occupational History    Not on file   Tobacco Use    Smoking status: Never    Smokeless tobacco: Never   Vaping Use    Vaping Use: Never used   Substance and Sexual Activity    Alcohol use: Not Currently    Drug use: Never    Sexual activity: Not Currently   Other Topics Concern     Service Not Asked    Blood Transfusions Not Asked    Caffeine Concern Not Asked    Occupational Exposure Not Asked    Hobby Hazards Not Asked    Sleep Concern Not Asked    Stress Concern Not Asked    Weight Concern Not Asked    Special Diet Not Asked    Back Care Not Asked    Exercise Not Asked    Bike Helmet Not Asked    Seat Belt Not Asked    Self-Exams Not Asked   Social History Narrative    ** Merged History Encounter **          Social Determinants of Health     Financial Resource Strain: Not on file   Food Insecurity: Not on file   Transportation Needs: Not on file   Physical Activity: Not on file   Stress: Not on file   Social Connections: Not on file   Intimate Partner Violence: Not on file   Housing Stability: Not on file       HPI    Review of Systems   Constitutional:  Negative for chills, fever and malaise/fatigue. HENT:  Negative for congestion, hearing loss, sinus pain, sore throat and tinnitus. Eyes:  Positive for blurred vision (seeing an eye doctor) and redness. Negative for double vision, pain and discharge. Respiratory:  Negative for shortness of breath.     Cardiovascular:  Positive for chest pain (sometimes has pain in her chest and swelling.) and leg swelling. Negative for palpitations. Gastrointestinal:  Positive for abdominal pain, heartburn and nausea. Negative for vomiting. Genitourinary:  Positive for dysuria (is seeing her PCP in [place]) and urgency. Negative for frequency. Musculoskeletal:  Positive for back pain, joint pain (knees) and neck pain. Skin:  Negative for itching and rash. Neurological:  Positive for dizziness and headaches. Psychiatric/Behavioral:  Positive for depression. The patient is nervous/anxious. Physical Exam  Constitutional:       Appearance: She is well-developed. HENT:      Mouth/Throat:      Mouth: Mucous membranes are moist.   Cardiovascular:      Rate and Rhythm: Normal rate and regular rhythm. Heart sounds: No murmur heard. No friction rub. No gallop. Pulmonary:      Effort: Pulmonary effort is normal.      Breath sounds: Normal breath sounds. Abdominal:      General: Bowel sounds are normal. There is no distension. Palpations: Abdomen is soft. Tenderness: There is abdominal tenderness (epigastric). Musculoskeletal:      Cervical back: Normal range of motion. Skin:     General: Skin is warm and dry. Neurological:      Mental Status: She is alert and oriented to person, place, and time. ASSESSMENT and PLAN      Sleeve gastrectomy or vertical gastric resection involves a resection of the vast majority of the stomach usually done with a dilator pressed against the right half of the stomach of the lesser curvature. One preserves the pyloric sphincter at the lower end of the stomach and then sequentially staples and divides all the way up to the gastroesophageal junction leaving a small rim of the stomach to the left better known as the angle of His. This procedure significantly reduces the amount that the stomach can hold while removing the part of the stomach that secretes the hormone grehlin and alters the speed of food emptying from the stomach.  Once food leaves the stomach, the remainder of the intestinal tract is completely intact and there is no effect on the digestion or absorption of food nutrients and calories. The procedure is intermediate between the adjustable gastric band and the gastric bypass as far as weight loss, potential complications and resolution of comorbid diseases such as Type 2 diabetes, high blood pressure, sleep apnea, arthritic pain, cholesterol disorders to mention a few. The usual complications are that of any procedure which affects the ability of the stomach to accept food at any given time. Those are usually nausea and vomiting which either spontaneously resolve or require endoscopic dilatation. The most serious complication from this surgery is a leak from the staple line usually near the  gastroesophageal junction. The frequency of this serious complication is low and usually heals spontaneously although reoperation may be necessary. The other serious complications are those which can occur with any major surgery and include  Infection, bleeding, blood clots and/or cardiopulmonary problems. The procedure of divided gastric bypass with Tay-en-Y gastrojejunostomy was explained to the patient including the four parts of the operation- 1) loss of appetite, 2) the restrictive phases, 3) the dumping phase, 4) mild malabsorption from the diversion of pancreatic or biliary enzymes. Informed consent was obtained concerning the potential morbidities and mortality of the operation including anastomotic leak, pulmonary embolism, deep vein thrombosis, bleeding, staple- line disruption, stomal stenosis or dilatation, inadequate weight loss, and requirement for life-long vitamin intake. Further information was given concerning a three-day hospitalization with at least one or more nights in a special care unit, protein- enriched liquified diet for two-thee weeks, convalescence for three to six weeks.  Information was provided concerning the team approach anesthesia, nursing, and dietary. Pneumatic stockings, Heparin or Lovenox, and wound care management techniques were explained. Abdominal pain, nausea and/or vomiting may occur if eating too fast, too much, or not chewing well enough. With sweets or high fat ingestion, dumping may occur. It was further stressed the approximately three to five percent of patients require endoscopic balloon dilatation of the staple line. Furthermore, a clear discussion of the imperfections of the operation was discussed including the fact that it not effective in every patient because of patient non-compliance and/or mechanical failure. It was hoped, however, that at approaching a ninety percent level, the patients can achieve a mean of seventy percent loss of excess body weight. This is determined partially by patient compliance and exercise as well as making wise choices for the diet. Patient meets criteria established by the NIH. Without weight reduction, co-morbidities will escalate as well as risk of early mortality. Recommendation is patient could be served with surgical weight reduction, the procedure of undecided. I explained to the patient differences between laparoscopic and open gastric bypass, laparoscopic adjustable gastric banding, and sleeve gastrectomy procedures. Patient has attended one our informational meeting and has seen our educational materials. Patient desires to have surgery with Dr. London Hernadez    I have reinforced without lifestyle change and behavior modification, they would not achieve his weight loss goals. I reviewed risks and complications associated with each procedure. Patient has been sent to [facility] for H. Pylori for  testing. Will treat based on results. Other recommendations include psychological evaluation, nutritional consultation. She will need and UGI, EGd, and sleep study. She will need to lose 15 lbs.      I discussed with patient a diet high in protein, low-fat, low- sugar, limited carbohydrates, and discontinuing use of carbonated beverages. Also discussed physical activity and exercises. I have answered all questions, they wish to proceed. 90 Minutes spent face to face with patient, >50 % of time spent counseling. ** Copy to PCP and other providers       Advised that she needs to follow up with her PCP about urinary pain, frequency. If Chest pain or shortness of breath worsens, needs to go the ER.  She is followed by a psychologist.

## 2023-01-25 ENCOUNTER — DOCUMENTATION ONLY (OUTPATIENT)
Dept: SURGERY | Age: 33
End: 2023-01-25

## 2023-01-25 NOTE — PROGRESS NOTES
Referral requisitions for appointment request faxed to [facility] Dr. Mary Chavez for EGD and Sleep Medicine Dr. Evelyn Mojica for sleep study.   Both confirmations received placed in scan pile

## 2023-01-31 NOTE — PROGRESS NOTES
Neurology Progress Note    Patient ID:  Ashley Bowden  [id number]  34 y.o.  1990      Subjective:   History:  Ashley Bowden is a 34 y.o. female [place] who  has a past medical history of Essential hypertension who for the past 15 yrs, noted headache, bitemporal going to the back, daily, described as \"boiling water\", 10/10, constant, (+) nausea (+) vomiting (+) photophobia (+) phonophobia (+) blurred vision. Gets worse when bending over. Was seen by a headache doctor in [facility]. Thought it was sinus allergies placed on unrecalled meds. Saw optometrist in Aug 2022- who said she just needs glasses but they are not clear if her optic nerve was examined. Placed on Diamox 125 mg BID 1 month ago with no benefit. Developed some paresthesia. (+) weight gain (+) not sleeping well - only 5 hrs. Patient is now on Diamox 500 mg BID but still getting daily headaches. Has not seen an eye doctor. ROS:  Per HPI-  Otherwise the remainder of ROS was negative    Social Hx  Social History     Socioeconomic History    Marital status:    Tobacco Use    Smoking status: Never    Smokeless tobacco: Never   Vaping Use    Vaping Use: Never used   Substance and Sexual Activity    Alcohol use: Not Currently    Drug use: Never    Sexual activity: Not Currently   Social History Narrative    ** Merged History Encounter **            Meds:  Current Outpatient Medications on File Prior to Visit   Medication Sig Dispense Refill    escitalopram oxalate (LEXAPRO) 10 mg tablet Take 1 Tablet by mouth daily. 60 Tablet 0    butalbital-acetaminophen-caff (Fioricet) -40 mg per capsule Take 1 Capsule by mouth every four (4) hours as needed for Headache. 12 Capsule 0    nirmatrelvir-ritonavir (Paxlovid, EUA,) 300 mg (150 mg x 2)-100 mg Take as prescribed on packaging for 5 days. (Patient not taking: Reported on 2/1/2023) 1 Box 0     No current facility-administered medications on file prior to visit.        Imaging:    CT Results (recent):  Results from [place] encounter on 11/04/22    CT HEAD WO CONT    Narrative  INDICATION: headache    EXAM: CT HEAD without contrast.    TECHNIQUE: Unenhanced CT Head is performed. CT dose reduction was achieved  through use of a standardized protocol tailored for this examination and  automatic exposure control for dose modulation. FINDINGS:  No acute infarct is seen. There is no apparent mass on unenhanced imaging. There  is no bleed, shift, obstructive hydrocephalus or significant extra-axial fluid  collection. Bone windows are unremarkable. Impression  No acute intracranial finding. MRI Results (recent):  Results from [place] encounter on 06/30/22    MRV BRAIN WO CONT    Narrative  CLINICAL HISTORY: Tension type headache. INDICATION: Tension type headache. COMPARISON: NONE    TECHNIQUE: MR examination of the brain includes axial and sagittal T1, coronal  T2, axial T2, axial FLAIR, axial gradient echo, axial DWI. CONTRAST: None    FINDINGS:  There is no intracranial mass, hemorrhage or evidence of acute infarction. IACs are symmetric in size. Mild tortuosity of the optic nerves. There is  slightly increased perineural CSF. There is flattening of the posterior globes. The brain architecture is normal. There is no evidence of midline shift or  mass-effect. There are no extra-axial fluid collections. There is no Chiari or  syrinx. The pituitary and infundibulum are grossly unremarkable. There is no  skull base mass. Cerebellopontine angles are grossly unremarkable. The major  intracranial vascular flow-voids are unremarkable. The cavernous sinuses are  symmetric. Optic chiasm and infundibulum grossly unremarkable. Orbits are  grossly symmetric. Dural venous sinuses are grossly patent. The mastoid air cells are well pneumatized and clear. Impression  Imaging findings are suggestive of idiopathic intracranial hypertension.   Increased perineural CSF, mild posterior flattening. Correlation with lumbar  puncture opening pressures suggested. There is no intracranial mass, hemorrhage or evidence of acute infarction. No acute intracranial process is demonstrated. Clinical history: Tension type headache  INDICATION:   Tension type headache  COMPARISON: None  TECHNIQUE: Contrast enhanced and 3-D time-of-flight MRV of the brain was  performed. Multiplanar reconstructions were obtained. FINDINGS:  Transverse sinus is patent. . Sagittal sinus is patent. . Straight sinus is  patent. . Basilar veins are patent. . Sigmoid sinuses are patent. . There is no  aneurysm. There is no evidence of cortical vein thrombosis. The right transverse  sinus is dominant. There is moderate stenosis of the left transverse sinus. .    IMPRESSION:  No evidence of dural venous sinus thrombosis. 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 IR Results (recent):  No results found for this or any previous visit. VAS/US Results (recent):  No results found for this or any previous visit.       Reviewed records in [facility] [employer] tab today    Lab Review     Office Visit on 01/24/2023   Component Date Value Ref Range Status    H. pylori Ab, IgA 01/24/2023 <9.0  0.0 - 8.9 units Final    Comment:                                 Negative          <9.0                                  Equivocal   9.0 - 11.0                                  Positive         >11.0      H pylori Ab IgG 01/24/2023 0.79  0.00 - 0.79 Index Value Final    Comment:                              Negative           <0.80                               Equivocal    0.80 - 0.89                               Positive           >0.89     Admission on 01/11/2023, Discharged on 01/11/2023   Component Date Value Ref Range Status    Influenza A Antigen 01/11/2023 Negative  NEG   Final    Influenza B Antigen 01/11/2023 Negative  NEG   Final    Specimen source 01/11/2023 Nasopharyngeal    Final    COVID-19 rapid test 01/11/2023 Detected (A)  NOTD   Final    Comment: CALLED TO AND READ BACK BY  JAVI MARTINEZ,[id number]  Rapid Abbott ID Now       The specimen is POSITIVE for SARS-CoV-2, the novel coronavirus associated with COVID-19. This test has been authorized by the FDA under an Emergency Use Authorization (EUA) for use by authorized laboratories. Fact sheet for Healthcare Providers:  http://www.khadar.bernadette/  Fact sheet for Patients: http://www.samantha-sandy.bernadette/       Methodology: Isothermal Nucleic Acid Amplification      WBC 01/11/2023 4.1  3.6 - 11.0 K/uL Final    RBC 01/11/2023 5.49 (A)  3.80 - 5.20 M/uL Final    HGB 01/11/2023 12.4  11.5 - 16.0 g/dL Final    HCT 01/11/2023 41.8  35.0 - 47.0 % Final    MCV 01/11/2023 76.1 (A)  80.0 - 99.0 FL Final    MCH 01/11/2023 22.6 (A)  26.0 - 34.0 PG Final    MCHC 01/11/2023 29.7 (A)  30.0 - 36.5 g/dL Final    RDW 01/11/2023 16.2 (A)  11.5 - 14.5 % Final    PLATELET 90/53/3016 167  150 - 400 K/uL Final    MPV 01/11/2023 10.6  8.9 - 12.9 FL Final    NRBC 01/11/2023 0.0  0  WBC Final    ABSOLUTE NRBC 01/11/2023 0.00  0.00 - 0.01 K/uL Final    NEUTROPHILS 01/11/2023 40  32 - 75 % Final    LYMPHOCYTES 01/11/2023 44  12 - 49 % Final    MONOCYTES 01/11/2023 11  5 - 13 % Final    EOSINOPHILS 01/11/2023 4  0 - 7 % Final    BASOPHILS 01/11/2023 1  0 - 1 % Final    IMMATURE GRANULOCYTES 01/11/2023 0  0.0 - 0.5 % Final    ABS. NEUTROPHILS 01/11/2023 1.6 (A)  1.8 - 8.0 K/UL Final    ABS. LYMPHOCYTES 01/11/2023 1.8  0.8 - 3.5 K/UL Final    ABS. MONOCYTES 01/11/2023 0.5  0.0 - 1.0 K/UL Final    ABS. EOSINOPHILS 01/11/2023 0.2  0.0 - 0.4 K/UL Final    ABS. BASOPHILS 01/11/2023 0.0  0.0 - 0.1 K/UL Final    ABS. IMM.  GRANS. 01/11/2023 0.0  0.00 - 0.04 K/UL Final    DF 01/11/2023 AUTOMATED    Final    Sodium 01/11/2023 138  136 - 145 mmol/L Final    Potassium 01/11/2023 4.0  3.5 - 5.1 mmol/L Final    Chloride 01/11/2023 102  97 - 108 mmol/L Final    CO2 01/11/2023 30  21 - 32 mmol/L Final    Anion gap 01/11/2023 6  5 - 15 mmol/L Final    Glucose 01/11/2023 143 (A)  65 - 100 mg/dL Final    BUN 01/11/2023 11  6 - 20 MG/DL Final    Creatinine 01/11/2023 0.62  0.55 - 1.02 MG/DL Final    BUN/Creatinine ratio 01/11/2023 18  12 - 20   Final    eGFR 01/11/2023 >60  >60 ml/min/1.73m2 Final    Comment:      Pediatric calculator link: [employer].at. org/professionals/kdoqi/gfr_calculatorped       These results are not intended for use in patients <25years of age. eGFR results are calculated without a race factor using  the 2021 CKD-EPI equation. Careful clinical correlation is recommended, particularly when comparing to results calculated using previous equations. The CKD-EPI equation is less accurate in patients with extremes of muscle mass, extra-renal metabolism of creatinine, excessive creatine ingestion, or following therapy that affects renal tubular secretion. Calcium 01/11/2023 9.6  8.5 - 10.1 MG/DL Final    Bilirubin, total 01/11/2023 0.2  0.2 - 1.0 MG/DL Final    ALT (SGPT) 01/11/2023 21  12 - 78 U/L Final    AST (SGOT) 01/11/2023 12 (A)  15 - 37 U/L Final    Alk. phosphatase 01/11/2023 57  45 - 117 U/L Final    Protein, total 01/11/2023 8.1  6.4 - 8.2 g/dL Final    Albumin 01/11/2023 3.3 (A)  3.5 - 5.0 g/dL Final    Globulin 01/11/2023 4.8 (A)  2.0 - 4.0 g/dL Final    A-G Ratio 01/11/2023 0.7 (A)  1.1 - 2.2   Final    Troponin-High Sensitivity 01/11/2023 5  0 - 51 ng/L Final    Comment: A HS troponin value change of (+ or -) 50% or more below the 99th percentile, in a 1/2/3 hr interval represents a significant change. Clinical correlation is recommended. A HS troponin value change of (+ or -) 20% or above the 99th percentile, in a 1/2/3 hr interval represents a significant change. Clinical correlation is recommended.   99th Percentile:   Women: 0-51 ng/L                                                                Men:   0-76 ng/L  Patients taking more than 20 mg/day of biotin may have falsely negative results and should not use this test.      Ventricular Rate 01/11/2023 96  BPM Final    Atrial Rate 01/11/2023 96  BPM Final    P-R Interval 01/11/2023 146  ms Final    QRS Duration 01/11/2023 88  ms Final    Q-T Interval 01/11/2023 362  ms Final    QTC Calculation (Bezet) 01/11/2023 457  ms Final    Calculated P Axis 01/11/2023 57  degrees Final    Calculated R Axis 01/11/2023 51  degrees Final    Calculated T Axis 01/11/2023 16  degrees Final    Diagnosis 01/11/2023    Final                    Value:Normal sinus rhythm  Normal ECG  Confirmed by Karin Fowler MD. ([id number]) on 1/11/2023 10:37:24 PM      SAMPLES BEING HELD 01/11/2023 1SST,1BL,1RED   Final    COMMENT 01/11/2023 Add-on orders for these samples will be processed based on acceptable specimen integrity and analyte stability, which may vary by analyte. Final    Color 01/11/2023 YELLOW/STRAW    Final    Color Reference Range: Straw, Yellow or Dark Yellow    Appearance 01/11/2023 CLEAR  CLEAR   Final    Specific gravity 01/11/2023 1.006  1.003 - 1.030   Final    pH (UA) 01/11/2023 5.5  5.0 - 8.0   Final    Protein 01/11/2023 100 (A)  NEG mg/dL Final    Glucose 01/11/2023 Negative  NEG mg/dL Final    Ketone 01/11/2023 Negative  NEG mg/dL Final    Bilirubin 01/11/2023 Negative  NEG   Final    Blood 01/11/2023 LARGE (A)  NEG   Final    Urobilinogen 01/11/2023 0.2  0.2 - 1.0 EU/dL Final    Nitrites 01/11/2023 Negative  NEG   Final    Leukocyte Esterase 01/11/2023 Negative  NEG   Final    WBC 01/11/2023 0-4  0 - 4 /hpf Final    RBC 01/11/2023 20-50  0 - 5 /hpf Final    Epithelial cells 01/11/2023 MODERATE (A)  FEW /lpf Final    Epithelial cell category consists of squamous cells and /or transitional urothelial cells. Renal tubular cells, if present, are separately identified as such.     Bacteria 01/11/2023 Negative  NEG /hpf Final    Hyaline cast 01/11/2023 0-2  0 - 2 /lpf Final    Urine culture hold 01/11/2023 Urine on hold in Microbiology dept for 2 days. If unpreserved urine is submitted, it cannot be used for addtional testing after 24 hours, recollection will be required. Final   Admission on 11/04/2022, Discharged on 11/04/2022   Component Date Value Ref Range Status    SAMPLES BEING HELD 11/04/2022 1RED,1DK GRN,1BLUE,1SST   Final    COMMENT 11/04/2022 Add-on orders for these samples will be processed based on acceptable specimen integrity and analyte stability, which may vary by analyte. Final    WBC 11/04/2022 4.8  3.6 - 11.0 K/uL Final    RBC 11/04/2022 5.74 (A)  3.80 - 5.20 M/uL Final    HGB 11/04/2022 12.5  11.5 - 16.0 g/dL Final    HCT 11/04/2022 42.1  35.0 - 47.0 % Final    MCV 11/04/2022 73.3 (A)  80.0 - 99.0 FL Final    MCH 11/04/2022 21.8 (A)  26.0 - 34.0 PG Final    MCHC 11/04/2022 29.7 (A)  30.0 - 36.5 g/dL Final    RDW 11/04/2022 18.1 (A)  11.5 - 14.5 % Final    PLATELET 80/89/5198 325  150 - 400 K/uL Final    MPV 11/04/2022 11.0  8.9 - 12.9 FL Final    NRBC 11/04/2022 0.0  0  WBC Final    ABSOLUTE NRBC 11/04/2022 0.00  0.00 - 0.01 K/uL Final    NEUTROPHILS 11/04/2022 44  32 - 75 % Final    LYMPHOCYTES 11/04/2022 46  12 - 49 % Final    MONOCYTES 11/04/2022 7  5 - 13 % Final    EOSINOPHILS 11/04/2022 2  0 - 7 % Final    BASOPHILS 11/04/2022 1  0 - 1 % Final    IMMATURE GRANULOCYTES 11/04/2022 0  0.0 - 0.5 % Final    ABS. NEUTROPHILS 11/04/2022 2.1  1.8 - 8.0 K/UL Final    ABS. LYMPHOCYTES 11/04/2022 2.2  0.8 - 3.5 K/UL Final    ABS. MONOCYTES 11/04/2022 0.4  0.0 - 1.0 K/UL Final    ABS. EOSINOPHILS 11/04/2022 0.1  0.0 - 0.4 K/UL Final    ABS. BASOPHILS 11/04/2022 0.0  0.0 - 0.1 K/UL Final    ABS. IMM. GRANS.  11/04/2022 0.0  0.00 - 0.04 K/UL Final    DF 11/04/2022 AUTOMATED    Final    Sodium 11/04/2022 136  136 - 145 mmol/L Final    Potassium 11/04/2022 3.8  3.5 - 5.1 mmol/L Final    Chloride 11/04/2022 102  97 - 108 mmol/L Final    CO2 11/04/2022 28  21 - 32 mmol/L Final    Anion gap 11/04/2022 6  5 - 15 mmol/L Final    Glucose 11/04/2022 119 (A)  65 - 100 mg/dL Final    BUN 11/04/2022 10  6 - 20 MG/DL Final    Creatinine 11/04/2022 0.70  0.55 - 1.02 MG/DL Final    BUN/Creatinine ratio 11/04/2022 14  12 - 20   Final    eGFR 11/04/2022 >60  >60 ml/min/1.73m2 Final    Comment:      Pediatric calculator link: [employer].at. org/professionals/kdoqi/gfr_calculatorped       Effective Oct 3, 2022       These results are not intended for use in patients <25years of age. eGFR results are calculated without a race factor using  the 2021 CKD-EPI equation. Careful clinical correlation is recommended, particularly when comparing to results calculated using previous equations. The CKD-EPI equation is less accurate in patients with extremes of muscle mass, extra-renal metabolism of creatinine, excessive creatine ingestion, or following therapy that affects renal tubular secretion. Calcium 11/04/2022 9.8  8.5 - 10.1 MG/DL Final    Bilirubin, total 11/04/2022 0.6  0.2 - 1.0 MG/DL Final    ALT (SGPT) 11/04/2022 13  12 - 78 U/L Final    AST (SGOT) 11/04/2022 9 (A)  15 - 37 U/L Final    Alk. phosphatase 11/04/2022 57  45 - 117 U/L Final    Protein, total 11/04/2022 8.1  6.4 - 8.2 g/dL Final    Albumin 11/04/2022 3.5  3.5 - 5.0 g/dL Final    Globulin 11/04/2022 4.6 (A)  2.0 - 4.0 g/dL Final    A-G Ratio 11/04/2022 0.8 (A)  1.1 - 2.2   Final    Pregnancy test,urine (POC) 11/04/2022 Negative  NEG   Final         Objective:       Exam:  Visit Vitals  /80   Pulse (!) 102   SpO2 99%     Gen: Awake, alert, follows commands  Appropriate appearance, normal speech. Oriented to all spheres. No visual field defect on confrontation exam.  Full eyes movement, with no nystagmus, no diplopia, no ptosis. Normal gag and swallow.   All remaining cranial nerves were normal  Motor function: 5/5 in all extremities  Sensory: intact to LT, PP and JPS  DTRs ++ in all extremities, (-) Babinski  Good FTN and HTS Gait: Antalgic due to R knee pain    Assessment:       ICD-10-CM ICD-9-CM    1. Chronic intractable headache, unspecified headache type  R51.9 784.0     G89.29            Considerations include pseudotumor cerebri (diffuse every day, obese with blurred vision but CSF opening pressure was normal- 17) and severe migraine, intractable    Plan:   1.  I had a long discussion with patient  (via phone ). Discussed diagnosis, prognosis, pathophysiology and available treatment. Reviewed test results. All questions were answered. 2. Increase Diamox 500 mg 2 tabs BID. Monitor BP  3. Again stress that patient needs to see an ophthalmologist who can take a look at her optic nerve to look for optic nerve swelling which will support the diagnosis of pseudotumor cerebri  4. If optic nerve is normal and no clear response to Diamox, will try her on migraine meds (I.e. Topamax)  5. Advise to lose weight    Follow-up and Dispositions    Return in about 1 month (around 3/1/2023).                Makayla Ramírez MD  Diplomate, American Board of Psychiatry and Neurology  Diplomate, Neuromuscular Medicine  Diplomate, American Board of Electrodiagnostic Medicine

## 2023-02-01 ENCOUNTER — OFFICE VISIT (OUTPATIENT)
Dept: NEUROLOGY | Age: 33
End: 2023-02-01
Payer: COMMERCIAL

## 2023-02-01 VITALS — OXYGEN SATURATION: 99 % | SYSTOLIC BLOOD PRESSURE: 130 MMHG | HEART RATE: 102 BPM | DIASTOLIC BLOOD PRESSURE: 80 MMHG

## 2023-02-01 DIAGNOSIS — G89.29 CHRONIC INTRACTABLE HEADACHE, UNSPECIFIED HEADACHE TYPE: Primary | ICD-10-CM

## 2023-02-01 DIAGNOSIS — R51.9 CHRONIC INTRACTABLE HEADACHE, UNSPECIFIED HEADACHE TYPE: Primary | ICD-10-CM

## 2023-02-01 PROCEDURE — 3079F DIAST BP 80-89 MM HG: CPT | Performed by: PSYCHIATRY & NEUROLOGY

## 2023-02-01 PROCEDURE — 3075F SYST BP GE 130 - 139MM HG: CPT | Performed by: PSYCHIATRY & NEUROLOGY

## 2023-02-01 PROCEDURE — 99215 OFFICE O/P EST HI 40 MIN: CPT | Performed by: PSYCHIATRY & NEUROLOGY

## 2023-02-01 RX ORDER — ACETAZOLAMIDE 500 MG/1
1000 CAPSULE, EXTENDED RELEASE ORAL 2 TIMES DAILY
Qty: 120 CAPSULE | Refills: 3 | Status: SHIPPED | OUTPATIENT
Start: 2023-02-01

## 2023-02-01 NOTE — LETTER
2/1/2023    Patient: Virginia Gonzalez   YOB: 1990   Date of Visit: 2/1/2023     Patricia Garcia, [address] [place], [place] [id number]  [facility] [place]    Dear Patricia Garcia DO,      Thank you for referring Ms. Virginia Gonzalez to [address] [place] [address] for evaluation. My notes for this consultation are attached. If you have questions, please do not hesitate to call me. I look forward to following your patient along with you.       Sincerely,    Armando Huerta MD

## 2023-02-08 ENCOUNTER — OFFICE VISIT (OUTPATIENT)
Dept: FAMILY MEDICINE CLINIC | Age: 33
End: 2023-02-08
Payer: COMMERCIAL

## 2023-02-08 VITALS
HEART RATE: 97 BPM | WEIGHT: 285 LBS | BODY MASS INDEX: 47.43 KG/M2 | OXYGEN SATURATION: 98 % | SYSTOLIC BLOOD PRESSURE: 127 MMHG | DIASTOLIC BLOOD PRESSURE: 81 MMHG | TEMPERATURE: 98 F

## 2023-02-08 DIAGNOSIS — F43.22 ADJUSTMENT DISORDER WITH ANXIETY: ICD-10-CM

## 2023-02-08 DIAGNOSIS — G93.2 IDIOPATHIC INTRACRANIAL HYPERTENSION: Primary | ICD-10-CM

## 2023-02-08 DIAGNOSIS — E66.01 CLASS 3 SEVERE OBESITY WITH SERIOUS COMORBIDITY AND BODY MASS INDEX (BMI) OF 40.0 TO 44.9 IN ADULT, UNSPECIFIED OBESITY TYPE (HCC): ICD-10-CM

## 2023-02-08 DIAGNOSIS — G44.229 CHRONIC TENSION-TYPE HEADACHE, NOT INTRACTABLE: ICD-10-CM

## 2023-02-08 PROCEDURE — 3074F SYST BP LT 130 MM HG: CPT | Performed by: STUDENT IN AN ORGANIZED HEALTH CARE EDUCATION/TRAINING PROGRAM

## 2023-02-08 PROCEDURE — 99214 OFFICE O/P EST MOD 30 MIN: CPT | Performed by: STUDENT IN AN ORGANIZED HEALTH CARE EDUCATION/TRAINING PROGRAM

## 2023-02-08 PROCEDURE — 3078F DIAST BP <80 MM HG: CPT | Performed by: STUDENT IN AN ORGANIZED HEALTH CARE EDUCATION/TRAINING PROGRAM

## 2023-02-08 RX ORDER — ESCITALOPRAM OXALATE 20 MG/1
20 TABLET ORAL DAILY
Qty: 90 TABLET | Refills: 3 | Status: CANCELLED | OUTPATIENT
Start: 2023-02-08

## 2023-02-08 RX ORDER — VENLAFAXINE HYDROCHLORIDE 75 MG/1
75 CAPSULE, EXTENDED RELEASE ORAL DAILY
Qty: 90 CAPSULE | Refills: 1 | Status: SHIPPED | OUTPATIENT
Start: 2023-02-08

## 2023-02-08 NOTE — PROGRESS NOTES
Nolna Ortiz is an 34 y.o. female who presents for follow up of chronic conditions    #anxiety  - medication not working  - multiple stressors  - medicaid - not eligible   - finished paperwork with [employer] financial aid yesterday    #headaches  - can't afford the medication from the neurologist  - it isn't covered and she doesn't know why    #bariatric surgery  - worried that insurance will run out before this can be scheduled  - told that she cannot apply for medicaid due to citizenship status      Allergies   Allergies   Allergen Reactions    Acetazolamide Unknown (comments)     Rash arm and paresthesias. Other Medication Unknown (comments)     Pt. States does not remember the name of the shot but had a reaction. Medications   Current Outpatient Medications   Medication Sig    venlafaxine-SR (EFFEXOR-XR) 75 mg capsule Take 1 Capsule by mouth daily. butalbital-acetaminophen-caff (Fioricet) -40 mg per capsule Take 1 Capsule by mouth every four (4) hours as needed for Headache. acetaZOLAMIDE SR (DIAMOX) 500 mg capsule Take 2 Capsules by mouth two (2) times a day. (Patient not taking: Reported on 2/8/2023)     No current facility-administered medications for this visit. Past surgical, medical and social history reviewed and updated as relevant to presenting concerns. ROS: See HPI      OBJECTIVE  Visit Vitals  /81   Pulse 97   Temp 98 °F (36.7 °C) (Oral)   Wt 285 lb (129.3 kg)   SpO2 98%   BMI 47.43 kg/m²       Physical Exam  General: No acute distress. HEENT: Conjunctiva are clear, sclera non-icteric. Respiratory: Normal work of breathing, talking in full sentences without issue  Musculoskeletal: Normal gait. Skin: No abnormalities or rashes   Neuro: Alert, oriented, speech clear and coherent  Psychiatric: Congruent mood and affect, anxious at times tearful when discussing insurance. Denies SI.       ASSESSMENT & PLAN  1.  Adjustment disorder with anxiety  Current medication not controlling symptoms. Multiple life stressors including pending insurance loss which impacts her ability to access health care. Changing medication today but also assisted with resources for insurance questions and finances. She has submitted application for [employer]. Provided resources for reduced cost medications. - venlafaxine-SR (EFFEXOR-XR) 75 mg capsule; Take 1 Capsule by mouth daily. Dispense: 90 Capsule; Refill: 1    2. Idiopathic intracranial hypertension  3. Chronic tension-type headache, not intractable  Not controlled. Reports that the diamox was denied by Medicaid. I reviewed notes from neurology and pt has also not seen an ophthalmologist. I submitted a referral today so patient can have this evaluation to confirm her diagnosis, sent note to our referrals coordinator. Called pharmacy to check re: diamox - pharmacist reports that it is not covered at the extended release dose. Could be covered at IR. Allergy is listed in chart to acetazolamide - possible at IR? Sent message to neurology to clarify and see if formulation change is possible. Given anxiety not improved with escitalopram, and comorbid headaches, change to venlafaxine for possible benefit to anxiety and HA.   - venlafaxine-SR (EFFEXOR-XR) 75 mg capsule; Take 1 Capsule by mouth daily. Dispense: 90 Capsule; Refill: 1    4. Class 3 severe obesity with serious comorbidity and body mass index (BMI) of 40.0 to 44.9 in adult, unspecified obesity type [facility])  Undergoing evaluation with Bariatric Surgery. Provided information for financial resources and insurance resources. SHe has applied to [employer] already      Follow-up and Dispositions    Return in about 4 weeks (around 3/8/2023) for Anxiety. I have discussed the diagnosis with the patient and the intended plan as seen in the above orders. Patient verbalized understanding of the plan and agrees with the plan.  The patient has received an after-visit summary and questions were answered concerning future plans. I have discussed medication side effects and warnings with the patient as well. Informed patient to return to the office if new symptoms arise.         Rachael Rubalcava MD

## 2023-02-08 NOTE — PROGRESS NOTES
Identified pt with two pt identifiers(name and ). Reviewed record in preparation for visit and have obtained necessary documentation. All patient medications has been reviewed. Chief Complaint   Patient presents with    Anxiety     Follow up     Visit Vitals  /81   Pulse 97   Temp 98 °F (36.7 °C) (Oral)   Wt 285 lb (129.3 kg)   SpO2 98%   BMI 47.43 kg/m²     1. Have you been to the ER, urgent care clinic since your last visit? Hospitalized since your last visit? No    2. Have you seen or consulted any other health care providers outside of the [address] [place] [address] since your last visit? Include any pap smears or colon screening.  No

## 2023-02-10 DIAGNOSIS — G93.2 IDIOPATHIC INTRACRANIAL HYPERTENSION: Primary | ICD-10-CM

## 2023-02-10 RX ORDER — ACETAZOLAMIDE 250 MG/1
1000 TABLET ORAL 2 TIMES DAILY
Qty: 720 TABLET | Refills: 1 | Status: SHIPPED | OUTPATIENT
Start: 2023-02-10 | End: 2023-08-09

## 2023-02-10 NOTE — PROGRESS NOTES
Discussed switch from SR to IR for affordability with pt's neurologist, OK to switch to 250mg, 4 tabs, twice daily. Sending new prescription. Diagnoses and all orders for this visit:    1. Idiopathic intracranial hypertension  -     acetaZOLAMIDE (DIAMOX) 250 mg tablet; Take 4 Tablets by mouth two (2) times a day for 180 days.

## 2023-03-15 ENCOUNTER — OFFICE VISIT (OUTPATIENT)
Dept: FAMILY MEDICINE CLINIC | Age: 33
End: 2023-03-15
Payer: COMMERCIAL

## 2023-03-15 VITALS
HEART RATE: 96 BPM | WEIGHT: 286 LBS | TEMPERATURE: 98.6 F | OXYGEN SATURATION: 100 % | BODY MASS INDEX: 47.59 KG/M2 | SYSTOLIC BLOOD PRESSURE: 104 MMHG | DIASTOLIC BLOOD PRESSURE: 69 MMHG

## 2023-03-15 DIAGNOSIS — F41.9 INSOMNIA SECONDARY TO ANXIETY: Primary | ICD-10-CM

## 2023-03-15 DIAGNOSIS — F51.05 INSOMNIA SECONDARY TO ANXIETY: Primary | ICD-10-CM

## 2023-03-15 DIAGNOSIS — F43.22 ADJUSTMENT DISORDER WITH ANXIETY: ICD-10-CM

## 2023-03-15 DIAGNOSIS — G44.229 CHRONIC TENSION-TYPE HEADACHE, NOT INTRACTABLE: ICD-10-CM

## 2023-03-15 PROCEDURE — 99214 OFFICE O/P EST MOD 30 MIN: CPT | Performed by: STUDENT IN AN ORGANIZED HEALTH CARE EDUCATION/TRAINING PROGRAM

## 2023-03-15 PROCEDURE — 3078F DIAST BP <80 MM HG: CPT | Performed by: STUDENT IN AN ORGANIZED HEALTH CARE EDUCATION/TRAINING PROGRAM

## 2023-03-15 PROCEDURE — 3074F SYST BP LT 130 MM HG: CPT | Performed by: STUDENT IN AN ORGANIZED HEALTH CARE EDUCATION/TRAINING PROGRAM

## 2023-03-15 RX ORDER — VENLAFAXINE HYDROCHLORIDE 37.5 MG/1
37.5 CAPSULE, EXTENDED RELEASE ORAL DAILY
Qty: 30 CAPSULE | Refills: 0 | Status: SHIPPED | OUTPATIENT
Start: 2023-03-15

## 2023-03-15 RX ORDER — TRAZODONE HYDROCHLORIDE 50 MG/1
TABLET ORAL
Qty: 60 TABLET | Refills: 1 | Status: SHIPPED | OUTPATIENT
Start: 2023-03-15

## 2023-03-15 NOTE — PROGRESS NOTES
Identified pt with two pt identifiers(name and ). Reviewed record in preparation for visit and have obtained necessary documentation. All patient medications has been reviewed. Chief Complaint   Patient presents with    Dizziness     X4-5 days, feels like passing out, nauseated, ringing in both ears    Headache     Follow up - still the same      Vitals:    03/15/23 1449 03/15/23 1457 03/15/23 1459 03/15/23 1502   BP: 104/69      BP 2:  (!) 153/96 (!) 156/112 135/83   BP 1 Location:  Right arm     BP Patient Position:  Lying     BP Cuff Size:  Large adult     Pulse: 96      Pulse 2:  58 58    Temp: 98.6 °F (37 °C)      TempSrc: Oral      Weight: 286 lb (129.7 kg)      SpO2: 100%      1. Have you been to the ER, urgent care clinic since your last visit? Hospitalized since your last visit? No    2. Have you seen or consulted any other health care providers outside of the [address] [place] [address] since your last visit? Include any pap smears or colon screening.  No

## 2023-03-15 NOTE — PATIENT INSTRUCTIONS
Do NOT  Effexor (Venlafaxine) 75mg.  the SMALLER DOSE Venlafaxine (Effexor) 37.5mg. For the Effexor, we are slowly stopping this medication. Please take the smaller dose everyday for two weeks. Then take this every other day for two weeks. Then stop this medication and follow up with me. [place]:  24 hour crisis line: [phone]  Daytime number: [phone]     ADDRESS[facility] [place]    Psychiatry, counseling, case management        New medication: TRAZODONE. This helps with sleep and anxiety. Please take 1 full tab at night. You can take a half tab during the day as needed.

## 2023-03-31 ENCOUNTER — APPOINTMENT (OUTPATIENT)
Dept: GENERAL RADIOLOGY | Age: 33
End: 2023-03-31
Attending: EMERGENCY MEDICINE
Payer: COMMERCIAL

## 2023-03-31 ENCOUNTER — HOSPITAL ENCOUNTER (EMERGENCY)
Age: 33
Discharge: HOME | End: 2023-03-31
Attending: EMERGENCY MEDICINE
Payer: COMMERCIAL

## 2023-03-31 VITALS
DIASTOLIC BLOOD PRESSURE: 76 MMHG | BODY MASS INDEX: 47.59 KG/M2 | WEIGHT: 286 LBS | SYSTOLIC BLOOD PRESSURE: 148 MMHG | OXYGEN SATURATION: 98 % | HEART RATE: 99 BPM | TEMPERATURE: 97.5 F | RESPIRATION RATE: 15 BRPM

## 2023-03-31 DIAGNOSIS — G47.33 OBSTRUCTIVE SLEEP APNEA: ICD-10-CM

## 2023-03-31 DIAGNOSIS — R51.9 CHRONIC NONINTRACTABLE HEADACHE, UNSPECIFIED HEADACHE TYPE: Primary | ICD-10-CM

## 2023-03-31 DIAGNOSIS — G89.29 CHRONIC NONINTRACTABLE HEADACHE, UNSPECIFIED HEADACHE TYPE: Primary | ICD-10-CM

## 2023-03-31 LAB
ALBUMIN SERPL-MCNC: 3.1 G/DL (ref 3.5–5)
ALBUMIN/GLOB SERPL: 0.6 (ref 1.1–2.2)
ALP SERPL-CCNC: 69 U/L (ref 45–117)
ALT SERPL-CCNC: 22 U/L (ref 12–78)
ANION GAP SERPL CALC-SCNC: 5 MMOL/L (ref 5–15)
AST SERPL-CCNC: 14 U/L (ref 15–37)
ATRIAL RATE: 99 BPM
BASOPHILS # BLD: 0.1 K/UL (ref 0–0.1)
BASOPHILS NFR BLD: 1 % (ref 0–1)
BILIRUB SERPL-MCNC: 0.2 MG/DL (ref 0.2–1)
BUN SERPL-MCNC: 13 MG/DL (ref 6–20)
BUN/CREAT SERPL: 18 (ref 12–20)
CALCIUM SERPL-MCNC: 9.2 MG/DL (ref 8.5–10.1)
CALCULATED P AXIS, ECG09: 55 DEGREES
CALCULATED R AXIS, ECG10: 43 DEGREES
CALCULATED T AXIS, ECG11: 47 DEGREES
CHLORIDE SERPL-SCNC: 111 MMOL/L (ref 97–108)
CO2 SERPL-SCNC: 22 MMOL/L (ref 21–32)
COMMENT, HOLDF: NORMAL
CREAT SERPL-MCNC: 0.71 MG/DL (ref 0.55–1.02)
DIAGNOSIS, 93000: NORMAL
DIFFERENTIAL METHOD BLD: ABNORMAL
EOSINOPHIL # BLD: 0.2 K/UL (ref 0–0.4)
EOSINOPHIL NFR BLD: 3 % (ref 0–7)
ERYTHROCYTE [DISTWIDTH] IN BLOOD BY AUTOMATED COUNT: 18 % (ref 11.5–14.5)
GLOBULIN SER CALC-MCNC: 5.2 G/DL (ref 2–4)
GLUCOSE SERPL-MCNC: 137 MG/DL (ref 65–100)
HCT VFR BLD AUTO: 42.4 % (ref 35–47)
HGB BLD-MCNC: 12.9 G/DL (ref 11.5–16)
IMM GRANULOCYTES # BLD AUTO: 0.1 K/UL (ref 0–0.04)
IMM GRANULOCYTES NFR BLD AUTO: 1 % (ref 0–0.5)
LYMPHOCYTES # BLD: 2.2 K/UL (ref 0.8–3.5)
LYMPHOCYTES NFR BLD: 30 % (ref 12–49)
MCH RBC QN AUTO: 22.4 PG (ref 26–34)
MCHC RBC AUTO-ENTMCNC: 30.4 G/DL (ref 30–36.5)
MCV RBC AUTO: 73.5 FL (ref 80–99)
MONOCYTES # BLD: 0.4 K/UL (ref 0–1)
MONOCYTES NFR BLD: 6 % (ref 5–13)
NEUTS SEG # BLD: 4.2 K/UL (ref 1.8–8)
NEUTS SEG NFR BLD: 59 % (ref 32–75)
NRBC # BLD: 0 K/UL (ref 0–0.01)
NRBC BLD-RTO: 0 PER 100 WBC
P-R INTERVAL, ECG05: 152 MS
PLATELET # BLD AUTO: 282 K/UL (ref 150–400)
POTASSIUM SERPL-SCNC: 3.6 MMOL/L (ref 3.5–5.1)
PROT SERPL-MCNC: 8.3 G/DL (ref 6.4–8.2)
Q-T INTERVAL, ECG07: 354 MS
QRS DURATION, ECG06: 82 MS
QTC CALCULATION (BEZET), ECG08: 454 MS
RBC # BLD AUTO: 5.77 M/UL (ref 3.8–5.2)
RBC MORPH BLD: ABNORMAL
SAMPLES BEING HELD,HOLD: NORMAL
SODIUM SERPL-SCNC: 138 MMOL/L (ref 136–145)
TROPONIN I SERPL HS-MCNC: 8 NG/L (ref 0–51)
VENTRICULAR RATE, ECG03: 99 BPM
WBC # BLD AUTO: 7.2 K/UL (ref 3.6–11)

## 2023-03-31 PROCEDURE — 71045 X-RAY EXAM CHEST 1 VIEW: CPT

## 2023-03-31 PROCEDURE — 99285 EMERGENCY DEPT VISIT HI MDM: CPT

## 2023-03-31 PROCEDURE — 84484 ASSAY OF TROPONIN QUANT: CPT

## 2023-03-31 PROCEDURE — 96375 TX/PRO/DX INJ NEW DRUG ADDON: CPT

## 2023-03-31 PROCEDURE — 80053 COMPREHEN METABOLIC PANEL: CPT

## 2023-03-31 PROCEDURE — 96374 THER/PROPH/DIAG INJ IV PUSH: CPT

## 2023-03-31 PROCEDURE — 74011250636 HC RX REV CODE- 250/636: Performed by: EMERGENCY MEDICINE

## 2023-03-31 PROCEDURE — 93005 ELECTROCARDIOGRAM TRACING: CPT

## 2023-03-31 PROCEDURE — 36415 COLL VENOUS BLD VENIPUNCTURE: CPT

## 2023-03-31 PROCEDURE — 85025 COMPLETE CBC W/AUTO DIFF WBC: CPT

## 2023-03-31 RX ORDER — METOCLOPRAMIDE HYDROCHLORIDE 5 MG/ML
10 INJECTION INTRAMUSCULAR; INTRAVENOUS
Status: COMPLETED | OUTPATIENT
Start: 2023-03-31 | End: 2023-03-31

## 2023-03-31 RX ORDER — DIPHENHYDRAMINE HYDROCHLORIDE 50 MG/ML
25 INJECTION, SOLUTION INTRAMUSCULAR; INTRAVENOUS ONCE
Status: COMPLETED | OUTPATIENT
Start: 2023-03-31 | End: 2023-03-31

## 2023-03-31 RX ORDER — KETOROLAC TROMETHAMINE 30 MG/ML
15 INJECTION, SOLUTION INTRAMUSCULAR; INTRAVENOUS
Status: COMPLETED | OUTPATIENT
Start: 2023-03-31 | End: 2023-03-31

## 2023-03-31 RX ADMIN — METOCLOPRAMIDE 10 MG: 5 INJECTION, SOLUTION INTRAMUSCULAR; INTRAVENOUS at 08:31

## 2023-03-31 RX ADMIN — KETOROLAC TROMETHAMINE 15 MG: 30 INJECTION, SOLUTION INTRAMUSCULAR at 08:31

## 2023-03-31 RX ADMIN — DIPHENHYDRAMINE HYDROCHLORIDE 25 MG: 50 INJECTION, SOLUTION INTRAMUSCULAR; INTRAVENOUS at 08:31

## 2023-03-31 NOTE — Clinical Note
[address]  [facility] EMERGENCY DEPT  [address] [id number]  [phone]    Work/School Note    Date: 3/31/2023    To Whom It May concern:    Mae Peters was seen and treated today in the emergency room by the following provider(s):  Attending Provider: Theresa Pantoja MD.      Mae Peters is excused from work/school on 03/31/23 and 04/01/23. She is medically clear to return to work/school on 4/2/2023.        Sincerely,          Irlanda Gallegos MD

## 2023-03-31 NOTE — ED PROVIDER NOTES
19-year-old female with PMHx of HTN, prediabetes, obesity, and COVID diagnosed 3 times last in 2022 presents to the emergency department complaining of intermittent, gradual onset, frontal headaches x2 weeks which worsened when she woke up this morning. She also notes shortness of breath since last night. Patient has no additional complaints this time    The history is provided by the patient. Headache   Associated symptoms include shortness of breath. Shortness of Breath  Associated symptoms include headaches.       Past Medical History:   Diagnosis Date    Essential hypertension     last two pregnancies       Past Surgical History:   Procedure Laterality Date    HX  SECTION      x4    HX DILATION AND CURETTAGE      HX OTHER SURGICAL           Family History:   Problem Relation Age of Onset    Hypertension Father        Social History     Socioeconomic History    Marital status:      Spouse name: Not on file    Number of children: Not on file    Years of education: Not on file    Highest education level: Not on file   Occupational History    Not on file   Tobacco Use    Smoking status: Never    Smokeless tobacco: Never   Vaping Use    Vaping Use: Never used   Substance and Sexual Activity    Alcohol use: Not Currently    Drug use: Never    Sexual activity: Not Currently   Other Topics Concern     Service Not Asked    Blood Transfusions Not Asked    Caffeine Concern Not Asked    Occupational Exposure Not Asked    Hobby Hazards Not Asked    Sleep Concern Not Asked    Stress Concern Not Asked    Weight Concern Not Asked    Special Diet Not Asked    Back Care Not Asked    Exercise Not Asked    Bike Helmet Not Asked    Seat Belt Not Asked    Self-Exams Not Asked   Social History Narrative    ** Merged History Encounter **          Social Determinants of Health     Financial Resource Strain: Not on file   Food Insecurity: Not on file   Transportation Needs: Not on file   Physical Activity: Not on file   Stress: Not on file   Social Connections: Not on file   Intimate Partner Violence: Not on file   Housing Stability: Not on file         ALLERGIES: Acetazolamide and Other medication    Review of Systems   Constitutional: Negative. HENT: Negative. Eyes: Negative. Respiratory:  Positive for shortness of breath. Cardiovascular: Negative. Gastrointestinal: Negative. Endocrine: Negative. Genitourinary: Negative. Musculoskeletal: Negative. Skin: Negative. Neurological:  Positive for headaches. Hematological: Negative. Psychiatric/Behavioral: Negative. Vitals:    03/31/23 0756   BP: (!) 148/76   Pulse: 99   Resp: 15   Temp: 97.5 °F (36.4 °C)   SpO2: 100%   Weight: 129.7 kg (286 lb)            Physical Exam  Vitals and nursing note reviewed. Constitutional:       General: She is not in acute distress. Appearance: She is obese. She is not ill-appearing, toxic-appearing or diaphoretic. HENT:      Head: Normocephalic and atraumatic. Nose: Nose normal.      Mouth/Throat:      Mouth: Mucous membranes are moist.   Cardiovascular:      Rate and Rhythm: Normal rate. Pulmonary:      Effort: Pulmonary effort is normal. No respiratory distress. Musculoskeletal:      Cervical back: Normal range of motion. Skin:     General: Skin is warm and dry. Neurological:      General: No focal deficit present. Mental Status: She is alert and oriented to person, place, and time. Psychiatric:         Mood and Affect: Mood is anxious. Medical Decision Making  DDx: Migraine, tension headache, viral syndrome, hypercarbia, obstructive sleep apnea    The patient presents with a headache most consistent with benign headache from hypercarbia, tension type headache, or migraine. No headache red flags. Neurologic exam without evidence of meningismus, AMS, focal neurologic findings so doubt meningitis, encephalitis, stroke.  Presentation not consistent with acute intracranial bleed to include SAH (lack of risk factors, headache history). No history of trauma so doubt ICH. Given history and physical temporal arteritis unlikely, as is acute angle closure glaucoma. Doubt carotid artery dissection given no focal neuro deficits, no neck trauma or recent neck strain. Patient with no signs of increased intracranial pressure or weight loss and history and physical suggest more benign headache so less likely mass effect in brain from tumor or abscess or idiopathic intracranial hypertension. Patient appears very anxious. Judging by her body habitus and the combination of her symptoms, obstructive sleep apnea seems very likely. Suspect the etiology of her ongoing shortness of breath is anxiety. Will order chest x-ray. Very unlikely ACS given normal EKG. PERC negative so will not pursue PE work-up. No additional infectious symptoms to suggest pneumonia or new viral illness. May have ongoing chronic symptoms from her multiple COVID diagnoses. Plan:  - Labs: CBC, BMP  - Imaging: CXR  - Medications: Metoclopramide, diphenhydramine, ketorolac    Reassessment: Patient reports improvement with the above interventions and their work-up is reassuring. They may safely be discharged at this time with PCP and pulmonology follow-up     Amount and/or Complexity of Data Reviewed  Independent Historian: friend  Labs: ordered. Radiology: ordered. ECG/medicine tests: ordered. Risk  Prescription drug management. ED Course as of 03/31/23 0829   Fri Mar 31, 2023   9790 This EKG was interpreted me at 828. Normal sinus rhythm at 99 bpm.  Normal axis.   No significant ST segment abnormalities [JT]      ED Course User Index  [JT] Anirudh Tracey MD       Procedures

## 2023-03-31 NOTE — DISCHARGE INSTRUCTIONS
You were seen in the emergency department for headache. The results of your tests were reassuring. Although an exact cause of your symptoms was not identified, the most likely cause is obstructive sleep apnea. Please take any medications prescribed at this visit as instructed. Please follow-up with your PCP or return to the emergency department if you experience a worsening of symptoms or any new symptoms that are concerning to you.

## 2023-04-14 ENCOUNTER — OFFICE VISIT (OUTPATIENT)
Dept: FAMILY MEDICINE CLINIC | Age: 33
End: 2023-04-14
Payer: COMMERCIAL

## 2023-04-14 VITALS
SYSTOLIC BLOOD PRESSURE: 127 MMHG | TEMPERATURE: 98.4 F | DIASTOLIC BLOOD PRESSURE: 81 MMHG | RESPIRATION RATE: 16 BRPM | WEIGHT: 285 LBS | HEIGHT: 65 IN | BODY MASS INDEX: 47.48 KG/M2 | OXYGEN SATURATION: 98 % | HEART RATE: 94 BPM

## 2023-04-14 DIAGNOSIS — G44.229 CHRONIC TENSION-TYPE HEADACHE, NOT INTRACTABLE: ICD-10-CM

## 2023-04-14 DIAGNOSIS — I10 CHRONIC HYPERTENSION: ICD-10-CM

## 2023-04-14 DIAGNOSIS — G93.2 IDIOPATHIC INTRACRANIAL HYPERTENSION: Primary | ICD-10-CM

## 2023-04-14 DIAGNOSIS — E66.01 MORBID OBESITY (HCC): ICD-10-CM

## 2023-04-14 DIAGNOSIS — F41.9 INSOMNIA SECONDARY TO ANXIETY: ICD-10-CM

## 2023-04-14 DIAGNOSIS — F51.05 INSOMNIA SECONDARY TO ANXIETY: ICD-10-CM

## 2023-04-14 DIAGNOSIS — Z63.8 PARENTING STRESS: ICD-10-CM

## 2023-04-14 DIAGNOSIS — R73.03 PRE-DIABETES: ICD-10-CM

## 2023-04-14 PROCEDURE — 3074F SYST BP LT 130 MM HG: CPT | Performed by: FAMILY MEDICINE

## 2023-04-14 PROCEDURE — 3078F DIAST BP <80 MM HG: CPT | Performed by: FAMILY MEDICINE

## 2023-04-14 PROCEDURE — 99214 OFFICE O/P EST MOD 30 MIN: CPT | Performed by: FAMILY MEDICINE

## 2023-04-14 RX ORDER — ACETAMINOPHEN 500 MG
TABLET ORAL
COMMUNITY

## 2023-04-14 RX ORDER — TRAZODONE HYDROCHLORIDE 50 MG/1
TABLET ORAL
COMMUNITY
Start: 2023-03-15

## 2023-04-14 SDOH — SOCIAL STABILITY - SOCIAL INSECURITY: OTHER SPECIFIED PROBLEMS RELATED TO PRIMARY SUPPORT GROUP: Z63.8

## 2023-04-14 NOTE — PROGRESS NOTES
Ihsan Lebron is a 34 y.o. female    Chief Complaint   Patient presents with    Blood Pressure Check     Patient is coming in for a blood pressure check. Patient went to [facility] ER on March 30 for fatigue, headaches, and difficulty breathing. They told her she needs to see the chest specialist but was not sure where to go. Interpretor ID: [id number]. No other concerns. ,       1. Have you been to the ER, urgent care clinic since your last visit? Hospitalized since your last visit? No    2. Have you seen or consulted any other health care providers outside of the [address] [place] [address] since your last visit? Include any pap smears or colon screening. No      Visit Vitals  /81 (BP 1 Location: Right upper arm, BP Patient Position: Sitting)   Pulse 94   Temp 98.4 °F (36.9 °C) (Oral)   Resp 16   Ht 5' 5\" (1.651 m)   Wt 285 lb (129.3 kg)   SpO2 98%   BMI 47.43 kg/m²           Health Maintenance Due   Topic Date Due    COVID-19 Vaccine (1) Never done    Varicella Vaccine (1 of 2 - 2-dose childhood series) Never done         Medication Reconciliation completed, changes noted.   Please  Update medication list.

## 2023-04-14 NOTE — PROGRESS NOTES
[facility]  22. Medicine Office Visit     Assessment/ Plan: Erica Denis is a 34 y.o. female presenting for:    ***    *** minutes were spent on the day of this encounter both with the patient and in related activities including chart review, care coordination and counseling. Patient instructions were discussed and/or provided in the AVS. The patient understands and agrees to the plan. RETURN TO CARE: ***  Future Appointments   Date Time Provider Aleksandra Reyez   5/17/2023 10:20 AM Carroll Bocanegra MD [facility] BS AMB       Subjective:  Chief Complaint   Patient presents with    Blood Pressure Check     Patient is coming in for a blood pressure check. Patient went to [address] [place] [address] ER on March 30 for fatigue, headaches, and difficulty breathing. They told her she needs to see the chest specialist but was not sure where to go. Interpretor ID: [id number]. No other concerns. ,     HPI: Erica Denis is a 34 y.o. female with PMH of HTN, idiopathic intracranial hypertension, pre-diabetes, HLD, morbid obesity here for follow-up BP.      ER Visits   - ER visit 3/31 for worsening frontal headaches - concern for ELROY, tension  all over head, severe, shouting/yelling   - 15 years, worse and worse - started when pregnant with daughter 15 years ago  worse each time   - labs and got Reglan, Benadryl, Toradol - protein slightly elevated with low albumin  - shortness of breath with headaches   - referred to eye doctor but cancelled and refused to reschedule for some reason ***  call to schedule -  not Mondays, 11am or after 3:30pm and ACCESS NOW***(need before 5/17)   - saw neurology - increased dose and was told nothing else could be done   - sleep intermittent - midnight and wakes up at 1/3/5/6am, only an hour at a time  gets up at 7am, get kids ready - still waking up to breastfeed, a year old   - feels like would sleep fine without baby waking up     Depression, SI Thoughts  - was trialed on venlafaxine and didn't think working - tapered off, haven't helped at all   - referred to [address] - has not gone   - had appt with [facility] at 3pm on 4/12, wasn't able to go because of schedule with kids  - was trialed on trazodone   - sees therapist    [place] - patient states lost insurance ***Emergency Medicaid     I have reviewed the patients problem list, current medications, allergies, family, medical and social history. I have updated them as needed. Review of Systems  See HPI.     Objective:  Visit Vitals  /81 (BP 1 Location: Right upper arm, BP Patient Position: Sitting)   Pulse 94   Temp 98.4 °F (36.9 °C) (Oral)   Resp 16   Ht 5' 5\" (1.651 m)   Wt 285 lb (129.3 kg)   SpO2 98%   BMI 47.43 kg/m²     Physical Exam    [facility]

## 2023-04-21 DIAGNOSIS — G93.2 IDIOPATHIC INTRACRANIAL HYPERTENSION: Primary | ICD-10-CM

## 2023-04-25 ENCOUNTER — APPOINTMENT (OUTPATIENT)
Dept: GENERAL RADIOLOGY | Age: 33
DRG: 143 | End: 2023-04-25
Attending: NURSE PRACTITIONER
Payer: COMMERCIAL

## 2023-04-25 ENCOUNTER — APPOINTMENT (OUTPATIENT)
Dept: ULTRASOUND IMAGING | Age: 33
DRG: 143 | End: 2023-04-25
Attending: NURSE PRACTITIONER
Payer: COMMERCIAL

## 2023-04-25 ENCOUNTER — HOSPITAL ENCOUNTER (INPATIENT)
Age: 33
LOS: 4 days | Discharge: HOME | DRG: 143 | End: 2023-04-30
Attending: EMERGENCY MEDICINE | Admitting: STUDENT IN AN ORGANIZED HEALTH CARE EDUCATION/TRAINING PROGRAM
Payer: COMMERCIAL

## 2023-04-25 DIAGNOSIS — G93.2 IDIOPATHIC INTRACRANIAL HYPERTENSION: ICD-10-CM

## 2023-04-25 DIAGNOSIS — E66.01 CLASS 3 SEVERE OBESITY WITH SERIOUS COMORBIDITY AND BODY MASS INDEX (BMI) OF 40.0 TO 44.9 IN ADULT, UNSPECIFIED OBESITY TYPE (HCC): ICD-10-CM

## 2023-04-25 DIAGNOSIS — I51.7 CARDIOMEGALY: ICD-10-CM

## 2023-04-25 DIAGNOSIS — R06.82 TACHYPNEA: ICD-10-CM

## 2023-04-25 DIAGNOSIS — J81.0 ACUTE PULMONARY EDEMA (HCC): ICD-10-CM

## 2023-04-25 DIAGNOSIS — B34.9 VIRAL SYNDROME: ICD-10-CM

## 2023-04-25 DIAGNOSIS — R06.02 SOB (SHORTNESS OF BREATH): Primary | ICD-10-CM

## 2023-04-25 DIAGNOSIS — I10 CHRONIC HYPERTENSION: ICD-10-CM

## 2023-04-25 DIAGNOSIS — G44.229 CHRONIC TENSION-TYPE HEADACHE, NOT INTRACTABLE: ICD-10-CM

## 2023-04-25 LAB
ANION GAP SERPL CALC-SCNC: 0 MMOL/L (ref 5–15)
BASOPHILS # BLD: 0.1 K/UL (ref 0–0.1)
BASOPHILS NFR BLD: 1 % (ref 0–1)
BNP SERPL-MCNC: 74 PG/ML
BUN SERPL-MCNC: 9 MG/DL (ref 6–20)
BUN/CREAT SERPL: 15 (ref 12–20)
CALCIUM SERPL-MCNC: 8.8 MG/DL (ref 8.5–10.1)
CHLORIDE SERPL-SCNC: 104 MMOL/L (ref 97–108)
CO2 SERPL-SCNC: 32 MMOL/L (ref 21–32)
COMMENT, HOLDF: NORMAL
CREAT SERPL-MCNC: 0.61 MG/DL (ref 0.55–1.02)
DIFFERENTIAL METHOD BLD: ABNORMAL
EOSINOPHIL # BLD: 0.2 K/UL (ref 0–0.4)
EOSINOPHIL NFR BLD: 3 % (ref 0–7)
ERYTHROCYTE [DISTWIDTH] IN BLOOD BY AUTOMATED COUNT: 17.5 % (ref 11.5–14.5)
FLUAV AG NPH QL IA: NEGATIVE
FLUBV AG NOSE QL IA: NEGATIVE
GLUCOSE SERPL-MCNC: ABNORMAL MG/DL (ref 65–100)
HCT VFR BLD AUTO: 37.9 % (ref 35–47)
HGB BLD-MCNC: 11.5 G/DL (ref 11.5–16)
IMM GRANULOCYTES # BLD AUTO: 0 K/UL (ref 0–0.04)
IMM GRANULOCYTES NFR BLD AUTO: 0 % (ref 0–0.5)
LACTATE BLD-SCNC: 1.44 MMOL/L (ref 0.4–2)
LYMPHOCYTES # BLD: 2.4 K/UL (ref 0.8–3.5)
LYMPHOCYTES NFR BLD: 35 % (ref 12–49)
MAGNESIUM SERPL-MCNC: 2 MG/DL (ref 1.6–2.4)
MCH RBC QN AUTO: 23 PG (ref 26–34)
MCHC RBC AUTO-ENTMCNC: 30.3 G/DL (ref 30–36.5)
MCV RBC AUTO: 75.6 FL (ref 80–99)
MONOCYTES # BLD: 0.5 K/UL (ref 0–1)
MONOCYTES NFR BLD: 7 % (ref 5–13)
NEUTS SEG # BLD: 3.6 K/UL (ref 1.8–8)
NEUTS SEG NFR BLD: 54 % (ref 32–75)
NRBC # BLD: 0 K/UL (ref 0–0.01)
NRBC BLD-RTO: 0 PER 100 WBC
PLATELET # BLD AUTO: 267 K/UL (ref 150–400)
PMV BLD AUTO: 10.9 FL (ref 8.9–12.9)
POTASSIUM SERPL-SCNC: 3.9 MMOL/L (ref 3.5–5.1)
PROCALCITONIN SERPL-MCNC: <0.05 NG/ML
RBC # BLD AUTO: 5.01 M/UL (ref 3.8–5.2)
SAMPLES BEING HELD,HOLD: NORMAL
SARS-COV-2 RDRP RESP QL NAA+PROBE: NOT DETECTED
SODIUM SERPL-SCNC: 136 MMOL/L (ref 136–145)
SOURCE, COVRS: NORMAL
TROPONIN I SERPL HS-MCNC: 7 NG/L (ref 0–51)
WBC # BLD AUTO: 6.7 K/UL (ref 3.6–11)

## 2023-04-25 PROCEDURE — 74011636637 HC RX REV CODE- 636/637: Performed by: NURSE PRACTITIONER

## 2023-04-25 PROCEDURE — 94640 AIRWAY INHALATION TREATMENT: CPT

## 2023-04-25 PROCEDURE — 36415 COLL VENOUS BLD VENIPUNCTURE: CPT

## 2023-04-25 PROCEDURE — 83735 ASSAY OF MAGNESIUM: CPT

## 2023-04-25 PROCEDURE — 87040 BLOOD CULTURE FOR BACTERIA: CPT

## 2023-04-25 PROCEDURE — 93005 ELECTROCARDIOGRAM TRACING: CPT

## 2023-04-25 PROCEDURE — 83605 ASSAY OF LACTIC ACID: CPT

## 2023-04-25 PROCEDURE — 85025 COMPLETE CBC W/AUTO DIFF WBC: CPT

## 2023-04-25 PROCEDURE — 99285 EMERGENCY DEPT VISIT HI MDM: CPT

## 2023-04-25 PROCEDURE — 84145 PROCALCITONIN (PCT): CPT

## 2023-04-25 PROCEDURE — 74011250636 HC RX REV CODE- 250/636: Performed by: NURSE PRACTITIONER

## 2023-04-25 PROCEDURE — 84484 ASSAY OF TROPONIN QUANT: CPT

## 2023-04-25 PROCEDURE — 87635 SARS-COV-2 COVID-19 AMP PRB: CPT

## 2023-04-25 PROCEDURE — 80048 BASIC METABOLIC PNL TOTAL CA: CPT

## 2023-04-25 PROCEDURE — 96374 THER/PROPH/DIAG INJ IV PUSH: CPT

## 2023-04-25 PROCEDURE — 83880 ASSAY OF NATRIURETIC PEPTIDE: CPT

## 2023-04-25 PROCEDURE — 76705 ECHO EXAM OF ABDOMEN: CPT

## 2023-04-25 PROCEDURE — 87150 DNA/RNA AMPLIFIED PROBE: CPT

## 2023-04-25 PROCEDURE — 87804 INFLUENZA ASSAY W/OPTIC: CPT

## 2023-04-25 PROCEDURE — 71046 X-RAY EXAM CHEST 2 VIEWS: CPT

## 2023-04-25 PROCEDURE — 74011000250 HC RX REV CODE- 250: Performed by: NURSE PRACTITIONER

## 2023-04-25 PROCEDURE — 74011250637 HC RX REV CODE- 250/637: Performed by: NURSE PRACTITIONER

## 2023-04-25 RX ORDER — IPRATROPIUM BROMIDE AND ALBUTEROL SULFATE 2.5; .5 MG/3ML; MG/3ML
3 SOLUTION RESPIRATORY (INHALATION)
Status: COMPLETED | OUTPATIENT
Start: 2023-04-25 | End: 2023-04-25

## 2023-04-25 RX ORDER — FUROSEMIDE 10 MG/ML
40 INJECTION INTRAMUSCULAR; INTRAVENOUS ONCE
Status: COMPLETED | OUTPATIENT
Start: 2023-04-25 | End: 2023-04-26

## 2023-04-25 RX ORDER — KETOROLAC TROMETHAMINE 30 MG/ML
15 INJECTION, SOLUTION INTRAMUSCULAR; INTRAVENOUS
Status: COMPLETED | OUTPATIENT
Start: 2023-04-25 | End: 2023-04-25

## 2023-04-25 RX ORDER — PREDNISONE 20 MG/1
20 TABLET ORAL
Status: COMPLETED | OUTPATIENT
Start: 2023-04-25 | End: 2023-04-25

## 2023-04-25 RX ORDER — BENZONATATE 100 MG/1
200 CAPSULE ORAL
Status: COMPLETED | OUTPATIENT
Start: 2023-04-25 | End: 2023-04-25

## 2023-04-25 RX ORDER — CODEINE PHOSPHATE AND GUAIFENESIN 10; 100 MG/5ML; MG/5ML
10 SOLUTION ORAL
Status: COMPLETED | OUTPATIENT
Start: 2023-04-25 | End: 2023-04-25

## 2023-04-25 RX ORDER — IPRATROPIUM BROMIDE AND ALBUTEROL SULFATE 2.5; .5 MG/3ML; MG/3ML
3 SOLUTION RESPIRATORY (INHALATION)
Status: COMPLETED | OUTPATIENT
Start: 2023-04-25 | End: 2023-04-26

## 2023-04-25 RX ADMIN — KETOROLAC TROMETHAMINE 15 MG: 30 INJECTION, SOLUTION INTRAMUSCULAR at 21:37

## 2023-04-25 RX ADMIN — PREDNISONE 20 MG: 20 TABLET ORAL at 20:11

## 2023-04-25 RX ADMIN — GUAIFENESIN AND CODEINE PHOSPHATE 10 ML: 100; 10 SOLUTION ORAL at 20:11

## 2023-04-25 RX ADMIN — IPRATROPIUM BROMIDE AND ALBUTEROL SULFATE 3 ML: .5; 3 SOLUTION RESPIRATORY (INHALATION) at 22:19

## 2023-04-25 RX ADMIN — BENZONATATE 200 MG: 100 CAPSULE ORAL at 21:32

## 2023-04-26 ENCOUNTER — APPOINTMENT (OUTPATIENT)
Dept: NON INVASIVE DIAGNOSTICS | Age: 33
DRG: 143 | End: 2023-04-26
Attending: STUDENT IN AN ORGANIZED HEALTH CARE EDUCATION/TRAINING PROGRAM
Payer: COMMERCIAL

## 2023-04-26 ENCOUNTER — APPOINTMENT (OUTPATIENT)
Dept: GENERAL RADIOLOGY | Age: 33
DRG: 143 | End: 2023-04-26
Attending: STUDENT IN AN ORGANIZED HEALTH CARE EDUCATION/TRAINING PROGRAM
Payer: COMMERCIAL

## 2023-04-26 PROBLEM — R06.82 TACHYPNEA: Status: ACTIVE | Noted: 2023-04-26

## 2023-04-26 PROBLEM — I51.7 CARDIOMEGALY: Status: ACTIVE | Noted: 2023-04-26

## 2023-04-26 PROBLEM — R06.02 SOB (SHORTNESS OF BREATH): Status: ACTIVE | Noted: 2023-04-26

## 2023-04-26 PROBLEM — B34.9 VIRAL SYNDROME: Status: ACTIVE | Noted: 2023-04-26

## 2023-04-26 PROBLEM — J81.1 PULMONARY EDEMA: Status: ACTIVE | Noted: 2023-04-26

## 2023-04-26 LAB
ALBUMIN SERPL-MCNC: 3.1 G/DL (ref 3.5–5)
ALBUMIN/GLOB SERPL: 0.6 (ref 1.1–2.2)
ALP SERPL-CCNC: 58 U/L (ref 45–117)
ALT SERPL-CCNC: 17 U/L (ref 12–78)
ANION GAP SERPL CALC-SCNC: 2 MMOL/L (ref 5–15)
APPEARANCE UR: CLEAR
AST SERPL-CCNC: 13 U/L (ref 15–37)
ATRIAL RATE: 109 BPM
B PERT DNA SPEC QL NAA+PROBE: NOT DETECTED
BACTERIA URNS QL MICRO: NEGATIVE /HPF
BILIRUB SERPL-MCNC: 0.2 MG/DL (ref 0.2–1)
BILIRUB UR QL: NEGATIVE
BORDETELLA PARAPERTUSSIS PCR, BORPAR: NOT DETECTED
BUN SERPL-MCNC: 8 MG/DL (ref 6–20)
BUN/CREAT SERPL: 12 (ref 12–20)
C PNEUM DNA SPEC QL NAA+PROBE: NOT DETECTED
CALCIUM SERPL-MCNC: 9.1 MG/DL (ref 8.5–10.1)
CALCULATED P AXIS, ECG09: 63 DEGREES
CALCULATED R AXIS, ECG10: 56 DEGREES
CALCULATED T AXIS, ECG11: 82 DEGREES
CHLORIDE SERPL-SCNC: 106 MMOL/L (ref 97–108)
CO2 SERPL-SCNC: 30 MMOL/L (ref 21–32)
COLOR UR: ABNORMAL
CREAT SERPL-MCNC: 0.68 MG/DL (ref 0.55–1.02)
D DIMER PPP FEU-MCNC: 0.27 MG/L FEU (ref 0–0.65)
DIAGNOSIS, 93000: NORMAL
EPITH CASTS URNS QL MICRO: ABNORMAL /LPF
ERYTHROCYTE [DISTWIDTH] IN BLOOD BY AUTOMATED COUNT: 17.3 % (ref 11.5–14.5)
FLUAV SUBTYP SPEC NAA+PROBE: NOT DETECTED
FLUBV RNA SPEC QL NAA+PROBE: NOT DETECTED
GLOBULIN SER CALC-MCNC: 4.9 G/DL (ref 2–4)
GLUCOSE SERPL-MCNC: 136 MG/DL (ref 65–100)
GLUCOSE UR STRIP.AUTO-MCNC: NEGATIVE MG/DL
HADV DNA SPEC QL NAA+PROBE: NOT DETECTED
HCOV 229E RNA SPEC QL NAA+PROBE: NOT DETECTED
HCOV HKU1 RNA SPEC QL NAA+PROBE: NOT DETECTED
HCOV NL63 RNA SPEC QL NAA+PROBE: NOT DETECTED
HCOV OC43 RNA SPEC QL NAA+PROBE: NOT DETECTED
HCT VFR BLD AUTO: 38.4 % (ref 35–47)
HGB BLD-MCNC: 11.3 G/DL (ref 11.5–16)
HGB UR QL STRIP: ABNORMAL
HIV 1+2 AB+HIV1 P24 AG SERPL QL IA: NONREACTIVE
HIV12 RESULT COMMENT, HHIVC: NORMAL
HMPV RNA SPEC QL NAA+PROBE: NOT DETECTED
HPIV1 RNA SPEC QL NAA+PROBE: NOT DETECTED
HPIV2 RNA SPEC QL NAA+PROBE: NOT DETECTED
HPIV3 RNA SPEC QL NAA+PROBE: NOT DETECTED
HPIV4 RNA SPEC QL NAA+PROBE: NOT DETECTED
HYALINE CASTS URNS QL MICRO: ABNORMAL /LPF (ref 0–2)
KETONES UR QL STRIP.AUTO: NEGATIVE MG/DL
LEUKOCYTE ESTERASE UR QL STRIP.AUTO: NEGATIVE
M PNEUMO DNA SPEC QL NAA+PROBE: NOT DETECTED
MCH RBC QN AUTO: 22.2 PG (ref 26–34)
MCHC RBC AUTO-ENTMCNC: 29.4 G/DL (ref 30–36.5)
MCV RBC AUTO: 75.3 FL (ref 80–99)
NITRITE UR QL STRIP.AUTO: NEGATIVE
NRBC # BLD: 0 K/UL (ref 0–0.01)
NRBC BLD-RTO: 0 PER 100 WBC
P-R INTERVAL, ECG05: 142 MS
PH UR STRIP: 6 (ref 5–8)
PLATELET # BLD AUTO: 258 K/UL (ref 150–400)
PMV BLD AUTO: 10.5 FL (ref 8.9–12.9)
POTASSIUM SERPL-SCNC: 3.8 MMOL/L (ref 3.5–5.1)
PROT SERPL-MCNC: 8 G/DL (ref 6.4–8.2)
PROT UR STRIP-MCNC: 300 MG/DL
Q-T INTERVAL, ECG07: 316 MS
QRS DURATION, ECG06: 88 MS
QTC CALCULATION (BEZET), ECG08: 425 MS
RBC # BLD AUTO: 5.1 M/UL (ref 3.8–5.2)
RBC #/AREA URNS HPF: ABNORMAL /HPF (ref 0–5)
RPR SER QL: NONREACTIVE
RSV RNA SPEC QL NAA+PROBE: NOT DETECTED
RV+EV RNA SPEC QL NAA+PROBE: NOT DETECTED
SARS-COV-2 RNA RESP QL NAA+PROBE: NOT DETECTED
SODIUM SERPL-SCNC: 138 MMOL/L (ref 136–145)
SP GR UR REFRACTOMETRY: 1.01 (ref 1–1.03)
TROPONIN I SERPL HS-MCNC: 5 NG/L (ref 0–51)
UA: UC IF INDICATED,UAUC: ABNORMAL
UROBILINOGEN UR QL STRIP.AUTO: 0.2 EU/DL (ref 0.2–1)
VENTRICULAR RATE, ECG03: 109 BPM
WBC # BLD AUTO: 6.8 K/UL (ref 3.6–11)
WBC URNS QL MICRO: ABNORMAL /HPF (ref 0–4)

## 2023-04-26 PROCEDURE — 86592 SYPHILIS TEST NON-TREP QUAL: CPT

## 2023-04-26 PROCEDURE — 84484 ASSAY OF TROPONIN QUANT: CPT

## 2023-04-26 PROCEDURE — 81001 URINALYSIS AUTO W/SCOPE: CPT

## 2023-04-26 PROCEDURE — 85027 COMPLETE CBC AUTOMATED: CPT

## 2023-04-26 PROCEDURE — 74011000250 HC RX REV CODE- 250: Performed by: NURSE PRACTITIONER

## 2023-04-26 PROCEDURE — 97530 THERAPEUTIC ACTIVITIES: CPT

## 2023-04-26 PROCEDURE — 71045 X-RAY EXAM CHEST 1 VIEW: CPT

## 2023-04-26 PROCEDURE — 97161 PT EVAL LOW COMPLEX 20 MIN: CPT

## 2023-04-26 PROCEDURE — 74011000250 HC RX REV CODE- 250: Performed by: STUDENT IN AN ORGANIZED HEALTH CARE EDUCATION/TRAINING PROGRAM

## 2023-04-26 PROCEDURE — 93005 ELECTROCARDIOGRAM TRACING: CPT

## 2023-04-26 PROCEDURE — 74011000258 HC RX REV CODE- 258: Performed by: STUDENT IN AN ORGANIZED HEALTH CARE EDUCATION/TRAINING PROGRAM

## 2023-04-26 PROCEDURE — 74011250637 HC RX REV CODE- 250/637: Performed by: STUDENT IN AN ORGANIZED HEALTH CARE EDUCATION/TRAINING PROGRAM

## 2023-04-26 PROCEDURE — 36415 COLL VENOUS BLD VENIPUNCTURE: CPT

## 2023-04-26 PROCEDURE — 0202U NFCT DS 22 TRGT SARS-COV-2: CPT

## 2023-04-26 PROCEDURE — 65270000029 HC RM PRIVATE

## 2023-04-26 PROCEDURE — 94761 N-INVAS EAR/PLS OXIMETRY MLT: CPT

## 2023-04-26 PROCEDURE — 74011250636 HC RX REV CODE- 250/636: Performed by: NURSE PRACTITIONER

## 2023-04-26 PROCEDURE — 97165 OT EVAL LOW COMPLEX 30 MIN: CPT

## 2023-04-26 PROCEDURE — 85379 FIBRIN DEGRADATION QUANT: CPT

## 2023-04-26 PROCEDURE — 74011250637 HC RX REV CODE- 250/637

## 2023-04-26 PROCEDURE — 74011250636 HC RX REV CODE- 250/636: Performed by: STUDENT IN AN ORGANIZED HEALTH CARE EDUCATION/TRAINING PROGRAM

## 2023-04-26 PROCEDURE — 80053 COMPREHEN METABOLIC PANEL: CPT

## 2023-04-26 PROCEDURE — 87389 HIV-1 AG W/HIV-1&-2 AB AG IA: CPT

## 2023-04-26 PROCEDURE — 99223 1ST HOSP IP/OBS HIGH 75: CPT | Performed by: STUDENT IN AN ORGANIZED HEALTH CARE EDUCATION/TRAINING PROGRAM

## 2023-04-26 RX ORDER — ACETAMINOPHEN 650 MG/1
650 SUPPOSITORY RECTAL
Status: DISCONTINUED | OUTPATIENT
Start: 2023-04-26 | End: 2023-04-30 | Stop reason: HOSPADM

## 2023-04-26 RX ORDER — POLYETHYLENE GLYCOL 3350 17 G/17G
17 POWDER, FOR SOLUTION ORAL DAILY PRN
Status: DISCONTINUED | OUTPATIENT
Start: 2023-04-26 | End: 2023-04-30 | Stop reason: HOSPADM

## 2023-04-26 RX ORDER — ACETAMINOPHEN 325 MG/1
650 TABLET ORAL
Status: DISCONTINUED | OUTPATIENT
Start: 2023-04-26 | End: 2023-04-30 | Stop reason: HOSPADM

## 2023-04-26 RX ORDER — ACETAZOLAMIDE 250 MG/1
1000 TABLET ORAL 2 TIMES DAILY
Status: DISCONTINUED | OUTPATIENT
Start: 2023-04-26 | End: 2023-04-29

## 2023-04-26 RX ORDER — IPRATROPIUM BROMIDE AND ALBUTEROL SULFATE 2.5; .5 MG/3ML; MG/3ML
3 SOLUTION RESPIRATORY (INHALATION)
Status: DISCONTINUED | OUTPATIENT
Start: 2023-04-26 | End: 2023-04-28

## 2023-04-26 RX ORDER — ONDANSETRON 4 MG/1
4 TABLET, ORALLY DISINTEGRATING ORAL
Status: DISCONTINUED | OUTPATIENT
Start: 2023-04-26 | End: 2023-04-30 | Stop reason: HOSPADM

## 2023-04-26 RX ORDER — GUAIFENESIN/DEXTROMETHORPHAN 100-10MG/5
5 SYRUP ORAL
Status: DISCONTINUED | OUTPATIENT
Start: 2023-04-26 | End: 2023-04-30 | Stop reason: HOSPADM

## 2023-04-26 RX ORDER — ENOXAPARIN SODIUM 100 MG/ML
40 INJECTION SUBCUTANEOUS DAILY
Status: DISCONTINUED | OUTPATIENT
Start: 2023-04-26 | End: 2023-04-26

## 2023-04-26 RX ORDER — ENOXAPARIN SODIUM 100 MG/ML
30 INJECTION SUBCUTANEOUS EVERY 12 HOURS
Status: DISCONTINUED | OUTPATIENT
Start: 2023-04-26 | End: 2023-04-30 | Stop reason: HOSPADM

## 2023-04-26 RX ORDER — TRAZODONE HYDROCHLORIDE 50 MG/1
50 TABLET ORAL
Status: DISCONTINUED | OUTPATIENT
Start: 2023-04-26 | End: 2023-04-30 | Stop reason: HOSPADM

## 2023-04-26 RX ORDER — BENZONATATE 100 MG/1
100 CAPSULE ORAL
Status: DISCONTINUED | OUTPATIENT
Start: 2023-04-26 | End: 2023-04-30 | Stop reason: HOSPADM

## 2023-04-26 RX ORDER — FUROSEMIDE 10 MG/ML
40 INJECTION INTRAMUSCULAR; INTRAVENOUS ONCE
Status: COMPLETED | OUTPATIENT
Start: 2023-04-26 | End: 2023-04-26

## 2023-04-26 RX ORDER — ONDANSETRON 2 MG/ML
4 INJECTION INTRAMUSCULAR; INTRAVENOUS
Status: DISCONTINUED | OUTPATIENT
Start: 2023-04-26 | End: 2023-04-30 | Stop reason: HOSPADM

## 2023-04-26 RX ORDER — SODIUM CHLORIDE 0.9 % (FLUSH) 0.9 %
5-40 SYRINGE (ML) INJECTION EVERY 8 HOURS
Status: DISCONTINUED | OUTPATIENT
Start: 2023-04-26 | End: 2023-04-30 | Stop reason: HOSPADM

## 2023-04-26 RX ORDER — NYSTATIN 100000 [USP'U]/G
POWDER TOPICAL 2 TIMES DAILY
Status: DISCONTINUED | OUTPATIENT
Start: 2023-04-26 | End: 2023-04-30 | Stop reason: HOSPADM

## 2023-04-26 RX ORDER — SODIUM CHLORIDE 0.9 % (FLUSH) 0.9 %
5-40 SYRINGE (ML) INJECTION AS NEEDED
Status: DISCONTINUED | OUTPATIENT
Start: 2023-04-26 | End: 2023-04-30 | Stop reason: HOSPADM

## 2023-04-26 RX ADMIN — GUAIFENESIN AND DEXTROMETHORPHAN 5 ML: 100; 10 SYRUP ORAL at 21:56

## 2023-04-26 RX ADMIN — IPRATROPIUM BROMIDE AND ALBUTEROL SULFATE 3 ML: .5; 3 SOLUTION RESPIRATORY (INHALATION) at 00:13

## 2023-04-26 RX ADMIN — FUROSEMIDE 40 MG: 10 INJECTION, SOLUTION INTRAMUSCULAR; INTRAVENOUS at 06:57

## 2023-04-26 RX ADMIN — BENZONATATE 100 MG: 100 CAPSULE ORAL at 15:09

## 2023-04-26 RX ADMIN — ACETAMINOPHEN 650 MG: 325 TABLET ORAL at 03:38

## 2023-04-26 RX ADMIN — ACETAMINOPHEN 650 MG: 325 TABLET ORAL at 22:05

## 2023-04-26 RX ADMIN — GUAIFENESIN AND DEXTROMETHORPHAN 5 ML: 100; 10 SYRUP ORAL at 11:02

## 2023-04-26 RX ADMIN — GUAIFENESIN AND DEXTROMETHORPHAN 5 ML: 100; 10 SYRUP ORAL at 03:39

## 2023-04-26 RX ADMIN — Medication 10 ML: at 03:42

## 2023-04-26 RX ADMIN — ENOXAPARIN SODIUM 30 MG: 100 INJECTION SUBCUTANEOUS at 11:02

## 2023-04-26 RX ADMIN — ENOXAPARIN SODIUM 30 MG: 100 INJECTION SUBCUTANEOUS at 21:56

## 2023-04-26 RX ADMIN — TRAZODONE HYDROCHLORIDE 50 MG: 50 TABLET ORAL at 21:56

## 2023-04-26 RX ADMIN — Medication 10 ML: at 06:59

## 2023-04-26 RX ADMIN — BENZONATATE 100 MG: 100 CAPSULE ORAL at 06:57

## 2023-04-26 RX ADMIN — Medication 5 ML: at 15:09

## 2023-04-26 RX ADMIN — TRAZODONE HYDROCHLORIDE 50 MG: 50 TABLET ORAL at 03:38

## 2023-04-26 RX ADMIN — DOXYCYCLINE 100 MG: 100 INJECTION, POWDER, LYOPHILIZED, FOR SOLUTION INTRAVENOUS at 18:58

## 2023-04-26 RX ADMIN — ACETAMINOPHEN 650 MG: 325 TABLET ORAL at 11:02

## 2023-04-26 RX ADMIN — Medication 10 ML: at 22:05

## 2023-04-26 RX ADMIN — FUROSEMIDE 40 MG: 10 INJECTION, SOLUTION INTRAMUSCULAR; INTRAVENOUS at 00:13

## 2023-04-26 RX ADMIN — SODIUM CHLORIDE 1 G: 9 INJECTION INTRAMUSCULAR; INTRAVENOUS; SUBCUTANEOUS at 18:59

## 2023-04-27 ENCOUNTER — APPOINTMENT (OUTPATIENT)
Dept: NON INVASIVE DIAGNOSTICS | Age: 33
DRG: 143 | End: 2023-04-27
Attending: STUDENT IN AN ORGANIZED HEALTH CARE EDUCATION/TRAINING PROGRAM
Payer: COMMERCIAL

## 2023-04-27 LAB
ACC. NO. FROM MICRO ORDER, ACCP: ABNORMAL
ACINETOBACTER CALCOACETICUS-BAUMANII COMPLEX, ACBCX: NOT DETECTED
ALBUMIN SERPL-MCNC: 2.8 G/DL (ref 3.5–5)
ALBUMIN/GLOB SERPL: 0.7 (ref 1.1–2.2)
ALP SERPL-CCNC: 55 U/L (ref 45–117)
ALT SERPL-CCNC: 16 U/L (ref 12–78)
ANION GAP SERPL CALC-SCNC: 3 MMOL/L (ref 5–15)
AST SERPL-CCNC: 13 U/L (ref 15–37)
B FRAGILIS DNA BLD POS QL NAA+NON-PROBE: NOT DETECTED
BILIRUB SERPL-MCNC: 0.2 MG/DL (ref 0.2–1)
BIOFIRE COMMENT, BCIDPF: ABNORMAL
BUN SERPL-MCNC: 18 MG/DL (ref 6–20)
BUN/CREAT SERPL: 26 (ref 12–20)
C ALBICANS DNA BLD POS QL NAA+NON-PROBE: NOT DETECTED
C AURIS DNA BLD POS QL NAA+NON-PROBE: NOT DETECTED
C GATTII+NEOFOR DNA BLD POS QL NAA+N-PRB: NOT DETECTED
C GLABRATA DNA BLD POS QL NAA+NON-PROBE: NOT DETECTED
C KRUSEI DNA BLD POS QL NAA+NON-PROBE: NOT DETECTED
C PARAP DNA BLD POS QL NAA+NON-PROBE: NOT DETECTED
C TROPICLS DNA BLD POS QL NAA+NON-PROBE: NOT DETECTED
CALCIUM SERPL-MCNC: 8.5 MG/DL (ref 8.5–10.1)
CHLORIDE SERPL-SCNC: 103 MMOL/L (ref 97–108)
CO2 SERPL-SCNC: 31 MMOL/L (ref 21–32)
CREAT SERPL-MCNC: 0.7 MG/DL (ref 0.55–1.02)
E CLOAC COMP DNA BLD POS NAA+NON-PROBE: NOT DETECTED
E COLI DNA BLD POS QL NAA+NON-PROBE: NOT DETECTED
E FAECALIS DNA BLD POS QL NAA+NON-PROBE: NOT DETECTED
E FAECIUM DNA BLD POS QL NAA+NON-PROBE: NOT DETECTED
ECHO AO ASC DIAM: 2.6 CM
ECHO AO ASCENDING AORTA INDEX: 1.11 CM/M2
ECHO AV AREA PEAK VELOCITY: 2.4 CM2
ECHO AV AREA VTI: 2.4 CM2
ECHO AV AREA/BSA PEAK VELOCITY: 1 CM2/M2
ECHO AV AREA/BSA VTI: 1 CM2/M2
ECHO AV MEAN GRADIENT: 7 MMHG
ECHO AV MEAN VELOCITY: 1.2 M/S
ECHO AV PEAK GRADIENT: 13 MMHG
ECHO AV PEAK VELOCITY: 1.8 M/S
ECHO AV VELOCITY RATIO: 0.72
ECHO AV VTI: 36.5 CM
ECHO LA DIAMETER INDEX: 1.88 CM/M2
ECHO LA DIAMETER: 4.4 CM
ECHO LA VOL 2C: 39 ML (ref 22–52)
ECHO LA VOL 4C: 48 ML (ref 22–52)
ECHO LA VOL BP: 45 ML (ref 22–52)
ECHO LA VOL/BSA BIPLANE: 19 ML/M2 (ref 16–34)
ECHO LA VOLUME AREA LENGTH: 49 ML
ECHO LA VOLUME INDEX A2C: 17 ML/M2 (ref 16–34)
ECHO LA VOLUME INDEX A4C: 21 ML/M2 (ref 16–34)
ECHO LA VOLUME INDEX AREA LENGTH: 21 ML/M2 (ref 16–34)
ECHO LV E' LATERAL VELOCITY: 12 CM/S
ECHO LV E' SEPTAL VELOCITY: 8 CM/S
ECHO LV EDV A2C: 84 ML
ECHO LV EDV A4C: 101 ML
ECHO LV EDV BP: 92 ML (ref 56–104)
ECHO LV EDV INDEX A4C: 43 ML/M2
ECHO LV EDV INDEX BP: 39 ML/M2
ECHO LV EDV NDEX A2C: 36 ML/M2
ECHO LV EJECTION FRACTION A2C: 51 %
ECHO LV EJECTION FRACTION A4C: 62 %
ECHO LV EJECTION FRACTION BIPLANE: 56 % (ref 55–100)
ECHO LV ESV A2C: 41 ML
ECHO LV ESV A4C: 38 ML
ECHO LV ESV BP: 41 ML (ref 19–49)
ECHO LV ESV INDEX A2C: 18 ML/M2
ECHO LV ESV INDEX A4C: 16 ML/M2
ECHO LV ESV INDEX BP: 18 ML/M2
ECHO LV FRACTIONAL SHORTENING: 30 % (ref 28–44)
ECHO LV INTERNAL DIMENSION DIASTOLE INDEX: 1.88 CM/M2
ECHO LV INTERNAL DIMENSION DIASTOLIC: 4.4 CM (ref 3.9–5.3)
ECHO LV INTERNAL DIMENSION SYSTOLIC INDEX: 1.32 CM/M2
ECHO LV INTERNAL DIMENSION SYSTOLIC: 3.1 CM
ECHO LV IVSD: 1.4 CM (ref 0.6–0.9)
ECHO LV MASS 2D: 240.3 G (ref 67–162)
ECHO LV MASS INDEX 2D: 102.7 G/M2 (ref 43–95)
ECHO LV POSTERIOR WALL DIASTOLIC: 1.4 CM (ref 0.6–0.9)
ECHO LV RELATIVE WALL THICKNESS RATIO: 0.64
ECHO LVOT AREA: 3.5 CM2
ECHO LVOT AV VTI INDEX: 0.72
ECHO LVOT DIAM: 2.1 CM
ECHO LVOT MEAN GRADIENT: 4 MMHG
ECHO LVOT PEAK GRADIENT: 7 MMHG
ECHO LVOT PEAK VELOCITY: 1.3 M/S
ECHO LVOT STROKE VOLUME INDEX: 38.8 ML/M2
ECHO LVOT SV: 90.7 ML
ECHO LVOT VTI: 26.2 CM
ECHO MV A VELOCITY: 0.77 M/S
ECHO MV E DECELERATION TIME (DT): 191.9 MS
ECHO MV E VELOCITY: 0.97 M/S
ECHO MV E/A RATIO: 1.26
ECHO MV E/E' LATERAL: 8.08
ECHO MV E/E' RATIO (AVERAGED): 10.1
ECHO MV E/E' SEPTAL: 12.13
ECHO PV MAX VELOCITY: 1.3 M/S
ECHO PV PEAK GRADIENT: 7 MMHG
ECHO RV INTERNAL DIMENSION: 3.2 CM
ECHO RV TAPSE: 2.2 CM (ref 1.7–?)
ECHO RVOT PEAK GRADIENT: 2 MMHG
ECHO RVOT PEAK VELOCITY: 0.8 M/S
ECHO TV REGURGITANT MAX VELOCITY: 2.43 M/S
ECHO TV REGURGITANT PEAK GRADIENT: 24 MMHG
ENTEROBACTERALES DNA BLD POS NAA+N-PRB: NOT DETECTED
ERYTHROCYTE [DISTWIDTH] IN BLOOD BY AUTOMATED COUNT: 17.2 % (ref 11.5–14.5)
GLOBULIN SER CALC-MCNC: 4.2 G/DL (ref 2–4)
GLUCOSE SERPL-MCNC: 128 MG/DL (ref 65–100)
GP B STREP DNA BLD POS QL NAA+NON-PROBE: NOT DETECTED
HAEM INFLU DNA BLD POS QL NAA+NON-PROBE: NOT DETECTED
HCT VFR BLD AUTO: 38.6 % (ref 35–47)
HGB BLD-MCNC: 11.3 G/DL (ref 11.5–16)
K OXYTOCA DNA BLD POS QL NAA+NON-PROBE: NOT DETECTED
KLEBSIELLA SP DNA BLD POS QL NAA+NON-PRB: NOT DETECTED
KLEBSIELLA SP DNA BLD POS QL NAA+NON-PRB: NOT DETECTED
L MONOCYTOG DNA BLD POS QL NAA+NON-PROBE: NOT DETECTED
MCH RBC QN AUTO: 22.2 PG (ref 26–34)
MCHC RBC AUTO-ENTMCNC: 29.3 G/DL (ref 30–36.5)
MCV RBC AUTO: 76 FL (ref 80–99)
N MEN DNA BLD POS QL NAA+NON-PROBE: NOT DETECTED
NRBC # BLD: 0 K/UL (ref 0–0.01)
NRBC BLD-RTO: 0 PER 100 WBC
P AERUGINOSA DNA BLD POS NAA+NON-PROBE: NOT DETECTED
PLATELET # BLD AUTO: 224 K/UL (ref 150–400)
PMV BLD AUTO: 10.8 FL (ref 8.9–12.9)
POTASSIUM SERPL-SCNC: 3.6 MMOL/L (ref 3.5–5.1)
PROT SERPL-MCNC: 7 G/DL (ref 6.4–8.2)
PROTEUS SP DNA BLD POS QL NAA+NON-PROBE: NOT DETECTED
RBC # BLD AUTO: 5.08 M/UL (ref 3.8–5.2)
RESISTANT GENE SPACE, REGENE: ABNORMAL
S AUREUS DNA BLD POS QL NAA+NON-PROBE: NOT DETECTED
S AUREUS+CONS DNA BLD POS NAA+NON-PROBE: DETECTED
S EPIDERMIDIS DNA BLD POS QL NAA+NON-PRB: NOT DETECTED
S LUGDUNENSIS DNA BLD POS QL NAA+NON-PRB: NOT DETECTED
S MALTOPHILIA DNA BLD POS QL NAA+NON-PRB: NOT DETECTED
S MARCESCENS DNA BLD POS NAA+NON-PROBE: NOT DETECTED
S PNEUM DNA BLD POS QL NAA+NON-PROBE: NOT DETECTED
S PYO DNA BLD POS QL NAA+NON-PROBE: NOT DETECTED
SALMONELLA DNA BLD POS QL NAA+NON-PROBE: NOT DETECTED
SODIUM SERPL-SCNC: 137 MMOL/L (ref 136–145)
STREPTOCOCCUS DNA BLD POS NAA+NON-PROBE: NOT DETECTED
WBC # BLD AUTO: 5.6 K/UL (ref 3.6–11)

## 2023-04-27 PROCEDURE — 74011250636 HC RX REV CODE- 250/636: Performed by: STUDENT IN AN ORGANIZED HEALTH CARE EDUCATION/TRAINING PROGRAM

## 2023-04-27 PROCEDURE — 74011250637 HC RX REV CODE- 250/637: Performed by: STUDENT IN AN ORGANIZED HEALTH CARE EDUCATION/TRAINING PROGRAM

## 2023-04-27 PROCEDURE — 36415 COLL VENOUS BLD VENIPUNCTURE: CPT

## 2023-04-27 PROCEDURE — 74011000258 HC RX REV CODE- 258: Performed by: STUDENT IN AN ORGANIZED HEALTH CARE EDUCATION/TRAINING PROGRAM

## 2023-04-27 PROCEDURE — 85027 COMPLETE CBC AUTOMATED: CPT

## 2023-04-27 PROCEDURE — 74011250637 HC RX REV CODE- 250/637

## 2023-04-27 PROCEDURE — 74011000250 HC RX REV CODE- 250: Performed by: STUDENT IN AN ORGANIZED HEALTH CARE EDUCATION/TRAINING PROGRAM

## 2023-04-27 PROCEDURE — 93306 TTE W/DOPPLER COMPLETE: CPT | Performed by: SPECIALIST

## 2023-04-27 PROCEDURE — 93306 TTE W/DOPPLER COMPLETE: CPT

## 2023-04-27 PROCEDURE — 80053 COMPREHEN METABOLIC PANEL: CPT

## 2023-04-27 PROCEDURE — 65270000029 HC RM PRIVATE

## 2023-04-27 PROCEDURE — 99233 SBSQ HOSP IP/OBS HIGH 50: CPT | Performed by: STUDENT IN AN ORGANIZED HEALTH CARE EDUCATION/TRAINING PROGRAM

## 2023-04-27 RX ORDER — IBUPROFEN 600 MG/1
600 TABLET ORAL
Status: DISCONTINUED | OUTPATIENT
Start: 2023-04-27 | End: 2023-04-30 | Stop reason: HOSPADM

## 2023-04-27 RX ADMIN — DOXYCYCLINE 100 MG: 100 INJECTION, POWDER, LYOPHILIZED, FOR SOLUTION INTRAVENOUS at 06:26

## 2023-04-27 RX ADMIN — Medication 10 ML: at 17:17

## 2023-04-27 RX ADMIN — ENOXAPARIN SODIUM 30 MG: 100 INJECTION SUBCUTANEOUS at 21:32

## 2023-04-27 RX ADMIN — VANCOMYCIN HYDROCHLORIDE 1000 MG: 1 INJECTION, POWDER, LYOPHILIZED, FOR SOLUTION INTRAVENOUS at 17:17

## 2023-04-27 RX ADMIN — IBUPROFEN 600 MG: 600 TABLET, FILM COATED ORAL at 11:04

## 2023-04-27 RX ADMIN — VANCOMYCIN HYDROCHLORIDE 2500 MG: 10 INJECTION, POWDER, LYOPHILIZED, FOR SOLUTION INTRAVENOUS at 03:21

## 2023-04-27 RX ADMIN — ACETAMINOPHEN 650 MG: 325 TABLET ORAL at 23:15

## 2023-04-27 RX ADMIN — VANCOMYCIN HYDROCHLORIDE 1000 MG: 1 INJECTION, POWDER, LYOPHILIZED, FOR SOLUTION INTRAVENOUS at 10:46

## 2023-04-27 RX ADMIN — BENZONATATE 100 MG: 100 CAPSULE ORAL at 14:29

## 2023-04-27 RX ADMIN — BENZONATATE 100 MG: 100 CAPSULE ORAL at 06:50

## 2023-04-27 RX ADMIN — GUAIFENESIN AND DEXTROMETHORPHAN 5 ML: 100; 10 SYRUP ORAL at 09:04

## 2023-04-27 RX ADMIN — Medication 10 ML: at 21:34

## 2023-04-27 RX ADMIN — ACETAMINOPHEN 650 MG: 325 TABLET ORAL at 17:17

## 2023-04-27 RX ADMIN — TRAZODONE HYDROCHLORIDE 50 MG: 50 TABLET ORAL at 21:32

## 2023-04-27 RX ADMIN — ACETAMINOPHEN 650 MG: 325 TABLET ORAL at 09:04

## 2023-04-27 RX ADMIN — SODIUM CHLORIDE 1 G: 9 INJECTION INTRAMUSCULAR; INTRAVENOUS; SUBCUTANEOUS at 17:17

## 2023-04-27 RX ADMIN — NYSTATIN: 100000 POWDER TOPICAL at 09:00

## 2023-04-27 RX ADMIN — ENOXAPARIN SODIUM 30 MG: 100 INJECTION SUBCUTANEOUS at 09:04

## 2023-04-27 RX ADMIN — Medication 10 ML: at 06:33

## 2023-04-28 LAB
ALBUMIN SERPL-MCNC: 2.5 G/DL (ref 3.5–5)
ALBUMIN/GLOB SERPL: 0.5 (ref 1.1–2.2)
ALP SERPL-CCNC: 52 U/L (ref 45–117)
ALT SERPL-CCNC: 16 U/L (ref 12–78)
ANION GAP SERPL CALC-SCNC: 2 MMOL/L (ref 5–15)
AST SERPL-CCNC: 8 U/L (ref 15–37)
ATRIAL RATE: 88 BPM
BILIRUB SERPL-MCNC: 0.3 MG/DL (ref 0.2–1)
BUN SERPL-MCNC: 11 MG/DL (ref 6–20)
BUN/CREAT SERPL: 21 (ref 12–20)
CALCIUM SERPL-MCNC: 8.4 MG/DL (ref 8.5–10.1)
CALCULATED P AXIS, ECG09: 47 DEGREES
CALCULATED R AXIS, ECG10: 45 DEGREES
CALCULATED T AXIS, ECG11: 17 DEGREES
CHLORIDE SERPL-SCNC: 108 MMOL/L (ref 97–108)
CO2 SERPL-SCNC: 28 MMOL/L (ref 21–32)
CREAT SERPL-MCNC: 0.53 MG/DL (ref 0.55–1.02)
DIAGNOSIS, 93000: NORMAL
ERYTHROCYTE [DISTWIDTH] IN BLOOD BY AUTOMATED COUNT: 17.9 % (ref 11.5–14.5)
GLOBULIN SER CALC-MCNC: 4.7 G/DL (ref 2–4)
GLUCOSE SERPL-MCNC: 110 MG/DL (ref 65–100)
HCT VFR BLD AUTO: 41.1 % (ref 35–47)
HGB BLD-MCNC: 11.6 G/DL (ref 11.5–16)
MCH RBC QN AUTO: 21.9 PG (ref 26–34)
MCHC RBC AUTO-ENTMCNC: 28.2 G/DL (ref 30–36.5)
MCV RBC AUTO: 77.7 FL (ref 80–99)
NRBC # BLD: 0 K/UL (ref 0–0.01)
NRBC BLD-RTO: 0 PER 100 WBC
P-R INTERVAL, ECG05: 152 MS
PLATELET # BLD AUTO: 224 K/UL (ref 150–400)
PMV BLD AUTO: 10.3 FL (ref 8.9–12.9)
POTASSIUM SERPL-SCNC: 3.5 MMOL/L (ref 3.5–5.1)
PROT SERPL-MCNC: 7.2 G/DL (ref 6.4–8.2)
Q-T INTERVAL, ECG07: 390 MS
QRS DURATION, ECG06: 86 MS
QTC CALCULATION (BEZET), ECG08: 471 MS
RBC # BLD AUTO: 5.29 M/UL (ref 3.8–5.2)
SODIUM SERPL-SCNC: 138 MMOL/L (ref 136–145)
VENTRICULAR RATE, ECG03: 88 BPM
WBC # BLD AUTO: 4.3 K/UL (ref 3.6–11)

## 2023-04-28 PROCEDURE — 74011250636 HC RX REV CODE- 250/636: Performed by: STUDENT IN AN ORGANIZED HEALTH CARE EDUCATION/TRAINING PROGRAM

## 2023-04-28 PROCEDURE — 74011000250 HC RX REV CODE- 250: Performed by: INTERNAL MEDICINE

## 2023-04-28 PROCEDURE — 74011000250 HC RX REV CODE- 250: Performed by: STUDENT IN AN ORGANIZED HEALTH CARE EDUCATION/TRAINING PROGRAM

## 2023-04-28 PROCEDURE — 74011250637 HC RX REV CODE- 250/637: Performed by: INTERNAL MEDICINE

## 2023-04-28 PROCEDURE — 77010033678 HC OXYGEN DAILY

## 2023-04-28 PROCEDURE — 94640 AIRWAY INHALATION TREATMENT: CPT

## 2023-04-28 PROCEDURE — 85027 COMPLETE CBC AUTOMATED: CPT

## 2023-04-28 PROCEDURE — 99232 SBSQ HOSP IP/OBS MODERATE 35: CPT | Performed by: FAMILY MEDICINE

## 2023-04-28 PROCEDURE — 36415 COLL VENOUS BLD VENIPUNCTURE: CPT

## 2023-04-28 PROCEDURE — 74011250637 HC RX REV CODE- 250/637: Performed by: STUDENT IN AN ORGANIZED HEALTH CARE EDUCATION/TRAINING PROGRAM

## 2023-04-28 PROCEDURE — 74011250637 HC RX REV CODE- 250/637

## 2023-04-28 PROCEDURE — 65270000029 HC RM PRIVATE

## 2023-04-28 PROCEDURE — 80053 COMPREHEN METABOLIC PANEL: CPT

## 2023-04-28 PROCEDURE — 94618 PULMONARY STRESS TESTING: CPT

## 2023-04-28 PROCEDURE — 74011250636 HC RX REV CODE- 250/636: Performed by: INTERNAL MEDICINE

## 2023-04-28 RX ORDER — PANTOPRAZOLE SODIUM 40 MG/1
40 TABLET, DELAYED RELEASE ORAL
Status: DISCONTINUED | OUTPATIENT
Start: 2023-04-29 | End: 2023-04-30 | Stop reason: HOSPADM

## 2023-04-28 RX ORDER — IPRATROPIUM BROMIDE AND ALBUTEROL SULFATE 2.5; .5 MG/3ML; MG/3ML
3 SOLUTION RESPIRATORY (INHALATION)
Status: DISCONTINUED | OUTPATIENT
Start: 2023-04-28 | End: 2023-04-29

## 2023-04-28 RX ORDER — DOXYCYCLINE HYCLATE 100 MG
100 TABLET ORAL EVERY 12 HOURS
Status: DISCONTINUED | OUTPATIENT
Start: 2023-04-28 | End: 2023-04-30 | Stop reason: HOSPADM

## 2023-04-28 RX ORDER — ARFORMOTEROL TARTRATE 15 UG/2ML
15 SOLUTION RESPIRATORY (INHALATION)
Status: DISCONTINUED | OUTPATIENT
Start: 2023-04-28 | End: 2023-04-30 | Stop reason: HOSPADM

## 2023-04-28 RX ORDER — BUDESONIDE 0.5 MG/2ML
500 INHALANT ORAL
Status: DISCONTINUED | OUTPATIENT
Start: 2023-04-28 | End: 2023-04-30 | Stop reason: HOSPADM

## 2023-04-28 RX ADMIN — GUAIFENESIN AND DEXTROMETHORPHAN 5 ML: 100; 10 SYRUP ORAL at 16:07

## 2023-04-28 RX ADMIN — ACETAMINOPHEN 650 MG: 325 TABLET ORAL at 19:29

## 2023-04-28 RX ADMIN — GUAIFENESIN AND DEXTROMETHORPHAN 5 ML: 100; 10 SYRUP ORAL at 23:02

## 2023-04-28 RX ADMIN — NYSTATIN: 100000 POWDER TOPICAL at 08:50

## 2023-04-28 RX ADMIN — Medication 10 ML: at 05:16

## 2023-04-28 RX ADMIN — ENOXAPARIN SODIUM 30 MG: 100 INJECTION SUBCUTANEOUS at 08:50

## 2023-04-28 RX ADMIN — Medication 10 ML: at 14:00

## 2023-04-28 RX ADMIN — METHYLPREDNISOLONE SODIUM SUCCINATE 40 MG: 40 INJECTION, POWDER, FOR SOLUTION INTRAMUSCULAR; INTRAVENOUS at 11:27

## 2023-04-28 RX ADMIN — BUDESONIDE 500 MCG: 0.5 INHALANT RESPIRATORY (INHALATION) at 19:50

## 2023-04-28 RX ADMIN — IPRATROPIUM BROMIDE AND ALBUTEROL SULFATE 3 ML: 2.5; .5 SOLUTION RESPIRATORY (INHALATION) at 19:50

## 2023-04-28 RX ADMIN — IBUPROFEN 600 MG: 600 TABLET, FILM COATED ORAL at 16:07

## 2023-04-28 RX ADMIN — BUDESONIDE 500 MCG: 0.5 INHALANT RESPIRATORY (INHALATION) at 12:11

## 2023-04-28 RX ADMIN — DOXYCYCLINE HYCLATE 100 MG: 100 TABLET, COATED ORAL at 11:27

## 2023-04-28 RX ADMIN — Medication 10 ML: at 22:54

## 2023-04-28 RX ADMIN — METHYLPREDNISOLONE SODIUM SUCCINATE 40 MG: 40 INJECTION, POWDER, FOR SOLUTION INTRAMUSCULAR; INTRAVENOUS at 17:33

## 2023-04-28 RX ADMIN — BENZONATATE 100 MG: 100 CAPSULE ORAL at 11:59

## 2023-04-28 RX ADMIN — DOXYCYCLINE HYCLATE 100 MG: 100 TABLET, COATED ORAL at 22:54

## 2023-04-28 RX ADMIN — ENOXAPARIN SODIUM 30 MG: 100 INJECTION SUBCUTANEOUS at 22:54

## 2023-04-28 RX ADMIN — GUAIFENESIN AND DEXTROMETHORPHAN 5 ML: 100; 10 SYRUP ORAL at 08:50

## 2023-04-28 RX ADMIN — ACETAMINOPHEN 650 MG: 325 TABLET ORAL at 08:50

## 2023-04-28 RX ADMIN — ARFORMOTEROL TARTRATE 15 MCG: 15 SOLUTION RESPIRATORY (INHALATION) at 12:10

## 2023-04-28 RX ADMIN — TRAZODONE HYDROCHLORIDE 50 MG: 50 TABLET ORAL at 22:54

## 2023-04-28 RX ADMIN — IPRATROPIUM BROMIDE AND ALBUTEROL SULFATE 3 ML: 2.5; .5 SOLUTION RESPIRATORY (INHALATION) at 12:09

## 2023-04-28 RX ADMIN — ARFORMOTEROL TARTRATE 15 MCG: 15 SOLUTION RESPIRATORY (INHALATION) at 19:50

## 2023-04-28 RX ADMIN — IBUPROFEN 600 MG: 600 TABLET, FILM COATED ORAL at 05:24

## 2023-04-29 LAB
ALBUMIN SERPL-MCNC: 3 G/DL (ref 3.5–5)
ALBUMIN/GLOB SERPL: 0.6 (ref 1.1–2.2)
ALP SERPL-CCNC: 53 U/L (ref 45–117)
ALT SERPL-CCNC: 19 U/L (ref 12–78)
ANION GAP SERPL CALC-SCNC: 3 MMOL/L (ref 5–15)
AST SERPL-CCNC: 14 U/L (ref 15–37)
BACTERIA SPEC CULT: ABNORMAL
BACTERIA SPEC CULT: ABNORMAL
BILIRUB SERPL-MCNC: 0.3 MG/DL (ref 0.2–1)
BUN SERPL-MCNC: 13 MG/DL (ref 6–20)
BUN/CREAT SERPL: 19 (ref 12–20)
CALCIUM SERPL-MCNC: 9.5 MG/DL (ref 8.5–10.1)
CHLORIDE SERPL-SCNC: 105 MMOL/L (ref 97–108)
CO2 SERPL-SCNC: 26 MMOL/L (ref 21–32)
CREAT SERPL-MCNC: 0.67 MG/DL (ref 0.55–1.02)
D DIMER PPP FEU-MCNC: 0.25 MG/L FEU (ref 0–0.65)
ERYTHROCYTE [DISTWIDTH] IN BLOOD BY AUTOMATED COUNT: 16.9 % (ref 11.5–14.5)
GLOBULIN SER CALC-MCNC: 5.1 G/DL (ref 2–4)
GLUCOSE SERPL-MCNC: 162 MG/DL (ref 65–100)
HCT VFR BLD AUTO: 41.2 % (ref 35–47)
HGB BLD-MCNC: 12 G/DL (ref 11.5–16)
MCH RBC QN AUTO: 22 PG (ref 26–34)
MCHC RBC AUTO-ENTMCNC: 29.1 G/DL (ref 30–36.5)
MCV RBC AUTO: 75.6 FL (ref 80–99)
NRBC # BLD: 0 K/UL (ref 0–0.01)
NRBC BLD-RTO: 0 PER 100 WBC
PLATELET # BLD AUTO: 282 K/UL (ref 150–400)
PMV BLD AUTO: 11.3 FL (ref 8.9–12.9)
POTASSIUM SERPL-SCNC: 3.9 MMOL/L (ref 3.5–5.1)
PROT SERPL-MCNC: 8.1 G/DL (ref 6.4–8.2)
RBC # BLD AUTO: 5.45 M/UL (ref 3.8–5.2)
SERVICE CMNT-IMP: ABNORMAL
SODIUM SERPL-SCNC: 134 MMOL/L (ref 136–145)
TROPONIN I SERPL HS-MCNC: 11 NG/L (ref 0–51)
TROPONIN I SERPL HS-MCNC: 13 NG/L (ref 0–51)
WBC # BLD AUTO: 8.9 K/UL (ref 3.6–11)

## 2023-04-29 PROCEDURE — 74011250636 HC RX REV CODE- 250/636: Performed by: STUDENT IN AN ORGANIZED HEALTH CARE EDUCATION/TRAINING PROGRAM

## 2023-04-29 PROCEDURE — 65270000029 HC RM PRIVATE

## 2023-04-29 PROCEDURE — 99232 SBSQ HOSP IP/OBS MODERATE 35: CPT | Performed by: FAMILY MEDICINE

## 2023-04-29 PROCEDURE — 74011000250 HC RX REV CODE- 250: Performed by: INTERNAL MEDICINE

## 2023-04-29 PROCEDURE — 74011250636 HC RX REV CODE- 250/636: Performed by: INTERNAL MEDICINE

## 2023-04-29 PROCEDURE — 85379 FIBRIN DEGRADATION QUANT: CPT

## 2023-04-29 PROCEDURE — 80053 COMPREHEN METABOLIC PANEL: CPT

## 2023-04-29 PROCEDURE — 74011000250 HC RX REV CODE- 250: Performed by: STUDENT IN AN ORGANIZED HEALTH CARE EDUCATION/TRAINING PROGRAM

## 2023-04-29 PROCEDURE — 36415 COLL VENOUS BLD VENIPUNCTURE: CPT

## 2023-04-29 PROCEDURE — 74011636637 HC RX REV CODE- 636/637

## 2023-04-29 PROCEDURE — 74011250637 HC RX REV CODE- 250/637: Performed by: STUDENT IN AN ORGANIZED HEALTH CARE EDUCATION/TRAINING PROGRAM

## 2023-04-29 PROCEDURE — 77010033678 HC OXYGEN DAILY

## 2023-04-29 PROCEDURE — 85027 COMPLETE CBC AUTOMATED: CPT

## 2023-04-29 PROCEDURE — 94640 AIRWAY INHALATION TREATMENT: CPT

## 2023-04-29 PROCEDURE — 74011250637 HC RX REV CODE- 250/637: Performed by: INTERNAL MEDICINE

## 2023-04-29 PROCEDURE — 74011250637 HC RX REV CODE- 250/637

## 2023-04-29 PROCEDURE — 84484 ASSAY OF TROPONIN QUANT: CPT

## 2023-04-29 RX ORDER — LEVALBUTEROL INHALATION SOLUTION 1.25 MG/3ML
1.25 SOLUTION RESPIRATORY (INHALATION)
Status: DISCONTINUED | OUTPATIENT
Start: 2023-04-29 | End: 2023-04-30 | Stop reason: HOSPADM

## 2023-04-29 RX ORDER — DOXYCYCLINE HYCLATE 100 MG
100 TABLET ORAL EVERY 12 HOURS
Qty: 11 TABLET | Refills: 0 | Status: SHIPPED | OUTPATIENT
Start: 2023-04-29 | End: 2023-05-05

## 2023-04-29 RX ORDER — NYSTATIN 100000 [USP'U]/G
POWDER TOPICAL 2 TIMES DAILY
Qty: 5 EACH | Refills: 0 | Status: SHIPPED | OUTPATIENT
Start: 2023-04-29 | End: 2023-05-04

## 2023-04-29 RX ORDER — FLUTICASONE PROPIONATE AND SALMETEROL 250; 50 UG/1; UG/1
1 POWDER RESPIRATORY (INHALATION) EVERY 12 HOURS
Qty: 60 EACH | Refills: 1 | Status: SHIPPED | OUTPATIENT
Start: 2023-04-29

## 2023-04-29 RX ORDER — PANTOPRAZOLE SODIUM 40 MG/1
40 TABLET, DELAYED RELEASE ORAL
Qty: 10 TABLET | Refills: 0 | Status: SHIPPED | OUTPATIENT
Start: 2023-04-30 | End: 2023-05-10

## 2023-04-29 RX ORDER — GUAIFENESIN/DEXTROMETHORPHAN 100-10MG/5
5 SYRUP ORAL
Qty: 1 EACH | Refills: 0 | Status: SHIPPED | OUTPATIENT
Start: 2023-04-29 | End: 2023-05-09

## 2023-04-29 RX ORDER — POLYETHYLENE GLYCOL 3350 17 G/17G
17 POWDER, FOR SOLUTION ORAL DAILY
Qty: 10 PACKET | Refills: 0 | Status: SHIPPED | OUTPATIENT
Start: 2023-04-29 | End: 2023-05-09

## 2023-04-29 RX ORDER — PREDNISONE 20 MG/1
40 TABLET ORAL 2 TIMES DAILY WITH MEALS
Status: DISCONTINUED | OUTPATIENT
Start: 2023-04-29 | End: 2023-04-30 | Stop reason: HOSPADM

## 2023-04-29 RX ORDER — ACETAZOLAMIDE 250 MG/1
1000 TABLET ORAL 2 TIMES DAILY
Status: DISCONTINUED | OUTPATIENT
Start: 2023-04-29 | End: 2023-04-30 | Stop reason: HOSPADM

## 2023-04-29 RX ORDER — PREDNISONE 20 MG/1
TABLET ORAL
Qty: 15 TABLET | Refills: 0 | Status: SHIPPED | OUTPATIENT
Start: 2023-04-29 | End: 2023-04-30 | Stop reason: SDUPTHER

## 2023-04-29 RX ORDER — IPRATROPIUM BROMIDE 0.5 MG/2.5ML
0.5 SOLUTION RESPIRATORY (INHALATION)
Status: DISCONTINUED | OUTPATIENT
Start: 2023-04-29 | End: 2023-04-30 | Stop reason: HOSPADM

## 2023-04-29 RX ADMIN — Medication 10 ML: at 07:17

## 2023-04-29 RX ADMIN — BUDESONIDE 500 MCG: 0.5 INHALANT RESPIRATORY (INHALATION) at 07:21

## 2023-04-29 RX ADMIN — IPRATROPIUM BROMIDE AND ALBUTEROL SULFATE 3 ML: 2.5; .5 SOLUTION RESPIRATORY (INHALATION) at 07:19

## 2023-04-29 RX ADMIN — LEVALBUTEROL HYDROCHLORIDE 1.25 MG: 1.25 SOLUTION RESPIRATORY (INHALATION) at 19:45

## 2023-04-29 RX ADMIN — IBUPROFEN 600 MG: 600 TABLET, FILM COATED ORAL at 18:02

## 2023-04-29 RX ADMIN — ENOXAPARIN SODIUM 30 MG: 100 INJECTION SUBCUTANEOUS at 21:15

## 2023-04-29 RX ADMIN — METHYLPREDNISOLONE SODIUM SUCCINATE 40 MG: 40 INJECTION, POWDER, FOR SOLUTION INTRAMUSCULAR; INTRAVENOUS at 00:55

## 2023-04-29 RX ADMIN — ACETAMINOPHEN 650 MG: 325 TABLET ORAL at 20:15

## 2023-04-29 RX ADMIN — IPRATROPIUM BROMIDE 0.5 MG: 0.5 SOLUTION RESPIRATORY (INHALATION) at 19:45

## 2023-04-29 RX ADMIN — DOXYCYCLINE HYCLATE 100 MG: 100 TABLET, COATED ORAL at 21:15

## 2023-04-29 RX ADMIN — Medication 10 ML: at 21:16

## 2023-04-29 RX ADMIN — IBUPROFEN 600 MG: 600 TABLET, FILM COATED ORAL at 11:34

## 2023-04-29 RX ADMIN — ACETAMINOPHEN 650 MG: 325 TABLET ORAL at 14:53

## 2023-04-29 RX ADMIN — ARFORMOTEROL TARTRATE 15 MCG: 15 SOLUTION RESPIRATORY (INHALATION) at 19:45

## 2023-04-29 RX ADMIN — ENOXAPARIN SODIUM 30 MG: 100 INJECTION SUBCUTANEOUS at 08:00

## 2023-04-29 RX ADMIN — ARFORMOTEROL TARTRATE 15 MCG: 15 SOLUTION RESPIRATORY (INHALATION) at 07:20

## 2023-04-29 RX ADMIN — PREDNISONE 40 MG: 20 TABLET ORAL at 16:27

## 2023-04-29 RX ADMIN — Medication 10 ML: at 14:54

## 2023-04-29 RX ADMIN — NYSTATIN: 100000 POWDER TOPICAL at 16:28

## 2023-04-29 RX ADMIN — IPRATROPIUM BROMIDE AND ALBUTEROL SULFATE 3 ML: 2.5; .5 SOLUTION RESPIRATORY (INHALATION) at 03:05

## 2023-04-29 RX ADMIN — IPRATROPIUM BROMIDE 0.5 MG: 0.5 SOLUTION RESPIRATORY (INHALATION) at 13:06

## 2023-04-29 RX ADMIN — LEVALBUTEROL HYDROCHLORIDE 1.25 MG: 1.25 SOLUTION RESPIRATORY (INHALATION) at 13:06

## 2023-04-29 RX ADMIN — NYSTATIN: 100000 POWDER TOPICAL at 08:00

## 2023-04-29 RX ADMIN — PANTOPRAZOLE SODIUM 40 MG: 40 TABLET, DELAYED RELEASE ORAL at 07:14

## 2023-04-29 RX ADMIN — BUDESONIDE 500 MCG: 0.5 INHALANT RESPIRATORY (INHALATION) at 19:45

## 2023-04-29 RX ADMIN — IBUPROFEN 600 MG: 600 TABLET, FILM COATED ORAL at 00:55

## 2023-04-29 RX ADMIN — ACETAMINOPHEN 650 MG: 325 TABLET ORAL at 07:52

## 2023-04-29 RX ADMIN — DOXYCYCLINE HYCLATE 100 MG: 100 TABLET, COATED ORAL at 08:00

## 2023-04-29 RX ADMIN — TRAZODONE HYDROCHLORIDE 50 MG: 50 TABLET ORAL at 21:15

## 2023-04-29 RX ADMIN — METHYLPREDNISOLONE SODIUM SUCCINATE 40 MG: 40 INJECTION, POWDER, FOR SOLUTION INTRAMUSCULAR; INTRAVENOUS at 07:18

## 2023-04-30 VITALS
RESPIRATION RATE: 16 BRPM | BODY MASS INDEX: 48.82 KG/M2 | HEART RATE: 94 BPM | DIASTOLIC BLOOD PRESSURE: 61 MMHG | TEMPERATURE: 97.7 F | SYSTOLIC BLOOD PRESSURE: 109 MMHG | WEIGHT: 293 LBS | HEIGHT: 65 IN | OXYGEN SATURATION: 99 %

## 2023-04-30 LAB
ALBUMIN SERPL-MCNC: 2.8 G/DL (ref 3.5–5)
ALBUMIN/GLOB SERPL: 0.6 (ref 1.1–2.2)
ALP SERPL-CCNC: 48 U/L (ref 45–117)
ALT SERPL-CCNC: 19 U/L (ref 12–78)
ANION GAP SERPL CALC-SCNC: 3 MMOL/L (ref 5–15)
AST SERPL-CCNC: 11 U/L (ref 15–37)
BILIRUB SERPL-MCNC: 0.2 MG/DL (ref 0.2–1)
BUN SERPL-MCNC: 21 MG/DL (ref 6–20)
BUN/CREAT SERPL: 31 (ref 12–20)
CALCIUM SERPL-MCNC: 9.4 MG/DL (ref 8.5–10.1)
CHLORIDE SERPL-SCNC: 106 MMOL/L (ref 97–108)
CO2 SERPL-SCNC: 26 MMOL/L (ref 21–32)
CREAT SERPL-MCNC: 0.67 MG/DL (ref 0.55–1.02)
ERYTHROCYTE [DISTWIDTH] IN BLOOD BY AUTOMATED COUNT: 17.2 % (ref 11.5–14.5)
GLOBULIN SER CALC-MCNC: 4.9 G/DL (ref 2–4)
GLUCOSE SERPL-MCNC: 170 MG/DL (ref 65–100)
HCT VFR BLD AUTO: 39.7 % (ref 35–47)
HGB BLD-MCNC: 11.4 G/DL (ref 11.5–16)
MCH RBC QN AUTO: 22.1 PG (ref 26–34)
MCHC RBC AUTO-ENTMCNC: 28.7 G/DL (ref 30–36.5)
MCV RBC AUTO: 76.8 FL (ref 80–99)
NRBC # BLD: 0 K/UL (ref 0–0.01)
NRBC BLD-RTO: 0 PER 100 WBC
PLATELET # BLD AUTO: 274 K/UL (ref 150–400)
PMV BLD AUTO: 10.8 FL (ref 8.9–12.9)
POTASSIUM SERPL-SCNC: 3.9 MMOL/L (ref 3.5–5.1)
PROT SERPL-MCNC: 7.7 G/DL (ref 6.4–8.2)
RBC # BLD AUTO: 5.17 M/UL (ref 3.8–5.2)
SODIUM SERPL-SCNC: 135 MMOL/L (ref 136–145)
TROPONIN I SERPL HS-MCNC: 11 NG/L (ref 0–51)
WBC # BLD AUTO: 11 K/UL (ref 3.6–11)

## 2023-04-30 PROCEDURE — 80053 COMPREHEN METABOLIC PANEL: CPT

## 2023-04-30 PROCEDURE — 74011250637 HC RX REV CODE- 250/637: Performed by: INTERNAL MEDICINE

## 2023-04-30 PROCEDURE — 99238 HOSP IP/OBS DSCHRG MGMT 30/<: CPT | Performed by: FAMILY MEDICINE

## 2023-04-30 PROCEDURE — 77010033678 HC OXYGEN DAILY

## 2023-04-30 PROCEDURE — 85027 COMPLETE CBC AUTOMATED: CPT

## 2023-04-30 PROCEDURE — 74011636637 HC RX REV CODE- 636/637

## 2023-04-30 PROCEDURE — 94640 AIRWAY INHALATION TREATMENT: CPT

## 2023-04-30 PROCEDURE — 36415 COLL VENOUS BLD VENIPUNCTURE: CPT

## 2023-04-30 PROCEDURE — 74011250636 HC RX REV CODE- 250/636: Performed by: STUDENT IN AN ORGANIZED HEALTH CARE EDUCATION/TRAINING PROGRAM

## 2023-04-30 PROCEDURE — 74011000250 HC RX REV CODE- 250: Performed by: STUDENT IN AN ORGANIZED HEALTH CARE EDUCATION/TRAINING PROGRAM

## 2023-04-30 PROCEDURE — 84484 ASSAY OF TROPONIN QUANT: CPT

## 2023-04-30 PROCEDURE — 74011250637 HC RX REV CODE- 250/637

## 2023-04-30 PROCEDURE — 74011250637 HC RX REV CODE- 250/637: Performed by: STUDENT IN AN ORGANIZED HEALTH CARE EDUCATION/TRAINING PROGRAM

## 2023-04-30 PROCEDURE — 74011000250 HC RX REV CODE- 250: Performed by: INTERNAL MEDICINE

## 2023-04-30 RX ORDER — PREDNISONE 20 MG/1
TABLET ORAL
Qty: 13 TABLET | Refills: 0 | Status: SHIPPED | OUTPATIENT
Start: 2023-04-30 | End: 2023-05-08

## 2023-04-30 RX ADMIN — LEVALBUTEROL HYDROCHLORIDE 1.25 MG: 1.25 SOLUTION RESPIRATORY (INHALATION) at 01:31

## 2023-04-30 RX ADMIN — IPRATROPIUM BROMIDE 0.5 MG: 0.5 SOLUTION RESPIRATORY (INHALATION) at 01:31

## 2023-04-30 RX ADMIN — BUDESONIDE 500 MCG: 0.5 INHALANT RESPIRATORY (INHALATION) at 07:25

## 2023-04-30 RX ADMIN — LEVALBUTEROL HYDROCHLORIDE 1.25 MG: 1.25 SOLUTION RESPIRATORY (INHALATION) at 07:26

## 2023-04-30 RX ADMIN — PREDNISONE 40 MG: 20 TABLET ORAL at 08:08

## 2023-04-30 RX ADMIN — ACETAZOLAMIDE 1000 MG: 250 TABLET ORAL at 08:07

## 2023-04-30 RX ADMIN — IPRATROPIUM BROMIDE 0.5 MG: 0.5 SOLUTION RESPIRATORY (INHALATION) at 07:25

## 2023-04-30 RX ADMIN — ACETAZOLAMIDE 1000 MG: 250 TABLET ORAL at 01:47

## 2023-04-30 RX ADMIN — GUAIFENESIN AND DEXTROMETHORPHAN 5 ML: 100; 10 SYRUP ORAL at 01:47

## 2023-04-30 RX ADMIN — LEVALBUTEROL HYDROCHLORIDE 1.25 MG: 1.25 SOLUTION RESPIRATORY (INHALATION) at 14:53

## 2023-04-30 RX ADMIN — IBUPROFEN 600 MG: 600 TABLET, FILM COATED ORAL at 12:37

## 2023-04-30 RX ADMIN — NYSTATIN: 100000 POWDER TOPICAL at 08:11

## 2023-04-30 RX ADMIN — ENOXAPARIN SODIUM 30 MG: 100 INJECTION SUBCUTANEOUS at 08:08

## 2023-04-30 RX ADMIN — ARFORMOTEROL TARTRATE 15 MCG: 15 SOLUTION RESPIRATORY (INHALATION) at 07:26

## 2023-04-30 RX ADMIN — DOXYCYCLINE HYCLATE 100 MG: 100 TABLET, COATED ORAL at 08:07

## 2023-04-30 RX ADMIN — PANTOPRAZOLE SODIUM 40 MG: 40 TABLET, DELAYED RELEASE ORAL at 08:08

## 2023-04-30 RX ADMIN — IPRATROPIUM BROMIDE 0.5 MG: 0.5 SOLUTION RESPIRATORY (INHALATION) at 14:53

## 2023-05-04 ENCOUNTER — OFFICE VISIT (OUTPATIENT)
Dept: FAMILY MEDICINE CLINIC | Age: 33
End: 2023-05-04

## 2023-05-04 VITALS
OXYGEN SATURATION: 96 % | HEART RATE: 88 BPM | HEIGHT: 65 IN | DIASTOLIC BLOOD PRESSURE: 76 MMHG | RESPIRATION RATE: 18 BRPM | TEMPERATURE: 98.2 F | WEIGHT: 285 LBS | SYSTOLIC BLOOD PRESSURE: 126 MMHG | BODY MASS INDEX: 47.48 KG/M2

## 2023-05-04 DIAGNOSIS — R06.02 SOB (SHORTNESS OF BREATH): ICD-10-CM

## 2023-05-04 DIAGNOSIS — F43.22 ADJUSTMENT DISORDER WITH ANXIETY: ICD-10-CM

## 2023-05-04 DIAGNOSIS — R07.82 INTERCOSTAL PAIN: Primary | ICD-10-CM

## 2023-05-04 RX ORDER — IPRATROPIUM BROMIDE AND ALBUTEROL SULFATE 2.5; .5 MG/3ML; MG/3ML
3 SOLUTION RESPIRATORY (INHALATION)
Qty: 5 EACH | Refills: 0 | Status: SHIPPED | OUTPATIENT
Start: 2023-05-04

## 2023-05-04 RX ORDER — VENLAFAXINE HYDROCHLORIDE 75 MG/1
75 CAPSULE, EXTENDED RELEASE ORAL DAILY
Qty: 60 CAPSULE | Refills: 1 | Status: SHIPPED | OUTPATIENT
Start: 2023-05-04

## 2023-05-12 ENCOUNTER — OFFICE VISIT (OUTPATIENT)
Age: 33
End: 2023-05-12

## 2023-05-12 VITALS
WEIGHT: 286 LBS | DIASTOLIC BLOOD PRESSURE: 79 MMHG | SYSTOLIC BLOOD PRESSURE: 117 MMHG | BODY MASS INDEX: 47.59 KG/M2 | HEART RATE: 81 BPM | TEMPERATURE: 98.8 F | OXYGEN SATURATION: 100 %

## 2023-05-12 DIAGNOSIS — F43.22 ADJUSTMENT DISORDER WITH ANXIETY: ICD-10-CM

## 2023-05-12 DIAGNOSIS — R19.7 DIARRHEA, UNSPECIFIED TYPE: ICD-10-CM

## 2023-05-12 DIAGNOSIS — R51.9 NONINTRACTABLE EPISODIC HEADACHE, UNSPECIFIED HEADACHE TYPE: ICD-10-CM

## 2023-05-12 DIAGNOSIS — R10.13 EPIGASTRIC ABDOMINAL PAIN: ICD-10-CM

## 2023-05-12 DIAGNOSIS — R06.09 DYSPNEA ON EXERTION: ICD-10-CM

## 2023-05-12 DIAGNOSIS — R53.83 OTHER FATIGUE: ICD-10-CM

## 2023-05-12 DIAGNOSIS — R11.0 NAUSEA: ICD-10-CM

## 2023-05-12 DIAGNOSIS — J02.9 SORE THROAT: Primary | ICD-10-CM

## 2023-05-12 LAB
D DIMER PPP FEU-MCNC: 0.23 MG/L FEU (ref 0–0.65)
GROUP A STREP ANTIGEN, POC: NEGATIVE
VALID INTERNAL CONTROL, POC: NORMAL

## 2023-05-12 PROCEDURE — 87880 STREP A ASSAY W/OPTIC: CPT | Performed by: STUDENT IN AN ORGANIZED HEALTH CARE EDUCATION/TRAINING PROGRAM

## 2023-05-12 PROCEDURE — 99214 OFFICE O/P EST MOD 30 MIN: CPT | Performed by: STUDENT IN AN ORGANIZED HEALTH CARE EDUCATION/TRAINING PROGRAM

## 2023-05-12 PROCEDURE — 3074F SYST BP LT 130 MM HG: CPT | Performed by: STUDENT IN AN ORGANIZED HEALTH CARE EDUCATION/TRAINING PROGRAM

## 2023-05-12 PROCEDURE — 3078F DIAST BP <80 MM HG: CPT | Performed by: STUDENT IN AN ORGANIZED HEALTH CARE EDUCATION/TRAINING PROGRAM

## 2023-05-12 RX ORDER — ONDANSETRON 4 MG/1
4 TABLET, FILM COATED ORAL 3 TIMES DAILY PRN
Qty: 30 TABLET | Refills: 0 | Status: SHIPPED | OUTPATIENT
Start: 2023-05-12

## 2023-05-12 RX ORDER — VENLAFAXINE HYDROCHLORIDE 75 MG/1
75 CAPSULE, EXTENDED RELEASE ORAL DAILY
Qty: 90 CAPSULE | Refills: 0 | Status: SHIPPED | OUTPATIENT
Start: 2023-05-12

## 2023-05-13 LAB
ALBUMIN SERPL-MCNC: 3.2 G/DL (ref 3.5–5)
ALBUMIN/GLOB SERPL: 0.8 (ref 1.1–2.2)
ALP SERPL-CCNC: 63 U/L (ref 45–117)
ALT SERPL-CCNC: 70 U/L (ref 12–78)
ANION GAP SERPL CALC-SCNC: 2 MMOL/L (ref 5–15)
AST SERPL-CCNC: 30 U/L (ref 15–37)
BASOPHILS # BLD: 0 K/UL (ref 0–0.1)
BASOPHILS NFR BLD: 0 % (ref 0–1)
BILIRUB SERPL-MCNC: 0.2 MG/DL (ref 0.2–1)
BUN SERPL-MCNC: 10 MG/DL (ref 6–20)
BUN/CREAT SERPL: 19 (ref 12–20)
CALCIUM SERPL-MCNC: 9 MG/DL (ref 8.5–10.1)
CHLORIDE SERPL-SCNC: 112 MMOL/L (ref 97–108)
CO2 SERPL-SCNC: 25 MMOL/L (ref 21–32)
CREAT SERPL-MCNC: 0.54 MG/DL (ref 0.55–1.02)
DIFFERENTIAL METHOD BLD: ABNORMAL
EOSINOPHIL # BLD: 0.2 K/UL (ref 0–0.4)
EOSINOPHIL NFR BLD: 3 % (ref 0–7)
ERYTHROCYTE [DISTWIDTH] IN BLOOD BY AUTOMATED COUNT: 18.6 % (ref 11.5–14.5)
GLOBULIN SER CALC-MCNC: 4 G/DL (ref 2–4)
GLUCOSE SERPL-MCNC: 93 MG/DL (ref 65–100)
HCT VFR BLD AUTO: 40.2 % (ref 35–47)
HETEROPH AB BLD QL IA: NEGATIVE
HGB BLD-MCNC: 11.7 G/DL (ref 11.5–16)
IMM GRANULOCYTES # BLD AUTO: 0.1 K/UL (ref 0–0.04)
IMM GRANULOCYTES NFR BLD AUTO: 1 % (ref 0–0.5)
LIPASE SERPL-CCNC: 151 U/L (ref 73–393)
LYMPHOCYTES # BLD: 2.2 K/UL (ref 0.8–3.5)
LYMPHOCYTES NFR BLD: 31 % (ref 12–49)
MCH RBC QN AUTO: 22.2 PG (ref 26–34)
MCHC RBC AUTO-ENTMCNC: 29.1 G/DL (ref 30–36.5)
MCV RBC AUTO: 76.3 FL (ref 80–99)
MONOCYTES # BLD: 0.5 K/UL (ref 0–1)
MONOCYTES NFR BLD: 7 % (ref 5–13)
NEUTS SEG # BLD: 4 K/UL (ref 1.8–8)
NEUTS SEG NFR BLD: 58 % (ref 32–75)
NRBC # BLD: 0 K/UL (ref 0–0.01)
NRBC BLD-RTO: 0 PER 100 WBC
PLATELET # BLD AUTO: 327 K/UL (ref 150–400)
PMV BLD AUTO: 10.8 FL (ref 8.9–12.9)
POTASSIUM SERPL-SCNC: 3.7 MMOL/L (ref 3.5–5.1)
PROT SERPL-MCNC: 7.2 G/DL (ref 6.4–8.2)
RBC # BLD AUTO: 5.27 M/UL (ref 3.8–5.2)
SODIUM SERPL-SCNC: 139 MMOL/L (ref 136–145)
TSH SERPL DL<=0.05 MIU/L-ACNC: 1.21 UIU/ML (ref 0.36–3.74)
WBC # BLD AUTO: 6.9 K/UL (ref 3.6–11)

## 2023-05-16 ENCOUNTER — TELEPHONE (OUTPATIENT)
Age: 33
End: 2023-05-16

## 2023-05-16 DIAGNOSIS — R06.09 DYSPNEA ON EXERTION: ICD-10-CM

## 2023-05-16 DIAGNOSIS — R10.84 GENERALIZED ABDOMINAL PAIN: Primary | ICD-10-CM

## 2023-05-16 RX ORDER — IPRATROPIUM BROMIDE AND ALBUTEROL SULFATE 2.5; .5 MG/3ML; MG/3ML
3 SOLUTION RESPIRATORY (INHALATION) EVERY 4 HOURS PRN
COMMUNITY
Start: 2023-05-04 | End: 2023-05-16 | Stop reason: SDUPTHER

## 2023-05-16 RX ORDER — IPRATROPIUM BROMIDE AND ALBUTEROL SULFATE 2.5; .5 MG/3ML; MG/3ML
3 SOLUTION RESPIRATORY (INHALATION) EVERY 4 HOURS PRN
Qty: 360 ML | Refills: 1 | Status: SHIPPED | OUTPATIENT
Start: 2023-05-16 | End: 2023-05-16 | Stop reason: SDUPTHER

## 2023-05-16 RX ORDER — PANTOPRAZOLE SODIUM 40 MG/1
TABLET, DELAYED RELEASE ORAL
COMMUNITY
Start: 2023-04-29

## 2023-05-16 RX ORDER — FLUTICASONE PROPIONATE AND SALMETEROL 250; 50 UG/1; UG/1
1 POWDER RESPIRATORY (INHALATION) EVERY 12 HOURS
COMMUNITY
Start: 2023-04-29

## 2023-05-16 RX ORDER — IPRATROPIUM BROMIDE AND ALBUTEROL SULFATE 2.5; .5 MG/3ML; MG/3ML
3 SOLUTION RESPIRATORY (INHALATION) EVERY 4 HOURS PRN
Qty: 360 ML | Refills: 1 | Status: SHIPPED | OUTPATIENT
Start: 2023-05-16

## 2023-05-16 RX ORDER — NYSTATIN 100000 [USP'U]/G
POWDER TOPICAL
COMMUNITY
Start: 2023-04-29

## 2023-05-16 NOTE — TELEPHONE ENCOUNTER
El  #[id number]  Session Code 20440    Called with results. Spoke to patient for 25 minutes. Still having diarrhea and abdominal pain. Similar to when she was in the hospital but thinks it is worse. Already had an US of the abdomen while inpatient. Given abdominal pain and diarrhea now persistent, and has already had an ultrasound, will get a CT scan of the abdomen and pelvis. She also mentions that she would like to be on an injectable medication that would help her lose weight. It sounds like the insurance question is cleared up and she has her medicaid again. I asked if we could talk about this at the next visit. Also resent nebulizer and updated pharmacy.

## 2023-05-22 ENCOUNTER — TELEPHONE (OUTPATIENT)
Age: 33
End: 2023-05-22

## 2023-05-22 NOTE — TELEPHONE ENCOUNTER
Called patient using  ID [id number]. Her  Milly Kunz picked up and explained he is not home. Relayed that we need to reschedule Charisse's appointment on 5/24, and asked that she give us a call at [phone] to do so. He verbalized understanding.

## 2023-05-26 ENCOUNTER — OFFICE VISIT (OUTPATIENT)
Age: 33
End: 2023-05-26
Payer: COMMERCIAL

## 2023-05-26 VITALS
BODY MASS INDEX: 48.78 KG/M2 | RESPIRATION RATE: 15 BRPM | SYSTOLIC BLOOD PRESSURE: 135 MMHG | HEIGHT: 65 IN | HEART RATE: 89 BPM | TEMPERATURE: 97.8 F | OXYGEN SATURATION: 95 % | DIASTOLIC BLOOD PRESSURE: 88 MMHG | WEIGHT: 292.8 LBS

## 2023-05-26 DIAGNOSIS — R73.03 PRE-DIABETES: ICD-10-CM

## 2023-05-26 DIAGNOSIS — G44.229 CHRONIC TENSION-TYPE HEADACHE, NOT INTRACTABLE: Primary | ICD-10-CM

## 2023-05-26 PROCEDURE — 99213 OFFICE O/P EST LOW 20 MIN: CPT | Performed by: STUDENT IN AN ORGANIZED HEALTH CARE EDUCATION/TRAINING PROGRAM

## 2023-05-26 RX ORDER — NAPROXEN 500 MG/1
500 TABLET ORAL 2 TIMES DAILY WITH MEALS
Qty: 14 TABLET | Refills: 0 | Status: SHIPPED | OUTPATIENT
Start: 2023-05-26 | End: 2023-06-02

## 2023-05-26 SDOH — ECONOMIC STABILITY: INCOME INSECURITY: HOW HARD IS IT FOR YOU TO PAY FOR THE VERY BASICS LIKE FOOD, HOUSING, MEDICAL CARE, AND HEATING?: NOT HARD AT ALL

## 2023-05-26 SDOH — ECONOMIC STABILITY: FOOD INSECURITY: WITHIN THE PAST 12 MONTHS, THE FOOD YOU BOUGHT JUST DIDN'T LAST AND YOU DIDN'T HAVE MONEY TO GET MORE.: NEVER TRUE

## 2023-05-26 SDOH — ECONOMIC STABILITY: HOUSING INSECURITY
IN THE LAST 12 MONTHS, WAS THERE A TIME WHEN YOU DID NOT HAVE A STEADY PLACE TO SLEEP OR SLEPT IN A SHELTER (INCLUDING NOW)?: NO

## 2023-05-26 SDOH — ECONOMIC STABILITY: FOOD INSECURITY: WITHIN THE PAST 12 MONTHS, YOU WORRIED THAT YOUR FOOD WOULD RUN OUT BEFORE YOU GOT MONEY TO BUY MORE.: NEVER TRUE

## 2023-05-26 NOTE — PROGRESS NOTES
of ophthalmology eval.   - Will try Naproxen 500 mg BID for 1 week. - Can alternate with Tylenol 500 mg PO q6hrs as needed. - Message sent to Dr. Alyssa Arcos and see if he can have his staff to make an appt for pt. - ED precautions given. PreDiabetes:   - Will check A1c today   - Low carbs and regular exercise advised      Pt would like to come back and discuss weight loss. I have discussed the aforementioned diagnoses and plan with the patient in detail. I have provided information in person and/or in AVS. All questions answered prior to discharge.     Pt discussed with Dr. Cristel Ramos - Attending     Lázaro Nagy MD  Family Medicine Resident

## 2023-05-26 NOTE — CONSULTS
Session ID: [id number]  Request ID: [id number]  Language: Romanian  Status: Fulfilled   ID: #[id number]   Name: Nuala Lundborg

## 2023-05-27 LAB
EST. AVERAGE GLUCOSE BLD GHB EST-MCNC: 131 MG/DL
HBA1C MFR BLD: 6.2 % (ref 4–5.6)

## 2023-05-30 DIAGNOSIS — E66.01 MORBID (SEVERE) OBESITY DUE TO EXCESS CALORIES (HCC): ICD-10-CM

## 2023-05-30 DIAGNOSIS — R73.03 PRE-DIABETES: Primary | ICD-10-CM

## 2023-05-30 DIAGNOSIS — R03.0 ELEVATED BLOOD PRESSURE READING: ICD-10-CM

## 2023-05-30 RX ORDER — SEMAGLUTIDE 0.68 MG/ML
0.25 INJECTION, SOLUTION SUBCUTANEOUS WEEKLY
Qty: 3 ML | Refills: 0 | Status: SHIPPED | OUTPATIENT
Start: 2023-05-30

## 2023-05-30 RX ORDER — BLOOD PRESSURE TEST KIT
KIT MISCELLANEOUS
Qty: 1 KIT | Refills: 0 | Status: SHIPPED | OUTPATIENT
Start: 2023-05-30

## 2023-05-30 NOTE — PROGRESS NOTES
A1c 6.2 in PreDM. Given morbid obesity will try to send Ozempic 0.25 mg weekly x 5 dosages. If it is not approved. Will try Metformin (pt does not want to try this first) if it is not covered. Will need appt to evaluate how she is tolerating. Pt aware.

## 2023-07-06 ENCOUNTER — APPOINTMENT (OUTPATIENT)
Facility: HOSPITAL | Age: 33
End: 2023-07-06
Payer: COMMERCIAL

## 2023-07-06 ENCOUNTER — HOSPITAL ENCOUNTER (EMERGENCY)
Facility: HOSPITAL | Age: 33
Discharge: HOME | End: 2023-07-06
Attending: EMERGENCY MEDICINE
Payer: COMMERCIAL

## 2023-07-06 VITALS
TEMPERATURE: 98.9 F | SYSTOLIC BLOOD PRESSURE: 146 MMHG | DIASTOLIC BLOOD PRESSURE: 54 MMHG | RESPIRATION RATE: 20 BRPM | OXYGEN SATURATION: 96 % | HEART RATE: 99 BPM

## 2023-07-06 DIAGNOSIS — R07.9 CHEST PAIN, UNSPECIFIED TYPE: Primary | ICD-10-CM

## 2023-07-06 LAB
ALBUMIN SERPL-MCNC: 3.3 G/DL (ref 3.5–5)
ALBUMIN/GLOB SERPL: 0.7 (ref 1.1–2.2)
ALP SERPL-CCNC: 51 U/L (ref 45–117)
ALT SERPL-CCNC: 18 U/L (ref 12–78)
ANION GAP SERPL CALC-SCNC: 7 MMOL/L (ref 5–15)
AST SERPL-CCNC: 13 U/L (ref 15–37)
BASOPHILS # BLD: 0 K/UL (ref 0–0.1)
BASOPHILS NFR BLD: 1 % (ref 0–1)
BILIRUB SERPL-MCNC: 0.3 MG/DL (ref 0.2–1)
BUN SERPL-MCNC: 13 MG/DL (ref 6–20)
BUN/CREAT SERPL: 18 (ref 12–20)
CALCIUM SERPL-MCNC: 9.5 MG/DL (ref 8.5–10.1)
CHLORIDE SERPL-SCNC: 103 MMOL/L (ref 97–108)
CO2 SERPL-SCNC: 28 MMOL/L (ref 21–32)
COMMENT:: NORMAL
CREAT SERPL-MCNC: 0.71 MG/DL (ref 0.55–1.02)
D DIMER PPP FEU-MCNC: 0.2 MG/L FEU (ref 0–0.65)
DIFFERENTIAL METHOD BLD: ABNORMAL
EOSINOPHIL # BLD: 0.1 K/UL (ref 0–0.4)
EOSINOPHIL NFR BLD: 2 % (ref 0–7)
ERYTHROCYTE [DISTWIDTH] IN BLOOD BY AUTOMATED COUNT: 15.9 % (ref 11.5–14.5)
GLOBULIN SER CALC-MCNC: 4.9 G/DL (ref 2–4)
GLUCOSE SERPL-MCNC: 117 MG/DL (ref 65–100)
HCG SERPL QL: NEGATIVE
HCT VFR BLD AUTO: 41 % (ref 35–47)
HGB BLD-MCNC: 12.1 G/DL (ref 11.5–16)
IMM GRANULOCYTES # BLD AUTO: 0 K/UL (ref 0–0.04)
IMM GRANULOCYTES NFR BLD AUTO: 0 % (ref 0–0.5)
LIPASE SERPL-CCNC: 158 U/L (ref 73–393)
LYMPHOCYTES # BLD: 1.8 K/UL (ref 0.8–3.5)
LYMPHOCYTES NFR BLD: 37 % (ref 12–49)
MAGNESIUM SERPL-MCNC: 2 MG/DL (ref 1.6–2.4)
MCH RBC QN AUTO: 22.6 PG (ref 26–34)
MCHC RBC AUTO-ENTMCNC: 29.5 G/DL (ref 30–36.5)
MCV RBC AUTO: 76.5 FL (ref 80–99)
MONOCYTES # BLD: 0.4 K/UL (ref 0–1)
MONOCYTES NFR BLD: 8 % (ref 5–13)
NEUTS SEG # BLD: 2.5 K/UL (ref 1.8–8)
NEUTS SEG NFR BLD: 52 % (ref 32–75)
NRBC # BLD: 0 K/UL (ref 0–0.01)
NRBC BLD-RTO: 0 PER 100 WBC
NT PRO BNP: 18 PG/ML
PLATELET # BLD AUTO: 275 K/UL (ref 150–400)
PMV BLD AUTO: 11 FL (ref 8.9–12.9)
POTASSIUM SERPL-SCNC: 3.5 MMOL/L (ref 3.5–5.1)
PROT SERPL-MCNC: 8.2 G/DL (ref 6.4–8.2)
RBC # BLD AUTO: 5.36 M/UL (ref 3.8–5.2)
SODIUM SERPL-SCNC: 138 MMOL/L (ref 136–145)
SPECIMEN HOLD: NORMAL
TROPONIN I SERPL HS-MCNC: 5 NG/L (ref 0–51)
WBC # BLD AUTO: 4.8 K/UL (ref 3.6–11)

## 2023-07-06 PROCEDURE — 84484 ASSAY OF TROPONIN QUANT: CPT

## 2023-07-06 PROCEDURE — 83880 ASSAY OF NATRIURETIC PEPTIDE: CPT

## 2023-07-06 PROCEDURE — 80053 COMPREHEN METABOLIC PANEL: CPT

## 2023-07-06 PROCEDURE — 93005 ELECTROCARDIOGRAM TRACING: CPT | Performed by: STUDENT IN AN ORGANIZED HEALTH CARE EDUCATION/TRAINING PROGRAM

## 2023-07-06 PROCEDURE — 85025 COMPLETE CBC W/AUTO DIFF WBC: CPT

## 2023-07-06 PROCEDURE — 83690 ASSAY OF LIPASE: CPT

## 2023-07-06 PROCEDURE — 84703 CHORIONIC GONADOTROPIN ASSAY: CPT

## 2023-07-06 PROCEDURE — 36415 COLL VENOUS BLD VENIPUNCTURE: CPT

## 2023-07-06 PROCEDURE — 71046 X-RAY EXAM CHEST 2 VIEWS: CPT

## 2023-07-06 PROCEDURE — 83735 ASSAY OF MAGNESIUM: CPT

## 2023-07-06 PROCEDURE — 85379 FIBRIN DEGRADATION QUANT: CPT

## 2023-07-06 PROCEDURE — 99285 EMERGENCY DEPT VISIT HI MDM: CPT

## 2023-07-06 ASSESSMENT — ENCOUNTER SYMPTOMS
ABDOMINAL PAIN: 0
EYE DISCHARGE: 0
SHORTNESS OF BREATH: 1

## 2023-07-06 NOTE — ED TRIAGE NOTES
used in triage     Pt arrives to the ER for complaints of left sided chest pain that radiates into her left shoulder while she was sitting. Pt also reports shortness of breath. Pt states that the pain was minimal this morning and states that it became worse around 1430     PMH of pulmonary edema, cardiomegaly.

## 2023-07-06 NOTE — ED NOTES
Patient given discharge instructions and verbalized understanding.      Discharge done using  session code:     [id number]     Elizabeth Hines RN  07/06/23 4785

## 2023-07-06 NOTE — ED PROVIDER NOTES
[facility] EMERGENCY DEPT  EMERGENCY DEPARTMENT ENCOUNTER      Pt Name: Yasmeen Gilliland  MRN: [id number]  [address] [facility] [place] 1990  Date of evaluation: 2023  Provider: Xu White PA-C    CHIEF COMPLAINT       Chief Complaint   Patient presents with    Chest Pain         HISTORY OF PRESENT ILLNESS    HPI  Patient is a 34 y.o. female with PMH of cardiomegaly,asthma with recent hospital admission in 2023 for acute respiratory failure who presents today with complaints of left-sided chest pain. States the pain started this morning but has progressively worsened. States she is having shortness of breath. No history of DVT or PE. No history of malignancy. Does not take any hormonal medications. Denies any recent travel, or immobilization. No recent trauma. Denies calf pain or leg swelling. Denies hemoptysis. Nursing Notes were reviewed. REVIEW OF SYSTEMS       Review of Systems   Constitutional:  Negative for fever. HENT:  Negative for congestion. Eyes:  Negative for discharge. Respiratory:  Positive for shortness of breath. Cardiovascular:  Positive for chest pain. Gastrointestinal:  Negative for abdominal pain. Genitourinary:  Negative for dysuria. Skin:  Negative for wound. Neurological:  Negative for seizures. Psychiatric/Behavioral:  Negative for suicidal ideas. Except as noted above the remainder of the review of systems was reviewed and negative.        PAST MEDICAL HISTORY     Past Medical History:   Diagnosis Date    Cardiomegaly 2023    Essential hypertension     last two pregnancies         SURGICAL HISTORY       Past Surgical History:   Procedure Laterality Date     SECTION      x4    DILATION AND CURETTAGE OF UTERUS      OTHER SURGICAL HISTORY           CURRENT MEDICATIONS       Discharge Medication List as of 2023  6:45 PM        CONTINUE these medications which have NOT CHANGED    Details   Semaglutide,0.25 or 0.5MG/DOS, (OZEMPIC, 0.25 OR 0.5 MG/DOSE,)

## 2023-07-07 LAB
EKG ATRIAL RATE: 108 BPM
EKG DIAGNOSIS: NORMAL
EKG P AXIS: 51 DEGREES
EKG P-R INTERVAL: 158 MS
EKG Q-T INTERVAL: 346 MS
EKG QRS DURATION: 86 MS
EKG QTC CALCULATION (BAZETT): 463 MS
EKG R AXIS: 44 DEGREES
EKG T AXIS: -17 DEGREES
EKG VENTRICULAR RATE: 108 BPM

## 2023-07-12 ENCOUNTER — OFFICE VISIT (OUTPATIENT)
Age: 33
End: 2023-07-12
Payer: COMMERCIAL

## 2023-07-12 VITALS
HEART RATE: 96 BPM | HEIGHT: 65 IN | BODY MASS INDEX: 48.82 KG/M2 | DIASTOLIC BLOOD PRESSURE: 86 MMHG | WEIGHT: 293 LBS | SYSTOLIC BLOOD PRESSURE: 130 MMHG | OXYGEN SATURATION: 96 %

## 2023-07-12 DIAGNOSIS — R73.03 PRE-DIABETES: Primary | ICD-10-CM

## 2023-07-12 DIAGNOSIS — R06.02 SOB (SHORTNESS OF BREATH): ICD-10-CM

## 2023-07-12 DIAGNOSIS — E66.01 CLASS 3 SEVERE OBESITY WITH SERIOUS COMORBIDITY AND BODY MASS INDEX (BMI) OF 40.0 TO 44.9 IN ADULT, UNSPECIFIED OBESITY TYPE (HCC): ICD-10-CM

## 2023-07-12 PROCEDURE — 3075F SYST BP GE 130 - 139MM HG: CPT | Performed by: INTERNAL MEDICINE

## 2023-07-12 PROCEDURE — 99204 OFFICE O/P NEW MOD 45 MIN: CPT | Performed by: INTERNAL MEDICINE

## 2023-07-12 PROCEDURE — 3079F DIAST BP 80-89 MM HG: CPT | Performed by: INTERNAL MEDICINE

## 2023-07-12 RX ORDER — FUROSEMIDE 20 MG/1
20 TABLET ORAL DAILY
Qty: 60 TABLET | Refills: 3 | Status: SHIPPED | OUTPATIENT
Start: 2023-07-12

## 2023-07-12 ASSESSMENT — ENCOUNTER SYMPTOMS
SHORTNESS OF BREATH: 1
WHEEZING: 0
CHEST TIGHTNESS: 1

## 2023-07-14 ENCOUNTER — NURSE TRIAGE (OUTPATIENT)
Dept: OTHER | Facility: CLINIC | Age: 33
End: 2023-07-14

## 2023-07-14 ENCOUNTER — HOSPITAL ENCOUNTER (EMERGENCY)
Facility: HOSPITAL | Age: 33
Discharge: HOME | End: 2023-07-14
Attending: EMERGENCY MEDICINE
Payer: COMMERCIAL

## 2023-07-14 VITALS
SYSTOLIC BLOOD PRESSURE: 148 MMHG | DIASTOLIC BLOOD PRESSURE: 78 MMHG | RESPIRATION RATE: 18 BRPM | BODY MASS INDEX: 48.82 KG/M2 | WEIGHT: 293 LBS | HEIGHT: 65 IN | OXYGEN SATURATION: 99 % | TEMPERATURE: 98.4 F | HEART RATE: 94 BPM

## 2023-07-14 DIAGNOSIS — G44.209 ACUTE NON INTRACTABLE TENSION-TYPE HEADACHE: Primary | ICD-10-CM

## 2023-07-14 LAB
COMMENT:: NORMAL
SPECIMEN HOLD: NORMAL

## 2023-07-14 PROCEDURE — 2580000003 HC RX 258: Performed by: EMERGENCY MEDICINE

## 2023-07-14 PROCEDURE — 96375 TX/PRO/DX INJ NEW DRUG ADDON: CPT

## 2023-07-14 PROCEDURE — 6370000000 HC RX 637 (ALT 250 FOR IP): Performed by: EMERGENCY MEDICINE

## 2023-07-14 PROCEDURE — 6360000002 HC RX W HCPCS: Performed by: EMERGENCY MEDICINE

## 2023-07-14 PROCEDURE — 96366 THER/PROPH/DIAG IV INF ADDON: CPT

## 2023-07-14 PROCEDURE — 99284 EMERGENCY DEPT VISIT MOD MDM: CPT

## 2023-07-14 PROCEDURE — 36415 COLL VENOUS BLD VENIPUNCTURE: CPT

## 2023-07-14 PROCEDURE — 96365 THER/PROPH/DIAG IV INF INIT: CPT

## 2023-07-14 RX ORDER — KETOROLAC TROMETHAMINE 30 MG/ML
15 INJECTION, SOLUTION INTRAMUSCULAR; INTRAVENOUS ONCE
Status: COMPLETED | OUTPATIENT
Start: 2023-07-14 | End: 2023-07-14

## 2023-07-14 RX ORDER — DIAZEPAM 2 MG/1
2 TABLET ORAL ONCE
Status: COMPLETED | OUTPATIENT
Start: 2023-07-14 | End: 2023-07-14

## 2023-07-14 RX ORDER — 0.9 % SODIUM CHLORIDE 0.9 %
1000 INTRAVENOUS SOLUTION INTRAVENOUS ONCE
Status: COMPLETED | OUTPATIENT
Start: 2023-07-14 | End: 2023-07-14

## 2023-07-14 RX ORDER — PROCHLORPERAZINE EDISYLATE 5 MG/ML
10 INJECTION INTRAMUSCULAR; INTRAVENOUS ONCE
Status: COMPLETED | OUTPATIENT
Start: 2023-07-14 | End: 2023-07-14

## 2023-07-14 RX ORDER — BUTALBITAL, ACETAMINOPHEN AND CAFFEINE 300; 40; 50 MG/1; MG/1; MG/1
1 CAPSULE ORAL EVERY 6 HOURS PRN
Qty: 16 CAPSULE | Refills: 0 | Status: SHIPPED | OUTPATIENT
Start: 2023-07-14

## 2023-07-14 RX ADMIN — KETOROLAC TROMETHAMINE 15 MG: 30 INJECTION, SOLUTION INTRAMUSCULAR; INTRAVENOUS at 15:41

## 2023-07-14 RX ADMIN — PROCHLORPERAZINE EDISYLATE 10 MG: 5 INJECTION INTRAMUSCULAR; INTRAVENOUS at 15:41

## 2023-07-14 RX ADMIN — DIAZEPAM 2 MG: 2 TABLET ORAL at 15:41

## 2023-07-14 RX ADMIN — DIPHENHYDRAMINE HYDROCHLORIDE 50 MG: 50 INJECTION, SOLUTION INTRAMUSCULAR; INTRAVENOUS at 15:44

## 2023-07-14 RX ADMIN — SODIUM CHLORIDE 1000 ML: 9 INJECTION, SOLUTION INTRAVENOUS at 15:40

## 2023-07-14 ASSESSMENT — PAIN DESCRIPTION - LOCATION
LOCATION: HEAD
LOCATION: HEAD

## 2023-07-14 ASSESSMENT — ENCOUNTER SYMPTOMS
VOMITING: 0
VISUAL CHANGE: 1
TINGLING: 1

## 2023-07-14 ASSESSMENT — PAIN - FUNCTIONAL ASSESSMENT
PAIN_FUNCTIONAL_ASSESSMENT: ACTIVITIES ARE NOT PREVENTED
PAIN_FUNCTIONAL_ASSESSMENT: 0-10

## 2023-07-14 ASSESSMENT — PAIN DESCRIPTION - DESCRIPTORS
DESCRIPTORS: ACHING
DESCRIPTORS: ACHING

## 2023-07-14 ASSESSMENT — PAIN SCALES - GENERAL
PAINLEVEL_OUTOF10: 10
PAINLEVEL_OUTOF10: 3

## 2023-07-14 ASSESSMENT — PAIN DESCRIPTION - ORIENTATION: ORIENTATION: ANTERIOR

## 2023-07-14 NOTE — ED TRIAGE NOTES
Patient presents for evaluation of severe headache that began yesterday with associated dizzy sensation and spots in her vision. History of headaches in the past, this one is more severe. States the pain radiates from her head into her neck and has tingling in her hands. Took Tylenol without improvement. Had vomiting earlier today.

## 2023-07-14 NOTE — TELEPHONE ENCOUNTER
Location of patient: [address] [place], [place] [address]way call from [address] at [facility] with [facility]. Triage completed with the assistance of English , Antonia Huerta, [id number]    Subjective: Caller states \"I got up from my sleep with a headache and I was very dizzy. \"     Current Symptoms: headache, dizzy, numbness in her head, arms and legs; vomiting (clear bubbles), nausea; unable to touch chin to chest; bilateral eye pain    Onset: 2 hours ago; sudden    Associated Symptoms: reduced activity, reduced appetite, reduced fluid intake, constipation - last BM 7/13/2023    Pain Severity: 10/10; throbbing; constant    Temperature: feels cold    What has been tried: tylenol    LMP:  2 weeks ago - states has her period on and off every other 2 days continuous  Pregnant: No    Recommended disposition: Go to ED Now    Care advice provided, patient verbalizes understanding; denies any other questions or concerns; instructed to call back for any new or worsening symptoms. Patient/caller agrees to proceed to [facility]    Attention Provider: Thank you for allowing me to participate in the care of your patient. The patient was connected to triage in response to information provided to the ECC/PSC. Please do not respond through this encounter as the response is not directed to a shared pool.     Reason for Disposition   Stiff neck (can't touch chin to chest)    Protocols used: Headache-ADULT-[place]

## 2023-07-14 NOTE — ED NOTES
Patient verbalized that she is now feeling much better and can go home.       Lennox Lone Mattis-Francis, RN  07/14/23 9379

## 2023-07-28 ENCOUNTER — HOSPITAL ENCOUNTER (OUTPATIENT)
Facility: HOSPITAL | Age: 33
End: 2023-07-28
Attending: INTERNAL MEDICINE
Payer: COMMERCIAL

## 2023-07-28 DIAGNOSIS — R10.84 GENERALIZED ABDOMINAL PAIN: ICD-10-CM

## 2023-07-28 DIAGNOSIS — M75.101 ROTATOR CUFF SYNDROME OF RIGHT SHOULDER: ICD-10-CM

## 2023-07-28 PROCEDURE — 74177 CT ABD & PELVIS W/CONTRAST: CPT

## 2023-07-28 PROCEDURE — 71275 CT ANGIOGRAPHY CHEST: CPT

## 2023-07-28 PROCEDURE — 6360000004 HC RX CONTRAST MEDICATION: Performed by: STUDENT IN AN ORGANIZED HEALTH CARE EDUCATION/TRAINING PROGRAM

## 2023-07-28 RX ADMIN — IOPAMIDOL 100 ML: 755 INJECTION, SOLUTION INTRAVENOUS at 07:44

## 2023-08-11 ENCOUNTER — HOSPITAL ENCOUNTER (EMERGENCY)
Facility: HOSPITAL | Age: 33
Discharge: HOME | End: 2023-08-11
Attending: EMERGENCY MEDICINE

## 2023-08-11 ENCOUNTER — APPOINTMENT (OUTPATIENT)
Facility: HOSPITAL | Age: 33
End: 2023-08-11

## 2023-08-11 VITALS
WEIGHT: 293 LBS | HEIGHT: 65 IN | TEMPERATURE: 98.3 F | DIASTOLIC BLOOD PRESSURE: 95 MMHG | BODY MASS INDEX: 48.82 KG/M2 | SYSTOLIC BLOOD PRESSURE: 159 MMHG | HEART RATE: 91 BPM | RESPIRATION RATE: 20 BRPM | OXYGEN SATURATION: 100 %

## 2023-08-11 DIAGNOSIS — T14.8XXA MUSCLE STRAIN: Primary | ICD-10-CM

## 2023-08-11 PROCEDURE — 96372 THER/PROPH/DIAG INJ SC/IM: CPT

## 2023-08-11 PROCEDURE — 99284 EMERGENCY DEPT VISIT MOD MDM: CPT

## 2023-08-11 PROCEDURE — 6360000002 HC RX W HCPCS: Performed by: EMERGENCY MEDICINE

## 2023-08-11 PROCEDURE — 73502 X-RAY EXAM HIP UNI 2-3 VIEWS: CPT

## 2023-08-11 RX ORDER — KETOROLAC TROMETHAMINE 30 MG/ML
30 INJECTION, SOLUTION INTRAMUSCULAR; INTRAVENOUS
Status: COMPLETED | OUTPATIENT
Start: 2023-08-11 | End: 2023-08-11

## 2023-08-11 RX ORDER — IBUPROFEN 600 MG/1
600 TABLET ORAL EVERY 8 HOURS PRN
Qty: 20 TABLET | Refills: 0 | Status: SHIPPED | OUTPATIENT
Start: 2023-08-11

## 2023-08-11 RX ORDER — IBUPROFEN 600 MG/1
600 TABLET ORAL EVERY 8 HOURS PRN
Qty: 20 TABLET | Refills: 0 | Status: SHIPPED | OUTPATIENT
Start: 2023-08-11 | End: 2023-08-11 | Stop reason: SDUPTHER

## 2023-08-11 RX ADMIN — KETOROLAC TROMETHAMINE 30 MG: 30 INJECTION, SOLUTION INTRAMUSCULAR; INTRAVENOUS at 16:16

## 2023-08-11 ASSESSMENT — PAIN SCALES - GENERAL: PAINLEVEL_OUTOF10: 8

## 2023-08-11 ASSESSMENT — ENCOUNTER SYMPTOMS
BACK PAIN: 0
ABDOMINAL PAIN: 0
COUGH: 0
SORE THROAT: 0

## 2023-08-11 ASSESSMENT — PAIN DESCRIPTION - DESCRIPTORS: DESCRIPTORS: ACHING

## 2023-08-11 ASSESSMENT — PAIN DESCRIPTION - LOCATION: LOCATION: LEG

## 2023-08-11 ASSESSMENT — PAIN - FUNCTIONAL ASSESSMENT: PAIN_FUNCTIONAL_ASSESSMENT: 0-10

## 2023-08-11 NOTE — ED TRIAGE NOTES
Pt arrives via w/c to triage w/ family member w/ c/c of \"I walked into the bathroom and I slipped on a book and my legs spread. \" Pt reports the fall happened @ 0145. No anticoags.

## 2023-08-11 NOTE — ED PROVIDER NOTES
[facility] EMERGENCY DEPT  EMERGENCY DEPARTMENT ENCOUNTER      Pt Name: Lance Johnson  MRN: [id number]  [address] [facility] 1990  Date of evaluation: 2023  Provider: Nirmal Mcdaniels MD    CHIEF COMPLAINT       Chief Complaint   Patient presents with    Fall    Leg Pain         HISTORY OF PRESENT ILLNESS    Carlos Lynch is a 33 yo F with left thigh pain. She states she was walking in the bathroom yesterday and slipped on a note book on the floor and fell into the splits and has since had pain in her proximal, medial left thigh and groin. She does not take any blood thinning medications. She is having difficulty walking due to pain. She has not taken anything for pain. Additional history from independent historians:     Review of External Medical Records:     Nursing Notes were reviewed. REVIEW OF SYSTEMS       Review of Systems   Constitutional:  Negative for fever. HENT:  Negative for sore throat. Eyes:  Negative for visual disturbance. Respiratory:  Negative for cough. Cardiovascular:  Negative for chest pain. Gastrointestinal:  Negative for abdominal pain. Genitourinary:  Negative for dysuria. Musculoskeletal:  Positive for myalgias (left medial thigh). Negative for back pain. Skin:  Negative for rash. Neurological:  Negative for headaches. Except as noted above the remainder of the review of systems was reviewed and negative. PAST MEDICAL HISTORY     Past Medical History:   Diagnosis Date    Cardiomegaly 2023    Essential hypertension     last two pregnancies         SURGICAL HISTORY       Past Surgical History:   Procedure Laterality Date     SECTION      x4    DILATION AND CURETTAGE OF UTERUS      OTHER SURGICAL HISTORY           CURRENT MEDICATIONS       Previous Medications    BLOOD PRESSURE KIT    Check blood pressure twice a day.     BUTALBITAL-APAP-CAFFEINE -40 MG CAPS PER CAPSULE    Take 1 capsule by mouth every 6 hours as needed for Headaches

## 2023-08-14 ENCOUNTER — TELEPHONE (OUTPATIENT)
Age: 33
End: 2023-08-14

## 2023-08-14 NOTE — TELEPHONE ENCOUNTER
----- Message from Barbara Milner DO sent at 8/14/2023  8:23 AM EDT -----  Regarding: Hospital Follow-Up  Sawyer Da Silva you're doing well. Would you mind reaching out to patient to get her a hospital follow-up visit? Ideally it would be with me or Dr. Lorraine Burleson, but if no appointments available can be with a resident. She speaks Georgian.     Thanks so muchMadelyn

## 2023-08-14 NOTE — TELEPHONE ENCOUNTER
Per Dr. Cely Rand, this writer attempted to contact pt on listed phone number and other listed contact, Meghana Garcia; however, no one answered phone calls. Voice messages with left by Khmer . Pt need to schedule hospital follow up appt. Please see previous message by Dr. Cely Rand.

## 2023-08-18 ENCOUNTER — OFFICE VISIT (OUTPATIENT)
Age: 33
End: 2023-08-18
Payer: COMMERCIAL

## 2023-08-18 VITALS
TEMPERATURE: 98.8 F | OXYGEN SATURATION: 98 % | WEIGHT: 293 LBS | RESPIRATION RATE: 18 BRPM | HEART RATE: 89 BPM | BODY MASS INDEX: 48.82 KG/M2 | HEIGHT: 65 IN | SYSTOLIC BLOOD PRESSURE: 137 MMHG | DIASTOLIC BLOOD PRESSURE: 81 MMHG

## 2023-08-18 DIAGNOSIS — I10 CHRONIC HYPERTENSION: ICD-10-CM

## 2023-08-18 DIAGNOSIS — G93.2 IDIOPATHIC INTRACRANIAL HYPERTENSION: Primary | ICD-10-CM

## 2023-08-18 DIAGNOSIS — E66.01 MORBID OBESITY WITH BMI OF 45.0-49.9, ADULT (HCC): ICD-10-CM

## 2023-08-18 DIAGNOSIS — R73.03 PRE-DIABETES: ICD-10-CM

## 2023-08-18 DIAGNOSIS — G47.00 INSOMNIA, UNSPECIFIED TYPE: ICD-10-CM

## 2023-08-18 DIAGNOSIS — E78.49 OTHER HYPERLIPIDEMIA: ICD-10-CM

## 2023-08-18 DIAGNOSIS — R06.09 DYSPNEA ON EXERTION: ICD-10-CM

## 2023-08-18 DIAGNOSIS — G44.229 CHRONIC TENSION-TYPE HEADACHE, NOT INTRACTABLE: ICD-10-CM

## 2023-08-18 PROCEDURE — 99214 OFFICE O/P EST MOD 30 MIN: CPT | Performed by: FAMILY MEDICINE

## 2023-08-18 RX ORDER — MONTELUKAST SODIUM 10 MG/1
10 TABLET ORAL
COMMUNITY
Start: 2023-07-12

## 2023-08-18 RX ORDER — CETIRIZINE HYDROCHLORIDE 10 MG/1
10 TABLET ORAL DAILY
COMMUNITY

## 2023-08-18 RX ORDER — IPRATROPIUM BROMIDE AND ALBUTEROL SULFATE 2.5; .5 MG/3ML; MG/3ML
3 SOLUTION RESPIRATORY (INHALATION) EVERY 4 HOURS PRN
Qty: 30 EACH | Refills: 3 | Status: SHIPPED | OUTPATIENT
Start: 2023-08-18

## 2023-08-18 RX ORDER — ACETAZOLAMIDE 250 MG/1
250 TABLET ORAL DAILY
COMMUNITY
End: 2023-08-18

## 2023-08-18 RX ORDER — TRAZODONE HYDROCHLORIDE 50 MG/1
50 TABLET ORAL NIGHTLY PRN
Qty: 90 TABLET | Refills: 0 | Status: SHIPPED | OUTPATIENT
Start: 2023-08-18

## 2023-08-18 RX ORDER — PROPRANOLOL HYDROCHLORIDE 10 MG/1
10 TABLET ORAL 3 TIMES DAILY
COMMUNITY

## 2023-08-18 RX ORDER — TOPIRAMATE 50 MG/1
50 TABLET, FILM COATED ORAL 2 TIMES DAILY
COMMUNITY

## 2023-08-18 RX ORDER — FLUTICASONE FUROATE, UMECLIDINIUM BROMIDE AND VILANTEROL TRIFENATATE 100; 62.5; 25 UG/1; UG/1; UG/1
1 POWDER RESPIRATORY (INHALATION) DAILY
COMMUNITY

## 2023-08-18 RX ORDER — FLUTICASONE PROPIONATE AND SALMETEROL 250; 50 UG/1; UG/1
1 POWDER RESPIRATORY (INHALATION) EVERY 12 HOURS
Qty: 1 EACH | Refills: 3 | Status: SHIPPED | OUTPATIENT
Start: 2023-08-18

## 2023-08-18 NOTE — PROGRESS NOTES
[address] [facility] Medicine Office Visit     Assessment/ Plan: Gerry Trevino is a 34 y.o. female presenting for:     [place] video  used for encounter   Headaches  Idiopathic Intracranial Hypertension, Cervical Strain/Somatic Dysfunction  Morbid Obesity  Insomnia  Anxiety and Depression  Prediabetes  - Persistent headaches, exacerbated by poor quality sleep in setting of caring for young children. Following with neurology, need her to see ophthalmology prior to further evaluation. BP okay today. Hemoglobin A1C   Date Value Ref Range Status   05/26/2023 6.2 (H) 4.0 - 5.6 % Final     Comment:     NEW METHOD  PLEASE NOTE NEW REFERENCE RANGE  (NOTE)  HbA1C Interpretive Ranges  <5.7              Normal  5.7 - 6.4         Consider Prediabetes  >6.5              Consider Diabetes       BP Readings from Last 3 Encounters:   08/18/23 137/81   08/11/23 (!) 159/95   07/14/23 (!) 148/78   -- referral to [facility] ophthalmology    -- acetazolamide has been discontinued since hospitalization   -- continue propranolol, topiramate for HA ppx  cautioned on overuse of Fioricet   -- will work on getting ophtho appt scheduled at another office where discounted rate for uninsured, will look into Access Now   -- continue to encourage sleep hygiene, infants now > 3year old so hopefully can improve sleep    -- unable to afford ozempic (NF) so will try to have Victoza covered (on formulary for Medicaid plan)     Obstructive Lung Disease - Unclear etiology, no pulmonology records to review at this time but reports having scheduled follow-up. Trelegy is non-formular for her plan, advised her to continue Advair, Duo-Neb prn, singulair and zyrtec. 35 minutes were spent on the day of this encounter both with the patient and in related activities including chart review, care coordination and counseling.      Patient instructions were discussed and/or provided in the AVS. The patient understands and agrees to the

## 2023-09-08 PROBLEM — R06.82 TACHYPNEA: Status: RESOLVED | Noted: 2023-04-26 | Resolved: 2023-09-08

## 2023-09-08 PROBLEM — R06.02 SOB (SHORTNESS OF BREATH): Status: RESOLVED | Noted: 2023-04-26 | Resolved: 2023-09-08

## 2023-09-08 PROBLEM — E66.813 CLASS 3 SEVERE OBESITY WITH SERIOUS COMORBIDITY AND BODY MASS INDEX (BMI) OF 40.0 TO 44.9 IN ADULT: Status: RESOLVED | Noted: 2022-06-20 | Resolved: 2023-09-08

## 2023-09-08 PROBLEM — B34.9 VIRAL SYNDROME: Status: RESOLVED | Noted: 2023-04-26 | Resolved: 2023-09-08

## 2023-09-08 PROBLEM — E66.01 MORBID OBESITY WITH BMI OF 45.0-49.9, ADULT (HCC): Status: ACTIVE | Noted: 2023-09-08

## 2023-09-08 PROBLEM — E66.01 CLASS 3 SEVERE OBESITY WITH SERIOUS COMORBIDITY AND BODY MASS INDEX (BMI) OF 40.0 TO 44.9 IN ADULT (HCC): Status: RESOLVED | Noted: 2022-06-20 | Resolved: 2023-09-08

## 2023-09-08 RX ORDER — LIRAGLUTIDE 6 MG/ML
INJECTION SUBCUTANEOUS
Qty: 2 ADJUSTABLE DOSE PRE-FILLED PEN SYRINGE | Refills: 3 | Status: SHIPPED | OUTPATIENT
Start: 2023-09-08

## 2023-09-20 ENCOUNTER — OFFICE VISIT (OUTPATIENT)
Age: 33
End: 2023-09-20
Payer: COMMERCIAL

## 2023-09-20 VITALS
WEIGHT: 293 LBS | DIASTOLIC BLOOD PRESSURE: 76 MMHG | BODY MASS INDEX: 49.92 KG/M2 | HEART RATE: 89 BPM | OXYGEN SATURATION: 100 % | SYSTOLIC BLOOD PRESSURE: 113 MMHG | RESPIRATION RATE: 16 BRPM

## 2023-09-20 DIAGNOSIS — G93.2 IDIOPATHIC INTRACRANIAL HYPERTENSION: Primary | ICD-10-CM

## 2023-09-20 DIAGNOSIS — R20.8 BURNING SENSATION OF FEET: ICD-10-CM

## 2023-09-20 DIAGNOSIS — E66.01 MORBID OBESITY WITH BMI OF 45.0-49.9, ADULT (HCC): ICD-10-CM

## 2023-09-20 DIAGNOSIS — R73.03 PRE-DIABETES: ICD-10-CM

## 2023-09-20 DIAGNOSIS — L65.9 HAIR LOSS: ICD-10-CM

## 2023-09-20 PROCEDURE — 3078F DIAST BP <80 MM HG: CPT | Performed by: FAMILY MEDICINE

## 2023-09-20 PROCEDURE — 3074F SYST BP LT 130 MM HG: CPT | Performed by: FAMILY MEDICINE

## 2023-09-20 PROCEDURE — 99214 OFFICE O/P EST MOD 30 MIN: CPT | Performed by: FAMILY MEDICINE

## 2023-09-20 RX ORDER — ACETAMINOPHEN 500 MG
1000 TABLET ORAL 2 TIMES DAILY
COMMUNITY

## 2023-09-20 NOTE — PROGRESS NOTES
[facility] Medicine Office Visit     Assessment/ Plan: Iván Castanon is a 34 y.o. female presenting for:    Turkmen video  used for encounter   Headaches  Idiopathic Intracranial Hypertension, Cervical Strain/Somatic Dysfunction  Morbid Obesity  Insomnia  Anxiety and Depression  Prediabetes  - Persistent headaches, do think from undertreatment of IIH though I also think exacerbated by medication (ibuprofen, tylenol) overuse. Counseled on limiting use, continuing with propranolol and topamax.   -- Will call to facilitate scheduling with [facility] ophthalmology (patient was not contacted)  -- Victoza script placed, patient has not tried to pick-up, will call pharmacy to check on status   -- recheck A1C, TSH  -- would consider tizanidine or similar if no improvement   -- neuro follow-up once seen by ophtho    Peripheral Neuropathy - Noticing in bilateral feet, concerning for developing diabetes. Check B12 today and A1C as above     Hair Loss - Likely multifactorial with stress, insulin resistance. Check TSH. 30 minutes were spent on the day of this encounter both with the patient and in related activities including chart review, care coordination and counseling. Patient instructions were discussed and/or provided in the AVS. The patient understands and agrees to the plan. RETURN TO CARE: 3 months   Future Appointments   Date Time Provider [address]   10/17/2023 10:00 AM [facility] ECHO 2 CAVS BS AMB   10/17/2023 11:00 AM Devin Prudent, MD CAV BS AMB       Subjective:  Chief Complaint   Patient presents with    Hypertension       HPI:   Iván Castanon is a 34 y.o. female with PMH of IIH, obesity, HTN, pulmonary edema here for hospital follow-up.      IIH, Obesity  - [facility]   - call pharmacy Victoza   - takes tylenol and ibuprofen for HA - takes every day, Tylenol 4 tablets BID?  - has to take meds in middle of night   - still

## 2023-09-21 LAB
EST. AVERAGE GLUCOSE BLD GHB EST-MCNC: 128 MG/DL
HBA1C MFR BLD: 6.1 % (ref 4–5.6)
TSH SERPL DL<=0.05 MIU/L-ACNC: 1.19 UIU/ML (ref 0.36–3.74)
VIT B12 SERPL-MCNC: 419 PG/ML (ref 193–986)

## 2023-10-17 ENCOUNTER — ANCILLARY PROCEDURE (OUTPATIENT)
Age: 33
End: 2023-10-17

## 2023-10-17 ENCOUNTER — OFFICE VISIT (OUTPATIENT)
Age: 33
End: 2023-10-17

## 2023-10-17 VITALS
DIASTOLIC BLOOD PRESSURE: 82 MMHG | HEART RATE: 85 BPM | SYSTOLIC BLOOD PRESSURE: 142 MMHG | HEIGHT: 65 IN | WEIGHT: 292 LBS | BODY MASS INDEX: 48.65 KG/M2

## 2023-10-17 VITALS
OXYGEN SATURATION: 100 % | DIASTOLIC BLOOD PRESSURE: 86 MMHG | HEART RATE: 81 BPM | HEIGHT: 65 IN | BODY MASS INDEX: 48.82 KG/M2 | SYSTOLIC BLOOD PRESSURE: 130 MMHG | WEIGHT: 293 LBS

## 2023-10-17 DIAGNOSIS — R73.03 PRE-DIABETES: ICD-10-CM

## 2023-10-17 DIAGNOSIS — R06.02 SHORTNESS OF BREATH: Primary | ICD-10-CM

## 2023-10-17 DIAGNOSIS — R06.02 SOB (SHORTNESS OF BREATH): ICD-10-CM

## 2023-10-17 LAB
ECHO AO ASC DIAM: 2.8 CM
ECHO AO ASCENDING AORTA INDEX: 1.21 CM/M2
ECHO AO ROOT DIAM: 2.9 CM
ECHO AO ROOT INDEX: 1.25 CM/M2
ECHO AV AREA PEAK VELOCITY: 2.9 CM2
ECHO AV AREA VTI: 2.9 CM2
ECHO AV AREA/BSA PEAK VELOCITY: 1.3 CM2/M2
ECHO AV AREA/BSA VTI: 1.3 CM2/M2
ECHO AV MEAN GRADIENT: 7 MMHG
ECHO AV MEAN VELOCITY: 1.3 M/S
ECHO AV PEAK GRADIENT: 12 MMHG
ECHO AV PEAK VELOCITY: 1.8 M/S
ECHO AV VELOCITY RATIO: 0.72
ECHO AV VTI: 37.9 CM
ECHO BSA: 2.46 M2
ECHO EST RA PRESSURE: 3 MMHG
ECHO LA DIAMETER INDEX: 1.9 CM/M2
ECHO LA DIAMETER: 4.4 CM
ECHO LA TO AORTIC ROOT RATIO: 1.52
ECHO LA VOL 2C: 59 ML (ref 22–52)
ECHO LA VOL 4C: 61 ML (ref 22–52)
ECHO LA VOLUME AREA LENGTH: 66 ML
ECHO LA VOLUME INDEX A2C: 25 ML/M2 (ref 16–34)
ECHO LA VOLUME INDEX A4C: 26 ML/M2 (ref 16–34)
ECHO LA VOLUME INDEX AREA LENGTH: 28 ML/M2 (ref 16–34)
ECHO LV E' LATERAL VELOCITY: 11 CM/S
ECHO LV E' SEPTAL VELOCITY: 9 CM/S
ECHO LV EDV A2C: 135 ML
ECHO LV EDV A4C: 140 ML
ECHO LV EDV BP: 137 ML (ref 56–104)
ECHO LV EDV INDEX A4C: 60 ML/M2
ECHO LV EDV INDEX BP: 59 ML/M2
ECHO LV EDV NDEX A2C: 58 ML/M2
ECHO LV EJECTION FRACTION A2C: 63 %
ECHO LV EJECTION FRACTION A4C: 57 %
ECHO LV EJECTION FRACTION BIPLANE: 59 % (ref 55–100)
ECHO LV ESV A2C: 49 ML
ECHO LV ESV A4C: 60 ML
ECHO LV ESV BP: 56 ML (ref 19–49)
ECHO LV ESV INDEX A2C: 21 ML/M2
ECHO LV ESV INDEX A4C: 26 ML/M2
ECHO LV ESV INDEX BP: 24 ML/M2
ECHO LV FRACTIONAL SHORTENING: 33 % (ref 28–44)
ECHO LV GLOBAL LONGITUDINAL STRAIN (GLS): 20.1 %
ECHO LV INTERNAL DIMENSION DIASTOLE INDEX: 2.33 CM/M2
ECHO LV INTERNAL DIMENSION DIASTOLIC: 5.4 CM (ref 3.9–5.3)
ECHO LV INTERNAL DIMENSION SYSTOLIC INDEX: 1.55 CM/M2
ECHO LV INTERNAL DIMENSION SYSTOLIC: 3.6 CM
ECHO LV IVSD: 0.8 CM (ref 0.6–0.9)
ECHO LV MASS 2D: 155 G (ref 67–162)
ECHO LV MASS INDEX 2D: 66.8 G/M2 (ref 43–95)
ECHO LV POSTERIOR WALL DIASTOLIC: 0.8 CM (ref 0.6–0.9)
ECHO LV RELATIVE WALL THICKNESS RATIO: 0.3
ECHO LVOT AREA: 4.2 CM2
ECHO LVOT AV VTI INDEX: 0.72
ECHO LVOT DIAM: 2.3 CM
ECHO LVOT MEAN GRADIENT: 4 MMHG
ECHO LVOT PEAK GRADIENT: 7 MMHG
ECHO LVOT PEAK VELOCITY: 1.3 M/S
ECHO LVOT STROKE VOLUME INDEX: 48.7 ML/M2
ECHO LVOT SV: 113 ML
ECHO LVOT VTI: 27.2 CM
ECHO MV A VELOCITY: 0.8 M/S
ECHO MV AREA PHT: 4.1 CM2
ECHO MV E DECELERATION TIME (DT): 183.6 MS
ECHO MV E VELOCITY: 1.05 M/S
ECHO MV E/A RATIO: 1.31
ECHO MV E/E' LATERAL: 9.55
ECHO MV E/E' RATIO (AVERAGED): 10.61
ECHO MV E/E' SEPTAL: 11.67
ECHO MV PRESSURE HALF TIME (PHT): 53.2 MS
ECHO RIGHT VENTRICULAR SYSTOLIC PRESSURE (RVSP): 23 MMHG
ECHO TV REGURGITANT MAX VELOCITY: 2.25 M/S
ECHO TV REGURGITANT PEAK GRADIENT: 20 MMHG

## 2023-10-17 PROCEDURE — 3075F SYST BP GE 130 - 139MM HG: CPT | Performed by: INTERNAL MEDICINE

## 2023-10-17 PROCEDURE — 3079F DIAST BP 80-89 MM HG: CPT | Performed by: INTERNAL MEDICINE

## 2023-10-17 PROCEDURE — 99214 OFFICE O/P EST MOD 30 MIN: CPT | Performed by: INTERNAL MEDICINE

## 2023-10-17 PROCEDURE — 93308 TTE F-UP OR LMTD: CPT | Performed by: INTERNAL MEDICINE

## 2023-10-17 PROCEDURE — 93308 TTE F-UP OR LMTD: CPT

## 2023-10-17 PROCEDURE — 93325 DOPPLER ECHO COLOR FLOW MAPG: CPT | Performed by: INTERNAL MEDICINE

## 2023-10-17 PROCEDURE — 93356 MYOCRD STRAIN IMG SPCKL TRCK: CPT | Performed by: INTERNAL MEDICINE

## 2023-10-17 PROCEDURE — 93321 DOPPLER ECHO F-UP/LMTD STD: CPT | Performed by: INTERNAL MEDICINE

## 2023-10-17 ASSESSMENT — ENCOUNTER SYMPTOMS
WHEEZING: 0
CHEST TIGHTNESS: 1
SHORTNESS OF BREATH: 1

## 2023-11-09 ENCOUNTER — APPOINTMENT (OUTPATIENT)
Facility: HOSPITAL | Age: 33
End: 2023-11-09
Attending: STUDENT IN AN ORGANIZED HEALTH CARE EDUCATION/TRAINING PROGRAM

## 2023-11-09 ENCOUNTER — HOSPITAL ENCOUNTER (OUTPATIENT)
Facility: HOSPITAL | Age: 33
Setting detail: OBSERVATION
Discharge: HOME | End: 2023-11-10
Attending: STUDENT IN AN ORGANIZED HEALTH CARE EDUCATION/TRAINING PROGRAM | Admitting: FAMILY MEDICINE

## 2023-11-09 DIAGNOSIS — J44.1 COPD EXACERBATION (HCC): Primary | ICD-10-CM

## 2023-11-09 DIAGNOSIS — I50.9 ACUTE ON CHRONIC CONGESTIVE HEART FAILURE, UNSPECIFIED HEART FAILURE TYPE (HCC): ICD-10-CM

## 2023-11-09 PROBLEM — R06.00 DYSPNEA: Status: ACTIVE | Noted: 2023-11-09

## 2023-11-09 LAB
ALBUMIN SERPL-MCNC: 3.4 G/DL (ref 3.5–5)
ALBUMIN/GLOB SERPL: 0.7 (ref 1.1–2.2)
ALP SERPL-CCNC: 57 U/L (ref 45–117)
ALT SERPL-CCNC: 25 U/L (ref 12–78)
ANION GAP SERPL CALC-SCNC: 3 MMOL/L (ref 5–15)
AST SERPL-CCNC: 15 U/L (ref 15–37)
BASOPHILS # BLD: 0.1 K/UL (ref 0–0.1)
BASOPHILS NFR BLD: 1 % (ref 0–1)
BILIRUB SERPL-MCNC: 0.2 MG/DL (ref 0.2–1)
BUN SERPL-MCNC: 11 MG/DL (ref 6–20)
BUN/CREAT SERPL: 14 (ref 12–20)
CALCIUM SERPL-MCNC: 9.1 MG/DL (ref 8.5–10.1)
CHLORIDE SERPL-SCNC: 106 MMOL/L (ref 97–108)
CO2 SERPL-SCNC: 30 MMOL/L (ref 21–32)
COMMENT:: NORMAL
CREAT SERPL-MCNC: 0.8 MG/DL (ref 0.55–1.02)
D DIMER PPP FEU-MCNC: 0.35 MG/L FEU (ref 0–0.65)
DIFFERENTIAL METHOD BLD: ABNORMAL
EOSINOPHIL # BLD: 0.2 K/UL (ref 0–0.4)
EOSINOPHIL NFR BLD: 4 % (ref 0–7)
ERYTHROCYTE [DISTWIDTH] IN BLOOD BY AUTOMATED COUNT: 15.9 % (ref 11.5–14.5)
FLUAV AG NPH QL IA: NEGATIVE
FLUBV AG NOSE QL IA: NEGATIVE
GLOBULIN SER CALC-MCNC: 4.7 G/DL (ref 2–4)
GLUCOSE SERPL-MCNC: 92 MG/DL (ref 65–100)
HCT VFR BLD AUTO: 39.4 % (ref 35–47)
HGB BLD-MCNC: 11.7 G/DL (ref 11.5–16)
IMM GRANULOCYTES # BLD AUTO: 0 K/UL (ref 0–0.04)
IMM GRANULOCYTES NFR BLD AUTO: 0 % (ref 0–0.5)
LYMPHOCYTES # BLD: 2.4 K/UL (ref 0.8–3.5)
LYMPHOCYTES NFR BLD: 38 % (ref 12–49)
MCH RBC QN AUTO: 22.5 PG (ref 26–34)
MCHC RBC AUTO-ENTMCNC: 29.7 G/DL (ref 30–36.5)
MCV RBC AUTO: 75.8 FL (ref 80–99)
MONOCYTES # BLD: 0.5 K/UL (ref 0–1)
MONOCYTES NFR BLD: 9 % (ref 5–13)
NEUTS SEG # BLD: 3.1 K/UL (ref 1.8–8)
NEUTS SEG NFR BLD: 48 % (ref 32–75)
NRBC # BLD: 0 K/UL (ref 0–0.01)
NRBC BLD-RTO: 0 PER 100 WBC
NT PRO BNP: 73 PG/ML
PLATELET # BLD AUTO: 277 K/UL (ref 150–400)
PMV BLD AUTO: 10.9 FL (ref 8.9–12.9)
POTASSIUM SERPL-SCNC: 3.4 MMOL/L (ref 3.5–5.1)
PROT SERPL-MCNC: 8.1 G/DL (ref 6.4–8.2)
RBC # BLD AUTO: 5.2 M/UL (ref 3.8–5.2)
SODIUM SERPL-SCNC: 139 MMOL/L (ref 136–145)
SPECIMEN HOLD: NORMAL
TROPONIN I SERPL HS-MCNC: 10 NG/L (ref 0–51)
WBC # BLD AUTO: 6.3 K/UL (ref 3.6–11)

## 2023-11-09 PROCEDURE — 2580000003 HC RX 258: Performed by: STUDENT IN AN ORGANIZED HEALTH CARE EDUCATION/TRAINING PROGRAM

## 2023-11-09 PROCEDURE — 96361 HYDRATE IV INFUSION ADD-ON: CPT

## 2023-11-09 PROCEDURE — 87804 INFLUENZA ASSAY W/OPTIC: CPT

## 2023-11-09 PROCEDURE — 96374 THER/PROPH/DIAG INJ IV PUSH: CPT

## 2023-11-09 PROCEDURE — 99285 EMERGENCY DEPT VISIT HI MDM: CPT

## 2023-11-09 PROCEDURE — 84145 PROCALCITONIN (PCT): CPT

## 2023-11-09 PROCEDURE — 80053 COMPREHEN METABOLIC PANEL: CPT

## 2023-11-09 PROCEDURE — 87635 SARS-COV-2 COVID-19 AMP PRB: CPT

## 2023-11-09 PROCEDURE — 85379 FIBRIN DEGRADATION QUANT: CPT

## 2023-11-09 PROCEDURE — 6370000000 HC RX 637 (ALT 250 FOR IP): Performed by: STUDENT IN AN ORGANIZED HEALTH CARE EDUCATION/TRAINING PROGRAM

## 2023-11-09 PROCEDURE — 93005 ELECTROCARDIOGRAM TRACING: CPT | Performed by: STUDENT IN AN ORGANIZED HEALTH CARE EDUCATION/TRAINING PROGRAM

## 2023-11-09 PROCEDURE — 85025 COMPLETE CBC W/AUTO DIFF WBC: CPT

## 2023-11-09 PROCEDURE — 83880 ASSAY OF NATRIURETIC PEPTIDE: CPT

## 2023-11-09 PROCEDURE — 84484 ASSAY OF TROPONIN QUANT: CPT

## 2023-11-09 PROCEDURE — 71046 X-RAY EXAM CHEST 2 VIEWS: CPT

## 2023-11-09 PROCEDURE — 6360000002 HC RX W HCPCS: Performed by: STUDENT IN AN ORGANIZED HEALTH CARE EDUCATION/TRAINING PROGRAM

## 2023-11-09 PROCEDURE — 36415 COLL VENOUS BLD VENIPUNCTURE: CPT

## 2023-11-09 RX ORDER — BENZONATATE 100 MG/1
200 CAPSULE ORAL ONCE
Status: COMPLETED | OUTPATIENT
Start: 2023-11-09 | End: 2023-11-09

## 2023-11-09 RX ORDER — IPRATROPIUM BROMIDE AND ALBUTEROL SULFATE 2.5; .5 MG/3ML; MG/3ML
1 SOLUTION RESPIRATORY (INHALATION)
Status: COMPLETED | OUTPATIENT
Start: 2023-11-09 | End: 2023-11-09

## 2023-11-09 RX ORDER — FUROSEMIDE 10 MG/ML
20 INJECTION INTRAMUSCULAR; INTRAVENOUS
Status: COMPLETED | OUTPATIENT
Start: 2023-11-09 | End: 2023-11-09

## 2023-11-09 RX ORDER — 0.9 % SODIUM CHLORIDE 0.9 %
1000 INTRAVENOUS SOLUTION INTRAVENOUS ONCE
Status: COMPLETED | OUTPATIENT
Start: 2023-11-09 | End: 2023-11-09

## 2023-11-09 RX ORDER — ACETAMINOPHEN 500 MG
1000 TABLET ORAL
Status: COMPLETED | OUTPATIENT
Start: 2023-11-09 | End: 2023-11-10

## 2023-11-09 RX ADMIN — SODIUM CHLORIDE 1000 ML: 9 INJECTION, SOLUTION INTRAVENOUS at 20:29

## 2023-11-09 RX ADMIN — PREDNISONE 50 MG: 5 TABLET ORAL at 20:07

## 2023-11-09 RX ADMIN — IPRATROPIUM BROMIDE AND ALBUTEROL SULFATE 1 DOSE: .5; 3 SOLUTION RESPIRATORY (INHALATION) at 20:07

## 2023-11-09 RX ADMIN — BENZONATATE 200 MG: 100 CAPSULE ORAL at 20:06

## 2023-11-09 RX ADMIN — FUROSEMIDE 20 MG: 10 INJECTION, SOLUTION INTRAMUSCULAR; INTRAVENOUS at 22:01

## 2023-11-09 ASSESSMENT — ENCOUNTER SYMPTOMS
VOMITING: 0
COUGH: 1
SHORTNESS OF BREATH: 1
DIARRHEA: 1

## 2023-11-09 ASSESSMENT — PAIN - FUNCTIONAL ASSESSMENT: PAIN_FUNCTIONAL_ASSESSMENT: 0-10

## 2023-11-09 ASSESSMENT — PAIN DESCRIPTION - LOCATION: LOCATION: CHEST

## 2023-11-09 ASSESSMENT — PAIN SCALES - GENERAL: PAINLEVEL_OUTOF10: 10

## 2023-11-10 ENCOUNTER — APPOINTMENT (OUTPATIENT)
Facility: HOSPITAL | Age: 33
End: 2023-11-10

## 2023-11-10 VITALS
HEIGHT: 65 IN | OXYGEN SATURATION: 96 % | BODY MASS INDEX: 48.82 KG/M2 | DIASTOLIC BLOOD PRESSURE: 84 MMHG | HEART RATE: 95 BPM | RESPIRATION RATE: 19 BRPM | WEIGHT: 293 LBS | TEMPERATURE: 98.2 F | SYSTOLIC BLOOD PRESSURE: 142 MMHG

## 2023-11-10 PROBLEM — I50.9 ACUTE ON CHRONIC CONGESTIVE HEART FAILURE (HCC): Status: ACTIVE | Noted: 2023-11-10

## 2023-11-10 PROBLEM — J44.1 COPD EXACERBATION (HCC): Status: ACTIVE | Noted: 2023-11-10

## 2023-11-10 LAB
ANION GAP SERPL CALC-SCNC: 4 MMOL/L (ref 5–15)
APPEARANCE UR: ABNORMAL
B PERT DNA SPEC QL NAA+PROBE: NOT DETECTED
BACTERIA URNS QL MICRO: ABNORMAL /HPF
BILIRUB UR QL: NEGATIVE
BORDETELLA PARAPERTUSSIS BY PCR: NOT DETECTED
BUN SERPL-MCNC: 10 MG/DL (ref 6–20)
BUN/CREAT SERPL: 16 (ref 12–20)
C PNEUM DNA SPEC QL NAA+PROBE: NOT DETECTED
CALCIUM SERPL-MCNC: 8.6 MG/DL (ref 8.5–10.1)
CHLORIDE SERPL-SCNC: 109 MMOL/L (ref 97–108)
CO2 SERPL-SCNC: 27 MMOL/L (ref 21–32)
COLOR UR: ABNORMAL
CREAT SERPL-MCNC: 0.64 MG/DL (ref 0.55–1.02)
EKG ATRIAL RATE: 113 BPM
EKG DIAGNOSIS: NORMAL
EKG P AXIS: 54 DEGREES
EKG P-R INTERVAL: 138 MS
EKG Q-T INTERVAL: 350 MS
EKG QRS DURATION: 86 MS
EKG QTC CALCULATION (BAZETT): 480 MS
EKG R AXIS: 39 DEGREES
EKG T AXIS: -39 DEGREES
EKG VENTRICULAR RATE: 113 BPM
EPITH CASTS URNS QL MICRO: ABNORMAL /LPF
FLUAV SUBTYP SPEC NAA+PROBE: NOT DETECTED
FLUBV RNA SPEC QL NAA+PROBE: NOT DETECTED
GLUCOSE BLD STRIP.AUTO-MCNC: 101 MG/DL (ref 65–117)
GLUCOSE BLD STRIP.AUTO-MCNC: 101 MG/DL (ref 65–117)
GLUCOSE SERPL-MCNC: 130 MG/DL (ref 65–100)
GLUCOSE UR STRIP.AUTO-MCNC: NEGATIVE MG/DL
HADV DNA SPEC QL NAA+PROBE: NOT DETECTED
HCG UR QL: NEGATIVE
HCOV 229E RNA SPEC QL NAA+PROBE: NOT DETECTED
HCOV HKU1 RNA SPEC QL NAA+PROBE: NOT DETECTED
HCOV NL63 RNA SPEC QL NAA+PROBE: NOT DETECTED
HCOV OC43 RNA SPEC QL NAA+PROBE: NOT DETECTED
HGB UR QL STRIP: ABNORMAL
HMPV RNA SPEC QL NAA+PROBE: NOT DETECTED
HPIV1 RNA SPEC QL NAA+PROBE: NOT DETECTED
HPIV2 RNA SPEC QL NAA+PROBE: NOT DETECTED
HPIV3 RNA SPEC QL NAA+PROBE: NOT DETECTED
HPIV4 RNA SPEC QL NAA+PROBE: NOT DETECTED
HYALINE CASTS URNS QL MICRO: ABNORMAL /LPF (ref 0–5)
KETONES UR QL STRIP.AUTO: NEGATIVE MG/DL
LEUKOCYTE ESTERASE UR QL STRIP.AUTO: NEGATIVE
M PNEUMO DNA SPEC QL NAA+PROBE: NOT DETECTED
NITRITE UR QL STRIP.AUTO: NEGATIVE
PH UR STRIP: 6 (ref 5–8)
POTASSIUM SERPL-SCNC: 3.6 MMOL/L (ref 3.5–5.1)
PROCALCITONIN SERPL-MCNC: <0.05 NG/ML
PROT UR STRIP-MCNC: 300 MG/DL
RBC #/AREA URNS HPF: ABNORMAL /HPF (ref 0–5)
RSV RNA SPEC QL NAA+PROBE: NOT DETECTED
RV+EV RNA SPEC QL NAA+PROBE: NOT DETECTED
SARS-COV-2 RNA RESP QL NAA+PROBE: NOT DETECTED
SARS-COV-2 RNA RESP QL NAA+PROBE: NOT DETECTED
SERVICE CMNT-IMP: NORMAL
SERVICE CMNT-IMP: NORMAL
SODIUM SERPL-SCNC: 140 MMOL/L (ref 136–145)
SOURCE: NORMAL
SP GR UR REFRACTOMETRY: 1.02 (ref 1–1.03)
TROPONIN I SERPL HS-MCNC: 8 NG/L (ref 0–51)
URINE CULTURE IF INDICATED: ABNORMAL
UROBILINOGEN UR QL STRIP.AUTO: 1 EU/DL (ref 0.2–1)
WBC URNS QL MICRO: ABNORMAL /HPF (ref 0–4)

## 2023-11-10 PROCEDURE — 94640 AIRWAY INHALATION TREATMENT: CPT

## 2023-11-10 PROCEDURE — C9113 INJ PANTOPRAZOLE SODIUM, VIA: HCPCS | Performed by: STUDENT IN AN ORGANIZED HEALTH CARE EDUCATION/TRAINING PROGRAM

## 2023-11-10 PROCEDURE — 2580000003 HC RX 258: Performed by: STUDENT IN AN ORGANIZED HEALTH CARE EDUCATION/TRAINING PROGRAM

## 2023-11-10 PROCEDURE — 82962 GLUCOSE BLOOD TEST: CPT

## 2023-11-10 PROCEDURE — 87086 URINE CULTURE/COLONY COUNT: CPT

## 2023-11-10 PROCEDURE — 80048 BASIC METABOLIC PNL TOTAL CA: CPT

## 2023-11-10 PROCEDURE — 93010 ELECTROCARDIOGRAM REPORT: CPT | Performed by: SPECIALIST

## 2023-11-10 PROCEDURE — 96372 THER/PROPH/DIAG INJ SC/IM: CPT

## 2023-11-10 PROCEDURE — 6360000004 HC RX CONTRAST MEDICATION: Performed by: RADIOLOGY

## 2023-11-10 PROCEDURE — A4216 STERILE WATER/SALINE, 10 ML: HCPCS | Performed by: STUDENT IN AN ORGANIZED HEALTH CARE EDUCATION/TRAINING PROGRAM

## 2023-11-10 PROCEDURE — 81001 URINALYSIS AUTO W/SCOPE: CPT

## 2023-11-10 PROCEDURE — 6370000000 HC RX 637 (ALT 250 FOR IP): Performed by: STUDENT IN AN ORGANIZED HEALTH CARE EDUCATION/TRAINING PROGRAM

## 2023-11-10 PROCEDURE — 96375 TX/PRO/DX INJ NEW DRUG ADDON: CPT

## 2023-11-10 PROCEDURE — 94761 N-INVAS EAR/PLS OXIMETRY MLT: CPT

## 2023-11-10 PROCEDURE — G0378 HOSPITAL OBSERVATION PER HR: HCPCS

## 2023-11-10 PROCEDURE — 81025 URINE PREGNANCY TEST: CPT

## 2023-11-10 PROCEDURE — 97162 PT EVAL MOD COMPLEX 30 MIN: CPT

## 2023-11-10 PROCEDURE — 97116 GAIT TRAINING THERAPY: CPT

## 2023-11-10 PROCEDURE — 96376 TX/PRO/DX INJ SAME DRUG ADON: CPT

## 2023-11-10 PROCEDURE — 84484 ASSAY OF TROPONIN QUANT: CPT

## 2023-11-10 PROCEDURE — 6360000002 HC RX W HCPCS: Performed by: STUDENT IN AN ORGANIZED HEALTH CARE EDUCATION/TRAINING PROGRAM

## 2023-11-10 PROCEDURE — 74177 CT ABD & PELVIS W/CONTRAST: CPT

## 2023-11-10 PROCEDURE — 99235 HOSP IP/OBS SAME DATE MOD 70: CPT | Performed by: FAMILY MEDICINE

## 2023-11-10 PROCEDURE — 0202U NFCT DS 22 TRGT SARS-COV-2: CPT

## 2023-11-10 PROCEDURE — 94664 DEMO&/EVAL PT USE INHALER: CPT

## 2023-11-10 PROCEDURE — 36415 COLL VENOUS BLD VENIPUNCTURE: CPT

## 2023-11-10 RX ORDER — ACETAMINOPHEN 650 MG/1
650 SUPPOSITORY RECTAL EVERY 6 HOURS PRN
Status: DISCONTINUED | OUTPATIENT
Start: 2023-11-10 | End: 2023-11-10 | Stop reason: HOSPADM

## 2023-11-10 RX ORDER — TRAZODONE HYDROCHLORIDE 50 MG/1
50 TABLET ORAL NIGHTLY PRN
Status: DISCONTINUED | OUTPATIENT
Start: 2023-11-10 | End: 2023-11-10 | Stop reason: HOSPADM

## 2023-11-10 RX ORDER — ENOXAPARIN SODIUM 100 MG/ML
30 INJECTION SUBCUTANEOUS 2 TIMES DAILY
Status: DISCONTINUED | OUTPATIENT
Start: 2023-11-10 | End: 2023-11-10

## 2023-11-10 RX ORDER — ARFORMOTEROL TARTRATE 15 UG/2ML
15 SOLUTION RESPIRATORY (INHALATION)
Status: DISCONTINUED | OUTPATIENT
Start: 2023-11-10 | End: 2023-11-10 | Stop reason: HOSPADM

## 2023-11-10 RX ORDER — ONDANSETRON 2 MG/ML
4 INJECTION INTRAMUSCULAR; INTRAVENOUS EVERY 6 HOURS PRN
Status: DISCONTINUED | OUTPATIENT
Start: 2023-11-10 | End: 2023-11-10 | Stop reason: HOSPADM

## 2023-11-10 RX ORDER — ONDANSETRON 4 MG/1
4 TABLET, ORALLY DISINTEGRATING ORAL EVERY 8 HOURS PRN
Status: DISCONTINUED | OUTPATIENT
Start: 2023-11-10 | End: 2023-11-10 | Stop reason: HOSPADM

## 2023-11-10 RX ORDER — BUDESONIDE 0.5 MG/2ML
0.5 INHALANT ORAL
Status: DISCONTINUED | OUTPATIENT
Start: 2023-11-10 | End: 2023-11-10 | Stop reason: HOSPADM

## 2023-11-10 RX ORDER — POLYETHYLENE GLYCOL 3350 17 G/17G
17 POWDER, FOR SOLUTION ORAL DAILY PRN
Status: DISCONTINUED | OUTPATIENT
Start: 2023-11-10 | End: 2023-11-10 | Stop reason: HOSPADM

## 2023-11-10 RX ORDER — SODIUM CHLORIDE 0.9 % (FLUSH) 0.9 %
5-40 SYRINGE (ML) INJECTION PRN
Status: DISCONTINUED | OUTPATIENT
Start: 2023-11-10 | End: 2023-11-10 | Stop reason: HOSPADM

## 2023-11-10 RX ORDER — ACETAMINOPHEN 325 MG/1
650 TABLET ORAL EVERY 6 HOURS PRN
Status: DISCONTINUED | OUTPATIENT
Start: 2023-11-10 | End: 2023-11-10 | Stop reason: HOSPADM

## 2023-11-10 RX ORDER — INSULIN LISPRO 100 [IU]/ML
0-8 INJECTION, SOLUTION INTRAVENOUS; SUBCUTANEOUS
Status: DISCONTINUED | OUTPATIENT
Start: 2023-11-10 | End: 2023-11-10 | Stop reason: HOSPADM

## 2023-11-10 RX ORDER — HYDROCODONE POLISTIREX AND CHLORPHENIRAMINE POLISTIREX 10; 8 MG/5ML; MG/5ML
5 SUSPENSION, EXTENDED RELEASE ORAL EVERY 12 HOURS PRN
Status: DISCONTINUED | OUTPATIENT
Start: 2023-11-10 | End: 2023-11-10 | Stop reason: HOSPADM

## 2023-11-10 RX ORDER — SODIUM CHLORIDE 9 MG/ML
INJECTION, SOLUTION INTRAVENOUS PRN
Status: DISCONTINUED | OUTPATIENT
Start: 2023-11-10 | End: 2023-11-10 | Stop reason: HOSPADM

## 2023-11-10 RX ORDER — LEVALBUTEROL INHALATION SOLUTION 1.25 MG/3ML
3 SOLUTION RESPIRATORY (INHALATION) EVERY 6 HOURS PRN
Status: DISCONTINUED | OUTPATIENT
Start: 2023-11-10 | End: 2023-11-10 | Stop reason: HOSPADM

## 2023-11-10 RX ORDER — MAGNESIUM SULFATE IN WATER 40 MG/ML
2000 INJECTION, SOLUTION INTRAVENOUS PRN
Status: DISCONTINUED | OUTPATIENT
Start: 2023-11-10 | End: 2023-11-10 | Stop reason: HOSPADM

## 2023-11-10 RX ORDER — FUROSEMIDE 10 MG/ML
20 INJECTION INTRAMUSCULAR; INTRAVENOUS DAILY
Status: DISCONTINUED | OUTPATIENT
Start: 2023-11-10 | End: 2023-11-10 | Stop reason: HOSPADM

## 2023-11-10 RX ORDER — BUDESONIDE AND FORMOTEROL FUMARATE DIHYDRATE 160; 4.5 UG/1; UG/1
2 AEROSOL RESPIRATORY (INHALATION)
Status: DISCONTINUED | OUTPATIENT
Start: 2023-11-10 | End: 2023-11-10

## 2023-11-10 RX ORDER — POTASSIUM CHLORIDE 7.45 MG/ML
10 INJECTION INTRAVENOUS PRN
Status: DISCONTINUED | OUTPATIENT
Start: 2023-11-10 | End: 2023-11-10 | Stop reason: HOSPADM

## 2023-11-10 RX ORDER — ENOXAPARIN SODIUM 100 MG/ML
30 INJECTION SUBCUTANEOUS 2 TIMES DAILY
Status: DISCONTINUED | OUTPATIENT
Start: 2023-11-10 | End: 2023-11-10 | Stop reason: HOSPADM

## 2023-11-10 RX ORDER — DEXTROSE MONOHYDRATE 100 MG/ML
INJECTION, SOLUTION INTRAVENOUS CONTINUOUS PRN
Status: DISCONTINUED | OUTPATIENT
Start: 2023-11-10 | End: 2023-11-10 | Stop reason: HOSPADM

## 2023-11-10 RX ORDER — POTASSIUM CHLORIDE 750 MG/1
40 TABLET, FILM COATED, EXTENDED RELEASE ORAL PRN
Status: DISCONTINUED | OUTPATIENT
Start: 2023-11-10 | End: 2023-11-10 | Stop reason: HOSPADM

## 2023-11-10 RX ORDER — SODIUM CHLORIDE 0.9 % (FLUSH) 0.9 %
5-40 SYRINGE (ML) INJECTION EVERY 12 HOURS SCHEDULED
Status: DISCONTINUED | OUTPATIENT
Start: 2023-11-10 | End: 2023-11-10 | Stop reason: HOSPADM

## 2023-11-10 RX ORDER — INSULIN LISPRO 100 [IU]/ML
0-4 INJECTION, SOLUTION INTRAVENOUS; SUBCUTANEOUS NIGHTLY
Status: DISCONTINUED | OUTPATIENT
Start: 2023-11-10 | End: 2023-11-10 | Stop reason: HOSPADM

## 2023-11-10 RX ADMIN — PANTOPRAZOLE SODIUM 40 MG: 40 INJECTION, POWDER, FOR SOLUTION INTRAVENOUS at 09:07

## 2023-11-10 RX ADMIN — IOPAMIDOL 100 ML: 755 INJECTION, SOLUTION INTRAVENOUS at 06:58

## 2023-11-10 RX ADMIN — PREDNISONE 50 MG: 5 TABLET ORAL at 09:06

## 2023-11-10 RX ADMIN — HYDROCODONE POLISTIREX AND CHLORPHENIRAMINE POLISTIREX 5 ML: 10; 8 SUSPENSION, EXTENDED RELEASE ORAL at 05:22

## 2023-11-10 RX ADMIN — SODIUM CHLORIDE 10 ML: 9 INJECTION INTRAMUSCULAR; INTRAVENOUS; SUBCUTANEOUS at 09:06

## 2023-11-10 RX ADMIN — FUROSEMIDE 20 MG: 10 INJECTION, SOLUTION INTRAMUSCULAR; INTRAVENOUS at 09:07

## 2023-11-10 RX ADMIN — BUDESONIDE 500 MCG: 0.5 INHALANT RESPIRATORY (INHALATION) at 07:10

## 2023-11-10 RX ADMIN — ENOXAPARIN SODIUM 30 MG: 100 INJECTION SUBCUTANEOUS at 09:06

## 2023-11-10 RX ADMIN — ARFORMOTEROL TARTRATE 15 MCG: 15 SOLUTION RESPIRATORY (INHALATION) at 07:10

## 2023-11-10 RX ADMIN — ACETAMINOPHEN 1000 MG: 500 TABLET ORAL at 00:36

## 2023-11-10 ASSESSMENT — PAIN DESCRIPTION - LOCATION
LOCATION: CHEST
LOCATION: HEAD;CHEST
LOCATION: CHEST;HEAD

## 2023-11-10 ASSESSMENT — ENCOUNTER SYMPTOMS
WHEEZING: 0
BACK PAIN: 1
CHEST TIGHTNESS: 1
VOMITING: 0
NAUSEA: 0
SHORTNESS OF BREATH: 1
SORE THROAT: 1
ABDOMINAL PAIN: 1
COUGH: 1
DIARRHEA: 1
CONSTIPATION: 0

## 2023-11-10 ASSESSMENT — PAIN DESCRIPTION - ORIENTATION: ORIENTATION: MID

## 2023-11-10 ASSESSMENT — PAIN SCALES - GENERAL
PAINLEVEL_OUTOF10: 4
PAINLEVEL_OUTOF10: 4
PAINLEVEL_OUTOF10: 6

## 2023-11-10 ASSESSMENT — PAIN DESCRIPTION - DESCRIPTORS: DESCRIPTORS: SHARP;THROBBING

## 2023-11-10 NOTE — ED TRIAGE NOTES
Patient reports three days of cough with burning in the chest when she coughs. Has not had a fever. Patient reports associated headache, chest heaviness, and dizziness. Cough is productive of thick yellow phlegm. States all of her children have been sick with similar symptoms recently.

## 2023-11-10 NOTE — PROGRESS NOTES
Occupational Therapy  Order received and chart reviewed, attempted to see however patient pending discharge and waiting for transport. Discussed with PT and feel no needs at this time. Will sign off.    Francisca Alanis, OTR/L

## 2023-11-10 NOTE — ED PROVIDER NOTES
[facility] EMERGENCY DEPT  EMERGENCY DEPARTMENT ENCOUNTER      Pt Name: Filemon Crawford  MRN: [id number]  [facility] 1990  Date of evaluation: 2023  Provider: Madison Welsh       Chief Complaint   Patient presents with    Cough         HISTORY OF PRESENT ILLNESS   (Location/Symptom, Timing/Onset, Context/Setting, Quality, Duration, Modifying Factors, Severity)  Note limiting factors. 43-year-old female history of hypertension, cardiomegaly and asthma presenting today with flulike symptoms. Sick for 3 days. Children have been sick with the same. Reports productive cough, tightness/heaviness in the chest, dizziness, diarrhea, generalized fatigue. Using breathing treatments at home without much improvement. A  was used. Review of External Medical Records:     Nursing Notes were reviewed. REVIEW OF SYSTEMS    (2-9 systems for level 4, 10 or more for level 5)     Review of Systems   Constitutional:  Negative for fever. HENT:  Positive for congestion. Respiratory:  Positive for cough and shortness of breath. Cardiovascular:  Negative for leg swelling. Gastrointestinal:  Positive for diarrhea. Negative for vomiting. Neurological:  Positive for headaches. Except as noted above the remainder of the review of systems was reviewed and negative.        PAST MEDICAL HISTORY     Past Medical History:   Diagnosis Date    Cardiomegaly 2023    Essential hypertension     last two pregnancies         SURGICAL HISTORY       Past Surgical History:   Procedure Laterality Date     SECTION      x4    DILATION AND CURETTAGE OF UTERUS      OTHER SURGICAL HISTORY           CURRENT MEDICATIONS       Previous Medications    ACETAMINOPHEN (TYLENOL) 500 MG TABLET    Take 2 tablets by mouth in the morning and at bedtime    CETIRIZINE (ZYRTEC) 10 MG TABLET    Take 1 tablet by mouth daily    FLUTICASONE-SALMETEROL (ADVAIR) 250-50 MCG/ACT AEPB

## 2023-11-10 NOTE — CARE COORDINATION
CARE MANAGEMENT NOTE      CM received consult for \"no insurance\". CM coordinated with [employer] liaison. Pt has been denied multiple times for Medicaid. Pt had previously been approved for financial assistance through [facility]. Pt has been working with a financial counselor at [facility] and has coordinated with her as recent as last week. CM does not have any additional resources.      _____________________________________  Elizabeth Moya, [address] Management   Available via Varun Weinstein  11/10/2023   9:37 AM

## 2023-11-10 NOTE — ED NOTES
Called the provider Dr. Eula Essex to request she put in an order for HCG per CT.      Jose Mcconnell RN  11/10/23 8974

## 2023-11-10 NOTE — CARE COORDINATION
11/10/2023  3:08 PM      ICD-10-CM    1. COPD exacerbation ([address])  J44.1       2. Acute on chronic congestive heart failure, unspecified heart failure type (HCC)  I50.9             General Risk Score: 7   Values used to calculate this score:    Points  Metrics       0        Age: 28       2        Hospital Admissions: 2       3        ED Visits: 5       1        Has Chronic Obstructive Pulmonary Disease: Yes       0        Has Diabetes: No       1        Has Congestive Heart Failure: Yes       0        Has Liver Disease: No       0        Has Depression: No       0        Current PCP: Bennetta Dancer, DO       0        Has Medicaid: No      CM reviewed EMR. No CM needs indicated at this time. Providers, if needs arise, please consult case management. CM consult for RW noted. RW provided by therapy dept.

## 2023-11-10 NOTE — DISCHARGE SUMMARY
propranolol 10 MG tablet  Commonly known as: INDERAL     topiramate 50 MG tablet  Commonly known as: TOPAMAX     traZODone 50 MG tablet  Commonly known as: DESYREL  Take 1 tablet by mouth nightly as needed for Sleep     Trelegy Ellipta 100-62.5-25 MCG/ACT Aepb inhaler  Generic drug: fluticasone-umeclidin-vilant               No other changes were made to your medications, please take all your other home medicines as previously prescribed.   ------------------------------------------------------------------------------------------------------------------    History of Present Illness    As per admitting provider, Dr. Murphy Drafts:   Roxanne Valentin is a 28 y.o. female with PMHx of prediabetes, hypertension, obesity, peripheral neuropathy, IIH who presents to the ED complaining of shortness of breath. Patient reports symptoms of shortness of breath and cough started about 3 days ago. She states she has young children at home with similar symptoms. She also endorses chest pain and mid chest with coughing. She feels chest pain is really associated with this recent cough and worsens with cough. She states she has not been able to sleep well in the past couple of days. She endorses fevers, chills, decreased p.o. intake, urinary urgency and frequency, watery diarrhea. She reports watery diarrhea is relatively recent and is associated with abdominal pain. Of note, patient recently seen and evaluated by cardiology given concerns of interstitial edema and cardiomegaly seen on chest x-rays. She had an echo done which showed a normal EF, Dr. Mercedes Tirado commented that she does not believe patient's symptoms are secondary to heart failure. She did which outpatients acetazolamide (for IIH) and replaced with Lasix, which patient reports she has been taking. Patient is a poor historian in terms of medication she is on so a med reconciliation will be completed. [facility]  Roxanne Valentin was admitted into the [facility] place    Cardiomegaly    Pulmonary edema    Morbid obesity with BMI of 45.0-49.9, adult (HCC)    Dyspnea    COPD exacerbation (HCC)    Acute on chronic congestive heart failure (HCC)           Diet:  Regular diet. Activity:  As tolerated     Disposition: Home    Discharge instructions to patient/family  Please seek medical attention for any new or worsening symptoms particularly chest pain, shortness of breath, fever, chills, nausea, vomiting, diarrhea, change in mentation, falling, weakness, bleeding. Follow up plans/appointments  Follow-up Information       Follow up With Specialties Details Why Contact Info    Yamilet Ferreira MD Family Medicine Follow up on 11/20/2023 at 2:40am for hospital follow up. [address] [facility] Arsenio Tam MD  Family Medicine Resident     For Billing    Chief Complaint   Patient presents with    Cough       Principal Problem:    Dyspnea  Active Problems:    COPD exacerbation ([address])    Acute on chronic congestive heart failure ([address])  Resolved Problems:    * No resolved hospital problems.  *

## 2023-11-10 NOTE — PLAN OF CARE
Problem: Physical Therapy - Adult  Goal: By Discharge: Performs mobility at highest level of function for planned discharge setting. See evaluation for individualized goals. Description: FUNCTIONAL STATUS PRIOR TO ADMISSION: Patient was independent and active without use of DME.    HOME SUPPORT PRIOR TO ADMISSION: The patient lived with family but did not require assist.    Physical Therapy Goals  Initiated 11/10/2023  1. Patient will move from supine to sit and sit to supine  in bed with modified independence within 7 day(s). 2.  Patient will transfer from bed to chair and chair to bed with modified independence using the least restrictive device within 7 day(s). 3.  Patient will perform sit to stand with modified independence within 7 day(s). 4.  Patient will ambulate with modified independence for 150 feet with the least restrictive device within 7 day(s). 5.  Patient will ascend/descend 14 stairs with one handrail(s) with modified independence within 7 day(s). Outcome: Progressing   PHYSICAL THERAPY EVALUATION    Patient: Chelsey Perkins (34 y.o. female)  Date: 11/10/2023  Primary Diagnosis: Dyspnea [R06.00]  COPD exacerbation ([address]) [J44.1]  Acute on chronic congestive heart failure, unspecified heart failure type ([address]) [I50.9]       Precautions: Fall Risk                    ASSESSMENT :   DEFICITS/IMPAIRMENTS:   The patient is limited by decreased functional mobility, strength, endurance, dizziness with blurry vision, headache, and limited functional mobility on day 1 of admission with respiratory infection, COPD exacerbation, and acute on chronic HF. Based on the impairments listed above she presents below baseline functional status with limited tolerance to ambulation associated with reported dizziness, fatigue, and LE weakness. She engages in gait training using RW and requires up to SBA. Encouraged frequent assisted mobility and out of bed positioning as tolerated.      Patient will benefit

## 2023-11-10 NOTE — FLOWSHEET NOTE
AVS and dc summary reviewed with patient using , IV removed without difficulty, opportunity given for questions.

## 2023-11-10 NOTE — ED NOTES
Called pharmacy to push Lovenox to 0900, they stated they would      Dairl Nimesh, [address]  11/10/23 1783

## 2023-11-10 NOTE — DISCHARGE INSTRUCTIONS
HOME DISCHARGE INSTRUCTIONS    Cash Garcia / [id number] : 1990    Admission date: 2023 Discharge date: 11/10/2023 1:09 PM     Please bring this form with you to show your care provider at your follow-up appointment. Primary care provider:  Oleg Wolff      Discharging provider:  Renetta Michel DO  - Family Medicine Resident  Yoselin Ramírez, [address] Attending      You have been admitted to the hospital with the following diagnoses:    ACUTE DIAGNOSES:  Dyspnea [R06.00]  Viral Upper Respiratory Infection    . . . . . . . . . . . . . . . . . . . . . . . . . . . . . . . . . . . . . . . . . . . . . . . . . . . . . . . . . . . . . . . . . . . . . . . . MEDICATION CHANGES  Your medications were not changed       No other changes were made to your medications, please take all your other home medicines as previously prescribed. . . . . . . . . . . . . . . . . . . . . . . . . . . . . . . . . . . . . . . . . . . . . . . . . . . . . . . . . . . . . . . . . . . . . . . . Diamond Hamman FOLLOW-UP CARE RECOMMENDATIONS:    Appointments  Mayco Valera [address] [place] [phone]    Follow up on 2023  at 2:40am for hospital follow up. Follow-up tests needed:   - Follow up with your pulmonologist to get testing done for asthma   - Follow up with your cardiologist to continue your work up for fluid in the lungs     Pending test results: At the time of your discharge the following test results are still pending: urine culture. Please make sure you review these results with your outpatient follow-up provider(s). DIET/what to eat:  regular diet    ACTIVITY:  activity as tolerated    Wound care: n/a     Equipment needed:  n/a    Specific symptoms to watch for: chest pain, shortness of breath, fever, chills, nausea, vomiting, diarrhea, change in mentation, falling, weakness, bleeding. What to do if new or unexpected symptoms occur?     If you experience any of

## 2023-11-11 LAB
BACTERIA SPEC CULT: NORMAL
CC UR VC: NORMAL
SERVICE CMNT-IMP: NORMAL

## 2023-11-13 ENCOUNTER — TELEPHONE (OUTPATIENT)
Age: 33
End: 2023-11-13

## 2023-11-13 NOTE — TELEPHONE ENCOUNTER
Session Code 18803 / Romansh : Shira # [id number]      Care Transitions Initial Follow Up Call    Outreach made within 2 business days of discharge: Yes    Patient: Helen Nix Patient : 1990   MRN: [id number]  Reason for Admission: There are no discharge diagnoses documented for the most recent discharge.   Discharge Date: 11/10/23       Spoke with: Left Message    Discharge department/facility: [facility] Interactive Patient Contact:  Left message for patient to contact office in regards to recent hospital discharge    Scheduled appointment with PCP within 7-14 days    Follow Up  Future Appointments   Date Time Provider [address] [place] [address]   2023  2:40 PM Pauline Pete MD [facility] BS BOY Dillard LPN

## 2023-11-28 ENCOUNTER — OFFICE VISIT (OUTPATIENT)
Age: 33
End: 2023-11-28

## 2023-11-28 VITALS
TEMPERATURE: 98 F | WEIGHT: 293 LBS | SYSTOLIC BLOOD PRESSURE: 125 MMHG | HEIGHT: 65 IN | OXYGEN SATURATION: 94 % | HEART RATE: 91 BPM | RESPIRATION RATE: 20 BRPM | BODY MASS INDEX: 48.82 KG/M2 | DIASTOLIC BLOOD PRESSURE: 83 MMHG

## 2023-11-28 DIAGNOSIS — R06.00 DYSPNEA, UNSPECIFIED TYPE: Primary | ICD-10-CM

## 2023-11-28 DIAGNOSIS — R32 URINARY INCONTINENCE, UNSPECIFIED TYPE: ICD-10-CM

## 2023-11-28 PROCEDURE — 99213 OFFICE O/P EST LOW 20 MIN: CPT

## 2023-11-28 RX ORDER — DULOXETIN HYDROCHLORIDE 20 MG/1
20 CAPSULE, DELAYED RELEASE ORAL DAILY
Qty: 30 CAPSULE | Refills: 1 | Status: SHIPPED | OUTPATIENT
Start: 2023-11-28

## 2023-11-28 SDOH — ECONOMIC STABILITY: FOOD INSECURITY: WITHIN THE PAST 12 MONTHS, YOU WORRIED THAT YOUR FOOD WOULD RUN OUT BEFORE YOU GOT MONEY TO BUY MORE.: PATIENT DECLINED

## 2023-11-28 SDOH — ECONOMIC STABILITY: FOOD INSECURITY: WITHIN THE PAST 12 MONTHS, THE FOOD YOU BOUGHT JUST DIDN'T LAST AND YOU DIDN'T HAVE MONEY TO GET MORE.: PATIENT DECLINED

## 2023-11-28 SDOH — ECONOMIC STABILITY: INCOME INSECURITY: HOW HARD IS IT FOR YOU TO PAY FOR THE VERY BASICS LIKE FOOD, HOUSING, MEDICAL CARE, AND HEATING?: PATIENT DECLINED

## 2023-11-28 SDOH — ECONOMIC STABILITY: HOUSING INSECURITY
IN THE LAST 12 MONTHS, WAS THERE A TIME WHEN YOU DID NOT HAVE A STEADY PLACE TO SLEEP OR SLEPT IN A SHELTER (INCLUDING NOW)?: PATIENT REFUSED

## 2023-11-28 ASSESSMENT — PATIENT HEALTH QUESTIONNAIRE - PHQ9
2. FEELING DOWN, DEPRESSED OR HOPELESS: 0
1. LITTLE INTEREST OR PLEASURE IN DOING THINGS: 0
SUM OF ALL RESPONSES TO PHQ QUESTIONS 1-9: 0
SUM OF ALL RESPONSES TO PHQ9 QUESTIONS 1 & 2: 0
SUM OF ALL RESPONSES TO PHQ QUESTIONS 1-9: 0

## 2023-12-14 ENCOUNTER — HOSPITAL ENCOUNTER (EMERGENCY)
Facility: HOSPITAL | Age: 33
Discharge: HOME | End: 2023-12-14
Attending: EMERGENCY MEDICINE

## 2023-12-14 ENCOUNTER — APPOINTMENT (OUTPATIENT)
Facility: HOSPITAL | Age: 33
End: 2023-12-14

## 2023-12-14 VITALS
RESPIRATION RATE: 23 BRPM | OXYGEN SATURATION: 98 % | TEMPERATURE: 98.1 F | SYSTOLIC BLOOD PRESSURE: 155 MMHG | BODY MASS INDEX: 48.82 KG/M2 | DIASTOLIC BLOOD PRESSURE: 96 MMHG | HEART RATE: 94 BPM | HEIGHT: 65 IN | WEIGHT: 293 LBS

## 2023-12-14 DIAGNOSIS — R07.9 CHEST PAIN, UNSPECIFIED TYPE: Primary | ICD-10-CM

## 2023-12-14 LAB
ALBUMIN SERPL-MCNC: 3.1 G/DL (ref 3.5–5)
ALBUMIN/GLOB SERPL: 0.7 (ref 1.1–2.2)
ALP SERPL-CCNC: 54 U/L (ref 45–117)
ALT SERPL-CCNC: 19 U/L (ref 12–78)
ANION GAP SERPL CALC-SCNC: 6 MMOL/L (ref 5–15)
AST SERPL-CCNC: 13 U/L (ref 15–37)
BASOPHILS # BLD: 0 K/UL (ref 0–0.1)
BASOPHILS NFR BLD: 1 % (ref 0–1)
BILIRUB SERPL-MCNC: 0.2 MG/DL (ref 0.2–1)
BUN SERPL-MCNC: 16 MG/DL (ref 6–20)
BUN/CREAT SERPL: 17 (ref 12–20)
CALCIUM SERPL-MCNC: 9.5 MG/DL (ref 8.5–10.1)
CHLORIDE SERPL-SCNC: 105 MMOL/L (ref 97–108)
CO2 SERPL-SCNC: 28 MMOL/L (ref 21–32)
COMMENT:: NORMAL
CREAT SERPL-MCNC: 0.96 MG/DL (ref 0.55–1.02)
DIFFERENTIAL METHOD BLD: ABNORMAL
EKG ATRIAL RATE: 93 BPM
EKG DIAGNOSIS: NORMAL
EKG P AXIS: 54 DEGREES
EKG P-R INTERVAL: 154 MS
EKG Q-T INTERVAL: 362 MS
EKG QRS DURATION: 84 MS
EKG QTC CALCULATION (BAZETT): 450 MS
EKG R AXIS: 53 DEGREES
EKG T AXIS: 13 DEGREES
EKG VENTRICULAR RATE: 93 BPM
EOSINOPHIL # BLD: 0.1 K/UL (ref 0–0.4)
EOSINOPHIL NFR BLD: 2 % (ref 0–7)
ERYTHROCYTE [DISTWIDTH] IN BLOOD BY AUTOMATED COUNT: 15.7 % (ref 11.5–14.5)
GLOBULIN SER CALC-MCNC: 4.7 G/DL (ref 2–4)
GLUCOSE SERPL-MCNC: 122 MG/DL (ref 65–100)
HCT VFR BLD AUTO: 42.3 % (ref 35–47)
HGB BLD-MCNC: 12.8 G/DL (ref 11.5–16)
IMM GRANULOCYTES # BLD AUTO: 0 K/UL (ref 0–0.04)
IMM GRANULOCYTES NFR BLD AUTO: 0 % (ref 0–0.5)
LYMPHOCYTES # BLD: 2.1 K/UL (ref 0.8–3.5)
LYMPHOCYTES NFR BLD: 39 % (ref 12–49)
MCH RBC QN AUTO: 22.7 PG (ref 26–34)
MCHC RBC AUTO-ENTMCNC: 30.3 G/DL (ref 30–36.5)
MCV RBC AUTO: 74.9 FL (ref 80–99)
MONOCYTES # BLD: 0.4 K/UL (ref 0–1)
MONOCYTES NFR BLD: 8 % (ref 5–13)
NEUTS SEG # BLD: 2.6 K/UL (ref 1.8–8)
NEUTS SEG NFR BLD: 50 % (ref 32–75)
NRBC # BLD: 0 K/UL (ref 0–0.01)
NRBC BLD-RTO: 0 PER 100 WBC
PLATELET # BLD AUTO: 255 K/UL (ref 150–400)
PMV BLD AUTO: 11 FL (ref 8.9–12.9)
POTASSIUM SERPL-SCNC: 3.6 MMOL/L (ref 3.5–5.1)
PROT SERPL-MCNC: 7.8 G/DL (ref 6.4–8.2)
RBC # BLD AUTO: 5.65 M/UL (ref 3.8–5.2)
SODIUM SERPL-SCNC: 139 MMOL/L (ref 136–145)
SPECIMEN HOLD: NORMAL
TROPONIN I SERPL HS-MCNC: 9 NG/L (ref 0–51)
WBC # BLD AUTO: 5.3 K/UL (ref 3.6–11)

## 2023-12-14 PROCEDURE — 99285 EMERGENCY DEPT VISIT HI MDM: CPT

## 2023-12-14 PROCEDURE — 93005 ELECTROCARDIOGRAM TRACING: CPT | Performed by: EMERGENCY MEDICINE

## 2023-12-14 PROCEDURE — 84484 ASSAY OF TROPONIN QUANT: CPT

## 2023-12-14 PROCEDURE — 6360000002 HC RX W HCPCS: Performed by: EMERGENCY MEDICINE

## 2023-12-14 PROCEDURE — 96374 THER/PROPH/DIAG INJ IV PUSH: CPT

## 2023-12-14 PROCEDURE — 80053 COMPREHEN METABOLIC PANEL: CPT

## 2023-12-14 PROCEDURE — 71045 X-RAY EXAM CHEST 1 VIEW: CPT

## 2023-12-14 PROCEDURE — 93010 ELECTROCARDIOGRAM REPORT: CPT | Performed by: SPECIALIST

## 2023-12-14 PROCEDURE — 85025 COMPLETE CBC W/AUTO DIFF WBC: CPT

## 2023-12-14 PROCEDURE — 36415 COLL VENOUS BLD VENIPUNCTURE: CPT

## 2023-12-14 RX ORDER — ONDANSETRON 2 MG/ML
4 INJECTION INTRAMUSCULAR; INTRAVENOUS ONCE
Status: DISCONTINUED | OUTPATIENT
Start: 2023-12-14 | End: 2023-12-14 | Stop reason: HOSPADM

## 2023-12-14 RX ORDER — NAPROXEN 500 MG/1
500 TABLET ORAL 2 TIMES DAILY WITH MEALS
Qty: 20 TABLET | Refills: 5 | Status: SHIPPED | OUTPATIENT
Start: 2023-12-14 | End: 2023-12-14 | Stop reason: SDUPTHER

## 2023-12-14 RX ORDER — KETOROLAC TROMETHAMINE 15 MG/ML
15 INJECTION, SOLUTION INTRAMUSCULAR; INTRAVENOUS ONCE
Status: COMPLETED | OUTPATIENT
Start: 2023-12-14 | End: 2023-12-14

## 2023-12-14 RX ORDER — NAPROXEN 500 MG/1
500 TABLET ORAL 2 TIMES DAILY WITH MEALS
Qty: 20 TABLET | Refills: 5 | Status: SHIPPED | OUTPATIENT
Start: 2023-12-14

## 2023-12-14 RX ADMIN — KETOROLAC TROMETHAMINE 15 MG: 15 INJECTION, SOLUTION INTRAMUSCULAR; INTRAVENOUS at 03:12

## 2023-12-14 ASSESSMENT — PAIN - FUNCTIONAL ASSESSMENT: PAIN_FUNCTIONAL_ASSESSMENT: 0-10

## 2023-12-14 ASSESSMENT — PAIN DESCRIPTION - ORIENTATION: ORIENTATION: LEFT

## 2023-12-14 ASSESSMENT — PAIN SCALES - GENERAL: PAINLEVEL_OUTOF10: 10

## 2023-12-14 ASSESSMENT — PAIN DESCRIPTION - LOCATION: LOCATION: CHEST

## 2023-12-14 NOTE — ED NOTES
Patient discharged by provider, discharge instructions provided to patient and reviewed, all questions answered. Vital signs stable, A/O, breathing unlabored.  Patient ambulatory on discharge       Aura Browne RN  12/14/23 6586

## 2023-12-14 NOTE — ED TRIAGE NOTES
Pt arrived via EMS, reports Left side chest pain that started yesterday morning at 9am. Pain is non radiating   Also reports SOB    EMS gave 4 baby Aspirin

## 2023-12-14 NOTE — DISCHARGE INSTRUCTIONS
Please take the person for your pain. Your blood work showed you did not have a heart attack. Please follow-up with your cardiologist in the morning for symptom recheck. Thank you.

## 2024-02-10 ENCOUNTER — APPOINTMENT (OUTPATIENT)
Facility: HOSPITAL | Age: 34
End: 2024-02-10

## 2024-02-10 ENCOUNTER — HOSPITAL ENCOUNTER (INPATIENT)
Facility: HOSPITAL | Age: 34
LOS: 3 days | Discharge: HOME | End: 2024-02-13
Attending: EMERGENCY MEDICINE | Admitting: FAMILY MEDICINE
Payer: MEDICAID

## 2024-02-10 DIAGNOSIS — I10 HYPERTENSION, UNSPECIFIED TYPE: ICD-10-CM

## 2024-02-10 DIAGNOSIS — R06.03 RESPIRATORY DISTRESS: Primary | ICD-10-CM

## 2024-02-10 LAB
ALBUMIN SERPL-MCNC: 3.4 G/DL (ref 3.5–5)
ALBUMIN/GLOB SERPL: 0.7 (ref 1.1–2.2)
ALP SERPL-CCNC: 59 U/L (ref 45–117)
ALT SERPL-CCNC: 21 U/L (ref 12–78)
ANION GAP SERPL CALC-SCNC: 5 MMOL/L (ref 5–15)
ARTERIAL PATENCY WRIST A: YES
AST SERPL-CCNC: 18 U/L (ref 15–37)
BASE EXCESS BLDA CALC-SCNC: 1.5 MMOL/L
BASOPHILS # BLD: 0 K/UL (ref 0–0.1)
BASOPHILS NFR BLD: 1 % (ref 0–1)
BDY SITE: ABNORMAL
BILIRUB DIRECT SERPL-MCNC: 0.1 MG/DL (ref 0–0.2)
BILIRUB SERPL-MCNC: 0.3 MG/DL (ref 0.2–1)
BUN SERPL-MCNC: 13 MG/DL (ref 6–20)
BUN/CREAT SERPL: 17 (ref 12–20)
CALCIUM SERPL-MCNC: 9.1 MG/DL (ref 8.5–10.1)
CHLORIDE SERPL-SCNC: 104 MMOL/L (ref 97–108)
CO2 SERPL-SCNC: 27 MMOL/L (ref 21–32)
CREAT SERPL-MCNC: 0.75 MG/DL (ref 0.55–1.02)
D DIMER PPP FEU-MCNC: 0.36 MG/L FEU (ref 0–0.65)
DIFFERENTIAL METHOD BLD: ABNORMAL
EOSINOPHIL # BLD: 0.1 K/UL (ref 0–0.4)
EOSINOPHIL NFR BLD: 1 % (ref 0–7)
ERYTHROCYTE [DISTWIDTH] IN BLOOD BY AUTOMATED COUNT: 15.7 % (ref 11.5–14.5)
FIO2 ON VENT: 30 %
FLUAV AG NPH QL IA: NEGATIVE
FLUBV AG NOSE QL IA: NEGATIVE
GLOBULIN SER CALC-MCNC: 5.1 G/DL (ref 2–4)
GLUCOSE BLD STRIP.AUTO-MCNC: 123 MG/DL (ref 65–117)
GLUCOSE SERPL-MCNC: 94 MG/DL (ref 65–100)
HCO3 BLDA-SCNC: 26 MMOL/L (ref 22–26)
HCT VFR BLD AUTO: 41.5 % (ref 35–47)
HGB BLD-MCNC: 12.8 G/DL (ref 11.5–16)
IMM GRANULOCYTES # BLD AUTO: 0 K/UL (ref 0–0.04)
IMM GRANULOCYTES NFR BLD AUTO: 0 % (ref 0–0.5)
LACTATE SERPL-SCNC: 1.2 MMOL/L (ref 0.4–2)
LIPASE SERPL-CCNC: 41 U/L (ref 13–75)
LYMPHOCYTES # BLD: 1.7 K/UL (ref 0.8–3.5)
LYMPHOCYTES NFR BLD: 38 % (ref 12–49)
MCH RBC QN AUTO: 23 PG (ref 26–34)
MCHC RBC AUTO-ENTMCNC: 30.8 G/DL (ref 30–36.5)
MCV RBC AUTO: 74.6 FL (ref 80–99)
MONOCYTES # BLD: 0.8 K/UL (ref 0–1)
MONOCYTES NFR BLD: 18 % (ref 5–13)
NEUTS SEG # BLD: 1.9 K/UL (ref 1.8–8)
NEUTS SEG NFR BLD: 42 % (ref 32–75)
NRBC # BLD: 0 K/UL (ref 0–0.01)
NRBC BLD-RTO: 0 PER 100 WBC
NT PRO BNP: 17 PG/ML
PCO2 BLDA: 39 MMHG (ref 35–45)
PEEP RESPIRATORY: 5
PH BLDA: 7.43 (ref 7.35–7.45)
PLATELET # BLD AUTO: 261 K/UL (ref 150–400)
PMV BLD AUTO: 10.7 FL (ref 8.9–12.9)
PO2 BLDA: 140 MMHG (ref 80–100)
POTASSIUM SERPL-SCNC: 3.8 MMOL/L (ref 3.5–5.1)
PROCALCITONIN SERPL-MCNC: <0.05 NG/ML
PROT SERPL-MCNC: 8.5 G/DL (ref 6.4–8.2)
RBC # BLD AUTO: 5.56 M/UL (ref 3.8–5.2)
SAO2 % BLD: 99 % (ref 92–97)
SAO2% DEVICE SAO2% SENSOR NAME: ABNORMAL
SARS-COV-2 RDRP RESP QL NAA+PROBE: NOT DETECTED
SERVICE CMNT-IMP: ABNORMAL
SODIUM SERPL-SCNC: 136 MMOL/L (ref 136–145)
SOURCE: NORMAL
SPECIMEN SITE: ABNORMAL
TROPONIN I SERPL HS-MCNC: 6 NG/L (ref 0–51)
TROPONIN I SERPL HS-MCNC: 6 NG/L (ref 0–51)
WBC # BLD AUTO: 4.6 K/UL (ref 3.6–11)

## 2024-02-10 PROCEDURE — 96374 THER/PROPH/DIAG INJ IV PUSH: CPT

## 2024-02-10 PROCEDURE — 94761 N-INVAS EAR/PLS OXIMETRY MLT: CPT

## 2024-02-10 PROCEDURE — 6360000002 HC RX W HCPCS

## 2024-02-10 PROCEDURE — 36600 WITHDRAWAL OF ARTERIAL BLOOD: CPT

## 2024-02-10 PROCEDURE — 2580000003 HC RX 258

## 2024-02-10 PROCEDURE — 2700000000 HC OXYGEN THERAPY PER DAY

## 2024-02-10 PROCEDURE — 87635 SARS-COV-2 COVID-19 AMP PRB: CPT

## 2024-02-10 PROCEDURE — 99285 EMERGENCY DEPT VISIT HI MDM: CPT

## 2024-02-10 PROCEDURE — 82803 BLOOD GASES ANY COMBINATION: CPT

## 2024-02-10 PROCEDURE — 96375 TX/PRO/DX INJ NEW DRUG ADDON: CPT

## 2024-02-10 PROCEDURE — 36415 COLL VENOUS BLD VENIPUNCTURE: CPT

## 2024-02-10 PROCEDURE — 1100000000 HC RM PRIVATE

## 2024-02-10 PROCEDURE — 6360000002 HC RX W HCPCS: Performed by: EMERGENCY MEDICINE

## 2024-02-10 PROCEDURE — 6370000000 HC RX 637 (ALT 250 FOR IP)

## 2024-02-10 PROCEDURE — 80048 BASIC METABOLIC PNL TOTAL CA: CPT

## 2024-02-10 PROCEDURE — 87804 INFLUENZA ASSAY W/OPTIC: CPT

## 2024-02-10 PROCEDURE — 94660 CPAP INITIATION&MGMT: CPT

## 2024-02-10 PROCEDURE — 80076 HEPATIC FUNCTION PANEL: CPT

## 2024-02-10 PROCEDURE — 85379 FIBRIN DEGRADATION QUANT: CPT

## 2024-02-10 PROCEDURE — 83880 ASSAY OF NATRIURETIC PEPTIDE: CPT

## 2024-02-10 PROCEDURE — 83605 ASSAY OF LACTIC ACID: CPT

## 2024-02-10 PROCEDURE — 71045 X-RAY EXAM CHEST 1 VIEW: CPT

## 2024-02-10 PROCEDURE — 6370000000 HC RX 637 (ALT 250 FOR IP): Performed by: EMERGENCY MEDICINE

## 2024-02-10 PROCEDURE — 93005 ELECTROCARDIOGRAM TRACING: CPT | Performed by: EMERGENCY MEDICINE

## 2024-02-10 PROCEDURE — 84145 PROCALCITONIN (PCT): CPT

## 2024-02-10 PROCEDURE — 83690 ASSAY OF LIPASE: CPT

## 2024-02-10 PROCEDURE — 5A09357 ASSISTANCE WITH RESPIRATORY VENTILATION, LESS THAN 24 CONSECUTIVE HOURS, CONTINUOUS POSITIVE AIRWAY PRESSURE: ICD-10-PCS | Performed by: EMERGENCY MEDICINE

## 2024-02-10 PROCEDURE — 82962 GLUCOSE BLOOD TEST: CPT

## 2024-02-10 PROCEDURE — 84484 ASSAY OF TROPONIN QUANT: CPT

## 2024-02-10 PROCEDURE — 0202U NFCT DS 22 TRGT SARS-COV-2: CPT

## 2024-02-10 PROCEDURE — 85025 COMPLETE CBC W/AUTO DIFF WBC: CPT

## 2024-02-10 RX ORDER — CETIRIZINE HYDROCHLORIDE 10 MG/1
10 TABLET ORAL DAILY
Status: DISCONTINUED | OUTPATIENT
Start: 2024-02-11 | End: 2024-02-13 | Stop reason: HOSPADM

## 2024-02-10 RX ORDER — LEVALBUTEROL INHALATION SOLUTION 1.25 MG/3ML
1.25 SOLUTION RESPIRATORY (INHALATION) EVERY 8 HOURS PRN
Status: DISCONTINUED | OUTPATIENT
Start: 2024-02-10 | End: 2024-02-13 | Stop reason: HOSPADM

## 2024-02-10 RX ORDER — FUROSEMIDE 20 MG/1
20 TABLET ORAL DAILY
Status: DISCONTINUED | OUTPATIENT
Start: 2024-02-11 | End: 2024-02-13 | Stop reason: HOSPADM

## 2024-02-10 RX ORDER — SODIUM CHLORIDE 9 MG/ML
INJECTION, SOLUTION INTRAVENOUS PRN
Status: DISCONTINUED | OUTPATIENT
Start: 2024-02-10 | End: 2024-02-13 | Stop reason: HOSPADM

## 2024-02-10 RX ORDER — TOPIRAMATE 100 MG/1
50 TABLET, FILM COATED ORAL 2 TIMES DAILY
Status: DISCONTINUED | OUTPATIENT
Start: 2024-02-10 | End: 2024-02-13 | Stop reason: HOSPADM

## 2024-02-10 RX ORDER — SODIUM CHLORIDE 0.9 % (FLUSH) 0.9 %
5-40 SYRINGE (ML) INJECTION EVERY 12 HOURS SCHEDULED
Status: DISCONTINUED | OUTPATIENT
Start: 2024-02-10 | End: 2024-02-13 | Stop reason: HOSPADM

## 2024-02-10 RX ORDER — ARFORMOTEROL TARTRATE 15 UG/2ML
15 SOLUTION RESPIRATORY (INHALATION)
Status: DISCONTINUED | OUTPATIENT
Start: 2024-02-10 | End: 2024-02-13 | Stop reason: HOSPADM

## 2024-02-10 RX ORDER — ONDANSETRON 4 MG/1
4 TABLET, ORALLY DISINTEGRATING ORAL EVERY 8 HOURS PRN
Status: DISCONTINUED | OUTPATIENT
Start: 2024-02-10 | End: 2024-02-13 | Stop reason: HOSPADM

## 2024-02-10 RX ORDER — DULOXETIN HYDROCHLORIDE 20 MG/1
20 CAPSULE, DELAYED RELEASE ORAL DAILY
Status: DISCONTINUED | OUTPATIENT
Start: 2024-02-11 | End: 2024-02-13 | Stop reason: HOSPADM

## 2024-02-10 RX ORDER — IPRATROPIUM BROMIDE AND ALBUTEROL SULFATE 2.5; .5 MG/3ML; MG/3ML
1 SOLUTION RESPIRATORY (INHALATION)
Status: ACTIVE | OUTPATIENT
Start: 2024-02-10 | End: 2024-02-11

## 2024-02-10 RX ORDER — BUDESONIDE 0.5 MG/2ML
0.5 INHALANT ORAL
Status: DISCONTINUED | OUTPATIENT
Start: 2024-02-10 | End: 2024-02-13 | Stop reason: HOSPADM

## 2024-02-10 RX ORDER — ENOXAPARIN SODIUM 100 MG/ML
30 INJECTION SUBCUTANEOUS 2 TIMES DAILY
Status: DISCONTINUED | OUTPATIENT
Start: 2024-02-10 | End: 2024-02-13 | Stop reason: HOSPADM

## 2024-02-10 RX ORDER — MONTELUKAST SODIUM 10 MG/1
10 TABLET ORAL
Status: DISCONTINUED | OUTPATIENT
Start: 2024-02-10 | End: 2024-02-13 | Stop reason: HOSPADM

## 2024-02-10 RX ORDER — ACETAMINOPHEN 650 MG/1
650 SUPPOSITORY RECTAL EVERY 6 HOURS PRN
Status: DISCONTINUED | OUTPATIENT
Start: 2024-02-10 | End: 2024-02-13 | Stop reason: HOSPADM

## 2024-02-10 RX ORDER — DEXAMETHASONE SODIUM PHOSPHATE 10 MG/ML
10 INJECTION, SOLUTION INTRAMUSCULAR; INTRAVENOUS ONCE
Status: COMPLETED | OUTPATIENT
Start: 2024-02-10 | End: 2024-02-10

## 2024-02-10 RX ORDER — SODIUM CHLORIDE 0.9 % (FLUSH) 0.9 %
5-40 SYRINGE (ML) INJECTION PRN
Status: DISCONTINUED | OUTPATIENT
Start: 2024-02-10 | End: 2024-02-13 | Stop reason: HOSPADM

## 2024-02-10 RX ORDER — FUROSEMIDE 10 MG/ML
40 INJECTION INTRAMUSCULAR; INTRAVENOUS ONCE
Status: COMPLETED | OUTPATIENT
Start: 2024-02-10 | End: 2024-02-10

## 2024-02-10 RX ORDER — ACETAMINOPHEN 325 MG/1
650 TABLET ORAL EVERY 6 HOURS PRN
Status: DISCONTINUED | OUTPATIENT
Start: 2024-02-10 | End: 2024-02-13 | Stop reason: HOSPADM

## 2024-02-10 RX ORDER — ONDANSETRON 2 MG/ML
4 INJECTION INTRAMUSCULAR; INTRAVENOUS ONCE
Status: COMPLETED | OUTPATIENT
Start: 2024-02-10 | End: 2024-02-10

## 2024-02-10 RX ORDER — ONDANSETRON 2 MG/ML
4 INJECTION INTRAMUSCULAR; INTRAVENOUS EVERY 6 HOURS PRN
Status: DISCONTINUED | OUTPATIENT
Start: 2024-02-10 | End: 2024-02-13 | Stop reason: HOSPADM

## 2024-02-10 RX ORDER — PROPRANOLOL HYDROCHLORIDE 10 MG/1
10 TABLET ORAL 3 TIMES DAILY
Status: DISCONTINUED | OUTPATIENT
Start: 2024-02-10 | End: 2024-02-13 | Stop reason: HOSPADM

## 2024-02-10 RX ORDER — POLYETHYLENE GLYCOL 3350 17 G/17G
17 POWDER, FOR SOLUTION ORAL DAILY PRN
Status: DISCONTINUED | OUTPATIENT
Start: 2024-02-10 | End: 2024-02-13 | Stop reason: HOSPADM

## 2024-02-10 RX ADMIN — WATER 60 MG: 1 INJECTION INTRAMUSCULAR; INTRAVENOUS; SUBCUTANEOUS at 22:20

## 2024-02-10 RX ADMIN — ENOXAPARIN SODIUM 30 MG: 100 INJECTION SUBCUTANEOUS at 22:21

## 2024-02-10 RX ADMIN — PROPRANOLOL HYDROCHLORIDE 10 MG: 10 TABLET ORAL at 23:23

## 2024-02-10 RX ADMIN — DEXAMETHASONE SODIUM PHOSPHATE 10 MG: 10 INJECTION, SOLUTION INTRAMUSCULAR; INTRAVENOUS at 19:34

## 2024-02-10 RX ADMIN — ONDANSETRON 4 MG: 2 INJECTION INTRAMUSCULAR; INTRAVENOUS at 19:34

## 2024-02-10 RX ADMIN — FUROSEMIDE 40 MG: 10 INJECTION, SOLUTION INTRAMUSCULAR; INTRAVENOUS at 19:45

## 2024-02-10 RX ADMIN — MONTELUKAST 10 MG: 10 TABLET, FILM COATED ORAL at 23:23

## 2024-02-10 RX ADMIN — TOPIRAMATE 50 MG: 100 TABLET, FILM COATED ORAL at 23:23

## 2024-02-10 RX ADMIN — ACETAMINOPHEN 650 MG: 325 TABLET ORAL at 23:23

## 2024-02-10 ASSESSMENT — PAIN DESCRIPTION - DESCRIPTORS: DESCRIPTORS: ACHING

## 2024-02-10 ASSESSMENT — LIFESTYLE VARIABLES
HOW OFTEN DO YOU HAVE A DRINK CONTAINING ALCOHOL: NEVER
HOW MANY STANDARD DRINKS CONTAINING ALCOHOL DO YOU HAVE ON A TYPICAL DAY: PATIENT DOES NOT DRINK

## 2024-02-10 ASSESSMENT — PAIN - FUNCTIONAL ASSESSMENT: PAIN_FUNCTIONAL_ASSESSMENT: 0-10

## 2024-02-10 ASSESSMENT — PAIN DESCRIPTION - LOCATION
LOCATION: GENERALIZED
LOCATION: GENERALIZED

## 2024-02-10 ASSESSMENT — PAIN SCALES - GENERAL
PAINLEVEL_OUTOF10: 2
PAINLEVEL_OUTOF10: 10

## 2024-02-11 LAB
AMPHET UR QL SCN: NEGATIVE
ANION GAP SERPL CALC-SCNC: 4 MMOL/L (ref 5–15)
B PERT DNA SPEC QL NAA+PROBE: NOT DETECTED
BARBITURATES UR QL SCN: NEGATIVE
BASOPHILS # BLD: 0 K/UL (ref 0–0.1)
BASOPHILS NFR BLD: 0 % (ref 0–1)
BENZODIAZ UR QL: NEGATIVE
BORDETELLA PARAPERTUSSIS BY PCR: NOT DETECTED
BUN SERPL-MCNC: 17 MG/DL (ref 6–20)
BUN/CREAT SERPL: 23 (ref 12–20)
C PNEUM DNA SPEC QL NAA+PROBE: NOT DETECTED
CALCIUM SERPL-MCNC: 9 MG/DL (ref 8.5–10.1)
CANNABINOIDS UR QL SCN: NEGATIVE
CHLORIDE SERPL-SCNC: 103 MMOL/L (ref 97–108)
CO2 SERPL-SCNC: 28 MMOL/L (ref 21–32)
COCAINE UR QL SCN: NEGATIVE
CREAT SERPL-MCNC: 0.75 MG/DL (ref 0.55–1.02)
DIFFERENTIAL METHOD BLD: ABNORMAL
EKG ATRIAL RATE: 127 BPM
EKG DIAGNOSIS: NORMAL
EKG P AXIS: 62 DEGREES
EKG P-R INTERVAL: 124 MS
EKG Q-T INTERVAL: 324 MS
EKG QRS DURATION: 82 MS
EKG QTC CALCULATION (BAZETT): 470 MS
EKG R AXIS: 51 DEGREES
EKG T AXIS: 4 DEGREES
EKG VENTRICULAR RATE: 127 BPM
EOSINOPHIL # BLD: 0 K/UL (ref 0–0.4)
EOSINOPHIL NFR BLD: 0 % (ref 0–7)
ERYTHROCYTE [DISTWIDTH] IN BLOOD BY AUTOMATED COUNT: 15.7 % (ref 11.5–14.5)
FLUAV SUBTYP SPEC NAA+PROBE: NOT DETECTED
FLUBV RNA SPEC QL NAA+PROBE: DETECTED
GLUCOSE SERPL-MCNC: 174 MG/DL (ref 65–100)
HADV DNA SPEC QL NAA+PROBE: NOT DETECTED
HCOV 229E RNA SPEC QL NAA+PROBE: NOT DETECTED
HCOV HKU1 RNA SPEC QL NAA+PROBE: NOT DETECTED
HCOV NL63 RNA SPEC QL NAA+PROBE: NOT DETECTED
HCOV OC43 RNA SPEC QL NAA+PROBE: NOT DETECTED
HCT VFR BLD AUTO: 40.3 % (ref 35–47)
HGB BLD-MCNC: 12.5 G/DL (ref 11.5–16)
HMPV RNA SPEC QL NAA+PROBE: NOT DETECTED
HPIV1 RNA SPEC QL NAA+PROBE: NOT DETECTED
HPIV2 RNA SPEC QL NAA+PROBE: NOT DETECTED
HPIV3 RNA SPEC QL NAA+PROBE: NOT DETECTED
HPIV4 RNA SPEC QL NAA+PROBE: NOT DETECTED
IMM GRANULOCYTES # BLD AUTO: 0 K/UL (ref 0–0.04)
IMM GRANULOCYTES NFR BLD AUTO: 1 % (ref 0–0.5)
LYMPHOCYTES # BLD: 0.7 K/UL (ref 0.8–3.5)
LYMPHOCYTES NFR BLD: 17 % (ref 12–49)
Lab: NORMAL
M PNEUMO DNA SPEC QL NAA+PROBE: NOT DETECTED
MCH RBC QN AUTO: 23 PG (ref 26–34)
MCHC RBC AUTO-ENTMCNC: 31 G/DL (ref 30–36.5)
MCV RBC AUTO: 74.1 FL (ref 80–99)
METHADONE UR QL: NEGATIVE
MONOCYTES # BLD: 0.1 K/UL (ref 0–1)
MONOCYTES NFR BLD: 2 % (ref 5–13)
NEUTS SEG # BLD: 3.4 K/UL (ref 1.8–8)
NEUTS SEG NFR BLD: 80 % (ref 32–75)
NRBC # BLD: 0 K/UL (ref 0–0.01)
NRBC BLD-RTO: 0 PER 100 WBC
OPIATES UR QL: NEGATIVE
PCP UR QL: NEGATIVE
PLATELET # BLD AUTO: 264 K/UL (ref 150–400)
PMV BLD AUTO: 10.9 FL (ref 8.9–12.9)
POTASSIUM SERPL-SCNC: 4.6 MMOL/L (ref 3.5–5.1)
RBC # BLD AUTO: 5.44 M/UL (ref 3.8–5.2)
RBC MORPH BLD: ABNORMAL
RSV RNA SPEC QL NAA+PROBE: NOT DETECTED
RV+EV RNA SPEC QL NAA+PROBE: NOT DETECTED
SARS-COV-2 RNA RESP QL NAA+PROBE: NOT DETECTED
SODIUM SERPL-SCNC: 135 MMOL/L (ref 136–145)
WBC # BLD AUTO: 4.2 K/UL (ref 3.6–11)

## 2024-02-11 PROCEDURE — 36415 COLL VENOUS BLD VENIPUNCTURE: CPT

## 2024-02-11 PROCEDURE — 85025 COMPLETE CBC W/AUTO DIFF WBC: CPT

## 2024-02-11 PROCEDURE — 6370000000 HC RX 637 (ALT 250 FOR IP)

## 2024-02-11 PROCEDURE — 1100000000 HC RM PRIVATE

## 2024-02-11 PROCEDURE — 6360000002 HC RX W HCPCS: Performed by: FAMILY MEDICINE

## 2024-02-11 PROCEDURE — 94640 AIRWAY INHALATION TREATMENT: CPT

## 2024-02-11 PROCEDURE — 80307 DRUG TEST PRSMV CHEM ANLYZR: CPT

## 2024-02-11 PROCEDURE — 2580000003 HC RX 258

## 2024-02-11 PROCEDURE — 94660 CPAP INITIATION&MGMT: CPT

## 2024-02-11 PROCEDURE — 2700000000 HC OXYGEN THERAPY PER DAY

## 2024-02-11 PROCEDURE — 97162 PT EVAL MOD COMPLEX 30 MIN: CPT

## 2024-02-11 PROCEDURE — 97116 GAIT TRAINING THERAPY: CPT

## 2024-02-11 PROCEDURE — 99222 1ST HOSP IP/OBS MODERATE 55: CPT | Performed by: FAMILY MEDICINE

## 2024-02-11 PROCEDURE — 80048 BASIC METABOLIC PNL TOTAL CA: CPT

## 2024-02-11 PROCEDURE — 6360000002 HC RX W HCPCS

## 2024-02-11 PROCEDURE — 97161 PT EVAL LOW COMPLEX 20 MIN: CPT

## 2024-02-11 PROCEDURE — 93010 ELECTROCARDIOGRAM REPORT: CPT | Performed by: SPECIALIST

## 2024-02-11 PROCEDURE — 94761 N-INVAS EAR/PLS OXIMETRY MLT: CPT

## 2024-02-11 RX ORDER — OSELTAMIVIR PHOSPHATE 75 MG/1
75 CAPSULE ORAL 2 TIMES DAILY
Status: DISCONTINUED | OUTPATIENT
Start: 2024-02-11 | End: 2024-02-13 | Stop reason: HOSPADM

## 2024-02-11 RX ORDER — LORAZEPAM 0.5 MG/1
0.5 TABLET ORAL EVERY 6 HOURS PRN
Status: DISCONTINUED | OUTPATIENT
Start: 2024-02-11 | End: 2024-02-13 | Stop reason: HOSPADM

## 2024-02-11 RX ORDER — BENZONATATE 100 MG/1
100 CAPSULE ORAL 3 TIMES DAILY PRN
Status: DISCONTINUED | OUTPATIENT
Start: 2024-02-11 | End: 2024-02-13 | Stop reason: HOSPADM

## 2024-02-11 RX ADMIN — ENOXAPARIN SODIUM 30 MG: 100 INJECTION SUBCUTANEOUS at 21:41

## 2024-02-11 RX ADMIN — OSELTAMIVIR PHOSPHATE 75 MG: 75 CAPSULE ORAL at 21:40

## 2024-02-11 RX ADMIN — TOPIRAMATE 50 MG: 100 TABLET, FILM COATED ORAL at 08:21

## 2024-02-11 RX ADMIN — TOPIRAMATE 50 MG: 100 TABLET, FILM COATED ORAL at 21:40

## 2024-02-11 RX ADMIN — ACETAMINOPHEN 650 MG: 325 TABLET ORAL at 18:40

## 2024-02-11 RX ADMIN — SODIUM CHLORIDE, PRESERVATIVE FREE 10 ML: 5 INJECTION INTRAVENOUS at 08:22

## 2024-02-11 RX ADMIN — SODIUM CHLORIDE, PRESERVATIVE FREE 10 ML: 5 INJECTION INTRAVENOUS at 21:42

## 2024-02-11 RX ADMIN — MONTELUKAST 10 MG: 10 TABLET, FILM COATED ORAL at 21:40

## 2024-02-11 RX ADMIN — ONDANSETRON 4 MG: 2 INJECTION INTRAMUSCULAR; INTRAVENOUS at 11:18

## 2024-02-11 RX ADMIN — IPRATROPIUM BROMIDE 0.5 MG: 0.5 SOLUTION RESPIRATORY (INHALATION) at 19:59

## 2024-02-11 RX ADMIN — PROPRANOLOL HYDROCHLORIDE 10 MG: 10 TABLET ORAL at 21:40

## 2024-02-11 RX ADMIN — ENOXAPARIN SODIUM 30 MG: 100 INJECTION SUBCUTANEOUS at 08:21

## 2024-02-11 RX ADMIN — WATER 60 MG: 1 INJECTION INTRAMUSCULAR; INTRAVENOUS; SUBCUTANEOUS at 10:35

## 2024-02-11 RX ADMIN — PROPRANOLOL HYDROCHLORIDE 10 MG: 10 TABLET ORAL at 14:22

## 2024-02-11 RX ADMIN — LORAZEPAM 0.5 MG: 0.5 TABLET ORAL at 11:24

## 2024-02-11 RX ADMIN — FUROSEMIDE 20 MG: 20 TABLET ORAL at 08:20

## 2024-02-11 RX ADMIN — BENZONATATE 100 MG: 100 CAPSULE ORAL at 18:41

## 2024-02-11 RX ADMIN — BUDESONIDE 500 MCG: 0.5 INHALANT RESPIRATORY (INHALATION) at 20:05

## 2024-02-11 RX ADMIN — ACETAMINOPHEN 650 MG: 325 TABLET ORAL at 08:20

## 2024-02-11 RX ADMIN — CETIRIZINE HYDROCHLORIDE 10 MG: 10 TABLET, FILM COATED ORAL at 08:21

## 2024-02-11 RX ADMIN — ARFORMOTEROL TARTRATE 15 MCG: 15 SOLUTION RESPIRATORY (INHALATION) at 20:05

## 2024-02-11 RX ADMIN — DULOXETINE HYDROCHLORIDE 20 MG: 20 CAPSULE, DELAYED RELEASE ORAL at 10:35

## 2024-02-11 RX ADMIN — PROPRANOLOL HYDROCHLORIDE 10 MG: 10 TABLET ORAL at 08:21

## 2024-02-11 RX ADMIN — PREDNISONE 50 MG: 20 TABLET ORAL at 21:40

## 2024-02-11 NOTE — PROGRESS NOTES
[id number] [id number]   Office [phone]  Fax [phone]          Subjective / Objective     Subjective  Overnight Events: none  Patient is resting comfortably.    Respiratory:   BiPAP  Vitals:    02/11/24 0000   BP: 118/74   Pulse: 91   Resp:    Temp:    SpO2: 93%     Physical Examination:   General appearance - resting comfortably, and in no distress  Chest - mild intermittent wheezes, rales at bases, symmetric air entry  Heart - normal rate, regular rhythm, normal S1, S2, no murmurs, rubs, clicks or gallops,   Abdomen - soft, nontender, nondistended, no masses or organomegaly  Neurological - alert, oriented, normal speech, no focal findings  Skin - warm, dry. No notable rashes  Extremities - peripheral pulses normal, +2 nonpitting pedal edema, no clubbing or cyanosis  Psychiatric - normal speech and thought processes    I/O:  No intake/output data recorded.  Inpatient Medications    Current Facility-Administered Medications   Medication Dose Route Frequency    ipratropium 0.5 mg-albuterol 2.5 mg (DUONEB) nebulizer solution 1 Dose  1 Dose Inhalation NOW    sodium chloride flush 0.9 % injection 5-40 mL  5-40 mL IntraVENous 2 times per day    sodium chloride flush 0.9 % injection 5-40 mL  5-40 mL IntraVENous PRN    0.9 % sodium chloride infusion   IntraVENous PRN    enoxaparin Sodium (LOVENOX) injection 30 mg  30 mg SubCUTAneous BID    ondansetron (ZOFRAN-ODT) disintegrating tablet 4 mg  4 mg Oral Q8H PRN    Or    ondansetron (ZOFRAN) injection 4 mg  4 mg IntraVENous Q6H PRN    polyethylene glycol (GLYCOLAX) packet 17 g  17 g Oral Daily PRN    acetaminophen (TYLENOL) tablet 650 mg  650 mg Oral Q6H PRN    Or    acetaminophen (TYLENOL) suppository 650 mg  650 mg Rectal Q6H PRN    methylPREDNISolone sodium succ (SOLU-MEDROL) 60 mg in sterile water 0.96 mL injection  60 mg IntraVENous Q12H    levalbuterol (XOPENEX) nebulizer solution 1.25 mg  1.25 mg Nebulization Q8H PRN    DULoxetine  while awake  - solumedrol 60mg BID  - brovana, pulmicort BID  - Atrovent QID  - levalbuterol 1.25mg q8h prn   - continuing home zyrtec 10mg qD & montelukast 10mg qhs  - Daily CBC, BMP  - F/U RVP  - Consult PT, appreciate recs  - NPO     Hypertension: POA /127.  - Continuing home medications of lasix 20mg qD.   - Will continue to monitor at this time and readjust as BP's trend.     PreDiabetes Mellitus:          Hemoglobin A1C   Date Value Ref Range Status   09/20/2023 6.1 (H) 4.0 - 5.6 % Final       Comment:       (NOTE)  HbA1C Interpretive Ranges  <5.7              Normal  5.7 - 6.4         Consider Prediabetes  >6.5              Consider Diabetes            Peripheral neuropathy: controlled  - continue home medication: cymbaltal 20mg qD     IIH / Headaches: controlled  - continue home medication: propranolol 10mg TID, topiramate 50mg BID, lasix 20mg qD     Severe Obesity BMI Body mass index is 49.92 kg/m².  - Encouraging lifestyle modifications and further follow up outpatient.         FEN/GI - NPO.   Activity - Ambulate as tolerated  DVT prophylaxis - Lovenox  GI prophylaxis - Not indicated at this time  Fall prophylaxis - Not indicated at this time.  Disposition - Plan to d/c to Home. Consulting PT/OT/CM  Code Status - limited- patient agrees to intubation, patient declines chest compressions, IV resuscitative meds & shocks, discussed with patient / caregivers.  Next of Kin Name and Contact: Lorri Mcqueen () [phone]    Patient discussed with Austin Sotomayor MD Helena Varys, MD  Family Medicine Resident       For Billing    Chief Complaint   Patient presents with    Chest Pain    Shortness of Breath    Chills    Generalized Body Aches

## 2024-02-11 NOTE — CONSULTS
Session ID: [id number]  Language: Turkmen   ID: #[id number]   Name: Roe    Patient evaluated for PT and considerable time spent attempting to get her to answer questions and respond. Patient crying with HA upon standing. Full report to follow. Orthostatic measures taken and high BP in stance with worsening HA but otherwise normal vitals and normal SPO2 RA. Short distance amb with and without RW. She tends to panic and hold her breath while becoming emotional and more crying, drop to 92% on RA in sitting. She states when not on O2 her hands go numb and HA worsens. She is requesting w/c for d/c. Educated she needs to increase activity level as eliazar and not rely on unhealthy lifestyle habits such as activity level, poor diet etc. Asked repeatedly if she had questions but then after translation discontinued she asked PT to return. Unable to reload translation site on iPad. Placed back on 2L for comfort/ No Home O2 needed. Highly recommend mental health assistance. Appears highly stressed and wonder if depression and/or panic/anxiety attacks overwhelmed with cultural demands and/or young family. Asking for MD letter to excuse spouse for today and next 2 days from work due to her illness.     Ernestina Barrios, PT, DPT

## 2024-02-11 NOTE — PROGRESS NOTES
PHYSICAL THERAPY EVALUATION/DISCHARGE    Patient: Charisse Shi (33 y.o. female)  Date: 2/11/2024  Primary Diagnosis: Respiratory distress [R06.03]  Hypertension, unspecified type [I10]       Precautions:                        ASSESSMENT AND RECOMMENDATIONS:  Based on the objective data below, the patient is a 32 yo woman that speaks Turkmen as her primary language. She lives in home with flight of stairs and two young children as well as children from other families. When on phone in room and family on speaker, extremely chaotic and loud in her home- non stop yelling screaming very noisy 7 young children constant. Patient has hx of asthma and IIH with severe headaches. She came to ED with increased MARTINEZ. She states she uses a RW at baseline and appears needed due to significantly compromised endurance rather than balance or strength deficits. This is her baseline. She has not seen pulmonary consult OP because ins related issue. Received call from family practice regarding home O2 needs. She has BMI of near 50 at 300# 5'5. Patient evaluated for PT and considerable time spent attempting to get her to answer questions and respond. Patient crying with HA upon standing. Full report to follow. Orthostatic measures taken and high BP in stance with worsening HA but otherwise normal vitals and normal SPO2 RA. Short distance amb with and without RW. She tends to panic and hold her breath while becoming emotional and more crying, drop to 92% on RA in sitting. She states when without O2 NC and 4L, her hands go numb and HA worsens. She is requesting w/c for d/c. Educated she needs to increase activity level as eliazar and not rely on unhealthy lifestyle habits such as sedentary activity level, poor diet etc. Asked repeatedly if she had questions but then after translation discontinued she asked PT to return. Unable to reload translation site on iPad. Placed back on 2L for comfort but No Home O2 needed. Highly recommend mental health        Home Situation and Prior Level of Function: independent with RW, home bound primarily  Social/Functional History  Lives With: Spouse  Type of Home: Apartment  Home Layout: One level  Home Access: Stairs to enter with rails  Entrance Stairs - Number of Steps: 13  Home Equipment: Walker, rolling  Critical Behavior:  Orientation  Overall Orientation Status: Within Normal Limits  Orientation Level: Oriented X4  Cognition  Overall Cognitive Status: WNL    Strength:    Strength: Within functional limits    Tone & Sensation:   Tone: Normal  Sensation:  (reports numbness bilateral hands when PT turns off oxygen)    Coordination:  Coordination: Within functional limits    Range Of Motion:  AROM: Within functional limits       Functional Mobility:  Bed Mobility:     Bed Mobility Training  Bed Mobility Training: Yes  Overall Level of Assistance: Modified independent  Transfers:     Transfer Training  Transfer Training: Yes  Overall Level of Assistance: Modified independent  Interventions: Verbal cues  Sit to Stand: Supervision;Modified independent  Stand to Sit: Modified independent  Stand Pivot Transfers: Modified independent  Balance:               Balance  Sitting: Intact  Standing: High guard  Ambulation/Gait Training:    Gait  Overall Level of Assistance: Modified independent  Distance (ft): 40 Feet (pacing in room - covid rule out)      Pain Ratin/10   Pain Intervention(s):   nursing notified    Activity Tolerance:   Poor, requires frequent rest breaks, observed shortness of breath on exertion, and SpO2 stable on room air    After treatment:   Patient left in no apparent distress sitting up in chair, Call bell within reach, and Bed/ chair alarm activated      COMMUNICATION/EDUCATION:   The patient's plan of care was discussed with: registered nurse    Patient Education  Education Given To: Patient  Education Provided: Role of Therapy;Plan of Care;Energy Conservation  Education Method:

## 2024-02-11 NOTE — CONSULTS
Completed inpatient cardiac rehabilitation thoracoscopy/thoracotomy education with the patient. A home exercise prescription, activity restrictions & precautions, and appropriate risk factor education have been completed. All education is documented in the Cardiac Rehabilitation Education Record.  Total time spent educating the patient was 25 minutes.      Session ID: [id number]  Language: Spanish   ID: #[id number]   Name: Hamzah

## 2024-02-11 NOTE — H&P
[address] [address]   Office [phone]  Fax [phone]       Admission H&P     Name: Charisse Shi MRN: [id number]  Sex: Female   YOB: 1990  Age: 33 y.o.  PCP: Marisol Parisi DO     Source of Information: patient, medical records    Chief complaint: SOB    History of Present Illness  Charisse Shi is a 33 y.o. female with known history of HTN, asthma, prediabetes, obesity, peripheral neuropathy and IIH who presents to the ER complaining of upper respiratory infection & shortness of breathe for 1 day. Patient endorses chills & fatigue as well. Patient has taken 6 tablets of 500mg tylenol for these symptoms. Patient reports children are sick with the cold. Patient reports increased bilateral edema & bilateral leg pain. Patient does not have a thermometer at home to take temperature.      In the ER:      Vitals:  Patient Vitals for the past 8 hrs:   Temp Pulse Resp BP SpO2   02/10/24 2042 -- 100 -- 133/86 100 %   02/10/24 2009 -- -- 20 -- --   02/10/24 2004 -- (!) 117 (!) 34 -- 100 %   02/10/24 1958 -- (!) 106 -- 124/68 100 %   02/10/24 1939 -- -- 20 -- --   02/10/24 1925 -- (!) 117 -- -- --   02/10/24 1917 98 °F (36.7 °C) (!) 117 18 (!) 164/127 100 %         Labs:   Recent Labs     02/10/24  1931   WBC 4.6   HGB 12.8   HCT 41.5        Recent Labs     02/10/24  1931      K 3.8      CO2 27   BUN 13     No results for input(s): \"TP\", \"ALB\", \"GLOB\", \"AML\" in the last 72 hours.    Invalid input(s): \"SGOT\", \"GPT\", \"AP\", \"TBIL\", \"AMYP\", \"LPSE\"  No results for input(s): \"INR\", \"APTT\" in the last 72 hours.    Invalid input(s): \"PTP\"   Invalid input(s): \"PHI\", \"PCO2I\", \"PO2I\", \"FIO2I\"  No results for input(s): \"CPK\", \"CKMB\" in the last 72 hours.    Invalid input(s): \"TROIQ\", \"BNPP\"    Imaging:   CXR:  Xray Result (most recent):  XR CHEST PORTABLE 02/10/2024    Narrative  EXAM: XR CHEST PORTABLE    DATE: 2/10/2024 8:05 PM    INDICATION: cough, shortness of  Arterial 140 (H) 80 - 100 mmHg    POC O2 SAT 99 (H) 92 - 97 %    HCO3, Arterial 26 22 - 26 mmol/L    Base Excess, Arterial 1.5 mmol/L    O2 Method BIPAP      FIO2 Arterial 30 %    POC PEEP/CPA 5.0      Source ARTERIAL      Site LEFT RADIAL      [place] Test YES     POCT Glucose    Collection Time: 02/10/24  9:53 PM   Result Value Ref Range    POC Glucose 123 (H) 65 - 117 mg/dL    Performed by: RONNI Mansfield and Plan     Charisse Shi is a 33 y.o. female with a PMHx of HTN, asthma, prediabetes, obesity, peripheral neuropathy and IIH  who is admitted for respiratory distress.    Respiratory distress / asthma exacerbation: Likely 2/2 to viral URI with comorbidity of severe obesity. Patient is currently on BiPAP. ABG: pH was unremarkable. Patient has a history of asthma. Patient reports 1 day of URI + SOB, chills & fatigue. CXR showed mild basilar atelectasis. COVID & flu are negative. RVP is pending. BNP was 17 thus low suspicion for heart failure. Troponin was 6>6 & EKG showed sinus tachycardia thus low suspicion for MI. D-dimer was wnl thus low suspicion for PE. Procal & lactic was wnl & POA temperature was 98 thus low suspicion for sepsis. Patient received decadron 10mg, lasix 40mg IV in ED.   - Admit to telemetry  - Monitor vitals per unit protocol  - monitor I&O  - O2 prn, wean as tolerated  - incentive spirometery q2h while awake  - solumedrol 60mg BID  - brovana, pulmicort BID  - Atrovent QID  - levalbuterol 1.25mg q8h prn   - continuing home zyrtec 10mg qD & montelukast 10mg qhs  - Daily CBC, BMP  - F/U RVP  - Consult PT, appreciate recs  - NPO    Hypertension: POA /127.  - Continuing home medications of lasix 20mg qD.   - Will continue to monitor at this time and readjust as BP's trend.     PreDiabetes Mellitus:  Hemoglobin A1C   Date Value Ref Range Status   09/20/2023 6.1 (H) 4.0 - 5.6 % Final     Comment:     (NOTE)  HbA1C Interpretive Ranges  <5.7              Normal  5.7 - 6.4

## 2024-02-11 NOTE — ED TRIAGE NOTES
used for triage.    Patient arrives to the ED with a CC of chest pain, shortness of breath, chills, cough and body aches that started yesterday. Patient has history of asthma, was told she has fluid in her lungs. Patient reports using inhaler and nebulizer at home yesterday.

## 2024-02-11 NOTE — DISCHARGE INSTRUCTIONS
HOME DISCHARGE INSTRUCTIONS    Charisse Shi / [id number] : 1990    Admission date: 2/10/2024 Discharge date: 2024     Please bring this form with you to show your care provider at your follow-up appointment.    Primary care provider:  Marisol Parisi MD    Discharging provider:  Eloisa Mc DO  - Family Medicine Resident  Austin Sotomayor MD - Family Medicine Attending      You have been admitted to the hospital with the following diagnoses:    ACUTE DIAGNOSES:  Respiratory distress [R06.03]  Hypertension, unspecified type [I10]    . . . . . . . . . . . . . . . . . . . . . . . . . . . . . . . . . . . . . . . . . . . . . . . . . . . . . . . . . . . . . . . . . . . . . . . .   FOLLOW-UP CARE RECOMMENDATIONS:  Please follow-up with your primary care physician, they can provide a referral to a pulmonologist who is a part of the [facility] card program so that you can obtain a sleep study.    Please taking your breathing treatment and finish the course of the prednisone (this was sent in to your pharmacy) as well as Tamaflu (next dose tonight)     Appointments  24 at 2:20pm with Dr. Mireles at [facility].       Follow-up tests needed: Repeat metabolic panel  after respiratory symptoms have resolved to evaluate elevated globulin levels found inpatient    Pending test results:   At the time of your discharge the following test results are still pending: None  Please make sure you review these results with your outpatient follow-up provider(s).    DIET/what to eat:  regular diet    ACTIVITY:  activity as tolerated    Wound care: None    Equipment needed:  Home walking equipment    Specific symptoms to watch for: chest pain, shortness of breath, fever, chills, nausea, vomiting, diarrhea, change in mentation, falling, weakness, bleeding.     What to do if new or unexpected symptoms occur?    If you experience any of the above symptoms (or should other concerns or questions

## 2024-02-11 NOTE — PROGRESS NOTES
[facility] MEDICINE RESIDENCY PROGRAM   Senior Resident Admission Note    Chart reviewed. Patient seen, examined, and discussed with Dr. Kaye (PGY-1). See H&P note for more details.    CC: Chest Pain, Shortness of Breath, Chills, and Generalized Body Aches    HPI:  Charisse Shi is a 33 y.o. female w/ a known history of asthma, IIH, HTN who presents for shortness of breath, cough, chills, myalgias, chest pain. She has 2 small children at home with similar symptoms. Symptoms x 1 day. Also notes edema in her legs bilaterally. Has been sweating more. She has not been able to establish with pulmonology due to lack of insurance.     Pertinent ED Findings:  VS: /86   Pulse 100   Temp 98 °F (36.7 °C) (Oral)   Resp 20   Ht 1.651 m (5' 5\")   Wt 136.1 kg (300 lb)   SpO2 100%   BMI 49.92 kg/m²     PE:  General appearance - alert, diaphoretic, no distress  Chest - mild intermittent wheezes, rales at bases, symmetric air entry  Heart - tachycardic, regular rhythm, normal S1, S2, no murmurs, rubs, clicks or gallops,   Abdomen - soft, nontender, nondistended, no masses or organomegaly  Neurological - alert, oriented, normal speech, no focal findings  Extremities - peripheral pulses normal, pedal edema difficult to assess due to body habitus, trace pitting, no clubbing or cyanosis    A/P: 33 y.o. female admitted for respiratory distress.    Respiratory distress / Asthma exacerbation: In the setting of likely URI based on symptoms. In the ER, was not hypoxic, but became diaphoretic with increased work of breathing so was placed on BiPAP with improvement. She was noted to have rales on ER exam and given Lasix 40 mg IV. Work up thus far has been unrevealing, with clear chest xray, no leukocytosis, negative flu and covid swabs, troponin 6, BNP 17, ABG without acidosis. She is afebrile. She has a history of interstitial edema noted on CXR in past, but not this admission. Previously evaluated by cardiology who felt it

## 2024-02-11 NOTE — ED PROVIDER NOTES
[facility] EMERGENCY DEPT  EMERGENCY DEPARTMENT ENCOUNTER      Pt Name: Chraisse Shi  MRN: [id number]  Birthdate 1990  Date of evaluation: 2/10/2024  Provider: Lavinia Wharton MD    CHIEF COMPLAINT       Chief Complaint   Patient presents with    Chest Pain    Shortness of Breath    Chills    Generalized Body Aches         HISTORY OF PRESENT ILLNESS    Charisse Shi is a 32 yo F with  h/o of asthma and admission last Fall for pulmonary edema and concern for CHF.  She notes that she developed chest pain. Body aches cough and chills yesterday and today the symptoms have increased.  She used her nebulizer yesterday but not today.            Additional history from independent historians:     Review of External Medical Records:     Nursing Notes were reviewed.    REVIEW OF SYSTEMS       Review of Systems    Except as noted above the remainder of the review of systems was reviewed and negative.       PAST MEDICAL HISTORY     Past Medical History:   Diagnosis Date    Cardiomegaly 2023    Essential hypertension     last two pregnancies         SURGICAL HISTORY       Past Surgical History:   Procedure Laterality Date     SECTION      x4    DILATION AND CURETTAGE OF UTERUS      OTHER SURGICAL HISTORY           CURRENT MEDICATIONS       Previous Medications    ACETAMINOPHEN (TYLENOL) 500 MG TABLET    Take 2 tablets by mouth in the morning and at bedtime    CETIRIZINE (ZYRTEC) 10 MG TABLET    Take 1 tablet by mouth daily    DULOXETINE (CYMBALTA) 20 MG EXTENDED RELEASE CAPSULE    Take 1 capsule by mouth daily    FLUTICASONE-SALMETEROL (ADVAIR) 250-50 MCG/ACT AEPB DISKUS INHALER    Inhale 1 puff into the lungs in the morning and 1 puff in the evening.    FLUTICASONE-UMECLIDIN-VILANT (TRELEGY ELLIPTA) 100-62.5-25 MCG/ACT AEPB INHALER    Inhale 1 puff into the lungs daily    FUROSEMIDE (LASIX) 20 MG TABLET    Take 1 tablet by mouth daily    IBUPROFEN (ADVIL;MOTRIN) 600 MG TABLET    Take 1 tablet by mouth every 8 hours

## 2024-02-11 NOTE — ED NOTES
TRANSFER - OUT REPORT:    Verbal report given to CHARLOTTE Rowell on Charisse Shi  being transferred to ICU for routine progression of patient care       Report consisted of patient's Situation, Background, Assessment and   Recommendations(SBAR).     Information from the following report(s) Nurse Handoff Report, MAR, and Recent Results was reviewed with the receiving nurse.    [place] Assessment:    Presents to emergency department  because of falls (Syncope, seizure, or loss of consciousness): No  Age > 70: No  Altered Mental Status, Intoxication with alcohol or substance confusion (Disorientation, impaired judgment, poor safety awaremess, or inability to follow instructions): No  Impaired Mobility: Ambulates or transfers with assistive devices or assistance; Unable to ambulate or transer.: No  Nursing Judgement: No          Lines:   Peripheral IV 02/10/24 Right Antecubital (Active)        Opportunity for questions and clarification was provided.      Patient transported with:  Monitor, O2 @ 2lpm, and Registered Nurse

## 2024-02-11 NOTE — CONSULTS
Pulmonary, Critical Care, and Sleep Medicine    Initial Patient Consult    Name: Charisse Shi MRN: [id number]   : 1990 Hospital: [facility]   Date: 2024        IMPRESSION:   Asthma exacerbation - sob, hypoxia, cough,:   she isn't wheezing but also not taking large breaths so may be underappreciated, would give her another 1-2 days of the high dose oral steroids (prednisone 50 mg bid)plus regular nebulzied albuterol( 2.5 mg q4 prn) if she isn't improving and sat still borderline would get ct chest - looking for atelectasis, plugging etc.    Obesity recommend sleep study  SOB out of proportion to exam findings -ddimer and bnp nl,  anxiety may be contributing, I don't note any muscle weakness but if sx persist and worsen lying down would check NIF      RECOMMENDATIONS:        Subjective:     This patient has been seen and evaluated at the request of Dr. laws for dyspnea, anxeity. Patient is a 33 y.o. female Bengali speaking, pt seen with .  She c/o severe sob, cough, cp/back pain for 4 days  Feels sob - o2/bipap help, worse walking, c/o paresthesias in hands  C/o dark sputum  No f/c  Staff/providers note sig anxiety    Pmh  Asthma  Htn  Obesity  Idiopathic intracranial htn    Lives with , 7 children  Non smoker  No pets    Ros:  Pos ha  Pos  sob, no hemoptysis  Pos cp with cough  Pos snoring  No n/v diarrhea    Does use inhaler at home - neftaly evans trelegy        Past Medical History:   Diagnosis Date    Cardiomegaly 2023    Essential hypertension     last two pregnancies      Past Surgical History:   Procedure Laterality Date     SECTION      x4    DILATION AND CURETTAGE OF UTERUS      OTHER SURGICAL HISTORY        Prior to Admission medications    Medication Sig Start Date End Date Taking? Authorizing Provider   naproxen (NAPROSYN) 500 MG tablet Take 1 tablet by mouth 2 times daily (with meals) 23   Bozena Maxwell MD   DULoxetine (CYMBALTA) 20 MG  0.2 MG/DL    Alk Phosphatase 59 45 - 117 U/L    AST 18 15 - 37 U/L    ALT 21 12 - 78 U/L   Blood Gas, Arterial    Collection Time: 02/10/24  8:57 PM   Result Value Ref Range    pH, Arterial 7.43 7.35 - 7.45      pCO2, Arterial 39 35 - 45 mmHg    pO2, Arterial 140 (H) 80 - 100 mmHg    POC O2 SAT 99 (H) 92 - 97 %    HCO3, Arterial 26 22 - 26 mmol/L    Base Excess, Arterial 1.5 mmol/L    O2 Method BIPAP      FIO2 Arterial 30 %    POC PEEP/CPA 5.0      Source ARTERIAL      Site LEFT RADIAL      [place] Test YES     Procalcitonin    Collection Time: 02/10/24  9:47 PM   Result Value Ref Range    Procalcitonin <0.05 ng/mL   Troponin    Collection Time: 02/10/24  9:47 PM   Result Value Ref Range    Troponin, High Sensitivity 6 0 - 51 ng/L   Lactic Acid    Collection Time: 02/10/24  9:47 PM   Result Value Ref Range    Lactic Acid, Plasma 1.2 0.4 - 2.0 MMOL/L   POCT Glucose    Collection Time: 02/10/24  9:53 PM   Result Value Ref Range    POC Glucose 123 (H) 65 - 117 mg/dL    Performed by: [employer] AIDE    Basic Metabolic Panel w/ Reflex to MG    Collection Time: 02/11/24  3:15 AM   Result Value Ref Range    Sodium 135 (L) 136 - 145 mmol/L    Potassium 4.6 3.5 - 5.1 mmol/L    Chloride 103 97 - 108 mmol/L    CO2 28 21 - 32 mmol/L    Anion Gap 4 (L) 5 - 15 mmol/L    Glucose 174 (H) 65 - 100 mg/dL    BUN 17 6 - 20 MG/DL    Creatinine 0.75 0.55 - 1.02 MG/DL    Bun/Cre Ratio 23 (H) 12 - 20      Est, Glom Filt Rate >60 >60 ml/min/1.73m2    Calcium 9.0 8.5 - 10.1 MG/DL   CBC with Auto Differential    Collection Time: 02/11/24  3:15 AM   Result Value Ref Range    WBC 4.2 3.6 - 11.0 K/uL    RBC 5.44 (H) 3.80 - 5.20 M/uL    Hemoglobin 12.5 11.5 - 16.0 g/dL    Hematocrit 40.3 35.0 - 47.0 %    MCV 74.1 (L) 80.0 - 99.0 FL    MCH 23.0 (L) 26.0 - 34.0 PG    MCHC 31.0 30.0 - 36.5 g/dL    RDW 15.7 (H) 11.5 - 14.5 %    Platelets 264 150 - 400 K/uL    MPV 10.9 8.9 - 12.9 FL    Nucleated RBCs 0.0 0  WBC    nRBC 0.00 0.00 - 0.01 K/uL

## 2024-02-12 ENCOUNTER — APPOINTMENT (OUTPATIENT)
Facility: HOSPITAL | Age: 34
End: 2024-02-12

## 2024-02-12 LAB
ANION GAP SERPL CALC-SCNC: 5 MMOL/L (ref 5–15)
BASOPHILS # BLD: 0 K/UL (ref 0–0.1)
BASOPHILS NFR BLD: 0 % (ref 0–1)
BUN SERPL-MCNC: 25 MG/DL (ref 6–20)
BUN/CREAT SERPL: 30 (ref 12–20)
CALCIUM SERPL-MCNC: 8.9 MG/DL (ref 8.5–10.1)
CHLORIDE SERPL-SCNC: 105 MMOL/L (ref 97–108)
CO2 SERPL-SCNC: 26 MMOL/L (ref 21–32)
CREAT SERPL-MCNC: 0.84 MG/DL (ref 0.55–1.02)
DIFFERENTIAL METHOD BLD: ABNORMAL
EOSINOPHIL # BLD: 0 K/UL (ref 0–0.4)
EOSINOPHIL NFR BLD: 0 % (ref 0–7)
ERYTHROCYTE [DISTWIDTH] IN BLOOD BY AUTOMATED COUNT: 15.9 % (ref 11.5–14.5)
GLUCOSE SERPL-MCNC: 191 MG/DL (ref 65–100)
HCT VFR BLD AUTO: 39.7 % (ref 35–47)
HGB BLD-MCNC: 12.1 G/DL (ref 11.5–16)
IMM GRANULOCYTES # BLD AUTO: 0 K/UL (ref 0–0.04)
IMM GRANULOCYTES NFR BLD AUTO: 0 % (ref 0–0.5)
LYMPHOCYTES # BLD: 1.2 K/UL (ref 0.8–3.5)
LYMPHOCYTES NFR BLD: 21 % (ref 12–49)
MCH RBC QN AUTO: 22.8 PG (ref 26–34)
MCHC RBC AUTO-ENTMCNC: 30.5 G/DL (ref 30–36.5)
MCV RBC AUTO: 74.9 FL (ref 80–99)
MONOCYTES # BLD: 0.7 K/UL (ref 0–1)
MONOCYTES NFR BLD: 13 % (ref 5–13)
NEUTS SEG # BLD: 3.6 K/UL (ref 1.8–8)
NEUTS SEG NFR BLD: 66 % (ref 32–75)
NRBC # BLD: 0 K/UL (ref 0–0.01)
NRBC BLD-RTO: 0 PER 100 WBC
PLATELET # BLD AUTO: 287 K/UL (ref 150–400)
PMV BLD AUTO: 10.9 FL (ref 8.9–12.9)
POTASSIUM SERPL-SCNC: 3.9 MMOL/L (ref 3.5–5.1)
RBC # BLD AUTO: 5.3 M/UL (ref 3.8–5.2)
SODIUM SERPL-SCNC: 136 MMOL/L (ref 136–145)
WBC # BLD AUTO: 5.5 K/UL (ref 3.6–11)

## 2024-02-12 PROCEDURE — 36415 COLL VENOUS BLD VENIPUNCTURE: CPT

## 2024-02-12 PROCEDURE — 6360000002 HC RX W HCPCS

## 2024-02-12 PROCEDURE — 94761 N-INVAS EAR/PLS OXIMETRY MLT: CPT

## 2024-02-12 PROCEDURE — 6370000000 HC RX 637 (ALT 250 FOR IP)

## 2024-02-12 PROCEDURE — 94640 AIRWAY INHALATION TREATMENT: CPT

## 2024-02-12 PROCEDURE — 2700000000 HC OXYGEN THERAPY PER DAY

## 2024-02-12 PROCEDURE — 80048 BASIC METABOLIC PNL TOTAL CA: CPT

## 2024-02-12 PROCEDURE — 2580000003 HC RX 258

## 2024-02-12 PROCEDURE — 93005 ELECTROCARDIOGRAM TRACING: CPT | Performed by: FAMILY MEDICINE

## 2024-02-12 PROCEDURE — 85025 COMPLETE CBC W/AUTO DIFF WBC: CPT

## 2024-02-12 PROCEDURE — 6360000002 HC RX W HCPCS: Performed by: FAMILY MEDICINE

## 2024-02-12 PROCEDURE — 71250 CT THORAX DX C-: CPT

## 2024-02-12 PROCEDURE — 1100000000 HC RM PRIVATE

## 2024-02-12 PROCEDURE — 94660 CPAP INITIATION&MGMT: CPT

## 2024-02-12 PROCEDURE — 99232 SBSQ HOSP IP/OBS MODERATE 35: CPT | Performed by: FAMILY MEDICINE

## 2024-02-12 RX ORDER — GUAIFENESIN 600 MG/1
600 TABLET, EXTENDED RELEASE ORAL 2 TIMES DAILY
Status: DISCONTINUED | OUTPATIENT
Start: 2024-02-12 | End: 2024-02-13 | Stop reason: HOSPADM

## 2024-02-12 RX ORDER — OXYMETAZOLINE HYDROCHLORIDE 0.05 G/100ML
2 SPRAY NASAL 2 TIMES DAILY
Status: DISCONTINUED | OUTPATIENT
Start: 2024-02-12 | End: 2024-02-13 | Stop reason: HOSPADM

## 2024-02-12 RX ORDER — IPRATROPIUM BROMIDE AND ALBUTEROL SULFATE 2.5; .5 MG/3ML; MG/3ML
1 SOLUTION RESPIRATORY (INHALATION)
Status: DISCONTINUED | OUTPATIENT
Start: 2024-02-12 | End: 2024-02-12

## 2024-02-12 RX ORDER — IPRATROPIUM BROMIDE AND ALBUTEROL SULFATE 2.5; .5 MG/3ML; MG/3ML
1 SOLUTION RESPIRATORY (INHALATION)
Status: DISCONTINUED | OUTPATIENT
Start: 2024-02-12 | End: 2024-02-13 | Stop reason: HOSPADM

## 2024-02-12 RX ADMIN — IPRATROPIUM BROMIDE AND ALBUTEROL SULFATE 1 DOSE: .5; 3 SOLUTION RESPIRATORY (INHALATION) at 21:17

## 2024-02-12 RX ADMIN — SODIUM CHLORIDE, PRESERVATIVE FREE 10 ML: 5 INJECTION INTRAVENOUS at 09:03

## 2024-02-12 RX ADMIN — TOPIRAMATE 50 MG: 100 TABLET, FILM COATED ORAL at 08:54

## 2024-02-12 RX ADMIN — CETIRIZINE HYDROCHLORIDE 10 MG: 10 TABLET, FILM COATED ORAL at 09:12

## 2024-02-12 RX ADMIN — ACETAMINOPHEN 650 MG: 325 TABLET ORAL at 17:56

## 2024-02-12 RX ADMIN — ENOXAPARIN SODIUM 30 MG: 100 INJECTION SUBCUTANEOUS at 21:38

## 2024-02-12 RX ADMIN — PROPRANOLOL HYDROCHLORIDE 10 MG: 10 TABLET ORAL at 21:37

## 2024-02-12 RX ADMIN — ARFORMOTEROL TARTRATE 15 MCG: 15 SOLUTION RESPIRATORY (INHALATION) at 21:24

## 2024-02-12 RX ADMIN — BUDESONIDE 500 MCG: 0.5 INHALANT RESPIRATORY (INHALATION) at 08:21

## 2024-02-12 RX ADMIN — GUAIFENESIN 600 MG: 600 TABLET ORAL at 09:48

## 2024-02-12 RX ADMIN — GUAIFENESIN 600 MG: 600 TABLET ORAL at 21:37

## 2024-02-12 RX ADMIN — DULOXETINE HYDROCHLORIDE 20 MG: 20 CAPSULE, DELAYED RELEASE ORAL at 09:48

## 2024-02-12 RX ADMIN — IPRATROPIUM BROMIDE AND ALBUTEROL SULFATE 1 DOSE: .5; 3 SOLUTION RESPIRATORY (INHALATION) at 14:24

## 2024-02-12 RX ADMIN — PROPRANOLOL HYDROCHLORIDE 10 MG: 10 TABLET ORAL at 14:10

## 2024-02-12 RX ADMIN — ARFORMOTEROL TARTRATE 15 MCG: 15 SOLUTION RESPIRATORY (INHALATION) at 08:21

## 2024-02-12 RX ADMIN — MONTELUKAST 10 MG: 10 TABLET, FILM COATED ORAL at 21:37

## 2024-02-12 RX ADMIN — PROPRANOLOL HYDROCHLORIDE 10 MG: 10 TABLET ORAL at 08:55

## 2024-02-12 RX ADMIN — ENOXAPARIN SODIUM 30 MG: 100 INJECTION SUBCUTANEOUS at 08:55

## 2024-02-12 RX ADMIN — IPRATROPIUM BROMIDE 0.5 MG: 0.5 SOLUTION RESPIRATORY (INHALATION) at 08:21

## 2024-02-12 RX ADMIN — FUROSEMIDE 20 MG: 20 TABLET ORAL at 08:54

## 2024-02-12 RX ADMIN — Medication 2 SPRAY: at 08:55

## 2024-02-12 RX ADMIN — ACETAMINOPHEN 650 MG: 325 TABLET ORAL at 11:43

## 2024-02-12 RX ADMIN — BUDESONIDE 500 MCG: 0.5 INHALANT RESPIRATORY (INHALATION) at 21:24

## 2024-02-12 RX ADMIN — PREDNISONE 50 MG: 20 TABLET ORAL at 08:54

## 2024-02-12 RX ADMIN — TOPIRAMATE 50 MG: 100 TABLET, FILM COATED ORAL at 21:37

## 2024-02-12 RX ADMIN — Medication 2 SPRAY: at 21:41

## 2024-02-12 RX ADMIN — OSELTAMIVIR PHOSPHATE 75 MG: 75 CAPSULE ORAL at 21:37

## 2024-02-12 RX ADMIN — BENZONATATE 100 MG: 100 CAPSULE ORAL at 17:56

## 2024-02-12 RX ADMIN — SODIUM CHLORIDE, PRESERVATIVE FREE 10 ML: 5 INJECTION INTRAVENOUS at 21:39

## 2024-02-12 RX ADMIN — BENZONATATE 100 MG: 100 CAPSULE ORAL at 11:16

## 2024-02-12 RX ADMIN — PREDNISONE 50 MG: 20 TABLET ORAL at 21:37

## 2024-02-12 RX ADMIN — BENZONATATE 100 MG: 100 CAPSULE ORAL at 04:39

## 2024-02-12 RX ADMIN — OSELTAMIVIR PHOSPHATE 75 MG: 75 CAPSULE ORAL at 08:55

## 2024-02-12 ASSESSMENT — PAIN DESCRIPTION - LOCATION: LOCATION: GENERALIZED

## 2024-02-12 NOTE — PROGRESS NOTES
Pt coughing and complaining of SOB, chest tightness and pain. EKG obtained-NSR. 2L NC. Family practice notified.

## 2024-02-12 NOTE — PROGRESS NOTES
0544 Pt has complaints of feeling dizzy. Vital signs bp 104/71,  pulse 67, oxygen saturation 97% on 2 liters nasal cannula.     0549 Notified on call MD. They will assess patient.

## 2024-02-12 NOTE — PROGRESS NOTES
Pulmonary, Critical Care, and Sleep Medicine    Progress Note    Name: Charisse Shi MRN: [id number]   : 1990 Hospital: [facility]   Date: 2024        IMPRESSION/PLAN:   Asthma exacerbation - sob, hypoxia, cough,:   she isn't wheezing but also not taking large breaths so may be underappreciated, would give her another 1-2 days of the high dose oral steroids (prednisone 50 mg bid)plus regular nebulzied xopenex and brovana/pulmicort nebs would get ct chest - looking for atelectasis, plugging etc.    Hypoxia - wean O2 as able.  RT eval for O2 assessment prior to discharge.  Influenza B - continue Tamiflu  Obesity - recommend sleep study  SOB out of proportion to exam findings -ddimer and bnp nl,  anxiety may be contributing, I don't note any muscle weakness but if sx persist and worsen lying down would check NIF        Subjective:     This patient has been seen and evaluated at the request of Dr. laws for dyspnea, anxeity. Patient is a 33 y.o. female Czech speaking, pt seen with .  She c/o severe sob, cough, cp/back pain for 4 days  Feels sob - o2/bipap help, worse walking, c/o paresthesias in hands  C/o dark sputum  No f/c  Staff/providers note sig anxiety    Pmh  Asthma  Htn  Obesity  Idiopathic intracranial htn    Lives with , 7 children  Non smoker  No pets    Ros:  Pos ha  Pos  sob, no hemoptysis  Pos cp with cough  Pos snoring  No n/v diarrhea    Does use inhaler at home - neb , mdi, trelegy    Interval history  Afebrile  BP stable  Sats 90-94% on 2L during my exam  Slept with NIV and states she tolerated fine  2/10 RVP + for influenza B  2/10 ABG 7.43/39/140/26 FiO2 30%    ROS: Used Czech video translation.  She reports continued chest tightness, sore back with cough, cough, and SOB.  SOB improved with O2 in place.  Feels feverish, but no fever measured.      Past Medical History:   Diagnosis Date    Cardiomegaly 2023    Essential hypertension     last two

## 2024-02-12 NOTE — PROGRESS NOTES
Pt ordered for Home O2 evaluation.  Per RN Note, pt c/o CP and SOB.  Will assess when ready for discharge.

## 2024-02-12 NOTE — PROGRESS NOTES
Nutrition Note    Noted cultural BPA entered on admission. No dietary modifications needed at this time.     Meal Intake:   Patient Vitals for the past 168 hrs:   PO Meals Eaten (%)   02/12/24 0440 0%   02/11/24 1300 76 - 100%   02/11/24 0815 0%     Supplement Intake:  No data found.    Wt Readings from Last 30 Encounters:   02/10/24 136.1 kg (300 lb)   12/14/23 136 kg (299 lb 13.2 oz)   11/28/23 134.3 kg (296 lb)   11/10/23 135.4 kg (298 lb 8.1 oz)   10/17/23 133.4 kg (294 lb)   10/17/23 132.5 kg (292 lb)   09/20/23 136.1 kg (300 lb)   08/18/23 136.1 kg (300 lb)   08/11/23 133.8 kg (295 lb)   07/14/23 133.8 kg (295 lb)   07/12/23 133.8 kg (295 lb)   05/26/23 132.8 kg (292 lb 12.8 oz)   05/12/23 129.7 kg (286 lb)   05/04/23 129.3 kg (285 lb)   04/14/23 129.3 kg (285 lb)   03/15/23 129.7 kg (286 lb)   02/08/23 129.3 kg (285 lb)   01/24/23 131.1 kg (289 lb)   12/16/22 127 kg (280 lb)   12/02/22 127 kg (280 lb)   10/14/22 126.6 kg (279 lb)   09/07/22 127.5 kg (281 lb)   07/29/22 128.8 kg (284 lb)   07/13/22 129.7 kg (286 lb)   06/15/22 127.5 kg (281 lb)   06/15/22 127.5 kg (281 lb)   06/09/22 128.2 kg (282 lb 9.6 oz)   05/19/22 124.7 kg (275 lb)   04/22/22 123.8 kg (273 lb)   04/07/22 123.4 kg (272 lb)           Electronically signed by Juvencio Orr RD on 2/12/24 at 2:51 PM EST    Contact: [phone]

## 2024-02-12 NOTE — PROGRESS NOTES
[address] [place] [address]   Office [phone]  Fax [phone]          Subjective / Objective     Subjective  Overnight Events: Patient complaint of dizziness    Patient reports that she has chronic dizziness, often when she wakes up and throughout the day with intermittent episodes of tinnitus. Patient states that right now she feels as if she is breathing smoke when she breathes through her nose. Patient endorses nasal congestion when she is taken off oxygen.    Patient was removed from oxygen during interview and experienced one episode of desaturation to 91% which resolved with deep breathing on auscultation.    Respiratory:   BiPAP  Vitals:    02/12/24 0545   BP: 104/71   Pulse: 68   Resp:    Temp:    SpO2: 97%     Physical Examination:   General appearance - resting comfortably, and in no distress  Chest - No wheezing or crackles noted on auscultation. Some decreased breath sounds noted in the bases.  Heart - normal rate, regular rhythm, normal S1, S2, no murmurs, rubs, clicks or gallops,   Abdomen - soft, nontender, nondistended, no masses or organomegaly  Neurological - alert, oriented, normal speech, no focal findings  Skin - warm, dry. No notable rashes  Extremities - peripheral pulses normal, +2 nonpitting pedal edema, no clubbing or cyanosis  Psychiatric - normal speech and thought processes    I/O:  02/11 0701 - 02/12 0700  In: 200 [P.O.:200]  Out: -   Inpatient Medications    Current Facility-Administered Medications   Medication Dose Route Frequency    ipratropium (ATROVENT) 0.02 % nebulizer solution 0.5 mg  0.5 mg Nebulization Q6H WA RT    LORazepam (ATIVAN) tablet 0.5 mg  0.5 mg Oral Q6H PRN    predniSONE (DELTASONE) tablet 50 mg  50 mg Oral BID    oseltamivir (TAMIFLU) capsule 75 mg  75 mg Oral BID    benzonatate (TESSALON) capsule 100 mg  100 mg Oral TID PRN    sodium chloride flush 0.9 % injection 5-40 mL  5-40 mL IntraVENous 2 times per day    sodium chloride flush  meds & shocks, discussed with patient / caregivers.  Next of Kin Name and Contact: Lorri Mcqueen () [phone]    Patient discussed with Austin Sotomayor MD Gregory W Crook, MD  Family Medicine Resident       For Billing    Chief Complaint   Patient presents with    Chest Pain    Shortness of Breath    Chills    Generalized Body Aches

## 2024-02-13 VITALS
HEART RATE: 64 BPM | WEIGHT: 293 LBS | BODY MASS INDEX: 48.82 KG/M2 | OXYGEN SATURATION: 95 % | SYSTOLIC BLOOD PRESSURE: 137 MMHG | DIASTOLIC BLOOD PRESSURE: 69 MMHG | TEMPERATURE: 98.2 F | HEIGHT: 65 IN | RESPIRATION RATE: 22 BRPM

## 2024-02-13 LAB
ANION GAP SERPL CALC-SCNC: 5 MMOL/L (ref 5–15)
BASOPHILS # BLD: 0 K/UL (ref 0–0.1)
BASOPHILS NFR BLD: 0 % (ref 0–1)
BUN SERPL-MCNC: 29 MG/DL (ref 6–20)
BUN/CREAT SERPL: 29 (ref 12–20)
CALCIUM SERPL-MCNC: 9.1 MG/DL (ref 8.5–10.1)
CHLORIDE SERPL-SCNC: 104 MMOL/L (ref 97–108)
CO2 SERPL-SCNC: 27 MMOL/L (ref 21–32)
CREAT SERPL-MCNC: 1 MG/DL (ref 0.55–1.02)
DIFFERENTIAL METHOD BLD: ABNORMAL
EKG ATRIAL RATE: 61 BPM
EKG DIAGNOSIS: NORMAL
EKG P AXIS: 22 DEGREES
EKG P-R INTERVAL: 150 MS
EKG Q-T INTERVAL: 430 MS
EKG QRS DURATION: 98 MS
EKG QTC CALCULATION (BAZETT): 432 MS
EKG R AXIS: 41 DEGREES
EKG T AXIS: 7 DEGREES
EKG VENTRICULAR RATE: 61 BPM
EOSINOPHIL # BLD: 0 K/UL (ref 0–0.4)
EOSINOPHIL NFR BLD: 0 % (ref 0–7)
ERYTHROCYTE [DISTWIDTH] IN BLOOD BY AUTOMATED COUNT: 15.9 % (ref 11.5–14.5)
GLUCOSE SERPL-MCNC: 146 MG/DL (ref 65–100)
HCT VFR BLD AUTO: 40.9 % (ref 35–47)
HGB BLD-MCNC: 12.5 G/DL (ref 11.5–16)
IMM GRANULOCYTES # BLD AUTO: 0 K/UL (ref 0–0.04)
IMM GRANULOCYTES NFR BLD AUTO: 0 % (ref 0–0.5)
LYMPHOCYTES # BLD: 1.9 K/UL (ref 0.8–3.5)
LYMPHOCYTES NFR BLD: 27 % (ref 12–49)
MAGNESIUM SERPL-MCNC: 2 MG/DL (ref 1.6–2.4)
MCH RBC QN AUTO: 23.3 PG (ref 26–34)
MCHC RBC AUTO-ENTMCNC: 30.6 G/DL (ref 30–36.5)
MCV RBC AUTO: 76.3 FL (ref 80–99)
MONOCYTES # BLD: 0.9 K/UL (ref 0–1)
MONOCYTES NFR BLD: 12 % (ref 5–13)
NEUTS SEG # BLD: 4.3 K/UL (ref 1.8–8)
NEUTS SEG NFR BLD: 61 % (ref 32–75)
NRBC # BLD: 0 K/UL (ref 0–0.01)
NRBC BLD-RTO: 0 PER 100 WBC
PLATELET # BLD AUTO: 294 K/UL (ref 150–400)
PMV BLD AUTO: 10.8 FL (ref 8.9–12.9)
POTASSIUM SERPL-SCNC: 3.5 MMOL/L (ref 3.5–5.1)
RBC # BLD AUTO: 5.36 M/UL (ref 3.8–5.2)
SODIUM SERPL-SCNC: 136 MMOL/L (ref 136–145)
WBC # BLD AUTO: 7.1 K/UL (ref 3.6–11)

## 2024-02-13 PROCEDURE — 36415 COLL VENOUS BLD VENIPUNCTURE: CPT

## 2024-02-13 PROCEDURE — 6360000002 HC RX W HCPCS

## 2024-02-13 PROCEDURE — 80048 BASIC METABOLIC PNL TOTAL CA: CPT

## 2024-02-13 PROCEDURE — 6370000000 HC RX 637 (ALT 250 FOR IP)

## 2024-02-13 PROCEDURE — 2580000003 HC RX 258

## 2024-02-13 PROCEDURE — 83735 ASSAY OF MAGNESIUM: CPT

## 2024-02-13 PROCEDURE — 94761 N-INVAS EAR/PLS OXIMETRY MLT: CPT

## 2024-02-13 PROCEDURE — 2700000000 HC OXYGEN THERAPY PER DAY

## 2024-02-13 PROCEDURE — 99238 HOSP IP/OBS DSCHRG MGMT 30/<: CPT | Performed by: FAMILY MEDICINE

## 2024-02-13 PROCEDURE — 85025 COMPLETE CBC W/AUTO DIFF WBC: CPT

## 2024-02-13 PROCEDURE — 94640 AIRWAY INHALATION TREATMENT: CPT

## 2024-02-13 PROCEDURE — 93010 ELECTROCARDIOGRAM REPORT: CPT | Performed by: INTERNAL MEDICINE

## 2024-02-13 PROCEDURE — 94618 PULMONARY STRESS TESTING: CPT

## 2024-02-13 RX ORDER — OSELTAMIVIR PHOSPHATE 75 MG/1
75 CAPSULE ORAL 2 TIMES DAILY
Qty: 6 CAPSULE | Refills: 0 | Status: SHIPPED | OUTPATIENT
Start: 2024-02-13 | End: 2024-02-13

## 2024-02-13 RX ORDER — PREDNISONE 20 MG/1
40 TABLET ORAL DAILY
Status: DISCONTINUED | OUTPATIENT
Start: 2024-02-14 | End: 2024-02-13 | Stop reason: HOSPADM

## 2024-02-13 RX ORDER — ALBUTEROL SULFATE 90 UG/1
2 AEROSOL, METERED RESPIRATORY (INHALATION) 4 TIMES DAILY PRN
Qty: 18 G | Refills: 0 | Status: SHIPPED | OUTPATIENT
Start: 2024-02-13

## 2024-02-13 RX ORDER — PREDNISONE 10 MG/1
TABLET ORAL
Qty: 70 TABLET | Refills: 0 | Status: SHIPPED | OUTPATIENT
Start: 2024-02-13 | End: 2024-02-26

## 2024-02-13 RX ORDER — PREDNISONE 10 MG/1
TABLET ORAL
Qty: 70 TABLET | Refills: 0 | Status: SHIPPED | OUTPATIENT
Start: 2024-02-13 | End: 2024-02-13

## 2024-02-13 RX ORDER — OSELTAMIVIR PHOSPHATE 75 MG/1
75 CAPSULE ORAL 2 TIMES DAILY
Qty: 6 CAPSULE | Refills: 0 | Status: SHIPPED | OUTPATIENT
Start: 2024-02-13 | End: 2024-02-16

## 2024-02-13 RX ADMIN — OSELTAMIVIR PHOSPHATE 75 MG: 75 CAPSULE ORAL at 08:51

## 2024-02-13 RX ADMIN — GUAIFENESIN 600 MG: 600 TABLET ORAL at 08:51

## 2024-02-13 RX ADMIN — PREDNISONE 50 MG: 20 TABLET ORAL at 08:51

## 2024-02-13 RX ADMIN — ARFORMOTEROL TARTRATE 15 MCG: 15 SOLUTION RESPIRATORY (INHALATION) at 08:42

## 2024-02-13 RX ADMIN — TOPIRAMATE 50 MG: 100 TABLET, FILM COATED ORAL at 09:37

## 2024-02-13 RX ADMIN — BENZONATATE 100 MG: 100 CAPSULE ORAL at 09:04

## 2024-02-13 RX ADMIN — ACETAMINOPHEN 650 MG: 325 TABLET ORAL at 09:04

## 2024-02-13 RX ADMIN — FUROSEMIDE 20 MG: 20 TABLET ORAL at 08:51

## 2024-02-13 RX ADMIN — Medication 2 SPRAY: at 08:52

## 2024-02-13 RX ADMIN — IPRATROPIUM BROMIDE AND ALBUTEROL SULFATE 1 DOSE: .5; 3 SOLUTION RESPIRATORY (INHALATION) at 08:35

## 2024-02-13 RX ADMIN — BUDESONIDE 500 MCG: 0.5 INHALANT RESPIRATORY (INHALATION) at 08:42

## 2024-02-13 RX ADMIN — PROPRANOLOL HYDROCHLORIDE 10 MG: 10 TABLET ORAL at 08:51

## 2024-02-13 RX ADMIN — ENOXAPARIN SODIUM 30 MG: 100 INJECTION SUBCUTANEOUS at 08:51

## 2024-02-13 RX ADMIN — SODIUM CHLORIDE, PRESERVATIVE FREE 10 ML: 5 INJECTION INTRAVENOUS at 08:50

## 2024-02-13 RX ADMIN — CETIRIZINE HYDROCHLORIDE 10 MG: 10 TABLET, FILM COATED ORAL at 08:51

## 2024-02-13 RX ADMIN — DULOXETINE HYDROCHLORIDE 20 MG: 20 CAPSULE, DELAYED RELEASE ORAL at 08:52

## 2024-02-13 ASSESSMENT — PAIN SCALES - GENERAL
PAINLEVEL_OUTOF10: 0
PAINLEVEL_OUTOF10: 0

## 2024-02-13 NOTE — DISCHARGE SUMMARY
[address] [place] [address]   Office [phone]  Fax [phone]        Discharge / Transfer / Off-Service Note     Name: Charisse Shi MRN: [id number]  Sex: Female   YOB: 1990  Age: 33 y.o.  PCP: Marisol Parisi DO     Date of admission: 2/10/2024  Date of discharge/transfer: 2/13/2024    Attending physician at admission: Austin Sotomayor.  Attending physician at discharge/transfer: Austin Sotomayor.  Resident physician at discharge/transfer: Eloisa Mc DO     Consultants during hospitalization  IP CONSULT TO FAMILY MEDICINE  IP CONSULT TO PULMONOLOGY     Admission diagnoses   Respiratory distress [R06.03]  Hypertension, unspecified type [I10]    Recommended follow-up after discharge    1. PCP- Marisol Parisi DO    To follow up on with PCP:   - Placing referral to pulmonologist to establish for care & obtain sleep study  - Metabolic Panel (after resolution of respiratory symptoms d/t elevated globulin level)  - Medication reconciliation   ------------------------------------------------------------------------------------------------------------------    History of Present Illness    As per admitting provider, Dr. Julianna Kaye MD:   \"Charisse Shi is a 33 y.o. female with known history of HTN, asthma, prediabetes, obesity, peripheral neuropathy and IIH who presents to the ER complaining of upper respiratory infection & shortness of breathe for 1 day. Patient endorses chills & fatigue as well. Patient has taken 6 tablets of 500mg tylenol for these symptoms. Patient reports children are sick with the cold. Patient reports increased bilateral edema & bilateral leg pain. Patient does not have a thermometer at home to take temperature. \"      Hospital course  Charisse Shi was admitted into the Family Medicine Service from 2/10/2024 to 02/13/2024 for Respiratory distress [R06.03] and Hypertension, unspecified type [I10]. Pulmonology and Physical Therapy followed along during

## 2024-02-13 NOTE — PROGRESS NOTES
[id number] [place] [address]   Office [phone]  Fax [phone]          Subjective / Objective     Subjective  Overnight Events: No acute events     Patient examined at bedside. Patient is doing well this morning.  She shares concerns for medication coverage due to her insurance. States she is ready to go home. States she would ideally like to around 10 AM and that her  would like to pick her up at that time.     Respiratory:   BiPAP  Vitals:    02/13/24 0807   BP: 130/74   Pulse: 62   Resp: 19   Temp: 98.2 °F (36.8 °C)   SpO2: 94%     Physical Examination:   General appearance - resting comfortably, and in no distress  Chest - CTAB. Some decreased breath sounds noted in the bases. No wheezes, no crackles.   Heart - normal rate, regular rhythm, normal S1, S2, no murmurs, rubs, clicks or gallops,   Abdomen - soft, nontender. Exam limited by body habitus.  Neurological - alert, oriented, normal speech, no focal findings  Skin - warm, dry. No notable rashes  Extremities - peripheral pulses normal, +2 nonpitting pedal edema, no clubbing or cyanosis  Psychiatric - normal speech and thought processes    I/O:  No intake/output data recorded.  Inpatient Medications    Current Facility-Administered Medications   Medication Dose Route Frequency    oxymetazoline (AFRIN) 0.05 % nasal spray 2 spray  2 spray Each Nostril BID    guaiFENesin (MUCINEX) extended release tablet 600 mg  600 mg Oral BID    ipratropium 0.5 mg-albuterol 2.5 mg (DUONEB) nebulizer solution 1 Dose  1 Dose Inhalation Q6H WA RT    LORazepam (ATIVAN) tablet 0.5 mg  0.5 mg Oral Q6H PRN    predniSONE (DELTASONE) tablet 50 mg  50 mg Oral BID    oseltamivir (TAMIFLU) capsule 75 mg  75 mg Oral BID    benzonatate (TESSALON) capsule 100 mg  100 mg Oral TID PRN    sodium chloride flush 0.9 % injection 5-40 mL  5-40 mL IntraVENous 2 times per day    sodium chloride flush 0.9 % injection 5-40 mL  5-40 mL IntraVENous PRN    0.9 % sodium  chloride infusion   IntraVENous PRN    enoxaparin Sodium (LOVENOX) injection 30 mg  30 mg SubCUTAneous BID    ondansetron (ZOFRAN-ODT) disintegrating tablet 4 mg  4 mg Oral Q8H PRN    Or    ondansetron (ZOFRAN) injection 4 mg  4 mg IntraVENous Q6H PRN    polyethylene glycol (GLYCOLAX) packet 17 g  17 g Oral Daily PRN    acetaminophen (TYLENOL) tablet 650 mg  650 mg Oral Q6H PRN    Or    acetaminophen (TYLENOL) suppository 650 mg  650 mg Rectal Q6H PRN    levalbuterol (XOPENEX) nebulizer solution 1.25 mg  1.25 mg Nebulization Q8H PRN    DULoxetine (CYMBALTA) extended release capsule 20 mg  20 mg Oral Daily    budesonide (PULMICORT) nebulizer suspension 500 mcg  0.5 mg Nebulization BID RT    topiramate (TOPAMAX) tablet 50 mg  50 mg Oral BID    propranolol (INDERAL) tablet 10 mg  10 mg Oral TID    montelukast (SINGULAIR) tablet 10 mg  10 mg Oral QHS    cetirizine (ZYRTEC) tablet 10 mg  10 mg Oral Daily    furosemide (LASIX) tablet 20 mg  20 mg Oral Daily    arformoterol tartrate (BROVANA) nebulizer solution 15 mcg  15 mcg Nebulization BID RT     Allergies  Allergies   Allergen Reactions    Acetazolamide      Other reaction(s): Unknown (comments)  Rash arm and paresthesias.     Adhesive Tape Rash    Other Rash     Pt. States does not remember the name of the shot but had a reaction.     CBC:  Recent Labs     02/11/24  0315 02/12/24  0059 02/13/24  0012   WBC 4.2 5.5 7.1   HGB 12.5 12.1 12.5    287 294     Metabolic Panel:  Recent Labs     02/10/24  1931 02/11/24  0315 02/12/24  0059 02/13/24  0012    135* 136 136   K 3.8 4.6 3.9 3.5    103 105 104   CO2 27 28 26 27   BUN 13 17 25* 29*   MG  --   --   --  2.0   ALT 21  --   --   --             Assessment and Plan     Charisse Shi is a 33 y.o. female with a PMHx of HTN, asthma, prediabetes, obesity, peripheral neuropathy and IIH  who is admitted for respiratory distress.     Respiratory distress / asthma exacerbation: Likely 2/2 to viral URI with

## 2024-02-13 NOTE — PROGRESS NOTES
Pulmonary, Critical Care, and Sleep Medicine    Progress Note    Name: Charisse Shi MRN: [id number]   : 1990 [facility]   Date: 2024        IMPRESSION/PLAN:   Asthma exacerbation - sob, hypoxia, cough.  Wean pred to 40mg daily.  Continue nebulzied xopenex and brovana/pulmicort nebs     Hypoxia - improved, on RA at rest..  RT eval for O2 assessment prior to discharge.  Influenza B - continue Tamiflu  Obesity - recommend sleep study  SOB out of proportion to exam findings -ddimer and bnp nl,  anxiety may be contributing, I don't note any muscle weakness but if sx persist and worsen lying down would check NIF    Patient is stable from a pulmonary standpoint.  We will sign off and arrange for outpatient pulmonary follow up as above.  Please call with questions.          Subjective:     This patient has been seen and evaluated at the request of Dr. laws for dyspnea, anxeity. Patient is a 33 y.o. female Macedonian speaking, pt seen with .  She c/o severe sob, cough, cp/back pain for 4 days  Feels sob - o2/bipap help, worse walking, c/o paresthesias in hands  C/o dark sputum  No f/c  Staff/providers note sig anxiety    Pmh  Asthma  Htn  Obesity  Idiopathic intracranial htn    Lives with , 7 children  Non smoker  No pets    Ros:  Pos ha  Pos  sob, no hemoptysis  Pos cp with cough  Pos snoring  No n/v diarrhea    Does use inhaler at home - neb , mdi, trelegy    Interval history  Afebrile  BP stable  Sats 96% on RA  2/10 RVP + for influenza B  2/10 ABG 7.43/39/140/26 FiO2 30%     chest CT: lungs clear, no adenopathy    ROS: Used Macedonian video translation.  She reports congestion, cough, and SOB.  Nebs are helpful.      Past Medical History:   Diagnosis Date    Cardiomegaly 2023    Essential hypertension     last two pregnancies      Past Surgical History:   Procedure Laterality Date     SECTION      x4    DILATION AND CURETTAGE OF UTERUS      OTHER SURGICAL

## 2024-02-13 NOTE — PROGRESS NOTES
02/13/24 0929   Resting (Room Air)   SpO2 92   HR 88   During Walk (Room Air)   SpO2 94   HR 99   Walk/Assistance Device Walker   Rate of Dyspnea 3   Symptoms Shortness of breath;Dizziness   Comments used walker in room.   After Walk   SpO2 96   HR 90   Comments returned to bed on room air.   Does the Patient Qualify for Home O2 No

## 2024-02-14 ENCOUNTER — TELEPHONE (OUTPATIENT)
Age: 34
End: 2024-02-14

## 2024-02-14 NOTE — TELEPHONE ENCOUNTER
Session Code 18983  / Faroese : Abril  # [id number]     Care Transitions Initial Follow Up Call    Outreach made within 2 business days of discharge: Yes    Patient: Charisse Shi Patient : 1990   MRN: [id number]  Reason for Admission: There are no discharge diagnoses documented for the most recent discharge.  Discharge Date: 24       Spoke with: , Lorri     Discharge department/facility: [facility] [place]    TCM Interactive Patient Contact:  Was patient able to fill all prescriptions: Yes  Was patient instructed to bring all medications to the follow-up visit: Yes  Is patient taking all medications as directed in the discharge summary? Yes  Does patient understand their discharge instructions: Yes  Does patient have questions or concerns that need addressed prior to 7-14 day follow up office visit: no    Scheduled appointment with PCP within 7-14 days    Follow Up  Future Appointments   Date Time Provider Department Center   2024 11:00 AM Marisol Parisi DO [facility] BS BOY Carbajal LPN

## 2024-02-16 NOTE — PROGRESS NOTES
Physician Progress Note      PATIENT:               HETAL, NADA  CSN #:                  [id number]  :                       1990  ADMIT DATE:       2/10/2024 7:20 PM  DISCH DATE:        2024 2:58 PM  RESPONDING  PROVIDER #:        RADHA ROY          QUERY TEXT:    33yoF pt admitted with Acute Asthma exacerbation.  Pt noted to have   respiratory distress likely 2/2 to viral URI with comorbidity of severe   obesity.  On arrival, pt became diaphoretic and increased work of breathing so   was placed on BIPAP.  If possible, please document in the progress notes and   discharge summary if you are evaluating and/or treating any of the following:    The medical record reflects the following:  Risk Factors: Anxiety, Asthma exacerbation, morbid obesity, BMI 49.92 kg/m2.   Influenza B +.  Clinical Indicators:  Nursing Flowsheet noted: SOB, Tachypnea, fatigue  2/10  2009 Labored, Accessory muscle use  2/10 2315 RR 28 - 34 100%  PAP  noted as Labored, dyspnea @ rest, Tachypneic    on BIPAP    0750 REsp Tachypneic Labored, dyspnea @ rest on 4L NC  dyspnea with exertion & @ rest   Pulmonology noted 'at still borderline would get ct chest - looking for   atelectasis, plugging etc; pt feels sob - o2/bipap help, worse walking, c/o   paresthesias in hands  Treatment: Decadron, IV lasix, BIPAP in ED, inhalers, Solumedrol, nebs,   Pulmonology consult.    Thank you,  Kadi Cedeno RN, CDI  Options provided:  -- Acute respiratory failure with hypoxia  -- Acute respiratory failure with hypercapnia  -- Acute respiratory distress  -- Other - I will add my own diagnosis  -- Disagree - Not applicable / Not valid  -- Disagree - Clinically unable to determine / Unknown  -- Refer to Clinical Documentation Reviewer    PROVIDER RESPONSE TEXT:    This patient is in acute respiratory distress POA.    Query created by: Kadi Cedeno on 2024 11:43 AM      Electronically signed by:  RADHA ROY 2024 1:45

## 2024-02-21 ENCOUNTER — OFFICE VISIT (OUTPATIENT)
Age: 34
End: 2024-02-21
Payer: MEDICAID

## 2024-02-21 VITALS
WEIGHT: 293 LBS | SYSTOLIC BLOOD PRESSURE: 136 MMHG | BODY MASS INDEX: 48.82 KG/M2 | RESPIRATION RATE: 18 BRPM | HEIGHT: 65 IN | OXYGEN SATURATION: 97 % | HEART RATE: 92 BPM | TEMPERATURE: 98.6 F | DIASTOLIC BLOOD PRESSURE: 88 MMHG

## 2024-02-21 DIAGNOSIS — E66.01 MORBID OBESITY WITH BMI OF 45.0-49.9, ADULT (HCC): ICD-10-CM

## 2024-02-21 DIAGNOSIS — R73.03 PRE-DIABETES: ICD-10-CM

## 2024-02-21 DIAGNOSIS — Z09 HOSPITAL DISCHARGE FOLLOW-UP: Primary | ICD-10-CM

## 2024-02-21 DIAGNOSIS — G93.2 IDIOPATHIC INTRACRANIAL HYPERTENSION: ICD-10-CM

## 2024-02-21 LAB — HBA1C MFR BLD: 6.5 %

## 2024-02-21 PROCEDURE — 1111F DSCHRG MED/CURRENT MED MERGE: CPT | Performed by: FAMILY MEDICINE

## 2024-02-21 PROCEDURE — 99214 OFFICE O/P EST MOD 30 MIN: CPT | Performed by: FAMILY MEDICINE

## 2024-02-21 PROCEDURE — PBSHW AMB POC HEMOGLOBIN A1C: Performed by: FAMILY MEDICINE

## 2024-02-21 PROCEDURE — 83036 HEMOGLOBIN GLYCOSYLATED A1C: CPT | Performed by: FAMILY MEDICINE

## 2024-02-21 PROCEDURE — 99495 TRANSJ CARE MGMT MOD F2F 14D: CPT | Performed by: FAMILY MEDICINE

## 2024-02-21 NOTE — PROGRESS NOTES
[facility] Office Visit     Assessment/ Plan: Charisse Shi is a 33 y.o. female presenting for:    Hospital Follow-Up  Asthma Exacerbation, Flu - Overall back to baseline though still not feeling well - daily headaches, more sleep. Unable to complete med rec because does not have medications with her today, unable to tell me exactly how she takes her medications. States has follow-up arranged with pulmonology.   -- consider d/c lasix (was prescribed by cardiology in place of acetazolamide from [facility]) but low suspicion for heart failure - no pulmonary edema on recent CT Chest     Pre-Diabetes - Rechecked A1C and in diabetes range (barely at 6.5%). Plan to recheck at follow-up with venous sample and consider conversation of metformin, as I think this would benefit patient. Has been on steroids recently for asthma exacerbation but still feel she would benefit from metformin if able to tolerate.     Chronic Headaches  IIH - Chronic, no improvement. Seen by neurology but needs ophthalmology eval to dictate further treatment recs. Unable to get ophtho until has Medicaid because cannot afford to see out of pocket. Has seen optometry but didn't have full exam needed.   -- referral/message sent to CM/SW to reach out to patient  regarding citizenship forms, Medicaid status, etc.     30 minutes were spent on the day of this encounter both with the patient and in related activities including chart review, care coordination and counseling.     Patient instructions were discussed and/or provided in the AVS. The patient understands and agrees to the plan.    RETURN TO CARE: 1 month review meds   Future Appointments   Date Time Provider Department Center   3/20/2024  3:40 PM Marisol Parisi DO [facility] BS AMB     Subjective:  Chief Complaint   Patient presents with    Follow-Up from Hospital     Patient is coming in for a hospital follow up from [facility]. She was admitted for 4 days. She went in for

## 2024-02-21 NOTE — PROGRESS NOTES
Charisse Shi is a 33 y.o. female      Chief Complaint   Patient presents with    Follow-Up from Hospital     Patient is coming in for a hospital follow up from [facility]. She was admitted for 4 days. She went in for shortness of breath and cough. She still she has constant headaches, dizziness and weakness. Interpretor ID: [id number]. No other concerns.        \"Have you been to the ER, urgent care clinic since your last visit?  Hospitalized since your last visit?\"    NO    “Have you seen or consulted any other health care providers outside of [facility] since your last visit?”    NO              Vitals:    02/21/24 1103   BP: 136/88   Site: Right Upper Arm   Position: Sitting   Pulse: 92   Resp: 18   Temp: 98.6 °F (37 °C)   TempSrc: Oral   SpO2: 97%   Weight: 134.7 kg (297 lb)   Height: 1.651 m (5' 5\")            Health Maintenance Due   Topic Date Due    Hepatitis B vaccine (1 of 3 - 3-dose series) Never done    COVID-19 Vaccine (1) Never done    Varicella vaccine (1 of 2 - 2-dose childhood series) Never done    Pneumococcal 0-64 years Vaccine (1 - PCV) Never done    Flu vaccine (1) 08/01/2023         Medication Reconciliation completed, changes noted.  Please  Update medication list.

## 2024-03-04 ENCOUNTER — TELEPHONE (OUTPATIENT)
Age: 34
End: 2024-03-04

## 2024-03-04 NOTE — TELEPHONE ENCOUNTER
SW referral received from physician.    SW attempted to reach patient via telephone. No answer, left voicemail.    RONAL Oreilly Navigator

## 2024-03-15 ENCOUNTER — TELEPHONE (OUTPATIENT)
Age: 34
End: 2024-03-15

## 2024-03-19 PROBLEM — I50.9 ACUTE ON CHRONIC CONGESTIVE HEART FAILURE (HCC): Status: RESOLVED | Noted: 2023-11-10 | Resolved: 2024-03-19

## 2024-03-19 PROBLEM — R06.00 DYSPNEA: Status: RESOLVED | Noted: 2023-11-09 | Resolved: 2024-03-19

## 2024-03-19 PROBLEM — J44.1 COPD EXACERBATION (HCC): Status: RESOLVED | Noted: 2023-11-10 | Resolved: 2024-03-19

## 2024-03-19 PROBLEM — R06.03 RESPIRATORY DISTRESS: Status: RESOLVED | Noted: 2024-02-10 | Resolved: 2024-03-19

## 2024-03-19 PROBLEM — J81.1 PULMONARY EDEMA: Status: RESOLVED | Noted: 2023-04-26 | Resolved: 2024-03-19

## 2024-03-20 ENCOUNTER — OFFICE VISIT (OUTPATIENT)
Age: 34
End: 2024-03-20

## 2024-03-20 ENCOUNTER — OFFICE VISIT (OUTPATIENT)
Age: 34
End: 2024-03-20
Payer: MEDICAID

## 2024-03-20 VITALS
HEIGHT: 65 IN | RESPIRATION RATE: 18 BRPM | OXYGEN SATURATION: 97 % | DIASTOLIC BLOOD PRESSURE: 82 MMHG | TEMPERATURE: 99.3 F | HEART RATE: 91 BPM | WEIGHT: 293 LBS | BODY MASS INDEX: 48.82 KG/M2 | SYSTOLIC BLOOD PRESSURE: 130 MMHG

## 2024-03-20 DIAGNOSIS — E66.01 MORBID OBESITY WITH BMI OF 45.0-49.9, ADULT (HCC): ICD-10-CM

## 2024-03-20 DIAGNOSIS — E11.69 TYPE 2 DIABETES MELLITUS WITH OTHER SPECIFIED COMPLICATION, UNSPECIFIED WHETHER LONG TERM INSULIN USE (HCC): ICD-10-CM

## 2024-03-20 DIAGNOSIS — M25.569 KNEE PAIN, UNSPECIFIED CHRONICITY, UNSPECIFIED LATERALITY: ICD-10-CM

## 2024-03-20 DIAGNOSIS — Z59.41 FOOD INSECURITY: Primary | ICD-10-CM

## 2024-03-20 DIAGNOSIS — Z59.82 TRANSPORTATION UNAVAILABLE: ICD-10-CM

## 2024-03-20 DIAGNOSIS — G93.2 IDIOPATHIC INTRACRANIAL HYPERTENSION: Primary | ICD-10-CM

## 2024-03-20 DIAGNOSIS — J45.909 UNCOMPLICATED ASTHMA, UNSPECIFIED ASTHMA SEVERITY, UNSPECIFIED WHETHER PERSISTENT: ICD-10-CM

## 2024-03-20 DIAGNOSIS — I10 CHRONIC HYPERTENSION: ICD-10-CM

## 2024-03-20 DIAGNOSIS — Z59.9 FINANCIAL DIFFICULTY: ICD-10-CM

## 2024-03-20 DIAGNOSIS — Z13.9 ENCOUNTER FOR SCREENING INVOLVING SOCIAL DETERMINANTS OF HEALTH (SDOH): ICD-10-CM

## 2024-03-20 DIAGNOSIS — Z75.4 INADEQUATE COMMUNITY RESOURCES: ICD-10-CM

## 2024-03-20 PROCEDURE — 99214 OFFICE O/P EST MOD 30 MIN: CPT | Performed by: FAMILY MEDICINE

## 2024-03-20 RX ORDER — ACETAZOLAMIDE 250 MG/1
250 TABLET ORAL DAILY
COMMUNITY

## 2024-03-20 RX ORDER — BUTALBITAL, ACETAMINOPHEN, CAFFEINE AND CODEINE PHOSPHATE 300; 50; 40; 30 MG/1; MG/1; MG/1; MG/1
1 CAPSULE ORAL EVERY 4 HOURS PRN
COMMUNITY
End: 2024-03-20

## 2024-03-20 RX ORDER — ALBUTEROL SULFATE 90 UG/1
2 AEROSOL, METERED RESPIRATORY (INHALATION) 4 TIMES DAILY PRN
Qty: 18 G | Refills: 0 | Status: SHIPPED | OUTPATIENT
Start: 2024-03-20

## 2024-03-20 RX ORDER — FLUTICASONE PROPIONATE AND SALMETEROL XINAFOATE 45; 21 UG/1; UG/1
2 AEROSOL, METERED RESPIRATORY (INHALATION) 2 TIMES DAILY
COMMUNITY

## 2024-03-20 SDOH — ECONOMIC STABILITY: INCOME INSECURITY: HOW HARD IS IT FOR YOU TO PAY FOR THE VERY BASICS LIKE FOOD, HOUSING, MEDICAL CARE, AND HEATING?: VERY HARD

## 2024-03-20 SDOH — ECONOMIC STABILITY - TRANSPORTATION SECURITY: TRANSPORTATION INSECURITY: Z59.82

## 2024-03-20 SDOH — ECONOMIC STABILITY: INCOME INSECURITY: IN THE LAST 12 MONTHS, WAS THERE A TIME WHEN YOU WERE NOT ABLE TO PAY THE MORTGAGE OR RENT ON TIME?: NO

## 2024-03-20 SDOH — ECONOMIC STABILITY: FOOD INSECURITY: WITHIN THE PAST 12 MONTHS, YOU WORRIED THAT YOUR FOOD WOULD RUN OUT BEFORE YOU GOT MONEY TO BUY MORE.: OFTEN TRUE

## 2024-03-20 SDOH — ECONOMIC STABILITY - FOOD INSECURITY: FOOD INSECURITY: Z59.41

## 2024-03-20 SDOH — ECONOMIC STABILITY - INCOME SECURITY: PROBLEM RELATED TO HOUSING AND ECONOMIC CIRCUMSTANCES, UNSPECIFIED: Z59.9

## 2024-03-20 SDOH — ECONOMIC STABILITY: FOOD INSECURITY: WITHIN THE PAST 12 MONTHS, THE FOOD YOU BOUGHT JUST DIDN'T LAST AND YOU DIDN'T HAVE MONEY TO GET MORE.: OFTEN TRUE

## 2024-03-20 SDOH — ECONOMIC STABILITY: TRANSPORTATION INSECURITY
IN THE PAST 12 MONTHS, HAS THE LACK OF TRANSPORTATION KEPT YOU FROM MEDICAL APPOINTMENTS OR FROM GETTING MEDICATIONS?: YES

## 2024-03-20 ASSESSMENT — PATIENT HEALTH QUESTIONNAIRE - PHQ9
SUM OF ALL RESPONSES TO PHQ QUESTIONS 1-9: 2
SUM OF ALL RESPONSES TO PHQ9 QUESTIONS 1 & 2: 2
SUM OF ALL RESPONSES TO PHQ QUESTIONS 1-9: 2
2. FEELING DOWN, DEPRESSED OR HOPELESS: SEVERAL DAYS
SUM OF ALL RESPONSES TO PHQ QUESTIONS 1-9: 2
SUM OF ALL RESPONSES TO PHQ QUESTIONS 1-9: 2
1. LITTLE INTEREST OR PLEASURE IN DOING THINGS: SEVERAL DAYS

## 2024-03-20 NOTE — PROGRESS NOTES
[facility] Office Visit     Assessment/ Plan: Charisse Shi is a 33 y.o. female presenting for:    Faroese  video used for visit.  Chronic Headaches  Idiopathic Intracranial Hypertension - Chronic, no improvement. Still unclear what meds she is taking routinely. Has pictures on phone but unable to tell me exactly how/when she takes medications. I worry about medication overuse/underuse exacerbating picture. She only reads in Faroese, medication bottles labeled in English, multiple different providers because in/out of hospital. Went through meds and provided updated list on AVS of what to take regularly. Ultimately, needs referral to ophthalmology then back to neurology for better management of her IIH. Continue to assist with weight loss, symptom control as able.     Type II Diabetes - New diagnosis based on recent POC. Even if pre-diabetes and elevation related to recent steroid use, would recommend metformin in setting of morbid obesity and other conditions exacerbated by weight - obesity hypoventilation, IIH.   Hemoglobin A1C, POC   Date Value Ref Range Status   02/21/2024 6.5 % Final   -- start metformin 500mg BID, will follow-up in 3 months to recheck     Limited Resources - Will meet with  to help with financial assistance, navigating immigration process. I completed [id number] form for patient in regards to medical disability exemption.     Moderate Persistent Asthma - Reasonable control, was able to see pulmonology after recent hospitalization but unable to afford medications ongoing (Trelegy). Could consider obtaining nebulizer as inhaled pulmicort may be least expensive controller medication for her.     Knee Pain - Acute on chronic, has received injections in the past that have been helpful. Will schedule follow-up with [facility].     45 minutes were spent on the day of this encounter both with the patient and in related activities including chart review, care coordination
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
## 2024-03-20 NOTE — PROGRESS NOTES
Charisse Shi is a 33 y.o. female      Chief Complaint   Patient presents with    Headache     Patient is coming in for a follow up on headaches. Patient states that she has been having pain in hips radiating down her legs, but the right side is more painful. When she steps on her right knee pops. Interpretor ID: [id number]. No other concerns.        \"Have you been to the ER, urgent care clinic since your last visit?  Hospitalized since your last visit?\"    NO    “Have you seen or consulted any other health care providers outside of [facility] since your last visit?”    NO              Vitals:    03/20/24 1543   BP: 130/82   Site: Right Upper Arm   Position: Sitting   Pulse: 91   Resp: 18   Temp: 99.3 °F (37.4 °C)   TempSrc: Oral   SpO2: 97%   Weight: 134.7 kg (297 lb)   Height: 1.651 m (5' 5\")            Health Maintenance Due   Topic Date Due    Hepatitis B vaccine (1 of 3 - 3-dose series) Never done    COVID-19 Vaccine (1) Never done    Varicella vaccine (1 of 2 - 2-dose childhood series) Never done    Pneumococcal 0-64 years Vaccine (1 of 2 - PCV) Never done    Flu vaccine (1) 08/01/2023         Medication Reconciliation completed, changes noted.  Please  Update medication list.

## 2024-03-20 NOTE — PATIENT INSTRUCTIONS
Food Resources:  Please see separate food resources / handout provided for insecurity in Lao. Also provided with financial and transportation resources.     Patient Education        Learning About Getting Help With Transportation  Transportation is how we get around every day. It includes buses, trains, bicycles, scooters, cars, and walking. When you don't have transportation that you can rely on, it's harder to earn money, buy food, visit friends, and go to doctor visits. And this can affect your health.    Many places deliver food and other items, sometimes for free or low cost. For local resources, talk to your doctor or a shannan leader, or ask at a senior center.    You can also find help by going online to [facility]ee[employer] or appening or by calling [employer].  Where can you get help?  The programs that offer help with transportation will depend on where you live. And you may need to give some information to help you qualify, such as your income, your age, or if you have refugee status. To find out more about a program, try looking it up online or asking at your local library. Here are some other tips and online resources.    Reduced fare programs.  They give low-cost bus or train passes to older people, people with disabilities, and people with Medicaid. Ask your doctor about these programs. Or call your local public transit agency.     Non-emergency medical transportation.  It gives free rides to and from doctor visits. It's for people enrolled in Medicaid or certain private insurance plans. Call or go online to your state Medicaid agency or private insurance plan to learn more.     Virtual care.  If it's hard for you to travel to doctor visits, ask your doctor about virtual care. You use a computer, phone, or other device to talk to a doctor or nurse.     Free or reduced cost food delivery and rides.  The [employer], [employer], and other programs will deliver food and give rides to work, grocery stores, and other

## 2024-03-20 NOTE — PATIENT INSTRUCTIONS
Additional food, financial, and transportation resources provided in [place].    [facility] Financial Resources*  (Call 211 if need more resources.)    Medical Care  [facility] Financial Assistance  What they offer: The [employer] Financial Assistance Program helps uninsured patients who do not qualify for government-sponsored health insurance and cannot afford to pay for their medical care. Insured patients may also qualify for assistance based on family income, family size, and medical needs.  Phone Number: [phone]  How to apply for the [employer] Financial Assistance Program:  Option 1: To apply for financial assistance, a patient (or their family or other provider) should fill out the Financial Assistance Application. Copies of the Financial Assistance Application and the FAP may be obtained for free by calling the [facility] service department at [phone].  Option 2: The Financial Assistance Application and policy may be obtained for free by downloading a copy from the [employer] website:  https://www.[employer]IP/patient-resources/financial-assistance  Applications are available in several languages on the website    [facility] Financial Assistance  What they offer: [facility] has a Financial Assistance Policy that provides free or discounted health care to qualified patients.  Website:  https://[employer]OBILITY/patient-financial/pricing  Phone Number for Patient Benefit Advisors: [phone]    [facility] Financial Assistance  What they offer: Help with understanding a bill, finding out what insurance pays, applying for financial aid, or setting up a payment plan.  Website:  https://[employer]/services/billing-insurance/pajfcvsyb-hjqepbnlsc-abgaodnrdfu  Financial Counseling Call Center: [phone]    [facility]  What they offer:   Mobile healthcare resources for uninsured patients.  Contact: [phone]        [facility]  What they offer:  Healthcare screenings and

## 2024-03-22 ENCOUNTER — ANCILLARY PROCEDURE (OUTPATIENT)
Age: 34
End: 2024-03-22
Payer: MEDICAID

## 2024-03-22 ENCOUNTER — OFFICE VISIT (OUTPATIENT)
Age: 34
End: 2024-03-22
Payer: MEDICAID

## 2024-03-22 ENCOUNTER — OFFICE VISIT (OUTPATIENT)
Age: 34
End: 2024-03-22

## 2024-03-22 VITALS
SYSTOLIC BLOOD PRESSURE: 142 MMHG | TEMPERATURE: 98.6 F | BODY MASS INDEX: 48.42 KG/M2 | HEART RATE: 94 BPM | OXYGEN SATURATION: 96 % | DIASTOLIC BLOOD PRESSURE: 96 MMHG | WEIGHT: 291 LBS | RESPIRATION RATE: 18 BRPM

## 2024-03-22 DIAGNOSIS — R03.0 ELEVATED BLOOD PRESSURE READING: ICD-10-CM

## 2024-03-22 DIAGNOSIS — M25.561 CHRONIC PAIN OF RIGHT KNEE: ICD-10-CM

## 2024-03-22 DIAGNOSIS — G89.29 CHRONIC PAIN OF RIGHT KNEE: ICD-10-CM

## 2024-03-22 DIAGNOSIS — M25.561 CHRONIC PAIN OF RIGHT KNEE: Primary | ICD-10-CM

## 2024-03-22 DIAGNOSIS — G89.29 CHRONIC PAIN OF RIGHT KNEE: Primary | ICD-10-CM

## 2024-03-22 DIAGNOSIS — Z59.9 FINANCIAL DIFFICULTY: Primary | ICD-10-CM

## 2024-03-22 PROCEDURE — 99214 OFFICE O/P EST MOD 30 MIN: CPT | Performed by: STUDENT IN AN ORGANIZED HEALTH CARE EDUCATION/TRAINING PROGRAM

## 2024-03-22 PROCEDURE — 73562 X-RAY EXAM OF KNEE 3: CPT

## 2024-03-22 RX ORDER — DULOXETINE 40 MG/1
20 CAPSULE, DELAYED RELEASE ORAL DAILY
Qty: 30 CAPSULE | Refills: 1 | Status: SHIPPED | OUTPATIENT
Start: 2024-03-22

## 2024-03-22 SDOH — ECONOMIC STABILITY - INCOME SECURITY: PROBLEM RELATED TO HOUSING AND ECONOMIC CIRCUMSTANCES, UNSPECIFIED: Z59.9

## 2024-03-22 NOTE — PROGRESS NOTES
Bill counseling visit with JULIANO Navigator. Patient presented several medical bills listed below:    [facility] - [phone]  Amount Due: $755.72  SW assisted patient by contacting service provider. Per rep, patient can apply a hardship application will be sent to home address. Patient understands must complete application and return as soon as possible. Application will then be evaluated for write-off or discount.     [facility] - [phone]  Amount Due: $200.00  SW assisted patient by contacting service provider. Per rep, Cynthia, [facility] will honor BS financial assistance. Rep provided with BS account # and advised patient to disregard invoice.     [facility]  Amount Due: $483.00  SW assisted patient by contacting service provider. Per rep, CHANELL Buck does NOT participate with [facility] [employer] financial assistance plan. No financial assistance available. Rep will send to research team to see if any other assistance can be provided but no guarantee. They may consider a self-pay discount. Patient will be notified by mail. JULIANO to follow-up in 3 weeks with CHANELL - [phone].    Minda Chacon [facility] HospitalSHANTEL Santosator

## 2024-03-22 NOTE — PROGRESS NOTES
Chief Complaint   Patient presents with    Knee Pain     right     Vitals:    03/22/24 1200   BP: (!) 142/96   Pulse: 94   Resp:    Temp:    SpO2: 96%

## 2024-03-22 NOTE — PROGRESS NOTES
[facility] Family Medicine Residency   [facility] Sports Medicine      Chief Complaint:   Chief Complaint   Patient presents with    Knee Pain     right       HPI:  Charisse Shi is a 33 y.o. female who presents with right knee pain. Azeri interpretor used: #[id number] (Evangelina).     - She has had R knee pain for years, worsening over past few months. No injury or specific inciting event  - Feels pain on anterior knee  - Tried Tylenol without much benefit  - Has not tried PT due to significant pain  - Had CSI in R knee 2.5 years ago, only helped for a week and then the pain worsened   - Last x-ray on 4/27/2022 showed small osteophytes, no significant joint space narrowing     Past Medical History:   Diagnosis Date    Cardiomegaly 4/26/2023    Essential hypertension     last two pregnancies       Current Outpatient Medications:     DULoxetine 40 MG CPEP, Take 20 mg by mouth daily, Disp: 30 capsule, Rfl: 1    diclofenac sodium (VOLTAREN) 1 % GEL, Apply 4 g topically 4 times daily, Disp: 50 g, Rfl: 1    acetaZOLAMIDE (DIAMOX) 250 MG tablet, Take 1 tablet by mouth daily, Disp: , Rfl:     fluticasone-salmeterol (ADVAIR HFA) 45-21 MCG/ACT inhaler, Inhale 2 puffs into the lungs 2 times daily, Disp: , Rfl:     albuterol sulfate HFA (VENTOLIN HFA) 108 (90 Base) MCG/ACT inhaler, Inhale 2 puffs into the lungs 4 times daily as needed for Wheezing, Disp: 18 g, Rfl: 0    metFORMIN (GLUCOPHAGE) 500 MG tablet, Take 1 tablet by mouth 2 times daily (with meals), Disp: 60 tablet, Rfl: 11    acetaminophen (TYLENOL) 500 MG tablet, Take 2 tablets by mouth in the morning and at bedtime, Disp: , Rfl:     montelukast (SINGULAIR) 10 MG tablet, Take 1 tablet by mouth nightly, Disp: , Rfl:     traZODone (DESYREL) 50 MG tablet, Take 1 tablet by mouth nightly as needed for Sleep, Disp: 90 tablet, Rfl: 0  Allergies   Allergen Reactions    Acetazolamide      Other reaction(s): Unknown (comments)  Rash arm and

## 2024-04-12 ENCOUNTER — OFFICE VISIT (OUTPATIENT)
Age: 34
End: 2024-04-12

## 2024-04-12 DIAGNOSIS — Z59.7 INSUFFICIENT SOCIAL INSURANCE AND WELFARE SUPPORT: Primary | ICD-10-CM

## 2024-04-12 SDOH — ECONOMIC STABILITY - INCOME SECURITY: INSUFFICIENT SOCIAL INSURANCE AND WELFARE SUPPORT: Z59.7

## 2024-04-12 NOTE — PROGRESS NOTES
Patient seen by JULIANO Navigator for bill counseling follow-up. Translation services utilized during visit; Malay. Patient presented [facility] Financial Hardship Form received by mail. Patient in need of assistance completing form due to language barrier. SW assisted patient with completion of form. Patient advised that application must include spouse' pay stubs for last 60 days and copy of tax form.     SW Navigator also assisted patient by reaching out to [facility] ([facility]) regarding outstanding bill. Previously contacted [facility] to request financial assistance and was advised that request would be sent to research team. No response since last visit. Per rep, billing office currently unavailable, message taken, and advised that a  would reach out to SW. Patient advised and understands that SW will contact her should she hear back from [facility].     RONAL Oreilly Navigator

## 2024-04-15 ENCOUNTER — TELEPHONE (OUTPATIENT)
Age: 34
End: 2024-04-15

## 2024-04-15 NOTE — TELEPHONE ENCOUNTER
SW attempted to reach patient to discuss response from service provider regarding medical bill. Utilized language line (Mohawk). Voicemail left for patient to return call.    RONAL Oreilly Navigator

## 2024-04-17 ENCOUNTER — HOSPITAL ENCOUNTER (EMERGENCY)
Facility: HOSPITAL | Age: 34
Discharge: HOME | End: 2024-04-17
Attending: EMERGENCY MEDICINE
Payer: MEDICAID

## 2024-04-17 ENCOUNTER — APPOINTMENT (OUTPATIENT)
Facility: HOSPITAL | Age: 34
End: 2024-04-17
Payer: MEDICAID

## 2024-04-17 VITALS
TEMPERATURE: 98.8 F | BODY MASS INDEX: 48.82 KG/M2 | RESPIRATION RATE: 18 BRPM | SYSTOLIC BLOOD PRESSURE: 126 MMHG | WEIGHT: 293 LBS | DIASTOLIC BLOOD PRESSURE: 76 MMHG | OXYGEN SATURATION: 100 % | HEIGHT: 65 IN | HEART RATE: 103 BPM

## 2024-04-17 DIAGNOSIS — R51.9 ACUTE NONINTRACTABLE HEADACHE, UNSPECIFIED HEADACHE TYPE: Primary | ICD-10-CM

## 2024-04-17 LAB
ALBUMIN SERPL-MCNC: 3.1 G/DL (ref 3.5–5)
ALBUMIN/GLOB SERPL: 0.7 (ref 1.1–2.2)
ALP SERPL-CCNC: 52 U/L (ref 45–117)
ALT SERPL-CCNC: 18 U/L (ref 12–78)
ANION GAP SERPL CALC-SCNC: 3 MMOL/L (ref 5–15)
AST SERPL-CCNC: 16 U/L (ref 15–37)
BASOPHILS # BLD: 0 K/UL (ref 0–0.1)
BASOPHILS NFR BLD: 1 % (ref 0–1)
BILIRUB SERPL-MCNC: 0.2 MG/DL (ref 0.2–1)
BUN SERPL-MCNC: 15 MG/DL (ref 6–20)
BUN/CREAT SERPL: 21 (ref 12–20)
CALCIUM SERPL-MCNC: 9.4 MG/DL (ref 8.5–10.1)
CHLORIDE SERPL-SCNC: 104 MMOL/L (ref 97–108)
CO2 SERPL-SCNC: 29 MMOL/L (ref 21–32)
CREAT SERPL-MCNC: 0.72 MG/DL (ref 0.55–1.02)
DIFFERENTIAL METHOD BLD: ABNORMAL
EOSINOPHIL # BLD: 0.1 K/UL (ref 0–0.4)
EOSINOPHIL NFR BLD: 2 % (ref 0–7)
ERYTHROCYTE [DISTWIDTH] IN BLOOD BY AUTOMATED COUNT: 15.4 % (ref 11.5–14.5)
GLOBULIN SER CALC-MCNC: 4.6 G/DL (ref 2–4)
GLUCOSE BLD STRIP.AUTO-MCNC: 103 MG/DL (ref 65–117)
GLUCOSE SERPL-MCNC: 116 MG/DL (ref 65–100)
HCT VFR BLD AUTO: 38.7 % (ref 35–47)
HGB BLD-MCNC: 12 G/DL (ref 11.5–16)
IMM GRANULOCYTES # BLD AUTO: 0 K/UL (ref 0–0.04)
IMM GRANULOCYTES NFR BLD AUTO: 0 % (ref 0–0.5)
LYMPHOCYTES # BLD: 2 K/UL (ref 0.8–3.5)
LYMPHOCYTES NFR BLD: 37 % (ref 12–49)
MCH RBC QN AUTO: 23.4 PG (ref 26–34)
MCHC RBC AUTO-ENTMCNC: 31 G/DL (ref 30–36.5)
MCV RBC AUTO: 75.4 FL (ref 80–99)
MONOCYTES # BLD: 0.4 K/UL (ref 0–1)
MONOCYTES NFR BLD: 7 % (ref 5–13)
NEUTS SEG # BLD: 2.8 K/UL (ref 1.8–8)
NEUTS SEG NFR BLD: 53 % (ref 32–75)
NRBC # BLD: 0 K/UL (ref 0–0.01)
NRBC BLD-RTO: 0 PER 100 WBC
PLATELET # BLD AUTO: 269 K/UL (ref 150–400)
PMV BLD AUTO: 10.7 FL (ref 8.9–12.9)
POTASSIUM SERPL-SCNC: 3.8 MMOL/L (ref 3.5–5.1)
PROT SERPL-MCNC: 7.7 G/DL (ref 6.4–8.2)
RBC # BLD AUTO: 5.13 M/UL (ref 3.8–5.2)
SERVICE CMNT-IMP: NORMAL
SODIUM SERPL-SCNC: 136 MMOL/L (ref 136–145)
TROPONIN I SERPL HS-MCNC: 6 NG/L (ref 0–51)
WBC # BLD AUTO: 5.3 K/UL (ref 3.6–11)

## 2024-04-17 PROCEDURE — 80053 COMPREHEN METABOLIC PANEL: CPT

## 2024-04-17 PROCEDURE — 93005 ELECTROCARDIOGRAM TRACING: CPT | Performed by: EMERGENCY MEDICINE

## 2024-04-17 PROCEDURE — 6360000002 HC RX W HCPCS: Performed by: EMERGENCY MEDICINE

## 2024-04-17 PROCEDURE — 99284 EMERGENCY DEPT VISIT MOD MDM: CPT

## 2024-04-17 PROCEDURE — 82962 GLUCOSE BLOOD TEST: CPT

## 2024-04-17 PROCEDURE — 6370000000 HC RX 637 (ALT 250 FOR IP): Performed by: EMERGENCY MEDICINE

## 2024-04-17 PROCEDURE — 85025 COMPLETE CBC W/AUTO DIFF WBC: CPT

## 2024-04-17 PROCEDURE — 96375 TX/PRO/DX INJ NEW DRUG ADDON: CPT

## 2024-04-17 PROCEDURE — 70450 CT HEAD/BRAIN W/O DYE: CPT

## 2024-04-17 PROCEDURE — 2580000003 HC RX 258: Performed by: EMERGENCY MEDICINE

## 2024-04-17 PROCEDURE — 36415 COLL VENOUS BLD VENIPUNCTURE: CPT

## 2024-04-17 PROCEDURE — 84484 ASSAY OF TROPONIN QUANT: CPT

## 2024-04-17 PROCEDURE — 96374 THER/PROPH/DIAG INJ IV PUSH: CPT

## 2024-04-17 RX ORDER — TETRACAINE HYDROCHLORIDE 5 MG/ML
1 SOLUTION OPHTHALMIC
Status: COMPLETED | OUTPATIENT
Start: 2024-04-17 | End: 2024-04-17

## 2024-04-17 RX ORDER — PROCHLORPERAZINE EDISYLATE 5 MG/ML
10 INJECTION INTRAMUSCULAR; INTRAVENOUS ONCE
Status: COMPLETED | OUTPATIENT
Start: 2024-04-17 | End: 2024-04-17

## 2024-04-17 RX ORDER — 0.9 % SODIUM CHLORIDE 0.9 %
1000 INTRAVENOUS SOLUTION INTRAVENOUS ONCE
Status: COMPLETED | OUTPATIENT
Start: 2024-04-17 | End: 2024-04-17

## 2024-04-17 RX ORDER — DIPHENHYDRAMINE HYDROCHLORIDE 50 MG/ML
12.5 INJECTION INTRAMUSCULAR; INTRAVENOUS
Status: COMPLETED | OUTPATIENT
Start: 2024-04-17 | End: 2024-04-17

## 2024-04-17 RX ADMIN — SODIUM CHLORIDE 1000 ML: 9 INJECTION, SOLUTION INTRAVENOUS at 18:22

## 2024-04-17 RX ADMIN — PROCHLORPERAZINE EDISYLATE 10 MG: 5 INJECTION INTRAMUSCULAR; INTRAVENOUS at 18:23

## 2024-04-17 RX ADMIN — TETRACAINE HYDROCHLORIDE 1 DROP: 5 SOLUTION OPHTHALMIC at 18:22

## 2024-04-17 RX ADMIN — DIPHENHYDRAMINE HYDROCHLORIDE 12.5 MG: 50 INJECTION, SOLUTION INTRAMUSCULAR; INTRAVENOUS at 18:23

## 2024-04-17 ASSESSMENT — PAIN SCALES - GENERAL: PAINLEVEL_OUTOF10: 10

## 2024-04-17 ASSESSMENT — TONOMETRY
OD_IOP_MMHG: 16
OS_IOP_MMHG: 15
IOP_HANDHELD: 1

## 2024-04-17 ASSESSMENT — PAIN - FUNCTIONAL ASSESSMENT: PAIN_FUNCTIONAL_ASSESSMENT: 0-10

## 2024-04-17 NOTE — ED PROVIDER NOTES
[facility] EMERGENCY DEPT  EMERGENCY DEPARTMENT ENCOUNTER      Pt Name: Charisse Shi  MRN: [id number]  Birthdate 1990  Date of evaluation: 2024  Provider: Lavinia Wharton MD    CHIEF COMPLAINT       Chief Complaint   Patient presents with    Headache    Chest Pain         HISTORY OF PRESENT ILLNESS    Charisse Shi is a 34 yo F with h/o DM who had had headache, chest pain and back pain today.  She states that she developed headache and cloudy vision this morning and then developed the chest pain as well.  She states she went to an optometrist at [facility] 1 month ago and was told that she has elevated pressure in her eye and that she needed to follow-up with a specialist but she did not because she could not afford to to.  The pain went away but now it has returned.            Additional history from independent historians:     Review of External Medical Records:     Nursing Notes were reviewed.    REVIEW OF SYSTEMS       Review of Systems   Eyes:  Positive for visual disturbance.   Cardiovascular:  Positive for chest pain.   Neurological:  Positive for headaches.       Except as noted above the remainder of the review of systems was reviewed and negative.       PAST MEDICAL HISTORY     Past Medical History:   Diagnosis Date    Cardiomegaly 2023    Essential hypertension     last two pregnancies         SURGICAL HISTORY       Past Surgical History:   Procedure Laterality Date     SECTION      x4    DILATION AND CURETTAGE OF UTERUS      OTHER SURGICAL HISTORY           CURRENT MEDICATIONS       Previous Medications    ACETAMINOPHEN (TYLENOL) 500 MG TABLET    Take 2 tablets by mouth in the morning and at bedtime    ACETAZOLAMIDE (DIAMOX) 250 MG TABLET    Take 1 tablet by mouth daily    ALBUTEROL SULFATE HFA (VENTOLIN HFA) 108 (90 BASE) MCG/ACT INHALER    Inhale 2 puffs into the lungs 4 times daily as needed for Wheezing    DICLOFENAC SODIUM (VOLTAREN) 1 % GEL    Apply 4 g topically 4 times daily

## 2024-04-17 NOTE — ED TRIAGE NOTES
used in triage, #[id number]    Pt arrives to the ER for complaints of headache, dizziness, leg pain, and chest pain that radiates into her back. Reports that symptoms started 1300 today.     Pt reports that she also has bilateral eye pain. States that she went to see an eye doctor at the beginning of April and she was told that she has elevated eye pressure and was told to follow up with another eye specialist.

## 2024-04-18 LAB
EKG ATRIAL RATE: 101 BPM
EKG DIAGNOSIS: NORMAL
EKG P AXIS: 44 DEGREES
EKG P-R INTERVAL: 160 MS
EKG Q-T INTERVAL: 348 MS
EKG QRS DURATION: 86 MS
EKG QTC CALCULATION (BAZETT): 451 MS
EKG R AXIS: 38 DEGREES
EKG T AXIS: -12 DEGREES
EKG VENTRICULAR RATE: 101 BPM

## 2024-04-26 ENCOUNTER — TELEPHONE (OUTPATIENT)
Age: 34
End: 2024-04-26

## 2024-04-26 NOTE — TELEPHONE ENCOUNTER
SW contacted patient to provide information regarding previous request related to medical bill. SW spoke with patient's spouse and provided information received from [facility]. Advised that service provider does not offer any financial assistance. Informed to establish payment arrangement, if necessary.     [facility]   Navigator

## 2024-04-30 ENCOUNTER — TELEPHONE (OUTPATIENT)
Age: 34
End: 2024-04-30

## 2024-04-30 NOTE — TELEPHONE ENCOUNTER
This writer attempted to contact pt to schedule an appt with Dr. Parisi. Pt did not answer phone; a voice message was left.    Pt has been scheduled for 5/15 at 2:00 with Dr. Parisi. Please confirm appt.

## 2024-04-30 NOTE — TELEPHONE ENCOUNTER
----- Message from Marisol Parisi, DO sent at 4/16/2024  2:34 PM EDT -----  Regarding: follow-up  Sawyer Roger    Sorry for all the messages! Would you mind getting this patient scheduled to see me in late May/early June? Follow-up headaches, diabetes.     Thanks so much,  Marisol

## 2024-05-15 ENCOUNTER — OFFICE VISIT (OUTPATIENT)
Age: 34
End: 2024-05-15

## 2024-05-15 VITALS
TEMPERATURE: 99.1 F | HEIGHT: 65 IN | HEART RATE: 105 BPM | SYSTOLIC BLOOD PRESSURE: 136 MMHG | WEIGHT: 293 LBS | OXYGEN SATURATION: 95 % | BODY MASS INDEX: 48.82 KG/M2 | RESPIRATION RATE: 18 BRPM | DIASTOLIC BLOOD PRESSURE: 89 MMHG

## 2024-05-15 DIAGNOSIS — E11.69 TYPE 2 DIABETES MELLITUS WITH OTHER SPECIFIED COMPLICATION, UNSPECIFIED WHETHER LONG TERM INSULIN USE (HCC): Primary | ICD-10-CM

## 2024-05-15 DIAGNOSIS — G93.2 IDIOPATHIC INTRACRANIAL HYPERTENSION: ICD-10-CM

## 2024-05-15 DIAGNOSIS — J45.909 UNCOMPLICATED ASTHMA, UNSPECIFIED ASTHMA SEVERITY, UNSPECIFIED WHETHER PERSISTENT: ICD-10-CM

## 2024-05-15 DIAGNOSIS — G47.00 INSOMNIA, UNSPECIFIED TYPE: ICD-10-CM

## 2024-05-15 DIAGNOSIS — Z78.9 NEED FOR FOLLOW-UP BY SOCIAL WORKER: Primary | ICD-10-CM

## 2024-05-15 PROBLEM — R73.03 PRE-DIABETES: Status: RESOLVED | Noted: 2022-06-20 | Resolved: 2024-05-15

## 2024-05-15 LAB — HBA1C MFR BLD: 6.7 %

## 2024-05-15 PROCEDURE — 99214 OFFICE O/P EST MOD 30 MIN: CPT | Performed by: FAMILY MEDICINE

## 2024-05-15 PROCEDURE — 83036 HEMOGLOBIN GLYCOSYLATED A1C: CPT | Performed by: FAMILY MEDICINE

## 2024-05-15 PROCEDURE — 3075F SYST BP GE 130 - 139MM HG: CPT | Performed by: FAMILY MEDICINE

## 2024-05-15 PROCEDURE — PBSHW AMB POC HEMOGLOBIN A1C: Performed by: FAMILY MEDICINE

## 2024-05-15 PROCEDURE — 3079F DIAST BP 80-89 MM HG: CPT | Performed by: FAMILY MEDICINE

## 2024-05-15 RX ORDER — ALBUTEROL SULFATE 90 UG/1
2 AEROSOL, METERED RESPIRATORY (INHALATION) 4 TIMES DAILY PRN
Qty: 18 G | Refills: 11 | Status: SHIPPED | OUTPATIENT
Start: 2024-05-15

## 2024-05-15 RX ORDER — TRAZODONE HYDROCHLORIDE 50 MG/1
50 TABLET ORAL NIGHTLY PRN
Qty: 30 TABLET | Refills: 11 | Status: SHIPPED | OUTPATIENT
Start: 2024-05-15

## 2024-05-15 RX ORDER — ACETAZOLAMIDE 250 MG/1
250 TABLET ORAL 2 TIMES DAILY
Qty: 60 TABLET | Refills: 11 | Status: SHIPPED | OUTPATIENT
Start: 2024-05-15

## 2024-05-15 NOTE — PROGRESS NOTES
Charisse Shi is a 33 y.o. female      Chief Complaint   Patient presents with    Diabetes     Patient is coming in for a follow up on diabetes and headaches. She said that hr neck has been hurting for a week and it is difficult for her to move. No other concerns.        \"Have you been to the ER, urgent care clinic since your last visit?  Hospitalized since your last visit?\"    NO    “Have you seen or consulted any other health care providers outside of [facility] since your last visit?”    NO              Vitals:    05/15/24 1402   BP: 136/89   Site: Right Upper Arm   Position: Sitting   Pulse: (!) 105   Resp: 18   Temp: 99.1 °F (37.3 °C)   TempSrc: Oral   SpO2: 95%   Weight: (!) 137 kg (302 lb)   Height: 1.651 m (5' 5\")            Health Maintenance Due   Topic Date Due    Hepatitis B vaccine (1 of 3 - 3-dose series) Never done    COVID-19 Vaccine (1) Never done    Varicella vaccine (1 of 2 - 2-dose childhood series) Never done    Pneumococcal 0-64 years Vaccine (1 of 2 - PCV) Never done    Diabetic foot exam  Never done    Diabetic Alb to Cr ratio (uACR) test  Never done    Diabetic retinal exam  Never done    Lipids  06/15/2023         Medication Reconciliation completed, changes noted.  Please  Update medication list.

## 2024-05-15 NOTE — PROGRESS NOTES
[facility] Office Visit     Assessment/ Plan: Charisse Shi is a 33 y.o. female presenting for:    Idiopathic Intracranial Hypertension  Obesity  Headaches - Chronic, persistent. Continue to have challenges treating given unclear adherence to medications (challenges with affording, not yet brought in meds to review what is actually being taken). Recently had normal eye pressure in ER but was told by [facility] optometry she had elevated pressure. Will attempt to obtain records. Will uptitrate Diamox to symptoms, has recently been able to tolerate okay. Provided with goodrx coupon.   -- consider further work-up for ongoing weight-gain, reports increased appetite    Insomnia - Refill of trazodone and coupon provided.     Obesity Hypoventilation  Reactive Airway Disease - Unable to see pulmonology as not active insurance, no financial assistance program. Unable to afford most inhalers. Coupon and refill of albuterol provided.     Type II Diabetes - Stable on recheck. Unclear if has been taking metformin as prescribed. Will continue metformin, plan to up-titrate as able once brings in meds for review.   Hemoglobin A1C, POC   Date Value Ref Range Status   05/15/2024 6.7 % Final     Joint Pains - Previously saw [facility] and was recommended Voltaren but not able to pick-up. Provided with picture of how to obtain OTC.     30 minutes were spent on the day of this encounter both with the patient and in related activities including chart review, care coordination and counseling.     Patient instructions were discussed and/or provided in the AVS. The patient understands and agrees to the plan.    RETURN TO CARE: 1 month for headaches, med refills   Future Appointments   Date Time Provider Department Center   6/19/2024  2:00 PM Marisol Parisi,  [facility] BS AMB         Subjective:  Chief Complaint   Patient presents with    Diabetes     Patient is coming in for a follow up on diabetes and headaches. She

## 2024-05-15 NOTE — PROGRESS NOTES
Language line (Telugu) utilized during visit. Patient seen by JULIANO Navigator for assistance with reapplying for [employer] Financial Assistance Program. Patient provided copy of pay stubs and bank statement (documentation required for reapplication). [employer] FA application faxed to [phone].    During visit, patient also presented a medical bill for which she need assistance with payment. Patient advised that the service provider will honor [employer] FA.    [facility]  Account #: [id number]  DOS: 3/22/24   Amount: $25.00  Action taken: JULIANO attempted to reach billing office; however, no response after waiting for over 15 minutes. Patient advised to send copy of financial assistance approval letter to [facility] along with invoice by mail. Patient acknowledged understanding instructions.     Plan:  Ongoing psychosocial support and resource referral, as desired by the patient and family.       RONAL Oreilly   Navigator

## 2024-05-23 ENCOUNTER — TELEPHONE (OUTPATIENT)
Age: 34
End: 2024-05-23

## 2024-05-23 NOTE — TELEPHONE ENCOUNTER
Session code:  10307  :  Lluvia [id number]  Using Chinese , we called the patient.  The patient's daughter answered the phone and we left a detailed message with her.  She was advised that Charisse has been scheduled with her neurologist Dr. Medeiros for 9/17/24 at 11am, and this was his next available appointment.  Charisse has been placed on a wait list and she will be called if there are any cancellations.  I also stated that we may review this information with her when we see her at our next appointment.

## 2024-05-31 ENCOUNTER — APPOINTMENT (OUTPATIENT)
Facility: HOSPITAL | Age: 34
End: 2024-05-31

## 2024-05-31 ENCOUNTER — HOSPITAL ENCOUNTER (EMERGENCY)
Facility: HOSPITAL | Age: 34
Discharge: HOME | End: 2024-05-31
Attending: EMERGENCY MEDICINE

## 2024-05-31 VITALS
OXYGEN SATURATION: 100 % | SYSTOLIC BLOOD PRESSURE: 162 MMHG | HEIGHT: 65 IN | HEART RATE: 106 BPM | RESPIRATION RATE: 18 BRPM | BODY MASS INDEX: 48.82 KG/M2 | TEMPERATURE: 98.6 F | DIASTOLIC BLOOD PRESSURE: 98 MMHG | WEIGHT: 293 LBS

## 2024-05-31 DIAGNOSIS — J06.9 VIRAL UPPER RESPIRATORY TRACT INFECTION WITH COUGH: Primary | ICD-10-CM

## 2024-05-31 LAB
ALBUMIN SERPL-MCNC: 3.1 G/DL (ref 3.5–5)
ALBUMIN/GLOB SERPL: 0.7 (ref 1.1–2.2)
ALP SERPL-CCNC: 52 U/L (ref 45–117)
ALT SERPL-CCNC: 21 U/L (ref 12–78)
ANION GAP SERPL CALC-SCNC: 2 MMOL/L (ref 5–15)
AST SERPL-CCNC: 21 U/L (ref 15–37)
BASOPHILS # BLD: 0 K/UL (ref 0–0.1)
BASOPHILS NFR BLD: 1 % (ref 0–1)
BILIRUB SERPL-MCNC: 0.1 MG/DL (ref 0.2–1)
BUN SERPL-MCNC: 11 MG/DL (ref 6–20)
BUN/CREAT SERPL: 17 (ref 12–20)
CALCIUM SERPL-MCNC: 9.6 MG/DL (ref 8.5–10.1)
CHLORIDE SERPL-SCNC: 103 MMOL/L (ref 97–108)
CO2 SERPL-SCNC: 32 MMOL/L (ref 21–32)
CREAT SERPL-MCNC: 0.64 MG/DL (ref 0.55–1.02)
DIFFERENTIAL METHOD BLD: ABNORMAL
EOSINOPHIL # BLD: 0.1 K/UL (ref 0–0.4)
EOSINOPHIL NFR BLD: 2 % (ref 0–7)
ERYTHROCYTE [DISTWIDTH] IN BLOOD BY AUTOMATED COUNT: 15.5 % (ref 11.5–14.5)
FLUAV RNA SPEC QL NAA+PROBE: NOT DETECTED
FLUBV RNA SPEC QL NAA+PROBE: NOT DETECTED
GLOBULIN SER CALC-MCNC: 4.6 G/DL (ref 2–4)
GLUCOSE SERPL-MCNC: 133 MG/DL (ref 65–100)
HCT VFR BLD AUTO: 39.7 % (ref 35–47)
HGB BLD-MCNC: 12.2 G/DL (ref 11.5–16)
IMM GRANULOCYTES # BLD AUTO: 0 K/UL (ref 0–0.04)
IMM GRANULOCYTES NFR BLD AUTO: 0 % (ref 0–0.5)
LIPASE SERPL-CCNC: 48 U/L (ref 13–75)
LYMPHOCYTES # BLD: 1.7 K/UL (ref 0.8–3.5)
LYMPHOCYTES NFR BLD: 35 % (ref 12–49)
MCH RBC QN AUTO: 23.6 PG (ref 26–34)
MCHC RBC AUTO-ENTMCNC: 30.7 G/DL (ref 30–36.5)
MCV RBC AUTO: 76.6 FL (ref 80–99)
MONOCYTES # BLD: 0.4 K/UL (ref 0–1)
MONOCYTES NFR BLD: 7 % (ref 5–13)
NEUTS SEG # BLD: 2.7 K/UL (ref 1.8–8)
NEUTS SEG NFR BLD: 55 % (ref 32–75)
NRBC # BLD: 0 K/UL (ref 0–0.01)
NRBC BLD-RTO: 0 PER 100 WBC
PLATELET # BLD AUTO: 266 K/UL (ref 150–400)
PMV BLD AUTO: 11.6 FL (ref 8.9–12.9)
POTASSIUM SERPL-SCNC: 4.1 MMOL/L (ref 3.5–5.1)
PROT SERPL-MCNC: 7.7 G/DL (ref 6.4–8.2)
RBC # BLD AUTO: 5.18 M/UL (ref 3.8–5.2)
SARS-COV-2 RNA RESP QL NAA+PROBE: NOT DETECTED
SODIUM SERPL-SCNC: 137 MMOL/L (ref 136–145)
TROPONIN I SERPL HS-MCNC: 7 NG/L (ref 0–51)
WBC # BLD AUTO: 4.9 K/UL (ref 3.6–11)

## 2024-05-31 PROCEDURE — 71275 CT ANGIOGRAPHY CHEST: CPT

## 2024-05-31 PROCEDURE — 84484 ASSAY OF TROPONIN QUANT: CPT

## 2024-05-31 PROCEDURE — 87636 SARSCOV2 & INF A&B AMP PRB: CPT

## 2024-05-31 PROCEDURE — 80053 COMPREHEN METABOLIC PANEL: CPT

## 2024-05-31 PROCEDURE — 83690 ASSAY OF LIPASE: CPT

## 2024-05-31 PROCEDURE — 6360000004 HC RX CONTRAST MEDICATION: Performed by: NURSE PRACTITIONER

## 2024-05-31 PROCEDURE — 99285 EMERGENCY DEPT VISIT HI MDM: CPT

## 2024-05-31 PROCEDURE — 85025 COMPLETE CBC W/AUTO DIFF WBC: CPT

## 2024-05-31 PROCEDURE — 36415 COLL VENOUS BLD VENIPUNCTURE: CPT

## 2024-05-31 RX ORDER — BENZONATATE 100 MG/1
100 CAPSULE ORAL 3 TIMES DAILY PRN
Qty: 15 CAPSULE | Refills: 0 | Status: SHIPPED | OUTPATIENT
Start: 2024-05-31 | End: 2024-06-10

## 2024-05-31 RX ADMIN — IOPAMIDOL 100 ML: 755 INJECTION, SOLUTION INTRAVENOUS at 12:48

## 2024-05-31 ASSESSMENT — ENCOUNTER SYMPTOMS
COUGH: 1
EYE PAIN: 0
VOMITING: 0
ABDOMINAL DISTENTION: 0
SINUS PRESSURE: 1
EYE REDNESS: 0
ABDOMINAL PAIN: 0
SHORTNESS OF BREATH: 1
COLOR CHANGE: 0
TROUBLE SWALLOWING: 0
SINUS PAIN: 1
SORE THROAT: 0
NAUSEA: 0

## 2024-05-31 ASSESSMENT — PAIN - FUNCTIONAL ASSESSMENT: PAIN_FUNCTIONAL_ASSESSMENT: 0-10

## 2024-05-31 ASSESSMENT — PAIN SCALES - GENERAL: PAINLEVEL_OUTOF10: 10

## 2024-05-31 ASSESSMENT — PAIN DESCRIPTION - LOCATION: LOCATION: CHEST

## 2024-05-31 NOTE — ED PROVIDER NOTES
Musculoskeletal:  Positive for myalgias (body aches and pain). Negative for arthralgias, neck pain and neck stiffness.   Skin:  Negative for color change, pallor, rash and wound.   Neurological:  Positive for weakness and headaches. Negative for dizziness and speech difficulty.   Hematological:  Does not bruise/bleed easily.   Psychiatric/Behavioral:  The patient is not nervous/anxious.            PAST MEDICAL HISTORY     Past Medical History:   Diagnosis Date    Cardiomegaly 2023    Essential hypertension     last two pregnancies    Type 2 diabetes mellitus with other specified complication (HCC) 5/15/2024         SURGICAL HISTORY       Past Surgical History:   Procedure Laterality Date     SECTION      x4    DILATION AND CURETTAGE OF UTERUS      OTHER SURGICAL HISTORY           CURRENT MEDICATIONS       Previous Medications    ACETAMINOPHEN (TYLENOL) 500 MG TABLET    Take 2 tablets by mouth in the morning and at bedtime    ACETAZOLAMIDE (DIAMOX) 250 MG TABLET    Take 1 tablet by mouth 2 times daily    ALBUTEROL SULFATE HFA (VENTOLIN HFA) 108 (90 BASE) MCG/ACT INHALER    Inhale 2 puffs into the lungs 4 times daily as needed for Wheezing    FLUTICASONE-SALMETEROL (ADVAIR HFA) 45-21 MCG/ACT INHALER    Inhale 2 puffs into the lungs 2 times daily    METFORMIN (GLUCOPHAGE) 500 MG TABLET    Take 1 tablet by mouth 2 times daily (with meals)    MONTELUKAST (SINGULAIR) 10 MG TABLET    Take 1 tablet by mouth nightly    TRAZODONE (DESYREL) 50 MG TABLET    Take 1 tablet by mouth nightly as needed for Sleep       ALLERGIES     Acetazolamide, Adhesive tape, and Other    FAMILY HISTORY       Family History   Problem Relation Age of Onset    Hypertension Father           SOCIAL HISTORY       Social History     Socioeconomic History    Marital status:    Tobacco Use    Smoking status: Never    Smokeless tobacco: Never   Substance and Sexual Activity    Alcohol use: Not Currently    Drug use: Never   Social

## 2024-05-31 NOTE — ED TRIAGE NOTES
Patient arrives to the ED with cough, chest pain, and headache for the last five days, reports feeling weak and feels like she has a fever but is unsure.

## 2024-05-31 NOTE — CONSULTS
Session ID: [id number]  Language: Tamazight   ID: #[id number]   Name: Sarthak [Time Spent: ___ minutes] : I have spent [unfilled] minutes of time on the encounter. [>50% of the face to face encounter time was spent on counseling and/or coordination of care for ___] : Greater than 50% of the face to face encounter time was spent on counseling and/or coordination of care for [unfilled]

## 2024-06-07 ENCOUNTER — OFFICE VISIT (OUTPATIENT)
Age: 34
End: 2024-06-07

## 2024-06-07 DIAGNOSIS — Z59.7: Primary | ICD-10-CM

## 2024-06-07 SDOH — ECONOMIC STABILITY - INCOME SECURITY: INSUFFICIENT SOCIAL INSURANCE AND WELFARE SUPPORT: Z59.7

## 2024-06-07 NOTE — PROGRESS NOTES
Patient seen by JULIANO Navigator for Financial Assistance follow-up. Language line (Uzbek) utilized during visit.    Patient asked about status of [facility] Financial Assistance application. Patient completed Financial Assistance application during visit of 5/15/24. Application mailed to address on form along with pay stubs and bank statement. At the time patient was advised that it could take up to 60 days for processing.     Patient reminded during today's visit that 60 days has not passed. Patient states that in past she has received response within 10 days. Patient informed that 10 days is uncommon for processing of FA application. SW assisted patient by contacting  Financial Assistance department. Left voicemail for Ileana Villa (Financial Counselor at [facility]) at [phone] and requested call back. Patient informed that she will be contacted once a response is received.    Patient also presented letter received on 5/28/24 from [facility] regarding financial hardship application. Per patient does not understand why she is receiving notice as she already sent application and pay stubs.     JULIANO assisted by contacting [facility] at [phone]. Per rep, they have received the application and pay stubs but still need additional documentation. Patient advised to mail copy of full bank statement OR copy of taxes. Advised that she does not have to reapply, just send the missing documents.     RONAL Oreilly   Navigator

## 2024-06-19 ENCOUNTER — OFFICE VISIT (OUTPATIENT)
Age: 34
End: 2024-06-19
Payer: MEDICAID

## 2024-06-19 VITALS
BODY MASS INDEX: 48.82 KG/M2 | WEIGHT: 293 LBS | HEIGHT: 65 IN | HEART RATE: 86 BPM | SYSTOLIC BLOOD PRESSURE: 130 MMHG | OXYGEN SATURATION: 97 % | RESPIRATION RATE: 18 BRPM | TEMPERATURE: 99 F | DIASTOLIC BLOOD PRESSURE: 83 MMHG

## 2024-06-19 DIAGNOSIS — T14.8XXA MUSCLE STRAIN: Primary | ICD-10-CM

## 2024-06-19 DIAGNOSIS — M54.2 NECK PAIN: ICD-10-CM

## 2024-06-19 DIAGNOSIS — J45.909 UNCOMPLICATED ASTHMA, UNSPECIFIED ASTHMA SEVERITY, UNSPECIFIED WHETHER PERSISTENT: ICD-10-CM

## 2024-06-19 PROCEDURE — 3075F SYST BP GE 130 - 139MM HG: CPT | Performed by: FAMILY MEDICINE

## 2024-06-19 PROCEDURE — 99214 OFFICE O/P EST MOD 30 MIN: CPT | Performed by: FAMILY MEDICINE

## 2024-06-19 PROCEDURE — 3079F DIAST BP 80-89 MM HG: CPT | Performed by: FAMILY MEDICINE

## 2024-06-19 PROCEDURE — PBSHW PBB SHADOW CHARGE: Performed by: FAMILY MEDICINE

## 2024-06-19 RX ORDER — IPRATROPIUM BROMIDE AND ALBUTEROL SULFATE 2.5; .5 MG/3ML; MG/3ML
1 SOLUTION RESPIRATORY (INHALATION) EVERY 4 HOURS
COMMUNITY

## 2024-06-19 RX ORDER — TIZANIDINE 2 MG/1
2 TABLET ORAL 3 TIMES DAILY PRN
Qty: 30 TABLET | Refills: 1 | Status: SHIPPED | OUTPATIENT
Start: 2024-06-19

## 2024-06-19 RX ORDER — KETOROLAC TROMETHAMINE 30 MG/ML
30 INJECTION, SOLUTION INTRAMUSCULAR; INTRAVENOUS ONCE
Status: COMPLETED | OUTPATIENT
Start: 2024-06-19 | End: 2024-06-19

## 2024-06-19 RX ORDER — FUROSEMIDE 20 MG/1
20 TABLET ORAL 2 TIMES DAILY
COMMUNITY
End: 2024-06-19

## 2024-06-19 RX ORDER — NAPROXEN 500 MG/1
500 TABLET ORAL 2 TIMES DAILY WITH MEALS
COMMUNITY
End: 2024-06-28 | Stop reason: ALTCHOICE

## 2024-06-19 RX ORDER — VENLAFAXINE 75 MG/1
75 TABLET ORAL 3 TIMES DAILY
COMMUNITY

## 2024-06-19 RX ADMIN — KETOROLAC TROMETHAMINE 30 MG: 30 INJECTION, SOLUTION INTRAMUSCULAR; INTRAVENOUS at 15:07

## 2024-06-19 NOTE — PROGRESS NOTES
[facility] Office Visit     Assessment/ Plan: Charisse Shi is a 33 y.o. female presenting for:    ***    *** minutes were spent on the day of this encounter both with the patient and in related activities including chart review, care coordination and counseling.     Patient instructions were discussed and/or provided in the AVS. The patient understands and agrees to the plan.    RETURN TO CARE: ***  Future Appointments   Date Time Provider Department Center   10/10/2024  3:00 PM Jose Antonio Medeiros MD [facility] BS AMB       Subjective:  Chief Complaint   Patient presents with    Neck Pain     Patient is coming in for a follow up on neck pain. She states she needs a refill on the advair inhaler. Interpretor #: [id number]       HPI:   Charisse Shi is a 33 y.o. female with PMH of *** here for ***.     Neck Pain  - constant, just right side   - last 2 months has been worse  -  wanted to take her to hospital   - numbess in fingers, barely there   - trouble sleeping at night because of pain   - Tylenol (up to 5-6 tablets per day ~3000mg) and cream/Volataren but not helping   - sharp, stabbing   - gel didn't help even though taking three times per day     Medications  - lasix, singulair not filled since 7/23     Breathing  - wants refill of Advair ***    Message to [facility] doctor known? ***      I have reviewed the patients problem list, current medications, allergies, family, medical and social history. I have updated them as needed.     Review of Systems  See HPI.    Objective:  /83 (Site: Right Upper Arm, Position: Sitting)   Pulse 86   Temp 99 °F (37.2 °C) (Oral)   Resp 18   Ht 1.651 m (5' 5\")   Wt 135.2 kg (298 lb)   SpO2 97%   BMI 49.59 kg/m²   Physical Exam    Marisol Parisi DO, [facility]

## 2024-06-19 NOTE — PROGRESS NOTES
Charisse Shi is a 33 y.o. female      Chief Complaint   Patient presents with    Neck Pain     Patient is coming in for a follow up on neck pain. She states she needs a refill on the advair inhaler. Interpretor #: [id number]       \"Have you been to the ER, urgent care clinic since your last visit?  Hospitalized since your last visit?\"    NO    “Have you seen or consulted any other health care providers outside of [facility] since your last visit?”    NO              Vitals:    06/19/24 1348   BP: 130/83   Site: Right Upper Arm   Position: Sitting   Pulse: 86   Resp: 18   Temp: 99 °F (37.2 °C)   TempSrc: Oral   SpO2: 97%   Weight: 135.2 kg (298 lb)   Height: 1.651 m (5' 5\")            Health Maintenance Due   Topic Date Due    Hepatitis B vaccine (1 of 3 - 3-dose series) Never done    COVID-19 Vaccine (1) Never done    Varicella vaccine (1 of 2 - 2-dose childhood series) Never done    Pneumococcal 0-64 years Vaccine (1 of 2 - PCV) Never done    Diabetic foot exam  Never done    Diabetic Alb to Cr ratio (uACR) test  Never done    Diabetic retinal exam  Never done    Lipids  06/15/2023         Medication Reconciliation completed, changes noted.  Please  Update medication list.

## 2024-06-27 ENCOUNTER — HOSPITAL ENCOUNTER (EMERGENCY)
Facility: HOSPITAL | Age: 34
Discharge: HOME | End: 2024-06-28
Attending: EMERGENCY MEDICINE
Payer: MEDICAID

## 2024-06-27 DIAGNOSIS — N83.202 CYST OF LEFT OVARY: Primary | ICD-10-CM

## 2024-06-27 DIAGNOSIS — N39.0 URINARY TRACT INFECTION WITHOUT HEMATURIA, SITE UNSPECIFIED: ICD-10-CM

## 2024-06-27 PROCEDURE — 99285 EMERGENCY DEPT VISIT HI MDM: CPT

## 2024-06-27 PROCEDURE — 6360000002 HC RX W HCPCS: Performed by: EMERGENCY MEDICINE

## 2024-06-27 PROCEDURE — 96375 TX/PRO/DX INJ NEW DRUG ADDON: CPT

## 2024-06-27 PROCEDURE — 84703 CHORIONIC GONADOTROPIN ASSAY: CPT

## 2024-06-27 PROCEDURE — 85025 COMPLETE CBC W/AUTO DIFF WBC: CPT

## 2024-06-27 PROCEDURE — 36415 COLL VENOUS BLD VENIPUNCTURE: CPT

## 2024-06-27 PROCEDURE — 2580000003 HC RX 258: Performed by: EMERGENCY MEDICINE

## 2024-06-27 PROCEDURE — 80053 COMPREHEN METABOLIC PANEL: CPT

## 2024-06-27 PROCEDURE — 96361 HYDRATE IV INFUSION ADD-ON: CPT

## 2024-06-27 PROCEDURE — 96374 THER/PROPH/DIAG INJ IV PUSH: CPT

## 2024-06-27 RX ORDER — KETOROLAC TROMETHAMINE 30 MG/ML
30 INJECTION, SOLUTION INTRAMUSCULAR; INTRAVENOUS
Status: COMPLETED | OUTPATIENT
Start: 2024-06-27 | End: 2024-06-27

## 2024-06-27 RX ORDER — ONDANSETRON 2 MG/ML
4 INJECTION INTRAMUSCULAR; INTRAVENOUS ONCE
Status: COMPLETED | OUTPATIENT
Start: 2024-06-27 | End: 2024-06-27

## 2024-06-27 RX ORDER — 0.9 % SODIUM CHLORIDE 0.9 %
1000 INTRAVENOUS SOLUTION INTRAVENOUS ONCE
Status: COMPLETED | OUTPATIENT
Start: 2024-06-27 | End: 2024-06-28

## 2024-06-27 RX ADMIN — ONDANSETRON 4 MG: 2 INJECTION INTRAMUSCULAR; INTRAVENOUS at 23:56

## 2024-06-27 RX ADMIN — KETOROLAC TROMETHAMINE 30 MG: 30 INJECTION, SOLUTION INTRAMUSCULAR at 23:58

## 2024-06-27 RX ADMIN — SODIUM CHLORIDE 1000 ML: 9 INJECTION, SOLUTION INTRAVENOUS at 23:57

## 2024-06-27 ASSESSMENT — PAIN DESCRIPTION - DESCRIPTORS
DESCRIPTORS: ACHING
DESCRIPTORS: BURNING;PRESSURE

## 2024-06-27 ASSESSMENT — PAIN SCALES - GENERAL
PAINLEVEL_OUTOF10: 10
PAINLEVEL_OUTOF10: 10

## 2024-06-27 ASSESSMENT — PAIN - FUNCTIONAL ASSESSMENT
PAIN_FUNCTIONAL_ASSESSMENT: PREVENTS OR INTERFERES SOME ACTIVE ACTIVITIES AND ADLS
PAIN_FUNCTIONAL_ASSESSMENT: 0-10

## 2024-06-27 ASSESSMENT — PAIN DESCRIPTION - ORIENTATION
ORIENTATION: LOWER;MID
ORIENTATION: LOWER;MID

## 2024-06-27 ASSESSMENT — PAIN DESCRIPTION - LOCATION
LOCATION: PELVIS
LOCATION: ABDOMEN

## 2024-06-28 ENCOUNTER — APPOINTMENT (OUTPATIENT)
Facility: HOSPITAL | Age: 34
End: 2024-06-28
Payer: MEDICAID

## 2024-06-28 ENCOUNTER — OFFICE VISIT (OUTPATIENT)
Age: 34
End: 2024-06-28
Payer: MEDICAID

## 2024-06-28 VITALS
HEART RATE: 90 BPM | DIASTOLIC BLOOD PRESSURE: 69 MMHG | SYSTOLIC BLOOD PRESSURE: 152 MMHG | OXYGEN SATURATION: 97 % | RESPIRATION RATE: 20 BRPM | TEMPERATURE: 98.5 F | BODY MASS INDEX: 51.91 KG/M2 | HEIGHT: 63 IN | WEIGHT: 293 LBS

## 2024-06-28 VITALS — WEIGHT: 293 LBS | DIASTOLIC BLOOD PRESSURE: 87 MMHG | BODY MASS INDEX: 52.82 KG/M2 | SYSTOLIC BLOOD PRESSURE: 143 MMHG

## 2024-06-28 DIAGNOSIS — R10.2 PELVIC PAIN: Primary | ICD-10-CM

## 2024-06-28 DIAGNOSIS — N39.0 URINARY TRACT INFECTION WITH HEMATURIA, SITE UNSPECIFIED: ICD-10-CM

## 2024-06-28 DIAGNOSIS — R31.9 URINARY TRACT INFECTION WITH HEMATURIA, SITE UNSPECIFIED: ICD-10-CM

## 2024-06-28 DIAGNOSIS — E66.01 MORBID OBESITY WITH BMI OF 50.0-59.9, ADULT (HCC): ICD-10-CM

## 2024-06-28 LAB
ALBUMIN SERPL-MCNC: 3.4 G/DL (ref 3.5–5)
ALBUMIN/GLOB SERPL: 0.8 (ref 1.1–2.2)
ALP SERPL-CCNC: 53 U/L (ref 45–117)
ALT SERPL-CCNC: 26 U/L (ref 12–78)
ANION GAP SERPL CALC-SCNC: 4 MMOL/L (ref 5–15)
APPEARANCE UR: CLEAR
AST SERPL-CCNC: ABNORMAL U/L (ref 15–37)
BACTERIA URNS QL MICRO: ABNORMAL /HPF
BASOPHILS # BLD: 0 K/UL (ref 0–0.1)
BASOPHILS NFR BLD: 1 % (ref 0–1)
BILIRUB SERPL-MCNC: 0.4 MG/DL (ref 0.2–1)
BILIRUB UR QL: NEGATIVE
BUN SERPL-MCNC: 12 MG/DL (ref 6–20)
BUN/CREAT SERPL: 17 (ref 12–20)
CALCIUM SERPL-MCNC: 8.9 MG/DL (ref 8.5–10.1)
CHLORIDE SERPL-SCNC: 110 MMOL/L (ref 97–108)
CO2 SERPL-SCNC: 21 MMOL/L (ref 21–32)
COLOR UR: ABNORMAL
CREAT SERPL-MCNC: 0.72 MG/DL (ref 0.55–1.02)
DIFFERENTIAL METHOD BLD: ABNORMAL
EOSINOPHIL # BLD: 0.1 K/UL (ref 0–0.4)
EOSINOPHIL NFR BLD: 3 % (ref 0–7)
EPITH CASTS URNS QL MICRO: ABNORMAL /LPF
ERYTHROCYTE [DISTWIDTH] IN BLOOD BY AUTOMATED COUNT: 16.1 % (ref 11.5–14.5)
GLOBULIN SER CALC-MCNC: 4.5 G/DL (ref 2–4)
GLUCOSE SERPL-MCNC: 129 MG/DL (ref 65–100)
GLUCOSE UR STRIP.AUTO-MCNC: NEGATIVE MG/DL
HCG SERPL QL: NEGATIVE
HCG UR QL: NEGATIVE
HCT VFR BLD AUTO: 40.5 % (ref 35–47)
HGB BLD-MCNC: 12.5 G/DL (ref 11.5–16)
HGB UR QL STRIP: ABNORMAL
HYALINE CASTS URNS QL MICRO: ABNORMAL /LPF (ref 0–2)
IMM GRANULOCYTES # BLD AUTO: 0 K/UL (ref 0–0.04)
IMM GRANULOCYTES NFR BLD AUTO: 0 % (ref 0–0.5)
KETONES UR QL STRIP.AUTO: NEGATIVE MG/DL
LEUKOCYTE ESTERASE UR QL STRIP.AUTO: NEGATIVE
LYMPHOCYTES # BLD: 2.1 K/UL (ref 0.8–3.5)
LYMPHOCYTES NFR BLD: 40 % (ref 12–49)
MCH RBC QN AUTO: 23.1 PG (ref 26–34)
MCHC RBC AUTO-ENTMCNC: 30.9 G/DL (ref 30–36.5)
MCV RBC AUTO: 74.7 FL (ref 80–99)
MONOCYTES # BLD: 0.4 K/UL (ref 0–1)
MONOCYTES NFR BLD: 8 % (ref 5–13)
NEUTS SEG # BLD: 2.5 K/UL (ref 1.8–8)
NEUTS SEG NFR BLD: 48 % (ref 32–75)
NITRITE UR QL STRIP.AUTO: NEGATIVE
NRBC # BLD: 0 K/UL (ref 0–0.01)
NRBC BLD-RTO: 0 PER 100 WBC
PH UR STRIP: 6 (ref 5–8)
PLATELET # BLD AUTO: 231 K/UL (ref 150–400)
PMV BLD AUTO: 10.8 FL (ref 8.9–12.9)
POTASSIUM SERPL-SCNC: ABNORMAL MMOL/L (ref 3.5–5.1)
PROT SERPL-MCNC: 7.9 G/DL (ref 6.4–8.2)
PROT UR STRIP-MCNC: 300 MG/DL
RBC # BLD AUTO: 5.42 M/UL (ref 3.8–5.2)
RBC #/AREA URNS HPF: ABNORMAL /HPF (ref 0–5)
SODIUM SERPL-SCNC: 135 MMOL/L (ref 136–145)
SP GR UR REFRACTOMETRY: 1.02 (ref 1–1.03)
URINE CULTURE IF INDICATED: ABNORMAL
UROBILINOGEN UR QL STRIP.AUTO: 1 EU/DL (ref 0.2–1)
WBC # BLD AUTO: 5.1 K/UL (ref 3.6–11)
WBC URNS QL MICRO: ABNORMAL /HPF (ref 0–4)

## 2024-06-28 PROCEDURE — 87086 URINE CULTURE/COLONY COUNT: CPT

## 2024-06-28 PROCEDURE — 6360000004 HC RX CONTRAST MEDICATION: Performed by: RADIOLOGY

## 2024-06-28 PROCEDURE — 76830 TRANSVAGINAL US NON-OB: CPT

## 2024-06-28 PROCEDURE — 81001 URINALYSIS AUTO W/SCOPE: CPT

## 2024-06-28 PROCEDURE — 3077F SYST BP >= 140 MM HG: CPT | Performed by: OBSTETRICS & GYNECOLOGY

## 2024-06-28 PROCEDURE — 96375 TX/PRO/DX INJ NEW DRUG ADDON: CPT

## 2024-06-28 PROCEDURE — 3079F DIAST BP 80-89 MM HG: CPT | Performed by: OBSTETRICS & GYNECOLOGY

## 2024-06-28 PROCEDURE — 81025 URINE PREGNANCY TEST: CPT

## 2024-06-28 PROCEDURE — 74177 CT ABD & PELVIS W/CONTRAST: CPT

## 2024-06-28 PROCEDURE — 99213 OFFICE O/P EST LOW 20 MIN: CPT | Performed by: OBSTETRICS & GYNECOLOGY

## 2024-06-28 PROCEDURE — 6360000002 HC RX W HCPCS: Performed by: EMERGENCY MEDICINE

## 2024-06-28 RX ORDER — NAPROXEN 500 MG/1
500 TABLET ORAL 2 TIMES DAILY WITH MEALS
Qty: 60 TABLET | Refills: 0 | Status: SHIPPED | OUTPATIENT
Start: 2024-06-28

## 2024-06-28 RX ORDER — MORPHINE SULFATE 4 MG/ML
4 INJECTION, SOLUTION INTRAMUSCULAR; INTRAVENOUS
Status: COMPLETED | OUTPATIENT
Start: 2024-06-28 | End: 2024-06-28

## 2024-06-28 RX ORDER — CEPHALEXIN 500 MG/1
500 CAPSULE ORAL 3 TIMES DAILY
Qty: 21 CAPSULE | Refills: 0 | Status: SHIPPED | OUTPATIENT
Start: 2024-06-28 | End: 2024-07-05

## 2024-06-28 RX ADMIN — MORPHINE SULFATE 4 MG: 4 INJECTION INTRAVENOUS at 02:33

## 2024-06-28 RX ADMIN — IOPAMIDOL 100 ML: 755 INJECTION, SOLUTION INTRAVENOUS at 02:15

## 2024-06-28 ASSESSMENT — PAIN DESCRIPTION - DESCRIPTORS: DESCRIPTORS: ACHING

## 2024-06-28 ASSESSMENT — PAIN DESCRIPTION - LOCATION: LOCATION: ABDOMEN

## 2024-06-28 ASSESSMENT — PAIN - FUNCTIONAL ASSESSMENT: PAIN_FUNCTIONAL_ASSESSMENT: ACTIVITIES ARE NOT PREVENTED

## 2024-06-28 ASSESSMENT — PAIN DESCRIPTION - ORIENTATION: ORIENTATION: MID

## 2024-06-28 ASSESSMENT — PAIN SCALES - GENERAL: PAINLEVEL_OUTOF10: 8

## 2024-06-28 NOTE — ED PROVIDER NOTES
[facility] EMERGENCY DEPT  EMERGENCY DEPARTMENT ENCOUNTER      Pt Name: Charisse Shi  MRN: [id number]  Birthdate 1990  Date of evaluation: 2024  Provider: Blade Hartman MD    CHIEF COMPLAINT       Chief Complaint   Patient presents with    Pelvic Pain         HISTORY OF PRESENT ILLNESS   (Location/Symptom, Timing/Onset, Context/Setting, Quality, Duration, Modifying Factors, Severity)  Note limiting factors.   33-year-old female with a past medical history significant for morbid obesity, cardiomegaly, hypertension, type 2 diabetes, , PCOS, D&C, who presents to the ER with a complaint of sudden onset of suprapubic abdominal pain that began this evening, severity 8 out of 10, constant, accompanied by nausea and diaphoresis.  The patient stated that she had a similar episode maybe several years ago while she was in the tube.  She denies any fever or chills, headache, chest pain or shortness of breath, diarrhea, constipation,            Review of External Medical Records:     Nursing Notes were reviewed.    REVIEW OF SYSTEMS    (2-9 systems for level 4, 10 or more for level 5)     Review of Systems   All other systems reviewed and are negative.      Except as noted above the remainder of the review of systems was reviewed and negative.       PAST MEDICAL HISTORY     Past Medical History:   Diagnosis Date    Cardiomegaly 2023    Essential hypertension     last two pregnancies    Type 2 diabetes mellitus with other specified complication (HCC) 5/15/2024         SURGICAL HISTORY       Past Surgical History:   Procedure Laterality Date     SECTION      x4    DILATION AND CURETTAGE OF UTERUS      OTHER SURGICAL HISTORY           CURRENT MEDICATIONS       Discharge Medication List as of 2024  2:37 AM        CONTINUE these medications which have NOT CHANGED    Details   diclofenac sodium (VOLTAREN) 1 % GEL Apply topically 2 times daily, Topical, 2 TIMES DAILY, Historical Med      ipratropium

## 2024-06-28 NOTE — PROGRESS NOTES
Charisse Shi is a 33 y.o. female presents for a problem visit.    Chief Complaint   Patient presents with    Follow-up     No LMP recorded. (Menstrual status: IUD).      The patient is reporting having: ER follow up from 6/27 for severe pelvic pain. Patient states she has had this pain on and off for several years      Examination chaperoned by Maria Ines Orr MA.

## 2024-06-28 NOTE — ED TRIAGE NOTES
Pt arrives to the ED with c/o pelvic pain that started about 30 min ago and not being able to urinate. Last urinated around 1700. Endorses nausea.

## 2024-06-28 NOTE — PROGRESS NOTES
OB/GYN Problem VIsit    HPI  Charisse Shi is a ,  33 y.o. female who presents for a problem visit. Seen in ED yesterday with pelvic pain. Dx with UTI but has not picked up rx. Pain is suprapubic. She also has irregular bleeding with the Mirena - bleeds for a week off for a week. Afraid to remove because she doesn't want to be pregnant. Disc sending her for BS with Dr. Estrada - she was afraid medicaid would cancel her - reassured that will not happen. Then she asked for a hysterectomy. There is no indication for hyst     I reviewed her CT result - normal  I reviewed her US result from ED - physiologic follicular cyst    Past Medical History:   Diagnosis Date    Cardiomegaly 2023    Essential hypertension     last two pregnancies    Type 2 diabetes mellitus with other specified complication (HCC) 5/15/2024     Past Surgical History:   Procedure Laterality Date     SECTION      x4    DILATION AND CURETTAGE OF UTERUS      OTHER SURGICAL HISTORY       Social History     Occupational History    Not on file   Tobacco Use    Smoking status: Never    Smokeless tobacco: Never   Substance and Sexual Activity    Alcohol use: Not Currently    Drug use: Never    Sexual activity: Not on file     Family History   Problem Relation Age of Onset    Hypertension Father        Allergies   Allergen Reactions    Acetazolamide      Other reaction(s): Unknown (comments)  Rash arm and paresthesias.     Adhesive Tape Rash    Other Rash     Pt. States does not remember the name of the shot but had a reaction.     Prior to Admission medications    Medication Sig Start Date End Date Taking? Authorizing Provider   cephALEXin (KEFLEX) 500 MG capsule Take 1 capsule by mouth 3 times daily for 7 days 24  Blade Hartman MD   naproxen (NAPROSYN) 500 MG tablet Take 1 tablet by mouth 2 times daily (with meals) 24   Blade Hartman MD   diclofenac sodium (VOLTAREN) 1 % GEL Apply topically 2 times daily    Provider,

## 2024-06-30 LAB
BACTERIA SPEC CULT: ABNORMAL
CC UR VC: ABNORMAL
SERVICE CMNT-IMP: ABNORMAL

## 2024-07-01 LAB
C TRACH RRNA SPEC QL NAA+PROBE: NEGATIVE
N GONORRHOEA RRNA SPEC QL NAA+PROBE: NEGATIVE
SPECIMEN STATUS REPORT: NORMAL
T VAGINALIS RRNA SPEC QL NAA+PROBE: NEGATIVE

## 2024-07-02 ENCOUNTER — OFFICE VISIT (OUTPATIENT)
Age: 34
End: 2024-07-02
Payer: MEDICAID

## 2024-07-02 VITALS
RESPIRATION RATE: 20 BRPM | HEART RATE: 90 BPM | DIASTOLIC BLOOD PRESSURE: 85 MMHG | OXYGEN SATURATION: 97 % | WEIGHT: 293 LBS | SYSTOLIC BLOOD PRESSURE: 129 MMHG | BODY MASS INDEX: 53.32 KG/M2

## 2024-07-02 DIAGNOSIS — M62.830 SPASM OF RIGHT TRAPEZIUS MUSCLE: Primary | ICD-10-CM

## 2024-07-02 PROBLEM — J45.909 UNCOMPLICATED ASTHMA: Status: ACTIVE | Noted: 2024-07-02

## 2024-07-02 PROCEDURE — 99213 OFFICE O/P EST LOW 20 MIN: CPT | Performed by: FAMILY MEDICINE

## 2024-07-02 PROCEDURE — 3079F DIAST BP 80-89 MM HG: CPT | Performed by: FAMILY MEDICINE

## 2024-07-02 PROCEDURE — 3074F SYST BP LT 130 MM HG: CPT | Performed by: FAMILY MEDICINE

## 2024-07-02 PROCEDURE — 20552 NJX 1/MLT TRIGGER POINT 1/2: CPT | Performed by: FAMILY MEDICINE

## 2024-07-02 RX ORDER — FLUTICASONE PROPIONATE AND SALMETEROL XINAFOATE 45; 21 UG/1; UG/1
2 AEROSOL, METERED RESPIRATORY (INHALATION) 2 TIMES DAILY
Qty: 1 EACH | Refills: 11 | Status: SHIPPED | OUTPATIENT
Start: 2024-07-02

## 2024-07-02 NOTE — PROGRESS NOTES
[facility]  [facility]   [facility]      Chief Complaint:   Chief Complaint   Patient presents with    Neck Pain     right       Subjective:      Charisse Shi is an 33 y.o. female who presents for evaluation and treatment of neck pain x 2 months, referred by PCP. Video interpreting services provided by Juan [employer] #[id number]. She had a cervical x ray taken on 6/19/24 showing no fracture or dislocation. Pt reports naproxen 500mg have not helped.  She describes the pain as a stabbing, burning constant pain. She has difficulty sleeping due to pain. She does not recall any trauma, injury, or change in activity.  Pain localized over the right trap.         Past Medical History:   Diagnosis Date    Cardiomegaly 4/26/2023    Essential hypertension     last two pregnancies    Type 2 diabetes mellitus with other specified complication (HCC) 5/15/2024     Family History   Problem Relation Age of Onset    Hypertension Father      Current Outpatient Medications   Medication Sig Dispense Refill    cephALEXin (KEFLEX) 500 MG capsule Take 1 capsule by mouth 3 times daily for 7 days 21 capsule 0    naproxen (NAPROSYN) 500 MG tablet Take 1 tablet by mouth 2 times daily (with meals) 60 tablet 0    diclofenac sodium (VOLTAREN) 1 % GEL Apply topically 2 times daily      ipratropium 0.5 mg-albuterol 2.5 mg (DUONEB) 0.5-2.5 (3) MG/3ML SOLN nebulizer solution Inhale 3 mLs into the lungs every 4 hours      venlafaxine (EFFEXOR) 75 MG tablet Take 1 tablet by mouth 3 times daily      tiZANidine (ZANAFLEX) 2 MG tablet Take 1 tablet by mouth 3 times daily as needed (muscle strain) 30 tablet 1    acetaZOLAMIDE (DIAMOX) 250 MG tablet Take 1 tablet by mouth 2 times daily 60 tablet 11    traZODone (DESYREL) 50 MG tablet Take 1 tablet by mouth nightly as needed for Sleep 30 tablet 11    albuterol sulfate HFA (VENTOLIN HFA) 108 (90 Base) MCG/ACT inhaler Inhale 2 puffs into the lungs 4 times

## 2024-07-02 NOTE — PROGRESS NOTES
Interpretor :[employer]di[employer] #[id number]    Interpretor used:Juan José #[id number]  Patient States she's been having right neck pain for about 2 months now on the right side,its very tender to the touch.12    Chief Complaint   Patient presents with    Neck Pain     right     Vitals:    07/02/24 0920   BP: 129/85   Pulse: 90   Resp: 20   SpO2: 97%

## 2024-07-14 ENCOUNTER — APPOINTMENT (OUTPATIENT)
Facility: HOSPITAL | Age: 34
End: 2024-07-14
Payer: MEDICAID

## 2024-07-14 ENCOUNTER — HOSPITAL ENCOUNTER (EMERGENCY)
Facility: HOSPITAL | Age: 34
Discharge: HOME | End: 2024-07-15
Attending: STUDENT IN AN ORGANIZED HEALTH CARE EDUCATION/TRAINING PROGRAM
Payer: MEDICAID

## 2024-07-14 DIAGNOSIS — R10.31 RIGHT LOWER QUADRANT ABDOMINAL PAIN: Primary | ICD-10-CM

## 2024-07-14 LAB
ALBUMIN SERPL-MCNC: 3 G/DL (ref 3.5–5)
ALBUMIN/GLOB SERPL: 0.7 (ref 1.1–2.2)
ALP SERPL-CCNC: 56 U/L (ref 45–117)
ALT SERPL-CCNC: 17 U/L (ref 12–78)
ANION GAP SERPL CALC-SCNC: 5 MMOL/L (ref 5–15)
AST SERPL-CCNC: 12 U/L (ref 15–37)
BASOPHILS # BLD: 0 K/UL (ref 0–0.1)
BASOPHILS NFR BLD: 1 % (ref 0–1)
BILIRUB SERPL-MCNC: 0.3 MG/DL (ref 0.2–1)
BUN SERPL-MCNC: 13 MG/DL (ref 6–20)
BUN/CREAT SERPL: 18 (ref 12–20)
CALCIUM SERPL-MCNC: 9.2 MG/DL (ref 8.5–10.1)
CHLORIDE SERPL-SCNC: 108 MMOL/L (ref 97–108)
CO2 SERPL-SCNC: 26 MMOL/L (ref 21–32)
CREAT SERPL-MCNC: 0.71 MG/DL (ref 0.55–1.02)
DIFFERENTIAL METHOD BLD: ABNORMAL
EOSINOPHIL # BLD: 0.1 K/UL (ref 0–0.4)
EOSINOPHIL NFR BLD: 2 % (ref 0–7)
ERYTHROCYTE [DISTWIDTH] IN BLOOD BY AUTOMATED COUNT: 15.6 % (ref 11.5–14.5)
GLOBULIN SER CALC-MCNC: 4.6 G/DL (ref 2–4)
GLUCOSE SERPL-MCNC: 117 MG/DL (ref 65–100)
HCT VFR BLD AUTO: 38.6 % (ref 35–47)
HGB BLD-MCNC: 11.8 G/DL (ref 11.5–16)
IMM GRANULOCYTES # BLD AUTO: 0 K/UL (ref 0–0.04)
IMM GRANULOCYTES NFR BLD AUTO: 0 % (ref 0–0.5)
LIPASE SERPL-CCNC: 38 U/L (ref 13–75)
LYMPHOCYTES # BLD: 2.1 K/UL (ref 0.8–3.5)
LYMPHOCYTES NFR BLD: 32 % (ref 12–49)
MCH RBC QN AUTO: 23.1 PG (ref 26–34)
MCHC RBC AUTO-ENTMCNC: 30.6 G/DL (ref 30–36.5)
MCV RBC AUTO: 75.5 FL (ref 80–99)
MONOCYTES # BLD: 0.5 K/UL (ref 0–1)
MONOCYTES NFR BLD: 8 % (ref 5–13)
NEUTS SEG # BLD: 3.7 K/UL (ref 1.8–8)
NEUTS SEG NFR BLD: 57 % (ref 32–75)
NRBC # BLD: 0 K/UL (ref 0–0.01)
NRBC BLD-RTO: 0 PER 100 WBC
PLATELET # BLD AUTO: 249 K/UL (ref 150–400)
PMV BLD AUTO: 10.5 FL (ref 8.9–12.9)
POTASSIUM SERPL-SCNC: 3.4 MMOL/L (ref 3.5–5.1)
PROT SERPL-MCNC: 7.6 G/DL (ref 6.4–8.2)
RBC # BLD AUTO: 5.11 M/UL (ref 3.8–5.2)
SODIUM SERPL-SCNC: 139 MMOL/L (ref 136–145)
WBC # BLD AUTO: 6.4 K/UL (ref 3.6–11)

## 2024-07-14 PROCEDURE — 6360000004 HC RX CONTRAST MEDICATION: Performed by: STUDENT IN AN ORGANIZED HEALTH CARE EDUCATION/TRAINING PROGRAM

## 2024-07-14 PROCEDURE — 96374 THER/PROPH/DIAG INJ IV PUSH: CPT

## 2024-07-14 PROCEDURE — 74177 CT ABD & PELVIS W/CONTRAST: CPT

## 2024-07-14 PROCEDURE — 36415 COLL VENOUS BLD VENIPUNCTURE: CPT

## 2024-07-14 PROCEDURE — 99285 EMERGENCY DEPT VISIT HI MDM: CPT

## 2024-07-14 PROCEDURE — 85025 COMPLETE CBC W/AUTO DIFF WBC: CPT

## 2024-07-14 PROCEDURE — 80053 COMPREHEN METABOLIC PANEL: CPT

## 2024-07-14 PROCEDURE — 83690 ASSAY OF LIPASE: CPT

## 2024-07-14 PROCEDURE — 6360000002 HC RX W HCPCS

## 2024-07-14 RX ORDER — 0.9 % SODIUM CHLORIDE 0.9 %
1000 INTRAVENOUS SOLUTION INTRAVENOUS ONCE
Status: DISCONTINUED | OUTPATIENT
Start: 2024-07-14 | End: 2024-07-15 | Stop reason: HOSPADM

## 2024-07-14 RX ORDER — AMOXICILLIN AND CLAVULANATE POTASSIUM 875; 125 MG/1; MG/1
1 TABLET, FILM COATED ORAL
Status: COMPLETED | OUTPATIENT
Start: 2024-07-14 | End: 2024-07-15

## 2024-07-14 RX ORDER — KETOROLAC TROMETHAMINE 15 MG/ML
15 INJECTION, SOLUTION INTRAMUSCULAR; INTRAVENOUS ONCE
Status: COMPLETED | OUTPATIENT
Start: 2024-07-14 | End: 2024-07-14

## 2024-07-14 RX ADMIN — IOPAMIDOL 100 ML: 755 INJECTION, SOLUTION INTRAVENOUS at 22:05

## 2024-07-14 RX ADMIN — KETOROLAC TROMETHAMINE 15 MG: 15 INJECTION, SOLUTION INTRAMUSCULAR; INTRAVENOUS at 21:05

## 2024-07-14 ASSESSMENT — PAIN - FUNCTIONAL ASSESSMENT: PAIN_FUNCTIONAL_ASSESSMENT: 0-10

## 2024-07-15 VITALS
RESPIRATION RATE: 17 BRPM | DIASTOLIC BLOOD PRESSURE: 51 MMHG | TEMPERATURE: 98.3 F | BODY MASS INDEX: 53.32 KG/M2 | HEIGHT: 63 IN | SYSTOLIC BLOOD PRESSURE: 127 MMHG | OXYGEN SATURATION: 98 % | HEART RATE: 88 BPM

## 2024-07-15 PROCEDURE — 6370000000 HC RX 637 (ALT 250 FOR IP)

## 2024-07-15 RX ORDER — AMOXICILLIN AND CLAVULANATE POTASSIUM 875; 125 MG/1; MG/1
1 TABLET, FILM COATED ORAL 2 TIMES DAILY
Qty: 14 TABLET | Refills: 0 | Status: SHIPPED | OUTPATIENT
Start: 2024-07-15 | End: 2024-07-22

## 2024-07-15 RX ADMIN — AMOXICILLIN AND CLAVULANATE POTASSIUM 1 TABLET: 875; 125 TABLET, FILM COATED ORAL at 00:24

## 2024-07-15 NOTE — DISCHARGE INSTRUCTIONS
?????? ??? ?????? ????? ???? ????? ????????? ??? ???? ????.  ?? ????? ???? ?? ???? ??? ??? ????? ??????? ????.  ??? ?????? ??????? ?? ???? ????? ?????? ??? ?? ??? ????? ?? ?????????? ???? ????? ???? ??? ?????.    ????? ??? ?????? ??? ?????? ??????? ?????? ?? ?????.  ??? ???? ?? ???? ?????? ?? ???????? ????????? ??????? ??????? ?????? ??.  ????? ??? ?????? [facility].  ???? ???????? ?? ???????? ???? ???????? ????? ???? ??????.     ?? ????? ??????? ???? ?????? ?? ??? ??????? ??? ?????? ????? ???? ??????? ??? ????? ????? ?????. ?? ????? ?? ????? ?? ???? ?? ???? ????? ?? ????? ???? ?????? ??? ??????? ????????. ??? ?????? ?????? ?? ?? ????? ??? ?? ????? ??? ????? ?? ?????? ??? ???? ??????? ?????? ???????? ???? ???? ?????? ??? ??? ???????.  ??? ???????? 24 ???? ?? ?????.     ???? ????? ???? ?? ???? (?????) ??????? ?????? ????? ?? ??????? ?????? ???? ???????.  ?? ??? ?????? ??? ????? ???? ?????? ???????? ?????? ??.    We are sending you home with a prescription for Augmentin, an antibiotic.  It is very important that you take this as prescribed.  If symptoms worsen or new concerning symptoms arise, to include fever or chills, please report to the nearest emergency department.    Thank you for allowing us to provide you with medical care today.  We realize that you have many choices for your emergency care needs.  We thank you for choosing [facility].  Please choose us in the future for any continued health care needs.     The exam and treatment you received in the Emergency Department were for an emergent problem and are not intended as complete care. It is important that you follow up with a doctor, nurse practitioner, or physician assistant for ongoing care. If your symptoms worsen or you do not improve as expected and you are unable to reach your usual health care provider, you should return to the Emergency Department.  We are available 24 hours a day.     Please make an appointment with your health care provider(s)

## 2024-07-15 NOTE — ED PROVIDER NOTES
[facility] EMERGENCY DEPT  EMERGENCY DEPARTMENT ENCOUNTER      Pt Name: Charisse Shi  MRN: [id number]  Birthdate 1990  Date of evaluation: 2024  Provider: Blade Bateman PA-C    CHIEF COMPLAINT       Chief Complaint   Patient presents with    Flank Pain    Abdominal Pain         HISTORY OF PRESENT ILLNESS   (Location/Symptom, Timing/Onset, Context/Setting, Quality, Duration, Modifying Factors, Severity)  Note limiting factors.   33 year old female with history of type 2 diabetes, hypertension, reports to ED with Right flank and RLQ pain, onset 3 days ago, worsening today prompting ED visit.  Was seen in this ED 2.5 weeks ago and diagnosed with ovarian cyst and UTI.  Endorses some improvement in symptoms since visit until the symptoms arose 3 days ago.  Denies fever, chills, chest pain, shortness of breath, or N/V/D.            Review of External Medical Records:     Nursing Notes were reviewed.    REVIEW OF SYSTEMS    (2-9 systems for level 4, 10 or more for level 5)     Review of Systems    Except as noted above the remainder of the review of systems was reviewed and negative.       PAST MEDICAL HISTORY     Past Medical History:   Diagnosis Date    Cardiomegaly 2023    Essential hypertension     last two pregnancies    Type 2 diabetes mellitus with other specified complication (HCC) 5/15/2024    Uncomplicated asthma 2024         SURGICAL HISTORY       Past Surgical History:   Procedure Laterality Date     SECTION      x4    DILATION AND CURETTAGE OF UTERUS      OTHER SURGICAL HISTORY           CURRENT MEDICATIONS       Discharge Medication List as of 7/15/2024 12:20 AM        CONTINUE these medications which have NOT CHANGED    Details   naproxen (NAPROSYN) 500 MG tablet Take 1 tablet by mouth 2 times daily (with meals), Disp-60 tablet, R-0Print      fluticasone-salmeterol (ADVAIR HFA) 45-21 MCG/ACT inhaler Inhale 2 puffs into the lungs 2 times daily, Disp-1 each, R-11Normal

## 2024-07-15 NOTE — ED TRIAGE NOTES
Pt ambulatory to triage c/o right side flank pain that started 3 days ago but has gotten worse.     Provider in triage

## 2024-08-29 ENCOUNTER — APPOINTMENT (OUTPATIENT)
Facility: HOSPITAL | Age: 34
End: 2024-08-29
Attending: EMERGENCY MEDICINE

## 2024-08-29 ENCOUNTER — HOSPITAL ENCOUNTER (INPATIENT)
Facility: HOSPITAL | Age: 34
LOS: 1 days | Discharge: HOME | End: 2024-08-30
Attending: INTERNAL MEDICINE | Admitting: FAMILY MEDICINE

## 2024-08-29 DIAGNOSIS — J45.909 UNCOMPLICATED ASTHMA, UNSPECIFIED ASTHMA SEVERITY, UNSPECIFIED WHETHER PERSISTENT: ICD-10-CM

## 2024-08-29 DIAGNOSIS — J45.901 EXACERBATION OF ASTHMA, UNSPECIFIED ASTHMA SEVERITY, UNSPECIFIED WHETHER PERSISTENT: ICD-10-CM

## 2024-08-29 DIAGNOSIS — U07.1 COVID-19: Primary | ICD-10-CM

## 2024-08-29 LAB
ALBUMIN SERPL-MCNC: 3.3 G/DL (ref 3.5–5)
ALBUMIN/GLOB SERPL: 0.8 (ref 1.1–2.2)
ALP SERPL-CCNC: 64 U/L (ref 45–117)
ALT SERPL-CCNC: 20 U/L (ref 12–78)
ANION GAP SERPL CALC-SCNC: 4 MMOL/L (ref 5–15)
AST SERPL-CCNC: 18 U/L (ref 15–37)
BASOPHILS # BLD: 0 K/UL (ref 0–0.1)
BASOPHILS NFR BLD: 1 % (ref 0–1)
BILIRUB SERPL-MCNC: 0.2 MG/DL (ref 0.2–1)
BUN SERPL-MCNC: 12 MG/DL (ref 6–20)
BUN/CREAT SERPL: 16 (ref 12–20)
CALCIUM SERPL-MCNC: 9.6 MG/DL (ref 8.5–10.1)
CHLORIDE SERPL-SCNC: 103 MMOL/L (ref 97–108)
CO2 SERPL-SCNC: 31 MMOL/L (ref 21–32)
COMMENT:: NORMAL
CREAT SERPL-MCNC: 0.74 MG/DL (ref 0.55–1.02)
DIFFERENTIAL METHOD BLD: ABNORMAL
EOSINOPHIL # BLD: 0.2 K/UL (ref 0–0.4)
EOSINOPHIL NFR BLD: 3 % (ref 0–7)
ERYTHROCYTE [DISTWIDTH] IN BLOOD BY AUTOMATED COUNT: 15.4 % (ref 11.5–14.5)
FLUAV RNA SPEC QL NAA+PROBE: NOT DETECTED
FLUBV RNA SPEC QL NAA+PROBE: NOT DETECTED
GLOBULIN SER CALC-MCNC: 4.2 G/DL (ref 2–4)
GLUCOSE SERPL-MCNC: 117 MG/DL (ref 65–100)
HCT VFR BLD AUTO: 39.7 % (ref 35–47)
HGB BLD-MCNC: 12.1 G/DL (ref 11.5–16)
IMM GRANULOCYTES # BLD AUTO: 0 K/UL (ref 0–0.04)
IMM GRANULOCYTES NFR BLD AUTO: 1 % (ref 0–0.5)
LYMPHOCYTES # BLD: 1.6 K/UL (ref 0.8–3.5)
LYMPHOCYTES NFR BLD: 29 % (ref 12–49)
MCH RBC QN AUTO: 23.5 PG (ref 26–34)
MCHC RBC AUTO-ENTMCNC: 30.5 G/DL (ref 30–36.5)
MCV RBC AUTO: 77.1 FL (ref 80–99)
MONOCYTES # BLD: 0.5 K/UL (ref 0–1)
MONOCYTES NFR BLD: 9 % (ref 5–13)
NEUTS SEG # BLD: 3.2 K/UL (ref 1.8–8)
NEUTS SEG NFR BLD: 57 % (ref 32–75)
NRBC # BLD: 0 K/UL (ref 0–0.01)
NRBC BLD-RTO: 0 PER 100 WBC
NT PRO BNP: 106 PG/ML
PLATELET # BLD AUTO: 262 K/UL (ref 150–400)
PMV BLD AUTO: 10.8 FL (ref 8.9–12.9)
POTASSIUM SERPL-SCNC: 3.6 MMOL/L (ref 3.5–5.1)
PROT SERPL-MCNC: 7.5 G/DL (ref 6.4–8.2)
RBC # BLD AUTO: 5.15 M/UL (ref 3.8–5.2)
SARS-COV-2 RNA RESP QL NAA+PROBE: DETECTED
SODIUM SERPL-SCNC: 138 MMOL/L (ref 136–145)
SOURCE: ABNORMAL
SPECIMEN HOLD: NORMAL
TROPONIN I SERPL HS-MCNC: 9 NG/L (ref 0–51)
WBC # BLD AUTO: 5.6 K/UL (ref 3.6–11)

## 2024-08-29 PROCEDURE — 96375 TX/PRO/DX INJ NEW DRUG ADDON: CPT

## 2024-08-29 PROCEDURE — 83880 ASSAY OF NATRIURETIC PEPTIDE: CPT

## 2024-08-29 PROCEDURE — 80053 COMPREHEN METABOLIC PANEL: CPT

## 2024-08-29 PROCEDURE — 71046 X-RAY EXAM CHEST 2 VIEWS: CPT

## 2024-08-29 PROCEDURE — 87636 SARSCOV2 & INF A&B AMP PRB: CPT

## 2024-08-29 PROCEDURE — 96374 THER/PROPH/DIAG INJ IV PUSH: CPT

## 2024-08-29 PROCEDURE — 1100000000 HC RM PRIVATE

## 2024-08-29 PROCEDURE — 99285 EMERGENCY DEPT VISIT HI MDM: CPT

## 2024-08-29 PROCEDURE — 84484 ASSAY OF TROPONIN QUANT: CPT

## 2024-08-29 PROCEDURE — 6360000002 HC RX W HCPCS

## 2024-08-29 PROCEDURE — 36415 COLL VENOUS BLD VENIPUNCTURE: CPT

## 2024-08-29 PROCEDURE — 6370000000 HC RX 637 (ALT 250 FOR IP): Performed by: EMERGENCY MEDICINE

## 2024-08-29 PROCEDURE — 6360000002 HC RX W HCPCS: Performed by: EMERGENCY MEDICINE

## 2024-08-29 PROCEDURE — 93005 ELECTROCARDIOGRAM TRACING: CPT

## 2024-08-29 PROCEDURE — 85025 COMPLETE CBC W/AUTO DIFF WBC: CPT

## 2024-08-29 PROCEDURE — 2580000003 HC RX 258

## 2024-08-29 RX ORDER — ENOXAPARIN SODIUM 100 MG/ML
40 INJECTION SUBCUTANEOUS 2 TIMES DAILY
Status: DISCONTINUED | OUTPATIENT
Start: 2024-08-30 | End: 2024-08-30 | Stop reason: HOSPADM

## 2024-08-29 RX ORDER — ONDANSETRON 4 MG/1
4 TABLET, ORALLY DISINTEGRATING ORAL EVERY 8 HOURS PRN
Status: DISCONTINUED | OUTPATIENT
Start: 2024-08-29 | End: 2024-08-30 | Stop reason: HOSPADM

## 2024-08-29 RX ORDER — ENOXAPARIN SODIUM 100 MG/ML
40 INJECTION SUBCUTANEOUS DAILY
Status: DISCONTINUED | OUTPATIENT
Start: 2024-08-30 | End: 2024-08-29 | Stop reason: DRUGHIGH

## 2024-08-29 RX ORDER — DEXTROSE MONOHYDRATE 100 MG/ML
INJECTION, SOLUTION INTRAVENOUS CONTINUOUS PRN
Status: DISCONTINUED | OUTPATIENT
Start: 2024-08-29 | End: 2024-08-30 | Stop reason: HOSPADM

## 2024-08-29 RX ORDER — BUDESONIDE 0.5 MG/2ML
0.5 INHALANT ORAL
Status: DISCONTINUED | OUTPATIENT
Start: 2024-08-29 | End: 2024-08-29 | Stop reason: CLARIF

## 2024-08-29 RX ORDER — TRAZODONE HYDROCHLORIDE 50 MG/1
50 TABLET, FILM COATED ORAL NIGHTLY PRN
Status: DISCONTINUED | OUTPATIENT
Start: 2024-08-29 | End: 2024-08-30 | Stop reason: HOSPADM

## 2024-08-29 RX ORDER — ACETAMINOPHEN 650 MG/1
650 SUPPOSITORY RECTAL EVERY 6 HOURS PRN
Status: DISCONTINUED | OUTPATIENT
Start: 2024-08-29 | End: 2024-08-30 | Stop reason: HOSPADM

## 2024-08-29 RX ORDER — POLYETHYLENE GLYCOL 3350 17 G/17G
17 POWDER, FOR SOLUTION ORAL DAILY PRN
Status: DISCONTINUED | OUTPATIENT
Start: 2024-08-29 | End: 2024-08-30 | Stop reason: HOSPADM

## 2024-08-29 RX ORDER — ARFORMOTEROL TARTRATE 15 UG/2ML
15 SOLUTION RESPIRATORY (INHALATION)
Status: DISCONTINUED | OUTPATIENT
Start: 2024-08-29 | End: 2024-08-29 | Stop reason: CLARIF

## 2024-08-29 RX ORDER — SODIUM CHLORIDE 0.9 % (FLUSH) 0.9 %
5-40 SYRINGE (ML) INJECTION EVERY 12 HOURS SCHEDULED
Status: DISCONTINUED | OUTPATIENT
Start: 2024-08-29 | End: 2024-08-30 | Stop reason: HOSPADM

## 2024-08-29 RX ORDER — DEXAMETHASONE SODIUM PHOSPHATE 10 MG/ML
6 INJECTION, SOLUTION INTRAMUSCULAR; INTRAVENOUS EVERY 24 HOURS
Status: DISCONTINUED | OUTPATIENT
Start: 2024-08-29 | End: 2024-08-29

## 2024-08-29 RX ORDER — INSULIN LISPRO 100 [IU]/ML
0-8 INJECTION, SOLUTION INTRAVENOUS; SUBCUTANEOUS
Status: DISCONTINUED | OUTPATIENT
Start: 2024-08-30 | End: 2024-08-30 | Stop reason: HOSPADM

## 2024-08-29 RX ORDER — INSULIN LISPRO 100 [IU]/ML
0-4 INJECTION, SOLUTION INTRAVENOUS; SUBCUTANEOUS NIGHTLY
Status: DISCONTINUED | OUTPATIENT
Start: 2024-08-29 | End: 2024-08-30 | Stop reason: HOSPADM

## 2024-08-29 RX ORDER — ONDANSETRON 2 MG/ML
4 INJECTION INTRAMUSCULAR; INTRAVENOUS EVERY 6 HOURS PRN
Status: DISCONTINUED | OUTPATIENT
Start: 2024-08-29 | End: 2024-08-30 | Stop reason: HOSPADM

## 2024-08-29 RX ORDER — ALBUTEROL SULFATE 0.83 MG/ML
2.5 SOLUTION RESPIRATORY (INHALATION) EVERY 4 HOURS PRN
Status: DISCONTINUED | OUTPATIENT
Start: 2024-08-30 | End: 2024-08-30 | Stop reason: HOSPADM

## 2024-08-29 RX ORDER — SODIUM CHLORIDE, SODIUM LACTATE, POTASSIUM CHLORIDE, AND CALCIUM CHLORIDE .6; .31; .03; .02 G/100ML; G/100ML; G/100ML; G/100ML
500 INJECTION, SOLUTION INTRAVENOUS ONCE
Status: COMPLETED | OUTPATIENT
Start: 2024-08-29 | End: 2024-08-29

## 2024-08-29 RX ORDER — ALBUTEROL SULFATE 0.83 MG/ML
2.5 SOLUTION RESPIRATORY (INHALATION) EVERY 20 MIN
Status: COMPLETED | OUTPATIENT
Start: 2024-08-29 | End: 2024-08-29

## 2024-08-29 RX ORDER — KETOROLAC TROMETHAMINE 15 MG/ML
15 INJECTION, SOLUTION INTRAMUSCULAR; INTRAVENOUS ONCE
Status: COMPLETED | OUTPATIENT
Start: 2024-08-29 | End: 2024-08-29

## 2024-08-29 RX ORDER — IBUPROFEN 400 MG/1
400 TABLET, FILM COATED ORAL EVERY 6 HOURS PRN
Status: DISCONTINUED | OUTPATIENT
Start: 2024-08-29 | End: 2024-08-30 | Stop reason: HOSPADM

## 2024-08-29 RX ORDER — VENLAFAXINE HYDROCHLORIDE 37.5 MG/1
75 CAPSULE, EXTENDED RELEASE ORAL NIGHTLY
Status: DISCONTINUED | OUTPATIENT
Start: 2024-08-29 | End: 2024-08-30 | Stop reason: HOSPADM

## 2024-08-29 RX ORDER — ACETAMINOPHEN 325 MG/1
650 TABLET ORAL EVERY 6 HOURS PRN
Status: DISCONTINUED | OUTPATIENT
Start: 2024-08-29 | End: 2024-08-30 | Stop reason: HOSPADM

## 2024-08-29 RX ORDER — HYDROCODONE POLISTIREX AND CHLORPHENIRAMINE POLISTIREX 10; 8 MG/5ML; MG/5ML
5 SUSPENSION, EXTENDED RELEASE ORAL
Status: COMPLETED | OUTPATIENT
Start: 2024-08-29 | End: 2024-08-29

## 2024-08-29 RX ORDER — BUDESONIDE AND FORMOTEROL FUMARATE DIHYDRATE 160; 4.5 UG/1; UG/1
2 AEROSOL RESPIRATORY (INHALATION)
Status: DISCONTINUED | OUTPATIENT
Start: 2024-08-29 | End: 2024-08-30 | Stop reason: HOSPADM

## 2024-08-29 RX ORDER — ALBUTEROL SULFATE 90 UG/1
2 AEROSOL, METERED RESPIRATORY (INHALATION) EVERY 4 HOURS PRN
Status: DISCONTINUED | OUTPATIENT
Start: 2024-08-29 | End: 2024-08-30 | Stop reason: HOSPADM

## 2024-08-29 RX ORDER — MONTELUKAST SODIUM 10 MG/1
10 TABLET ORAL
Status: DISCONTINUED | OUTPATIENT
Start: 2024-08-29 | End: 2024-08-30 | Stop reason: HOSPADM

## 2024-08-29 RX ORDER — PREDNISONE 20 MG/1
40 TABLET ORAL DAILY
Status: CANCELLED | OUTPATIENT
Start: 2024-08-29

## 2024-08-29 RX ORDER — SODIUM CHLORIDE 0.9 % (FLUSH) 0.9 %
5-40 SYRINGE (ML) INJECTION PRN
Status: DISCONTINUED | OUTPATIENT
Start: 2024-08-29 | End: 2024-08-30 | Stop reason: HOSPADM

## 2024-08-29 RX ORDER — IPRATROPIUM BROMIDE AND ALBUTEROL SULFATE 2.5; .5 MG/3ML; MG/3ML
1 SOLUTION RESPIRATORY (INHALATION)
Status: DISCONTINUED | OUTPATIENT
Start: 2024-08-30 | End: 2024-08-29 | Stop reason: CLARIF

## 2024-08-29 RX ORDER — GUAIFENESIN 600 MG/1
600 TABLET, EXTENDED RELEASE ORAL 2 TIMES DAILY
Status: DISCONTINUED | OUTPATIENT
Start: 2024-08-29 | End: 2024-08-30 | Stop reason: HOSPADM

## 2024-08-29 RX ORDER — SODIUM CHLORIDE 9 MG/ML
INJECTION, SOLUTION INTRAVENOUS PRN
Status: DISCONTINUED | OUTPATIENT
Start: 2024-08-29 | End: 2024-08-30 | Stop reason: HOSPADM

## 2024-08-29 RX ORDER — ACETAZOLAMIDE 250 MG/1
250 TABLET ORAL 2 TIMES DAILY
Status: DISCONTINUED | OUTPATIENT
Start: 2024-08-29 | End: 2024-08-30 | Stop reason: HOSPADM

## 2024-08-29 RX ADMIN — ALBUTEROL SULFATE 2.5 MG: 2.5 SOLUTION RESPIRATORY (INHALATION) at 20:48

## 2024-08-29 RX ADMIN — ALBUTEROL SULFATE 2.5 MG: 2.5 SOLUTION RESPIRATORY (INHALATION) at 19:49

## 2024-08-29 RX ADMIN — HYDROCODONE POLISTIREX AND CHLORPHENIRAMINE POLISTIREX 5 ML: 10; 8 SUSPENSION, EXTENDED RELEASE ORAL at 22:01

## 2024-08-29 RX ADMIN — ALBUTEROL SULFATE 2.5 MG: 2.5 SOLUTION RESPIRATORY (INHALATION) at 20:07

## 2024-08-29 RX ADMIN — SODIUM CHLORIDE, POTASSIUM CHLORIDE, SODIUM LACTATE AND CALCIUM CHLORIDE 500 ML: 600; 310; 30; 20 INJECTION, SOLUTION INTRAVENOUS at 19:53

## 2024-08-29 RX ADMIN — KETOROLAC TROMETHAMINE 15 MG: 15 INJECTION, SOLUTION INTRAMUSCULAR; INTRAVENOUS at 22:01

## 2024-08-29 RX ADMIN — ALBUTEROL SULFATE 2.5 MG: 2.5 SOLUTION RESPIRATORY (INHALATION) at 20:25

## 2024-08-29 RX ADMIN — DEXAMETHASONE SODIUM PHOSPHATE 6 MG: 10 INJECTION, SOLUTION INTRAMUSCULAR; INTRAVENOUS at 19:50

## 2024-08-29 NOTE — ED NOTES
Patient's first language is Georgian but she is able to understand some English. Language Line is currently in use- accurate assessment and triage to be completed.

## 2024-08-29 NOTE — ED TRIAGE NOTES
Pt has had a cough, dizziness and body aches x 3 days. Pt states that her children are COVID positive at home

## 2024-08-29 NOTE — ED NOTES
Patient has a profuse coarse cough- appears to have viral like illness that is inducing chest pain with coughing.

## 2024-08-30 ENCOUNTER — CLINICAL DOCUMENTATION (OUTPATIENT)
Age: 34
End: 2024-08-30

## 2024-08-30 VITALS
RESPIRATION RATE: 21 BRPM | OXYGEN SATURATION: 97 % | SYSTOLIC BLOOD PRESSURE: 133 MMHG | DIASTOLIC BLOOD PRESSURE: 90 MMHG | TEMPERATURE: 98.8 F | HEART RATE: 91 BPM

## 2024-08-30 DIAGNOSIS — T14.8XXA MUSCLE STRAIN: ICD-10-CM

## 2024-08-30 DIAGNOSIS — G47.00 INSOMNIA, UNSPECIFIED TYPE: ICD-10-CM

## 2024-08-30 DIAGNOSIS — G93.2 IDIOPATHIC INTRACRANIAL HYPERTENSION: ICD-10-CM

## 2024-08-30 DIAGNOSIS — E11.69 TYPE 2 DIABETES MELLITUS WITH OTHER SPECIFIED COMPLICATION, UNSPECIFIED WHETHER LONG TERM INSULIN USE (HCC): ICD-10-CM

## 2024-08-30 DIAGNOSIS — J45.909 UNCOMPLICATED ASTHMA, UNSPECIFIED ASTHMA SEVERITY, UNSPECIFIED WHETHER PERSISTENT: ICD-10-CM

## 2024-08-30 DIAGNOSIS — M54.2 NECK PAIN: ICD-10-CM

## 2024-08-30 PROBLEM — J45.901 EXACERBATION OF ASTHMA: Status: ACTIVE | Noted: 2024-08-30

## 2024-08-30 PROBLEM — R00.0 TACHYCARDIA: Status: ACTIVE | Noted: 2024-08-30

## 2024-08-30 LAB
ANION GAP SERPL CALC-SCNC: 6 MMOL/L (ref 5–15)
BASOPHILS # BLD: 0 K/UL (ref 0–0.1)
BASOPHILS NFR BLD: 0 % (ref 0–1)
BUN SERPL-MCNC: 15 MG/DL (ref 6–20)
BUN/CREAT SERPL: 18 (ref 12–20)
CALCIUM SERPL-MCNC: 9.7 MG/DL (ref 8.5–10.1)
CHLORIDE SERPL-SCNC: 107 MMOL/L (ref 97–108)
CO2 SERPL-SCNC: 23 MMOL/L (ref 21–32)
CREAT SERPL-MCNC: 0.84 MG/DL (ref 0.55–1.02)
D DIMER PPP FEU-MCNC: 0.26 MG/L FEU (ref 0–0.65)
DIFFERENTIAL METHOD BLD: ABNORMAL
EKG ATRIAL RATE: 134 BPM
EKG DIAGNOSIS: NORMAL
EKG P AXIS: 65 DEGREES
EKG P-R INTERVAL: 142 MS
EKG Q-T INTERVAL: 286 MS
EKG QRS DURATION: 84 MS
EKG QTC CALCULATION (BAZETT): 427 MS
EKG R AXIS: 70 DEGREES
EKG T AXIS: -47 DEGREES
EKG VENTRICULAR RATE: 134 BPM
EOSINOPHIL # BLD: 0 K/UL (ref 0–0.4)
EOSINOPHIL NFR BLD: 0 % (ref 0–7)
ERYTHROCYTE [DISTWIDTH] IN BLOOD BY AUTOMATED COUNT: 15.6 % (ref 11.5–14.5)
EST. AVERAGE GLUCOSE BLD GHB EST-MCNC: 126 MG/DL
GLUCOSE BLD STRIP.AUTO-MCNC: 145 MG/DL (ref 65–117)
GLUCOSE BLD STRIP.AUTO-MCNC: 165 MG/DL (ref 65–117)
GLUCOSE BLD STRIP.AUTO-MCNC: 176 MG/DL (ref 65–117)
GLUCOSE SERPL-MCNC: 188 MG/DL (ref 65–100)
HBA1C MFR BLD: 6 % (ref 4–5.6)
HCT VFR BLD AUTO: 40.9 % (ref 35–47)
HGB BLD-MCNC: 12.5 G/DL (ref 11.5–16)
IMM GRANULOCYTES # BLD AUTO: 0.1 K/UL (ref 0–0.04)
IMM GRANULOCYTES NFR BLD AUTO: 1 % (ref 0–0.5)
LACTATE SERPL-SCNC: 1.8 MMOL/L (ref 0.4–2)
LYMPHOCYTES # BLD: 0.5 K/UL (ref 0.8–3.5)
LYMPHOCYTES NFR BLD: 8 % (ref 12–49)
MCH RBC QN AUTO: 23.5 PG (ref 26–34)
MCHC RBC AUTO-ENTMCNC: 30.6 G/DL (ref 30–36.5)
MCV RBC AUTO: 76.9 FL (ref 80–99)
MONOCYTES # BLD: 0.1 K/UL (ref 0–1)
MONOCYTES NFR BLD: 1 % (ref 5–13)
NEUTS SEG # BLD: 5.5 K/UL (ref 1.8–8)
NEUTS SEG NFR BLD: 90 % (ref 32–75)
NRBC # BLD: 0 K/UL (ref 0–0.01)
NRBC BLD-RTO: 0 PER 100 WBC
PLATELET # BLD AUTO: 284 K/UL (ref 150–400)
PMV BLD AUTO: 10.6 FL (ref 8.9–12.9)
POTASSIUM SERPL-SCNC: 3.8 MMOL/L (ref 3.5–5.1)
RBC # BLD AUTO: 5.32 M/UL (ref 3.8–5.2)
RBC MORPH BLD: ABNORMAL
SERVICE CMNT-IMP: ABNORMAL
SODIUM SERPL-SCNC: 136 MMOL/L (ref 136–145)
TSH SERPL DL<=0.05 MIU/L-ACNC: 0.61 UIU/ML (ref 0.36–3.74)
WBC # BLD AUTO: 6.2 K/UL (ref 3.6–11)

## 2024-08-30 PROCEDURE — 6370000000 HC RX 637 (ALT 250 FOR IP)

## 2024-08-30 PROCEDURE — 84443 ASSAY THYROID STIM HORMONE: CPT

## 2024-08-30 PROCEDURE — 93010 ELECTROCARDIOGRAM REPORT: CPT | Performed by: INTERNAL MEDICINE

## 2024-08-30 PROCEDURE — 85025 COMPLETE CBC W/AUTO DIFF WBC: CPT

## 2024-08-30 PROCEDURE — 83605 ASSAY OF LACTIC ACID: CPT

## 2024-08-30 PROCEDURE — 99235 HOSP IP/OBS SAME DATE MOD 70: CPT

## 2024-08-30 PROCEDURE — 2580000003 HC RX 258

## 2024-08-30 PROCEDURE — 36415 COLL VENOUS BLD VENIPUNCTURE: CPT

## 2024-08-30 PROCEDURE — 85379 FIBRIN DEGRADATION QUANT: CPT

## 2024-08-30 PROCEDURE — 6360000002 HC RX W HCPCS

## 2024-08-30 PROCEDURE — 94664 DEMO&/EVAL PT USE INHALER: CPT

## 2024-08-30 PROCEDURE — 83036 HEMOGLOBIN GLYCOSYLATED A1C: CPT

## 2024-08-30 PROCEDURE — 80048 BASIC METABOLIC PNL TOTAL CA: CPT

## 2024-08-30 PROCEDURE — 82962 GLUCOSE BLOOD TEST: CPT

## 2024-08-30 PROCEDURE — 94640 AIRWAY INHALATION TREATMENT: CPT

## 2024-08-30 PROCEDURE — 2700000000 HC OXYGEN THERAPY PER DAY

## 2024-08-30 PROCEDURE — 94761 N-INVAS EAR/PLS OXIMETRY MLT: CPT

## 2024-08-30 RX ORDER — SODIUM CHLORIDE, SODIUM LACTATE, POTASSIUM CHLORIDE, AND CALCIUM CHLORIDE .6; .31; .03; .02 G/100ML; G/100ML; G/100ML; G/100ML
500 INJECTION, SOLUTION INTRAVENOUS ONCE
Status: DISCONTINUED | OUTPATIENT
Start: 2024-08-30 | End: 2024-08-30

## 2024-08-30 RX ORDER — TRAZODONE HYDROCHLORIDE 50 MG/1
50 TABLET, FILM COATED ORAL NIGHTLY PRN
Qty: 30 TABLET | Refills: 10 | Status: SHIPPED | OUTPATIENT
Start: 2024-08-30

## 2024-08-30 RX ORDER — SODIUM CHLORIDE, SODIUM LACTATE, POTASSIUM CHLORIDE, AND CALCIUM CHLORIDE .6; .31; .03; .02 G/100ML; G/100ML; G/100ML; G/100ML
1000 INJECTION, SOLUTION INTRAVENOUS ONCE
Status: COMPLETED | OUTPATIENT
Start: 2024-08-30 | End: 2024-08-30

## 2024-08-30 RX ORDER — VENLAFAXINE 75 MG/1
75 TABLET ORAL 3 TIMES DAILY
Qty: 90 TABLET | Refills: 3 | Status: SHIPPED | OUTPATIENT
Start: 2024-08-30

## 2024-08-30 RX ORDER — FLUTICASONE PROPIONATE AND SALMETEROL XINAFOATE 45; 21 UG/1; UG/1
2 AEROSOL, METERED RESPIRATORY (INHALATION) 2 TIMES DAILY
Qty: 1 EACH | Refills: 10 | Status: SHIPPED | OUTPATIENT
Start: 2024-08-30

## 2024-08-30 RX ORDER — ACETAZOLAMIDE 250 MG/1
250 TABLET ORAL 2 TIMES DAILY
Qty: 60 TABLET | Refills: 3 | Status: SHIPPED | OUTPATIENT
Start: 2024-08-30

## 2024-08-30 RX ORDER — MONTELUKAST SODIUM 10 MG/1
10 TABLET ORAL
Qty: 30 TABLET | Refills: 10 | Status: SHIPPED | OUTPATIENT
Start: 2024-08-30

## 2024-08-30 RX ORDER — PREDNISONE 20 MG/1
40 TABLET ORAL DAILY
Qty: 10 TABLET | Refills: 0 | Status: SHIPPED | OUTPATIENT
Start: 2024-08-30 | End: 2024-09-04

## 2024-08-30 RX ORDER — IPRATROPIUM BROMIDE AND ALBUTEROL SULFATE 2.5; .5 MG/3ML; MG/3ML
1 SOLUTION RESPIRATORY (INHALATION) EVERY 4 HOURS
Qty: 360 ML | Refills: 10 | Status: SHIPPED | OUTPATIENT
Start: 2024-08-30

## 2024-08-30 RX ORDER — ALBUTEROL SULFATE 90 UG/1
2 AEROSOL, METERED RESPIRATORY (INHALATION) 4 TIMES DAILY PRN
Qty: 18 G | Refills: 10 | Status: SHIPPED | OUTPATIENT
Start: 2024-08-30

## 2024-08-30 RX ADMIN — PANTOPRAZOLE SODIUM 40 MG: 40 INJECTION, POWDER, FOR SOLUTION INTRAVENOUS at 08:35

## 2024-08-30 RX ADMIN — WATER 60 MG: 1 INJECTION INTRAMUSCULAR; INTRAVENOUS; SUBCUTANEOUS at 01:11

## 2024-08-30 RX ADMIN — MONTELUKAST 10 MG: 10 TABLET, FILM COATED ORAL at 01:16

## 2024-08-30 RX ADMIN — ACETAMINOPHEN 650 MG: 325 TABLET ORAL at 01:19

## 2024-08-30 RX ADMIN — SODIUM CHLORIDE, PRESERVATIVE FREE 10 ML: 5 INJECTION INTRAVENOUS at 01:19

## 2024-08-30 RX ADMIN — TIOTROPIUM BROMIDE INHALATION SPRAY 2 PUFF: 3.12 SPRAY, METERED RESPIRATORY (INHALATION) at 01:19

## 2024-08-30 RX ADMIN — SODIUM CHLORIDE, POTASSIUM CHLORIDE, SODIUM LACTATE AND CALCIUM CHLORIDE 1000 ML: 600; 310; 30; 20 INJECTION, SOLUTION INTRAVENOUS at 11:28

## 2024-08-30 RX ADMIN — GUAIFENESIN 600 MG: 600 TABLET ORAL at 08:36

## 2024-08-30 RX ADMIN — GUAIFENESIN 600 MG: 600 TABLET ORAL at 01:15

## 2024-08-30 RX ADMIN — BUDESONIDE AND FORMOTEROL FUMARATE DIHYDRATE 2 PUFF: 160; 4.5 AEROSOL RESPIRATORY (INHALATION) at 01:11

## 2024-08-30 RX ADMIN — ACETAMINOPHEN 650 MG: 325 TABLET ORAL at 08:50

## 2024-08-30 RX ADMIN — TRAZODONE HYDROCHLORIDE 50 MG: 50 TABLET ORAL at 04:10

## 2024-08-30 RX ADMIN — IBUPROFEN 400 MG: 400 TABLET, FILM COATED ORAL at 08:50

## 2024-08-30 RX ADMIN — ACETAZOLAMIDE 250 MG: 250 TABLET ORAL at 01:16

## 2024-08-30 RX ADMIN — ACETAZOLAMIDE 250 MG: 250 TABLET ORAL at 08:55

## 2024-08-30 RX ADMIN — ONDANSETRON 4 MG: 2 INJECTION INTRAMUSCULAR; INTRAVENOUS at 02:27

## 2024-08-30 RX ADMIN — ENOXAPARIN SODIUM 40 MG: 100 INJECTION SUBCUTANEOUS at 08:36

## 2024-08-30 RX ADMIN — VENLAFAXINE HYDROCHLORIDE 75 MG: 37.5 CAPSULE, EXTENDED RELEASE ORAL at 01:16

## 2024-08-30 RX ADMIN — BUDESONIDE AND FORMOTEROL FUMARATE DIHYDRATE 2 PUFF: 160; 4.5 AEROSOL RESPIRATORY (INHALATION) at 07:26

## 2024-08-30 ASSESSMENT — PAIN DESCRIPTION - DESCRIPTORS
DESCRIPTORS: ACHING
DESCRIPTORS: ACHING

## 2024-08-30 ASSESSMENT — PAIN SCALES - GENERAL
PAINLEVEL_OUTOF10: 9
PAINLEVEL_OUTOF10: 9
PAINLEVEL_OUTOF10: 5
PAINLEVEL_OUTOF10: 0

## 2024-08-30 ASSESSMENT — PAIN DESCRIPTION - LOCATION
LOCATION: GENERALIZED
LOCATION: BACK;FOOT

## 2024-08-30 ASSESSMENT — PAIN - FUNCTIONAL ASSESSMENT: PAIN_FUNCTIONAL_ASSESSMENT: 0-10

## 2024-08-30 NOTE — PROGRESS NOTES
[id number] [id number]   Office [phone]  Fax [phone]          Subjective / Objective     Subjective  Overnight Events: Patient was admitted overnight.  This AM, reports her breathing feels better. No further questions or concerns at this time.      Respiratory: RA  BP (!) 146/89   Pulse (!) 114   Temp 98.8 °F (37.1 °C) (Oral)   Resp 22   SpO2 97%    Physical Examination:   General appearance - alert, well appearing, and in no distress  Chest - clear to auscultation, no wheezes, rales or rhonchi, symmetric air entry  Heart - tachycardic, regular rhythm, normal S1, S2, no murmurs, rubs, clicks or gallops,   Abdomen - Non-distended  Neurological - alert & oriented  Skin - warm, dry. No notable rashes  Extremities - No pedal edema, no clubbing or cyanosis      I/O:  No intake/output data recorded.  Inpatient Medications  Current Facility-Administered Medications   Medication Dose Route Frequency    albuterol (PROVENTIL) (2.5 MG/3ML) 0.083% nebulizer solution 2.5 mg  2.5 mg Nebulization Q4H PRN    sodium chloride flush 0.9 % injection 5-40 mL  5-40 mL IntraVENous 2 times per day    sodium chloride flush 0.9 % injection 5-40 mL  5-40 mL IntraVENous PRN    0.9 % sodium chloride infusion   IntraVENous PRN    ondansetron (ZOFRAN-ODT) disintegrating tablet 4 mg  4 mg Oral Q8H PRN    Or    ondansetron (ZOFRAN) injection 4 mg  4 mg IntraVENous Q6H PRN    polyethylene glycol (GLYCOLAX) packet 17 g  17 g Oral Daily PRN    acetaminophen (TYLENOL) tablet 650 mg  650 mg Oral Q6H PRN    Or    acetaminophen (TYLENOL) suppository 650 mg  650 mg Rectal Q6H PRN    acetaZOLAMIDE (DIAMOX) tablet 250 mg  250 mg Oral BID    montelukast (SINGULAIR) tablet 10 mg  10 mg Oral QHS    traZODone (DESYREL) tablet 50 mg  50 mg Oral Nightly PRN    venlafaxine (EFFEXOR XR) extended release capsule 75 mg  75 mg Oral Nightly    budesonide-formoterol (SYMBICORT) 160-4.5 MCG/ACT inhaler 2 puff  2 puff Inhalation BID  mouth 2 times daily (with meals)    acetaminophen (TYLENOL) 500 MG tablet Take 2 tablets by mouth in the morning and at bedtime    montelukast (SINGULAIR) 10 MG tablet Take 1 tablet by mouth nightly     Allergies  Allergies   Allergen Reactions    Acetazolamide      Other reaction(s): Unknown (comments)  Rash arm and paresthesias.     Cortisone     Adhesive Tape Rash    Other Rash     Pt. States does not remember the name of the shot but had a reaction.     CBC:  Recent Labs     08/29/24  1924 08/30/24  0403   WBC 5.6 6.2   HGB 12.1 12.5    284     Metabolic Panel:  Recent Labs     08/29/24 1924 08/30/24  0403    136   K 3.6 3.8    107   CO2 31 23   BUN 12 15   ALT 20  --      Imaging/procedures:   Xray Result (most recent):  XR CHEST STANDARD TWO VW 08/29/2024    Narrative  INDICATION:  chest pain    Exam: Chest 2 views.    Comparison: 5/31/2024.    Findings: Cardiomediastinal silhouette is normal. Pulmonary vasculature is not  engorged. No focal parenchymal opacities, effusions, or pneumothorax. Bony  thorax is intact.    Impression  1. No acute cardiopulmonary disease      Electronically signed by Juventino Price             Assessment and Plan     Charisse Shi is a 33 y.o. female with pmhx of asthma, HTN, T2DM, obesity, anxiety, and IIH admitted for asthma exacerbation 2/2 COVID.     Asthma Exacerbation: Improving. Likely 2/2 COVID infection as pt was positive on admission. History of poor compliance with asthma medication. Has been out of home medications. Saturing well on RA, no O2 requirement or hypoxia. CXR NAP. Improving this AM after treatment with steroids, duo-nebs, albuterol.  - Monitor vitals per unit protocol  - Transition to PO prednisone taper   - Continue brovana, pulmicort BID  - Continue spiriva 2 puffs daily  - Continue albuterol PRN  - Continue mucinex BID  - Continue home singulair 10mg nightly  - Pulmonology referral at discharge    Tachycardia: Acute, persistently tachy since

## 2024-08-30 NOTE — ED NOTES
I personally encountered the patient. I personally directed the diagnostic, therapeutic and consultative decisions. I reviewed the resident practitioner documented history, exam and MDM. I performed a substantive portion of the medical decision making.    The patient presented with   Chief Complaint   Patient presents with    URI    Chest Pain       I personally found patient with ongoing cough, tachypnea, and diaphoresis following her neb treatments.  She has not hypoxic, reports that her work of breathing is improved.  She reports that she has had COVID 4 times previously.  She reports chest wall pain from coughing.  Discussed with patient plan for symptomatic medications.  We discussed possibility of admission versus attempting to return home with scheduled nebs and symptomatic medications as well as Paxlovid, but she does not feel that she could make 4 hours in between treatments, and request admission.  This is reasonable.  Will consult hospitalist.      ICD-10-CM    1. COVID-19  U07.1       2. Exacerbation of asthma, unspecified asthma severity, unspecified whether persistent  J45.901         Perfect Serve Consult for Admission  9:58 PM    ED Room Number: ER11/11  Patient Name and age:  Charisse Shi 33 y.o.  female  Working Diagnosis:   1. COVID-19    2. Exacerbation of asthma, unspecified asthma severity, unspecified whether persistent        COVID-19 Suspicion: No  Sepsis present:  No  Reassessment needed: No  Code Status:  Full Code  Readmission: No  Isolation Requirements: yes - droplet plus  Recommended Level of Care: telemetry  Department: [place] [facility] - [phone]  Consulting Provider: Family Medicine    Other:  32yo Danish-speaking F with history of asthma presents with COVID-19, wheezing/dyspneic and diaphoretic but not hypoxic. Constant coughing. Some improvement with nebs and decadron but ongoing cough and tachypnea.  CXR clear.    Total critical care time spent exclusive of procedures:  32

## 2024-08-30 NOTE — H&P
Contact - Jose De Jesus Doran (Other)  [phone] (Mobile)     Patient Charisse Shi will be discussed with Dr. Osmel Barrios.    10:25 PM, 08/29/24  Brigitte Muro MD  Family Medicine Resident       For Billing    Chief Complaint   Patient presents with    URI    Chest Pain       Active Problems:    * No active hospital problems. *  Resolved Problems:    * No resolved hospital problems. *

## 2024-08-30 NOTE — CARE COORDINATION
8/30/2024  2:16 PM  Pt concerned over cost of DC meds  CM educated pt is currently [place]/ [place] and Rx is covered.    Additionally all scrips were sent  to [employer] $4.00 formulary.  Pt and family expressed understanding and appreciation.  BAILEY Rodriguez      12:25 PM  Pt admitted for COVID 19, on Droplet, speaks Slovenian   CM consult for financial assistance.  CM verified w/ [facility], pt has active [place] Medicaid effective 5/1/24.  Pt will be covered for her medical services and Rx.    Medicaid will NOT cover OTC drugs.  CM updated MD Resident  Consult completed.  BAILEY Rodriguez

## 2024-08-30 NOTE — DISCHARGE INSTRUCTIONS
HOME DISCHARGE INSTRUCTIONS    Charisse Shi / [id number] : 1990    Admission date: 2024 Discharge date: 2024 12:13 PM     Please bring this form with you to show your care provider at your follow-up appointment.    Primary care provider:  Marisol Parisi      Discharging provider:  Ana Monterroso MD  - Family Medicine Resident  Dr. Osmel Barrios  - Family Medicine Attending      You have been admitted to the hospital with the following diagnoses:    ACUTE DIAGNOSES:  COVID [U07.1]    Brief hospital course: You were admitted for an asthma exacerbation, because of your recent COVID infection. You improved with breathing treatments. Please continue taking all home medications as prescribed.    . . . . . . . . . . . . . . . . . . . . . . . . . . . . . . . . . . . . . . . . . . . . . . . . . . . . . . . . . . . . . . . . . . . . . .      MEDICATION CHANGES  -Start taking Prednisone 40mg daily by mouth for 5 days  . . . . . . . . . . . . . . . . . . . . . . . . . . . . . . . . . . . . . . . . . . . . . . . . . . . . . . . . . . . . . . . . . . . . . .     FOLLOW-UP CARE RECOMMENDATIONS:    Appointments  Future Appointments   Date Time Provider Department Center   10/10/2024  3:00 PM Jose Antonio Medeiros MD [id number] [place]  [address] [facility] [address] [phone]    Follow up  hospital follow-up-[place]thma [place]  [address] [facility] [address] [phone]              Follow-up with your PCP regarding:  -hospital follow-up as needed for COVID, asthma exacerbation     Follow-up tests needed:   -none    Pending test results:   At the time of your discharge the following test results are still pending: Hgb A1C   Please make sure you review these results with your outpatient follow-up provider(s).    DIET/what to eat:  diabetic diet    ACTIVITY:  activity as tolerated    Wound care: none    Equipment needed:   Representative Signature                                                          Date/Time

## 2024-08-30 NOTE — CONSULTS
Session ID: [id number]  Language: Greek   ID: #[id number]   Name: [place]: Sinhala   ID: #[id number]   Name: Vanessa

## 2024-08-30 NOTE — PROGRESS NOTES
[facility] Lovenox Dosing 08/29/24  Lovenox dose change per protocol  Physician: Jina    [facility] Protocol  Enoxaparin prophylaxis dosing (medically ill, surgical patients)   Patient Weight (kg)     50 and below 51 - 100 101 - 150 151 - 174 175 or greater         Estimated CrCl  (ml/min) 30 or greater   30 mg SUBQ daily   40 mg SUBQ daily (or 30 mg BID for ortho) 30 mg SUBQ BID  40 mg SUBQ BID 60mg SUBQ BID      15-29 UFH 5000 units SUBQ BID   30 mg SUBQ daily 30 mg SUBQ daily 40 mg SUBQ daily   60 mg SUBQ daily      Less than 15 or Dialysis UFH 5000 units SUBQ BID   UFH 5000 units SUBQ TID UFH 7500 units SUBQ TID     CrCl cannot be calculated (Unknown ideal weight.).  Current dose: 40 mg daily, changed to 40 mg BID  Recommendation:     Thank you

## 2024-08-31 NOTE — ED PROVIDER NOTES
[facility] EMERGENCY DEPT  EMERGENCY DEPARTMENT ENCOUNTER      Pt Name: Charisse Shi  MRN: [id number]  Birthdate 1990  Date of evaluation: 2024  Provider: Samantha Watkins MD    CHIEF COMPLAINT       Chief Complaint   Patient presents with    URI    Chest Pain           HISTORY OF PRESENT ILLNESS   (Location/Symptom, Timing/Onset, Context/Setting, Quality, Duration, Modifying Factors, Severity)  Note limiting factors.   32 yo F with PMHx asthma, type 2 diabetes, cardiomegaly, hyperlipidemia, hypertension, idiopathic intracranial hypertension, multidrug-resistant UTI, morbid obesity presenting for 3 days of cough.  Patient endorsing persistent cough with associated symptoms of congestion, rhinorrhea, dizziness, headaches, fever, chills, body aches. Patient reports children tested positive for COVID several days ago with symptoms.    Patient also endorsing chest pain and posterior neck pain, abdominal pain, diarrhea.  Patient has history of chronic neck pain    Interview limited due to patient's persistent coughing.        Review of External Medical Records:     Nursing Notes were reviewed.    REVIEW OF SYSTEMS    (2-9 systems for level 4, 10 or more for level 5)     Review of Systems    Except as noted above the remainder of the review of systems was reviewed and negative.       PAST MEDICAL HISTORY     Past Medical History:   Diagnosis Date    Cardiomegaly 2023    Essential hypertension     last two pregnancies    Type 2 diabetes mellitus with other specified complication (HCC) 5/15/2024    Uncomplicated asthma 2024         SURGICAL HISTORY       Past Surgical History:   Procedure Laterality Date     SECTION      x4    DILATION AND CURETTAGE OF UTERUS      OTHER SURGICAL HISTORY           CURRENT MEDICATIONS       Discharge Medication List as of 2024  1:41 PM        CONTINUE these medications which have NOT CHANGED    Details   acetaZOLAMIDE (DIAMOX) 250 MG tablet Take 1 tablet by mouth 2  UTI, morbid obesity presenting for 3 days of cough and flulike symptoms concerning for COVID.  Patient satting well on room air, vitals notable for temperature 99.9 °F, tachycardia to 131, hypertension 186/115.  Diminished lung sounds bilaterally on exam without appreciable wheezes.  Does not meet sepsis criteria at this time.      Will pursue rapid COVID/flu and treat empirically for asthma exacerbation in the setting of likely COVID.    Previous admissions for pulmonary edema and concern for CHF no clear diagnosis, last EF 2023 was 59%.  Will fluid repeat gently with 500 LR bolus.    DDx: COVID, asthma exacerbation, pneumonia, new CHF, ACS, PE, pericarditis, myocarditis    Plan:  - EKG  - Labs: BMP, CBC, troponin, BNP  - Imaging: CXR  - Medications: Albuterol nebulizer, dexamethasone 6 mg IV, 500 LR bolus    Reassessment:   Patient COVID-19 positive.  Will prescribe       Amount and/or Complexity of Data Reviewed  Labs: ordered.  Radiology: ordered.  ECG/medicine tests: ordered.    Risk  Prescription drug management.            REASSESSMENT     ED Course as of 08/30/24 2059   Thu Aug 29, 2024   1900 EKG independently reviewed by me:    EKG obtained at 1853. Noted with sinus rhythm at a rate of 134. Normal axis and intervals. Narrow complex QRS.  Nonspecific ST changes noted. No ectopy or ischemia noted. [RS]   2200 Pt with persistent cough, tachypnea, and diaphoresis on reeval, but reports WOB improved. She reports ongoing pain to chest wall. She is requesting admission for symptomatic management, which is reasonable given her symptoms and appearance. Will contact . [RS]      ED Course User Index  [RS] Mark Ayoub MD           CONSULTS:  IP CONSULT TO CASE MANAGEMENT    PROCEDURES:  Unless otherwise noted below, none     Procedures      FINAL IMPRESSION      1. COVID-19    2. Exacerbation of asthma, unspecified asthma severity, unspecified whether persistent    3. Uncomplicated asthma, unspecified asthma

## 2024-09-06 ENCOUNTER — APPOINTMENT (OUTPATIENT)
Facility: HOSPITAL | Age: 34
End: 2024-09-06
Payer: MEDICAID

## 2024-09-06 ENCOUNTER — HOSPITAL ENCOUNTER (EMERGENCY)
Facility: HOSPITAL | Age: 34
Discharge: HOME | End: 2024-09-06
Attending: EMERGENCY MEDICINE
Payer: MEDICAID

## 2024-09-06 VITALS
HEIGHT: 63 IN | OXYGEN SATURATION: 100 % | DIASTOLIC BLOOD PRESSURE: 160 MMHG | SYSTOLIC BLOOD PRESSURE: 178 MMHG | BODY MASS INDEX: 51.91 KG/M2 | RESPIRATION RATE: 20 BRPM | HEART RATE: 101 BPM | TEMPERATURE: 98.6 F | WEIGHT: 293 LBS

## 2024-09-06 DIAGNOSIS — U07.1 COVID-19 VIRUS INFECTION: Primary | ICD-10-CM

## 2024-09-06 DIAGNOSIS — J40 BRONCHITIS: ICD-10-CM

## 2024-09-06 LAB
FLUAV RNA SPEC QL NAA+PROBE: NOT DETECTED
FLUBV RNA SPEC QL NAA+PROBE: NOT DETECTED
SARS-COV-2 RNA RESP QL NAA+PROBE: DETECTED
SOURCE: ABNORMAL

## 2024-09-06 PROCEDURE — 71046 X-RAY EXAM CHEST 2 VIEWS: CPT

## 2024-09-06 PROCEDURE — 6370000000 HC RX 637 (ALT 250 FOR IP): Performed by: EMERGENCY MEDICINE

## 2024-09-06 PROCEDURE — 6360000002 HC RX W HCPCS: Performed by: EMERGENCY MEDICINE

## 2024-09-06 PROCEDURE — 87636 SARSCOV2 & INF A&B AMP PRB: CPT

## 2024-09-06 PROCEDURE — 99285 EMERGENCY DEPT VISIT HI MDM: CPT

## 2024-09-06 PROCEDURE — 93005 ELECTROCARDIOGRAM TRACING: CPT | Performed by: EMERGENCY MEDICINE

## 2024-09-06 RX ORDER — BENZONATATE 200 MG/1
200 CAPSULE ORAL 3 TIMES DAILY PRN
Qty: 21 CAPSULE | Refills: 0 | Status: SHIPPED | OUTPATIENT
Start: 2024-09-06 | End: 2024-09-13

## 2024-09-06 RX ORDER — GUAIFENESIN 600 MG/1
600 TABLET, EXTENDED RELEASE ORAL 2 TIMES DAILY
Qty: 30 TABLET | Refills: 0 | Status: SHIPPED | OUTPATIENT
Start: 2024-09-06 | End: 2024-09-21

## 2024-09-06 RX ORDER — PREDNISONE 20 MG/1
60 TABLET ORAL
Status: COMPLETED | OUTPATIENT
Start: 2024-09-06 | End: 2024-09-06

## 2024-09-06 RX ORDER — IPRATROPIUM BROMIDE AND ALBUTEROL SULFATE 2.5; .5 MG/3ML; MG/3ML
1 SOLUTION RESPIRATORY (INHALATION)
Status: COMPLETED | OUTPATIENT
Start: 2024-09-06 | End: 2024-09-06

## 2024-09-06 RX ORDER — GUAIFENESIN 600 MG/1
600 TABLET, EXTENDED RELEASE ORAL
Status: COMPLETED | OUTPATIENT
Start: 2024-09-06 | End: 2024-09-06

## 2024-09-06 RX ORDER — KETOROLAC TROMETHAMINE 30 MG/ML
30 INJECTION, SOLUTION INTRAMUSCULAR; INTRAVENOUS
Status: COMPLETED | OUTPATIENT
Start: 2024-09-06 | End: 2024-09-06

## 2024-09-06 RX ORDER — PREDNISONE 10 MG/1
TABLET ORAL
Qty: 21 TABLET | Refills: 0 | Status: SHIPPED | OUTPATIENT
Start: 2024-09-06 | End: 2024-09-11

## 2024-09-06 RX ADMIN — GUAIFENESIN 600 MG: 600 TABLET ORAL at 21:58

## 2024-09-06 RX ADMIN — IPRATROPIUM BROMIDE AND ALBUTEROL SULFATE 1 DOSE: .5; 3 SOLUTION RESPIRATORY (INHALATION) at 21:58

## 2024-09-06 RX ADMIN — PREDNISONE 60 MG: 20 TABLET ORAL at 21:58

## 2024-09-06 RX ADMIN — KETOROLAC TROMETHAMINE 30 MG: 30 INJECTION, SOLUTION INTRAMUSCULAR at 21:58

## 2024-09-06 ASSESSMENT — ENCOUNTER SYMPTOMS
SORE THROAT: 0
VOMITING: 0
COUGH: 1

## 2024-09-06 ASSESSMENT — PAIN DESCRIPTION - LOCATION: LOCATION: CHEST

## 2024-09-06 ASSESSMENT — PAIN - FUNCTIONAL ASSESSMENT: PAIN_FUNCTIONAL_ASSESSMENT: 0-10

## 2024-09-06 ASSESSMENT — PAIN SCALES - GENERAL: PAINLEVEL_OUTOF10: 10

## 2024-09-07 NOTE — ED PROVIDER NOTES
[facility] EMERGENCY DEPT  EMERGENCY DEPARTMENT ENCOUNTER      Pt Name: Charisse Shi  MRN: [id number]  Birthdate 1990  Date of evaluation: 2024  Provider: Shawn Lackey MD    CHIEF COMPLAINT       Chief Complaint   Patient presents with    Chest Pain    Shortness of Breath    Cough         HISTORY OF PRESENT ILLNESS   (Location/Symptom, Timing/Onset, Context/Setting, Quality, Duration, Modifying Factors, Severity)  Note limiting factors.   33-year-old female presents from home with complaints of cough and congestion.  She been sick for the past week.  Kids at home and also been sick.  Seen here a week ago and diagnosed with COVID.  Patient states she is not getting any better.    The history is provided by the patient and medical records.         Review of External Medical Records:     Nursing Notes were reviewed.    REVIEW OF SYSTEMS    (2-9 systems for level 4, 10 or more for level 5)     Review of Systems   Constitutional:  Negative for fatigue.   HENT:  Negative for sore throat.    Eyes:  Negative for visual disturbance.   Respiratory:  Positive for cough.    Cardiovascular:  Negative for palpitations.   Gastrointestinal:  Negative for vomiting.   Genitourinary:  Negative for difficulty urinating.   Musculoskeletal:  Negative for myalgias.   Skin:  Negative for rash.   Neurological:  Negative for weakness.       Except as noted above the remainder of the review of systems was reviewed and negative.       PAST MEDICAL HISTORY     Past Medical History:   Diagnosis Date    Cardiomegaly 2023    Essential hypertension     last two pregnancies    Type 2 diabetes mellitus with other specified complication (HCC) 5/15/2024    Uncomplicated asthma 2024         SURGICAL HISTORY       Past Surgical History:   Procedure Laterality Date     SECTION      x4    DILATION AND CURETTAGE OF UTERUS      OTHER SURGICAL HISTORY           CURRENT MEDICATIONS       Previous Medications    ACETAMINOPHEN (TYLENOL)      LABS:  Labs Reviewed   COVID-19 & INFLUENZA COMBO - Abnormal; Notable for the following components:       Result Value    SARS-CoV-2, PCR Detected (*)     All other components within normal limits       All other labs were within normal range or not returned as of this dictation.    EMERGENCY DEPARTMENT COURSE and DIFFERENTIAL DIAGNOSIS/MDM:   Vitals:    Vitals:    09/06/24 2148 09/06/24 2149 09/06/24 2203 09/06/24 2218   BP: (!) 188/100  (!) 186/125 (!) 178/160   Pulse: 95  88 (!) 101   Resp: (!) 31  26 20   Temp:    98.6 °F (37 °C)   TempSrc:    Oral   SpO2: 99%  100% 100%   Weight:  135.4 kg (298 lb 9.6 oz)     Height:  1.6 m (5' 3\")             Medical Decision Making  Assessment: Patient with cough chest tightness.  Has been sick with COVID for the past week.  No hypoxia and afebrile.  Lungs clear to auscultation.  Chest x-ray unremarkable.  COVID test still remains positive.  Patient does have significant cough.  Will plan on discharging her home with albuterol, prednisone, Mucinex, Tessalon.  She was encouraged follow-up with a primary care doctor if needed or return to the ER sooner if she has worsening symptoms.    Amount and/or Complexity of Data Reviewed  ECG/medicine tests: ordered.    Risk  OTC drugs.  Prescription drug management.            REASSESSMENT            CONSULTS:  None    PROCEDURES:  Unless otherwise noted below, none     Procedures      FINAL IMPRESSION      1. COVID-19 virus infection    2. Bronchitis          DISPOSITION/PLAN   DISPOSITION Decision To Discharge 09/06/2024 11:08:49 PM      PATIENT REFERRED TO:  Marisol Parisi DO  [id number] [facility] [address] [phone]    Schedule an appointment as soon as possible for a visit       [facility] EMERGENCY DEPT  [address] [place] [id number] [phone]    If symptoms worsen      DISCHARGE MEDICATIONS:  New Prescriptions    BENZONATATE (TESSALON) 200 MG CAPSULE    Take 1 capsule by mouth 3 times daily

## 2024-09-07 NOTE — ED NOTES
Patient ambulatory at discharge with discharge instructions reviewed and opportunity to answer questions given. Not primary RN. Prescriptions sent to preferred pharmacy.

## 2024-09-07 NOTE — ED TRIAGE NOTES
CC coughing, burning sensation in her chest, difficulty breathing.   Pt reports being here 10 days ago for the same reason reports she has worsening symptoms.   Pts has been using prescribed medication.   Denies recent travel.   Pt reports vomiting when taking medication that was prescribed to her pt said it was the tylenol and another medication was unable to provide.     Pt crying in triage,  used in triage

## 2024-09-08 LAB
EKG ATRIAL RATE: 92 BPM
EKG DIAGNOSIS: NORMAL
EKG P AXIS: 51 DEGREES
EKG P-R INTERVAL: 150 MS
EKG Q-T INTERVAL: 352 MS
EKG QRS DURATION: 86 MS
EKG QTC CALCULATION (BAZETT): 435 MS
EKG R AXIS: 49 DEGREES
EKG T AXIS: 21 DEGREES
EKG VENTRICULAR RATE: 92 BPM

## 2024-09-08 PROCEDURE — 93010 ELECTROCARDIOGRAM REPORT: CPT | Performed by: SPECIALIST

## 2024-09-13 ENCOUNTER — TELEPHONE (OUTPATIENT)
Age: 34
End: 2024-09-13

## 2024-09-19 ENCOUNTER — OFFICE VISIT (OUTPATIENT)
Age: 34
End: 2024-09-19
Payer: MEDICAID

## 2024-09-19 ENCOUNTER — ANCILLARY PROCEDURE (OUTPATIENT)
Age: 34
End: 2024-09-19
Payer: MEDICAID

## 2024-09-19 VITALS
WEIGHT: 293 LBS | SYSTOLIC BLOOD PRESSURE: 142 MMHG | OXYGEN SATURATION: 95 % | RESPIRATION RATE: 18 BRPM | HEIGHT: 63 IN | HEART RATE: 99 BPM | BODY MASS INDEX: 51.91 KG/M2 | TEMPERATURE: 98.7 F | DIASTOLIC BLOOD PRESSURE: 87 MMHG

## 2024-09-19 DIAGNOSIS — M79.645 THUMB PAIN, LEFT: ICD-10-CM

## 2024-09-19 DIAGNOSIS — R03.0 ELEVATED BLOOD PRESSURE READING: ICD-10-CM

## 2024-09-19 DIAGNOSIS — E66.01 MORBID OBESITY WITH BMI OF 45.0-49.9, ADULT (HCC): ICD-10-CM

## 2024-09-19 DIAGNOSIS — G47.33 OBSTRUCTIVE SLEEP APNEA SYNDROME: ICD-10-CM

## 2024-09-19 DIAGNOSIS — Z86.16 HISTORY OF COVID-19: Primary | ICD-10-CM

## 2024-09-19 PROCEDURE — 99213 OFFICE O/P EST LOW 20 MIN: CPT

## 2024-09-19 PROCEDURE — 73130 X-RAY EXAM OF HAND: CPT

## 2024-09-19 RX ORDER — LISINOPRIL 5 MG/1
5 TABLET ORAL DAILY
Qty: 90 TABLET | Refills: 0 | Status: SHIPPED | OUTPATIENT
Start: 2024-09-19

## 2024-09-20 DIAGNOSIS — R03.0 ELEVATED BLOOD PRESSURE READING: ICD-10-CM

## 2024-09-20 LAB
CHOLEST SERPL-MCNC: 248 MG/DL
CREAT UR-MCNC: 156 MG/DL
HDLC SERPL-MCNC: 44 MG/DL
HDLC SERPL: 5.6 (ref 0–5)
LDLC SERPL CALC-MCNC: 162 MG/DL (ref 0–100)
MICROALBUMIN UR-MCNC: 254 MG/DL
MICROALBUMIN/CREAT UR-RTO: 1628 MG/G (ref 0–30)
TRIGL SERPL-MCNC: 210 MG/DL
VLDLC SERPL CALC-MCNC: 42 MG/DL

## 2024-09-23 PROBLEM — E11.29 TYPE 2 DIABETES MELLITUS WITH DIABETIC MICROALBUMINURIA (HCC): Status: ACTIVE | Noted: 2024-05-15

## 2024-09-23 PROBLEM — R80.9 TYPE 2 DIABETES MELLITUS WITH DIABETIC MICROALBUMINURIA (HCC): Status: ACTIVE | Noted: 2024-05-15

## 2024-10-03 ENCOUNTER — OFFICE VISIT (OUTPATIENT)
Age: 34
End: 2024-10-03
Payer: COMMERCIAL

## 2024-10-03 VITALS
HEART RATE: 94 BPM | BODY MASS INDEX: 51.91 KG/M2 | DIASTOLIC BLOOD PRESSURE: 77 MMHG | RESPIRATION RATE: 18 BRPM | HEIGHT: 63 IN | TEMPERATURE: 99.1 F | SYSTOLIC BLOOD PRESSURE: 121 MMHG | OXYGEN SATURATION: 94 % | WEIGHT: 293 LBS

## 2024-10-03 DIAGNOSIS — F32.A DEPRESSION, UNSPECIFIED DEPRESSION TYPE: ICD-10-CM

## 2024-10-03 DIAGNOSIS — J45.40 MODERATE PERSISTENT ASTHMA WITHOUT COMPLICATION: ICD-10-CM

## 2024-10-03 DIAGNOSIS — R03.0 ELEVATED BLOOD PRESSURE READING: ICD-10-CM

## 2024-10-03 DIAGNOSIS — E11.69 TYPE 2 DIABETES MELLITUS WITH OTHER SPECIFIED COMPLICATION, UNSPECIFIED WHETHER LONG TERM INSULIN USE (HCC): Primary | ICD-10-CM

## 2024-10-03 DIAGNOSIS — E66.01 MORBID OBESITY: ICD-10-CM

## 2024-10-03 DIAGNOSIS — G93.2 IDIOPATHIC INTRACRANIAL HYPERTENSION: ICD-10-CM

## 2024-10-03 DIAGNOSIS — J45.909 UNCOMPLICATED ASTHMA, UNSPECIFIED ASTHMA SEVERITY, UNSPECIFIED WHETHER PERSISTENT: ICD-10-CM

## 2024-10-03 DIAGNOSIS — G47.00 INSOMNIA, UNSPECIFIED TYPE: ICD-10-CM

## 2024-10-03 PROBLEM — U07.1 COVID-19: Status: RESOLVED | Noted: 2024-08-29 | Resolved: 2024-10-03

## 2024-10-03 PROBLEM — G44.229 CHRONIC TENSION-TYPE HEADACHE, NOT INTRACTABLE: Status: RESOLVED | Noted: 2022-06-20 | Resolved: 2024-10-03

## 2024-10-03 PROCEDURE — 3044F HG A1C LEVEL LT 7.0%: CPT | Performed by: FAMILY MEDICINE

## 2024-10-03 PROCEDURE — 99214 OFFICE O/P EST MOD 30 MIN: CPT | Performed by: FAMILY MEDICINE

## 2024-10-03 PROCEDURE — 3078F DIAST BP <80 MM HG: CPT | Performed by: FAMILY MEDICINE

## 2024-10-03 PROCEDURE — 3074F SYST BP LT 130 MM HG: CPT | Performed by: FAMILY MEDICINE

## 2024-10-03 NOTE — PROGRESS NOTES
Charisse Shi is a 33 y.o. female      Chief Complaint   Patient presents with    Blood Pressure Check     Patient is coming in for a blood pressure follow up.Patient just taking blood pressure medication and tylenol.  No other concerns.        \"Have you been to the ER, urgent care clinic since your last visit?  Hospitalized since your last visit?\"    NO    “Have you seen or consulted any other health care providers outside of [facility] since your last visit?”    NO              Vitals:    10/03/24 1045   BP: 121/77   Site: Right Upper Arm   Position: Sitting   Pulse: 94   Resp: 18   Temp: 99.1 °F (37.3 °C)   TempSrc: Oral   SpO2: 94%   Weight: (!) 137 kg (302 lb)   Height: 1.6 m (5' 3\")            Health Maintenance Due   Topic Date Due    Pneumococcal 0-64 years Vaccine (1 of 2 - PCV) Never done    Diabetic foot exam  Never done    Varicella vaccine (1 of 2 - 13+ 2-dose series) Never done    Diabetic retinal exam  Never done    Hepatitis B vaccine (1 of 3 - 19+ 3-dose series) Never done    Flu vaccine (1) 08/01/2024    COVID-19 Vaccine (1 - 2023-24 season) Never done         Medication Reconciliation completed, changes noted.  Please  Update medication list.    
HPI.    Objective:  /77 (Site: Right Upper Arm, Position: Sitting)   Pulse 94   Temp 99.1 °F (37.3 °C) (Oral)   Resp 18   Ht 1.6 m (5' 3\")   Wt (!) 137 kg (302 lb)   SpO2 94%   BMI 53.50 kg/m²   Physical Exam  Vitals and nursing note reviewed.   Constitutional:       General: She is not in acute distress.     Appearance: Normal appearance.   HENT:      Head: Normocephalic and atraumatic.      Right Ear: External ear normal.      Left Ear: External ear normal.      Mouth/Throat:      Mouth: Mucous membranes are moist.   Eyes:      Extraocular Movements: Extraocular movements intact.      Conjunctiva/sclera: Conjunctivae normal.   Cardiovascular:      Rate and Rhythm: Normal rate and regular rhythm.      Heart sounds: No murmur heard.  Pulmonary:      Effort: Pulmonary effort is normal. No respiratory distress.      Breath sounds: Normal breath sounds.   Musculoskeletal:      Cervical back: Normal range of motion and neck supple. No tenderness.      Right lower leg: No edema.      Left lower leg: No edema.   Skin:     General: Skin is warm and dry.   Neurological:      General: No focal deficit present.      Mental Status: She is alert.      Cranial Nerves: No cranial nerve deficit.   Psychiatric:         Mood and Affect: Mood normal.         Behavior: Behavior normal.         Thought Content: Thought content normal.       Marisol Parisi DO, [facility]

## 2024-10-04 RX ORDER — MONTELUKAST SODIUM 10 MG/1
10 TABLET ORAL
Qty: 90 TABLET | Refills: 3 | Status: SHIPPED | OUTPATIENT
Start: 2024-10-04

## 2024-10-04 RX ORDER — VENLAFAXINE 37.5 MG/1
37.5 TABLET ORAL DAILY
Qty: 30 TABLET | Refills: 5 | Status: SHIPPED | OUTPATIENT
Start: 2024-10-04

## 2024-10-04 RX ORDER — ALBUTEROL SULFATE 90 UG/1
2 INHALANT RESPIRATORY (INHALATION) 4 TIMES DAILY PRN
Qty: 18 G | Refills: 11 | Status: SHIPPED | OUTPATIENT
Start: 2024-10-04

## 2024-10-04 RX ORDER — TRAZODONE HYDROCHLORIDE 50 MG/1
50 TABLET, FILM COATED ORAL NIGHTLY PRN
Qty: 30 TABLET | Refills: 5 | Status: SHIPPED | OUTPATIENT
Start: 2024-10-04

## 2024-10-04 RX ORDER — ACETAZOLAMIDE 250 MG/1
250 TABLET ORAL 2 TIMES DAILY
Qty: 60 TABLET | Refills: 5 | Status: SHIPPED | OUTPATIENT
Start: 2024-10-04

## 2024-10-04 RX ORDER — FLUTICASONE PROPIONATE AND SALMETEROL XINAFOATE 45; 21 UG/1; UG/1
2 AEROSOL, METERED RESPIRATORY (INHALATION) 2 TIMES DAILY
Qty: 1 EACH | Refills: 11 | Status: SHIPPED | OUTPATIENT
Start: 2024-10-04

## 2024-10-09 NOTE — PROGRESS NOTES
- 0.1 K/UL Final    Immature Granulocytes Absolute 08/29/2024 0.0  0.00 - 0.04 K/UL Final    Differential Type 08/29/2024 AUTOMATED    Final    Troponin, High Sensitivity 08/29/2024 9  0 - 51 ng/L Final    Comment: A HS troponin value change of (+ or -) 50% or more below the 99th percentile, in a 1/2/3 hr interval represents a significant change. Clinical correlation is recommended.  A HS troponin value change of (+ or -) 20% or above the 99th percentile, in a 1/2/3 hr interval represents a significant change. Clinical correlation is recommended.  99th Percentile:   Women: 0-51 ng/L                                                                Men:   0-76 ng/L  Patients taking more than 20 mg/day of biotin may have falsely negative results and should not use this test.      Sodium 08/29/2024 138  136 - 145 mmol/L Final    Potassium 08/29/2024 3.6  3.5 - 5.1 mmol/L Final    Chloride 08/29/2024 103  97 - 108 mmol/L Final    CO2 08/29/2024 31  21 - 32 mmol/L Final    Anion Gap 08/29/2024 4 (L)  5 - 15 mmol/L Final    Glucose 08/29/2024 117 (H)  65 - 100 mg/dL Final    BUN 08/29/2024 12  6 - 20 MG/DL Final    Creatinine 08/29/2024 0.74  0.55 - 1.02 MG/DL Final    BUN/Creatinine Ratio 08/29/2024 16  12 - 20   Final    Est, Glom Filt Rate 08/29/2024 >90  >60 ml/min/1.73m2 Final    Comment:    Pediatric calculator link: https://www.kidney.org/professionals/kdoqi/gfr_calculatorped     These results are not intended for use in patients <18 years of age.     eGFR results are calculated without a race factor using  the 2021 CKD-EPI equation. Careful clinical correlation is recommended, particularly when comparing to results calculated using previous equations.  The CKD-EPI equation is less accurate in patients with extremes of muscle mass, extra-renal metabolism of creatinine, excessive creatine ingestion, or following therapy that affects renal tubular secretion.      Calcium 08/29/2024 9.6  8.5 - 10.1 MG/DL Final    Total

## 2024-10-10 ENCOUNTER — OFFICE VISIT (OUTPATIENT)
Age: 34
End: 2024-10-10
Payer: COMMERCIAL

## 2024-10-10 VITALS
BODY MASS INDEX: 50.26 KG/M2 | TEMPERATURE: 97.1 F | HEIGHT: 65 IN | HEART RATE: 99 BPM | OXYGEN SATURATION: 99 % | SYSTOLIC BLOOD PRESSURE: 120 MMHG | DIASTOLIC BLOOD PRESSURE: 70 MMHG

## 2024-10-10 DIAGNOSIS — R51.9 INTRACTABLE HEADACHE, UNSPECIFIED CHRONICITY PATTERN, UNSPECIFIED HEADACHE TYPE: Primary | ICD-10-CM

## 2024-10-10 PROCEDURE — 3078F DIAST BP <80 MM HG: CPT | Performed by: PSYCHIATRY & NEUROLOGY

## 2024-10-10 PROCEDURE — 3074F SYST BP LT 130 MM HG: CPT | Performed by: PSYCHIATRY & NEUROLOGY

## 2024-10-10 PROCEDURE — 99214 OFFICE O/P EST MOD 30 MIN: CPT | Performed by: PSYCHIATRY & NEUROLOGY

## 2024-10-10 RX ORDER — TOPIRAMATE 25 MG/1
25 TABLET, FILM COATED ORAL NIGHTLY
Qty: 30 TABLET | Refills: 3 | Status: SHIPPED | OUTPATIENT
Start: 2024-10-10

## 2024-10-10 RX ORDER — SUMATRIPTAN 50 MG/1
50 TABLET, FILM COATED ORAL PRN
Qty: 9 TABLET | Refills: 3 | Status: SHIPPED | OUTPATIENT
Start: 2024-10-10

## 2024-10-17 ENCOUNTER — TELEPHONE (OUTPATIENT)
Age: 34
End: 2024-10-17

## 2024-11-14 ENCOUNTER — OFFICE VISIT (OUTPATIENT)
Age: 34
End: 2024-11-14
Payer: COMMERCIAL

## 2024-11-14 VITALS
WEIGHT: 293 LBS | TEMPERATURE: 98.6 F | SYSTOLIC BLOOD PRESSURE: 137 MMHG | DIASTOLIC BLOOD PRESSURE: 88 MMHG | RESPIRATION RATE: 18 BRPM | HEART RATE: 93 BPM | HEIGHT: 65 IN | OXYGEN SATURATION: 99 % | BODY MASS INDEX: 48.82 KG/M2

## 2024-11-14 DIAGNOSIS — Z23 NEED FOR IMMUNIZATION AGAINST INFLUENZA: ICD-10-CM

## 2024-11-14 DIAGNOSIS — M25.562 CHRONIC PAIN OF BOTH KNEES: ICD-10-CM

## 2024-11-14 DIAGNOSIS — I10 CHRONIC HYPERTENSION: ICD-10-CM

## 2024-11-14 DIAGNOSIS — M25.561 CHRONIC PAIN OF BOTH KNEES: ICD-10-CM

## 2024-11-14 DIAGNOSIS — E66.01 SEVERE OBESITY (BMI >= 40): ICD-10-CM

## 2024-11-14 DIAGNOSIS — G89.29 CHRONIC PAIN OF BOTH KNEES: ICD-10-CM

## 2024-11-14 DIAGNOSIS — G93.2 IDIOPATHIC INTRACRANIAL HYPERTENSION: Primary | ICD-10-CM

## 2024-11-14 PROCEDURE — 3075F SYST BP GE 130 - 139MM HG: CPT | Performed by: FAMILY MEDICINE

## 2024-11-14 PROCEDURE — 3079F DIAST BP 80-89 MM HG: CPT | Performed by: FAMILY MEDICINE

## 2024-11-14 PROCEDURE — 99214 OFFICE O/P EST MOD 30 MIN: CPT | Performed by: FAMILY MEDICINE

## 2024-11-14 PROCEDURE — PBSHW INFLUENZA, FLUCELVAX TRIVALENT, (AGE 6 MO+) IM, PRESERVATIVE FREE, 0.5ML: Performed by: FAMILY MEDICINE

## 2024-11-14 PROCEDURE — 90661 CCIIV3 VAC ABX FR 0.5 ML IM: CPT | Performed by: FAMILY MEDICINE

## 2024-11-14 RX ORDER — PROPRANOLOL HYDROCHLORIDE 40 MG/1
40 TABLET ORAL 2 TIMES DAILY
Qty: 60 TABLET | Refills: 3 | Status: SHIPPED | OUTPATIENT
Start: 2024-11-14

## 2024-11-14 NOTE — PROGRESS NOTES
Charisse Shi is a 34 y.o. female      Chief Complaint   Patient presents with    Follow-up Chronic Condition     Patient is coming in for a follow up on chronic conditions. Headaches are still the same. Patient is having pain in bilateral legs that started has been going on for a while. No other concerns.        \"Have you been to the ER, urgent care clinic since your last visit?  Hospitalized since your last visit?\"    NO    “Have you seen or consulted any other health care providers outside of [facility] since your last visit?”    NO              Vitals:    11/14/24 1049   BP: 137/88   Site: Right Upper Arm   Position: Sitting   Pulse: 93   Resp: 18   Temp: 98.6 °F (37 °C)   TempSrc: Oral   SpO2: 99%   Weight: (!) 138.3 kg (305 lb)   Height: 1.651 m (5' 5\")            Health Maintenance Due   Topic Date Due    Pneumococcal 0-64 years Vaccine (1 of 2 - PCV) Never done    Diabetic foot exam  Never done    Varicella vaccine (1 of 2 - 13+ 2-dose series) Never done    Diabetic retinal exam  Never done    Hepatitis B vaccine (1 of 3 - 19+ 3-dose series) Never done    Flu vaccine (1) 08/01/2024    COVID-19 Vaccine (1 - 2023-24 season) Never done         Medication Reconciliation completed, changes noted.  Please  Update medication list.

## 2024-11-14 NOTE — PROGRESS NOTES
[facility] [place] [facility] Office Visit     Assessment/ Plan: Charisse Shi is a 34 y.o. female presenting for:    Chronic Headaches  IIH  Obesity - Acute on chronic issue. Continuing to gain weight. Reviewed basic lifestyle changes like eating, drinking, adequate sleep, managing stress. Will reach out to pharmacy regarding Trulicity. Since feels like worsening with topamax, will stop. Encouraged to wean off of Tylenol (taking max dose every day) and trial propranolol instead.   -- labs at f/up visit   -- message sent to weight loss clinic to help coordinate care  -- message to  to help coordinate referral/appt with neuro-ophtho at [facility]    Chronic Knee Pain - Likely related to weight. Has seen sports medicine before but would like to see again to see if candidate for ICS, has helped before when seen at [facility]. Encouraged to use Tylenol prn.     Healthcare Maintenance  -- flu vaccine given     40 minutes were spent on the day of this encounter both with the patient and in related activities including chart review, care coordination and counseling.     Patient instructions were discussed and/or provided in the AVS. The patient understands and agrees to the plan.    RETURN TO CARE: 3 months  Future Appointments   Date Time Provider Department Center   12/5/2024 11:00 AM Shawn Berry MD [facility] DEP   2/6/2025 10:40 AM Marisol Parisi DO [facility] DEP   2/13/2025  3:00 PM Jose Antonio Medeiros MD [id number]       Subjective:  Chief Complaint   Patient presents with    Follow-up Chronic Condition     Patient is coming in for a follow up on chronic conditions. Headaches are still the same. Patient is having pain in bilateral legs that started has been going on for a while. No other concerns.      HPI:   Charisse Shi is a 34 y.o. female with PMH of HTN, chronic daily headaches, Type II DM, morbid obesity, depression here for follow-up.     Headaches  - trial of topamax 25mg

## 2024-11-18 ENCOUNTER — TELEPHONE (OUTPATIENT)
Age: 34
End: 2024-11-18

## 2024-11-18 NOTE — TELEPHONE ENCOUNTER
----- Message from Dr. Marisol Parisi, DO sent at 11/17/2024 11:53 AM EST -----  Can you please call pharmacy and see what's up with the trulicty? I didn't think it needed a PA but patient stated she wasn't able to pick it up. If you figure it out and it's approved, would you mind calling to let patient know? Thanks!

## 2024-11-18 NOTE — TELEPHONE ENCOUNTER
I called the pharmacy and they stated that the patient just picked up her Trulicity 3 days ago on 11/15/2024.

## 2024-11-21 ENCOUNTER — HOSPITAL ENCOUNTER (EMERGENCY)
Facility: HOSPITAL | Age: 34
Discharge: HOME | End: 2024-11-21
Attending: EMERGENCY MEDICINE
Payer: COMMERCIAL

## 2024-11-21 ENCOUNTER — APPOINTMENT (OUTPATIENT)
Facility: HOSPITAL | Age: 34
End: 2024-11-21
Payer: COMMERCIAL

## 2024-11-21 VITALS
BODY MASS INDEX: 48.82 KG/M2 | TEMPERATURE: 97.9 F | DIASTOLIC BLOOD PRESSURE: 110 MMHG | HEIGHT: 65 IN | OXYGEN SATURATION: 97 % | HEART RATE: 100 BPM | SYSTOLIC BLOOD PRESSURE: 159 MMHG | RESPIRATION RATE: 18 BRPM | WEIGHT: 293 LBS

## 2024-11-21 DIAGNOSIS — M17.11 OSTEOARTHRITIS OF RIGHT KNEE, UNSPECIFIED OSTEOARTHRITIS TYPE: ICD-10-CM

## 2024-11-21 DIAGNOSIS — M25.561 ACUTE PAIN OF RIGHT KNEE: Primary | ICD-10-CM

## 2024-11-21 PROCEDURE — 73562 X-RAY EXAM OF KNEE 3: CPT

## 2024-11-21 PROCEDURE — 99283 EMERGENCY DEPT VISIT LOW MDM: CPT

## 2024-11-21 RX ORDER — IBUPROFEN 600 MG/1
600 TABLET, FILM COATED ORAL 4 TIMES DAILY PRN
Qty: 20 TABLET | Refills: 0 | Status: SHIPPED | OUTPATIENT
Start: 2024-11-21 | End: 2024-11-21

## 2024-11-21 RX ORDER — ACETAMINOPHEN 500 MG
1000 TABLET ORAL 3 TIMES DAILY PRN
Qty: 30 TABLET | Refills: 0 | Status: SHIPPED | OUTPATIENT
Start: 2024-11-21

## 2024-11-21 RX ORDER — IBUPROFEN 600 MG/1
600 TABLET, FILM COATED ORAL EVERY 8 HOURS PRN
Qty: 30 TABLET | Refills: 0 | Status: SHIPPED | OUTPATIENT
Start: 2024-11-21

## 2024-11-21 ASSESSMENT — PAIN DESCRIPTION - LOCATION: LOCATION: KNEE

## 2024-11-21 ASSESSMENT — PAIN - FUNCTIONAL ASSESSMENT: PAIN_FUNCTIONAL_ASSESSMENT: 0-10

## 2024-11-21 ASSESSMENT — PAIN DESCRIPTION - ORIENTATION: ORIENTATION: RIGHT

## 2024-11-21 ASSESSMENT — PAIN SCALES - GENERAL: PAINLEVEL_OUTOF10: 10

## 2024-11-21 ASSESSMENT — PAIN DESCRIPTION - DESCRIPTORS: DESCRIPTORS: ACHING

## 2024-11-21 NOTE — ED PROVIDER NOTES
in the Last Year: 1     Unstable Housing in the Last Year: No         PHYSICAL EXAM       ED Triage Vitals [11/21/24 1402]   BP Systolic BP Percentile Diastolic BP Percentile Temp Temp Source Pulse Respirations SpO2   (!) 159/110 -- -- 97.9 °F (36.6 °C) Oral 100 18 97 %      Height Weight - Scale         1.651 m (5' 5\") (!) 138.3 kg (305 lb)             Body mass index is 50.75 kg/m².    Physical Exam        EMERGENCY DEPARTMENT COURSE and DIFFERENTIAL DIAGNOSIS/MDM:   Vitals:    Vitals:    11/21/24 1402   BP: (!) 159/110   Pulse: 100   Resp: 18   Temp: 97.9 °F (36.6 °C)   TempSrc: Oral   SpO2: 97%   Weight: (!) 138.3 kg (305 lb)   Height: 1.651 m (5' 5\")         Medical Decision Making  Amount and/or Complexity of Data Reviewed  Radiology: ordered.    Risk  Prescription drug management.            REASSESSMENT          CONSULTS:  None    PROCEDURES:     Procedures    Labs Reviewed - No data to display    XR KNEE RIGHT (3 VIEWS)   Final Result   No acute abnormality. Degenerative changes.      Electronically signed by BERTHA BIRD        3:50 PM  Pt has been reexamined.  Pt has no new complaints, changes or physical findings. Care plan outlined and precautions discussed. All available results were reviewed with pt. All medications were reviewed with pt. All of pt's questions and concerns were addressed. Pt agrees to F/U as instructed and agrees to return to ED upon further deterioration. Pt is ready to go home.  WILLEM Pires - NP      (Please note that portions of this note were completed with a voice recognition program.  Efforts were made to edit the dictations but occasionally words are mis-transcribed.)

## 2024-11-21 NOTE — ED TRIAGE NOTES
in use for triage.    Patient to ER for complaints of right knee pain x \"few years\".     Reports ortho appointment on 12/6.     Reports pain worsened last night.     Tylenol taken at 0700 today.

## 2024-11-25 ASSESSMENT — ENCOUNTER SYMPTOMS
SHORTNESS OF BREATH: 0
VOMITING: 0

## 2024-12-05 ENCOUNTER — OFFICE VISIT (OUTPATIENT)
Age: 34
End: 2024-12-05
Payer: COMMERCIAL

## 2024-12-05 VITALS
RESPIRATION RATE: 18 BRPM | HEART RATE: 89 BPM | BODY MASS INDEX: 51.91 KG/M2 | TEMPERATURE: 98.6 F | WEIGHT: 293 LBS | HEIGHT: 63 IN | DIASTOLIC BLOOD PRESSURE: 86 MMHG | SYSTOLIC BLOOD PRESSURE: 133 MMHG | OXYGEN SATURATION: 95 %

## 2024-12-05 DIAGNOSIS — M17.11 PRIMARY OSTEOARTHRITIS OF RIGHT KNEE: ICD-10-CM

## 2024-12-05 DIAGNOSIS — M25.562 PAIN IN BOTH KNEES, UNSPECIFIED CHRONICITY: ICD-10-CM

## 2024-12-05 DIAGNOSIS — M17.12 PRIMARY OSTEOARTHRITIS OF LEFT KNEE: Primary | ICD-10-CM

## 2024-12-05 DIAGNOSIS — M25.561 PAIN IN BOTH KNEES, UNSPECIFIED CHRONICITY: ICD-10-CM

## 2024-12-05 PROCEDURE — 99214 OFFICE O/P EST MOD 30 MIN: CPT | Performed by: FAMILY MEDICINE

## 2024-12-05 PROCEDURE — 20611 DRAIN/INJ JOINT/BURSA W/US: CPT | Performed by: FAMILY MEDICINE

## 2024-12-05 RX ORDER — BETAMETHASONE SODIUM PHOSPHATE AND BETAMETHASONE ACETATE 3; 3 MG/ML; MG/ML
12 INJECTION, SUSPENSION INTRA-ARTICULAR; INTRALESIONAL; INTRAMUSCULAR; SOFT TISSUE ONCE
Status: COMPLETED | OUTPATIENT
Start: 2024-12-05 | End: 2024-12-05

## 2024-12-05 RX ORDER — LIDOCAINE HYDROCHLORIDE 10 MG/ML
5 INJECTION, SOLUTION INFILTRATION; PERINEURAL ONCE
Status: COMPLETED | OUTPATIENT
Start: 2024-12-05 | End: 2024-12-05

## 2024-12-05 RX ADMIN — LIDOCAINE HYDROCHLORIDE 5 ML: 10 INJECTION, SOLUTION INFILTRATION; PERINEURAL at 14:03

## 2024-12-05 RX ADMIN — BETAMETHASONE ACETATE AND BETAMETHASONE SODIUM PHOSPHATE 12 MG: 3; 3 INJECTION, SUSPENSION INTRA-ARTICULAR; INTRALESIONAL; INTRAMUSCULAR; SOFT TISSUE at 14:02

## 2024-12-05 NOTE — PROGRESS NOTES
[facility] Family Medicine Residency   [facility]      Chief Complaint:   Chief Complaint   Patient presents with    Knee Pain     Bilateral knee pain       HPI:  Charisse Shi is a 34 y.o. female who presents with bilateral knee pain R>L.  Pain is chronic with increased sx over the last couple of months.  She was seen in the ER 2 weeks ago for knee pain.  Radiographs of the right knee showed DJD.  She has been taking tylenol and NSAIDs PRN with minimal relief.      Past Medical History:   Diagnosis Date    Cardiomegaly 4/26/2023    Essential hypertension     last two pregnancies    Type 2 diabetes mellitus with other specified complication (HCC) 5/15/2024    Uncomplicated asthma 7/2/2024       Current Outpatient Medications:     ibuprofen (ADVIL;MOTRIN) 600 MG tablet, Take 1 tablet by mouth every 8 hours as needed for Pain, Disp: 30 tablet, Rfl: 0    acetaminophen (TYLENOL) 500 MG tablet, Take 2 tablets by mouth 3 times daily as needed for Pain, Disp: 30 tablet, Rfl: 0    propranolol (INDERAL) 40 MG tablet, Take 1 tablet by mouth 2 times daily, Disp: 60 tablet, Rfl: 3    SUMAtriptan (IMITREX) 50 MG tablet, Take 1 tablet by mouth as needed for Migraine (Take 1 tab at onset of headache. May take another 1 tab after 2 hrs. Max 2 tabs/ 24 hrs), Disp: 9 tablet, Rfl: 3    acetaZOLAMIDE (DIAMOX) 250 MG tablet, Take 1 tablet by mouth 2 times daily, Disp: 60 tablet, Rfl: 5    albuterol sulfate HFA (VENTOLIN HFA) 108 (90 Base) MCG/ACT inhaler, Inhale 2 puffs into the lungs 4 times daily as needed for Wheezing, Disp: 18 g, Rfl: 11    fluticasone-salmeterol (ADVAIR HFA) 45-21 MCG/ACT inhaler, Inhale 2 puffs into the lungs 2 times daily, Disp: 1 each, Rfl: 11    metFORMIN (GLUCOPHAGE) 500 MG tablet, Take 1 tablet by mouth 2 times daily (with meals), Disp: 60 tablet, Rfl: 5    montelukast (SINGULAIR) 10 MG tablet, Take 1 tablet by mouth nightly, Disp: 90 tablet, Rfl: 3

## 2024-12-05 NOTE — PROGRESS NOTES
Session Code: 77946    Name:  Becky   ID #: [id number]    Pt here today for bilateral knee pain, but states the RT is worse. Pt denies injury.       Identified pt with two pt identifiers(name and ). Reviewed record in preparation for visit and have obtained necessary documentation.  Chief Complaint   Patient presents with    Knee Pain     Bilateral knee pain        Vitals:    24 1117   BP: 133/86   Site: Left Upper Arm   Position: Sitting   Cuff Size: Large Adult   Pulse: 89   Resp: 18   Temp: 98.6 °F (37 °C)   TempSrc: Oral   SpO2: 95%   Weight: (!) 139.8 kg (308 lb 3.2 oz)   Height: 1.6 m (5' 3\")         Coordination of Care Questionnaire:  :     \"Have you been to the ER, urgent care clinic since your last visit?  Hospitalized since your last visit?\"    YES - When: approximately 2  weeks ago.  Where and Why: [facility] ER for knee pain.    “Have you seen or consulted any other health care providers outside of [facility] since your last visit?”    NO            Click Here for Release of Records Request

## 2024-12-10 ENCOUNTER — OFFICE VISIT (OUTPATIENT)
Age: 34
End: 2024-12-10
Payer: COMMERCIAL

## 2024-12-10 VITALS
DIASTOLIC BLOOD PRESSURE: 83 MMHG | RESPIRATION RATE: 96 BRPM | HEART RATE: 107 BPM | OXYGEN SATURATION: 99 % | HEIGHT: 63 IN | SYSTOLIC BLOOD PRESSURE: 139 MMHG | TEMPERATURE: 98.8 F | BODY MASS INDEX: 51.91 KG/M2 | WEIGHT: 293 LBS

## 2024-12-10 DIAGNOSIS — I10 CHRONIC HYPERTENSION: ICD-10-CM

## 2024-12-10 DIAGNOSIS — E66.01 MORBID (SEVERE) OBESITY DUE TO EXCESS CALORIES: Primary | ICD-10-CM

## 2024-12-10 DIAGNOSIS — E11.29 TYPE 2 DIABETES MELLITUS WITH DIABETIC MICROALBUMINURIA, UNSPECIFIED WHETHER LONG TERM INSULIN USE (HCC): ICD-10-CM

## 2024-12-10 DIAGNOSIS — D64.9 ANEMIA, UNSPECIFIED TYPE: ICD-10-CM

## 2024-12-10 DIAGNOSIS — R80.9 TYPE 2 DIABETES MELLITUS WITH DIABETIC MICROALBUMINURIA, UNSPECIFIED WHETHER LONG TERM INSULIN USE (HCC): ICD-10-CM

## 2024-12-10 DIAGNOSIS — E53.8 VITAMIN B12 DEFICIENCY: ICD-10-CM

## 2024-12-10 DIAGNOSIS — R06.83 SNORING: ICD-10-CM

## 2024-12-10 DIAGNOSIS — Z00.00 ENCOUNTER FOR PREVENTIVE CARE: ICD-10-CM

## 2024-12-10 DIAGNOSIS — E55.9 VITAMIN D DEFICIENCY: ICD-10-CM

## 2024-12-10 PROCEDURE — 3075F SYST BP GE 130 - 139MM HG: CPT | Performed by: NURSE PRACTITIONER

## 2024-12-10 PROCEDURE — 3044F HG A1C LEVEL LT 7.0%: CPT | Performed by: NURSE PRACTITIONER

## 2024-12-10 PROCEDURE — 99215 OFFICE O/P EST HI 40 MIN: CPT | Performed by: NURSE PRACTITIONER

## 2024-12-10 PROCEDURE — 3079F DIAST BP 80-89 MM HG: CPT | Performed by: NURSE PRACTITIONER

## 2024-12-10 ASSESSMENT — ENCOUNTER SYMPTOMS
COUGH: 0
WHEEZING: 0
VOMITING: 0
SINUS PRESSURE: 0
NAUSEA: 0
ABDOMINAL PAIN: 0
DIARRHEA: 0
SORE THROAT: 0
BACK PAIN: 0
CONSTIPATION: 0
SHORTNESS OF BREATH: 1
BLOOD IN STOOL: 1

## 2024-12-10 ASSESSMENT — PATIENT HEALTH QUESTIONNAIRE - PHQ9
8. MOVING OR SPEAKING SO SLOWLY THAT OTHER PEOPLE COULD HAVE NOTICED. OR THE OPPOSITE, BEING SO FIGETY OR RESTLESS THAT YOU HAVE BEEN MOVING AROUND A LOT MORE THAN USUAL: NOT AT ALL
4. FEELING TIRED OR HAVING LITTLE ENERGY: NOT AT ALL
6. FEELING BAD ABOUT YOURSELF - OR THAT YOU ARE A FAILURE OR HAVE LET YOURSELF OR YOUR FAMILY DOWN: NOT AT ALL
3. TROUBLE FALLING OR STAYING ASLEEP: NOT AT ALL
SUM OF ALL RESPONSES TO PHQ QUESTIONS 1-9: 0
SUM OF ALL RESPONSES TO PHQ QUESTIONS 1-9: 0
7. TROUBLE CONCENTRATING ON THINGS, SUCH AS READING THE NEWSPAPER OR WATCHING TELEVISION: NOT AT ALL
1. LITTLE INTEREST OR PLEASURE IN DOING THINGS: NOT AT ALL
10. IF YOU CHECKED OFF ANY PROBLEMS, HOW DIFFICULT HAVE THESE PROBLEMS MADE IT FOR YOU TO DO YOUR WORK, TAKE CARE OF THINGS AT HOME, OR GET ALONG WITH OTHER PEOPLE: NOT DIFFICULT AT ALL
SUM OF ALL RESPONSES TO PHQ QUESTIONS 1-9: 0
5. POOR APPETITE OR OVEREATING: NOT AT ALL
SUM OF ALL RESPONSES TO PHQ QUESTIONS 1-9: 0
2. FEELING DOWN, DEPRESSED OR HOPELESS: NOT AT ALL
9. THOUGHTS THAT YOU WOULD BE BETTER OFF DEAD, OR OF HURTING YOURSELF: NOT AT ALL
SUM OF ALL RESPONSES TO PHQ9 QUESTIONS 1 & 2: 0

## 2024-12-10 NOTE — PROGRESS NOTES
Identified patient with two patient identifiers (name and ). Reviewed chart in preparation for visit and have obtained necessary documentation.    Charisse Shi is a 34 y.o. female  No chief complaint on file.    BP (!) 138/94 (Site: Left Upper Arm, Position: Sitting, Cuff Size: Large Adult)   Pulse (!) 107   Temp 98.8 °F (37.1 °C) (Oral)   Resp (!) 96   Ht 1.6 m (5' 3\")   Wt (!) 138.4 kg (305 lb 3.2 oz)   SpO2 99%   BMI 54.06 kg/m²     1. Have you been to the ER, urgent care clinic since your last visit?  Hospitalized since your last visit?yes - [facility] ED pain in right knee, legs hurting    2. Have you seen or consulted any other health care providers outside of the [facility] since your last visit?  Include any pap smears or colon screening. no

## 2024-12-10 NOTE — PROGRESS NOTES
Charisse Shi (:  1990) is a 34 y.o. female,Established patient, here for evaluation of the following chief complaint(s):  No chief complaint on file.        SUBJECTIVE/OBJECTIVE:    HPI:  Charisse Shi is new patient presents today for evaluation for weight loss surgery. Patient has been overweight for the past  10 years. Her weight has ranged from 250-270 lbs. Her heaviest weight today at 305 lbs.       Dietary Habits:  Breakfast- tortilla and coffee  Lunch- 2 cups of pasta and burger  Dinner- skips  Snacking- none  Liquids- 2 bottles of water a day.     Prior weight loss attempts: Trulicity and skipping meals.     STOPBANG questionnaire     Do you Snore loudly? yes  Do you often feel Tired, fatigued, or sleepy during the daytime? yes  Has anyone Observed you stop breathing during your sleep? no  Are you being treated for high blood Pressure? yes  BMI more than 35 kg/m2? yes  Age over 50 years old? no  Neck Circumference >16 inches? yes  Gender male? no  ______________________________________     SCORE: 4     If YES to 0 - 2, low risk of sleep apnea  If YES to 3 - 4 intermediate risk of having sleep apnea  If YES to 5 - 8 high risk of having sleep apnea (or 2 + BMI 35 or Neck > 17\" or Male)           Ms. Shi has a reminder for a \"due or due soon\" health maintenance. I have asked that she contact her primary care provider for follow-up on this health maintenance.      Patient Active Problem List   Diagnosis    Chronic hypertension    Other hyperlipidemia    Idiopathic intracranial hypertension  Chronic Headaches    IUD (intrauterine device) in place    Cardiomegaly    Severe obesity (BMI >= 40)    Food insecurity    Type 2 diabetes mellitus with diabetic microalbuminuria (HCC)    Moderate persistent asthma without complication    Tachycardia       Past Medical History:   Diagnosis Date    Cardiomegaly 2023    Essential hypertension     last two pregnancies    Type 2 diabetes mellitus with other

## 2024-12-11 ENCOUNTER — TELEPHONE (OUTPATIENT)
Age: 34
End: 2024-12-11

## 2024-12-11 NOTE — TELEPHONE ENCOUNTER
Attempted to contact patient to reschedule or change appt with ELOY De La Torre. Patient did not answer, left message.

## 2024-12-11 NOTE — TELEPHONE ENCOUNTER
Identified patient with two patient identifiers (name and ). Reviewed chart in preparation for encounter and have obtained necessary documentation.    [facility] Language Services    Session Code:     Language: Greenlandic      Name: carlos     ID:[id number]    Called and spoke with patient informed her we would call and get her a sleep study appointment and it will be in her mychart. Patient understood what was discussed and thankful for the call.

## 2024-12-11 NOTE — TELEPHONE ENCOUNTER
Minda Chacon,  Navigator was inquiring if patient had been scheduled.      Called [facility] appt/scheduling dept to get an update and was informed they have the patient registered with referral order. Once the review,  they'll reach out to the patient directly for scheduling.       Navigator has been informed

## 2024-12-12 ENCOUNTER — OFFICE VISIT (OUTPATIENT)
Age: 34
End: 2024-12-12

## 2024-12-12 DIAGNOSIS — Z59.82 TRANSPORTATION INSECURITY: ICD-10-CM

## 2024-12-12 DIAGNOSIS — Z71.89 COUNSELING AND COORDINATION OF CARE: Primary | ICD-10-CM

## 2024-12-12 SDOH — ECONOMIC STABILITY - TRANSPORTATION SECURITY: TRANSPORTATION INSECURITY: Z59.82

## 2024-12-12 NOTE — PROGRESS NOTES
Care coordination visit by phone with JULIANO Navigator. Icelandic language line utilized during call.    Medicaid - Change Report  Patient recently changed her last name. She indicates that she spoke with  regarding the name change and was informed that she should receive an updated Medicaid card within 30 days. SW advised patient that a copy of her ID will also be sent to [facility] as proof of name change to avoid delay in processing information. Patient agreed. Information faxed to [facility] on 12/12/24.    Transportation Need  Patient unable to navigate transportation request via [employer] [facility] due to language barrier. Upcoming appointments verified with patient during call. Patient advised that transportation will be set up for the December visit as the other appointments are scheduled for over 2 months from now. Transportation for those visits will be arranged later in January. Patient agreed and requested transportation details be sent via [employer] message. Transportation scheduled by JULIANO Navigator as follow:    [facility]  Appointment Date/Time: 12/20/24 at 10:30 am  Ride Confirmation ([employer]): #[phone]   at home address at 9:55 am   at medical facility at 12 noon  Patient will receive an phone message notification - transportation services advised of preferred language.      [facility] Appt  Patient's referral request was faxed on 12/02/2024 by [facility] referral coordinator with a received confirmation. Per coordinator, contact was made with [facility] at [phone] and they confirmed receipt of referral and documentation. They did not allow coordinator to schedule the appointment during the call. They will reach out to the patient directly. [facility]  was notified that an Icelandic  is needed. Patient advised of [facility]'s process and informed that they will contact her directly. SW will follow-up with patient in 2 weeks to

## 2024-12-13 ENCOUNTER — TELEPHONE (OUTPATIENT)
Age: 34
End: 2024-12-13

## 2024-12-13 NOTE — TELEPHONE ENCOUNTER
Patient called requesting a refill on Trulicity. Would like for prescription to be sent to [facility] on [address]    Thanks!

## 2024-12-13 NOTE — LETTER
9/10/2021 12:01 PM    Ms. Kayleen Santos [phone]          To Whom it may concern:      Hebert Pendleton is under my care for pregnancy. She was seen in the office today. By our best estimation  EDC of 4/8/2022. Please contact my office with any further questions or concerns.               Sincerely,          Ronda Fernández MD Addended by: WILEY WALDROP on: 12/13/2024 10:40 AM     Modules accepted: Orders

## 2024-12-16 ENCOUNTER — TELEPHONE (OUTPATIENT)
Age: 34
End: 2024-12-16

## 2024-12-16 DIAGNOSIS — I10 CHRONIC HYPERTENSION: Primary | ICD-10-CM

## 2024-12-16 DIAGNOSIS — I51.7 CARDIOMEGALY: ICD-10-CM

## 2024-12-16 DIAGNOSIS — E66.01 SEVERE OBESITY (BMI >= 40): ICD-10-CM

## 2024-12-16 NOTE — TELEPHONE ENCOUNTER
On review of records from 12/10 obesity medicine appointment, patient will need cardiac clearance prior to surgery. I have placed referral for her to get established with cardiology though this usually occurs closer to actual scheduled date of surgery. Please provide phone number of [facility] cardiology to call to make an appointment ([phone] ). Thanks!

## 2024-12-16 NOTE — TELEPHONE ENCOUNTER
----- Message from CHARLOTTE FUNES RN sent at 12/13/2024  1:43 PM EST -----  Regarding: FW: ECC Referral Request    ----- Message -----  From: Norma Herndon  Sent: 12/13/2024  12:46 PM EST  To: [facility] Clinical Staff  Subject: ECC Referral Request                             ECC Referral Request    Reason for referral request: Specialty Provider    Specialist/Lab/Test patient is requesting (if known): Heart Doctor     Specialist Phone Number (if applicable):    Additional Information :  Patient wanted to schedule an appt. For a heart doctor as per her pcp instructed. Patient doesn't know if she have an order already or not.  --------------------------------------------------------------------------------------------------------------------------    Relationship to Patient: Self     Call Back Information: OK to respond with electronic message via [email or web address] portal (only for patients who have registered [email or web address] account)    Preferred Call Back Number: Phone : [phone]

## 2024-12-18 NOTE — TELEPHONE ENCOUNTER
I tried calling the patient with an interpretor but she did not answer. Voicemail was full so the interpretor was unable to leave a message.

## 2024-12-20 ENCOUNTER — OFFICE VISIT (OUTPATIENT)
Age: 34
End: 2024-12-20

## 2024-12-20 DIAGNOSIS — E66.01 MORBID OBESITY: Primary | ICD-10-CM

## 2024-12-20 NOTE — PROGRESS NOTES
Pre-operative Bariatric Nutrition Evaluation - Phone Consult ([facility] )     Date: 2024   Physician/Surgeon:Parth Escalona M.D.   Name: Charisse Shi  :  1990  Age:  34  Gender: Female   Type of Surgery: [x]           Gastric Bypass   []           Sleeve Gastrectomy    ASSESSMENT:    Medications/Supplements:   Prior to Admission medications    Medication Sig Start Date End Date Taking? Authorizing Provider   dulaglutide (TRULICITY) 0.75 MG/0.5ML SOAJ SC injection Inject 0.5 mLs into the skin once a week 24   Marisol Parisi DO   ibuprofen (ADVIL;MOTRIN) 600 MG tablet Take 1 tablet by mouth every 8 hours as needed for Pain 24   Negrito Mckeon MD   acetaminophen (TYLENOL) 500 MG tablet Take 2 tablets by mouth 3 times daily as needed for Pain 24   Negrito Mckeon MD   propranolol (INDERAL) 40 MG tablet Take 1 tablet by mouth 2 times daily 24   Marisol Pairsi DO   SUMAtriptan (IMITREX) 50 MG tablet Take 1 tablet by mouth as needed for Migraine (Take 1 tab at onset of headache. May take another 1 tab after 2 hrs. Max 2 tabs/ 24 hrs) 10/10/24   Jose Antonio Medeiros MD   acetaZOLAMIDE (DIAMOX) 250 MG tablet Take 1 tablet by mouth 2 times daily 10/4/24   Marisol Parisi DO   albuterol sulfate HFA (VENTOLIN HFA) 108 (90 Base) MCG/ACT inhaler Inhale 2 puffs into the lungs 4 times daily as needed for Wheezing 10/4/24   Marisol Parisi DO   fluticasone-salmeterol (ADVAIR HFA) 45-21 MCG/ACT inhaler Inhale 2 puffs into the lungs 2 times daily 10/4/24   Marisol Parisi DO   metFORMIN (GLUCOPHAGE) 500 MG tablet Take 1 tablet by mouth 2 times daily (with meals) 10/4/24   Marisol Parisi DO   montelukast (SINGULAIR) 10 MG tablet Take 1 tablet by mouth nightly 10/4/24   Marisol Parisi DO   traZODone (DESYREL) 50 MG tablet Take 1 tablet by mouth nightly as needed for Sleep 10/4/24   Marisol Parisi, DO   venlafaxine (EFFEXOR) 37.5 MG tablet Take 1 tablet by mouth

## 2024-12-26 DIAGNOSIS — R03.0 ELEVATED BLOOD PRESSURE READING: ICD-10-CM

## 2024-12-26 NOTE — TELEPHONE ENCOUNTER
Medication Refill Request    Charisse Shi is requesting a refill of the following medication(s):   Requested Prescriptions     Pending Prescriptions Disp Refills    lisinopril (PRINIVIL;ZESTRIL) 5 MG tablet [Pharmacy Med Name: Lisinopril 5 MG Oral Tablet] 90 tablet 0     Sig: Take 1 tablet by mouth once daily        Listed PCP is Marisol Parisi DO   Last provider to prescribe medication: Dr. Johns on 09/19/24  Date of Last Office Visit at [facility]: 11/14/24 with Dr. Parisi    FUTURE APPOINTMENT:   Future Appointments   Date Time Provider Department Center   1/17/2025 10:30 AM Daniella De La Torre RD [facility] BS [facility]   2/6/2025 10:40 AM Marisol Parisi DO [facility]   2/13/2025  3:00 PM Jose Antonio Medeiros MD [id number]   4/3/2025 11:40 AM Rafa Beaulieu MD [facility]       Please send refill to:      [facility] [address] [place] [address] [facility] - P [phone] - F [phone]  [address] [facility] [address]  Phone: [phone] Fax: [phone]      Please review request and approve or deny with recommendations within 48 hours.

## 2024-12-26 NOTE — TELEPHONE ENCOUNTER
Pts daughter calling to check on when lisinopril 5 MG will be send to [facility] on [address]. Pt. is out of this med.

## 2024-12-28 LAB
25(OH)D3+25(OH)D2 SERPL-MCNC: 16.4 NG/ML (ref 30–100)
ALBUMIN SERPL-MCNC: 4.3 G/DL (ref 3.9–4.9)
ALP SERPL-CCNC: 59 IU/L (ref 44–121)
ALT SERPL-CCNC: 19 IU/L (ref 0–32)
AST SERPL-CCNC: 16 IU/L (ref 0–40)
BASOPHILS # BLD AUTO: 0 X10E3/UL (ref 0–0.2)
BASOPHILS NFR BLD AUTO: 1 %
BILIRUB SERPL-MCNC: <0.2 MG/DL (ref 0–1.2)
BUN SERPL-MCNC: 11 MG/DL (ref 6–20)
BUN/CREAT SERPL: 17 (ref 9–23)
CALCIUM SERPL-MCNC: 9.7 MG/DL (ref 8.7–10.2)
CHLORIDE SERPL-SCNC: 101 MMOL/L (ref 96–106)
CO2 SERPL-SCNC: 24 MMOL/L (ref 20–29)
CREAT SERPL-MCNC: 0.63 MG/DL (ref 0.57–1)
EGFRCR SERPLBLD CKD-EPI 2021: 119 ML/MIN/1.73
EOSINOPHIL # BLD AUTO: 0.1 X10E3/UL (ref 0–0.4)
EOSINOPHIL NFR BLD AUTO: 2 %
ERYTHROCYTE [DISTWIDTH] IN BLOOD BY AUTOMATED COUNT: 14.5 % (ref 11.7–15.4)
FOLATE SERPL-MCNC: >20 NG/ML
GLOBULIN SER CALC-MCNC: 3 G/DL (ref 1.5–4.5)
GLUCOSE SERPL-MCNC: 82 MG/DL (ref 70–99)
HBA1C MFR BLD: 6.5 % (ref 4.8–5.6)
HCT VFR BLD AUTO: 42.9 % (ref 34–46.6)
HGB BLD-MCNC: 13.2 G/DL (ref 11.1–15.9)
IMM GRANULOCYTES # BLD AUTO: 0 X10E3/UL (ref 0–0.1)
IMM GRANULOCYTES NFR BLD AUTO: 0 %
IRON SATN MFR SERPL: 13 % (ref 15–55)
IRON SERPL-MCNC: 45 UG/DL (ref 27–159)
LYMPHOCYTES # BLD AUTO: 1.7 X10E3/UL (ref 0.7–3.1)
LYMPHOCYTES NFR BLD AUTO: 48 %
MCH RBC QN AUTO: 23 PG (ref 26.6–33)
MCHC RBC AUTO-ENTMCNC: 30.8 G/DL (ref 31.5–35.7)
MCV RBC AUTO: 75 FL (ref 79–97)
MONOCYTES # BLD AUTO: 0.3 X10E3/UL (ref 0.1–0.9)
MONOCYTES NFR BLD AUTO: 8 %
NEUTROPHILS # BLD AUTO: 1.5 X10E3/UL (ref 1.4–7)
NEUTROPHILS NFR BLD AUTO: 41 %
PLATELET # BLD AUTO: 278 X10E3/UL (ref 150–450)
POTASSIUM SERPL-SCNC: 4.4 MMOL/L (ref 3.5–5.2)
PROT SERPL-MCNC: 7.3 G/DL (ref 6–8.5)
RBC # BLD AUTO: 5.75 X10E6/UL (ref 3.77–5.28)
SODIUM SERPL-SCNC: 142 MMOL/L (ref 134–144)
TIBC SERPL-MCNC: 352 UG/DL (ref 250–450)
TSH SERPL DL<=0.005 MIU/L-ACNC: 1.9 UIU/ML (ref 0.45–4.5)
UIBC SERPL-MCNC: 307 UG/DL (ref 131–425)
UREA BREATH TEST QL: POSITIVE
VIT B12 SERPL-MCNC: 384 PG/ML (ref 232–1245)
WBC # BLD AUTO: 3.6 X10E3/UL (ref 3.4–10.8)

## 2024-12-30 RX ORDER — AMOXICILLIN 500 MG/1
1000 CAPSULE ORAL 2 TIMES DAILY
Qty: 56 CAPSULE | Refills: 0 | Status: SHIPPED | OUTPATIENT
Start: 2024-12-30 | End: 2025-01-13

## 2024-12-30 RX ORDER — OMEPRAZOLE 40 MG/1
40 CAPSULE, DELAYED RELEASE ORAL 2 TIMES DAILY
Qty: 28 CAPSULE | Refills: 0 | Status: SHIPPED | OUTPATIENT
Start: 2024-12-30 | End: 2025-01-13

## 2024-12-30 RX ORDER — ERGOCALCIFEROL 1.25 MG/1
50000 CAPSULE, LIQUID FILLED ORAL WEEKLY
Qty: 12 CAPSULE | Refills: 0 | Status: SHIPPED | OUTPATIENT
Start: 2024-12-30

## 2024-12-30 RX ORDER — CLARITHROMYCIN 500 MG/1
500 TABLET ORAL 2 TIMES DAILY
Qty: 28 TABLET | Refills: 0 | Status: SHIPPED | OUTPATIENT
Start: 2024-12-30 | End: 2025-01-13

## 2024-12-30 RX ORDER — LISINOPRIL 5 MG/1
5 TABLET ORAL DAILY
Qty: 90 TABLET | Refills: 3 | Status: SHIPPED | OUTPATIENT
Start: 2024-12-30

## 2025-01-16 ENCOUNTER — APPOINTMENT (OUTPATIENT)
Dept: VASCULAR SURGERY | Facility: HOSPITAL | Age: 35
End: 2025-01-16
Payer: COMMERCIAL

## 2025-01-16 ENCOUNTER — HOSPITAL ENCOUNTER (EMERGENCY)
Facility: HOSPITAL | Age: 35
Discharge: HOME | End: 2025-01-16
Attending: STUDENT IN AN ORGANIZED HEALTH CARE EDUCATION/TRAINING PROGRAM
Payer: COMMERCIAL

## 2025-01-16 VITALS
HEART RATE: 110 BPM | SYSTOLIC BLOOD PRESSURE: 135 MMHG | DIASTOLIC BLOOD PRESSURE: 74 MMHG | TEMPERATURE: 98.1 F | RESPIRATION RATE: 20 BRPM | OXYGEN SATURATION: 100 %

## 2025-01-16 DIAGNOSIS — E66.01 CLASS 3 SEVERE OBESITY WITH BODY MASS INDEX (BMI) OF 50.0 TO 59.9 IN ADULT, UNSPECIFIED OBESITY TYPE, UNSPECIFIED WHETHER SERIOUS COMORBIDITY PRESENT: ICD-10-CM

## 2025-01-16 DIAGNOSIS — M71.21 SYNOVIAL CYST OF RIGHT POPLITEAL SPACE: ICD-10-CM

## 2025-01-16 DIAGNOSIS — M25.561 CHRONIC PAIN OF BOTH KNEES: Primary | ICD-10-CM

## 2025-01-16 DIAGNOSIS — E66.813 CLASS 3 SEVERE OBESITY WITH BODY MASS INDEX (BMI) OF 50.0 TO 59.9 IN ADULT, UNSPECIFIED OBESITY TYPE, UNSPECIFIED WHETHER SERIOUS COMORBIDITY PRESENT: ICD-10-CM

## 2025-01-16 DIAGNOSIS — G89.29 CHRONIC PAIN OF BOTH KNEES: Primary | ICD-10-CM

## 2025-01-16 DIAGNOSIS — M25.562 CHRONIC PAIN OF BOTH KNEES: Primary | ICD-10-CM

## 2025-01-16 LAB
ALBUMIN SERPL-MCNC: 3.4 G/DL (ref 3.5–5)
ALBUMIN/GLOB SERPL: 0.7 (ref 1.1–2.2)
ALP SERPL-CCNC: 60 U/L (ref 45–117)
ALT SERPL-CCNC: 20 U/L (ref 12–78)
ANION GAP SERPL CALC-SCNC: 3 MMOL/L (ref 2–12)
AST SERPL-CCNC: 11 U/L (ref 15–37)
BASOPHILS # BLD: 0.03 K/UL (ref 0–0.1)
BASOPHILS NFR BLD: 0.5 % (ref 0–1)
BILIRUB SERPL-MCNC: 0.4 MG/DL (ref 0.2–1)
BUN SERPL-MCNC: 11 MG/DL (ref 6–20)
BUN/CREAT SERPL: 15 (ref 12–20)
CALCIUM SERPL-MCNC: 10 MG/DL (ref 8.5–10.1)
CHLORIDE SERPL-SCNC: 103 MMOL/L (ref 97–108)
CO2 SERPL-SCNC: 31 MMOL/L (ref 21–32)
CREAT SERPL-MCNC: 0.74 MG/DL (ref 0.55–1.02)
DIFFERENTIAL METHOD BLD: ABNORMAL
EOSINOPHIL # BLD: 0.11 K/UL (ref 0–0.4)
EOSINOPHIL NFR BLD: 1.9 % (ref 0–7)
ERYTHROCYTE [DISTWIDTH] IN BLOOD BY AUTOMATED COUNT: 15.2 % (ref 11.5–14.5)
GLOBULIN SER CALC-MCNC: 4.7 G/DL (ref 2–4)
GLUCOSE SERPL-MCNC: 101 MG/DL (ref 65–100)
HCT VFR BLD AUTO: 41.5 % (ref 35–47)
HGB BLD-MCNC: 12.8 G/DL (ref 11.5–16)
IMM GRANULOCYTES # BLD AUTO: 0.01 K/UL (ref 0–0.04)
IMM GRANULOCYTES NFR BLD AUTO: 0.2 % (ref 0–0.5)
LYMPHOCYTES # BLD: 1.7 K/UL (ref 0.8–3.5)
LYMPHOCYTES NFR BLD: 28.9 % (ref 12–49)
MCH RBC QN AUTO: 23.1 PG (ref 26–34)
MCHC RBC AUTO-ENTMCNC: 30.8 G/DL (ref 30–36.5)
MCV RBC AUTO: 74.9 FL (ref 80–99)
MONOCYTES # BLD: 0.42 K/UL (ref 0–1)
MONOCYTES NFR BLD: 7.1 % (ref 5–13)
NEUTS SEG # BLD: 3.62 K/UL (ref 1.8–8)
NEUTS SEG NFR BLD: 61.4 % (ref 32–75)
NRBC # BLD: 0 K/UL (ref 0–0.01)
NRBC BLD-RTO: 0 PER 100 WBC
PLATELET # BLD AUTO: 278 K/UL (ref 150–400)
PMV BLD AUTO: 10.6 FL (ref 8.9–12.9)
POTASSIUM SERPL-SCNC: 3.4 MMOL/L (ref 3.5–5.1)
PROT SERPL-MCNC: 8.1 G/DL (ref 6.4–8.2)
RBC # BLD AUTO: 5.54 M/UL (ref 3.8–5.2)
SODIUM SERPL-SCNC: 137 MMOL/L (ref 136–145)
WBC # BLD AUTO: 5.9 K/UL (ref 3.6–11)

## 2025-01-16 PROCEDURE — 96374 THER/PROPH/DIAG INJ IV PUSH: CPT

## 2025-01-16 PROCEDURE — 99284 EMERGENCY DEPT VISIT MOD MDM: CPT

## 2025-01-16 PROCEDURE — 85025 COMPLETE CBC W/AUTO DIFF WBC: CPT

## 2025-01-16 PROCEDURE — 93971 EXTREMITY STUDY: CPT

## 2025-01-16 PROCEDURE — 36415 COLL VENOUS BLD VENIPUNCTURE: CPT

## 2025-01-16 PROCEDURE — 6360000002 HC RX W HCPCS

## 2025-01-16 PROCEDURE — 80053 COMPREHEN METABOLIC PANEL: CPT

## 2025-01-16 PROCEDURE — 93005 ELECTROCARDIOGRAM TRACING: CPT | Performed by: STUDENT IN AN ORGANIZED HEALTH CARE EDUCATION/TRAINING PROGRAM

## 2025-01-16 RX ORDER — KETOROLAC TROMETHAMINE 10 MG/1
10 TABLET, FILM COATED ORAL EVERY 6 HOURS PRN
Qty: 16 TABLET | Refills: 0 | Status: SHIPPED | OUTPATIENT
Start: 2025-01-16

## 2025-01-16 RX ORDER — KETOROLAC TROMETHAMINE 15 MG/ML
15 INJECTION, SOLUTION INTRAMUSCULAR; INTRAVENOUS ONCE
Status: COMPLETED | OUTPATIENT
Start: 2025-01-16 | End: 2025-01-16

## 2025-01-16 RX ADMIN — KETOROLAC TROMETHAMINE 15 MG: 15 INJECTION, SOLUTION INTRAMUSCULAR; INTRAVENOUS at 16:12

## 2025-01-16 ASSESSMENT — PAIN DESCRIPTION - DESCRIPTORS
DESCRIPTORS: ACHING
DESCRIPTORS: ACHING

## 2025-01-16 ASSESSMENT — PAIN SCALES - GENERAL
PAINLEVEL_OUTOF10: 10
PAINLEVEL_OUTOF10: 10

## 2025-01-16 ASSESSMENT — PAIN DESCRIPTION - ORIENTATION
ORIENTATION: RIGHT;LEFT
ORIENTATION: RIGHT;LEFT

## 2025-01-16 ASSESSMENT — PAIN DESCRIPTION - LOCATION
LOCATION: HEAD;LEG
LOCATION: LEG

## 2025-01-16 ASSESSMENT — ENCOUNTER SYMPTOMS
SHORTNESS OF BREATH: 1
CHEST TIGHTNESS: 0

## 2025-01-16 ASSESSMENT — PAIN - FUNCTIONAL ASSESSMENT: PAIN_FUNCTIONAL_ASSESSMENT: 0-10

## 2025-01-16 NOTE — CONSULTS
Session ID: [id number]  Language: Estonian   ID: #[id number]   Name: Cecilioge: Estonian   ID: #[id number]   Name: Brannon

## 2025-01-17 ENCOUNTER — OFFICE VISIT (OUTPATIENT)
Age: 35
End: 2025-01-17

## 2025-01-17 DIAGNOSIS — E66.01 MORBID OBESITY: Primary | ICD-10-CM

## 2025-01-17 NOTE — PROGRESS NOTES
[facility] at [facility]  Supervised Weight Loss     Date:   2025    Patient's Name: Charisse Shi  : 1990    Insurance:  [employer]          Session:   Surgery: Gastric Bypass    Surgeon:  Parth Escalona M.D.     Height: 63\"   Previous Month's Weight:    305      Lbs.   BMI: 54   Pounds Lost since last month: 0               Pounds Gained since last month: 0    Starting Weight: 305#   Previous Month’s Weight: 305#  Overall Pounds Lost: 0  Overall Pounds Gained: 0    Other Pertinent Information: Today's appointment was completed over the phone with the use of an Uzbek . Pt required to lose 15# prior to surgery approval. Pt unsure of current wt. Pt reports \"I don't like taking vitamins because the smell makes me vomit\".     Smoking Status:  none  Alcohol Intake: none    I have reviewed with pt the guidelines of the supervised wt loss program.  Pt understands the expectations of some wt loss during the program and that wt gain could delay the process. I have also explained that appointments need to be consecutive and missing an appointment may result in starting over. Pt has received this information in writing. This appointment was complimentary/non-billable as part of the bariatric surgery program.          Changes that patient has made since last month include:  stopped drinking sodas, limiting coffee.      Eating Habits and Behaviors  A nutrition lesson specific to vitamins was provided. We discussed the various reasons for needing vitamins and different types and doses. General healthy eating guidelines were also discussed. Pts were instructed that their plate should be made up 1/2 plate coming from non-starchy vegetables, 1/4 coming from lean meat, and 1/4 of their plate coming from carbohydrates, including fruits, starches, or milk.  We discussed measuring meals to 1/2 cup total per meal after surgery. Drinking only calorie-free, sugar-free

## 2025-01-18 LAB
EKG ATRIAL RATE: 113 BPM
EKG DIAGNOSIS: NORMAL
EKG P AXIS: 59 DEGREES
EKG P-R INTERVAL: 174 MS
EKG Q-T INTERVAL: 322 MS
EKG QRS DURATION: 90 MS
EKG QTC CALCULATION (BAZETT): 441 MS
EKG R AXIS: 63 DEGREES
EKG T AXIS: -47 DEGREES
EKG VENTRICULAR RATE: 113 BPM

## 2025-01-18 PROCEDURE — 93010 ELECTROCARDIOGRAM REPORT: CPT | Performed by: SPECIALIST

## 2025-02-04 ENCOUNTER — TELEPHONE (OUTPATIENT)
Age: 35
End: 2025-02-04

## 2025-02-04 NOTE — TELEPHONE ENCOUNTER
Identified patient with two patient identifiers (name and ). Reviewed chart in preparation for encounter and have obtained necessary documentation.    Called and spoke with patients spouse to inform patient still needs EGD, UGI and psych eval scheduled. Patient and  understood what was discussed and thankful for the call.

## 2025-02-06 ENCOUNTER — OFFICE VISIT (OUTPATIENT)
Age: 35
End: 2025-02-06

## 2025-02-06 ENCOUNTER — OFFICE VISIT (OUTPATIENT)
Age: 35
End: 2025-02-06
Payer: COMMERCIAL

## 2025-02-06 VITALS
HEIGHT: 63 IN | OXYGEN SATURATION: 98 % | RESPIRATION RATE: 20 BRPM | BODY MASS INDEX: 51.91 KG/M2 | WEIGHT: 293 LBS | HEART RATE: 98 BPM | SYSTOLIC BLOOD PRESSURE: 131 MMHG | TEMPERATURE: 99.1 F | DIASTOLIC BLOOD PRESSURE: 83 MMHG

## 2025-02-06 DIAGNOSIS — F32.A DEPRESSION, UNSPECIFIED DEPRESSION TYPE: ICD-10-CM

## 2025-02-06 DIAGNOSIS — Z59.82 TRANSPORTATION INSECURITY: Primary | ICD-10-CM

## 2025-02-06 DIAGNOSIS — R80.9 TYPE 2 DIABETES MELLITUS WITH DIABETIC MICROALBUMINURIA, UNSPECIFIED WHETHER LONG TERM INSULIN USE (HCC): Primary | ICD-10-CM

## 2025-02-06 DIAGNOSIS — Z71.89 COUNSELING AND COORDINATION OF CARE: ICD-10-CM

## 2025-02-06 DIAGNOSIS — B00.1 HERPES LABIALIS: ICD-10-CM

## 2025-02-06 DIAGNOSIS — J45.40 MODERATE PERSISTENT ASTHMA WITHOUT COMPLICATION: ICD-10-CM

## 2025-02-06 DIAGNOSIS — E11.29 TYPE 2 DIABETES MELLITUS WITH DIABETIC MICROALBUMINURIA, UNSPECIFIED WHETHER LONG TERM INSULIN USE (HCC): Primary | ICD-10-CM

## 2025-02-06 DIAGNOSIS — G47.00 INSOMNIA, UNSPECIFIED TYPE: ICD-10-CM

## 2025-02-06 DIAGNOSIS — G93.2 IDIOPATHIC INTRACRANIAL HYPERTENSION: ICD-10-CM

## 2025-02-06 DIAGNOSIS — R03.0 ELEVATED BLOOD PRESSURE READING: ICD-10-CM

## 2025-02-06 PROCEDURE — 99214 OFFICE O/P EST MOD 30 MIN: CPT | Performed by: FAMILY MEDICINE

## 2025-02-06 RX ORDER — ACETAZOLAMIDE 250 MG/1
250 TABLET ORAL 2 TIMES DAILY
Qty: 180 TABLET | Refills: 1 | Status: SHIPPED | OUTPATIENT
Start: 2025-02-06

## 2025-02-06 RX ORDER — ACYCLOVIR 400 MG/1
400 TABLET ORAL 3 TIMES DAILY
Qty: 15 TABLET | Refills: 0 | Status: SHIPPED | OUTPATIENT
Start: 2025-02-06 | End: 2025-02-11

## 2025-02-06 RX ORDER — GINSENG 100 MG
CAPSULE ORAL
Qty: 14 G | Refills: 0 | Status: SHIPPED | OUTPATIENT
Start: 2025-02-06 | End: 2025-02-16

## 2025-02-06 SDOH — ECONOMIC STABILITY - TRANSPORTATION SECURITY: TRANSPORTATION INSECURITY: Z59.82

## 2025-02-06 SDOH — ECONOMIC STABILITY: FOOD INSECURITY: WITHIN THE PAST 12 MONTHS, THE FOOD YOU BOUGHT JUST DIDN'T LAST AND YOU DIDN'T HAVE MONEY TO GET MORE.: NEVER TRUE

## 2025-02-06 SDOH — ECONOMIC STABILITY: FOOD INSECURITY: WITHIN THE PAST 12 MONTHS, YOU WORRIED THAT YOUR FOOD WOULD RUN OUT BEFORE YOU GOT MONEY TO BUY MORE.: NEVER TRUE

## 2025-02-06 ASSESSMENT — PATIENT HEALTH QUESTIONNAIRE - PHQ9
4. FEELING TIRED OR HAVING LITTLE ENERGY: SEVERAL DAYS
3. TROUBLE FALLING OR STAYING ASLEEP: NOT AT ALL
8. MOVING OR SPEAKING SO SLOWLY THAT OTHER PEOPLE COULD HAVE NOTICED. OR THE OPPOSITE, BEING SO FIGETY OR RESTLESS THAT YOU HAVE BEEN MOVING AROUND A LOT MORE THAN USUAL: SEVERAL DAYS
9. THOUGHTS THAT YOU WOULD BE BETTER OFF DEAD, OR OF HURTING YOURSELF: NOT AT ALL
SUM OF ALL RESPONSES TO PHQ QUESTIONS 1-9: 5
SUM OF ALL RESPONSES TO PHQ QUESTIONS 1-9: 5
6. FEELING BAD ABOUT YOURSELF - OR THAT YOU ARE A FAILURE OR HAVE LET YOURSELF OR YOUR FAMILY DOWN: NOT AT ALL
7. TROUBLE CONCENTRATING ON THINGS, SUCH AS READING THE NEWSPAPER OR WATCHING TELEVISION: SEVERAL DAYS
5. POOR APPETITE OR OVEREATING: SEVERAL DAYS
SUM OF ALL RESPONSES TO PHQ QUESTIONS 1-9: 5
10. IF YOU CHECKED OFF ANY PROBLEMS, HOW DIFFICULT HAVE THESE PROBLEMS MADE IT FOR YOU TO DO YOUR WORK, TAKE CARE OF THINGS AT HOME, OR GET ALONG WITH OTHER PEOPLE: SOMEWHAT DIFFICULT
2. FEELING DOWN, DEPRESSED OR HOPELESS: NOT AT ALL
1. LITTLE INTEREST OR PLEASURE IN DOING THINGS: SEVERAL DAYS
SUM OF ALL RESPONSES TO PHQ QUESTIONS 1-9: 5
SUM OF ALL RESPONSES TO PHQ9 QUESTIONS 1 & 2: 1

## 2025-02-06 NOTE — PROGRESS NOTES
Resource visit with JULIANO Navigator.     Patient in need of assistance with transportation scheduling for upcoming appointments.     - Cardiology appt - pending (referral in EPIC)  - Neuro Ophthalmology appt - called to schedule appt - left message  - Endoscopy and colonoscopy appts - Check with provider   Patient prefers Thurs/Friday 12-3 pm for any appointments    Transportation set up for upcoming Neurology appointment on 2/13/25:  Patient advised that transportation will pick her up at 2:35 pm @ home  Patient to be picked up from doctor's office at 4:30 pm   Confirmation #[id number]    Patient also inquired on Medicaid name change. Patient advised that information was previously sent to [facility] and that it can take up to 30 days for update to be made. A second request sent by JULIANO to [facility]; [place] ROWE included as verification.    Plan:  Ongoing psychosocial support and resource referral, as desired by the patient and family.      RONAL Oreilly   Navigator

## 2025-02-06 NOTE — PROGRESS NOTES
Charisse Shi is a 34 y.o. female      Chief Complaint   Patient presents with    Follow-up Chronic Condition     Patient is coming in for a follow up on chronic conditions. No other concerns. Patient also mentioned she has a rash around mouth that started about a week ago and it is spreading. Interpretor ID is [id number].         \"Have you been to the ER, urgent care clinic since your last visit?  Hospitalized since your last visit?\"    NO    “Have you seen or consulted any other health care providers outside of [facility] since your last visit?”    NO              Vitals:    02/06/25 1027   BP: 131/83   Site: Right Upper Arm   Position: Sitting   Pulse: 98   Resp: 20   Temp: 99.1 °F (37.3 °C)   TempSrc: Oral   SpO2: 98%   Weight: 134.7 kg (297 lb)   Height: 1.6 m (5' 3\")            Health Maintenance Due   Topic Date Due    Diabetic foot exam  Never done    Varicella vaccine (1 of 2 - 13+ 2-dose series) Never done    Diabetic retinal exam  Never done    Hepatitis B vaccine (1 of 3 - 19+ 3-dose series) Never done    Pneumococcal 0-49 years Vaccine (1 of 2 - PCV) Never done    COVID-19 Vaccine (1 - 2024-25 season) Never done         Medication Reconciliation completed, changes noted.  Please  Update medication list.    
  Constitutional:       General: She is in acute distress (tearful).      Appearance: Normal appearance.   HENT:      Head: Normocephalic and atraumatic.      Nose: Nose normal.   Eyes:      Extraocular Movements: Extraocular movements intact.      Conjunctiva/sclera: Conjunctivae normal.   Cardiovascular:      Rate and Rhythm: Normal rate.   Pulmonary:      Effort: No respiratory distress.   Neurological:      General: No focal deficit present.      Mental Status: She is alert.   Psychiatric:         Mood and Affect: Mood normal.         Thought Content: Thought content normal.       Marisol Parisi DO, [facility]

## 2025-02-12 NOTE — PROGRESS NOTES
Neurology Progress Note    Patient ID:  Charisse Shi  [id number]  34 y.o.  1990      Subjective:   History:  Charisse Shi is a female Ugand[place] who  has a past medical history of Essential hypertension and gastric bypass who for the past 15 yrs, noted headache, bitemporal going to the back, daily, described as \"boiling water\", 10/10, constant, (+) nausea (+) vomiting (+) photophobia (+) phonophobia (+) blurred vision. Gets worse when bending over. Was seen by a headache doctor in [place]. Thought it was sinus allergies placed on unrecalled meds. Saw optometrist in Aug 2022- who said she just needs glasses but they are not clear if her optic nerve was examined. Placed on Diamox 125 mg BID 1 month ago with no benefit. Developed some paresthesia. (+) weight gain (+) not sleeping well - only 5 hrs.        Last time, patient was placed on Topamax 25 mg QHS, but only for 1 month. Patient is a poor historian and does not know why she is not on it anymore (although in the EPIC it was discontinued by her PCP in Nov 2024). Also takes Imitrex but not helping. Patient also not taking Diamox. Patient does not know why.    Has not seen the [facility] neurophthalmology and just waiting for PCP to call them to make her an appointment.      ROS:  Per HPI-  Otherwise the remainder of ROS was negative    Social Hx  Social History     Socioeconomic History    Marital status:    Tobacco Use    Smoking status: Never    Smokeless tobacco: Never   Substance and Sexual Activity    Alcohol use: Not Currently    Drug use: Never   Social History Narrative         ** Merged History Encounter **     Social Determinants of Health     Financial Resource Strain: High Risk (3/20/2024)    Overall Financial Resource Strain (CARDIA)     Difficulty of Paying Living Expenses: Very hard   Food Insecurity: No Food Insecurity (2/6/2025)    Hunger Vital Sign     Worried About Running Out of Food in the Last Year: Never true     Ran Out of Food in the Last

## 2025-02-13 ENCOUNTER — OFFICE VISIT (OUTPATIENT)
Age: 35
End: 2025-02-13
Payer: COMMERCIAL

## 2025-02-13 VITALS
SYSTOLIC BLOOD PRESSURE: 120 MMHG | DIASTOLIC BLOOD PRESSURE: 70 MMHG | TEMPERATURE: 95.7 F | BODY MASS INDEX: 49.42 KG/M2 | HEART RATE: 85 BPM | HEIGHT: 65 IN | OXYGEN SATURATION: 96 %

## 2025-02-13 DIAGNOSIS — R51.9 INTRACTABLE HEADACHE, UNSPECIFIED CHRONICITY PATTERN, UNSPECIFIED HEADACHE TYPE: Primary | ICD-10-CM

## 2025-02-13 PROCEDURE — 99215 OFFICE O/P EST HI 40 MIN: CPT | Performed by: PSYCHIATRY & NEUROLOGY

## 2025-02-13 PROCEDURE — 3078F DIAST BP <80 MM HG: CPT | Performed by: PSYCHIATRY & NEUROLOGY

## 2025-02-13 PROCEDURE — 3074F SYST BP LT 130 MM HG: CPT | Performed by: PSYCHIATRY & NEUROLOGY

## 2025-02-13 RX ORDER — RIZATRIPTAN BENZOATE 10 MG/1
TABLET, ORALLY DISINTEGRATING ORAL
Qty: 9 TABLET | Refills: 2 | Status: SHIPPED | OUTPATIENT
Start: 2025-02-13

## 2025-02-13 RX ORDER — TOPIRAMATE 50 MG/1
50 TABLET, FILM COATED ORAL
Qty: 30 TABLET | Refills: 3 | Status: SHIPPED | OUTPATIENT
Start: 2025-02-13

## 2025-02-13 RX ORDER — ACYCLOVIR 400 MG/1
400 TABLET ORAL
COMMUNITY

## 2025-02-14 ENCOUNTER — OFFICE VISIT (OUTPATIENT)
Age: 35
End: 2025-02-14

## 2025-02-14 DIAGNOSIS — E66.01 MORBID OBESITY: Primary | ICD-10-CM

## 2025-02-14 NOTE — PROGRESS NOTES
[facility] at [facility]  Supervised Weight Loss     Date:   2025    Patient's Name: Charisse Shi  : 1990    Insurance:  [employer]na Better Health          Session: 3 of  6  Surgery: Gastric Bypass                    Surgeon:  Parth Escalona M.D.      Height: 65\"                 Recent EMR Weight:    297      Lbs.                            BMI: 49              Pounds Lost since last month: 8               Pounds Gained since last month: 0     Starting Weight: 305#                       Previous Month’s Weight: 305#  Overall Pounds Lost: 8#                   Overall Pounds Gained: 0     Other Pertinent Information: Today's appointment was completed over the phone with the use of an Japanese . Pt required to lose 15# prior to surgery approval. Today's wt was pulled from recent wt in EMR (25). Pt reports \"I don't like taking vitamins because the smell makes me vomit\". Pt reports her father passed away a few days ago and reports decreased appetite since then.     Smoking Status:  none  Alcohol Intake: none    I have reviewed with pt the guidelines of the supervised wt loss program.  Pt understands the expectations of some wt loss during the program and that wt gain could delay the process. I have also explained that appointments need to be consecutive and missing an appointment may result in starting over. Pt has received this information in writing. This appointment was complimentary/non-billable as part of the bariatric surgery program.         Changes that patient has made since last month include:  eating only 1 meal a day (or 1/2 meal day).      Eating Habits and Behaviors  General healthy eating guidelines were also discussed. Pts were instructed that their plate should be made up 1/2 plate coming from non-starchy vegetables, 1/4 coming from lean meat, and 1/4 of their plate coming from carbohydrates, including fruits, starches, or milk.  We discussed measuring

## 2025-02-22 RX ORDER — VENLAFAXINE 37.5 MG/1
37.5 TABLET ORAL DAILY
Qty: 90 TABLET | Refills: 3 | Status: SHIPPED | OUTPATIENT
Start: 2025-02-22

## 2025-02-22 RX ORDER — TRAZODONE HYDROCHLORIDE 50 MG/1
50 TABLET ORAL NIGHTLY PRN
Qty: 90 TABLET | Refills: 3 | Status: SHIPPED | OUTPATIENT
Start: 2025-02-22

## 2025-02-22 RX ORDER — LISINOPRIL 5 MG/1
5 TABLET ORAL DAILY
Qty: 90 TABLET | Refills: 3 | Status: SHIPPED | OUTPATIENT
Start: 2025-02-22

## 2025-02-22 RX ORDER — MONTELUKAST SODIUM 10 MG/1
10 TABLET ORAL
Qty: 90 TABLET | Refills: 3 | Status: SHIPPED | OUTPATIENT
Start: 2025-02-22

## 2025-02-28 ENCOUNTER — TELEPHONE (OUTPATIENT)
Age: 35
End: 2025-02-28

## 2025-02-28 NOTE — TELEPHONE ENCOUNTER
Session code 00076  Called patient with Malay .  picks up. Patient is not available until 2 pm.    Will call back at that time.

## 2025-02-28 NOTE — TELEPHONE ENCOUNTER
Session code 88213  Called patient with Macedonian  #[id number]. Conf call to [facility] Neuro-Ophthalmology at [phone]. The call went to voicemail belonging to [facility].  Instructed to leave patient's name/ and reason for call.  Left a detail message to reach out to the patient's phone# [phone].   To schedule an appointment. Patient needing Macedonian  assistance.

## 2025-03-03 ENCOUNTER — TELEPHONE (OUTPATIENT)
Age: 35
End: 2025-03-03

## 2025-03-03 NOTE — TELEPHONE ENCOUNTER
SW attempted to reach patient utilizing language line (Turkmen) to discuss ESL resources. No answer. Attempted again with language line and  answered stating he is at work and unable to continue with call.     JULIANO mailed patient resources regarding ESL classes through [facility]. An English and Turkmen handout was mailed to patient at address on file.      RONAL Oreilly   Navigator    ----- Message from Dr. Marisol Parisi DO sent at 2/22/2025  3:40 PM EST -----  Regarding: ESL Class Info?  Hi Minda,    I recently saw Charisse, and she is continuing to struggle. Is very thankful for all of our help. She said the Medicaid transport has helped some though she feels like people sometimes don't pick her up because she doesn't speak English? She expressed some interest in ESL classes, wasn't sure if that was something you may be able to help connect her with. Her  recently had a heart attack requiring stents and her father passed away...    Thanks so much,  Marisol

## 2025-03-11 ENCOUNTER — TELEPHONE (OUTPATIENT)
Age: 35
End: 2025-03-11

## 2025-03-11 NOTE — TELEPHONE ENCOUNTER
*Called language line for translation    Called patient in regards to remaining SWL requirements with scheduling UGI, spoke with the  and obtained permission to relay message.    Provided office phone number for scheduling, asked if there were any questions regarding remaining requirements. Provided the psych assessment via email.

## 2025-03-12 ENCOUNTER — HOSPITAL ENCOUNTER (EMERGENCY)
Facility: HOSPITAL | Age: 35
Discharge: HOME | End: 2025-03-12
Attending: STUDENT IN AN ORGANIZED HEALTH CARE EDUCATION/TRAINING PROGRAM
Payer: COMMERCIAL

## 2025-03-12 ENCOUNTER — APPOINTMENT (OUTPATIENT)
Facility: HOSPITAL | Age: 35
End: 2025-03-12
Payer: COMMERCIAL

## 2025-03-12 VITALS
HEART RATE: 84 BPM | RESPIRATION RATE: 18 BRPM | TEMPERATURE: 98.6 F | BODY MASS INDEX: 48.82 KG/M2 | HEIGHT: 65 IN | DIASTOLIC BLOOD PRESSURE: 99 MMHG | SYSTOLIC BLOOD PRESSURE: 150 MMHG | WEIGHT: 293 LBS | OXYGEN SATURATION: 98 %

## 2025-03-12 DIAGNOSIS — R07.9 CHEST PAIN, UNSPECIFIED TYPE: ICD-10-CM

## 2025-03-12 DIAGNOSIS — M25.561 ACUTE PAIN OF RIGHT KNEE: Primary | ICD-10-CM

## 2025-03-12 DIAGNOSIS — M17.11 OSTEOARTHRITIS OF RIGHT KNEE, UNSPECIFIED OSTEOARTHRITIS TYPE: ICD-10-CM

## 2025-03-12 LAB
ALBUMIN SERPL-MCNC: 3.3 G/DL (ref 3.5–5)
ALBUMIN/GLOB SERPL: 0.8 (ref 1.1–2.2)
ALP SERPL-CCNC: 59 U/L (ref 45–117)
ALT SERPL-CCNC: 27 U/L (ref 12–78)
ANION GAP SERPL CALC-SCNC: 5 MMOL/L (ref 2–12)
AST SERPL-CCNC: 14 U/L (ref 15–37)
BASOPHILS # BLD: 0.03 K/UL (ref 0–0.1)
BASOPHILS NFR BLD: 0.5 % (ref 0–1)
BILIRUB SERPL-MCNC: 0.3 MG/DL (ref 0.2–1)
BUN SERPL-MCNC: 15 MG/DL (ref 6–20)
BUN/CREAT SERPL: 20 (ref 12–20)
CALCIUM SERPL-MCNC: 9.8 MG/DL (ref 8.5–10.1)
CHLORIDE SERPL-SCNC: 104 MMOL/L (ref 97–108)
CO2 SERPL-SCNC: 29 MMOL/L (ref 21–32)
CREAT SERPL-MCNC: 0.75 MG/DL (ref 0.55–1.02)
DIFFERENTIAL METHOD BLD: ABNORMAL
EOSINOPHIL # BLD: 0.13 K/UL (ref 0–0.4)
EOSINOPHIL NFR BLD: 2.1 % (ref 0–7)
ERYTHROCYTE [DISTWIDTH] IN BLOOD BY AUTOMATED COUNT: 15.3 % (ref 11.5–14.5)
GLOBULIN SER CALC-MCNC: 4.4 G/DL (ref 2–4)
GLUCOSE SERPL-MCNC: 89 MG/DL (ref 65–100)
HCG UR QL: NEGATIVE
HCT VFR BLD AUTO: 38.6 % (ref 35–47)
HGB BLD-MCNC: 11.7 G/DL (ref 11.5–16)
IMM GRANULOCYTES # BLD AUTO: 0.01 K/UL (ref 0–0.04)
IMM GRANULOCYTES NFR BLD AUTO: 0.2 % (ref 0–0.5)
LYMPHOCYTES # BLD: 2.39 K/UL (ref 0.8–3.5)
LYMPHOCYTES NFR BLD: 37.9 % (ref 12–49)
MCH RBC QN AUTO: 23.2 PG (ref 26–34)
MCHC RBC AUTO-ENTMCNC: 30.3 G/DL (ref 30–36.5)
MCV RBC AUTO: 76.4 FL (ref 80–99)
MONOCYTES # BLD: 0.42 K/UL (ref 0–1)
MONOCYTES NFR BLD: 6.7 % (ref 5–13)
NEUTS SEG # BLD: 3.33 K/UL (ref 1.8–8)
NEUTS SEG NFR BLD: 52.6 % (ref 32–75)
NRBC # BLD: 0 K/UL (ref 0–0.01)
NRBC BLD-RTO: 0 PER 100 WBC
PLATELET # BLD AUTO: 274 K/UL (ref 150–400)
PMV BLD AUTO: 10.4 FL (ref 8.9–12.9)
POTASSIUM SERPL-SCNC: 3.7 MMOL/L (ref 3.5–5.1)
PROT SERPL-MCNC: 7.7 G/DL (ref 6.4–8.2)
RBC # BLD AUTO: 5.05 M/UL (ref 3.8–5.2)
SODIUM SERPL-SCNC: 138 MMOL/L (ref 136–145)
TROPONIN I SERPL HS-MCNC: 5 NG/L (ref 0–51)
WBC # BLD AUTO: 6.3 K/UL (ref 3.6–11)

## 2025-03-12 PROCEDURE — 99285 EMERGENCY DEPT VISIT HI MDM: CPT

## 2025-03-12 PROCEDURE — 80053 COMPREHEN METABOLIC PANEL: CPT

## 2025-03-12 PROCEDURE — 6370000000 HC RX 637 (ALT 250 FOR IP): Performed by: STUDENT IN AN ORGANIZED HEALTH CARE EDUCATION/TRAINING PROGRAM

## 2025-03-12 PROCEDURE — 81025 URINE PREGNANCY TEST: CPT

## 2025-03-12 PROCEDURE — 93005 ELECTROCARDIOGRAM TRACING: CPT | Performed by: EMERGENCY MEDICINE

## 2025-03-12 PROCEDURE — 6360000002 HC RX W HCPCS: Performed by: STUDENT IN AN ORGANIZED HEALTH CARE EDUCATION/TRAINING PROGRAM

## 2025-03-12 PROCEDURE — 96374 THER/PROPH/DIAG INJ IV PUSH: CPT

## 2025-03-12 PROCEDURE — 73562 X-RAY EXAM OF KNEE 3: CPT

## 2025-03-12 PROCEDURE — 84484 ASSAY OF TROPONIN QUANT: CPT

## 2025-03-12 PROCEDURE — 85025 COMPLETE CBC W/AUTO DIFF WBC: CPT

## 2025-03-12 PROCEDURE — 36415 COLL VENOUS BLD VENIPUNCTURE: CPT

## 2025-03-12 PROCEDURE — 71046 X-RAY EXAM CHEST 2 VIEWS: CPT

## 2025-03-12 RX ORDER — PREDNISONE 10 MG/1
TABLET ORAL
Qty: 1 EACH | Refills: 0 | Status: SHIPPED | OUTPATIENT
Start: 2025-03-12

## 2025-03-12 RX ORDER — KETOROLAC TROMETHAMINE 30 MG/ML
30 INJECTION, SOLUTION INTRAMUSCULAR; INTRAVENOUS ONCE
Status: COMPLETED | OUTPATIENT
Start: 2025-03-12 | End: 2025-03-12

## 2025-03-12 RX ORDER — LIDOCAINE 50 MG/G
1 PATCH TOPICAL DAILY
Qty: 10 PATCH | Refills: 0 | Status: SHIPPED | OUTPATIENT
Start: 2025-03-12 | End: 2025-03-22

## 2025-03-12 RX ORDER — IBUPROFEN 800 MG/1
800 TABLET, FILM COATED ORAL EVERY 8 HOURS PRN
Qty: 30 TABLET | Refills: 0 | Status: SHIPPED | OUTPATIENT
Start: 2025-03-12

## 2025-03-12 RX ORDER — LIDOCAINE 4 G/G
1 PATCH TOPICAL
Status: DISCONTINUED | OUTPATIENT
Start: 2025-03-12 | End: 2025-03-12 | Stop reason: HOSPADM

## 2025-03-12 RX ADMIN — KETOROLAC TROMETHAMINE 30 MG: 30 INJECTION, SOLUTION INTRAMUSCULAR at 18:53

## 2025-03-12 ASSESSMENT — LIFESTYLE VARIABLES
HOW MANY STANDARD DRINKS CONTAINING ALCOHOL DO YOU HAVE ON A TYPICAL DAY: PATIENT DOES NOT DRINK
HOW OFTEN DO YOU HAVE A DRINK CONTAINING ALCOHOL: NEVER

## 2025-03-12 ASSESSMENT — PAIN - FUNCTIONAL ASSESSMENT: PAIN_FUNCTIONAL_ASSESSMENT: 0-10

## 2025-03-12 ASSESSMENT — PAIN SCALES - GENERAL: PAINLEVEL_OUTOF10: 10

## 2025-03-12 ASSESSMENT — PAIN DESCRIPTION - LOCATION: LOCATION: KNEE

## 2025-03-12 ASSESSMENT — PAIN DESCRIPTION - ORIENTATION: ORIENTATION: RIGHT

## 2025-03-12 ASSESSMENT — HEART SCORE: ECG: NORMAL

## 2025-03-12 NOTE — DISCHARGE INSTRUCTIONS
Alternate tylenol 1000mg and ibuprofen 800mg every 4 hours as needed for pain. Elevate your leg and apply ice to your knee for 30 minutes 2-3 times per day. Please follow-up with your orthopedic specialist whose information is provided.

## 2025-03-12 NOTE — ED TRIAGE NOTES
Patient co right knee pain that began today, states difficult to walk  Add upper left chest pain x 2 days, comes and goes lasting 10 minutes at a time.

## 2025-03-12 NOTE — ED NOTES
Patient: Ronit Johansen   : 1980 MRN: [id number]    SUBJECTIVE:  Chief Complaint   Patient presents with   • Abdominal Pain   • Video Visit         A 43 year old female is present for a Virtual Visit via Teleconferencing for  an ER follow up. This is being used during the COVID-19 crisis in place of an in-person exam.    Verbal consent for initiation of telemedicine visit was obtained.    Patient has given consent to record this visit for documentation in their clinical record.    HISTORY OF PRESENT ILLNESS:  Historian: Self.    1. Abdominal pain, acute, generalized  She went to the ER on 2023 for complaints of mid epigastric discomfort. She did not eat anything heavy before that night.   Reviewed and discussed ER notes in detail. She was recommended to get some blood work in the ER because they wanted to make sure that she had no sepsis. She was given an injection of Zofran. Mentioned that she had a very sharp pain from the umbilicus and then it went down to the ovaries and had excessive sweating. Denies blood in the stool. She has more constipation issues than diarrhea.       PAST MEDICAL HISTORY:  Past Medical History:   Diagnosis Date   • Abdominal pain    • Biliary dyskinesia    • Bleeding disorder (CMD)    • Breast disorder     right breast lump, had mmg  and all was fine. she is monitoring with SBE   • Chronic pain     low back pain with siatica to right leg   • Constipation    • Essential (primary) hypertension     with last pregnancy   • Herpes simplex without mention of complication      used valtrex in the beginning. no active outbreaks   • Hx of migraines     last was 12/2/15   • Hypothyroidism     diagnosed at age 13 years   • IBS (irritable bowel syndrome)    • Incisional hernia     abdominal   • Inflammatory bowel disease     IBS   • Non-healing surgical wound 2013   • PONV (postoperative nausea and vomiting)     Very    • Rh incompatibility     pt states she is o negative.  Pt to xray     • STD (sexually transmitted disease)      MEDICATIONS:  Current Outpatient Medications   Medication Sig   • valACYclovir (VALTREX) 500 MG tablet Take 1 tablet by mouth daily.   • ondansetron (ZOFRAN ODT) 4 MG disintegrating tablet Place 1 tablet onto the tongue every 6 hours.   • semaglutide-Weight Management (Wegovy) 0.5 MG/0.5ML injection Inject 0.5 mg into the skin every 7 days.   • Synthroid 150 MCG tablet Take 1 tablet by mouth daily.   • amitriptyline (ELAVIL) 25 MG tablet Take 1 tablet by mouth nightly.   • linaclotide (Linzess) 290 MCG Take 1 capsule by mouth every morning.   • lisinopril (ZESTRIL) 10 MG tablet Take 1 tablet by mouth daily.   • traMADol (ULTRAM) 50 MG tablet Take 1 tablet by mouth every 6 hours as needed for Pain.   • metoPROLOL tartrate (LOPRESSOR) 25 MG tablet Take one-half tablet by mouth 2 times daily.   • Cholecalciferol (VITAMIN D3) 5000 units capsule Take 10,000 Units by mouth daily.   • Multiple Vitamins-Calcium (ONE-A-DAY WOMENS FORMULA PO) Take 1 tablet by mouth nightly.   • aspirin-acetaminophen-caffeine (EXCEDRIN MIGRAINE) 250-250-65 MG per tablet Take 1 tablet by mouth every 6 hours as needed for Pain. Indications: Headache      No current facility-administered medications for this visit.     ALLERGIES:  ALLERGIES:  No Known Allergies  PAST SURGICAL HISTORY:  Past Surgical History:   Procedure Laterality Date   • Abdomen surgery      evacuation hematoma, removal of chronic suture material   •  section, classic  2013    son  with emergency surgery   • Colonoscopy  2016    [facility]   • Echo heart resting w doppler & color flow  12    LVEF 63%   • Esophagogastroduodenoscopy  2016    [facility]   • Hernia repair     • Ir guided biopsy Right 2021    SUBMANDIBULAR MASS   • Lymph node biopsy  2021    Excisionial biopsy neck lymph node    • Removal gallbladder     • Stress echo  13    Negative; 11.9 METs; LVEF 60%   •  Stress test  8/12/15    Negative, 11 METs   • Xray mammogram screening bilat  04/27/2011     FAMILY HISTORY:  Family History   Problem Relation Age of Onset   • Migraine Other    • Dermatitis Other         2012 round nodular skin issue, one time only   • Thyroid Other 12        hypothyroid   • Diabetes Mother    • Hyperlipidemia Mother    • Hypertension Mother    • Congestive Heart Failure Mother         heart attack and 5 stents   • Other Maternal Grandmother         chron's disease     SOCIAL HISTORY:  Social History     Tobacco Use   Smoking Status Never   Smokeless Tobacco Never     Social History     Substance and Sexual Activity   Alcohol Use Yes   • Alcohol/week: 0.8 standard drinks of alcohol   • Types: 1 Standard drinks or equivalent per week    Comment: A glass of wine every 2 weeks or so       REVIEW OF SYSTEMS:  Constitutional: As Per HPI.  Gastrointestinal: As Per HPI.    All other systems reviewed and negative except as stated in the HPI.  OBJECTIVE:  There were no vitals filed for this visit.    PHYSICAL EXAMINATION:    Constitutional: Alert and oriented, No acute distress.  Respiratory: Respirations are non-labored.  Cognition and Speech: Speech clear and coherent.  Psychiatric: Appropriate mood & affect.    DIAGNOSTIC STUDIES:  LAB RESULTS:  Admission on 09/30/2023, Discharged on 09/30/2023   Component Date Value Ref Range Status   • Sodium 09/30/2023 139  135 - 145 mmol/L Final   • Potassium 09/30/2023 4.3  3.4 - 5.1 mmol/L Final   • Chloride 09/30/2023 103  97 - 110 mmol/L Final   • Carbon Dioxide 09/30/2023 28  21 - 32 mmol/L Final   • Anion Gap 09/30/2023 12  7 - 19 mmol/L Final   • Glucose 09/30/2023 111 (H)  70 - 99 mg/dL Final   • BUN 09/30/2023 10  6 - 20 mg/dL Final   • Creatinine 09/30/2023 0.80  0.51 - 0.95 mg/dL Final   • Glomerular Filtration Rate 09/30/2023 >90  >=60 Final   • BUN/Cr 09/30/2023 13  7 - 25 Final   • Calcium 09/30/2023 9.3  8.4 - 10.2 mg/dL Final   • Bilirubin, Total  09/30/2023 0.3  0.2 - 1.0 mg/dL Final   • GOT/AST 09/30/2023 43 (H)  <=37 Units/L Final   • GPT/ALT 09/30/2023 51  <64 Units/L Final   • Alkaline Phosphatase 09/30/2023 46  45 - 117 Units/L Final   • Albumin 09/30/2023 3.9  3.6 - 5.1 g/dL Final   • Protein, Total 09/30/2023 6.9  6.4 - 8.2 g/dL Final   • Globulin 09/30/2023 3.0  2.0 - 4.0 g/dL Final   • A/G Ratio 09/30/2023 1.3  1.0 - 2.4 Final   • Lipase 09/30/2023 32  15 - 77 Units/L Final   • WBC 09/30/2023 6.0  4.2 - 11.0 K/mcL Final   • RBC 09/30/2023 4.30  4.00 - 5.20 mil/mcL Final   • HGB 09/30/2023 13.4  12.0 - 15.5 g/dL Final   • HCT 09/30/2023 38.6  36.0 - 46.5 % Final   • MCV 09/30/2023 89.8  78.0 - 100.0 fl Final   • MCH 09/30/2023 31.2  26.0 - 34.0 pg Final   • MCHC 09/30/2023 34.7  32.0 - 36.5 g/dL Final   • RDW-CV 09/30/2023 12.3  11.0 - 15.0 % Final   • RDW-SD 09/30/2023 39.8  39.0 - 50.0 fL Final   • PLT 09/30/2023 331  140 - 450 K/mcL Final   • NRBC 09/30/2023 0  <=0 /100 WBC Final   • Neutrophil, Percent 09/30/2023 65  % Final   • Lymphocytes, Percent 09/30/2023 25  % Final   • Mono, Percent 09/30/2023 7  % Final   • Eosinophils, Percent 09/30/2023 2  % Final   • Basophils, Percent 09/30/2023 1  % Final   • Immature Granulocytes 09/30/2023 0  % Final   • Absolute Neutrophils 09/30/2023 4.0  1.8 - 7.7 K/mcL Final   • Absolute Lymphocytes 09/30/2023 1.5  1.0 - 4.8 K/mcL Final   • Absolute Monocytes 09/30/2023 0.4  0.3 - 0.9 K/mcL Final   • Absolute Eosinophils  09/30/2023 0.1  0.0 - 0.5 K/mcL Final   • Absolute Basophils 09/30/2023 0.0  0.0 - 0.3 K/mcL Final   • Absolute Immature Granulocytes 09/30/2023 0.0  0.0 - 0.2 K/mcL Final   • Extra Tube 09/30/2023 Hold for Add Ons   Final   • Extra Tube 09/30/2023 Hold for Add Ons   Final   Lab Services on 09/12/2023   Component Date Value Ref Range Status   • Miscellaneous Test Name 09/12/2023 Celiac Disease Comprehensive Panel with Gliadin Antibody (IgG)   Final   • Scanned Report 09/12/2023 See attached  report   Final       ASSESSMENT AND PLAN:  This 43 year old female presents with :  1. Abdominal pain, acute, generalized        No orders of the defined types were placed in this encounter.      PLAN:  1. Abdominal pain, acute, generalized  Reviewed ER notes.  Reviewed and reconciled medications list.   Discussed the condition in detail.   Advised to get CT abdomen.  Will discuss further once the CT results are available.  Explained the side effects of Valacyclovir (VALTREX) 500 mg.   Advised to go back to ER if she experiences any worsening of symptoms.      Follow up as needed.    Refer to orders.  Medical compliance with plan discussed and risks of non-compliance reviewed.  Patient education completed on disease process, etiology & prognosis.  Proper usage and side effects of medications reviewed & discussed.  Patient understands and agrees with the plan.  Return to clinic as clinically indicated as discussed with the patient who verbalized understanding of the plan and is in agreement with the plan.    No follow-ups on file.    IMary, have created a visit summary document based on the audio recording between Ms. Chiara Mohan NP and this patient for the physician to review, edit as needed, and authenticate.    Creation Date: 10/7/2023     Chiara LATHAM NP have reviewed and edited the visit summary above and attest that it is accurate. The documentation recorded by the scribe accurately and completely reflects the service(s) I personally performed and the decisions made by me.        This visit is being performed via video to discuss her abdominal pain     Clinician Location: [facility] [place] [facility], [address]      Ronit Johansen is in [place] and their identity has been established.   She was informed that consent to treat includes permission to submit charges to the applicable insurance on file. She was advised regarding the potential risk inherent in video visits, as the assessment  may be limited due to what can be seen on the screen which potentially results in an incomplete assessment; as well as either of us may discontinue the video visit if it is felt that the videoconferencing connections are not adequate for his/her situation.     30 minutes were spent in this encounter.

## 2025-03-13 NOTE — ED PROVIDER NOTES
[facility] EMERGENCY DEPARTMENT  EMERGENCY DEPARTMENT ENCOUNTER      Pt Name: Charisse Shi  MRN: [id number]  Birthdate 1990  Date of evaluation: 3/12/2025  Provider: FERCHO Bell    CHIEF COMPLAINT       Chief Complaint   Patient presents with    Knee Pain    Chest Pain         HISTORY OF PRESENT ILLNESS    Patient is a 34-year-old female with history of morbid obesity, cardiomegaly, hypertension, type 2 diabetes who presents to ED complaining of right knee pain as well as chest pain.  Reports right knee pain has been ongoing but recently worsened.  She is due for another steroid injection on 3/18.  Reports her pain is uncontrollable.  She has not take any medications prior to arrival.  Also reports left chest pain that is exacerbated by palpation and deep breath.  Reports his pain is intermittent in nature and last about 10 minutes at onset.  She denies any fever, chills, cough, shortness of breath, difficulty breathing, abdominal pain, nausea, vomiting.  Also denies any leg numbness or weakness, calf pain.            Review of External Medical Records:     Nursing Notes were reviewed.    REVIEW OF SYSTEMS       Review of Systems   All other systems reviewed and are negative.      Except as noted above the remainder of the review of systems was reviewed and negative.       PAST MEDICAL HISTORY     Past Medical History:   Diagnosis Date    Cardiomegaly 2023    Essential hypertension     last two pregnancies    Type 2 diabetes mellitus with other specified complication (HCC) 5/15/2024    Uncomplicated asthma 2024         SURGICAL HISTORY       Past Surgical History:   Procedure Laterality Date     SECTION      x4    DILATION AND CURETTAGE OF UTERUS      OTHER SURGICAL HISTORY           CURRENT MEDICATIONS       Discharge Medication List as of 3/12/2025  7:48 PM        CONTINUE these medications which have NOT CHANGED    Details   lisinopril (PRINIVIL;ZESTRIL) 5 MG

## 2025-03-14 ENCOUNTER — OFFICE VISIT (OUTPATIENT)
Age: 35
End: 2025-03-14

## 2025-03-14 DIAGNOSIS — E66.01 MORBID OBESITY: Primary | ICD-10-CM

## 2025-03-14 NOTE — PROGRESS NOTES
[facility]  Supervised Weight Loss     Date:   3/14/2025    Patient's Name: Charisse Shi  : 1990    Insurance:  [employer]          Session:   6  Surgery: Gastric Bypass                    Surgeon:  Parth Escalona M.D.      Height: 65\"                 Recent EMR Weight:    300      Lbs.                            BMI: 49              Pounds Lost since last month: 0               Pounds Gained since last month: 3#     Starting Weight: 305#                       Previous Month’s Weight: 297#  Overall Pounds Lost: 5#                   Overall Pounds Gained: 0     Other Pertinent Information: Today's appointment was completed over the phone with the use of an Khmer . Pt required to lose 15# prior to surgery approval. Today's wt was pulled from recent ER visit (3/12/25). Reports nausea/vomiting for the past few months and attributes to H. Pylori and diabetes medications/injections (Trulicity).     Smoking Status:  none  Alcohol Intake: none    I have reviewed with pt the guidelines of the supervised wt loss program.  Pt understands the expectations of some wt loss during the program and that wt gain could delay the process. I have also explained that appointments need to be consecutive and missing an appointment may result in starting over. Pt has received this information in writing. This appointment was complimentary/non-billable as part of the bariatric surgery program.         Changes that patient has made since last month include:  minimal eating or drinking stating everything is making her nausea/vomit.      Eating Habits and Behaviors  General healthy eating guidelines were discussed. A nutrition lesson was presented on portion control. Patients were instructed implement portion control now using the balanced plate method (1/2 plate non-starchy vegetables, 1/4 plate lean meat, and 1/4 plate whole grains and to include fruit and/or milk

## 2025-03-15 LAB
EKG ATRIAL RATE: 89 BPM
EKG DIAGNOSIS: NORMAL
EKG P AXIS: 52 DEGREES
EKG P-R INTERVAL: 164 MS
EKG Q-T INTERVAL: 380 MS
EKG QRS DURATION: 88 MS
EKG QTC CALCULATION (BAZETT): 462 MS
EKG R AXIS: 56 DEGREES
EKG T AXIS: 15 DEGREES
EKG VENTRICULAR RATE: 89 BPM

## 2025-03-24 ENCOUNTER — TELEPHONE (OUTPATIENT)
Age: 35
End: 2025-03-24

## 2025-03-24 NOTE — TELEPHONE ENCOUNTER
Attempted to contact pt to schedule In Person SWL NP Gita w/ [facility]. Unable to LVM, pts VM is full.

## 2025-03-27 ENCOUNTER — HOSPITAL ENCOUNTER (OUTPATIENT)
Facility: HOSPITAL | Age: 35
Discharge: HOME | End: 2025-03-30
Payer: COMMERCIAL

## 2025-03-27 DIAGNOSIS — Z00.00 ENCOUNTER FOR PREVENTIVE CARE: ICD-10-CM

## 2025-03-27 DIAGNOSIS — E53.8 VITAMIN B12 DEFICIENCY: ICD-10-CM

## 2025-03-27 DIAGNOSIS — E11.29 TYPE 2 DIABETES MELLITUS WITH DIABETIC MICROALBUMINURIA, UNSPECIFIED WHETHER LONG TERM INSULIN USE (HCC): ICD-10-CM

## 2025-03-27 DIAGNOSIS — E66.01 MORBID (SEVERE) OBESITY DUE TO EXCESS CALORIES: ICD-10-CM

## 2025-03-27 DIAGNOSIS — R06.83 SNORING: ICD-10-CM

## 2025-03-27 DIAGNOSIS — I10 CHRONIC HYPERTENSION: ICD-10-CM

## 2025-03-27 DIAGNOSIS — R80.9 TYPE 2 DIABETES MELLITUS WITH DIABETIC MICROALBUMINURIA, UNSPECIFIED WHETHER LONG TERM INSULIN USE (HCC): ICD-10-CM

## 2025-03-27 DIAGNOSIS — D64.9 ANEMIA, UNSPECIFIED TYPE: ICD-10-CM

## 2025-03-27 DIAGNOSIS — E55.9 VITAMIN D DEFICIENCY: ICD-10-CM

## 2025-03-27 PROCEDURE — 74240 X-RAY XM UPR GI TRC 1CNTRST: CPT

## 2025-04-03 ENCOUNTER — OFFICE VISIT (OUTPATIENT)
Age: 35
End: 2025-04-03
Payer: COMMERCIAL

## 2025-04-03 ENCOUNTER — CLINICAL DOCUMENTATION (OUTPATIENT)
Age: 35
End: 2025-04-03

## 2025-04-03 VITALS
OXYGEN SATURATION: 98 % | HEIGHT: 65 IN | HEART RATE: 89 BPM | WEIGHT: 289.3 LBS | DIASTOLIC BLOOD PRESSURE: 39 MMHG | TEMPERATURE: 97.5 F | BODY MASS INDEX: 48.2 KG/M2 | SYSTOLIC BLOOD PRESSURE: 78 MMHG

## 2025-04-03 DIAGNOSIS — G47.33 OSA (OBSTRUCTIVE SLEEP APNEA): Primary | ICD-10-CM

## 2025-04-03 PROCEDURE — 3078F DIAST BP <80 MM HG: CPT | Performed by: SPECIALIST

## 2025-04-03 PROCEDURE — 3074F SYST BP LT 130 MM HG: CPT | Performed by: SPECIALIST

## 2025-04-03 PROCEDURE — 99203 OFFICE O/P NEW LOW 30 MIN: CPT | Performed by: SPECIALIST

## 2025-04-03 ASSESSMENT — SLEEP AND FATIGUE QUESTIONNAIRES
HOW LIKELY ARE YOU TO NOD OFF OR FALL ASLEEP WHILE SITTING AND TALKING TO SOMEONE: WOULD NEVER DOZE
HOW LIKELY ARE YOU TO NOD OFF OR FALL ASLEEP WHILE SITTING QUIETLY AFTER LUNCH WITHOUT ALCOHOL: SLIGHT CHANCE OF DOZING
HOW LIKELY ARE YOU TO NOD OFF OR FALL ASLEEP WHEN YOU ARE A PASSENGER IN A CAR FOR AN HOUR WITHOUT A BREAK: SLIGHT CHANCE OF DOZING
HOW LIKELY ARE YOU TO NOD OFF OR FALL ASLEEP WHILE SITTING AND READING: SLIGHT CHANCE OF DOZING
HOW LIKELY ARE YOU TO NOD OFF OR FALL ASLEEP IN A CAR, WHILE STOPPED FOR A FEW MINUTES IN TRAFFIC: WOULD NEVER DOZE
HOW LIKELY ARE YOU TO NOD OFF OR FALL ASLEEP WHILE SITTING INACTIVE IN A PUBLIC PLACE: SLIGHT CHANCE OF DOZING
ESS TOTAL SCORE: 6
HOW LIKELY ARE YOU TO NOD OFF OR FALL ASLEEP WHILE WATCHING TV: SLIGHT CHANCE OF DOZING
HOW LIKELY ARE YOU TO NOD OFF OR FALL ASLEEP WHILE LYING DOWN TO REST IN THE AFTERNOON WHEN CIRCUMSTANCES PERMIT: SLIGHT CHANCE OF DOZING

## 2025-04-03 NOTE — PROGRESS NOTES
[facility] [address]  [facility] SLEEP DISORDERS CENTER [facility]  [address] [address] [address]  Dept: [phone]           [address]  [place] [address]  Tel.  [phone]  Fax. [phone] [address]  [place] [address]  Tel.  [phone]  Fax. [phone] [address] [place] [address]  Tel.  [phone]  Fax. [phone]     Bengali interpretive services: Daniela # [id number]   Chief Complaint       Chief Complaint   Patient presents with    Sleep Problem     NP refd. Kimmie Cardona, WILLEM-NP for Morbid obesity and MARTI consult       HPI      Charisse Shi is 34 y.o. female seen for evaluation of a sleep disorder.     The patient reports she has experienced snoring and apparent apnea.  Normally retires at midnight and will get a bed at 4 AM.  May awaken up to 4 times during the night.      Has been told of snoring described as loud.  Notes apparent apnea.  Notes vivid dreams/nightmares, waking with a gasp or snort,  often has headache on awakening.  Describes self as tired at that time.  Notes leg twitching/kicking, getting out of bed while still asleep.    Does not not fall asleep inappropriately during the day.    The patient has not undergone diagnostic testing for the current problems.             4/3/2025    11:57 AM   Sleep Medicine   Sitting and reading 1   Watching TV 1   Sitting, inactive in a public place (e.g. a theatre or a meeting) 1   As a passenger in a car for an hour without a break 1   Lying down to rest in the afternoon when circumstances permit 1   Sitting and talking to someone 0   Sitting quietly after a lunch without alcohol 1   In a car, while stopped for a few minutes in traffic 0   [place] Sleepiness Score 6   Neck (Inches) 15.5        Allergies   Allergen Reactions    Acetazolamide      Other reaction(s): Unknown (comments)  Rash arm and paresthesias.     Cortisone     Adhesive Tape Rash    Other  Right pneumothorax

## 2025-04-10 ENCOUNTER — TELEPHONE (OUTPATIENT)
Age: 35
End: 2025-04-10

## 2025-04-10 NOTE — TELEPHONE ENCOUNTER
Daniella from central scheduled called and states the patient is stating she needs a GI series done, but there are no orders. She is requesting we call patient to clarify.

## 2025-04-11 ENCOUNTER — OFFICE VISIT (OUTPATIENT)
Age: 35
End: 2025-04-11

## 2025-04-11 DIAGNOSIS — E66.01 MORBID OBESITY: Primary | ICD-10-CM

## 2025-04-11 NOTE — TELEPHONE ENCOUNTER
Called patient with assistance with central scheduling   Urdu  ID#[id number]    Two patient identifiers used for patient verification, name and .    Patient was adsvised that we did not call her and speak with her .  Patient stated that it was the scheduling department for her bariatric surgery she thinks.  She is going to look back at messages to confirm.    I discussed upcoming appointments with patient with .    Patient understood.

## 2025-04-11 NOTE — PROGRESS NOTES
[facility]  Supervised Weight Loss     Date:   2025    Patient's Name: Charisse Shi  : 1990    Insurance:  [employer]          Session: 5 of  6  Surgery: Gastric Bypass                    Surgeon:  Parth Escalona M.D.      Height: 65\"                 Recent EMR Weight:    289      Lbs.                            BMI: 48              Pounds Lost since last month: 11#               Pounds Gained since last month: 0#     Starting Weight: 305#                       Previous Month’s Weight: 300#  Overall Pounds Lost: 16#                   Overall Pounds Gained: 0     Other Pertinent Information: Today's appointment was completed over the phone with the use of an Yoruba . Pt required to lose 15# prior to surgery approval. Today's wt was pulled from recent ER visit (4/3/25). Wt loss has been mostly unintentional. Reports improvement in nausea/vomiting that was reported in previous sessions.     Smoking Status:  none  Alcohol Intake: none    I have reviewed with pt the guidelines of the supervised wt loss program.  Pt understands the expectations of some wt loss during the program and that wt gain could delay the process. I have also explained that appointments need to be consecutive and missing an appointment may result in starting over. Pt has received this information in writing. This appointment was complimentary/non-billable as part of the bariatric surgery program.         Changes that patient has made since last month include:  no lifestyle changes made.      Eating Habits and Behaviors  General healthy eating guidelines were also discussed. Pts were instructed that their plate should be made up 1/2 plate coming from non-starchy vegetables, 1/4 coming from lean meat, and 1/4 of their plate coming from carbohydrates, including fruits, starches, or milk. We discussed measuring meals to 1/2 cup total per meal after surgery. Drinking only

## 2025-04-14 ENCOUNTER — TELEPHONE (OUTPATIENT)
Age: 35
End: 2025-04-14

## 2025-04-14 NOTE — TELEPHONE ENCOUNTER
ID # [id number]    ATC 3x in a row, call went right to , unable to leave message due to  full.  I was trying to get a hold of patient to schedule endoscopy with Dr Escalona.     Sending a [employer] message

## 2025-04-17 ENCOUNTER — TELEPHONE (OUTPATIENT)
Age: 35
End: 2025-04-17

## 2025-04-17 ENCOUNTER — PREP FOR PROCEDURE (OUTPATIENT)
Age: 35
End: 2025-04-17

## 2025-04-17 DIAGNOSIS — Z98.84 BARIATRIC SURGERY STATUS: ICD-10-CM

## 2025-04-28 ENCOUNTER — CLINICAL DOCUMENTATION (OUTPATIENT)
Age: 35
End: 2025-04-28

## 2025-05-01 ENCOUNTER — OFFICE VISIT (OUTPATIENT)
Age: 35
End: 2025-05-01
Payer: COMMERCIAL

## 2025-05-01 VITALS
BODY MASS INDEX: 47.48 KG/M2 | HEIGHT: 65 IN | HEART RATE: 102 BPM | OXYGEN SATURATION: 92 % | SYSTOLIC BLOOD PRESSURE: 130 MMHG | DIASTOLIC BLOOD PRESSURE: 88 MMHG | WEIGHT: 285 LBS

## 2025-05-01 DIAGNOSIS — R06.02 SOB (SHORTNESS OF BREATH): Primary | ICD-10-CM

## 2025-05-01 DIAGNOSIS — R06.02 SHORTNESS OF BREATH: ICD-10-CM

## 2025-05-01 DIAGNOSIS — Z01.810 PREOP CARDIOVASCULAR EXAM: ICD-10-CM

## 2025-05-01 PROCEDURE — 3079F DIAST BP 80-89 MM HG: CPT | Performed by: SPECIALIST

## 2025-05-01 PROCEDURE — 99204 OFFICE O/P NEW MOD 45 MIN: CPT | Performed by: SPECIALIST

## 2025-05-01 PROCEDURE — 3075F SYST BP GE 130 - 139MM HG: CPT | Performed by: SPECIALIST

## 2025-05-01 RX ORDER — ACETAMINOPHEN 500 MG
500 TABLET ORAL EVERY 6 HOURS PRN
COMMUNITY

## 2025-05-01 NOTE — PROGRESS NOTES
Chief Complaint   Patient presents with    Hypertension    Other     OBESITY      Vitals:    05/01/25 1345   BP: 130/88   BP Site: Left Upper Arm   Patient Position: Sitting   BP Cuff Size: Large Adult   Pulse: (!) 102   SpO2: 92%   Weight: 129.3 kg (285 lb)   Height: 1.651 m (5' 5\")      /88 (BP Site: Left Upper Arm, Patient Position: Sitting, BP Cuff Size: Large Adult)   Pulse (!) 102   Ht 1.651 m (5' 5\")   Wt 129.3 kg (285 lb)   SpO2 92%   BMI 47.43 kg/m²

## 2025-05-01 NOTE — PROGRESS NOTES
Connie Pineda MD. [facility]          Patient: Charisse Shi  : 1990      Today's Date: 2025        HISTORY OF PRESENT ILLNESS:     History of Present Illness:    Asked to see for cardiac clearance prior to weight loss surgery.  She denies any heart problems.   Denies CP or sig SOB.  She feels fine.         PAST MEDICAL HISTORY:     Past Medical History:   Diagnosis Date    Cardiomegaly 2023    Essential hypertension     last two pregnancies    Type 2 diabetes mellitus with other specified complication (HCC) 5/15/2024    Uncomplicated asthma 2024       Past Surgical History:   Procedure Laterality Date     SECTION      x4    DILATION AND CURETTAGE OF UTERUS      OTHER SURGICAL HISTORY               CURRENT MEDICATIONS:    .  Current Outpatient Medications   Medication Sig Dispense Refill    acetaminophen (TYLENOL) 500 MG tablet Take 1 tablet by mouth every 6 hours as needed for Pain      ibuprofen (ADVIL;MOTRIN) 800 MG tablet Take 1 tablet by mouth every 8 hours as needed for Pain 30 tablet 0    lisinopril (PRINIVIL;ZESTRIL) 5 MG tablet Take 1 tablet by mouth daily 90 tablet 3    montelukast (SINGULAIR) 10 MG tablet Take 1 tablet by mouth nightly 90 tablet 3    venlafaxine (EFFEXOR) 37.5 MG tablet Take 1 tablet by mouth daily 90 tablet 3    traZODone (DESYREL) 50 MG tablet Take 1 tablet by mouth nightly as needed for Sleep 90 tablet 3    acyclovir (ZOVIRAX) 400 MG tablet Take 1 tablet by mouth every 4 hours (while awake)      topiramate (TOPAMAX) 50 MG tablet Take 1 tablet by mouth nightly 30 tablet 3    rizatriptan (MAXALT-MLT) 10 MG disintegrating tablet 1 tab at onset of migraine; repeat 1 tab in 2 hours if HA remains; Limit: 2 tabs in 24 hours, max 3 days/ week.  30 Day Rx 9 tablet 2    dulaglutide (TRULICITY) 1.5 MG/0.5ML SC injection Inject 0.5 mLs into the skin every 7 days 6 mL 1    acetaZOLAMIDE (DIAMOX) 250 MG tablet Take 1 tablet by mouth 2 times daily 180 tablet 1    propranolol

## 2025-05-01 NOTE — PATIENT INSTRUCTIONS
Patient Education        Learning About the Mediterranean Diet  What is the Mediterranean diet?     The Mediterranean diet is a style of eating rather than a diet plan. It features foods eaten in [place], [place], [place] and [place], and other countries along the [place] Sea. It emphasizes eating foods like fish, fruits, vegetables, beans, high-fiber breads and whole grains, nuts, and olive oil. This style of eating includes limited red meat, cheese, and sweets.  Why choose the Mediterranean diet?  A Mediterranean-style diet may improve heart health. It contains more fat than other heart-healthy diets. But the fats are mainly from nuts, unsaturated oils (such as fish oils and olive oil), and certain nut or seed oils (such as canola, soybean, or flaxseed oil). These fats may help protect the heart and blood vessels.  How can you get started on the Mediterranean diet?  Here are some things you can do to switch to a more Mediterranean way of eating.  What to eat  Eat a variety of fruits and vegetables each day, such as grapes, blueberries, tomatoes, broccoli, peppers, figs, olives, spinach, eggplant, beans, lentils, and chickpeas.  Eat a variety of whole-grain foods each day, such as oats, brown rice, and whole wheat bread, pasta, and couscous.  Eat fish at least 2 times a week. Try tuna, salmon, mackerel, lake trout, herring, or sardines.  Eat moderate amounts of low-fat dairy products, such as milk, cheese, or yogurt.  Eat moderate amounts of poultry and eggs.  Choose healthy (unsaturated) fats, such as nuts, olive oil, and certain nut or seed oils like canola, soybean, and flaxseed.  Limit unhealthy (saturated) fats, such as butter, palm oil, and coconut oil. And limit fats found in animal products, such as meat and dairy products made with whole milk. Try to eat red meat only a few times a month in very small amounts.  Limit sweets and desserts to only a few times a week. This includes sugar-sweetened

## 2025-05-02 ENCOUNTER — ANESTHESIA (OUTPATIENT)
Facility: HOSPITAL | Age: 35
End: 2025-05-02
Payer: COMMERCIAL

## 2025-05-02 ENCOUNTER — ANESTHESIA EVENT (OUTPATIENT)
Facility: HOSPITAL | Age: 35
End: 2025-05-02
Payer: COMMERCIAL

## 2025-05-02 ENCOUNTER — HOSPITAL ENCOUNTER (OUTPATIENT)
Facility: HOSPITAL | Age: 35
Setting detail: OUTPATIENT SURGERY
Discharge: HOME | End: 2025-05-02
Attending: SURGERY | Admitting: SURGERY
Payer: COMMERCIAL

## 2025-05-02 VITALS
OXYGEN SATURATION: 96 % | TEMPERATURE: 98 F | SYSTOLIC BLOOD PRESSURE: 142 MMHG | WEIGHT: 285 LBS | BODY MASS INDEX: 47.48 KG/M2 | HEIGHT: 65 IN | DIASTOLIC BLOOD PRESSURE: 70 MMHG | HEART RATE: 87 BPM | RESPIRATION RATE: 22 BRPM

## 2025-05-02 PROBLEM — Z98.84 BARIATRIC SURGERY STATUS: Status: RESOLVED | Noted: 2025-04-17 | Resolved: 2025-05-02

## 2025-05-02 PROBLEM — K21.9 GASTROESOPHAGEAL REFLUX DISEASE WITHOUT ESOPHAGITIS: Status: ACTIVE | Noted: 2025-05-02

## 2025-05-02 PROBLEM — A04.8 H. PYLORI INFECTION: Status: ACTIVE | Noted: 2025-05-02

## 2025-05-02 LAB — HCG UR QL: NEGATIVE

## 2025-05-02 PROCEDURE — 3700000000 HC ANESTHESIA ATTENDED CARE: Performed by: SURGERY

## 2025-05-02 PROCEDURE — 3600000012 HC SURGERY LEVEL 2 ADDTL 15MIN: Performed by: SURGERY

## 2025-05-02 PROCEDURE — 7100000001 HC PACU RECOVERY - ADDTL 15 MIN: Performed by: SURGERY

## 2025-05-02 PROCEDURE — 88342 IMHCHEM/IMCYTCHM 1ST ANTB: CPT

## 2025-05-02 PROCEDURE — 7100000000 HC PACU RECOVERY - FIRST 15 MIN: Performed by: SURGERY

## 2025-05-02 PROCEDURE — 2580000003 HC RX 258: Performed by: NURSE ANESTHETIST, CERTIFIED REGISTERED

## 2025-05-02 PROCEDURE — 88305 TISSUE EXAM BY PATHOLOGIST: CPT

## 2025-05-02 PROCEDURE — 3600000002 HC SURGERY LEVEL 2 BASE: Performed by: SURGERY

## 2025-05-02 PROCEDURE — 2709999900 HC NON-CHARGEABLE SUPPLY: Performed by: SURGERY

## 2025-05-02 PROCEDURE — 3700000001 HC ADD 15 MINUTES (ANESTHESIA): Performed by: SURGERY

## 2025-05-02 PROCEDURE — 81025 URINE PREGNANCY TEST: CPT

## 2025-05-02 PROCEDURE — 43239 EGD BIOPSY SINGLE/MULTIPLE: CPT | Performed by: SURGERY

## 2025-05-02 PROCEDURE — 6360000002 HC RX W HCPCS: Performed by: NURSE ANESTHETIST, CERTIFIED REGISTERED

## 2025-05-02 RX ORDER — LIDOCAINE HYDROCHLORIDE 20 MG/ML
INJECTION, SOLUTION EPIDURAL; INFILTRATION; INTRACAUDAL; PERINEURAL
Status: DISCONTINUED | OUTPATIENT
Start: 2025-05-02 | End: 2025-05-02 | Stop reason: SDUPTHER

## 2025-05-02 RX ORDER — SODIUM CHLORIDE, SODIUM LACTATE, POTASSIUM CHLORIDE, CALCIUM CHLORIDE 600; 310; 30; 20 MG/100ML; MG/100ML; MG/100ML; MG/100ML
INJECTION, SOLUTION INTRAVENOUS
Status: DISCONTINUED | OUTPATIENT
Start: 2025-05-02 | End: 2025-05-02 | Stop reason: SDUPTHER

## 2025-05-02 RX ORDER — GLYCOPYRROLATE 0.2 MG/ML
INJECTION, SOLUTION INTRAMUSCULAR; INTRAVENOUS
Status: DISCONTINUED | OUTPATIENT
Start: 2025-05-02 | End: 2025-05-02 | Stop reason: SDUPTHER

## 2025-05-02 RX ADMIN — LIDOCAINE HYDROCHLORIDE 100 MG: 20 INJECTION, SOLUTION EPIDURAL; INFILTRATION; INTRACAUDAL; PERINEURAL at 09:33

## 2025-05-02 RX ADMIN — GLYCOPYRROLATE 0.2 MG: 0.2 INJECTION, SOLUTION INTRAMUSCULAR; INTRAVENOUS at 09:28

## 2025-05-02 RX ADMIN — PROPOFOL 130 MG: 10 INJECTION, EMULSION INTRAVENOUS at 09:40

## 2025-05-02 RX ADMIN — SODIUM CHLORIDE, SODIUM LACTATE, POTASSIUM CHLORIDE, AND CALCIUM CHLORIDE: 600; 310; 30; 20 INJECTION, SOLUTION INTRAVENOUS at 09:23

## 2025-05-02 RX ADMIN — PROPOFOL 70 MG: 10 INJECTION, EMULSION INTRAVENOUS at 09:33

## 2025-05-02 ASSESSMENT — PAIN SCALES - GENERAL: PAINLEVEL_OUTOF10: 0

## 2025-05-02 NOTE — ANESTHESIA POSTPROCEDURE EVALUATION
Post-Anesthesia Evaluation and Assessment    Patient: Charisse Shi MRN: [id number]  SSN: [id number]    YOB: 1990  Age: 34 y.o.  Sex: female      I have evaluated the patient and they are stable and ready for discharge from the PACU.     Cardiovascular Function/Vital Signs  Visit Vitals  BP (!) 142/70   Pulse 87   Temp 98 °F (36.7 °C) (Oral)   Resp 22   Ht 1.651 m (5' 5\")   Wt 129.3 kg (285 lb)   SpO2 96%   BMI 47.43 kg/m²       Patient is status post Monitor Anesthesia Care anesthesia for Procedure(s):  ESOPHAGOGASTRODUODENOSCOPY.    Nausea/Vomiting: None    Postoperative hydration reviewed and adequate.    Pain:      Managed    Neurological Status:       At baseline    Mental Status, Level of Consciousness: Alert and  oriented to person, place, and time    Pulmonary Status:       Adequate oxygenation and airway patent    Complications related to anesthesia: None    Post-anesthesia assessment completed. No concerns    Signed By: Gary Fields MD     May 2, 2025

## 2025-05-02 NOTE — PERIOP NOTE
Dr. Escalona at bedside using the  services to discuss [procedure findings. Patient nauseous. MD aware. IV removed due to patient request. Daughter at bedside.

## 2025-05-02 NOTE — OP NOTE
[facility]   Endoscopic Procedure Note        NAME:  Charisse Shi   :   1990   MRN:   [id number]     Date/Time:  2025 9:43 AM    Esophagogastroduodenoscopy (EGD) Procedure Note    Preoperative Diagnosis: GERD, history of H. pylori infection  Postoperative Diagnosis: GERD, history of H. pylori infection, esophagitis       Surgeon:  Parth Escalona MD    Staff: Circulator: Sharifa Nicole RN  Scrub Person First: Radha Ellison  Circulator Assist: Donna Bateman RN  Perioperative Nurse: Irma Sanchez RN     Implants: None    Anethesia/Sedation:  MAC anesthesia Propofol    Procedure Details     After infom consent was obtained for the procedure, with all risks and benefits of procedure explained the patient was taken to the endoscopy suite and placed in the left lateral decubitus position.  Following sequential administration of sedation as per above, the GIF-H190 gastroscope was inserted into the mouth and advanced under direct vision to third portion of the duodenum.  A careful inspection was made as the gastroscope was withdrawn, including a retroflexed view of the proximal stomach; findings and interventions are described below.      Findings:  Esophagus: Grade a esophagitis, GEJ at 36 cm, no hiatal hernia  Stomach: Nodular mucosa within body of stomach, Hill Grade 1 valve  Duodenum/jejunum: Normal       Therapies: None    Specimens: Gastric mucosa; GE junction mucosa           EBL: Minimal    Complications:   None; patient tolerated the procedure well.           Impression:    See Postoperative diagnosis above    Recommendations:  Continue current medications; continue completion of steps for bariatric surgery.    Discharge disposition:  Home in the company of  when able to ambulate    Parth Escalona MD, [facility], [facility]  Bariatric and General Surgeon  [facility]      Electronically signed by Parth Escalona MD on 2025 at 9:42 AM

## 2025-05-02 NOTE — DISCHARGE INSTRUCTIONS
Discharge Instructions for Endoscopy Patients       Diagnosis: GERD, history of H. pylori infection      Plan: Continue preoperative requirements for bariatric surgery.      Do not drive or operate machinery while taking sedating or narcotic medications.     Some discomfort is expected in your throat or upper abdomen. Take tylenol as needed.    If an acid monitoring device was deployed, please follow the instructions for recording and returning the device.    You may walk as desired and go up and down stairs as needed. Walking is encouraged.    You may shower like normal    You may resume regular diet.    Follow up with provider as scheduled.    If you experience fever (greater than 101.5), chills, vomiting, please contact your surgeon’s office.    If you have further questions or concerns, please call your surgeon’s office at [phone].    Future Appointments   Date Time Provider Department Center   5/8/2025 11:00 AM Daniella De La Torre RD [facility] BS AMB   5/15/2025  1:20 PM Jose Antonio Medeiros MD NEUR[facility] BS AMB   5/16/2025 11:00 AM [facility] ECHO 1 [facility] AMB   5/20/2025  1:00 PM Kimmie Cardona, APRN - NP [facility] BS AMB   7/7/2025  9:00 AM Marleny Perez LPC [facility] BS AMB

## 2025-05-02 NOTE — H&P
Subjective:      Charisse Shi is a 34 y.o. female with morbid obesity and multiple medical problems to include diabetes mellitus and asthma.  She has started the preoperative process for bariatric surgery.  She notes reflux symptoms with recent identification of H. pylori infection.  She denies hematemesis or melena.  She underwent triple therapy 4 months ago.    Past Medical History:   Diagnosis Date    Cardiomegaly 2023    Essential hypertension     last two pregnancies    Type 2 diabetes mellitus with other specified complication ([facility]) 5/15/2024    Uncomplicated asthma 2024     Patient Active Problem List    Diagnosis Date Noted    Gastroesophageal reflux disease without esophagitis 2025    H. pylori infection 2025    Moderate persistent asthma without complication 2024    Tachycardia 2024    Type 2 diabetes mellitus with diabetic microalbuminuria ([facility]) 05/15/2024    Food insecurity 2024    Severe obesity (BMI >= 40) ([facility]) 2023    Cardiomegaly 2023    Idiopathic intracranial hypertension  Chronic Headaches 2022    IUD (intrauterine device) in place 2022    Chronic hypertension 2022    Other hyperlipidemia 2022     Past Surgical History:   Procedure Laterality Date     SECTION      x4    DILATION AND CURETTAGE OF UTERUS      OTHER SURGICAL HISTORY       Family History   Problem Relation Age of Onset    Hypertension Father      Social History     Socioeconomic History    Marital status:      Spouse name: None    Number of children: None    Years of education: None    Highest education level: None   Tobacco Use    Smoking status: Never    Smokeless tobacco: Never   Substance and Sexual Activity    Alcohol use: Not Currently    Drug use: Never   Social History Narrative         ** Merged History Encounter **     Social Drivers of Health     Financial Resource Strain: High Risk (3/20/2024)    Overall Financial Resource Strain  (CARDIA)     Difficulty of Paying Living Expenses: Very hard   Food Insecurity: No Food Insecurity (2/6/2025)    Hunger Vital Sign     Worried About Running Out of Food in the Last Year: Never true     Ran Out of Food in the Last Year: Never true   Transportation Needs: Unmet Transportation Needs (2/6/2025)    PRAPARE - Transportation     Lack of Transportation (Medical): Yes     Lack of Transportation (Non-Medical): Yes   Housing Stability: Low Risk  (2/6/2025)    Housing Stability Vital Sign     Unable to Pay for Housing in the Last Year: No     Number of Times Moved in the Last Year: 0     Homeless in the Last Year: No     No current facility-administered medications on file prior to encounter.     Current Outpatient Medications on File Prior to Encounter   Medication Sig Dispense Refill    lisinopril (PRINIVIL;ZESTRIL) 5 MG tablet Take 1 tablet by mouth daily 90 tablet 3    propranolol (INDERAL) 40 MG tablet Take 1 tablet by mouth 2 times daily 60 tablet 3    ibuprofen (ADVIL;MOTRIN) 800 MG tablet Take 1 tablet by mouth every 8 hours as needed for Pain 30 tablet 0    predniSONE 10 MG (21) TBPK Follow instructions in dose pack. (Patient not taking: Reported on 5/1/2025) 1 each 0    montelukast (SINGULAIR) 10 MG tablet Take 1 tablet by mouth nightly 90 tablet 3    venlafaxine (EFFEXOR) 37.5 MG tablet Take 1 tablet by mouth daily 90 tablet 3    traZODone (DESYREL) 50 MG tablet Take 1 tablet by mouth nightly as needed for Sleep 90 tablet 3    acyclovir (ZOVIRAX) 400 MG tablet Take 1 tablet by mouth every 4 hours (while awake)      topiramate (TOPAMAX) 50 MG tablet Take 1 tablet by mouth nightly 30 tablet 3    rizatriptan (MAXALT-MLT) 10 MG disintegrating tablet 1 tab at onset of migraine; repeat 1 tab in 2 hours if HA remains; Limit: 2 tabs in 24 hours, max 3 days/ week.  30 Day Rx 9 tablet 2    dulaglutide (TRULICITY) 1.5 MG/0.5ML SC injection Inject 0.5 mLs into the skin every 7 days 6 mL 1    acetaZOLAMIDE

## 2025-05-02 NOTE — ANESTHESIA PRE PROCEDURE
patient.      Plan discussed with CRNA.    Attending anesthesiologist reviewed and agrees with Preprocedure content                Gary Fields MD   5/2/2025

## 2025-05-03 ENCOUNTER — CLINICAL DOCUMENTATION (OUTPATIENT)
Age: 35
End: 2025-05-03

## 2025-05-03 DIAGNOSIS — G47.33 OSA (OBSTRUCTIVE SLEEP APNEA): Primary | ICD-10-CM

## 2025-05-03 NOTE — PROGRESS NOTES
WatchPAT HSAT performed for potential sleep disordered breathing.  8 hours 20 minutes recorded of which 7 hours 31 minutes of sleep; 27.8% of sleep in REM.  All sleep stages observed.  Patient slept supine, prone and lateral.    AHI 21.7/h (4% desaturation definition).  Minimal SaO2 83%.  Prominent snoring noted.    Impression: Sleep disordered breathing associated with arterial desaturation.    Recommendation: APAP 8-18 cm    Sleep technologist: Please advise patient of HSAT results.  Order has been entered for APAP 8-18 cm.  Please schedule compliance appointment.

## 2025-05-05 ENCOUNTER — TELEPHONE (OUTPATIENT)
Age: 35
End: 2025-05-05

## 2025-05-05 NOTE — TELEPHONE ENCOUNTER
Called patient, but they stated they couldn't talk at all about upcoming result review. Told patient call back when they get the chance.

## 2025-05-06 ENCOUNTER — OFFICE VISIT (OUTPATIENT)
Age: 35
End: 2025-05-06
Payer: COMMERCIAL

## 2025-05-06 ENCOUNTER — TELEPHONE (OUTPATIENT)
Age: 35
End: 2025-05-06

## 2025-05-06 VITALS
BODY MASS INDEX: 48.22 KG/M2 | HEIGHT: 65 IN | OXYGEN SATURATION: 95 % | TEMPERATURE: 99.1 F | WEIGHT: 289.4 LBS | HEART RATE: 85 BPM | RESPIRATION RATE: 16 BRPM | SYSTOLIC BLOOD PRESSURE: 122 MMHG | DIASTOLIC BLOOD PRESSURE: 85 MMHG

## 2025-05-06 DIAGNOSIS — E11.29 TYPE 2 DIABETES MELLITUS WITH DIABETIC MICROALBUMINURIA, UNSPECIFIED WHETHER LONG TERM INSULIN USE (HCC): ICD-10-CM

## 2025-05-06 DIAGNOSIS — R80.9 TYPE 2 DIABETES MELLITUS WITH DIABETIC MICROALBUMINURIA, UNSPECIFIED WHETHER LONG TERM INSULIN USE (HCC): ICD-10-CM

## 2025-05-06 DIAGNOSIS — A04.8 H. PYLORI INFECTION: Primary | ICD-10-CM

## 2025-05-06 DIAGNOSIS — B34.9 NONSPECIFIC SYNDROME SUGGESTIVE OF VIRAL ILLNESS: Primary | ICD-10-CM

## 2025-05-06 DIAGNOSIS — J45.40 MODERATE PERSISTENT ASTHMA WITHOUT COMPLICATION: ICD-10-CM

## 2025-05-06 PROCEDURE — 3074F SYST BP LT 130 MM HG: CPT | Performed by: FAMILY MEDICINE

## 2025-05-06 PROCEDURE — 99213 OFFICE O/P EST LOW 20 MIN: CPT

## 2025-05-06 PROCEDURE — 3079F DIAST BP 80-89 MM HG: CPT | Performed by: FAMILY MEDICINE

## 2025-05-06 RX ORDER — ALBUTEROL SULFATE 90 UG/1
2 INHALANT RESPIRATORY (INHALATION) 4 TIMES DAILY PRN
Qty: 18 G | Refills: 11 | Status: SHIPPED | OUTPATIENT
Start: 2025-05-06

## 2025-05-06 RX ORDER — FLUTICASONE PROPIONATE AND SALMETEROL XINAFOATE 45; 21 UG/1; UG/1
2 AEROSOL, METERED RESPIRATORY (INHALATION) 2 TIMES DAILY
Qty: 1 EACH | Refills: 11 | Status: SHIPPED | OUTPATIENT
Start: 2025-05-06

## 2025-05-06 RX ORDER — VONOPRAZAN FUMARATE, AMOXICILLIN AND CLARITHROMYCIN 20-500-500
KIT ORAL
Qty: 1 EACH | Refills: 0 | Status: SHIPPED | OUTPATIENT
Start: 2025-05-06

## 2025-05-06 NOTE — PROGRESS NOTES
[facility]   [facility]    Subjective:  CC:   Chief Complaint   Patient presents with    Dizziness     Patient is experiencing chest tightness, itchy eyes, high BP and High HR at 118. Patient has refused ED visit       HPI:  Charisse Shi is 34 y.o. female who presents for acute care    Chest and back pain with deep breathing, body aches, HA, dizzy, 1 episode NBNB emesis, cold, dry cough  Started upon waking at 0730 today  Denies fever, sick contacts  Has happened a couple times in the past when she was tired    Objective:    Vitals:    05/06/25 1829   BP: 122/85   Pulse: 85   Resp: 16   Temp: 99.1 °F (37.3 °C)   SpO2: 95%   Weight: 131.3 kg (289 lb 6.4 oz)   Height: 1.651 m (5' 5\")       Physical Exam  Constitutional:       General: She is not in acute distress.     Appearance: Normal appearance.   HENT:      Head: Normocephalic and atraumatic.      Right Ear: Tympanic membrane, ear canal and external ear normal.      Left Ear: Tympanic membrane, ear canal and external ear normal.      Mouth/Throat:      Mouth: Mucous membranes are moist.      Pharynx: No oropharyngeal exudate or posterior oropharyngeal erythema.   Eyes:      Extraocular Movements: Extraocular movements intact.      Conjunctiva/sclera: Conjunctivae normal.   Cardiovascular:      Rate and Rhythm: Normal rate and regular rhythm.      Pulses: Normal pulses.      Heart sounds: Normal heart sounds.   Pulmonary:      Effort: Pulmonary effort is normal.      Breath sounds: Normal breath sounds.   Abdominal:      General: Abdomen is flat. Bowel sounds are normal.      Palpations: Abdomen is soft.   Musculoskeletal:         General: Normal range of motion.      Cervical back: Normal range of motion and neck supple.      Comments: Reproducible ttp over L chest wall   Lymphadenopathy:      Cervical: No cervical adenopathy.   Skin:     General: Skin is warm and dry.      Capillary Refill: Capillary refill takes less

## 2025-05-06 NOTE — PROGRESS NOTES
Session Code 69725 / : Has#[id number]   Department Code: [phone]     Charisse Shi is a 34 y.o. female    Chief Complaint   Patient presents with    Dizziness     Patient is experiencing chest tightness, itchy eyes, high BP and High HR at 118. Patient has refused ED visit       \"Have you been to the ER, urgent care clinic since your last visit?  Hospitalized since your last visit?\"    NO    “Have you seen or consulted any other health care providers outside of [facility] since your last visit?”    NO              Vitals:    05/06/25 1829   Resp: 16          No data to display              Health Maintenance Due   Topic Date Due    Diabetic foot exam  Never done    Varicella vaccine (1 of 2 - 13+ 2-dose series) Never done    Diabetic retinal exam  Never done    Hepatitis B vaccine (1 of 3 - 19+ 3-dose series) Never done    Pneumococcal 0-49 years Vaccine (1 of 2 - PCV) Never done    COVID-19 Vaccine (1 - 2024-25 season) Never done

## 2025-05-06 NOTE — TELEPHONE ENCOUNTER
Sawyer Fitzpatrick,    Patient current blood pressure is 164/92, heart rate 118, experiencing headaches and dizziness.

## 2025-05-06 NOTE — TELEPHONE ENCOUNTER
Received transferred call with patient's daughter, Isai, on the line to discuss patient's current symptoms.     Daughter not listed on ASAEL and informed of so. This nurse offered to connect call with Bulgarian . Daughter accepted.    Call Back Number: [phone]

## 2025-05-06 NOTE — TELEPHONE ENCOUNTER
Session Code 51830/ Slovak : Dayanna #[id number]  Department Code: [phone]     Spoke with Charisse Shi (self) who identified self with two patient identifiers (name and ).    On Release of Information Form? N/A, self    Patient Active Problem List   Diagnosis    Chronic hypertension    Other hyperlipidemia    Idiopathic intracranial hypertension  Chronic Headaches    IUD (intrauterine device) in place    Cardiomegaly    Severe obesity (BMI >= 40) (HCC)    Food insecurity    Type 2 diabetes mellitus with diabetic microalbuminuria (HCC)    Moderate persistent asthma without complication    Tachycardia    Gastroesophageal reflux disease without esophagitis    H. pylori infection       Past Surgical History:   Procedure Laterality Date     SECTION      x4    DILATION AND CURETTAGE OF UTERUS      OTHER SURGICAL HISTORY      UPPER GASTROINTESTINAL ENDOSCOPY N/A 2025    ESOPHAGOGASTRODUODENOSCOPY performed by Parth Escalona MD at [facility] AMBULATORY OR       Allergies:  Allergies   Allergen Reactions    Acetazolamide      Other reaction(s): Unknown (comments)  Rash arm and paresthesias.     Cortisone     Adhesive Tape Rash    Other Rash     Pt. States does not remember the name of the shot but had a reaction.       Chief Complaint/Subjective: Headache  Went to the pharmacy to check blood pressure due to feeling:  Dizzy  Feverish  Tired  Currently at the pharmacy   Blood Pressure 164/92  Heart Rate 118    Onset: 25     Current Symptoms:   \"Feverish in the head\"  Headache  Dizzy  Itchy eyes  Abdominal Pain  Cramping  No appetite  No food since yesterday  Diarrhea  No blood noted  Not feeling well    Associated Symptoms:   \"Chest heavy when taking a breath\"   (inhale and exhale)       Yes No Comment(s)   Mentality Changes [] [x]    SOB [x] [] Inhalation and Exhalation    Dyspnea [] [x]    Chest Pain [x] [] \"Feels heavy\"   Palpitations [] [x]    Tachycardia [x] [] Reported: 118   Headache [x] []

## 2025-05-08 ENCOUNTER — OFFICE VISIT (OUTPATIENT)
Age: 35
End: 2025-05-08

## 2025-05-08 DIAGNOSIS — E66.01 MORBID OBESITY (HCC): Primary | ICD-10-CM

## 2025-05-08 NOTE — PROGRESS NOTES
[facility] at [facility]  Supervised Weight Loss     Date:   2025    Patient's Name: Charisse Shi  : 1990    Insurance:  [employer]          Session:   Surgery: Gastric Bypass                    Surgeon:  Parth Escalona M.D.      Height: 65\"                 Recent EMR Weight:    289      Lbs.                            BMI: 48              Pounds Lost since last month: 0#               Pounds Gained since last month: 0#     Starting Weight: 305#                         Previous Month’s Weight: 289#  Overall Pounds Lost: 16#                   Overall Pounds Gained: 0     Other Pertinent Information: Today's appointment was completed over the phone with the use of an Frisian . Pt required to lose 15# prior to surgery approval. Today's wt was pulled from recent ER visit (25). Wt loss has been mostly unintentional.    Smoking Status:  none  Alcohol Intake: none    I have reviewed with pt the guidelines of the supervised wt loss program.  Pt understands the expectations of some wt loss during the program and that wt gain could delay the process. I have also explained that appointments need to be consecutive and missing an appointment may result in starting over. Pt has received this information in writing. This appointment was complimentary/non-billable as part of the bariatric surgery program.         Changes that patient has made since last month include:  trying to eat 3 meals a day      Eating Habits and Behaviors  A nutrition lesson was presented on label reading with specific guidelines provided for limiting added sugars. This information will help increase healthy food choices, promote weight loss and prevent dumping syndrome after gastric bypass. We also reviewed the general nutrition guidelines for bariatric surgery.                        Patient's current diet habits include: Pt is eating 3 meals per day. Breakfast is reported as

## 2025-05-09 ENCOUNTER — OFFICE VISIT (OUTPATIENT)
Age: 35
End: 2025-05-09

## 2025-05-09 DIAGNOSIS — Z78.9 NEEDS ASSISTANCE WITH COMMUNITY RESOURCES: Primary | ICD-10-CM

## 2025-05-09 NOTE — PROGRESS NOTES
Resource visit with SW Navigator.     utilized today, Yi. Patient reports needing assistance with transportation services for upcoming medical appointments.     Transportation set up - contacted [employer] at [phone] during visit.     The following transportation/rides scheduled:    Appt: 5/15/25 - [facility] [place]   12:55 pm  Return: 2:50 pm  Ref#: [id number]    Appt: 5/16/25 - [facility]  : 10:40 am  Return: 12:30 pm  Ref#: [id number]    Appt 5/20/25: [facility]  : 12:25 pm  Return: 2:15 pm  Ref#: [id number]    Appt 5/23/25: [facility]  : 10:00 am  Return: 11:30 am  Ref#: [id number]    Neuro Ophthalmology appointment needed - no response by [facility] for appointment. Recommended [facility] as an option with Amaury Long. Referral needed per [facility].    Patient requested assistance with a bill from 2024 - not insured at the time but may be covered by [facility] FA program. Patient advised that copy of bill is needed for SW to check into it. Patient to bring bill at next visit. Advised patient to leave a copy of bill with Dr. Parisi of staff as SW is scheduled to be out of office.    Plan:  Ongoing psychosocial support and resource referral, as desired by the patient and family.      RONAL Oreilly   Navigator

## 2025-05-12 ENCOUNTER — TELEPHONE (OUTPATIENT)
Age: 35
End: 2025-05-12

## 2025-05-12 NOTE — ROUTINE PROCESS
Bedside and verbal shift change report given to oncoming nurse, as assigned, by offgoing nurse, Kimberley Baker RN. Report included SBAR, Kardex, I&Os, Recent Results, Procedures, MAR, and changes in patient status. Oncoming nurse and patient given opportunity for questions. [Left] : left shoulder [Standing] : standing [Minimal] : minimal [] : negative drop-arm test [TWNoteComboBox7] : active forward flexion 170 degrees [de-identified] : active abduction 165 degrees [TWNoteComboBox6] : internal rotation 70 degrees [de-identified] : external rotation 75 degrees

## 2025-05-12 NOTE — TELEPHONE ENCOUNTER
Fax received from [facility] for clearance.    Procedure: Bariatric surgery  Dr. Escalona    Fax to: [phone]  Ph: [phone]  Office contact Mehrdad NEGRETE CMA    Last appt: 5/1/2025    Future Appointments   Date Time Provider Department Center   5/15/2025  1:20 PM Jose Antonio Medeiros MD NEUROWRSPPBB BS AMB   5/16/2025 11:00 AM [facility] ECHO 1 CAV[facility] BS AMB   5/20/2025  1:00 PM Kimmie Cardona, APRN - NP [facility] BS AMB   5/23/2025 10:20 AM Marisol Parisi DO [facility] BS ECC DEP   6/5/2025 11:00 AM Daniella De La Torre RD [facility] BS AMB   7/7/2025  9:00 AM Marleny Perez LPC [facility] BS AMB   9/4/2025  3:40 PM Rafa Beaulieu MD [facility] BS AMB

## 2025-05-14 ENCOUNTER — TELEPHONE (OUTPATIENT)
Age: 35
End: 2025-05-14

## 2025-05-14 NOTE — PROGRESS NOTES
Neurology Progress Note    Patient ID:  Charisse Shi  [id number]  34 y.o.  1990      Subjective:   History:  Charisse Shi is a female [place] who  has morbid obesity, Essential hypertension and gastric bypass who for the past 15 yrs, noted headache, bitemporal going to the back, daily, described as \"boiling water\", 10/10, constant, (+) nausea (+) vomiting (+) photophobia (+) phonophobia (+) blurred vision. Gets worse when bending over. Was seen by a headache doctor in [place]. Thought it was sinus allergies placed on unrecalled meds. Saw optometrist in Aug 2022- who said she just needs glasses but they are not clear if her optic nerve was examined. Placed on Diamox 125 mg BID 1 month ago with no benefit. Developed some paresthesia. (+) weight gain (+) not sleeping well - only 5 hrs. Last time, patient was placed on Topamax 25 mg QHS, but only for 1 month. Patient is a poor historian and does not know why she is not on it anymore (although in the EPIC it was discontinued by her PCP in Nov 2024). Also takes Imitrex but not helping. Patient also not taking Diamox. Patient does not know why.      Since last time, patient was placed on Topamax 50 mg QHS with no benefit. Still getting daily headache. Has not tried Maxalt. Sumatriptan does not help.     Has not seen the [facility] neurophthalmology and just waiting for PCP to call them to make her an appointment.    (+) 20 lbs weight loss.      ROS:  Per HPI-  Otherwise the remainder of ROS was negative    Social Hx  Social History     Socioeconomic History    Marital status:    Tobacco Use    Smoking status: Never    Smokeless tobacco: Never   Substance and Sexual Activity    Alcohol use: Not Currently    Drug use: Never   Social History Narrative         ** Merged History Encounter **     Social Drivers of Health     Financial Resource Strain: High Risk (3/20/2024)    Overall Financial Resource Strain (CARDIA)     Difficulty of Paying Living Expenses: Very hard   Food

## 2025-05-15 ENCOUNTER — OFFICE VISIT (OUTPATIENT)
Age: 35
End: 2025-05-15
Payer: COMMERCIAL

## 2025-05-15 ENCOUNTER — TELEPHONE (OUTPATIENT)
Age: 35
End: 2025-05-15

## 2025-05-15 VITALS
SYSTOLIC BLOOD PRESSURE: 120 MMHG | HEART RATE: 90 BPM | BODY MASS INDEX: 48.16 KG/M2 | TEMPERATURE: 96 F | OXYGEN SATURATION: 97 % | DIASTOLIC BLOOD PRESSURE: 70 MMHG | HEIGHT: 65 IN

## 2025-05-15 DIAGNOSIS — G93.2 IDIOPATHIC INTRACRANIAL HYPERTENSION: Primary | ICD-10-CM

## 2025-05-15 DIAGNOSIS — R51.9 INTRACTABLE HEADACHE, UNSPECIFIED CHRONICITY PATTERN, UNSPECIFIED HEADACHE TYPE: Primary | ICD-10-CM

## 2025-05-15 PROCEDURE — 99214 OFFICE O/P EST MOD 30 MIN: CPT | Performed by: PSYCHIATRY & NEUROLOGY

## 2025-05-15 PROCEDURE — 3078F DIAST BP <80 MM HG: CPT | Performed by: PSYCHIATRY & NEUROLOGY

## 2025-05-15 PROCEDURE — 3074F SYST BP LT 130 MM HG: CPT | Performed by: PSYCHIATRY & NEUROLOGY

## 2025-05-15 RX ORDER — TOPIRAMATE 100 MG/1
100 TABLET, FILM COATED ORAL
Qty: 30 TABLET | Refills: 5 | Status: SHIPPED | OUTPATIENT
Start: 2025-05-15

## 2025-05-15 RX ORDER — RIZATRIPTAN BENZOATE 10 MG/1
TABLET, ORALLY DISINTEGRATING ORAL
Qty: 9 TABLET | Refills: 2 | Status: SHIPPED | OUTPATIENT
Start: 2025-05-15

## 2025-05-15 NOTE — TELEPHONE ENCOUNTER
----- Message from Minda BERTRAND sent at 5/14/2025  2:04 PM EDT -----  Regarding: RE: Neuro-Ophth Referral  Hi Dr. Parisi, I spoke with patient today and she does not recall a [facility] referral or being informed that she needed to bring an . She states today that she will have someone come with her to the appointment that speaks English. Please place referral so that I can follow-up with [facility].    Thank you.  Minda  ----- Message -----  From: Marisol Parisi DO  Sent: 5/12/2025   4:09 PM EDT  To: Minda Chacon  Subject: RE: Neuro-Ophth Referral                         Thanks for working on that. I am happy to place referral, though I did send her there before and because she didn't have  with her they couldn't see her. They said that they didn't have  services available, which I didn't think was allowed?  ----- Message -----  From: Minda Chacon  Sent: 5/9/2025   2:22 PM EDT  To: Marisol Parisi DO  Subject: Neuro-Ophth Referral                             Hi Dr. Parisi,  I saw Ms. Shi today and she states she has yet to receive any information or appointment for the neuro-ophthalmologist. I did see the information on file - referral to [facility]. I have called previously and left message for them regarding need for appointment. I did again today but did not feel assured that someone would call.    I checked into [facility] as an option as they take her insurance. They will need a new referral. Can you send it for Dr. Amaury Long.    Thank you.  Minda

## 2025-05-15 NOTE — TELEPHONE ENCOUNTER
I have placed referral, that would be amazing if you were able to get her in! I had called [facility] in the past, and they told me she had to have an in person  with her. That was a couple of years ago though now. At that time, she did not have anyone who could go with her. Her  works and also has somewhat limited English. If you can route this message back to me with date/time, I can pull necessary notes and imaging to send over to their office. Thanks!

## 2025-05-16 ENCOUNTER — ANCILLARY PROCEDURE (OUTPATIENT)
Age: 35
End: 2025-05-16
Payer: COMMERCIAL

## 2025-05-16 VITALS
HEART RATE: 83 BPM | SYSTOLIC BLOOD PRESSURE: 120 MMHG | WEIGHT: 289 LBS | DIASTOLIC BLOOD PRESSURE: 80 MMHG | HEIGHT: 65 IN | BODY MASS INDEX: 48.15 KG/M2

## 2025-05-16 DIAGNOSIS — Z01.810 PREOP CARDIOVASCULAR EXAM: ICD-10-CM

## 2025-05-16 DIAGNOSIS — R06.02 SOB (SHORTNESS OF BREATH): ICD-10-CM

## 2025-05-16 DIAGNOSIS — R06.02 SHORTNESS OF BREATH: ICD-10-CM

## 2025-05-16 LAB
ECHO AO ROOT DIAM: 2.7 CM
ECHO AO ROOT INDEX: 1.17 CM/M2
ECHO AV MEAN GRADIENT: 8 MMHG
ECHO AV MEAN VELOCITY: 1.4 M/S
ECHO AV PEAK GRADIENT: 15 MMHG
ECHO AV PEAK VELOCITY: 2 M/S
ECHO AV VELOCITY RATIO: 0.45
ECHO AV VTI: 35.7 CM
ECHO BSA: 2.45 M2
ECHO EST RA PRESSURE: 3 MMHG
ECHO LA DIAMETER INDEX: 1.86 CM/M2
ECHO LA DIAMETER: 4.3 CM
ECHO LA TO AORTIC ROOT RATIO: 1.59
ECHO LA VOL A-L A2C: 48 ML (ref 22–52)
ECHO LA VOL A-L A4C: 49 ML (ref 22–52)
ECHO LA VOL BP: 46 ML (ref 22–52)
ECHO LA VOL MOD A2C: 44 ML (ref 22–52)
ECHO LA VOL MOD A4C: 46 ML (ref 22–52)
ECHO LA VOL/BSA BIPLANE: 20 ML/M2 (ref 16–34)
ECHO LA VOLUME AREA LENGTH: 49 ML
ECHO LA VOLUME INDEX A-L A2C: 21 ML/M2 (ref 16–34)
ECHO LA VOLUME INDEX A-L A4C: 21 ML/M2 (ref 16–34)
ECHO LA VOLUME INDEX AREA LENGTH: 21 ML/M2 (ref 16–34)
ECHO LA VOLUME INDEX MOD A2C: 19 ML/M2 (ref 16–34)
ECHO LA VOLUME INDEX MOD A4C: 20 ML/M2 (ref 16–34)
ECHO LV E' LATERAL VELOCITY: 16.02 CM/S
ECHO LV E' SEPTAL VELOCITY: 11.01 CM/S
ECHO LV EDV A2C: 96 ML
ECHO LV EDV A4C: 98 ML
ECHO LV EDV BP: 98 ML (ref 56–104)
ECHO LV EDV INDEX A4C: 42 ML/M2
ECHO LV EDV INDEX BP: 42 ML/M2
ECHO LV EDV NDEX A2C: 42 ML/M2
ECHO LV EF PHYSICIAN: 60 %
ECHO LV EJECTION FRACTION A2C: 60 %
ECHO LV EJECTION FRACTION A4C: 64 %
ECHO LV ESV A2C: 38 ML
ECHO LV ESV A4C: 35 ML
ECHO LV ESV BP: 37 ML (ref 19–49)
ECHO LV ESV INDEX A2C: 16 ML/M2
ECHO LV ESV INDEX A4C: 15 ML/M2
ECHO LV ESV INDEX BP: 16 ML/M2
ECHO LV FRACTIONAL SHORTENING: 31 % (ref 28–44)
ECHO LV INTERNAL DIMENSION DIASTOLE INDEX: 2.51 CM/M2
ECHO LV INTERNAL DIMENSION DIASTOLIC: 5.8 CM (ref 3.9–5.3)
ECHO LV INTERNAL DIMENSION SYSTOLIC INDEX: 1.73 CM/M2
ECHO LV INTERNAL DIMENSION SYSTOLIC: 4 CM
ECHO LV IVSD: 0.9 CM (ref 0.6–0.9)
ECHO LV MASS 2D: 189.3 G (ref 67–162)
ECHO LV MASS INDEX 2D: 81.9 G/M2 (ref 43–95)
ECHO LV POSTERIOR WALL DIASTOLIC: 0.8 CM (ref 0.6–0.9)
ECHO LV RELATIVE WALL THICKNESS RATIO: 0.28
ECHO LVOT AV VTI INDEX: 0.51
ECHO LVOT MEAN GRADIENT: 2 MMHG
ECHO LVOT PEAK GRADIENT: 3 MMHG
ECHO LVOT PEAK VELOCITY: 0.9 M/S
ECHO LVOT VTI: 18.3 CM
ECHO MV A VELOCITY: 0.84 M/S
ECHO MV AREA PHT: 4.2 CM2
ECHO MV E DECELERATION TIME (DT): 179 MS
ECHO MV E VELOCITY: 1.04 M/S
ECHO MV E/A RATIO: 1.24
ECHO MV E/E' LATERAL: 6.49
ECHO MV E/E' RATIO (AVERAGED): 7.97
ECHO MV E/E' SEPTAL: 9.45
ECHO MV PRESSURE HALF TIME (PHT): 51.9 MS
ECHO RV FREE WALL PEAK S': 15.2 CM/S
ECHO RV TAPSE: 2.6 CM (ref 1.7–?)

## 2025-05-16 PROCEDURE — 93306 TTE W/DOPPLER COMPLETE: CPT | Performed by: SPECIALIST

## 2025-05-16 NOTE — TELEPHONE ENCOUNTER
Per Dr. Connie Pineda: \"  Echo looks OK - I'll read it later.  She can proceed with surgery and should have intermediate cardiac risk   \"  Dr. Escalona  [phone]

## 2025-05-19 ENCOUNTER — RESULTS FOLLOW-UP (OUTPATIENT)
Age: 35
End: 2025-05-19

## 2025-05-20 ENCOUNTER — OFFICE VISIT (OUTPATIENT)
Age: 35
End: 2025-05-20
Payer: COMMERCIAL

## 2025-05-20 VITALS
OXYGEN SATURATION: 98 % | SYSTOLIC BLOOD PRESSURE: 131 MMHG | RESPIRATION RATE: 19 BRPM | TEMPERATURE: 98.7 F | WEIGHT: 285.2 LBS | HEART RATE: 99 BPM | DIASTOLIC BLOOD PRESSURE: 88 MMHG | HEIGHT: 65 IN | BODY MASS INDEX: 47.52 KG/M2

## 2025-05-20 DIAGNOSIS — E66.01 MORBID (SEVERE) OBESITY DUE TO EXCESS CALORIES (HCC): Primary | ICD-10-CM

## 2025-05-20 DIAGNOSIS — R80.9 TYPE 2 DIABETES MELLITUS WITH DIABETIC MICROALBUMINURIA, UNSPECIFIED WHETHER LONG TERM INSULIN USE (HCC): ICD-10-CM

## 2025-05-20 DIAGNOSIS — I10 CHRONIC HYPERTENSION: ICD-10-CM

## 2025-05-20 DIAGNOSIS — A04.8 H. PYLORI INFECTION: ICD-10-CM

## 2025-05-20 DIAGNOSIS — E11.29 TYPE 2 DIABETES MELLITUS WITH DIABETIC MICROALBUMINURIA, UNSPECIFIED WHETHER LONG TERM INSULIN USE (HCC): ICD-10-CM

## 2025-05-20 PROCEDURE — 3079F DIAST BP 80-89 MM HG: CPT | Performed by: NURSE PRACTITIONER

## 2025-05-20 PROCEDURE — 99214 OFFICE O/P EST MOD 30 MIN: CPT | Performed by: NURSE PRACTITIONER

## 2025-05-20 PROCEDURE — 3075F SYST BP GE 130 - 139MM HG: CPT | Performed by: NURSE PRACTITIONER

## 2025-05-20 RX ORDER — VONOPRAZAN FUMARATE, AMOXICILLIN AND CLARITHROMYCIN 20-500-500
KIT ORAL
Qty: 1 EACH | Refills: 0 | Status: SHIPPED | OUTPATIENT
Start: 2025-05-20

## 2025-05-20 ASSESSMENT — ENCOUNTER SYMPTOMS
SHORTNESS OF BREATH: 1
CHEST TIGHTNESS: 0
VOMITING: 1
NAUSEA: 1
ABDOMINAL PAIN: 0
DIARRHEA: 1

## 2025-05-20 ASSESSMENT — PATIENT HEALTH QUESTIONNAIRE - PHQ9
2. FEELING DOWN, DEPRESSED OR HOPELESS: NOT AT ALL
SUM OF ALL RESPONSES TO PHQ QUESTIONS 1-9: 0
1. LITTLE INTEREST OR PLEASURE IN DOING THINGS: NOT AT ALL

## 2025-05-20 NOTE — PROGRESS NOTES
Madhuri # [id number]    Identified patient with two patient identifiers (name and ). Reviewed chart in preparation for visit and have obtained necessary documentation.    Charisse Shi is a 34 y.o. female  Chief Complaint   Patient presents with    Follow-up     [facility]      /88   Pulse 99   Temp 98.7 °F (37.1 °C)   Ht 1.651 m (5' 5\")   Wt 129.4 kg (285 lb 3.2 oz)   LMP 2025   SpO2 98%   BMI 47.46 kg/m²     1. Have you been to the ER, urgent care clinic since your last visit?  Hospitalized since your last visit?Yes     2. Have you seen or consulted any other health care providers outside of the [facility] since your last visit?  Include any pap smears or colon screening. no

## 2025-05-20 NOTE — PROGRESS NOTES
Chief Complaint   Patient presents with    Follow-up     [facility]        Charisse Shi 1990 presents today for presurgical bariatric evaluation in preparation for:     Procedure Gastric bypass  Surgeon Dr. Parth Escalona    Last Weight Metrics:      5/20/2025     1:04 PM 5/16/2025    11:03 AM 5/15/2025     1:27 PM 5/6/2025     6:29 PM 5/2/2025     8:45 AM 5/1/2025     1:45 PM 4/3/2025    11:55 AM   Weight Loss Metrics   Height 5' 5\" 5' 5\" 5' 5\" 5' 5\" 5' 5\" 5' 5\" 5' 5\"   Weight - Scale 285 lbs 3 oz 289 lbs  289 lbs 6 oz 285 lbs 285 lbs 289 lbs 5 oz   BMI (Calculated) 47.6 kg/m2 48.2 kg/m2  48.3 kg/m2 47.5 kg/m2 47.5 kg/m2 48.2 kg/m2       [x] Bariatric comorbidities present: hypertension and insulin dependent diabetes    [x] Ambulatory status: independent      Breakfast- bread, cheese  Lunch- rice, fish, chicken and salad  Dinner- light meal. Protein shakes. Drinking core power protein shakes.   Snacking- orange, apple.   Liquids- tea, coffee, water    She is still having diarrhea. If food does not agree with her, she will vomiting. She is having trouble tolerating vegetables- this causes her to vomiting.    Lives at home with  and kids. She has 7 kids.   Not working- legs hurt  She is using food stamps to get protein shakes but sometimes cannot afford them because she needs to get milk for the kids.   Wants to get vitamins covered by insurance.   [x] The patient's reported level of exercise: not active. Tries to move around at home.   Exercise: encouraged to perform at least 30 mins of at least moderate-intensity physical activity on 5 or more days a week. The activity can be undertaken in one session or several lasting 10 mins or more. Use of a wearable fitness device may help meet activity goals and was recommended.     [x] Nutrient screening with iron studies, B12, folic acid, and 25-vitamin D   Iron  45  B12/Folate   384    D   16.4  [] Sleep apnea screening  [] Sleep Medicine

## 2025-05-23 ENCOUNTER — OFFICE VISIT (OUTPATIENT)
Age: 35
End: 2025-05-23
Payer: COMMERCIAL

## 2025-05-23 VITALS
DIASTOLIC BLOOD PRESSURE: 92 MMHG | HEIGHT: 65 IN | WEIGHT: 289 LBS | BODY MASS INDEX: 48.15 KG/M2 | TEMPERATURE: 98.5 F | RESPIRATION RATE: 18 BRPM | SYSTOLIC BLOOD PRESSURE: 138 MMHG | HEART RATE: 85 BPM | OXYGEN SATURATION: 96 %

## 2025-05-23 DIAGNOSIS — L30.9 DERMATITIS: Primary | ICD-10-CM

## 2025-05-23 DIAGNOSIS — G93.2 IDIOPATHIC INTRACRANIAL HYPERTENSION: ICD-10-CM

## 2025-05-23 DIAGNOSIS — E11.29 TYPE 2 DIABETES MELLITUS WITH DIABETIC MICROALBUMINURIA, UNSPECIFIED WHETHER LONG TERM INSULIN USE (HCC): ICD-10-CM

## 2025-05-23 DIAGNOSIS — R80.9 TYPE 2 DIABETES MELLITUS WITH DIABETIC MICROALBUMINURIA, UNSPECIFIED WHETHER LONG TERM INSULIN USE (HCC): ICD-10-CM

## 2025-05-23 PROCEDURE — 99214 OFFICE O/P EST MOD 30 MIN: CPT | Performed by: FAMILY MEDICINE

## 2025-05-23 PROCEDURE — 3075F SYST BP GE 130 - 139MM HG: CPT | Performed by: FAMILY MEDICINE

## 2025-05-23 PROCEDURE — 3080F DIAST BP >= 90 MM HG: CPT | Performed by: FAMILY MEDICINE

## 2025-05-23 RX ORDER — TRIAMCINOLONE ACETONIDE 0.25 MG/G
OINTMENT TOPICAL
Qty: 80 G | Refills: 1 | Status: SHIPPED | OUTPATIENT
Start: 2025-05-23 | End: 2025-05-30

## 2025-05-23 NOTE — PROGRESS NOTES
Charisse Shi is a 34 y.o. female    Identified pt with two pt identifiers(name and ). Reviewed record in preparation for visit and have obtained necessary documentation.    Chief Complaint   Patient presents with    Follow-up Chronic Condition     Patient is coming in for a follow up on chronic condition. Patient has a rash on her stomach since week ago. She state sit is really itchy. Patient does not have her phone so she was unable to confirm medications. No other concerns.        \"Have you been to the ER, urgent care clinic since your last visit?  Hospitalized since your last visit?\"    NO    “Have you seen or consulted any other health care providers outside of [facility] since your last visit?”    No              Vitals:    25 1016   BP: (!) 138/92   BP Site: Right Upper Arm   Patient Position: Sitting   BP Cuff Size: Large Adult   Pulse: 85   Resp: 18   Temp: 98.5 °F (36.9 °C)   TempSrc: Oral   SpO2: 96%   Weight: 131.1 kg (289 lb)   Height: 1.651 m (5' 5\")            Health Maintenance Due   Topic Date Due    Diabetic foot exam  Never done    Varicella vaccine (1 of 2 - 13+ 2-dose series) Never done    Diabetic retinal exam  Never done    Hepatitis B vaccine (1 of 3 - 19+ 3-dose series) Never done    Pneumococcal 0-49 years Vaccine (1 of 2 - PCV) Never done    COVID-19 Vaccine ( season) Never done       Click Here for Release of Records Request     Medication Reconciliation completed, changes noted.  Please  Update medication list.    
Ariel Parisi DO, MSPH  [facility]

## 2025-06-05 ENCOUNTER — OFFICE VISIT (OUTPATIENT)
Age: 35
End: 2025-06-05

## 2025-06-05 DIAGNOSIS — E66.01 MORBID OBESITY (HCC): Primary | ICD-10-CM

## 2025-06-05 NOTE — PROGRESS NOTES
[facility] at [facility]  Supervised Weight Loss     Date:   2025    Patient's Name: Charisse Shi  : 1990    Insurance:  [employer]          Session:   Surgery: Gastric Bypass                    Surgeon:  Parth Escalona M.D.      Height: 65\"                 Reported Weight:    288      Lbs.                            BMI: 47              Pounds Lost since last month: 1#               Pounds Gained since last month: 0#     Starting Weight: 305#                          Previous Month’s Weight: 289#  Overall Pounds Lost: 17#                   Overall Pounds Gained: 0     Other Pertinent Information: Today's appointment was completed over the phone with the use of an Sami . Pt required to lose 15# prior to surgery approval. Today's wt was self-reported using home scale.     Smoking Status:  none  Alcohol Intake: none    I have reviewed with pt the guidelines of the supervised wt loss program.  Pt understands the expectations of some wt loss during the program and that wt gain could delay the process. I have also explained that classes need to be consecutive.  Missing a class may result in starting over. Pt has received this information in writing. This appointment was complimentary/non-billable as part of the bariatric surgery program.         Changes that patient has made since last month include:  purchased protein shakes (Core Power).      Eating Habits and Behaviors  General healthy eating guidelines were discussed. A nutrition lesson specific to the importance of protein intake after surgery was provided. We discussed food sources of protein, protein supplements and multiple reasons as to why protein is important after bariatric surgery.  Pts were instructed to focus on including protein at every meal and practice eating protein first at the meal. Pts were encouraged to sample a protein shake for tolerance. Patients were also instructed to

## 2025-06-06 ENCOUNTER — TELEPHONE (OUTPATIENT)
Age: 35
End: 2025-06-06

## 2025-06-06 NOTE — TELEPHONE ENCOUNTER
2 pt identifiers used. Spoke w/ [facility] pharmacist [place] who confirmed PA is needed for Voquezna medication, and they sent request to us on 6/3/25.    Complete PA request via covermymeds. Will follow up w/ insurance response.

## 2025-06-16 NOTE — DISCHARGE SUMMARY
Patient arrived via EMS sent by Dr. Ferrara for dialysis. Patient had left leg amputation last Thursday.   medications. CXR NAP. No hypoxia or oxygen requirement.  - PO prednisone burst 40mg daily x 5 days on discharge  - Continue home advair-refilled at discharge  - Continue home albuterol PRN-refilled at discharge  - Continue home singulair 10mg nightly- refilled at discharge  - Continue home duo-nebs PRN-refilled at discharge    Tachycardia: Improved after fluids bolus. Likely 2/2 COVID infection and albuterol. EKG with sinus tachycardia. TSH wnl. Lactate wnl.   - follow up OP at hospital follow-up with PCP     HTN: Chronic.   - Continue home acetazolamide 250mg BID, which pt also takes for IIH     T2DM: Chronic. POA A1C 6.7%. No home meds.   - A1C pending at time of discharge  - follow up with PCP     Anxiety: Chronic  - Continue home trazodone 50mg nightly prn-refilled at discharge  - Continue home effexor 75mg nightly-refilled at discharge     IIH: Chronic  - Continue home acetazolamide 250mg BID-refilled at discharge     Obesity: BMI is 53.32  -Encouraging lifestyle modifications and further follow up outpatient.     Condition at discharge: Stable.    Labs  Recent Labs     08/29/24 1924 08/30/24  0403   WBC 5.6 6.2   HGB 12.1 12.5   HCT 39.7 40.9    284     Recent Labs     08/29/24 1924 08/30/24  0403    136   K 3.6 3.8    107   CO2 31 23   BUN 12 15     Recent Labs     08/29/24 1924   ALT 20   GLOB 4.2*     No results for input(s): \"PH\", \"PCO2\", \"PO2\", \"TNIPOC\", \"INR\", \"APTT\", \"TIBC\", \"GLUCPOC\" in the last 72 hours.    Invalid input(s): \"TROIQ\", \"PTP\", \"FE\", \"PSAT\", \"FERR\", \"GLPOC\"    Micro:  No components found for: \"CULT\"    Imaging:  XR CHEST (2 VW)    Result Date: 8/29/2024  INDICATION:  chest pain Exam: Chest 2 views. Comparison: 5/31/2024. Findings: Cardiomediastinal silhouette is normal. Pulmonary vasculature is not engorged. No focal parenchymal opacities, effusions, or pneumothorax. Bony thorax is intact.     1. No acute cardiopulmonary disease Electronically signed by Juventino

## 2025-06-25 ENCOUNTER — TELEPHONE (OUTPATIENT)
Age: 35
End: 2025-06-25

## 2025-06-25 NOTE — CONSULTS
Session ID: [id number]  Session Duration: 6 minutes  Language: Japanese   ID: #[id number]   Name: Rosalba

## 2025-06-25 NOTE — TELEPHONE ENCOUNTER
2 pt identifiers used. Spoke w/ rep Petrona OLSON from [employer] regarding appeal status for Voquezna medication. Per rep appeal is still pending for review. Rep advises to have pt contact member services on back of ins card to provide verbal consent for the appeal request, or send signed consent to [email or web address]. their fax # [phone]. ETA for resolve is 30 days from date of submission, including urgent requests.

## 2025-06-25 NOTE — TELEPHONE ENCOUNTER
Called and spoke w/ pt @ 3:10pm. 2 pt identifiers used. Informed that Appeal for Voquezna medication is still pending for resolve, will follow up w/ pt once response from insurance is received. Per the  pt stated \"ok, that is fine\". Pt made aware insurance requires pt's consent for the appeal, otherwise request will be denied. Confirmed pt has our contact # for any questions or concerns. Pt verbalized understanding and thanked for the call.

## 2025-06-27 ENCOUNTER — OFFICE VISIT (OUTPATIENT)
Age: 35
End: 2025-06-27

## 2025-06-27 DIAGNOSIS — Z78.9 NEEDS ASSISTANCE WITH COMMUNITY RESOURCES: Primary | ICD-10-CM

## 2025-06-27 NOTE — PROGRESS NOTES
Resource visit with JULIANO Navigator.      utilized today, Sami. Patient reports needing assistance with transportation services for upcoming medical appointments.      Transportation set up - contacted Vern at [phone] during visit.      The following transportation/rides scheduled:     Appt: 7/7/25 9AM; [facility]  :  8:25 am  Return: 10:30 am   Ref#: [id number]     Appt: 7/18/25 11:20AM; [facility] Cardona  : 10:45 am  Return: 12:45 pm  Ref#: [id number]    Patient inquired about assistance with medical bills (some of which were left with physician at last visit and new ones). SW advised patient that she will need to bring those for review.  Bill Counseling appointment scheduled.     Plan:  Ongoing psychosocial support and resource referral, as desired by the patient and family.      RONAL Oreilly   Navigator

## 2025-07-07 ENCOUNTER — OFFICE VISIT (OUTPATIENT)
Age: 35
End: 2025-07-07

## 2025-07-07 DIAGNOSIS — E66.01 MORBID OBESITY (HCC): ICD-10-CM

## 2025-07-07 DIAGNOSIS — F43.9 TRAUMA AND STRESSOR-RELATED DISORDER: ICD-10-CM

## 2025-07-07 DIAGNOSIS — F43.21 ADJUSTMENT DISORDER WITH DEPRESSED MOOD: Primary | ICD-10-CM

## 2025-07-07 DIAGNOSIS — F43.81 PROLONGED GRIEF DISORDER: ICD-10-CM

## 2025-07-07 DIAGNOSIS — F50.9 EATING DISORDER, UNSPECIFIED TYPE: ICD-10-CM

## 2025-07-07 NOTE — PROGRESS NOTES
pattern of doing some snacking in the day and eating a large dinner at night before she goes to sleep.  She stated that she still struggles with eating dinner later in the evening, but feels she's eating smaller portions since she's been taking the Mounjaro injection.  She expressed that her weight has gone up and down over the years.  She endorsed using food to cope with her emotions.  She has really been struggling emotionally since the loss of her father who  five months ago and endorsed feeling very depressed since he passed away.  He was living in [place] at the time (where Mrs. Shi is from originally) and she had not had any communication with him for a week and stated she hadn't seen him for six years.  She expressed that she still cries multiple times per day over the loss of her father.    Previous Attempts to Lose Weight include the following:  []  Structured Diets (e.g. Weight Watchers)     ?  At-home Diets (e.g. calorie counting, etc.)  ?  Working with dietician or physician             []  Exercise  ?  Prescription Medications for weight loss    []  OTC Medications for weight loss  []  Starving/Purging/Over-Exercising (or other unhealthy weight loss attempts)    Patient reported being unable to keep the weight off for the following reasons:  []  Too restrictive  []  Frustrated with slow results []  Bored with plan  ?  Inconsistency ?  Injury or Illness   ?  Emotional Eating  []  Other:    Patients are informed that some of the same barriers to prior weight loss may still exist following surgery.  Personalized recommendations are provided to overcome hurdles after surgery, and patients are encouraged to utilize a dietitian and/or psychologist/therapist if they notice that they are beginning to have difficulty adhering to the program following surgery.  Interested patients are given a recommended reading list and encouraged to use a blank journal to begin food journaling so that they can more

## 2025-07-09 ENCOUNTER — APPOINTMENT (OUTPATIENT)
Dept: VASCULAR SURGERY | Facility: HOSPITAL | Age: 35
End: 2025-07-09
Attending: EMERGENCY MEDICINE
Payer: COMMERCIAL

## 2025-07-09 ENCOUNTER — APPOINTMENT (OUTPATIENT)
Facility: HOSPITAL | Age: 35
End: 2025-07-09
Payer: COMMERCIAL

## 2025-07-09 ENCOUNTER — HOSPITAL ENCOUNTER (EMERGENCY)
Facility: HOSPITAL | Age: 35
Discharge: HOME | End: 2025-07-09
Attending: EMERGENCY MEDICINE
Payer: COMMERCIAL

## 2025-07-09 VITALS
RESPIRATION RATE: 21 BRPM | HEART RATE: 89 BPM | WEIGHT: 292.33 LBS | OXYGEN SATURATION: 97 % | HEIGHT: 65 IN | DIASTOLIC BLOOD PRESSURE: 91 MMHG | BODY MASS INDEX: 48.71 KG/M2 | SYSTOLIC BLOOD PRESSURE: 164 MMHG | TEMPERATURE: 98 F

## 2025-07-09 DIAGNOSIS — R51.9 ACUTE NONINTRACTABLE HEADACHE, UNSPECIFIED HEADACHE TYPE: ICD-10-CM

## 2025-07-09 DIAGNOSIS — M79.605 LEFT LEG PAIN: ICD-10-CM

## 2025-07-09 DIAGNOSIS — R07.9 CHEST PAIN, UNSPECIFIED TYPE: Primary | ICD-10-CM

## 2025-07-09 LAB
ALBUMIN SERPL-MCNC: 3.4 G/DL (ref 3.5–5)
ALBUMIN/GLOB SERPL: 0.8 (ref 1.1–2.2)
ALP SERPL-CCNC: 55 U/L (ref 45–117)
ALT SERPL-CCNC: 18 U/L (ref 12–78)
ANION GAP SERPL CALC-SCNC: 5 MMOL/L (ref 2–12)
AST SERPL-CCNC: 12 U/L (ref 15–37)
BASOPHILS # BLD: 0.04 K/UL (ref 0–0.1)
BASOPHILS NFR BLD: 0.7 % (ref 0–1)
BILIRUB SERPL-MCNC: 0.2 MG/DL (ref 0.2–1)
BUN SERPL-MCNC: 12 MG/DL (ref 6–20)
BUN/CREAT SERPL: 17 (ref 12–20)
CALCIUM SERPL-MCNC: 9.6 MG/DL (ref 8.5–10.1)
CHLORIDE SERPL-SCNC: 104 MMOL/L (ref 97–108)
CO2 SERPL-SCNC: 30 MMOL/L (ref 21–32)
CREAT SERPL-MCNC: 0.72 MG/DL (ref 0.55–1.02)
D DIMER PPP FEU-MCNC: 0.21 MG/L FEU (ref 0–0.65)
DIFFERENTIAL METHOD BLD: ABNORMAL
EOSINOPHIL # BLD: 0.14 K/UL (ref 0–0.4)
EOSINOPHIL NFR BLD: 2.4 % (ref 0–7)
ERYTHROCYTE [DISTWIDTH] IN BLOOD BY AUTOMATED COUNT: 14.9 % (ref 11.5–14.5)
GLOBULIN SER CALC-MCNC: 4.3 G/DL (ref 2–4)
GLUCOSE SERPL-MCNC: 108 MG/DL (ref 65–100)
HCT VFR BLD AUTO: 37.4 % (ref 35–47)
HGB BLD-MCNC: 11.2 G/DL (ref 11.5–16)
IMM GRANULOCYTES # BLD AUTO: 0.02 K/UL (ref 0–0.04)
IMM GRANULOCYTES NFR BLD AUTO: 0.3 % (ref 0–0.5)
LYMPHOCYTES # BLD: 2.32 K/UL (ref 0.8–3.5)
LYMPHOCYTES NFR BLD: 39.9 % (ref 12–49)
MCH RBC QN AUTO: 22.3 PG (ref 26–34)
MCHC RBC AUTO-ENTMCNC: 29.9 G/DL (ref 30–36.5)
MCV RBC AUTO: 74.4 FL (ref 80–99)
MONOCYTES # BLD: 0.53 K/UL (ref 0–1)
MONOCYTES NFR BLD: 9.1 % (ref 5–13)
NEUTS SEG # BLD: 2.77 K/UL (ref 1.8–8)
NEUTS SEG NFR BLD: 47.6 % (ref 32–75)
NRBC # BLD: 0 K/UL (ref 0–0.01)
NRBC BLD-RTO: 0 PER 100 WBC
PLATELET # BLD AUTO: 259 K/UL (ref 150–400)
PMV BLD AUTO: 10.8 FL (ref 8.9–12.9)
POTASSIUM SERPL-SCNC: 3.7 MMOL/L (ref 3.5–5.1)
PROT SERPL-MCNC: 7.7 G/DL (ref 6.4–8.2)
RBC # BLD AUTO: 5.03 M/UL (ref 3.8–5.2)
SODIUM SERPL-SCNC: 139 MMOL/L (ref 136–145)
TROPONIN I SERPL HS-MCNC: 4 NG/L (ref 0–51)
WBC # BLD AUTO: 5.8 K/UL (ref 3.6–11)

## 2025-07-09 PROCEDURE — 93971 EXTREMITY STUDY: CPT

## 2025-07-09 PROCEDURE — 99285 EMERGENCY DEPT VISIT HI MDM: CPT

## 2025-07-09 PROCEDURE — 6360000002 HC RX W HCPCS: Performed by: EMERGENCY MEDICINE

## 2025-07-09 PROCEDURE — 71046 X-RAY EXAM CHEST 2 VIEWS: CPT

## 2025-07-09 PROCEDURE — 96375 TX/PRO/DX INJ NEW DRUG ADDON: CPT

## 2025-07-09 PROCEDURE — 85025 COMPLETE CBC W/AUTO DIFF WBC: CPT

## 2025-07-09 PROCEDURE — 96374 THER/PROPH/DIAG INJ IV PUSH: CPT

## 2025-07-09 PROCEDURE — 93005 ELECTROCARDIOGRAM TRACING: CPT | Performed by: INTERNAL MEDICINE

## 2025-07-09 PROCEDURE — 2580000003 HC RX 258: Performed by: EMERGENCY MEDICINE

## 2025-07-09 PROCEDURE — 36415 COLL VENOUS BLD VENIPUNCTURE: CPT

## 2025-07-09 PROCEDURE — 85379 FIBRIN DEGRADATION QUANT: CPT

## 2025-07-09 PROCEDURE — 84484 ASSAY OF TROPONIN QUANT: CPT

## 2025-07-09 PROCEDURE — 80053 COMPREHEN METABOLIC PANEL: CPT

## 2025-07-09 RX ORDER — LIDOCAINE 4 G/G
1 PATCH TOPICAL DAILY
Qty: 30 PATCH | Refills: 0 | Status: SHIPPED | OUTPATIENT
Start: 2025-07-09 | End: 2025-08-08

## 2025-07-09 RX ORDER — NAPROXEN 500 MG/1
500 TABLET ORAL 2 TIMES DAILY
Qty: 20 TABLET | Refills: 0 | Status: SHIPPED | OUTPATIENT
Start: 2025-07-09 | End: 2025-07-09 | Stop reason: SINTOL

## 2025-07-09 RX ORDER — 0.9 % SODIUM CHLORIDE 0.9 %
1000 INTRAVENOUS SOLUTION INTRAVENOUS ONCE
Status: COMPLETED | OUTPATIENT
Start: 2025-07-09 | End: 2025-07-09

## 2025-07-09 RX ORDER — METOCLOPRAMIDE HYDROCHLORIDE 5 MG/ML
10 INJECTION INTRAMUSCULAR; INTRAVENOUS ONCE
Status: COMPLETED | OUTPATIENT
Start: 2025-07-09 | End: 2025-07-09

## 2025-07-09 RX ORDER — DIPHENHYDRAMINE HYDROCHLORIDE 50 MG/ML
25 INJECTION, SOLUTION INTRAMUSCULAR; INTRAVENOUS
Status: COMPLETED | OUTPATIENT
Start: 2025-07-09 | End: 2025-07-09

## 2025-07-09 RX ORDER — NAPROXEN 500 MG/1
500 TABLET ORAL 2 TIMES DAILY
Qty: 20 TABLET | Refills: 0 | Status: SHIPPED | OUTPATIENT
Start: 2025-07-09

## 2025-07-09 RX ADMIN — METOCLOPRAMIDE 10 MG: 5 INJECTION, SOLUTION INTRAMUSCULAR; INTRAVENOUS at 21:54

## 2025-07-09 RX ADMIN — SODIUM CHLORIDE 1000 ML: 0.9 INJECTION, SOLUTION INTRAVENOUS at 21:54

## 2025-07-09 RX ADMIN — DIPHENHYDRAMINE HYDROCHLORIDE 25 MG: 50 INJECTION INTRAMUSCULAR; INTRAVENOUS at 21:54

## 2025-07-09 ASSESSMENT — PAIN - FUNCTIONAL ASSESSMENT: PAIN_FUNCTIONAL_ASSESSMENT: 0-10

## 2025-07-09 ASSESSMENT — PAIN DESCRIPTION - ORIENTATION: ORIENTATION: LEFT

## 2025-07-09 ASSESSMENT — PAIN DESCRIPTION - DESCRIPTORS: DESCRIPTORS: DISCOMFORT

## 2025-07-09 ASSESSMENT — PAIN DESCRIPTION - LOCATION: LOCATION: CHEST;HEAD

## 2025-07-10 LAB
ECHO BSA: 2.47 M2
EKG ATRIAL RATE: 89 BPM
EKG DIAGNOSIS: NORMAL
EKG P AXIS: 40 DEGREES
EKG P-R INTERVAL: 174 MS
EKG Q-T INTERVAL: 362 MS
EKG QRS DURATION: 90 MS
EKG QTC CALCULATION (BAZETT): 440 MS
EKG R AXIS: 28 DEGREES
EKG T AXIS: 2 DEGREES
EKG VENTRICULAR RATE: 89 BPM

## 2025-07-10 PROCEDURE — 93010 ELECTROCARDIOGRAM REPORT: CPT | Performed by: SPECIALIST

## 2025-07-10 NOTE — CONSULTS
Session ID: [id number]  Session Duration: 17 minutes  Language: Amharic   ID: #[id number]   Name: Miladis

## 2025-07-10 NOTE — ED PROVIDER NOTES
[facility] EMERGENCY DEPARTMENT  EMERGENCY DEPARTMENT ENCOUNTER      Pt Name: Charisse Shi  MRN: [id number]  Birthdate 1990  Date of evaluation: 2025  Provider: Shawn Mukherjee MD      HISTORY OF PRESENT ILLNESS      34-year-old female history of cardiomegaly, hypertension, type II Beatties, obesity, asthma presents to the emergency department chief complaint of chest pain, headache, left leg pain.  Symptoms for the last 4 or 5 days.  No fever but she does have chills.  No cough.  She is undergoing evaluation for potential bariatric surgery.  She reports history of migraines but this headache seems different.  She reports burning pain in her head as well as her chest.  Pain is somewhat worse with deep breathing.  No shortness of breath.  Also notes some pain behind her left knee.  No history of DVT or PE.    The history is provided by the patient and medical records.           Nursing Notes were reviewed.    REVIEW OF SYSTEMS         Review of Systems        PAST MEDICAL HISTORY     Past Medical History:   Diagnosis Date   • Cardiomegaly 2023   • Essential hypertension     last two pregnancies   • Type 2 diabetes mellitus with other specified complication (HCC) 5/15/2024   • Uncomplicated asthma 2024         SURGICAL HISTORY       Past Surgical History:   Procedure Laterality Date   •  SECTION      x4   • DILATION AND CURETTAGE OF UTERUS     • INTRAUTERINE DEVICE INSERTION     • OTHER SURGICAL HISTORY     • UPPER GASTROINTESTINAL ENDOSCOPY N/A 2025    ESOPHAGOGASTRODUODENOSCOPY performed by Parth Escalona MD at [facility] AMBUL[facility]ORY [facility]         CURRENT MEDICATIONS       Previous Medications    ACETAMINOPHEN (TYLENOL) 500 MG TABLET    Take 1 tablet by mouth every 6 hours as needed for Pain    ACETAZOLAMIDE (DIAMOX) 250 MG TABLET    Take 1 tablet by mouth 2 times daily    ACYCLOVIR (ZOVIRAX) 400 MG TABLET    Take 1 tablet by mouth every 4 hours (while awake)

## 2025-07-10 NOTE — ED TRIAGE NOTES
Patient ambulatory with cane to Triage with c/o left hip pain, chest pain and dizziness that started Saturday morning    Reports that the left hip has been hurting her since July but has increasingly gotten worse    Was seen at [facility] to get further evaluation     Hx DM, HTN, Arthritis

## 2025-07-18 ENCOUNTER — OFFICE VISIT (OUTPATIENT)
Age: 35
End: 2025-07-18

## 2025-07-18 ENCOUNTER — TELEPHONE (OUTPATIENT)
Age: 35
End: 2025-07-18

## 2025-07-18 VITALS
TEMPERATURE: 98.6 F | OXYGEN SATURATION: 98 % | HEIGHT: 65 IN | SYSTOLIC BLOOD PRESSURE: 105 MMHG | HEART RATE: 76 BPM | RESPIRATION RATE: 17 BRPM | WEIGHT: 288.6 LBS | BODY MASS INDEX: 48.08 KG/M2 | DIASTOLIC BLOOD PRESSURE: 61 MMHG

## 2025-07-18 DIAGNOSIS — E11.29 TYPE 2 DIABETES MELLITUS WITH DIABETIC MICROALBUMINURIA, UNSPECIFIED WHETHER LONG TERM INSULIN USE (HCC): ICD-10-CM

## 2025-07-18 DIAGNOSIS — A04.8 H. PYLORI INFECTION: ICD-10-CM

## 2025-07-18 DIAGNOSIS — R80.9 TYPE 2 DIABETES MELLITUS WITH DIABETIC MICROALBUMINURIA, UNSPECIFIED WHETHER LONG TERM INSULIN USE (HCC): ICD-10-CM

## 2025-07-18 DIAGNOSIS — E66.01 MORBID (SEVERE) OBESITY DUE TO EXCESS CALORIES (HCC): ICD-10-CM

## 2025-07-18 RX ORDER — CLARITHROMYCIN 500 MG/1
500 TABLET ORAL 2 TIMES DAILY
Qty: 28 TABLET | Refills: 0 | Status: SHIPPED | OUTPATIENT
Start: 2025-07-18 | End: 2025-08-01

## 2025-07-18 RX ORDER — AMOXICILLIN 500 MG/1
1000 CAPSULE ORAL 2 TIMES DAILY
Qty: 28 CAPSULE | Refills: 0 | Status: SHIPPED | OUTPATIENT
Start: 2025-07-18 | End: 2025-07-25

## 2025-07-18 RX ORDER — OMEPRAZOLE 20 MG/1
20 CAPSULE, DELAYED RELEASE ORAL 2 TIMES DAILY
Qty: 28 CAPSULE | Refills: 0 | Status: SHIPPED | OUTPATIENT
Start: 2025-07-18

## 2025-07-18 ASSESSMENT — ENCOUNTER SYMPTOMS
NAUSEA: 0
VOMITING: 1
DIARRHEA: 1
ABDOMINAL PAIN: 1

## 2025-07-18 ASSESSMENT — PATIENT HEALTH QUESTIONNAIRE - PHQ9
SUM OF ALL RESPONSES TO PHQ QUESTIONS 1-9: 0
SUM OF ALL RESPONSES TO PHQ QUESTIONS 1-9: 0
1. LITTLE INTEREST OR PLEASURE IN DOING THINGS: NOT AT ALL
2. FEELING DOWN, DEPRESSED OR HOPELESS: NOT AT ALL
SUM OF ALL RESPONSES TO PHQ QUESTIONS 1-9: 0
SUM OF ALL RESPONSES TO PHQ QUESTIONS 1-9: 0

## 2025-07-18 NOTE — PROGRESS NOTES
Identified patient with two patient identifiers (name and ). Reviewed chart in preparation for visit and have obtained necessary documentation.    Charisse Shi is a 34 y.o. female  Chief Complaint   Patient presents with    Follow-up     From [facility] [place] Office Visit     /61 (BP Site: Left Upper Arm, Patient Position: Sitting, BP Cuff Size: Large Adult)   Pulse 76   Temp 98.6 °F (37 °C) (Oral)   Resp 17   Ht 1.651 m (5' 5\")   Wt 130.9 kg (288 lb 9.6 oz)   SpO2 98%   BMI 48.03 kg/m²     1. Have you been to the ER, urgent care clinic since your last visit?  Hospitalized since your last visit? Yes- [facility] ED for chest pain and leg pain on 25    2. Have you seen or consulted any other health care providers outside of the [facility] since your last visit?  Include any pap smears or colon screening. yes - psychiatrist   
monitored to avoid complication and ensure successful, sustained weight control. They will be placed on a lifelong low carbohydrate and low sugar diet, exercise, and vitamin regimen and will require frequent blood draws and office visits to ensure adequate nutrition and program compliance. Visits and follow up will be in compliance with the guidelines set forth by MBSAQIP. I have specifically mentioned the need to avoid all personal and second-hand tobacco exposure, systemic steroids, and NSAIDS after any bariatric surgery to help avoid the above listed complications. The patient has expressed understanding of the above and would like to continue with the program. The above results were reviewed in detail with Charisse Shi.     Bariatric surgery preparation recommendations:     [x] Continue clinical nutrition counseling and education   [] Presurgical weight reduction patient has lost 19 pounds.  She states that she is following with the dietitian has recommended.  She understands that she will need to be able to afford protein shakes and bariatric vitamins postoperatively.  As well as being compliant with vitamins daily.  Still some concerns with gastric bypass being the appropriate surgery for patient.  Especially with possibility of noncompliance with vitamins  [x] Psychological counseling as recommended -she has had her first psychological visit with Marleny and has another one scheduled for August.  [x] Continue with lifestyle changes  [x] Voquneza was not approved for treatment for H. pylori.  Will send triple therapy to pharmacy to treat for H. pylori.  Patient will need to be retested after completion.  [x] Refer to patient selection committee -will discuss once completed last psychological evaluation.    All questions from the patient have been answered and they have demonstrated appropriate understanding of the process.  30 minutes spent in face to face with Charisse Shi > 50% counseling.

## 2025-07-18 NOTE — TELEPHONE ENCOUNTER
2 pt identifiers used. Spoke w/ rep Migdalia BROOKE from [facility] to confirm appeal resolve for Voquezna. Rep stated appeal was denied on 6/9/25 as trial of preferred drug was required. PA [id number].

## 2025-07-18 NOTE — CONSULTS
Session ID: [id number]  Session Duration: Longer than 54 minutes  Language: Danish   ID: #[id number]   Name: Adriana: Danish   ID: #[id number]   Name: Anjum

## 2025-07-22 ENCOUNTER — TELEPHONE (OUTPATIENT)
Age: 35
End: 2025-07-22

## 2025-07-22 NOTE — TELEPHONE ENCOUNTER
SW attempted to reach patient by phone to assist with transportation scheduling for 7/25/25 visit at [facility]. Unable to reach patient; however, transportation with [employer] scheduled as follow:    Appointment at [facility]: 7/25/25 at 3:00 PM  Pick-up time (home): 2:40 pm   Return pick-up time (clinic): 3:45 pm  Confirmation#: [id number]    Left message for patient using language line (Belarusian) advising that transportation for upcoming visit set up.    RONAL Oreilly   Navigator

## 2025-07-24 ENCOUNTER — TELEPHONE (OUTPATIENT)
Age: 35
End: 2025-07-24

## 2025-07-24 NOTE — TELEPHONE ENCOUNTER
Patient discussed today at patient selection committee.  Attempted to call patient to review recommendations from patient selection committee.  Unable to reach patient voicemail box was full.  Will attempt to call again tomorrow morning.

## 2025-07-25 ENCOUNTER — OFFICE VISIT (OUTPATIENT)
Age: 35
End: 2025-07-25

## 2025-07-25 ENCOUNTER — TELEPHONE (OUTPATIENT)
Age: 35
End: 2025-07-25

## 2025-07-25 DIAGNOSIS — E66.01 MORBID (SEVERE) OBESITY DUE TO EXCESS CALORIES (HCC): Primary | ICD-10-CM

## 2025-07-25 DIAGNOSIS — R19.7 DIARRHEA, UNSPECIFIED TYPE: ICD-10-CM

## 2025-07-25 DIAGNOSIS — R11.2 NAUSEA AND VOMITING, UNSPECIFIED VOMITING TYPE: ICD-10-CM

## 2025-07-25 DIAGNOSIS — Z59.71 UNINSURED MEDICAL EXPENSES: Primary | ICD-10-CM

## 2025-07-25 NOTE — PROGRESS NOTES
Cardiac ejection fraction 30s  Now appears to be making adequate urine.       Resource visit with JULIANO Navigator. Kazakh  utilized during visit: ID [id number].     Bill Counseling visit with JULIANO Navigator.    Patient presented medical bills during visit. Request assistance with insurance and payment arrangement.    Service Provider: [employer]   Account #: [id number]  DOS: 7/14/24  Amount: $1224.00  Action taken: JULIANO assisted patient by emailing a copy of BSR FA approval letter to service provider for write-off request. Patient understands that provider may or may not accept FA approval and that she may ultimately be responsible for this bill.     Service Provider: [employer]   Account #: [id number]  DOS: 9/6/24  Amount: $832.00  Action taken: SW assisted patient by emailing a copy of BSR FA approval letter to service provider for write-off request. Patient understands that provider may or may not accept FA approval and that she may ultimately be responsible for this bill.    Service Provider: [employer]   Account #: [id number]  DOS: 6/27/24  Amount: $1224.00  Action taken: SW assisted patient by emailing a copy of BSR FA approval letter to service provider for write-off request. Patient understands that provider may or may not accept FA approval and that she may ultimately be responsible for this bill.    Service Provider: [facility]  Account #: [id number]  DOS: 9/19/24  Amount: $63.31  Action taken: JULIANO assisted patient by faxing BSR FA approval letter along with copy of bill to billing department. Request bill write-off.    Patient requesting assistance with ophthalmology appointment, transportation scheduling, and connection with NP at [facility]. Phone visit scheduled for 7/28/25.    Plan:  Ongoing psychosocial support and resource referral, as desired by the patient and family.      RONAL Oreilly   Navigator

## 2025-07-25 NOTE — TELEPHONE ENCOUNTER
Attempted to call patient again this morning.  Phone call went directly to voicemail and mailbox is full.  Will attempt to send MyChart message to patient with recommendations from patient selection committee.

## 2025-07-28 ENCOUNTER — OFFICE VISIT (OUTPATIENT)
Age: 35
End: 2025-07-28

## 2025-07-28 ENCOUNTER — TELEPHONE (OUTPATIENT)
Age: 35
End: 2025-07-28

## 2025-07-28 DIAGNOSIS — Z71.89 COUNSELING AND COORDINATION OF CARE: Primary | ICD-10-CM

## 2025-07-28 NOTE — PROGRESS NOTES
Resource visit with JULIANO Navigator.    Assisted patient today via telephone with Hebrew  from [employer].     Neuro Ophthalmology - contacted [facility] for follow-up to referral faxed on 6/6/25 requesting neuro-ophth evaluation for patient. Unable to connect as per recording \"currently with patients\". Left detailed message requesting return call. Patient advised that she will receive a call should [facility] inform FM of an appointment date/time.    JULIANO also attempted to assist patient to connect with NP, Kimmie Cardona, from [facility]. NP attempted to reach patient on 7/24/25 but unable to reach patient. Per [facility] staff, NP not in office on Mondays. Patient informed to contact NP directly at [phone] or [phone].    Transportation scheduling - JULIANO assisted patient by contacting [employer] at [phone]. Transportation scheduled as follow:      Appt: 8/6/25 10 am - [facility]  : 9:25 am   Return: 12:00 pm   Trip Ref#: [id number]    Plan:  Ongoing psychosocial support and resource referral, as desired by the patient and family.      RONAL Oreilly   Navigator

## 2025-07-29 ENCOUNTER — TELEPHONE (OUTPATIENT)
Age: 35
End: 2025-07-29

## 2025-07-30 ENCOUNTER — TELEPHONE (OUTPATIENT)
Age: 35
End: 2025-07-30

## 2025-07-30 NOTE — TELEPHONE ENCOUNTER
Patient called office with daughter - Patient needed  -  ID: [id number] Name: Abraham - Jeremías Patients Name, , and BCN: [phone]. Confirmed Patient is interested in program. Patient has been sent the link for our pre-recorded [facility] Medical Weight Loss Orientation. Patient is required to register, view the recording, call insurance to verify benefits, then send an email to the  to schedule a consultation.

## 2025-08-06 ENCOUNTER — OFFICE VISIT (OUTPATIENT)
Age: 35
End: 2025-08-06

## 2025-08-06 DIAGNOSIS — E66.01 MORBID OBESITY (HCC): ICD-10-CM

## 2025-08-06 DIAGNOSIS — F43.81 PROLONGED GRIEF DISORDER: ICD-10-CM

## 2025-08-06 DIAGNOSIS — F50.9 EATING DISORDER, UNSPECIFIED TYPE: ICD-10-CM

## 2025-08-06 DIAGNOSIS — F43.9 TRAUMA AND STRESSOR-RELATED DISORDER: ICD-10-CM

## 2025-08-06 DIAGNOSIS — F43.21 ADJUSTMENT DISORDER WITH DEPRESSED MOOD: Primary | ICD-10-CM

## 2025-08-22 ENCOUNTER — OFFICE VISIT (OUTPATIENT)
Age: 35
End: 2025-08-22

## 2025-08-22 DIAGNOSIS — Z59.82 TRANSPORTATION UNAVAILABLE: Primary | ICD-10-CM

## 2025-08-22 SDOH — ECONOMIC STABILITY - TRANSPORTATION SECURITY: TRANSPORTATION INSECURITY: Z59.82

## (undated) DEVICE — GLOVE SURG SZ 65 THK91MIL LTX FREE SYN POLYISOPRENE

## (undated) DEVICE — PREP SKN CHLRAPRP APL 26ML STR --

## (undated) DEVICE — SUTURE MCRYL SZ 2-0 L27IN ABSRB UD SH L26MM TAPERPOINT NDL Y417H

## (undated) DEVICE — SYRINGE IRRIG 60ML SFT PLIABLE BLB EZ TO GRP 1 HND USE W/

## (undated) DEVICE — MASTISOL ADHESIVE LIQ 2/3ML

## (undated) DEVICE — SUTURE MCRYL SZ 0 L36IN ABSRB UD L36MM CT-1 1/2 CIR Y946H

## (undated) DEVICE — SOLUTION IV 1000ML 0.9% SOD CHL

## (undated) DEVICE — SUTURE STRATAFIX SYMMETRIC PDS + SZ 1 L18IN ABSRB VLT L48MM SXPP1A400

## (undated) DEVICE — SUTURE VCRL SZ 0 L36IN ABSRB VLT L40MM CT 1/2 CIR J358H

## (undated) DEVICE — HANDLE LT SNAP ON ULT DURABLE LENS FOR TRUMPF ALC DISPOSABLE

## (undated) DEVICE — REM POLYHESIVE ADULT PATIENT RETURN ELECTRODE: Brand: VALLEYLAB

## (undated) DEVICE — C-SECTION II-LF: Brand: MEDLINE INDUSTRIES, INC.

## (undated) DEVICE — STERILE LATEX POWDER-FREE SURGICAL GLOVESWITH NITRILE COATING: Brand: PROTEXIS

## (undated) DEVICE — KENDALL SCD EXPRESS SLEEVES, KNEE LENGTH, MEDIUM: Brand: KENDALL SCD

## (undated) DEVICE — GLOVE SURG SZ 75 L1212IN FNGR THK138MIL BRN LTX FREE

## (undated) DEVICE — TRAXI PANNICULUS RETRACTOR WITH RETENTUS TECHNOLOGY (BMI 30-50): Brand: TRAXI® PANNICULUS RETRACTOR

## (undated) DEVICE — STAPLER SKIN SQ 30 ABSRB STPL DISP INSORB

## (undated) DEVICE — ROCKER SWITCH PENCIL HOLSTER: Brand: VALLEYLAB

## (undated) DEVICE — SOLUTION IRRIG 1000ML 0.9% SOD CHL USP POUR PLAS BTL

## (undated) DEVICE — 3000CC GUARDIAN II: Brand: GUARDIAN

## (undated) DEVICE — TOWEL,OR,DSP,ST,BLUE,STD,2/PK,40PK/CS: Brand: MEDLINE

## (undated) DEVICE — SOLUTION IRRIG 1000ML H2O STRL BLT

## (undated) DEVICE — GOWN,AURORA,FABRIC-REINFORCED,X-LARGE: Brand: MEDLINE

## (undated) DEVICE — TOTAL TRAY, 16FR 10ML SIL FOLEY, URN: Brand: MEDLINE

## (undated) DEVICE — LARGE, DISPOSABLE ALEXIS O C-SECTION PROTECTOR - RETRACTOR: Brand: ALEXIS ® O C-SECTION PROTECTOR - RETRACTOR

## (undated) DEVICE — SUTURE PLN GUT SZ 2-0 L27IN ABSRB YELLOWISH TAN L70MM XLH 53T

## (undated) DEVICE — TRAY CATH OD16FR SIL URIN M STATLOK STBL DEV SURSTP

## (undated) DEVICE — STRAP,POSITIONING,KNEE/BODY,FOAM,4X60": Brand: MEDLINE

## (undated) DEVICE — TUBING, SUCTION, 1/4" X 12', STRAIGHT: Brand: MEDLINE

## (undated) DEVICE — TIP CLEANER: Brand: VALLEYLAB

## (undated) DEVICE — (D)PREP SKN CHLRAPRP APPL 26ML -- CONVERT TO ITEM 371833

## (undated) DEVICE — EXTRA LARGE, DISPOSABLE C-SECTION PROTECTOR - RETRACTOR: Brand: ALEXIS ® O C-SECTION PROTECTOR - RETRACTOR

## (undated) DEVICE — BULB SYRINGE, IRRIGATION WITH PROTECTIVE CAP, 60 CC, INDIVIDUALLY WRAPPED: Brand: DOVER

## (undated) DEVICE — SUTURE PDS II SZ 1 L36IN ABSRB VLT CT L40MM 1/2 CIR TAPR Z359T

## (undated) DEVICE — CATH FOLEY 16F LUBRI-SIL IC --

## (undated) DEVICE — STERILE POLYISOPRENE POWDER-FREE SURGICAL GLOVES: Brand: PROTEXIS